# Patient Record
Sex: MALE | Race: BLACK OR AFRICAN AMERICAN
[De-identification: names, ages, dates, MRNs, and addresses within clinical notes are randomized per-mention and may not be internally consistent; named-entity substitution may affect disease eponyms.]

---

## 2017-05-29 ENCOUNTER — HOSPITAL ENCOUNTER (EMERGENCY)
Dept: HOSPITAL 8 - ED | Age: 42
Discharge: HOME | End: 2017-05-29
Payer: MEDICAID

## 2017-05-29 VITALS — WEIGHT: 198.2 LBS | BODY MASS INDEX: 30.04 KG/M2 | HEIGHT: 68 IN

## 2017-05-29 VITALS — SYSTOLIC BLOOD PRESSURE: 112 MMHG | DIASTOLIC BLOOD PRESSURE: 64 MMHG

## 2017-05-29 DIAGNOSIS — K29.20: ICD-10-CM

## 2017-05-29 DIAGNOSIS — E11.65: ICD-10-CM

## 2017-05-29 DIAGNOSIS — R10.13: Primary | ICD-10-CM

## 2017-05-29 DIAGNOSIS — K85.90: ICD-10-CM

## 2017-05-29 LAB
AST SERPL-CCNC: 47 U/L (ref 15–37)
BUN SERPL-MCNC: 9 MG/DL (ref 7–18)
DAU SCREEN: (no result)
PH BLDV: 7.35 PH (ref 7.32–7.42)

## 2017-05-29 PROCEDURE — 81003 URINALYSIS AUTO W/O SCOPE: CPT

## 2017-05-29 PROCEDURE — 80307 DRUG TEST PRSMV CHEM ANLYZR: CPT

## 2017-05-29 PROCEDURE — 96374 THER/PROPH/DIAG INJ IV PUSH: CPT

## 2017-05-29 PROCEDURE — 85025 COMPLETE CBC W/AUTO DIFF WBC: CPT

## 2017-05-29 PROCEDURE — 82803 BLOOD GASES ANY COMBINATION: CPT

## 2017-05-29 PROCEDURE — 93005 ELECTROCARDIOGRAM TRACING: CPT

## 2017-05-29 PROCEDURE — 76700 US EXAM ABDOM COMPLETE: CPT

## 2017-05-29 PROCEDURE — 36415 COLL VENOUS BLD VENIPUNCTURE: CPT

## 2017-05-29 PROCEDURE — 80053 COMPREHEN METABOLIC PANEL: CPT

## 2017-05-29 PROCEDURE — 74020: CPT

## 2017-05-29 PROCEDURE — 83690 ASSAY OF LIPASE: CPT

## 2017-05-29 PROCEDURE — 96361 HYDRATE IV INFUSION ADD-ON: CPT

## 2017-05-29 PROCEDURE — 99285 EMERGENCY DEPT VISIT HI MDM: CPT

## 2017-05-29 PROCEDURE — 82010 KETONE BODYS QUAN: CPT

## 2017-09-12 ENCOUNTER — HOSPITAL ENCOUNTER (EMERGENCY)
Facility: MEDICAL CENTER | Age: 42
End: 2017-09-12
Payer: MEDICAID

## 2017-09-12 VITALS
HEIGHT: 69 IN | HEART RATE: 94 BPM | DIASTOLIC BLOOD PRESSURE: 86 MMHG | BODY MASS INDEX: 28.02 KG/M2 | SYSTOLIC BLOOD PRESSURE: 128 MMHG | OXYGEN SATURATION: 100 % | TEMPERATURE: 98.2 F | RESPIRATION RATE: 16 BRPM | WEIGHT: 189.15 LBS

## 2017-09-12 PROCEDURE — 302449 STATCHG TRIAGE ONLY (STATISTIC)

## 2017-09-12 ASSESSMENT — PAIN SCALES - GENERAL: PAINLEVEL_OUTOF10: 10

## 2017-09-12 NOTE — ED NOTES
"Chief Complaint   Patient presents with   • Open Wound     C/O multiple open wounds to left foot for 2-3 weeks. + chills/body aches at home per pt. +DM. Pt recently got out of shelter does not have any of his diabetes medication or lancets to check suger levels.      Pt has appt with [facility] tomorrow.   /86   Pulse 94   Temp 36.8 °C (98.2 °F)   Resp 16   Ht 1.753 m (5' 9\")   Wt 85.8 kg (189 lb 2.5 oz)   SpO2 100%   BMI 27.93 kg/m²     "

## 2018-07-28 ENCOUNTER — APPOINTMENT (OUTPATIENT)
Dept: RADIOLOGY | Facility: MEDICAL CENTER | Age: 43
End: 2018-07-28
Attending: EMERGENCY MEDICINE
Payer: MEDICAID

## 2018-07-28 ENCOUNTER — HOSPITAL ENCOUNTER (OUTPATIENT)
Facility: MEDICAL CENTER | Age: 43
End: 2018-07-31
Attending: EMERGENCY MEDICINE | Admitting: HOSPITALIST
Payer: MEDICAID

## 2018-07-28 DIAGNOSIS — K85.20 ALCOHOL-INDUCED ACUTE PANCREATITIS, UNSPECIFIED COMPLICATION STATUS: ICD-10-CM

## 2018-07-28 PROBLEM — E11.9 DIABETES (HCC): Status: ACTIVE | Noted: 2018-07-28

## 2018-07-28 PROBLEM — F10.939 ALCOHOL WITHDRAWAL (HCC): Status: ACTIVE | Noted: 2018-07-28

## 2018-07-28 PROBLEM — E87.1 HYPONATREMIA: Status: ACTIVE | Noted: 2018-07-28

## 2018-07-28 LAB
ALBUMIN SERPL BCP-MCNC: 4.3 G/DL (ref 3.2–4.9)
ALBUMIN/GLOB SERPL: 1.5 G/DL
ALP SERPL-CCNC: 80 U/L (ref 30–99)
ALT SERPL-CCNC: 31 U/L (ref 2–50)
ANION GAP SERPL CALC-SCNC: 13 MMOL/L (ref 0–11.9)
APAP SERPL-MCNC: <10 UG/ML (ref 10–30)
AST SERPL-CCNC: 37 U/L (ref 12–45)
BASOPHILS # BLD AUTO: 0.4 % (ref 0–1.8)
BASOPHILS # BLD: 0.05 K/UL (ref 0–0.12)
BILIRUB SERPL-MCNC: 0.6 MG/DL (ref 0.1–1.5)
BNP SERPL-MCNC: 2 PG/ML (ref 0–100)
BUN SERPL-MCNC: 15 MG/DL (ref 8–22)
CALCIUM SERPL-MCNC: 8.9 MG/DL (ref 8.5–10.5)
CHLORIDE SERPL-SCNC: 99 MMOL/L (ref 96–112)
CO2 SERPL-SCNC: 17 MMOL/L (ref 20–33)
CREAT SERPL-MCNC: 0.65 MG/DL (ref 0.5–1.4)
EOSINOPHIL # BLD AUTO: 0.6 K/UL (ref 0–0.51)
EOSINOPHIL NFR BLD: 4.5 % (ref 0–6.9)
ERYTHROCYTE [DISTWIDTH] IN BLOOD BY AUTOMATED COUNT: 39.8 FL (ref 35.9–50)
ETHANOL BLD-MCNC: 0.09 G/DL
GLOBULIN SER CALC-MCNC: 2.9 G/DL (ref 1.9–3.5)
GLUCOSE SERPL-MCNC: 337 MG/DL (ref 65–99)
HCT VFR BLD AUTO: 45.2 % (ref 42–52)
HGB BLD-MCNC: 16 G/DL (ref 14–18)
IMM GRANULOCYTES # BLD AUTO: 0.06 K/UL (ref 0–0.11)
IMM GRANULOCYTES NFR BLD AUTO: 0.5 % (ref 0–0.9)
LACTATE BLD-SCNC: 1.2 MMOL/L (ref 0.5–2)
LIPASE SERPL-CCNC: 86 U/L (ref 11–82)
LYMPHOCYTES # BLD AUTO: 4.63 K/UL (ref 1–4.8)
LYMPHOCYTES NFR BLD: 35 % (ref 22–41)
MCH RBC QN AUTO: 32.1 PG (ref 27–33)
MCHC RBC AUTO-ENTMCNC: 35.4 G/DL (ref 33.7–35.3)
MCV RBC AUTO: 90.8 FL (ref 81.4–97.8)
MONOCYTES # BLD AUTO: 0.82 K/UL (ref 0–0.85)
MONOCYTES NFR BLD AUTO: 6.2 % (ref 0–13.4)
NEUTROPHILS # BLD AUTO: 7.08 K/UL (ref 1.82–7.42)
NEUTROPHILS NFR BLD: 53.4 % (ref 44–72)
NRBC # BLD AUTO: 0 K/UL
NRBC BLD-RTO: 0 /100 WBC
PLATELET # BLD AUTO: 255 K/UL (ref 164–446)
PMV BLD AUTO: 10.3 FL (ref 9–12.9)
POTASSIUM SERPL-SCNC: 4.8 MMOL/L (ref 3.6–5.5)
PROT SERPL-MCNC: 7.2 G/DL (ref 6–8.2)
RBC # BLD AUTO: 4.98 M/UL (ref 4.7–6.1)
SALICYLATES SERPL-MCNC: 0 MG/DL (ref 15–25)
SODIUM SERPL-SCNC: 129 MMOL/L (ref 135–145)
T4 FREE SERPL-MCNC: 0.85 NG/DL (ref 0.53–1.43)
TROPONIN I SERPL-MCNC: <0.01 NG/ML (ref 0–0.04)
TSH SERPL DL<=0.005 MIU/L-ACNC: 1.25 UIU/ML (ref 0.38–5.33)
WBC # BLD AUTO: 13.2 K/UL (ref 4.8–10.8)

## 2018-07-28 PROCEDURE — 80307 DRUG TEST PRSMV CHEM ANLYZR: CPT

## 2018-07-28 PROCEDURE — 80053 COMPREHEN METABOLIC PANEL: CPT

## 2018-07-28 PROCEDURE — 700101 HCHG RX REV CODE 250: Performed by: HOSPITALIST

## 2018-07-28 PROCEDURE — 83036 HEMOGLOBIN GLYCOSYLATED A1C: CPT

## 2018-07-28 PROCEDURE — 83880 ASSAY OF NATRIURETIC PEPTIDE: CPT

## 2018-07-28 PROCEDURE — 85025 COMPLETE CBC W/AUTO DIFF WBC: CPT

## 2018-07-28 PROCEDURE — A9270 NON-COVERED ITEM OR SERVICE: HCPCS | Performed by: HOSPITALIST

## 2018-07-28 PROCEDURE — G0378 HOSPITAL OBSERVATION PER HR: HCPCS

## 2018-07-28 PROCEDURE — 700105 HCHG RX REV CODE 258: Performed by: HOSPITALIST

## 2018-07-28 PROCEDURE — 99219 PR INITIAL OBSERVATION CARE,LEVL II: CPT | Performed by: HOSPITALIST

## 2018-07-28 PROCEDURE — 84439 ASSAY OF FREE THYROXINE: CPT

## 2018-07-28 PROCEDURE — 84443 ASSAY THYROID STIM HORMONE: CPT

## 2018-07-28 PROCEDURE — 700102 HCHG RX REV CODE 250 W/ 637 OVERRIDE(OP): Performed by: HOSPITALIST

## 2018-07-28 PROCEDURE — 99285 EMERGENCY DEPT VISIT HI MDM: CPT

## 2018-07-28 PROCEDURE — 700111 HCHG RX REV CODE 636 W/ 250 OVERRIDE (IP): Performed by: HOSPITALIST

## 2018-07-28 PROCEDURE — 71045 X-RAY EXAM CHEST 1 VIEW: CPT

## 2018-07-28 PROCEDURE — 83690 ASSAY OF LIPASE: CPT

## 2018-07-28 PROCEDURE — 83605 ASSAY OF LACTIC ACID: CPT

## 2018-07-28 PROCEDURE — 84484 ASSAY OF TROPONIN QUANT: CPT

## 2018-07-28 PROCEDURE — 36415 COLL VENOUS BLD VENIPUNCTURE: CPT

## 2018-07-28 PROCEDURE — 70450 CT HEAD/BRAIN W/O DYE: CPT

## 2018-07-28 RX ORDER — LORAZEPAM 1 MG/1
1 TABLET ORAL EVERY 4 HOURS PRN
Status: DISCONTINUED | OUTPATIENT
Start: 2018-07-28 | End: 2018-07-31 | Stop reason: HOSPADM

## 2018-07-28 RX ORDER — AMOXICILLIN 250 MG
2 CAPSULE ORAL 2 TIMES DAILY
Status: DISCONTINUED | OUTPATIENT
Start: 2018-07-28 | End: 2018-07-31 | Stop reason: HOSPADM

## 2018-07-28 RX ORDER — BISACODYL 10 MG
10 SUPPOSITORY, RECTAL RECTAL
Status: DISCONTINUED | OUTPATIENT
Start: 2018-07-28 | End: 2018-07-31 | Stop reason: HOSPADM

## 2018-07-28 RX ORDER — LORAZEPAM 1 MG/1
4 TABLET ORAL
Status: DISCONTINUED | OUTPATIENT
Start: 2018-07-28 | End: 2018-07-31 | Stop reason: HOSPADM

## 2018-07-28 RX ORDER — LORAZEPAM 2 MG/ML
1.5 INJECTION INTRAMUSCULAR
Status: DISCONTINUED | OUTPATIENT
Start: 2018-07-28 | End: 2018-07-31 | Stop reason: HOSPADM

## 2018-07-28 RX ORDER — POLYETHYLENE GLYCOL 3350 17 G/17G
1 POWDER, FOR SOLUTION ORAL
Status: DISCONTINUED | OUTPATIENT
Start: 2018-07-28 | End: 2018-07-31 | Stop reason: HOSPADM

## 2018-07-28 RX ORDER — NICOTINE 21 MG/24HR
21 PATCH, TRANSDERMAL 24 HOURS TRANSDERMAL
Status: DISCONTINUED | OUTPATIENT
Start: 2018-07-29 | End: 2018-07-31 | Stop reason: HOSPADM

## 2018-07-28 RX ORDER — DEXTROSE MONOHYDRATE 25 G/50ML
25 INJECTION, SOLUTION INTRAVENOUS
Status: DISCONTINUED | OUTPATIENT
Start: 2018-07-28 | End: 2018-07-31 | Stop reason: HOSPADM

## 2018-07-28 RX ORDER — LORAZEPAM 2 MG/ML
0.5 INJECTION INTRAMUSCULAR EVERY 4 HOURS PRN
Status: DISCONTINUED | OUTPATIENT
Start: 2018-07-28 | End: 2018-07-31 | Stop reason: HOSPADM

## 2018-07-28 RX ORDER — LORAZEPAM 0.5 MG/1
0.5 TABLET ORAL EVERY 4 HOURS PRN
Status: DISCONTINUED | OUTPATIENT
Start: 2018-07-28 | End: 2018-07-31 | Stop reason: HOSPADM

## 2018-07-28 RX ORDER — GABAPENTIN 100 MG/1
100 CAPSULE ORAL 3 TIMES DAILY
Status: DISCONTINUED | OUTPATIENT
Start: 2018-07-29 | End: 2018-07-30

## 2018-07-28 RX ORDER — LORAZEPAM 2 MG/ML
1 INJECTION INTRAMUSCULAR
Status: DISCONTINUED | OUTPATIENT
Start: 2018-07-28 | End: 2018-07-31 | Stop reason: HOSPADM

## 2018-07-28 RX ORDER — SODIUM CHLORIDE 9 MG/ML
INJECTION, SOLUTION INTRAVENOUS CONTINUOUS
Status: DISCONTINUED | OUTPATIENT
Start: 2018-07-28 | End: 2018-07-31

## 2018-07-28 RX ORDER — LORAZEPAM 2 MG/ML
2 INJECTION INTRAMUSCULAR
Status: DISCONTINUED | OUTPATIENT
Start: 2018-07-28 | End: 2018-07-31 | Stop reason: HOSPADM

## 2018-07-28 RX ORDER — LORAZEPAM 1 MG/1
3 TABLET ORAL
Status: DISCONTINUED | OUTPATIENT
Start: 2018-07-28 | End: 2018-07-31 | Stop reason: HOSPADM

## 2018-07-28 RX ORDER — LORAZEPAM 1 MG/1
2 TABLET ORAL
Status: DISCONTINUED | OUTPATIENT
Start: 2018-07-28 | End: 2018-07-31 | Stop reason: HOSPADM

## 2018-07-28 RX ADMIN — LORAZEPAM 0.5 MG: 0.5 TABLET ORAL at 22:20

## 2018-07-28 RX ADMIN — FOLIC ACID: 5 INJECTION, SOLUTION INTRAMUSCULAR; INTRAVENOUS; SUBCUTANEOUS at 22:20

## 2018-07-28 RX ADMIN — SODIUM CHLORIDE: 9 INJECTION, SOLUTION INTRAVENOUS at 22:20

## 2018-07-28 ASSESSMENT — LIFESTYLE VARIABLES
EVER FELT BAD OR GUILTY ABOUT YOUR DRINKING: YES
ANXIETY: MILDLY ANXIOUS
DO YOU DRINK ALCOHOL: YES
EVER HAD A DRINK FIRST THING IN THE MORNING TO STEADY YOUR NERVES TO GET RID OF A HANGOVER: YES
DOES PATIENT WANT TO STOP DRINKING: YES
TOTAL SCORE: 4
HAVE YOU EVER FELT YOU SHOULD CUT DOWN ON YOUR DRINKING: YES
TOTAL SCORE: 4
TOTAL SCORE: 4
EVER_SMOKED: YES
EVER HAD A DRINK FIRST THING IN THE MORNING TO STEADY YOUR NERVES TO GET RID OF A HANGOVER: YES
HAVE YOU EVER FELT YOU SHOULD CUT DOWN ON YOUR DRINKING: YES
EVER FELT BAD OR GUILTY ABOUT YOUR DRINKING: YES
TREMOR: TREMOR NOT VISIBLE BUT CAN BE FELT, FINGERTIP TO FINGERTIP
CONSUMPTION TOTAL: POSITIVE
TOTAL SCORE: 4
AUDITORY DISTURBANCES: NOT PRESENT
TOTAL SCORE: VERY MILD ITCHING, PINS AND NEEDLES SENSATION, BURNING OR NUMBNESS
DOES PATIENT WANT TO TALK TO SOMEONE ABOUT QUITTING: YES
HEADACHE, FULLNESS IN HEAD: VERY MILD
TOTAL SCORE: 4
HAVE PEOPLE ANNOYED YOU BY CRITICIZING YOUR DRINKING: YES
HOW MANY TIMES IN THE PAST YEAR HAVE YOU HAD 5 OR MORE DRINKS IN A DAY: 240
ALCOHOL_USE: YES
DOES PATIENT WANT TO STOP DRINKING: YES
AVERAGE NUMBER OF DAYS PER WEEK YOU HAVE A DRINK CONTAINING ALCOHOL: 7
VISUAL DISTURBANCES: NOT PRESENT
NAUSEA AND VOMITING: NO NAUSEA AND NO VOMITING
TOTAL SCORE: 4
TOTAL SCORE: 5
ON A TYPICAL DAY WHEN YOU DRINK ALCOHOL HOW MANY DRINKS DO YOU HAVE: 4.5
DOES PATIENT WANT TO TALK TO SOMEONE ABOUT QUITTING: NO
AGITATION: NORMAL ACTIVITY
HAVE PEOPLE ANNOYED YOU BY CRITICIZING YOUR DRINKING: YES
ORIENTATION AND CLOUDING OF SENSORIUM: ORIENTED AND CAN DO SERIAL ADDITIONS
CONSUMPTION TOTAL: INCOMPLETE
PAROXYSMAL SWEATS: BARELY PERCEPTIBLE SWEATING. PALMS MOIST

## 2018-07-28 ASSESSMENT — COGNITIVE AND FUNCTIONAL STATUS - GENERAL
WALKING IN HOSPITAL ROOM: A LITTLE
SUGGESTED CMS G CODE MODIFIER MOBILITY: CJ
SUGGESTED CMS G CODE MODIFIER DAILY ACTIVITY: CH
CLIMB 3 TO 5 STEPS WITH RAILING: A LITTLE
STANDING UP FROM CHAIR USING ARMS: A LITTLE
DAILY ACTIVITIY SCORE: 24
MOBILITY SCORE: 21

## 2018-07-28 ASSESSMENT — ENCOUNTER SYMPTOMS
DIARRHEA: 0
PALPITATIONS: 0
CONSTIPATION: 0
ABDOMINAL PAIN: 1
BLOOD IN STOOL: 0
COUGH: 0
ORTHOPNEA: 0
DIZZINESS: 0
HEMOPTYSIS: 0
VOMITING: 1
SPUTUM PRODUCTION: 0
CHILLS: 0
NAUSEA: 1

## 2018-07-28 ASSESSMENT — PATIENT HEALTH QUESTIONNAIRE - PHQ9
2. FEELING DOWN, DEPRESSED, IRRITABLE, OR HOPELESS: NEARLY EVERY DAY
6. FEELING BAD ABOUT YOURSELF - OR THAT YOU ARE A FAILURE OR HAVE LET YOURSELF OR YOUR FAMILY DOWN: SEVERAL DAYS
SUM OF ALL RESPONSES TO PHQ QUESTIONS 1-9: 13
3. TROUBLE FALLING OR STAYING ASLEEP OR SLEEPING TOO MUCH: MORE THAN HALF THE DAYS
1. LITTLE INTEREST OR PLEASURE IN DOING THINGS: MORE THAN HALF THE DAYS
7. TROUBLE CONCENTRATING ON THINGS, SUCH AS READING THE NEWSPAPER OR WATCHING TELEVISION: SEVERAL DAYS
8. MOVING OR SPEAKING SO SLOWLY THAT OTHER PEOPLE COULD HAVE NOTICED. OR THE OPPOSITE, BEING SO FIGETY OR RESTLESS THAT YOU HAVE BEEN MOVING AROUND A LOT MORE THAN USUAL: SEVERAL DAYS
SUM OF ALL RESPONSES TO PHQ9 QUESTIONS 1 AND 2: 5
4. FEELING TIRED OR HAVING LITTLE ENERGY: MORE THAN HALF THE DAYS
5. POOR APPETITE OR OVEREATING: SEVERAL DAYS
9. THOUGHTS THAT YOU WOULD BE BETTER OFF DEAD, OR OF HURTING YOURSELF: NOT AT ALL

## 2018-07-28 ASSESSMENT — PAIN SCALES - GENERAL: PAINLEVEL_OUTOF10: 8
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
## 2018-07-28 NOTE — ED TRIAGE NOTES
"Pt reports feels \"tired\" for the past week. States that he has been off his medications for about 1 year. Pt drinks daily. Says he only drinks a few a day but has too have  A drink in the mornings to steady his nerves. Reports drinking last night. Pt reports history of DM and neuropathy. Denies SI, however states that sometimes his feet hurt so bad that he does think about it at times but has never tried to hurt himself.  "

## 2018-07-28 NOTE — ED NOTES
Med rec complete per pt at bedside   Pt is supposed to be on diabetic medications but states he has not taken any medications X 7 months   NKDA  No oral ABX within last 30 days

## 2018-07-29 ENCOUNTER — APPOINTMENT (OUTPATIENT)
Dept: RADIOLOGY | Facility: MEDICAL CENTER | Age: 43
End: 2018-07-29
Attending: HOSPITALIST
Payer: MEDICAID

## 2018-07-29 LAB
ALBUMIN SERPL BCP-MCNC: 3.9 G/DL (ref 3.2–4.9)
ALBUMIN/GLOB SERPL: 1.6 G/DL
ALP SERPL-CCNC: 77 U/L (ref 30–99)
ALT SERPL-CCNC: 25 U/L (ref 2–50)
ANION GAP SERPL CALC-SCNC: 8 MMOL/L (ref 0–11.9)
AST SERPL-CCNC: 15 U/L (ref 12–45)
BASOPHILS # BLD AUTO: 0.4 % (ref 0–1.8)
BASOPHILS # BLD: 0.05 K/UL (ref 0–0.12)
BILIRUB SERPL-MCNC: 0.7 MG/DL (ref 0.1–1.5)
BUN SERPL-MCNC: 12 MG/DL (ref 8–22)
CALCIUM SERPL-MCNC: 8.8 MG/DL (ref 8.5–10.5)
CHLORIDE SERPL-SCNC: 101 MMOL/L (ref 96–112)
CO2 SERPL-SCNC: 24 MMOL/L (ref 20–33)
CREAT SERPL-MCNC: 0.67 MG/DL (ref 0.5–1.4)
EOSINOPHIL # BLD AUTO: 0.59 K/UL (ref 0–0.51)
EOSINOPHIL NFR BLD: 5.1 % (ref 0–6.9)
ERYTHROCYTE [DISTWIDTH] IN BLOOD BY AUTOMATED COUNT: 39.5 FL (ref 35.9–50)
GLOBULIN SER CALC-MCNC: 2.4 G/DL (ref 1.9–3.5)
GLUCOSE BLD-MCNC: 235 MG/DL (ref 65–99)
GLUCOSE BLD-MCNC: 250 MG/DL (ref 65–99)
GLUCOSE BLD-MCNC: 277 MG/DL (ref 65–99)
GLUCOSE BLD-MCNC: 282 MG/DL (ref 65–99)
GLUCOSE SERPL-MCNC: 376 MG/DL (ref 65–99)
HCT VFR BLD AUTO: 44.9 % (ref 42–52)
HGB BLD-MCNC: 15.7 G/DL (ref 14–18)
IMM GRANULOCYTES # BLD AUTO: 0.06 K/UL (ref 0–0.11)
IMM GRANULOCYTES NFR BLD AUTO: 0.5 % (ref 0–0.9)
LIPASE SERPL-CCNC: 44 U/L (ref 11–82)
LYMPHOCYTES # BLD AUTO: 3.53 K/UL (ref 1–4.8)
LYMPHOCYTES NFR BLD: 30.6 % (ref 22–41)
MCH RBC QN AUTO: 31.8 PG (ref 27–33)
MCHC RBC AUTO-ENTMCNC: 35 G/DL (ref 33.7–35.3)
MCV RBC AUTO: 90.9 FL (ref 81.4–97.8)
MONOCYTES # BLD AUTO: 0.97 K/UL (ref 0–0.85)
MONOCYTES NFR BLD AUTO: 8.4 % (ref 0–13.4)
NEUTROPHILS # BLD AUTO: 6.35 K/UL (ref 1.82–7.42)
NEUTROPHILS NFR BLD: 55 % (ref 44–72)
NRBC # BLD AUTO: 0 K/UL
NRBC BLD-RTO: 0 /100 WBC
PLATELET # BLD AUTO: 266 K/UL (ref 164–446)
PMV BLD AUTO: 9.9 FL (ref 9–12.9)
POTASSIUM SERPL-SCNC: 4 MMOL/L (ref 3.6–5.5)
PROT SERPL-MCNC: 6.3 G/DL (ref 6–8.2)
RBC # BLD AUTO: 4.94 M/UL (ref 4.7–6.1)
SODIUM SERPL-SCNC: 133 MMOL/L (ref 135–145)
WBC # BLD AUTO: 11.6 K/UL (ref 4.8–10.8)

## 2018-07-29 PROCEDURE — 700102 HCHG RX REV CODE 250 W/ 637 OVERRIDE(OP): Performed by: HOSPITALIST

## 2018-07-29 PROCEDURE — 36415 COLL VENOUS BLD VENIPUNCTURE: CPT

## 2018-07-29 PROCEDURE — 76705 ECHO EXAM OF ABDOMEN: CPT

## 2018-07-29 PROCEDURE — A9270 NON-COVERED ITEM OR SERVICE: HCPCS | Performed by: HOSPITALIST

## 2018-07-29 PROCEDURE — 85025 COMPLETE CBC W/AUTO DIFF WBC: CPT

## 2018-07-29 PROCEDURE — G0378 HOSPITAL OBSERVATION PER HR: HCPCS

## 2018-07-29 PROCEDURE — 700111 HCHG RX REV CODE 636 W/ 250 OVERRIDE (IP): Performed by: HOSPITALIST

## 2018-07-29 PROCEDURE — 700101 HCHG RX REV CODE 250: Performed by: HOSPITALIST

## 2018-07-29 PROCEDURE — 700105 HCHG RX REV CODE 258: Performed by: HOSPITALIST

## 2018-07-29 PROCEDURE — 83690 ASSAY OF LIPASE: CPT

## 2018-07-29 PROCEDURE — 80053 COMPREHEN METABOLIC PANEL: CPT

## 2018-07-29 PROCEDURE — 99225 PR SUBSEQUENT OBSERVATION CARE,LEVEL II: CPT | Performed by: HOSPITALIST

## 2018-07-29 PROCEDURE — 82962 GLUCOSE BLOOD TEST: CPT

## 2018-07-29 RX ORDER — OXYCODONE HYDROCHLORIDE 5 MG/1
5 TABLET ORAL EVERY 4 HOURS PRN
Status: DISCONTINUED | OUTPATIENT
Start: 2018-07-29 | End: 2018-07-30

## 2018-07-29 RX ORDER — INSULIN GLARGINE 100 [IU]/ML
10 INJECTION, SOLUTION SUBCUTANEOUS EVERY EVENING
Status: DISCONTINUED | OUTPATIENT
Start: 2018-07-29 | End: 2018-07-31

## 2018-07-29 RX ORDER — OXYCODONE HYDROCHLORIDE 10 MG/1
10 TABLET ORAL EVERY 4 HOURS PRN
Status: DISCONTINUED | OUTPATIENT
Start: 2018-07-29 | End: 2018-07-30

## 2018-07-29 RX ADMIN — GABAPENTIN 100 MG: 100 CAPSULE ORAL at 06:33

## 2018-07-29 RX ADMIN — LORAZEPAM 0.5 MG: 0.5 TABLET ORAL at 02:20

## 2018-07-29 RX ADMIN — LORAZEPAM 0.5 MG: 0.5 TABLET ORAL at 11:39

## 2018-07-29 RX ADMIN — NICOTINE 21 MG: 21 PATCH, EXTENDED RELEASE TRANSDERMAL at 06:33

## 2018-07-29 RX ADMIN — INSULIN HUMAN 3 UNITS: 100 INJECTION, SOLUTION PARENTERAL at 11:39

## 2018-07-29 RX ADMIN — INSULIN GLARGINE 10 UNITS: 100 INJECTION, SOLUTION SUBCUTANEOUS at 17:06

## 2018-07-29 RX ADMIN — FOLIC ACID: 5 INJECTION, SOLUTION INTRAMUSCULAR; INTRAVENOUS; SUBCUTANEOUS at 22:52

## 2018-07-29 RX ADMIN — GABAPENTIN 100 MG: 100 CAPSULE ORAL at 17:07

## 2018-07-29 RX ADMIN — INSULIN HUMAN 3 UNITS: 100 INJECTION, SOLUTION PARENTERAL at 20:14

## 2018-07-29 RX ADMIN — OXYCODONE HYDROCHLORIDE 5 MG: 5 TABLET ORAL at 17:19

## 2018-07-29 RX ADMIN — LORAZEPAM 0.5 MG: 0.5 TABLET ORAL at 06:34

## 2018-07-29 RX ADMIN — ENOXAPARIN SODIUM 40 MG: 100 INJECTION SUBCUTANEOUS at 06:33

## 2018-07-29 RX ADMIN — INSULIN HUMAN 5 UNITS: 100 INJECTION, SOLUTION PARENTERAL at 17:06

## 2018-07-29 RX ADMIN — INSULIN HUMAN 5 UNITS: 100 INJECTION, SOLUTION PARENTERAL at 07:55

## 2018-07-29 RX ADMIN — LORAZEPAM 0.5 MG: 0.5 TABLET ORAL at 20:14

## 2018-07-29 RX ADMIN — STANDARDIZED SENNA CONCENTRATE AND DOCUSATE SODIUM 2 TABLET: 8.6; 5 TABLET, FILM COATED ORAL at 06:34

## 2018-07-29 RX ADMIN — GABAPENTIN 100 MG: 100 CAPSULE ORAL at 11:39

## 2018-07-29 ASSESSMENT — ENCOUNTER SYMPTOMS
LOSS OF CONSCIOUSNESS: 0
COUGH: 0
EYE DISCHARGE: 0
BACK PAIN: 0
ABDOMINAL PAIN: 0
NAUSEA: 0
HEMOPTYSIS: 0
FOCAL WEAKNESS: 0
POLYDIPSIA: 1
NECK PAIN: 0
WHEEZING: 0
SPEECH CHANGE: 0
BRUISES/BLEEDS EASILY: 0
CLAUDICATION: 0
DEPRESSION: 0
VOMITING: 0
PALPITATIONS: 0
WEAKNESS: 0
MYALGIAS: 0
HEADACHES: 0
DIAPHORESIS: 0
SENSORY CHANGE: 0
CONSTIPATION: 0
DIARRHEA: 0
SORE THROAT: 0
DIZZINESS: 0
SPUTUM PRODUCTION: 0
CHILLS: 0
SHORTNESS OF BREATH: 0
EYE PAIN: 0
FEVER: 0

## 2018-07-29 ASSESSMENT — LIFESTYLE VARIABLES
TOTAL SCORE: 5
HEADACHE, FULLNESS IN HEAD: NOT PRESENT
TREMOR: *
TOTAL SCORE: VERY MILD ITCHING, PINS AND NEEDLES SENSATION, BURNING OR NUMBNESS
ANXIETY: MILDLY ANXIOUS
ANXIETY: MILDLY ANXIOUS
TOTAL SCORE: VERY MILD ITCHING, PINS AND NEEDLES SENSATION, BURNING OR NUMBNESS
TOTAL SCORE: VERY MILD ITCHING, PINS AND NEEDLES SENSATION, BURNING OR NUMBNESS
NAUSEA AND VOMITING: NO NAUSEA AND NO VOMITING
AUDITORY DISTURBANCES: NOT PRESENT
SUBSTANCE_ABUSE: 1
HEADACHE, FULLNESS IN HEAD: VERY MILD
TOTAL SCORE: VERY MILD ITCHING, PINS AND NEEDLES SENSATION, BURNING OR NUMBNESS
VISUAL DISTURBANCES: NOT PRESENT
NAUSEA AND VOMITING: NO NAUSEA AND NO VOMITING
TOTAL SCORE: 6
HEADACHE, FULLNESS IN HEAD: NOT PRESENT
AUDITORY DISTURBANCES: NOT PRESENT
TREMOR: *
VISUAL DISTURBANCES: NOT PRESENT
ORIENTATION AND CLOUDING OF SENSORIUM: ORIENTED AND CAN DO SERIAL ADDITIONS
ANXIETY: MILDLY ANXIOUS
PAROXYSMAL SWEATS: BARELY PERCEPTIBLE SWEATING. PALMS MOIST
TREMOR: TREMOR NOT VISIBLE BUT CAN BE FELT, FINGERTIP TO FINGERTIP
AUDITORY DISTURBANCES: NOT PRESENT
TOTAL SCORE: 5
TREMOR: *
PAROXYSMAL SWEATS: BARELY PERCEPTIBLE SWEATING. PALMS MOIST
ANXIETY: MILDLY ANXIOUS
PAROXYSMAL SWEATS: BARELY PERCEPTIBLE SWEATING. PALMS MOIST
ORIENTATION AND CLOUDING OF SENSORIUM: ORIENTED AND CAN DO SERIAL ADDITIONS
HEADACHE, FULLNESS IN HEAD: VERY MILD
AGITATION: NORMAL ACTIVITY
AUDITORY DISTURBANCES: NOT PRESENT
PAROXYSMAL SWEATS: BARELY PERCEPTIBLE SWEATING. PALMS MOIST
ORIENTATION AND CLOUDING OF SENSORIUM: ORIENTED AND CAN DO SERIAL ADDITIONS
TOTAL SCORE: 5
NAUSEA AND VOMITING: NO NAUSEA AND NO VOMITING
VISUAL DISTURBANCES: NOT PRESENT
AGITATION: NORMAL ACTIVITY
VISUAL DISTURBANCES: NOT PRESENT
ORIENTATION AND CLOUDING OF SENSORIUM: ORIENTED AND CAN DO SERIAL ADDITIONS
AGITATION: NORMAL ACTIVITY
AGITATION: NORMAL ACTIVITY
NAUSEA AND VOMITING: NO NAUSEA AND NO VOMITING

## 2018-07-29 ASSESSMENT — PAIN SCALES - GENERAL
PAINLEVEL_OUTOF10: 8
PAINLEVEL_OUTOF10: 5

## 2018-07-29 NOTE — ASSESSMENT & PLAN NOTE
Patient has a history of diabetes, he does not currently take medications for it  Complications include peripheral neuropathy  Start insulin sliding  Start gabapentin for neuropathy  7/29:  Started lantus 10 units qhs since blood sugars 200/300.  a1c pending.  Diabetic education, will need insulin at [place].  7/30:  Added metformin 500mg po bid.

## 2018-07-29 NOTE — ASSESSMENT & PLAN NOTE
Lipase is only slightly elevated however the patient endorses symptoms consistent with pancreatitis  IV fluid hydration  Lipase 86 to 44.  Hungry:  Advanced clear liquid diet to full liquid.  Resolved, 2/2 chronic alcohol abuse.

## 2018-07-29 NOTE — PROGRESS NOTES
Assumed care of pt, received report from day shift RN, pt assessed.  Pt complaining of 8/10 BL foot pain and abdominal pain, no pain medication available at this time, will reassess pain when medication available.  Pt is A&O x4.  Pt moderate fall risk, wearing treaded socks, bed locked and in lowest position, bed alarm not indicated.  Pt instructed to call for assistance prior to getting OOB, pt verbalized understanding.  Call light, phone, and personal belongings within reach.

## 2018-07-29 NOTE — CARE PLAN
Problem: Communication  Goal: The ability to communicate needs accurately and effectively will improve  Outcome: PROGRESSING SLOWER THAN EXPECTED  Pt is sleeping this am, slightly difficult to arouse. CIWA protocol in place. siderails padded, suction set up at bedside. Rally bag infusing with NS. Will monitor.     Problem: Infection  Goal: Will remain free from infection  Outcome: PROGRESSING AS EXPECTED  Labs and vitals stable.

## 2018-07-29 NOTE — H&P
"Hospital Medicine History & Physical Note    Date of Service  7/28/2018    Primary Care Physician  Pcp Pt States None    Consultants  None    Code Status  Full Code    Chief Complaint  \"My pancreas is bothering me\"    History of Presenting Illness  43 y.o. male who presented 7/28/2018 with abdominal pain, he says that his pancreas is bothering him.    Mr Escudero has a history of alcoholism and diabetes, he is currently not on any treatment for his diabetes.  Patient drinks several beers per day, maybe 6-12.  He says that he has had prior episodes of pancreatitis and alcohol withdrawal.  He presented to the emergency room today complaining of upper quadrant abdominal pain without radiation, the pain is burning, it is associated with nausea and vomiting which has been nonbloody.  This is similar to prior episodes of pancreatitis.  Patient's last drink was last night.  It is noted by labs that the patient's blood sugars grossly uncontrolled, he endorses polyuria and polydipsia    Review of Systems  Review of Systems   Constitutional: Negative for chills and malaise/fatigue.   Respiratory: Negative for cough, hemoptysis and sputum production.    Cardiovascular: Negative for chest pain, palpitations and orthopnea.   Gastrointestinal: Positive for abdominal pain, nausea and vomiting. Negative for blood in stool, constipation and diarrhea.   Genitourinary: Positive for frequency.   Skin: Negative for itching and rash.   Neurological: Negative for dizziness.   All other systems reviewed and are negative.      Past Medical History   has a past medical history of Alcohol abuse; Depression (2010); Diabetes; and Pneumonia (approx 2011).    Family History  Father with cancer, details unknown    Social History   reports that he has been smoking Cigarettes.  He has a 10.00 pack-year smoking history. He has never used smokeless tobacco. He reports that he drinks alcohol. He reports that he does not use " drugs.    Allergies  Allergies   Allergen Reactions   • Apple Hives   • Lactose Diarrhea       Medications  None       Physical Exam  Blood Pressure: 127/83   Temperature: 36.6 °C (97.8 °F)   Pulse: 81   Respiration: 17   Pulse Oximetry: 93 %     Physical Exam   Constitutional: He is oriented to person, place, and time. He appears well-developed and well-nourished.   HENT:   Head: Normocephalic and atraumatic.   Eyes: Conjunctivae and EOM are normal. Right eye exhibits no discharge. Left eye exhibits no discharge.   Cardiovascular: Normal rate, regular rhythm and intact distal pulses.    No murmur heard.  2+ Radial Pulses  Brisk Capillary Refill   Pulmonary/Chest: Effort normal and breath sounds normal. No respiratory distress. He has no wheezes.   Abdominal: Soft. Bowel sounds are normal. He exhibits no distension. There is tenderness. There is no rebound and no guarding.   Musculoskeletal: Normal range of motion. He exhibits no edema.   Neurological: He is alert and oriented to person, place, and time. No cranial nerve deficit.   Sensation to light touch is decreased distally   Skin: Skin is warm and dry. He is not diaphoretic. No erythema.   Skin is warm and well perfused   Psychiatric: He has a normal mood and affect.       Laboratory:  Recent Labs      07/28/18   1120   WBC  13.2*   RBC  4.98   HEMOGLOBIN  16.0   HEMATOCRIT  45.2   MCV  90.8   MCH  32.1   MCHC  35.4*   RDW  39.8   PLATELETCT  255   MPV  10.3     Recent Labs      07/28/18   1120   SODIUM  129*   POTASSIUM  4.8   CHLORIDE  99   CO2  17*   GLUCOSE  337*   BUN  15   CREATININE  0.65   CALCIUM  8.9     Recent Labs      07/28/18   1120   ALTSGPT  31   ASTSGOT  37   ALKPHOSPHAT  80   TBILIRUBIN  0.6   LIPASE  86*   GLUCOSE  337*         Recent Labs      07/28/18   1120   BNPBTYPENAT  2         Lab Results   Component Value Date    TROPONINI <0.01 07/28/2018       Urinalysis:    Lab Results   Component Value Date    SPECGRAVITY 1.013 12/14/2012     GLUCOSEUR >1000 (A) 12/14/2012    KETONES 20 (A) 12/14/2012    NITRITE Negative 12/14/2012    WBCURINE 0-2 (A) 12/14/2012    RBCURINE None observed 12/14/2012    BACTERIA None observed 12/14/2012    EPITHELCELL Few 12/14/2012        Imaging:  CT-HEAD W/O   Final Result      1.  Head CT without contrast within normal limits. No evidence of acute cerebral infarction, hemorrhage or mass lesion.   2.  There is right-sided chronic sinus disease.      DX-CHEST-PORTABLE (1 VIEW)   Final Result      No acute cardiopulmonary process is seen.            Assessment/Plan:  I anticipate this patient is appropriate for observation status at this time.    Diabetes (HCC)- (present on admission)   Assessment & Plan    Patient has a history of diabetes, he does not currently take medications for it  Complications include peripheral neuropathy  Start insulin sliding  Start gabapentin for neuropathy        Alcohol withdrawal (HCC)- (present on admission)   Assessment & Plan    Has history of alcohol withdrawal, last drink was last night, start CIWA protocol        Alcohol-induced acute pancreatitis without infection or necrosis- (present on admission)   Assessment & Plan    Lipase is only slightly elevated however the patient endorses symptoms consistent with pancreatitis  IV fluid hydration        Hyponatremia- (present on admission)   Assessment & Plan    Suspect hypovolemic hyponatremia, IV fluid hydration, repeat labs tomorrow            VTE prophylaxis: Lovenox

## 2018-07-29 NOTE — PROGRESS NOTES
2 RN skin check complete. All bony prominences checked. Pt has very dry skin on heels and toes but no obvious breakdown. No pressure sores or reddened areas.

## 2018-07-29 NOTE — ASSESSMENT & PLAN NOTE
Has history of alcohol withdrawal, last drink was last night, start CIWA protocol  7/29:  Discussed AA, inpatient rehab, need for cessation.  States lives with GF who does not drink alcohol.  7/30:  CIWA remains 5,4,4.  No hallucinations, no tremor noted on exam, VSS.

## 2018-07-29 NOTE — PROGRESS NOTES
[facility] Hospitalist Progress Note    Date of Service: 7/29/2018    Chief Complaint  43 y.o. male admitted 7/28/2018 with abdominal pain, he says that his pancreas is bothering him.     Mr Escudero has a history of alcoholism and diabetes, he is currently not on any treatment for his diabetes.  Patient drinks several beers per day, maybe 6-12.  He says that he has had prior episodes of pancreatitis and alcohol withdrawal.  He presented to the emergency room today complaining of upper quadrant abdominal pain without radiation, the pain is burning, it is associated with nausea and vomiting which has been nonbloody.  This is similar to prior episodes of pancreatitis.  Patient's last drink was last night.  It is noted by labs that the patient's blood sugars grossly uncontrolled, he endorses polyuria and polydipsia.  Elevated Hga1c since 2012 on review.       Interval Problem Update  7/29:  Awakens to name, c/o pain in epigastrium, but states hungry and wants to eat, advanced diet from clears to full liquid.  CIWA 6,5,5. Added lantus 10 units qhs.    Consultants/Specialty  none    Disposition  Home.  AA or inpatient alcohol rehab discussed.  Lives with GF who does not drink per   patient.  Will need insulin at [place]. Diabetic education ordered.        Review of Systems   Constitutional: Positive for malaise/fatigue. Negative for chills, diaphoresis and fever.   HENT: Negative for congestion and sore throat.    Eyes: Negative for pain and discharge.   Respiratory: Negative for cough, hemoptysis, sputum production, shortness of breath and wheezing.    Cardiovascular: Negative for chest pain, palpitations, claudication and leg swelling.   Gastrointestinal: Negative for abdominal pain, constipation, diarrhea, melena, nausea and vomiting.   Genitourinary: Negative for dysuria, frequency and urgency.   Musculoskeletal: Negative for back pain, joint pain, myalgias and neck pain.   Skin: Negative for itching and rash.   Neurological:  Negative for dizziness, sensory change, speech change, focal weakness, loss of consciousness, weakness and headaches.   Endo/Heme/Allergies: Positive for polydipsia. Does not bruise/bleed easily.   Psychiatric/Behavioral: Positive for substance abuse. Negative for depression and suicidal ideas.      Physical Exam  Laboratory/Imaging   Hemodynamics  Temp (24hrs), Av.6 °C (97.8 °F), Min:35.9 °C (96.7 °F), Max:36.9 °C (98.5 °F)   Temperature: 35.9 °C (96.7 °F)  Pulse  Av.7  Min: 66  Max: 97    Blood Pressure: 127/86      Respiratory      Respiration: 18, Pulse Oximetry: 96 %             Fluids    Intake/Output Summary (Last 24 hours) at 18 2136  Last data filed at 18 1638   Gross per 24 hour   Intake                0 ml   Output             1400 ml   Net            -1400 ml       Nutrition  Orders Placed This Encounter   Procedures   • Diet Order Full Liquid, Diabetic     Standing Status:   Standing     Number of Occurrences:   1     Order Specific Question:   Diet:     Answer:   Full Liquid [11]     Order Specific Question:   Diet:     Answer:   Diabetic [3]     Physical Exam   Constitutional: He is oriented to person, place, and time. He appears well-developed and well-nourished. No distress.   HENT:   Head: Normocephalic and atraumatic.   Mouth/Throat: Oropharynx is clear and moist. No oropharyngeal exudate.   Eyes: Pupils are equal, round, and reactive to light. Conjunctivae and EOM are normal. Right eye exhibits no discharge. Left eye exhibits no discharge. No scleral icterus.   Neck: Normal range of motion. Neck supple. No JVD present. No tracheal deviation present. No thyromegaly present.   Cardiovascular: Normal rate, regular rhythm and normal heart sounds.  Exam reveals no gallop and no friction rub.    No murmur heard.  Pulmonary/Chest: Effort normal and breath sounds normal. No respiratory distress. He has no wheezes. He has no rales. He exhibits no tenderness.   Abdominal: Soft. Bowel  sounds are normal. He exhibits no distension and no mass. There is no tenderness. There is no rebound and no guarding.   Musculoskeletal: Normal range of motion. He exhibits no edema or tenderness.   Lymphadenopathy:     He has no cervical adenopathy.   Neurological: He is alert and oriented to person, place, and time. No cranial nerve deficit. He exhibits normal muscle tone.   Skin: Skin is warm and dry. No rash noted. He is not diaphoretic. No erythema.   Psychiatric: He has a normal mood and affect. His behavior is normal. Judgment and thought content normal.   Nursing note and vitals reviewed.      Recent Labs      07/28/18   1120  07/29/18   0026   WBC  13.2*  11.6*   RBC  4.98  4.94   HEMOGLOBIN  16.0  15.7   HEMATOCRIT  45.2  44.9   MCV  90.8  90.9   MCH  32.1  31.8   MCHC  35.4*  35.0   RDW  39.8  39.5   PLATELETCT  255  266   MPV  10.3  9.9     Recent Labs      07/28/18   1120  07/29/18   0026   SODIUM  129*  133*   POTASSIUM  4.8  4.0   CHLORIDE  99  101   CO2  17*  24   GLUCOSE  337*  376*   BUN  15  12   CREATININE  0.65  0.67   CALCIUM  8.9  8.8         Recent Labs      07/28/18   1120   BNPBTYPENAT  2              Assessment/Plan     Diabetes (HCC)- (present on admission)   Assessment & Plan    Patient has a history of diabetes, he does not currently take medications for it  Complications include peripheral neuropathy  Start insulin sliding  Start gabapentin for neuropathy  7/29:  Started lantus 10 units qhs since blood sugars 200/300.  a1c pending.  Diabetic education, will need insulin at [place].        Alcohol withdrawal (HCC)- (present on admission)   Assessment & Plan    Has history of alcohol withdrawal, last drink was last night, start CIWA protocol  7/29:  Discussed AA, inpatient rehab, need for cessation.  States lives with GF who does not drink alcohol.        Alcohol-induced acute pancreatitis without infection or necrosis- (present on admission)   Assessment & Plan    Lipase is only slightly  elevated however the patient endorses symptoms consistent with pancreatitis  IV fluid hydration  Lipase 86.  Hungry:  Advanced clear liquid diet to full liquid.          Hyponatremia- (present on admission)   Assessment & Plan    Suspect hypovolemic hyponatremia, IV fluid hydration, repeat labs tomorrow          Quality-Core Measures

## 2018-07-30 PROBLEM — E11.40 DIABETIC NEUROPATHY ASSOCIATED WITH TYPE 2 DIABETES MELLITUS (HCC): Status: ACTIVE | Noted: 2018-07-30

## 2018-07-30 LAB
BASOPHILS # BLD AUTO: 0.3 % (ref 0–1.8)
BASOPHILS # BLD: 0.03 K/UL (ref 0–0.12)
EOSINOPHIL # BLD AUTO: 0.47 K/UL (ref 0–0.51)
EOSINOPHIL NFR BLD: 5 % (ref 0–6.9)
ERYTHROCYTE [DISTWIDTH] IN BLOOD BY AUTOMATED COUNT: 39.1 FL (ref 35.9–50)
GLUCOSE BLD-MCNC: 181 MG/DL (ref 65–99)
GLUCOSE BLD-MCNC: 223 MG/DL (ref 65–99)
GLUCOSE BLD-MCNC: 225 MG/DL (ref 65–99)
GLUCOSE BLD-MCNC: 268 MG/DL (ref 65–99)
HCT VFR BLD AUTO: 40.4 % (ref 42–52)
HGB BLD-MCNC: 14.3 G/DL (ref 14–18)
IMM GRANULOCYTES # BLD AUTO: 0.04 K/UL (ref 0–0.11)
IMM GRANULOCYTES NFR BLD AUTO: 0.4 % (ref 0–0.9)
LYMPHOCYTES # BLD AUTO: 2.94 K/UL (ref 1–4.8)
LYMPHOCYTES NFR BLD: 31.4 % (ref 22–41)
MCH RBC QN AUTO: 32.1 PG (ref 27–33)
MCHC RBC AUTO-ENTMCNC: 35.4 G/DL (ref 33.7–35.3)
MCV RBC AUTO: 90.6 FL (ref 81.4–97.8)
MONOCYTES # BLD AUTO: 0.79 K/UL (ref 0–0.85)
MONOCYTES NFR BLD AUTO: 8.4 % (ref 0–13.4)
NEUTROPHILS # BLD AUTO: 5.1 K/UL (ref 1.82–7.42)
NEUTROPHILS NFR BLD: 54.5 % (ref 44–72)
NRBC # BLD AUTO: 0 K/UL
NRBC BLD-RTO: 0 /100 WBC
PLATELET # BLD AUTO: 246 K/UL (ref 164–446)
PMV BLD AUTO: 10.1 FL (ref 9–12.9)
RBC # BLD AUTO: 4.46 M/UL (ref 4.7–6.1)
WBC # BLD AUTO: 9.4 K/UL (ref 4.8–10.8)

## 2018-07-30 PROCEDURE — 99225 PR SUBSEQUENT OBSERVATION CARE,LEVEL II: CPT | Performed by: HOSPITALIST

## 2018-07-30 PROCEDURE — 700102 HCHG RX REV CODE 250 W/ 637 OVERRIDE(OP): Performed by: HOSPITALIST

## 2018-07-30 PROCEDURE — A9270 NON-COVERED ITEM OR SERVICE: HCPCS | Performed by: HOSPITALIST

## 2018-07-30 PROCEDURE — G0378 HOSPITAL OBSERVATION PER HR: HCPCS

## 2018-07-30 PROCEDURE — 83036 HEMOGLOBIN GLYCOSYLATED A1C: CPT

## 2018-07-30 PROCEDURE — 36415 COLL VENOUS BLD VENIPUNCTURE: CPT

## 2018-07-30 PROCEDURE — 85025 COMPLETE CBC W/AUTO DIFF WBC: CPT

## 2018-07-30 PROCEDURE — 82962 GLUCOSE BLOOD TEST: CPT

## 2018-07-30 PROCEDURE — 700111 HCHG RX REV CODE 636 W/ 250 OVERRIDE (IP): Performed by: HOSPITALIST

## 2018-07-30 RX ORDER — GABAPENTIN 300 MG/1
600 CAPSULE ORAL 2 TIMES DAILY
Status: DISCONTINUED | OUTPATIENT
Start: 2018-07-30 | End: 2018-07-30

## 2018-07-30 RX ORDER — GABAPENTIN 300 MG/1
600 CAPSULE ORAL 3 TIMES DAILY
Status: DISCONTINUED | OUTPATIENT
Start: 2018-07-30 | End: 2018-07-31 | Stop reason: HOSPADM

## 2018-07-30 RX ADMIN — INSULIN GLARGINE 10 UNITS: 100 INJECTION, SOLUTION SUBCUTANEOUS at 17:29

## 2018-07-30 RX ADMIN — ENOXAPARIN SODIUM 40 MG: 100 INJECTION SUBCUTANEOUS at 05:34

## 2018-07-30 RX ADMIN — INSULIN HUMAN 5 UNITS: 100 INJECTION, SOLUTION PARENTERAL at 20:27

## 2018-07-30 RX ADMIN — METFORMIN HYDROCHLORIDE 500 MG: 500 TABLET, FILM COATED ORAL at 17:30

## 2018-07-30 RX ADMIN — INSULIN HUMAN 2 UNITS: 100 INJECTION, SOLUTION PARENTERAL at 17:29

## 2018-07-30 RX ADMIN — NICOTINE 21 MG: 21 PATCH, EXTENDED RELEASE TRANSDERMAL at 05:34

## 2018-07-30 RX ADMIN — GABAPENTIN 100 MG: 100 CAPSULE ORAL at 05:34

## 2018-07-30 RX ADMIN — INSULIN HUMAN 3 UNITS: 100 INJECTION, SOLUTION PARENTERAL at 08:16

## 2018-07-30 RX ADMIN — INSULIN HUMAN 3 UNITS: 100 INJECTION, SOLUTION PARENTERAL at 12:14

## 2018-07-30 RX ADMIN — GABAPENTIN 100 MG: 100 CAPSULE ORAL at 10:54

## 2018-07-30 RX ADMIN — GABAPENTIN 600 MG: 300 CAPSULE ORAL at 17:30

## 2018-07-30 ASSESSMENT — ENCOUNTER SYMPTOMS
CHILLS: 0
HEMOPTYSIS: 0
POLYDIPSIA: 1
DEPRESSION: 0
SPUTUM PRODUCTION: 0
NECK PAIN: 0
WHEEZING: 0
WEAKNESS: 0
PALPITATIONS: 0
LOSS OF CONSCIOUSNESS: 0
DIZZINESS: 0
BACK PAIN: 0
SORE THROAT: 0
NAUSEA: 0
DIAPHORESIS: 0
SPEECH CHANGE: 0
ABDOMINAL PAIN: 0
FOCAL WEAKNESS: 0
EYE PAIN: 0
EYE DISCHARGE: 0
BRUISES/BLEEDS EASILY: 0
SENSORY CHANGE: 0
FEVER: 0
MYALGIAS: 0
COUGH: 0
SHORTNESS OF BREATH: 0
HEADACHES: 0
VOMITING: 0
CONSTIPATION: 0
CLAUDICATION: 0
DIARRHEA: 0

## 2018-07-30 ASSESSMENT — LIFESTYLE VARIABLES
TOTAL SCORE: 0
AGITATION: NORMAL ACTIVITY
VISUAL DISTURBANCES: NOT PRESENT
AUDITORY DISTURBANCES: NOT PRESENT
TOTAL SCORE: 4
TREMOR: TREMOR NOT VISIBLE BUT CAN BE FELT, FINGERTIP TO FINGERTIP
VISUAL DISTURBANCES: NOT PRESENT
AUDITORY DISTURBANCES: NOT PRESENT
AUDITORY DISTURBANCES: NOT PRESENT
SUBSTANCE_ABUSE: 1
PAROXYSMAL SWEATS: BARELY PERCEPTIBLE SWEATING. PALMS MOIST
TOTAL SCORE: VERY MILD ITCHING, PINS AND NEEDLES SENSATION, BURNING OR NUMBNESS
PAROXYSMAL SWEATS: BARELY PERCEPTIBLE SWEATING. PALMS MOIST
VISUAL DISTURBANCES: NOT PRESENT
TOTAL SCORE: VERY MILD ITCHING, PINS AND NEEDLES SENSATION, BURNING OR NUMBNESS
ANXIETY: NO ANXIETY (AT EASE)
AGITATION: NORMAL ACTIVITY
PAROXYSMAL SWEATS: NO SWEAT VISIBLE
ORIENTATION AND CLOUDING OF SENSORIUM: ORIENTED AND CAN DO SERIAL ADDITIONS
ANXIETY: NO ANXIETY (AT EASE)
NAUSEA AND VOMITING: NO NAUSEA AND NO VOMITING
HEADACHE, FULLNESS IN HEAD: VERY MILD
TOTAL SCORE: 4
TREMOR: TREMOR NOT VISIBLE BUT CAN BE FELT, FINGERTIP TO FINGERTIP
HEADACHE, FULLNESS IN HEAD: VERY MILD
ANXIETY: NO ANXIETY (AT EASE)
AGITATION: NORMAL ACTIVITY
NAUSEA AND VOMITING: NO NAUSEA AND NO VOMITING
NAUSEA AND VOMITING: NO NAUSEA AND NO VOMITING
HEADACHE, FULLNESS IN HEAD: NOT PRESENT
TREMOR: NO TREMOR

## 2018-07-30 ASSESSMENT — PAIN SCALES - GENERAL
PAINLEVEL_OUTOF10: 3
PAINLEVEL_OUTOF10: 5

## 2018-07-30 NOTE — CARE PLAN
Problem: Venous Thromboembolism (VTW)/Deep Vein Thrombosis (DVT) Prevention:  Goal: Patient will participate in Venous Thrombosis (VTE)/Deep Vein Thrombosis (DVT)Prevention Measures  Outcome: PROGRESSING AS EXPECTED  Pt on Lovenox for pharmacologic prophylaxis.    Problem: Bowel/Gastric:  Goal: Will not experience complications related to bowel motility  Outcome: PROGRESSING AS EXPECTED  LB 07/29/2018.  Pt refusing stool softeners, has no complaints of loose stools or constipation.

## 2018-07-30 NOTE — DIETARY
Nutrition Services:    Wt Loss on Nutrition Admit Screen. Pt is currently on a diabetic diet and per chart pt PO %. Pt is eating well. Pt was sleeping and would not arouse to name. Per chart review pt's wt on 9/12/17: 85.8 kg. Wt loss percent is 7.5% in 10 months, which is not significant. Ht: 175.3 cm, Wt 7/28: 79.4 kg via bed scale, BMI 25.84.  Consult RD as needed. RD will re-screen weekly.      RD available prn

## 2018-07-30 NOTE — PROGRESS NOTES
Assumed care of pt, received report from day shift RN, pt assessed.  Pt complaining of 8/10 BL foot pain, no pain medication available at this time, will reassess when available.  Pt is A&O x4.  Pt on CIWA protocol with current CIWA score of 5.  Pt low fall risk, wearing treaded socks, bed locked and in lowest position, bed alarm not indicated.  Pt instructed to call for assistance prior to getting OOB, pt verbalized understanding.  Call light, phone, and personal belongings within reach.

## 2018-07-30 NOTE — PROGRESS NOTES
[facility]ist Progress Note    Date of Service: 7/30/2018    Chief Complaint  43 y.o. male admitted 7/28/2018 with abdominal pain, he says that his pancreas is bothering him.     Mr Escudero has a history of alcoholism and diabetes, he is currently not on any treatment for his diabetes.  Patient drinks several beers per day, maybe 6-12.  He says that he has had prior episodes of pancreatitis and alcohol withdrawal.  He presented to the emergency room today complaining of upper quadrant abdominal pain without radiation, the pain is burning, it is associated with nausea and vomiting which has been nonbloody.  This is similar to prior episodes of pancreatitis.  Patient's last drink was last night.  It is noted by labs that the patient's blood sugars grossly uncontrolled, he endorses polyuria and polydipsia.  Elevated Hga1c since 2012 on review.       Interval Problem Update  7/29:  Awakens to name, c/o pain in epigastrium, but states hungry and wants to eat, advanced diet from clears to full liquid.  CIWA 6,5,5. Added lantus 10 units qhs.  7/30:  Patient feeling better, tolerating GI soft diet today.  But, states still shakey, feels needs 1 more day.  Plans to stop alcohol altogeter.  Advanced diet to diabetic regular.  Increased neurontin from 300mg tid to 600mg po tid, stopped oxycodone.  A1c still pending from 7/28 order.  Needs lantus at discharge, diabetic educator pending.  Started metformin 500mg bid with increase of lantus 10 to 15 units qhs.    Consultants/Specialty  none    Disposition  Home.  AA or inpatient alcohol rehab discussed.  Lives with GF who does not drink per   patient.  Will need insulin at [place]. Diabetic education ordered.        Review of Systems   Constitutional: Positive for malaise/fatigue. Negative for chills, diaphoresis and fever.   HENT: Negative for congestion and sore throat.    Eyes: Negative for pain and discharge.   Respiratory: Negative for cough, hemoptysis, sputum production,  shortness of breath and wheezing.    Cardiovascular: Negative for chest pain, palpitations, claudication and leg swelling.   Gastrointestinal: Negative for abdominal pain, constipation, diarrhea, melena, nausea and vomiting.   Genitourinary: Negative for dysuria, frequency and urgency.   Musculoskeletal: Negative for back pain, joint pain, myalgias and neck pain.   Skin: Negative for itching and rash.   Neurological: Negative for dizziness, sensory change, speech change, focal weakness, loss of consciousness, weakness and headaches.   Endo/Heme/Allergies: Positive for polydipsia. Does not bruise/bleed easily.   Psychiatric/Behavioral: Positive for substance abuse. Negative for depression and suicidal ideas.      Physical Exam  Laboratory/Imaging   Hemodynamics  Temp (24hrs), Av.3 °C (97.3 °F), Min:35.9 °C (96.7 °F), Max:36.7 °C (98 °F)   Temperature: 36.4 °C (97.5 °F)  Pulse  Av.9  Min: 66  Max: 97    Blood Pressure: 116/76      Respiratory      Respiration: 18, Pulse Oximetry: 97 %             Fluids    Intake/Output Summary (Last 24 hours) at 18 1550  Last data filed at 18 1300   Gross per 24 hour   Intake              600 ml   Output             3550 ml   Net            -2950 ml       Nutrition  Orders Placed This Encounter   Procedures   • Diet Order Diabetic     Standing Status:   Standing     Number of Occurrences:   1     Order Specific Question:   Diet:     Answer:   Diabetic [3]     Physical Exam   Constitutional: He is oriented to person, place, and time. He appears well-developed and well-nourished. No distress.   HENT:   Head: Normocephalic and atraumatic.   Mouth/Throat: Oropharynx is clear and moist. No oropharyngeal exudate.   Eyes: Pupils are equal, round, and reactive to light. Conjunctivae and EOM are normal. Right eye exhibits no discharge. Left eye exhibits no discharge. No scleral icterus.   Neck: Normal range of motion. Neck supple. No JVD present. No tracheal deviation  present. No thyromegaly present.   Cardiovascular: Normal rate, regular rhythm and normal heart sounds.  Exam reveals no gallop and no friction rub.    No murmur heard.  Pulmonary/Chest: Effort normal and breath sounds normal. No respiratory distress. He has no wheezes. He has no rales. He exhibits no tenderness.   Abdominal: Soft. Bowel sounds are normal. He exhibits no distension and no mass. There is no tenderness. There is no rebound and no guarding.   Musculoskeletal: Normal range of motion. He exhibits no edema or tenderness.   Lymphadenopathy:     He has no cervical adenopathy.   Neurological: He is alert and oriented to person, place, and time. No cranial nerve deficit. He exhibits normal muscle tone.   Skin: Skin is warm and dry. No rash noted. He is not diaphoretic. No erythema.   Psychiatric: He has a normal mood and affect. His behavior is normal. Judgment and thought content normal.   Nursing note and vitals reviewed.      Recent Labs      07/28/18   1120  07/29/18   0026  07/30/18   0952   WBC  13.2*  11.6*  9.4   RBC  4.98  4.94  4.46*   HEMOGLOBIN  16.0  15.7  14.3   HEMATOCRIT  45.2  44.9  40.4*   MCV  90.8  90.9  90.6   MCH  32.1  31.8  32.1   MCHC  35.4*  35.0  35.4*   RDW  39.8  39.5  39.1   PLATELETCT  255  266  246   MPV  10.3  9.9  10.1     Recent Labs      07/28/18   1120  07/29/18   0026   SODIUM  129*  133*   POTASSIUM  4.8  4.0   CHLORIDE  99  101   CO2  17*  24   GLUCOSE  337*  376*   BUN  15  12   CREATININE  0.65  0.67   CALCIUM  8.9  8.8         Recent Labs      07/28/18   1120   BNPBTYPENAT  2              Assessment/Plan     Diabetes (HCC)- (present on admission)   Assessment & Plan    Patient has a history of diabetes, he does not currently take medications for it  Complications include peripheral neuropathy  Start insulin sliding  Start gabapentin for neuropathy  7/29:  Started lantus 10 units qhs since blood sugars 200/300.  a1c pending.  Diabetic education, will need insulin at  dc.  7/30:  Added metformin 500mg po bid.        Alcohol withdrawal (HCC)- (present on admission)   Assessment & Plan    Has history of alcohol withdrawal, last drink was last night, start CIWA protocol  7/29:  Discussed AA, inpatient rehab, need for cessation.  States lives with GF who does not drink alcohol.  7/30:  CIWA remains 5,4,4.  No hallucinations, no tremor noted on exam, VSS.        Diabetic neuropathy associated with type 2 diabetes mellitus (HCC)- (present on admission)   Assessment & Plan    Increased neurontin 300 tid to 600mg tid.        Alcohol-induced acute pancreatitis without infection or necrosis- (present on admission)   Assessment & Plan    Lipase is only slightly elevated however the patient endorses symptoms consistent with pancreatitis  IV fluid hydration  Lipase 86 to 44.  Hungry:  Advanced clear liquid diet to full liquid.  Resolved, 2/2 chronic alcohol abuse.        Hyponatremia- (present on admission)   Assessment & Plan    Pseudohyponatremia.  Resolved with IVFs and glucose lowering.          Quality-Core Measures

## 2018-07-30 NOTE — CARE PLAN
Problem: Communication  Goal: The ability to communicate needs accurately and effectively will improve  Outcome: PROGRESSING AS EXPECTED  Pt verbalizes understanding to POC. Pt wants to speak with SW today regarding resources for quitting alcohol use today.     Problem: Infection  Goal: Will remain free from infection  Outcome: PROGRESSING AS EXPECTED  Labs and vitals stable.

## 2018-07-31 VITALS
HEIGHT: 69 IN | SYSTOLIC BLOOD PRESSURE: 120 MMHG | TEMPERATURE: 97.4 F | OXYGEN SATURATION: 96 % | BODY MASS INDEX: 25.92 KG/M2 | WEIGHT: 175 LBS | RESPIRATION RATE: 18 BRPM | HEART RATE: 76 BPM | DIASTOLIC BLOOD PRESSURE: 78 MMHG

## 2018-07-31 PROBLEM — E87.1 HYPONATREMIA: Status: RESOLVED | Noted: 2018-07-28 | Resolved: 2018-07-31

## 2018-07-31 PROBLEM — E11.40 DIABETIC NEUROPATHY ASSOCIATED WITH TYPE 2 DIABETES MELLITUS (HCC): Status: RESOLVED | Noted: 2018-07-30 | Resolved: 2018-07-31

## 2018-07-31 LAB
ANION GAP SERPL CALC-SCNC: 8 MMOL/L (ref 0–11.9)
BASOPHILS # BLD AUTO: 0.4 % (ref 0–1.8)
BASOPHILS # BLD: 0.04 K/UL (ref 0–0.12)
BUN SERPL-MCNC: 10 MG/DL (ref 8–22)
CALCIUM SERPL-MCNC: 8.8 MG/DL (ref 8.5–10.5)
CHLORIDE SERPL-SCNC: 104 MMOL/L (ref 96–112)
CO2 SERPL-SCNC: 24 MMOL/L (ref 20–33)
CREAT SERPL-MCNC: 0.78 MG/DL (ref 0.5–1.4)
EOSINOPHIL # BLD AUTO: 0.5 K/UL (ref 0–0.51)
EOSINOPHIL NFR BLD: 4.5 % (ref 0–6.9)
ERYTHROCYTE [DISTWIDTH] IN BLOOD BY AUTOMATED COUNT: 39.2 FL (ref 35.9–50)
EST. AVERAGE GLUCOSE BLD GHB EST-MCNC: 329 MG/DL
GLUCOSE BLD-MCNC: 245 MG/DL (ref 65–99)
GLUCOSE SERPL-MCNC: 256 MG/DL (ref 65–99)
HBA1C MFR BLD: 13.1 % (ref 0–5.6)
HCT VFR BLD AUTO: 40.2 % (ref 42–52)
HGB BLD-MCNC: 14.2 G/DL (ref 14–18)
IMM GRANULOCYTES # BLD AUTO: 0.05 K/UL (ref 0–0.11)
IMM GRANULOCYTES NFR BLD AUTO: 0.5 % (ref 0–0.9)
LYMPHOCYTES # BLD AUTO: 3.64 K/UL (ref 1–4.8)
LYMPHOCYTES NFR BLD: 33 % (ref 22–41)
MCH RBC QN AUTO: 31.8 PG (ref 27–33)
MCHC RBC AUTO-ENTMCNC: 35.3 G/DL (ref 33.7–35.3)
MCV RBC AUTO: 90.1 FL (ref 81.4–97.8)
MONOCYTES # BLD AUTO: 0.92 K/UL (ref 0–0.85)
MONOCYTES NFR BLD AUTO: 8.3 % (ref 0–13.4)
NEUTROPHILS # BLD AUTO: 5.89 K/UL (ref 1.82–7.42)
NEUTROPHILS NFR BLD: 53.3 % (ref 44–72)
NRBC # BLD AUTO: 0 K/UL
NRBC BLD-RTO: 0 /100 WBC
PLATELET # BLD AUTO: 261 K/UL (ref 164–446)
PMV BLD AUTO: 10 FL (ref 9–12.9)
POTASSIUM SERPL-SCNC: 3.9 MMOL/L (ref 3.6–5.5)
RBC # BLD AUTO: 4.46 M/UL (ref 4.7–6.1)
SODIUM SERPL-SCNC: 136 MMOL/L (ref 135–145)
WBC # BLD AUTO: 11 K/UL (ref 4.8–10.8)

## 2018-07-31 PROCEDURE — 700102 HCHG RX REV CODE 250 W/ 637 OVERRIDE(OP): Performed by: HOSPITALIST

## 2018-07-31 PROCEDURE — 99217 PR OBSERVATION CARE DISCHARGE: CPT | Performed by: INTERNAL MEDICINE

## 2018-07-31 PROCEDURE — G0378 HOSPITAL OBSERVATION PER HR: HCPCS

## 2018-07-31 PROCEDURE — 700111 HCHG RX REV CODE 636 W/ 250 OVERRIDE (IP): Performed by: HOSPITALIST

## 2018-07-31 PROCEDURE — A9270 NON-COVERED ITEM OR SERVICE: HCPCS | Performed by: INTERNAL MEDICINE

## 2018-07-31 PROCEDURE — 80048 BASIC METABOLIC PNL TOTAL CA: CPT

## 2018-07-31 PROCEDURE — 82962 GLUCOSE BLOOD TEST: CPT

## 2018-07-31 PROCEDURE — A9270 NON-COVERED ITEM OR SERVICE: HCPCS | Performed by: HOSPITALIST

## 2018-07-31 PROCEDURE — 85025 COMPLETE CBC W/AUTO DIFF WBC: CPT

## 2018-07-31 PROCEDURE — 700102 HCHG RX REV CODE 250 W/ 637 OVERRIDE(OP): Performed by: INTERNAL MEDICINE

## 2018-07-31 PROCEDURE — 700105 HCHG RX REV CODE 258: Performed by: HOSPITALIST

## 2018-07-31 PROCEDURE — 36415 COLL VENOUS BLD VENIPUNCTURE: CPT

## 2018-07-31 RX ORDER — NICOTINE 21 MG/24HR
1 PATCH, TRANSDERMAL 24 HOURS TRANSDERMAL EVERY 24 HOURS
Qty: 7 PATCH | Refills: 1 | Status: SHIPPED | OUTPATIENT
Start: 2018-07-31 | End: 2018-08-07

## 2018-07-31 RX ORDER — BLOOD-GLUCOSE METER
1 EACH MISCELLANEOUS DAILY
Qty: 1 KIT | Refills: 0 | Status: SHIPPED | OUTPATIENT
Start: 2018-07-31 | End: 2020-02-03

## 2018-07-31 RX ORDER — LANCETS 30 GAUGE
EACH MISCELLANEOUS
Qty: 100 EACH | Refills: 1 | Status: SHIPPED | OUTPATIENT
Start: 2018-07-31 | End: 2020-02-03

## 2018-07-31 RX ORDER — OXYCODONE HYDROCHLORIDE 5 MG/1
5-10 TABLET ORAL EVERY 4 HOURS PRN
Status: DISCONTINUED | OUTPATIENT
Start: 2018-07-31 | End: 2018-07-31 | Stop reason: HOSPADM

## 2018-07-31 RX ORDER — INSULIN GLARGINE 100 [IU]/ML
15 INJECTION, SOLUTION SUBCUTANEOUS EVERY EVENING
Status: DISCONTINUED | OUTPATIENT
Start: 2018-07-31 | End: 2018-07-31 | Stop reason: HOSPADM

## 2018-07-31 RX ORDER — GABAPENTIN 300 MG/1
600 CAPSULE ORAL 3 TIMES DAILY
Qty: 90 CAP | Refills: 1 | Status: SHIPPED | OUTPATIENT
Start: 2018-07-31 | End: 2020-02-03

## 2018-07-31 RX ORDER — INSULIN GLARGINE 100 [IU]/ML
15 INJECTION, SOLUTION SUBCUTANEOUS EVERY EVENING
Qty: 10 ML | Refills: 1 | Status: SHIPPED | OUTPATIENT
Start: 2018-07-31 | End: 2018-07-31

## 2018-07-31 RX ORDER — BLOOD-GLUCOSE METER
1 EACH MISCELLANEOUS DAILY
Qty: 1 KIT | Refills: 0 | Status: SHIPPED | OUTPATIENT
Start: 2018-07-31 | End: 2018-07-31

## 2018-07-31 RX ORDER — PEN NEEDLE, DIABETIC 30 GX3/16"
1 NEEDLE, DISPOSABLE MISCELLANEOUS DAILY
Qty: 100 EACH | Refills: 1 | Status: SHIPPED | OUTPATIENT
Start: 2018-07-31 | End: 2020-02-03

## 2018-07-31 RX ORDER — INSULIN GLARGINE 100 [IU]/ML
15 INJECTION, SOLUTION SUBCUTANEOUS
Qty: 5 PEN | Refills: 1 | Status: ON HOLD | OUTPATIENT
Start: 2018-07-31 | End: 2020-02-12 | Stop reason: SDUPTHER

## 2018-07-31 RX ADMIN — OXYCODONE HYDROCHLORIDE 5 MG: 5 TABLET ORAL at 08:38

## 2018-07-31 RX ADMIN — GABAPENTIN 600 MG: 300 CAPSULE ORAL at 06:00

## 2018-07-31 RX ADMIN — NICOTINE 21 MG: 21 PATCH, EXTENDED RELEASE TRANSDERMAL at 06:00

## 2018-07-31 RX ADMIN — ENOXAPARIN SODIUM 40 MG: 100 INJECTION SUBCUTANEOUS at 06:00

## 2018-07-31 RX ADMIN — SODIUM CHLORIDE: 9 INJECTION, SOLUTION INTRAVENOUS at 00:05

## 2018-07-31 RX ADMIN — INSULIN HUMAN 3 UNITS: 100 INJECTION, SOLUTION PARENTERAL at 08:29

## 2018-07-31 RX ADMIN — METFORMIN HYDROCHLORIDE 500 MG: 500 TABLET, FILM COATED ORAL at 07:57

## 2018-07-31 ASSESSMENT — LIFESTYLE VARIABLES
VISUAL DISTURBANCES: NOT PRESENT
AUDITORY DISTURBANCES: NOT PRESENT
ORIENTATION AND CLOUDING OF SENSORIUM: ORIENTED AND CAN DO SERIAL ADDITIONS
TREMOR: NO TREMOR
HEADACHE, FULLNESS IN HEAD: NOT PRESENT
NAUSEA AND VOMITING: NO NAUSEA AND NO VOMITING
AGITATION: NORMAL ACTIVITY
ANXIETY: NO ANXIETY (AT EASE)
TOTAL SCORE: 0
PAROXYSMAL SWEATS: NO SWEAT VISIBLE

## 2018-07-31 ASSESSMENT — PAIN SCALES - GENERAL: PAINLEVEL_OUTOF10: 7

## 2018-07-31 NOTE — PROGRESS NOTES
Report received. Assumed care, assessment complete. Pt is A & O x 4.  Pt reports mild abdominal pain, declines intervention. Fall precautions and appropriate signs in place.  RN extension number provided. Pt educated regarding fall precautions. Bed alarm refused with education. Pt denies any additional needs at this time. Call light within reach.

## 2018-07-31 NOTE — CARE PLAN
Problem: Infection  Goal: Will remain free from infection  Outcome: PROGRESSING AS EXPECTED  Patient educated on frequent monitoring of feet to prevent infection for patients with diabetes.    Problem: Knowledge Deficit  Goal: Knowledge of disease process/condition, treatment plan, diagnostic tests, and medications will improve  Outcome: PROGRESSING AS EXPECTED  Patient educated on new diagnosis of diabetes and tips for safe management.

## 2018-07-31 NOTE — PROGRESS NOTES
D/C instructions provided to patient. IVs discontinued. Patient states all questions have been answered. Copy of discharge provided to patient. Signed copy in chart. Patient escorted off of unit by CNA and girlfriend without incident.

## 2018-07-31 NOTE — DISCHARGE SUMMARY
Discharge Summary    CHIEF COMPLAINT ON ADMISSION  Chief Complaint   Patient presents with   • Foot Pain     history of neuropathy   • Tired     for 3 weeks       Reason for Admission  EMS     Admission Date  7/28/2018    CODE STATUS  Full Code    HPI & HOSPITAL COURSE  This is a 43 y.o. male admitted 7/28/2018 with abdominal pain.     Mr Escudero has a history of alcoholism and diabetes, he is currently not on any treatment for his diabetes.  Patient drinks several beers per day, maybe 6-12.  He says that he has had prior episodes of pancreatitis and alcohol withdrawal.  He presented to the emergency room today complaining of upper quadrant abdominal pain without radiation, the pain is burning, it is associated with nausea and vomiting which has been nonbloody.  This is similar to prior episodes of pancreatitis.  Patient's last drink was last night.  It is noted by labs that the patient's blood sugars grossly uncontrolled, he endorses polyuria and polydipsia.  Elevated Hga1c since 2012 on review.  Lipase normalized 54 to 44.  He did well on CIWA protocol and IVFs, his diet was gradually increased to diabetic from clear liquids.  Hga1c was 12.6%.  Started on metformin 500mg po bid and lantus 15 units qhs for elevated blood sugars in 200s with improved control.  He received diabetes education.  He plans to abstain from alcohol.  Liver u/s with hepatocellular disease.  LFTs wnl.  He lives with girlfriend who is a nondrinker.     Added neurontin 600mg po tid for fairly severe diabetic neuropathy of LEs.       Therefore, he is discharged in good and stable condition to home with close outpatient follow-up.    The patient met 2-midnight criteria for an inpatient stay at the time of discharge.    Discharge Date  7/31/18    FOLLOW UP ITEMS POST DISCHARGE  Follow up with PCP for diabetes management and alcohol cessation.    DISCHARGE DIAGNOSES  Active Problems:    Alcohol withdrawal (HCC) POA: Yes    Diabetes (HCC) POA:  Yes    Hyponatremia POA: Yes    Alcohol-induced acute pancreatitis without infection or necrosis POA: Yes    Diabetic neuropathy associated with type 2 diabetes mellitus (HCC) POA: Yes  Resolved Problems:    * No resolved hospital problems. *      FOLLOW UP  PCP appointment for Aug 6th    MEDICATIONS ON DISCHARGE     Medication List      START taking these medications      Instructions   BASAGLAR KWIKPEN 100 UNIT/ML Sopn   Inject 15 Units as instructed every bedtime.  Dose:  15 Units     [employer] BLOOD GLUCOSE METER w/Device Kit   1 Unknown by Does not apply route every day.  Dose:  1 Unknown     gabapentin 300 MG Caps  Commonly known as:  NEURONTIN   Take 2 Caps by mouth 3 times a day. For neuropathy  Dose:  600 mg     glucose blood strip   Test strips order: Test strips for True Metrix test strip. Use daily and in times of high or low blood sugars.     Lancets Misc   Lancets order: Lancets for True Metrix glucose meter. Use daily and as needed in times of high or low blood sugars.     metFORMIN 500 MG Tabs  Commonly known as:  GLUCOPHAGE   Take 1 Tab by mouth 2 times a day, with meals.  Dose:  500 mg     nicotine 21 MG/24HR Pt24  Commonly known as:  NICODERM   Apply 1 Patch to skin as directed every 24 hours for 7 days.  Dose:  1 Patch     Pen Needles 32G X 4 MM Misc   1 Unknown by Does not apply route every day.  Dose:  1 Unknown            Allergies  Allergies   Allergen Reactions   • Apple Hives   • Lactose Diarrhea       DIET  Orders Placed This Encounter   Procedures   • Diet Order Diabetic     Standing Status:   Standing     Number of Occurrences:   1     Order Specific Question:   Diet:     Answer:   Diabetic [3]       ACTIVITY  As tolerated.  Weight bearing as tolerated    CONSULTATIONS  none    PROCEDURES  Us liver:     Enlarged, heterogeneous and echogenic appearance of the liver can be seen in hepatic steatosis or hepatocellular disease.    No evidence of gallstones or biliary ductal dilatation.     Reading  Provider Reading Date   Derick Billingsley M.D. Jul 29, 2018         LABORATORY  Lab Results   Component Value Date    SODIUM 133 (L) 07/29/2018    POTASSIUM 4.0 07/29/2018    CHLORIDE 101 07/29/2018    CO2 24 07/29/2018    GLUCOSE 376 (H) 07/29/2018    BUN 12 07/29/2018    CREATININE 0.67 07/29/2018    CREATININE 1.2 01/13/2008        Lab Results   Component Value Date    WBC 9.4 07/30/2018    HEMOGLOBIN 14.3 07/30/2018    HEMATOCRIT 40.4 (L) 07/30/2018    PLATELETCT 246 07/30/2018        Total time of the discharge process exceeds 55 minutes.

## 2018-07-31 NOTE — PROGRESS NOTES
Report received. Assumed care at 0700, assessment complete. Pt is A&O x 4. 7/10 pain at this time in abdomen and patient medicated. Patient provided diabetes education. Patient instructed on the plan of discharge today. Bed in lowest position. Call light within reach. Patient provided RN extension.Report received.

## 2018-07-31 NOTE — CARE PLAN
Problem: Discharge Barriers/Planning  Goal: Patient's continuum of care needs will be met    Intervention: Collaborate with Transitional Care Team and Interdisciplinary Team to meet discharge needs  Pt expected to DC home, pt given ETOH treatment resources       Problem: Pain Management  Goal: Pain level will decrease to patient's comfort goal    Intervention: Follow pain managment plan developed in collaboration with patient and Interdisciplinary Team  Neuropathy pain managed with scheduled gabapentin

## 2018-07-31 NOTE — DISCHARGE INSTRUCTIONS
Discharge Instructions    Discharged to home by car with friend. Discharged via walking, hospital escort: Refused.  Special equipment needed: Not Applicable    Be sure to schedule a follow-up appointment with your primary care doctor or any specialists as instructed.     Discharge Plan:   Diet Plan: Discussed  Activity Level: Discussed  Confirmed Follow up Appointment: Appointment Scheduled  Confirmed Symptoms Management: Discussed  Medication Reconciliation Updated: Yes  Influenza Vaccine Indication: Patient Refuses    I understand that a diet low in cholesterol, fat, and sodium is recommended for good health. Unless I have been given specific instructions below for another diet, I accept this instruction as my diet prescription.   Other diet: Diabetic    Special Instructions: None    · Is patient discharged on Warfarin / Coumadin?   No       Diabetes and Foot Care  Diabetes may cause you to have problems because of poor blood supply (circulation) to your feet and legs. This may cause the skin on your feet to become thinner, break easier, and heal more slowly. Your skin may become dry, and the skin may peel and crack. You may also have nerve damage in your legs and feet causing decreased feeling in them. You may not notice minor injuries to your feet that could lead to infections or more serious problems. Taking care of your feet is one of the most important things you can do for yourself.  Follow these instructions at home:  · Wear shoes at all times, even in the house. Do not go barefoot. Bare feet are easily injured.  · Check your feet daily for blisters, cuts, and redness. If you cannot see the bottom of your feet, use a mirror or ask someone for help.  · Wash your feet with warm water (do not use hot water) and mild soap. Then pat your feet and the areas between your toes until they are completely dry. Do not soak your feet as this can dry your skin.  · Apply a moisturizing lotion or petroleum jelly (that does  not contain alcohol and is unscented) to the skin on your feet and to dry, brittle toenails. Do not apply lotion between your toes.  · Trim your toenails straight across. Do not dig under them or around the cuticle. File the edges of your nails with an emery board or nail file.  · Do not cut corns or calluses or try to remove them with medicine.  · Wear clean socks or stockings every day. Make sure they are not too tight. Do not wear knee-high stockings since they may decrease blood flow to your legs.  · Wear shoes that fit properly and have enough cushioning. To break in new shoes, wear them for just a few hours a day. This prevents you from injuring your feet. Always look in your shoes before you put them on to be sure there are no objects inside.  · Do not cross your legs. This may decrease the blood flow to your feet.  · If you find a minor scrape, cut, or break in the skin on your feet, keep it and the skin around it clean and dry. These areas may be cleansed with mild soap and water. Do not cleanse the area with peroxide, alcohol, or iodine.  · When you remove an adhesive bandage, be sure not to damage the skin around it.  · If you have a wound, look at it several times a day to make sure it is healing.  · Do not use heating pads or hot water bottles. They may burn your skin. If you have lost feeling in your feet or legs, you may not know it is happening until it is too late.  · Make sure your health care provider performs a complete foot exam at least annually or more often if you have foot problems. Report any cuts, sores, or bruises to your health care provider immediately.  Contact a health care provider if:  · You have an injury that is not healing.  · You have cuts or breaks in the skin.  · You have an ingrown nail.  · You notice redness on your legs or feet.  · You feel burning or tingling in your legs or feet.  · You have pain or cramps in your legs and feet.  · Your legs or feet are numb.  · Your feet  always feel cold.  Get help right away if:  · There is increasing redness, swelling, or pain in or around a wound.  · There is a red line that goes up your leg.  · Pus is coming from a wound.  · You develop a fever or as directed by your health care provider.  · You notice a bad smell coming from an ulcer or wound.  This information is not intended to replace advice given to you by your health care provider. Make sure you discuss any questions you have with your health care provider.  Document Released: 12/15/2001 Document Revised: 05/25/2017 Document Reviewed: 05/27/2014  [employer] Interactive Patient Education © 2017 [employer]    Diabetes Mellitus and Standards of Medical Care  Managing diabetes (diabetes mellitus) can be complicated. Your diabetes treatment may be managed by a team of health care providers, including:  · A diet and nutrition specialist (registered dietitian).  · A nurse.  · A certified diabetes educator (CDE).  · A diabetes specialist (endocrinologist).  · An eye doctor.  · A primary care provider.  · A dentist.  Your health care providers follow a schedule in order to help you get the best quality of care. The following schedule is a general guideline for your diabetes management plan. Your health care providers may also give you more specific instructions.  HbA1c (  hemoglobin A1c) test  This test provides information about blood sugar (glucose) control over the previous 2-3 months. It is used to check whether your diabetes management plan needs to be adjusted.  · If you are meeting your treatment goals, this test is done at least 2 times a year.  · If you are not meeting treatment goals or if your treatment goals have changed, this test is done 4 times a year.  Blood pressure test  · This test is done at every routine medical visit. For most people, the goal is less than 140/90. In some cases, your goal blood pressure may be 130/80 or less. Ask your health care provider what your goal blood  pressure should be.  Dental and eye exams  · Visit your dentist two times a year.  · If you have type 1 diabetes, get an eye exam 3-5 years after you are diagnosed, and then once a year after your first exam.  ¨ If you were diagnosed with type 1 diabetes as a child, get an eye exam when you are age 10 or older and have had diabetes for 3-5 years. After the first exam, you should get an eye exam once a year.  · If you have type 2 diabetes, have an eye exam as soon as you are diagnosed, and then once a year after your first exam.  Foot care exam  · Visual foot exams are done at every routine medical visit. The exams check for cuts, bruises, redness, blisters, sores, or other problems with the feet.  · A complete foot exam is done by your health care provider once a year. This exam includes an inspection of the structure and skin of your feet, and a check of the pulses and sensation in your feet.  ¨ Type 1 diabetes: Get your first exam 3-5 years after diagnosis.  ¨ Type 2 diabetes: Get your first exam as soon as you are diagnosed.  · Check your feet every day for cuts, bruises, redness, blisters, or sores. If you have any of these or other problems that are not healing, contact your health care provider.  Kidney function test (  urine microalbumin)  · This test is done once a year.  ¨ Type 1 diabetes: Get your first test 5 years after diagnosis.  ¨ Type 2 diabetes: Get your first test as soon as you are diagnosed.  · If you have chronic kidney disease (CKD), get a serum creatinine and estimated glomerular filtration rate (eGFR) test once a year.  Lipid profile (cholesterol, HDL, LDL, triglycerides)  · This test should be done when you are diagnosed with diabetes, and every 5 years after the first test. If you are on medicines to lower your cholesterol, you may need to get this test done every year.  ¨ The goal for LDL is less than 100 mg/dL (5.5 mmol/L). If you are at high risk, the goal is less than 70 mg/dL (3.9  mmol/L).  ¨ The goal for HDL is 40 mg/dL (2.2 mmol/L) for men and 50 mg/dL(2.8 mmol/L) for women. An HDL cholesterol of 60 mg/dL (3.3 mmol/L) or higher gives some protection against heart disease.  ¨ The goal for triglycerides is less than 150 mg/dL (8.3 mmol/L).  Immunizations  · The yearly flu (influenza) vaccine is recommended for everyone 6 months or older who has diabetes.  · The pneumonia (pneumococcal) vaccine is recommended for everyone 2 years or older who has diabetes. If you are 65 or older, you may get the pneumonia vaccine as a series of two separate shots.  · The hepatitis B vaccine is recommended for adults shortly after they have been diagnosed with diabetes.  · The Tdap (tetanus, diphtheria, and pertussis) vaccine should be given:  ¨ According to normal childhood vaccination schedules, for children.  ¨ Every 10 years, for adults who have diabetes.  · The shingles vaccine is recommended for people who have had chicken pox and are 50 years or older.  Mental and emotional health  · Screening for symptoms of eating disorders, anxiety, and depression is recommended at the time of diagnosis and afterward as needed. If your screening shows that you have symptoms (you have a positive screening result), you may need further evaluation and be referred to a mental health care provider.  Diabetes self-management education  · Education about how to manage your diabetes is recommended at diagnosis and ongoing as needed.  Treatment plan  · Your treatment plan will be reviewed at every medical visit.  Summary  · Managing diabetes (diabetes mellitus) can be complicated. Your diabetes treatment may be managed by a team of health care providers.  · Your health care providers follow a schedule in order to help you get the best quality of care.  · Standards of care including having regular physical exams, blood tests, blood pressure monitoring, immunizations, screening tests, and education about how to manage your  diabetes.  · Your health care providers may also give you more specific instructions based on your individual health.  This information is not intended to replace advice given to you by your health care provider. Make sure you discuss any questions you have with your health care provider.  Document Released: 10/15/2010 Document Revised: 09/15/2017 Document Reviewed: 09/15/2017  [employer] Interactive Patient Education © 2017 [employer]      Depression / Suicide Risk    As you are discharged from this [facility] facility, it is important to learn how to keep safe from harming yourself.    Recognize the warning signs:  · Abrupt changes in personality, positive or negative- including increase in energy   · Giving away possessions  · Change in eating patterns- significant weight changes-  positive or negative  · Change in sleeping patterns- unable to sleep or sleeping all the time   · Unwillingness or inability to communicate  · Depression  · Unusual sadness, discouragement and loneliness  · Talk of wanting to die  · Neglect of personal appearance   · Rebelliousness- reckless behavior  · Withdrawal from people/activities they love  · Confusion- inability to concentrate     If you or a loved one observes any of these behaviors or has concerns about self-harm, here's what you can do:  · Talk about it- your feelings and reasons for harming yourself  · Remove any means that you might use to hurt yourself (examples: pills, rope, extension cords, firearm)  · Get professional help from the community (Mental Health, Substance Abuse, psychological counseling)  · Do not be alone:Call your Safe Contact- someone whom you trust who will be there for you.  · Call your local CRISIS HOTLINE 196-5470 or 597-724-8379  · Call your local Children's Mobile Crisis Response Team [place] (680) 582-2394 or www.117go  · Call the toll free National Suicide Prevention Hotlines   · National Suicide Prevention Lifeline  071-432-FMHW (7218)  · National [place] Network 800-SUICIDE (537-7463)

## 2018-07-31 NOTE — PROGRESS NOTES
Diabetic education provided to patient by diabetic educator and Dietician provided nutrition consult.

## 2018-07-31 NOTE — DIETARY
Nutrition Services: diabetes diet    Visited pt and visitor at bedside to provide diabetes diet recommendations. Pt was engaged throughout education, asked appropriate questions, and verbalized understanding. Handout provided as reference to take home.     RD available prn

## 2018-07-31 NOTE — CONSULTS
"Diabetes education: Met with pt and SO regarding diabetes management. Pt has hx of diabetes for about 6 years. Pt was on Metformin and Glipizide \"off on\" taking himself off and then restarted the medications. Pt has not been on medications for \"awhile\".  Pt has a hx of alcohol abuse ( \"6-8 \" beers a day per chart), and stated he has stopped x2 ( once with AA and once \"cold turkey\"), and now her for pancreatitis.  Reviewed what diabetes was, effect of alcohol and pancreatitis on blood sugars, need for insulin at this time, need for a PCP ( pt has a  [employer] medicaid advocate IHD/rep who he met and will get him his PCP), hyperglycemia, hypoglycemia, and need to eat 3 meals and hs snack with protein and carb ( RD waiting to meet with him).  Pt given and taught to use True metrix meter ( reviewed finger sticks and goal) . Pt taught to use Basaglar pen and pen needles. Pt practice with saline pen.  Spoke with hospitalist to adjust prescriptions to reflect True metrix test strips, basaglar insulin pen and clare pen needles.  Plan: Education completed and handouts given. Please call [phone] if questions.  "

## 2018-08-01 LAB
EST. AVERAGE GLUCOSE BLD GHB EST-MCNC: 315 MG/DL
HBA1C MFR BLD: 12.6 % (ref 0–5.6)

## 2018-09-09 ENCOUNTER — HOSPITAL ENCOUNTER (EMERGENCY)
Facility: MEDICAL CENTER | Age: 43
End: 2018-09-09
Attending: EMERGENCY MEDICINE
Payer: MEDICAID

## 2018-09-09 VITALS
DIASTOLIC BLOOD PRESSURE: 86 MMHG | SYSTOLIC BLOOD PRESSURE: 117 MMHG | WEIGHT: 187.39 LBS | TEMPERATURE: 97.5 F | OXYGEN SATURATION: 98 % | BODY MASS INDEX: 27.76 KG/M2 | RESPIRATION RATE: 16 BRPM | HEIGHT: 69 IN | HEART RATE: 92 BPM

## 2018-09-09 DIAGNOSIS — K08.89 DENTALGIA: ICD-10-CM

## 2018-09-09 DIAGNOSIS — J02.9 PHARYNGITIS, UNSPECIFIED ETIOLOGY: ICD-10-CM

## 2018-09-09 PROCEDURE — 99283 EMERGENCY DEPT VISIT LOW MDM: CPT

## 2018-09-09 RX ORDER — AMOXICILLIN 500 MG/1
500 CAPSULE ORAL 3 TIMES DAILY
Qty: 30 CAP | Refills: 0 | Status: SHIPPED | OUTPATIENT
Start: 2018-09-09 | End: 2019-03-04

## 2018-09-09 ASSESSMENT — ENCOUNTER SYMPTOMS
HEADACHES: 1
CHILLS: 0
SHORTNESS OF BREATH: 0
SORE THROAT: 1
FEVER: 0

## 2018-09-09 ASSESSMENT — PAIN SCALES - GENERAL: PAINLEVEL_OUTOF10: 9

## 2018-09-09 NOTE — ED PROVIDER NOTES
ED Provider Note    Scribed for Saturnino Olson M.D. by Agustín Vega. 9/9/2018, 2:24 PM.    Primary care provider: Pcp Pt States None  Means of arrival: Walk in  History obtained from: Patient  History limited by: None    CHIEF COMPLAINT  Chief Complaint   Patient presents with   • Dental Pain     right upper   • Ear Pain     bilat   • Head Ache       HPI  Rogelio Escudero is a 43 y.o. male who presents to the Emergency Department complaining of dental pain, bilateral ear pian, and headache. Patient states he has had right upper dental pain at the 2nd molar for one month with worsened severity over the last several days. He states his symptoms began with pain from that tooth which then radiated up to his head and now bilateral ears. He also reports having sore throat since yesterday. Patient does not report any recent fevers, chills, or shortness of breath.      REVIEW OF SYSTEMS  Review of Systems   Constitutional: Negative for chills and fever.   HENT: Positive for ear pain (bilaterally) and sore throat.         Positive right upper dental pain   Respiratory: Negative for shortness of breath.    Neurological: Positive for headaches.       PAST MEDICAL HISTORY   has a past medical history of Alcohol abuse; Depression (2010); Diabetes; and Pneumonia (approx 2011).    SURGICAL HISTORY   has a past surgical history that includes other.    SOCIAL HISTORY  Social History   Substance Use Topics   • Smoking status: Current Every Day Smoker     Packs/day: 0.50     Years: 20.00     Types: Cigarettes   • Smokeless tobacco: Never Used      Comment: 1-2 cig daily   • Alcohol use Yes      Comment: daily      History   Drug Use No       FAMILY HISTORY  No pertinent family history reported.     CURRENT MEDICATIONS  Home Medications     Reviewed by Natalie Leary R.N. (Registered Nurse) on 09/09/18 at 1334  Med List Status: Complete   Medication Last Dose Status   Blood Glucose Monitoring Suppl ([employer] BLOOD GLUCOSE  "METER) w/Device Kit  Active   gabapentin (NEURONTIN) 300 MG Cap 9/8/2018 Active   glucose blood strip  Active   Insulin Glargine (BASAGLAR KWIKPEN) 100 UNIT/ML Solution Pen-injector 9/8/2018 Active   Insulin Pen Needle (PEN NEEDLES) 32G X 4 MM Misc  Active   Lancets Misc  Active   metFORMIN (GLUCOPHAGE) 500 MG Tab 9/8/2018 Active                ALLERGIES  Allergies   Allergen Reactions   • Apple Hives   • Lactose Diarrhea       PHYSICAL EXAM  VITAL SIGNS: /76   Pulse 88   Temp 36.3 °C (97.4 °F)   Resp 14   Ht 1.753 m (5' 9\")   Wt 85 kg (187 lb 6.3 oz)   SpO2 96%   BMI 27.67 kg/m²   Constitutional: Well developed, Well nourished, No acute distress, Non-toxic appearance.   HENT: Normocephalic, Atraumatic, Bilateral external ears normal. Bilateral TMs are normal. Posterior oropharynx is erythematous.  Tenderness to upper right second molar, no abscess formation.  Eyes:  conjunctiva are normal.   Neck: Supple.   Lymphatic: Anterior cervical lymphadenopathy.  Cardiovascular: Regular rate and rhythm without murmurs gallops or rubs.   Thorax & Lungs: No respiratory distress. Breathing comfortably. Lungs are clear to auscultation bilaterally, there are no wheezes no rales. Chest wall is nontender.      COURSE & MEDICAL DECISION MAKING  Pertinent Labs & Imaging studies reviewed. (See chart for details)    2:24 PM - Patient seen and examined at bedside. The patient will be discharged with amoxil and should return if symptoms worsen or if new symptoms arise. The patient understands and agrees to plan.      Decision Making:  Patient presents for dental pain, ear pain, and headache. Physical exam shows tenderness to right upper 2nd molar, no abscess formation. Patient reports sore throat. Physical exam showed anterior cervical lymphadenopathy and erythema to posterior oropharynx. Patient possibly has dental infection. Will prescribe amoxicillin which patient is to take for 10 days. He may or may not have strep " throat, however, this will be treated with the amoxicillin regardless. The patient will be discharged. Instructions were given for follow-up with a dentist. Discussed indications for seeking immediate medical attention. Patient was given the opportunity for questions. The patient understands and agrees.       The patient will return for new or worsening symptoms and is stable at the time of discharge.    The patient is referred to a primary physician for blood pressure management, diabetic screening, and for all other preventative health concerns.      DISPOSITION:  Patient will be discharged home in stable condition.    FOLLOW UP:  A Dentist    Schedule an appointment as soon as possible for a visit        OUTPATIENT MEDICATIONS:  Discharge Medication List as of 9/9/2018  2:36 PM      START taking these medications    Details   amoxicillin (AMOXIL) 500 MG Cap Take 1 Cap by mouth 3 times a day., Disp-30 Cap, R-0, Print Rx Paper               FINAL IMPRESSION  1. Dentalgia    2. Pharyngitis, unspecified etiology          Agustín WEINSTEIN (Mackibe), am scribing for, and in the presence of, Saturnino Olson M.D..    Electronically signed by: Agustín Vega (Mack[place]), 9/9/2018    ISaturnino M.D. personally performed the services described in this documentation, as scribed by Agustín Vega in my presence, and it is both accurate and complete. E    The note accurately reflects work and decisions made by me.  Saturnino Olson  9/9/2018  5:07 PM

## 2018-09-09 NOTE — ED TRIAGE NOTES
Pt amb to triage.  Chief Complaint   Patient presents with   • Dental Pain     right upper   • Ear Pain     bilat   • Head Ache     x1mo.

## 2018-10-02 NOTE — DISCHARGE INSTRUCTIONS
Pharyngitis  Pharyngitis is redness, pain, and swelling (inflammation) of your pharynx.  What are the causes?  Pharyngitis is usually caused by infection. Most of the time, these infections are from viruses (viral) and are part of a cold. However, sometimes pharyngitis is caused by bacteria (bacterial). Pharyngitis can also be caused by allergies. Viral pharyngitis may be spread from person to person by coughing, sneezing, and personal items or utensils (cups, forks, spoons, toothbrushes). Bacterial pharyngitis may be spread from person to person by more intimate contact, such as kissing.  What are the signs or symptoms?  Symptoms of pharyngitis include:  · Sore throat.  · Tiredness (fatigue).  · Low-grade fever.  · Headache.  · Joint pain and muscle aches.  · Skin rashes.  · Swollen lymph nodes.  · Plaque-like film on throat or tonsils (often seen with bacterial pharyngitis).  How is this diagnosed?  Your health care provider will ask you questions about your illness and your symptoms. Your medical history, along with a physical exam, is often all that is needed to diagnose pharyngitis. Sometimes, a rapid strep test is done. Other lab tests may also be done, depending on the suspected cause.  How is this treated?  Viral pharyngitis will usually get better in 3-4 days without the use of medicine. Bacterial pharyngitis is treated with medicines that kill germs (antibiotics).  Follow these instructions at home:  · Drink enough water and fluids to keep your urine clear or pale yellow.  · Only take over-the-counter or prescription medicines as directed by your health care provider:  ¨ If you are prescribed antibiotics, make sure you finish them even if you start to feel better.  ¨ Do not take aspirin.  · Get lots of rest.  · Gargle with 8 oz of salt water (½ tsp of salt per 1 qt of water) as often as every 1-2 hours to soothe your throat.  · Throat lozenges (if you are not at risk for choking) or sprays may be used to  soothe your throat.  Contact a health care provider if:  · You have large, tender lumps in your neck.  · You have a rash.  · You cough up green, yellow-brown, or bloody spit.  Get help right away if:  · Your neck becomes stiff.  · You drool or are unable to swallow liquids.  · You vomit or are unable to keep medicines or liquids down.  · You have severe pain that does not go away with the use of recommended medicines.  · You have trouble breathing (not caused by a stuffy nose).  This information is not intended to replace advice given to you by your health care provider. Make sure you discuss any questions you have with your health care provider.  Document Released: 12/18/2006 Document Revised: 05/25/2017 Document Reviewed: 08/25/2014  [employer] Interactive Patient Education © 2017 [employer]    Dental Pain  Introduction  Dental pain may be caused by many things, including:  · Tooth decay (cavities or caries). Cavities cause the nerve of your tooth to be open to air and hot or cold temperatures. This can cause pain or discomfort.  · Abscess or infection. A dental abscess is an area that is full of infected pus from a bacterial infection in the inner part of the tooth (pulp). It usually happens at the end of the tooth’s root.  · Injury.  · An unknown reason (idiopathic).  Your pain may be mild or severe. It may only happen when:  · You are chewing.  · You are exposed to hot or cold temperature.  · You are eating or drinking sugary foods or beverages, such as:  ¨ Soda.  ¨ Candy.  Your pain may also be there all of the time.  Follow these instructions at home:  Watch your dental pain for any changes. Do these things to lessen your discomfort:  · Take medicines only as told by your dentist.  · If your dentist tells you to take an antibiotic medicine, finish all of it even if you start to feel better.  · Keep all follow-up visits as told by your dentist. This is important.  · Do not apply heat to the outside of your  face.  · Rinse your mouth or gargle with salt water if told by your dentist. This helps with pain and swelling.  ¨ You can make salt water by adding ¼ tsp of salt to 1 cup of warm water.  · Apply ice to the painful area of your face:  ¨ Put ice in a plastic bag.  ¨ Place a towel between your skin and the bag.  ¨ Leave the ice on for 20 minutes, 2-3 times per day.  · Avoid foods or drinks that cause you pain, such as:  ¨ Very hot or very cold foods or drinks.  ¨ Sweet or sugary foods or drinks.  Contact a doctor if:  · Your pain is not helped with medicines.  · Your symptoms are worse.  · You have new symptoms.  Get help right away if:  · You cannot open your mouth.  · You are having trouble breathing or swallowing.  · You have a fever.  · Your face, neck, or jaw is puffy (swollen).  This information is not intended to replace advice given to you by your health care provider. Make sure you discuss any questions you have with your health care provider.  Document Released: 06/05/2009 Document Revised: 05/25/2017 Document Reviewed: 12/14/2015  © 2017 [employer]     ear

## 2018-12-03 PROCEDURE — 99283 EMERGENCY DEPT VISIT LOW MDM: CPT

## 2018-12-03 ASSESSMENT — PAIN SCALES - GENERAL: PAINLEVEL_OUTOF10: 8

## 2018-12-04 ENCOUNTER — HOSPITAL ENCOUNTER (EMERGENCY)
Facility: MEDICAL CENTER | Age: 43
End: 2018-12-04
Attending: EMERGENCY MEDICINE
Payer: MEDICAID

## 2018-12-04 VITALS
RESPIRATION RATE: 16 BRPM | HEIGHT: 69 IN | BODY MASS INDEX: 29.36 KG/M2 | SYSTOLIC BLOOD PRESSURE: 106 MMHG | HEART RATE: 89 BPM | DIASTOLIC BLOOD PRESSURE: 70 MMHG | OXYGEN SATURATION: 96 % | WEIGHT: 198.19 LBS | TEMPERATURE: 96.8 F

## 2018-12-04 DIAGNOSIS — J01.00 SUBACUTE MAXILLARY SINUSITIS: ICD-10-CM

## 2018-12-04 DIAGNOSIS — R09.81 NASAL CONGESTION: ICD-10-CM

## 2018-12-04 PROCEDURE — 99283 EMERGENCY DEPT VISIT LOW MDM: CPT

## 2018-12-04 RX ORDER — AMOXICILLIN 500 MG/1
500 CAPSULE ORAL 3 TIMES DAILY
Qty: 30 CAP | Refills: 0 | Status: SHIPPED | OUTPATIENT
Start: 2018-12-04 | End: 2018-12-14

## 2018-12-04 ASSESSMENT — PAIN DESCRIPTION - DESCRIPTORS: DESCRIPTORS: PRESSURE

## 2018-12-04 NOTE — ED PROVIDER NOTES
"ED Provider Note    Scribed for Chucho Ham M.D. by Satya Mcneill. 12/4/2018, 1:06 AM.    Primary care provider: Pcp Pt States None  Means of arrival: Walk in  History obtained from: Patient  History limited by: None    CHIEF COMPLAINT  Chief Complaint   Patient presents with   • Nasal Congestion   • Ear Pain       HPI  Rogelio Escudero is a 43 y.o. male with history of diabetes who presents to the Emergency Department for evaluation of nasal congestion with associated sinus pressure onset 3 days ago that has gradually worsened in severity. He additionally endorses associated headache and ear pain. The patient is negative for fever. He reports he takes Metformin and Insulin to manage his diabetes, but recently ran out of his prescription for Metformin. The patient states his blood sugars have been running high lately with a blood glucose around 400. No alleviating or exacerbating factors are identified at this time.     REVIEW OF SYSTEMS  See HPI for further details.    PAST MEDICAL HISTORY   has a past medical history of Alcohol abuse; Depression (2010); Diabetes; and Pneumonia (approx 2011).    SURGICAL HISTORY   has a past surgical history that includes other.    SOCIAL HISTORY  Social History   Substance Use Topics   • Smoking status: Current Every Day Smoker     Packs/day: 0.50     Years: 20.00     Types: Cigarettes   • Smokeless tobacco: Never Used      Comment: 1-2 cig daily   • Alcohol use Yes      Comment: daily      History   Drug Use No       FAMILY HISTORY  History reviewed. No pertinent family history.    CURRENT MEDICATIONS  Reviewed.  See Encounter Summary.     ALLERGIES  Allergies   Allergen Reactions   • Apple Hives   • Lactose Diarrhea       PHYSICAL EXAM  VITAL SIGNS: /78   Pulse 92   Temp 36.2 °C (97.2 °F) (Temporal)   Resp 16   Ht 1.753 m (5' 9\")   Wt 89.9 kg (198 lb 3.1 oz)   SpO2 97%   BMI 29.27 kg/m²   Constitutional: Alert in no apparent distress.  HENT: Normocephalic, " Atraumatic, Bilateral external ears normal. Nasal congestion with maxillary sinus tenderness.   Eyes: Pupils are equal and reactive. Conjunctiva normal, non-icteric.   Heart: Regular rate and rythm, no murmurs.    Lungs: Clear to auscultation bilaterally.  Skin: Warm, Dry, No erythema, No rash.   Neurologic: Alert, Grossly non-focal.   Psychiatric: Affect normal, Judgment normal, Mood normal, Appears appropriate and not intoxicated.     COURSE & MEDICAL DECISION MAKING  Nursing notes, VS, PMSFHx reviewed in chart.    1:06 AM - Patient seen and examined at bedside. He has stable vital signs and physical exam is remarkable for maxillary sinus tenderness and nasal congestion. The patient was informed given his symptoms, he likely has sinusitis. He will be discharged home with prescription for Amoxicillin. The patient is understanding and agreeable to discharge.     Decision Making:  This is a 43 y.o. year old male who presents with above complaint.  Likely viral sinusitis.  Will have the patient continue with over-the-counter medications for further symptom medic control.  I have provided him with a prescription for amoxicillin for possible bacterial sinusitis should his symptoms not resolve within the next week.  I have referred him to [facility] clinic for further outpatient evaluation if needed    The patient will return for new or worsening symptoms and is stable at the time of discharge.    DISPOSITION:  Patient will be discharged home in stable condition.    [facility]  [facility] [address] [address]  [phone]          OUTPATIENT MEDICATIONS:  Discharge Medication List as of 12/4/2018  1:25 AM      START taking these medications    Details   !! amoxicillin (AMOXIL) 500 MG Cap Take 1 Cap by mouth 3 times a day for 10 days., Disp-30 Cap, R-0, Print Rx Paper       !! - Potential duplicate medications found. Please discuss with provider.            FINAL IMPRESSION  1. Nasal  congestion    2. Subacute maxillary sinusitis          Satya WEINSTEIN (Mackibla), am scribing for, and in the presence of, Chucho Ham M.D..    Electronically signed by: Satya Mcneill (Claudio), 12/4/2018    Chucho WEINSTEIN M.D. personally performed the services described in this documentation, as scribed by Satya Mcneill in my presence, and it is both accurate and complete.    E.    The note accurately reflects work and decisions made by me.  Chucho Ham  12/4/2018  2:06 AM

## 2018-12-04 NOTE — ED TRIAGE NOTES
Rogelio Escudero  43 y.o.  male  Chief Complaint   Patient presents with   • Nasal Congestion   • Ear Pain     Present to triage c/o sinus congestion / bilateral ear pain and headache x 2 days. Described as off and on pressure and throbbing pain.

## 2019-03-04 ENCOUNTER — HOSPITAL ENCOUNTER (EMERGENCY)
Facility: MEDICAL CENTER | Age: 44
End: 2019-03-04
Attending: EMERGENCY MEDICINE
Payer: MEDICAID

## 2019-03-04 VITALS
SYSTOLIC BLOOD PRESSURE: 121 MMHG | TEMPERATURE: 97.5 F | OXYGEN SATURATION: 98 % | BODY MASS INDEX: 27.92 KG/M2 | DIASTOLIC BLOOD PRESSURE: 90 MMHG | HEIGHT: 69 IN | RESPIRATION RATE: 16 BRPM | WEIGHT: 188.49 LBS | HEART RATE: 98 BPM

## 2019-03-04 DIAGNOSIS — K02.9 DENTAL CARIES: ICD-10-CM

## 2019-03-04 DIAGNOSIS — K04.7 DENTAL INFECTION: ICD-10-CM

## 2019-03-04 PROCEDURE — 700102 HCHG RX REV CODE 250 W/ 637 OVERRIDE(OP): Performed by: EMERGENCY MEDICINE

## 2019-03-04 PROCEDURE — 99284 EMERGENCY DEPT VISIT MOD MDM: CPT

## 2019-03-04 PROCEDURE — A9270 NON-COVERED ITEM OR SERVICE: HCPCS | Performed by: EMERGENCY MEDICINE

## 2019-03-04 RX ORDER — GLIPIZIDE 10 MG/1
10 TABLET ORAL 2 TIMES DAILY
Status: ON HOLD | COMMUNITY
End: 2020-02-12 | Stop reason: SDUPTHER

## 2019-03-04 RX ORDER — AMOXICILLIN 500 MG/1
500 CAPSULE ORAL ONCE
Status: COMPLETED | OUTPATIENT
Start: 2019-03-04 | End: 2019-03-04

## 2019-03-04 RX ORDER — HYDROCODONE BITARTRATE AND ACETAMINOPHEN 5; 325 MG/1; MG/1
1-2 TABLET ORAL EVERY 6 HOURS PRN
Qty: 16 TAB | Refills: 0 | Status: SHIPPED | OUTPATIENT
Start: 2019-03-04 | End: 2019-03-09

## 2019-03-04 RX ORDER — AMOXICILLIN 500 MG/1
500 TABLET, FILM COATED ORAL 3 TIMES DAILY
Qty: 30 TAB | Refills: 0 | Status: SHIPPED | OUTPATIENT
Start: 2019-03-04 | End: 2019-03-14

## 2019-03-04 RX ORDER — HYDROCODONE BITARTRATE AND ACETAMINOPHEN 5; 325 MG/1; MG/1
1 TABLET ORAL ONCE
Status: COMPLETED | OUTPATIENT
Start: 2019-03-04 | End: 2019-03-04

## 2019-03-04 RX ADMIN — AMOXICILLIN 500 MG: 500 CAPSULE ORAL at 14:12

## 2019-03-04 RX ADMIN — HYDROCODONE BITARTRATE AND ACETAMINOPHEN 1 TABLET: 5; 325 TABLET ORAL at 14:12

## 2019-03-04 ASSESSMENT — LIFESTYLE VARIABLES: DO YOU DRINK ALCOHOL: NO

## 2019-03-04 NOTE — ED TRIAGE NOTES
Ambulates to triage  Chief Complaint   Patient presents with   • Dental Pain   • Oral Swelling     Pt has a broken tooth to his upper jaw, and has had swelling for the last 4 days, afebrile in triage has not been on any abx for this.
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
## 2019-03-04 NOTE — ED NOTES
Rogelio Escudero discharged via ambulation with friend driving home.  Discharge instructions given and reviewed, patient educated to follow up with dental referrals, verbalized understanding.  Prescriptions given x 2.  All personal belongings in possession.  No questions at this time.

## 2019-05-02 ENCOUNTER — NON-PROVIDER VISIT (OUTPATIENT)
Dept: OCCUPATIONAL MEDICINE | Facility: CLINIC | Age: 44
End: 2019-05-02

## 2019-05-02 DIAGNOSIS — Z02.1 PRE-EMPLOYMENT DRUG SCREENING: ICD-10-CM

## 2019-05-02 PROCEDURE — 99000 SPECIMEN HANDLING OFFICE-LAB: CPT | Performed by: PREVENTIVE MEDICINE

## 2019-05-06 ENCOUNTER — OFFICE VISIT (OUTPATIENT)
Dept: OCCUPATIONAL MEDICINE | Facility: CLINIC | Age: 44
End: 2019-05-06

## 2019-05-06 VITALS
RESPIRATION RATE: 14 BRPM | OXYGEN SATURATION: 99 % | BODY MASS INDEX: 30.36 KG/M2 | SYSTOLIC BLOOD PRESSURE: 130 MMHG | TEMPERATURE: 97.9 F | HEART RATE: 92 BPM | DIASTOLIC BLOOD PRESSURE: 90 MMHG | WEIGHT: 205 LBS | HEIGHT: 69 IN

## 2019-05-06 DIAGNOSIS — Z02.1 PHYSICAL EXAM, PRE-EMPLOYMENT: ICD-10-CM

## 2019-05-06 PROCEDURE — 8915 PR COMPREHENSIVE PHYSICAL: Performed by: PREVENTIVE MEDICINE

## 2019-05-06 RX ORDER — GABAPENTIN 400 MG/1
400 CAPSULE ORAL 3 TIMES DAILY
Refills: 3 | Status: ON HOLD | COMMUNITY
Start: 2019-04-10 | End: 2020-02-12 | Stop reason: SDUPTHER

## 2019-05-06 RX ORDER — SIMVASTATIN 10 MG
10 TABLET ORAL EVERY EVENING
Refills: 8 | Status: ON HOLD | COMMUNITY
Start: 2019-04-10 | End: 2020-02-12 | Stop reason: SDUPTHER

## 2019-05-06 RX ORDER — AMOXICILLIN 500 MG/1
CAPSULE ORAL
Refills: 0 | COMMUNITY
Start: 2019-03-04 | End: 2019-05-29

## 2019-05-29 ENCOUNTER — HOSPITAL ENCOUNTER (EMERGENCY)
Facility: MEDICAL CENTER | Age: 44
End: 2019-05-29
Attending: EMERGENCY MEDICINE
Payer: MEDICAID

## 2019-05-29 VITALS
RESPIRATION RATE: 17 BRPM | BODY MASS INDEX: 30.07 KG/M2 | TEMPERATURE: 96.9 F | HEIGHT: 69 IN | SYSTOLIC BLOOD PRESSURE: 141 MMHG | HEART RATE: 92 BPM | DIASTOLIC BLOOD PRESSURE: 92 MMHG | OXYGEN SATURATION: 98 % | WEIGHT: 203.04 LBS

## 2019-05-29 DIAGNOSIS — K04.7 DENTAL ABSCESS: ICD-10-CM

## 2019-05-29 DIAGNOSIS — K02.9 DENTAL CARIES: ICD-10-CM

## 2019-05-29 PROCEDURE — 99283 EMERGENCY DEPT VISIT LOW MDM: CPT

## 2019-05-29 RX ORDER — HYDROCODONE BITARTRATE AND ACETAMINOPHEN 5; 325 MG/1; MG/1
1-2 TABLET ORAL EVERY 6 HOURS PRN
Qty: 20 TAB | Refills: 0 | Status: SHIPPED | OUTPATIENT
Start: 2019-05-29 | End: 2019-06-01

## 2019-05-29 RX ORDER — AMOXICILLIN 500 MG/1
500 CAPSULE ORAL 3 TIMES DAILY
Qty: 30 CAP | Refills: 0 | Status: SHIPPED | OUTPATIENT
Start: 2019-05-29 | End: 2019-06-08

## 2019-05-30 NOTE — ED TRIAGE NOTES
"Chief Complaint   Patient presents with   • Tooth Abscess     dental pain/RT cheek swelling since yesterday.      Pt to triage for above. Rt cheek notably inflamed. Pt appears uncomfortable. Unsure of fevers.     Pt returned to lobby. Educated on triage process and to inform staff of any changes.     /95   Pulse (!) 105   Temp 36.1 °C (96.9 °F) (Temporal)   Resp 16   Ht 1.753 m (5' 9\")   Wt 92.1 kg (203 lb 0.7 oz)   SpO2 96%   BMI 29.98 kg/m²     "

## 2019-05-30 NOTE — ED NOTES
Pt verbalizes understanding of discharge and follow-up instructions. Given Rx X2.  VSS.  All questions answered.  Ambulates to discharge with steady gait.
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
## 2019-12-23 ENCOUNTER — HOSPITAL ENCOUNTER (EMERGENCY)
Dept: HOSPITAL 8 - ED | Age: 44
Discharge: HOME | End: 2019-12-23
Payer: MEDICAID

## 2019-12-23 VITALS — BODY MASS INDEX: 27.78 KG/M2 | HEIGHT: 71 IN | WEIGHT: 198.42 LBS

## 2019-12-23 VITALS — SYSTOLIC BLOOD PRESSURE: 107 MMHG | DIASTOLIC BLOOD PRESSURE: 57 MMHG

## 2019-12-23 DIAGNOSIS — E11.65: ICD-10-CM

## 2019-12-23 DIAGNOSIS — F10.120: ICD-10-CM

## 2019-12-23 DIAGNOSIS — F17.200: ICD-10-CM

## 2019-12-23 DIAGNOSIS — Z72.9: ICD-10-CM

## 2019-12-23 DIAGNOSIS — K29.20: Primary | ICD-10-CM

## 2019-12-23 LAB
ALBUMIN SERPL-MCNC: 3.2 G/DL (ref 3.4–5)
ALP SERPL-CCNC: 110 U/L (ref 45–117)
ALT SERPL-CCNC: 33 U/L (ref 12–78)
ANION GAP SERPL CALC-SCNC: 10 MMOL/L (ref 5–15)
BASOPHILS # BLD AUTO: 0.05 X10^3/UL (ref 0–0.1)
BASOPHILS NFR BLD AUTO: 1 % (ref 0–1)
BILIRUB SERPL-MCNC: 0.3 MG/DL (ref 0.2–1)
CALCIUM SERPL-MCNC: 8 MG/DL (ref 8.5–10.1)
CHLORIDE SERPL-SCNC: 107 MMOL/L (ref 98–107)
CREAT SERPL-MCNC: 0.73 MG/DL (ref 0.7–1.3)
EOSINOPHIL # BLD AUTO: 0.32 X10^3/UL (ref 0–0.4)
EOSINOPHIL NFR BLD AUTO: 3 % (ref 1–7)
ERYTHROCYTE [DISTWIDTH] IN BLOOD BY AUTOMATED COUNT: 14.3 % (ref 9.4–14.8)
LYMPHOCYTES # BLD AUTO: 3.7 X10^3/UL (ref 1–3.4)
LYMPHOCYTES NFR BLD AUTO: 33 % (ref 22–44)
MCH RBC QN AUTO: 32.3 PG (ref 27.5–34.5)
MCHC RBC AUTO-ENTMCNC: 34 G/DL (ref 33.2–36.2)
MCV RBC AUTO: 94.8 FL (ref 81–97)
MD: NO
MONOCYTES # BLD AUTO: 0.87 X10^3/UL (ref 0.2–0.8)
MONOCYTES NFR BLD AUTO: 8 % (ref 2–9)
NEUTROPHILS # BLD AUTO: 6.39 X10^3/UL (ref 1.8–6.8)
NEUTROPHILS NFR BLD AUTO: 56 % (ref 42–75)
PLATELET # BLD AUTO: 296 X10^3/UL (ref 130–400)
PMV BLD AUTO: 8.2 FL (ref 7.4–10.4)
PROT SERPL-MCNC: 6.8 G/DL (ref 6.4–8.2)
RBC # BLD AUTO: 4.78 X10^6/UL (ref 4.38–5.82)

## 2019-12-23 PROCEDURE — 99284 EMERGENCY DEPT VISIT MOD MDM: CPT

## 2019-12-23 PROCEDURE — 36415 COLL VENOUS BLD VENIPUNCTURE: CPT

## 2019-12-23 PROCEDURE — 93005 ELECTROCARDIOGRAM TRACING: CPT

## 2019-12-23 PROCEDURE — 85025 COMPLETE CBC W/AUTO DIFF WBC: CPT

## 2019-12-23 PROCEDURE — 80307 DRUG TEST PRSMV CHEM ANLYZR: CPT

## 2019-12-23 PROCEDURE — 83690 ASSAY OF LIPASE: CPT

## 2019-12-23 PROCEDURE — 80053 COMPREHEN METABOLIC PANEL: CPT

## 2020-02-03 ENCOUNTER — HOSPITAL ENCOUNTER (INPATIENT)
Facility: MEDICAL CENTER | Age: 45
LOS: 9 days | DRG: 623 | End: 2020-02-12
Attending: EMERGENCY MEDICINE | Admitting: HOSPITALIST
Payer: MEDICAID

## 2020-02-03 ENCOUNTER — APPOINTMENT (OUTPATIENT)
Dept: RADIOLOGY | Facility: MEDICAL CENTER | Age: 45
DRG: 623 | End: 2020-02-03
Attending: EMERGENCY MEDICINE
Payer: MEDICAID

## 2020-02-03 DIAGNOSIS — M86.271 SUBACUTE OSTEOMYELITIS OF RIGHT FOOT (HCC): ICD-10-CM

## 2020-02-03 DIAGNOSIS — L03.115 CELLULITIS OF RIGHT LOWER EXTREMITY: ICD-10-CM

## 2020-02-03 DIAGNOSIS — E11.621 DIABETIC FOOT ULCER ASSOCIATED WITH TYPE 2 DIABETES MELLITUS, UNSPECIFIED LATERALITY, UNSPECIFIED PART OF FOOT, UNSPECIFIED ULCER STAGE (HCC): ICD-10-CM

## 2020-02-03 DIAGNOSIS — L97.509 DIABETIC FOOT ULCER ASSOCIATED WITH TYPE 2 DIABETES MELLITUS, UNSPECIFIED LATERALITY, UNSPECIFIED PART OF FOOT, UNSPECIFIED ULCER STAGE (HCC): ICD-10-CM

## 2020-02-03 DIAGNOSIS — M86.171 OTHER ACUTE OSTEOMYELITIS OF RIGHT FOOT (HCC): ICD-10-CM

## 2020-02-03 PROBLEM — Z72.0 TOBACCO ABUSE: Status: ACTIVE | Noted: 2020-02-03

## 2020-02-03 PROBLEM — M86.9 OSTEOMYELITIS (HCC): Status: ACTIVE | Noted: 2020-02-03

## 2020-02-03 PROBLEM — F10.10 ALCOHOL ABUSE: Status: ACTIVE | Noted: 2020-02-03

## 2020-02-03 LAB
ALBUMIN SERPL BCP-MCNC: 4.2 G/DL (ref 3.2–4.9)
ALBUMIN/GLOB SERPL: 1.4 G/DL
ALP SERPL-CCNC: 88 U/L (ref 30–99)
ALT SERPL-CCNC: 27 U/L (ref 2–50)
ANION GAP SERPL CALC-SCNC: 7 MMOL/L (ref 0–11.9)
AST SERPL-CCNC: 21 U/L (ref 12–45)
BASOPHILS # BLD AUTO: 0.5 % (ref 0–1.8)
BASOPHILS # BLD: 0.04 K/UL (ref 0–0.12)
BILIRUB SERPL-MCNC: 0.6 MG/DL (ref 0.1–1.5)
BUN SERPL-MCNC: 14 MG/DL (ref 8–22)
CALCIUM SERPL-MCNC: 9.3 MG/DL (ref 8.5–10.5)
CHLORIDE SERPL-SCNC: 101 MMOL/L (ref 96–112)
CO2 SERPL-SCNC: 24 MMOL/L (ref 20–33)
CREAT SERPL-MCNC: 0.81 MG/DL (ref 0.5–1.4)
CRP SERPL HS-MCNC: 2.75 MG/DL (ref 0–0.75)
EOSINOPHIL # BLD AUTO: 0.27 K/UL (ref 0–0.51)
EOSINOPHIL NFR BLD: 3.1 % (ref 0–6.9)
ERYTHROCYTE [DISTWIDTH] IN BLOOD BY AUTOMATED COUNT: 43.6 FL (ref 35.9–50)
ERYTHROCYTE [SEDIMENTATION RATE] IN BLOOD BY WESTERGREN METHOD: 15 MM/HOUR (ref 0–15)
GLOBULIN SER CALC-MCNC: 3.1 G/DL (ref 1.9–3.5)
GLUCOSE SERPL-MCNC: 365 MG/DL (ref 65–99)
HCT VFR BLD AUTO: 46.5 % (ref 42–52)
HGB BLD-MCNC: 16 G/DL (ref 14–18)
IMM GRANULOCYTES # BLD AUTO: 0.05 K/UL (ref 0–0.11)
IMM GRANULOCYTES NFR BLD AUTO: 0.6 % (ref 0–0.9)
LYMPHOCYTES # BLD AUTO: 1.15 K/UL (ref 1–4.8)
LYMPHOCYTES NFR BLD: 13.1 % (ref 22–41)
MCH RBC QN AUTO: 32 PG (ref 27–33)
MCHC RBC AUTO-ENTMCNC: 34.4 G/DL (ref 33.7–35.3)
MCV RBC AUTO: 93 FL (ref 81.4–97.8)
MONOCYTES # BLD AUTO: 0.99 K/UL (ref 0–0.85)
MONOCYTES NFR BLD AUTO: 11.3 % (ref 0–13.4)
NEUTROPHILS # BLD AUTO: 6.28 K/UL (ref 1.82–7.42)
NEUTROPHILS NFR BLD: 71.4 % (ref 44–72)
NRBC # BLD AUTO: 0 K/UL
NRBC BLD-RTO: 0 /100 WBC
PLATELET # BLD AUTO: 256 K/UL (ref 164–446)
PMV BLD AUTO: 9.9 FL (ref 9–12.9)
POTASSIUM SERPL-SCNC: 4 MMOL/L (ref 3.6–5.5)
PREALB SERPL-MCNC: 22 MG/DL (ref 18–38)
PROT SERPL-MCNC: 7.3 G/DL (ref 6–8.2)
RBC # BLD AUTO: 5 M/UL (ref 4.7–6.1)
SODIUM SERPL-SCNC: 132 MMOL/L (ref 135–145)
WBC # BLD AUTO: 8.8 K/UL (ref 4.8–10.8)

## 2020-02-03 PROCEDURE — 36415 COLL VENOUS BLD VENIPUNCTURE: CPT

## 2020-02-03 PROCEDURE — 96365 THER/PROPH/DIAG IV INF INIT: CPT

## 2020-02-03 PROCEDURE — 700111 HCHG RX REV CODE 636 W/ 250 OVERRIDE (IP): Performed by: EMERGENCY MEDICINE

## 2020-02-03 PROCEDURE — 96367 TX/PROPH/DG ADDL SEQ IV INF: CPT

## 2020-02-03 PROCEDURE — 700105 HCHG RX REV CODE 258: Performed by: HOSPITALIST

## 2020-02-03 PROCEDURE — 99223 1ST HOSP IP/OBS HIGH 75: CPT | Mod: 25 | Performed by: HOSPITALIST

## 2020-02-03 PROCEDURE — 700105 HCHG RX REV CODE 258: Performed by: EMERGENCY MEDICINE

## 2020-02-03 PROCEDURE — 87040 BLOOD CULTURE FOR BACTERIA: CPT

## 2020-02-03 PROCEDURE — 96375 TX/PRO/DX INJ NEW DRUG ADDON: CPT

## 2020-02-03 PROCEDURE — 73630 X-RAY EXAM OF FOOT: CPT | Mod: RT

## 2020-02-03 PROCEDURE — 83036 HEMOGLOBIN GLYCOSYLATED A1C: CPT

## 2020-02-03 PROCEDURE — 86140 C-REACTIVE PROTEIN: CPT

## 2020-02-03 PROCEDURE — 80053 COMPREHEN METABOLIC PANEL: CPT

## 2020-02-03 PROCEDURE — 85652 RBC SED RATE AUTOMATED: CPT

## 2020-02-03 PROCEDURE — 700111 HCHG RX REV CODE 636 W/ 250 OVERRIDE (IP): Performed by: HOSPITALIST

## 2020-02-03 PROCEDURE — 84134 ASSAY OF PREALBUMIN: CPT

## 2020-02-03 PROCEDURE — 99407 BEHAV CHNG SMOKING > 10 MIN: CPT | Performed by: HOSPITALIST

## 2020-02-03 PROCEDURE — 85025 COMPLETE CBC W/AUTO DIFF WBC: CPT

## 2020-02-03 PROCEDURE — 99285 EMERGENCY DEPT VISIT HI MDM: CPT

## 2020-02-03 PROCEDURE — 770006 HCHG ROOM/CARE - MED/SURG/GYN SEMI*

## 2020-02-03 RX ORDER — SIMVASTATIN 20 MG
10 TABLET ORAL
Status: DISCONTINUED | OUTPATIENT
Start: 2020-02-03 | End: 2020-02-08

## 2020-02-03 RX ORDER — POLYETHYLENE GLYCOL 3350 17 G/17G
1 POWDER, FOR SOLUTION ORAL
Status: DISCONTINUED | OUTPATIENT
Start: 2020-02-03 | End: 2020-02-12 | Stop reason: HOSPADM

## 2020-02-03 RX ORDER — OXYCODONE HYDROCHLORIDE 5 MG/1
5 TABLET ORAL
Status: DISCONTINUED | OUTPATIENT
Start: 2020-02-03 | End: 2020-02-12 | Stop reason: HOSPADM

## 2020-02-03 RX ORDER — HYDROMORPHONE HYDROCHLORIDE 1 MG/ML
0.25 INJECTION, SOLUTION INTRAMUSCULAR; INTRAVENOUS; SUBCUTANEOUS
Status: DISCONTINUED | OUTPATIENT
Start: 2020-02-03 | End: 2020-02-12 | Stop reason: HOSPADM

## 2020-02-03 RX ORDER — NICOTINE 21 MG/24HR
21 PATCH, TRANSDERMAL 24 HOURS TRANSDERMAL
Status: DISCONTINUED | OUTPATIENT
Start: 2020-02-04 | End: 2020-02-12 | Stop reason: HOSPADM

## 2020-02-03 RX ORDER — AMOXICILLIN 250 MG
2 CAPSULE ORAL 2 TIMES DAILY
Status: DISCONTINUED | OUTPATIENT
Start: 2020-02-03 | End: 2020-02-12 | Stop reason: HOSPADM

## 2020-02-03 RX ORDER — GABAPENTIN 400 MG/1
400 CAPSULE ORAL 3 TIMES DAILY
Status: DISCONTINUED | OUTPATIENT
Start: 2020-02-04 | End: 2020-02-12 | Stop reason: HOSPADM

## 2020-02-03 RX ORDER — MORPHINE SULFATE 4 MG/ML
4 INJECTION, SOLUTION INTRAMUSCULAR; INTRAVENOUS ONCE
Status: COMPLETED | OUTPATIENT
Start: 2020-02-03 | End: 2020-02-03

## 2020-02-03 RX ORDER — BISACODYL 10 MG
10 SUPPOSITORY, RECTAL RECTAL
Status: DISCONTINUED | OUTPATIENT
Start: 2020-02-03 | End: 2020-02-12 | Stop reason: HOSPADM

## 2020-02-03 RX ORDER — ONDANSETRON 2 MG/ML
4 INJECTION INTRAMUSCULAR; INTRAVENOUS ONCE
Status: COMPLETED | OUTPATIENT
Start: 2020-02-03 | End: 2020-02-03

## 2020-02-03 RX ORDER — OXYCODONE HYDROCHLORIDE 5 MG/1
2.5 TABLET ORAL
Status: DISCONTINUED | OUTPATIENT
Start: 2020-02-03 | End: 2020-02-12 | Stop reason: HOSPADM

## 2020-02-03 RX ORDER — SODIUM CHLORIDE 9 MG/ML
INJECTION, SOLUTION INTRAVENOUS CONTINUOUS
Status: DISCONTINUED | OUTPATIENT
Start: 2020-02-03 | End: 2020-02-06

## 2020-02-03 RX ADMIN — VANCOMYCIN HYDROCHLORIDE 2200 MG: 500 INJECTION, POWDER, LYOPHILIZED, FOR SOLUTION INTRAVENOUS at 22:10

## 2020-02-03 RX ADMIN — AMPICILLIN SODIUM AND SULBACTAM SODIUM 3 G: 2; 1 INJECTION, POWDER, FOR SOLUTION INTRAMUSCULAR; INTRAVENOUS at 20:35

## 2020-02-03 RX ADMIN — ONDANSETRON 4 MG: 2 INJECTION INTRAMUSCULAR; INTRAVENOUS at 20:35

## 2020-02-03 RX ADMIN — MORPHINE SULFATE 4 MG: 4 INJECTION INTRAVENOUS at 20:35

## 2020-02-03 ASSESSMENT — ENCOUNTER SYMPTOMS
EYE DISCHARGE: 0
DEPRESSION: 0
FLANK PAIN: 0
BRUISES/BLEEDS EASILY: 0
WEAKNESS: 0
HALLUCINATIONS: 0
MYALGIAS: 1
DOUBLE VISION: 0
SENSORY CHANGE: 0
SHORTNESS OF BREATH: 0
ABDOMINAL PAIN: 0
NAUSEA: 0
SPEECH CHANGE: 0
FOCAL WEAKNESS: 0
PALPITATIONS: 0
HEARTBURN: 0
FALLS: 1
FEVER: 0
BLURRED VISION: 0
VOMITING: 0
HEMOPTYSIS: 0
DIZZINESS: 0
COUGH: 0
CHILLS: 1

## 2020-02-03 ASSESSMENT — LIFESTYLE VARIABLES: SUBSTANCE_ABUSE: 0

## 2020-02-04 ENCOUNTER — PATIENT OUTREACH (OUTPATIENT)
Dept: HEALTH INFORMATION MANAGEMENT | Facility: OTHER | Age: 45
End: 2020-02-04

## 2020-02-04 ENCOUNTER — APPOINTMENT (OUTPATIENT)
Dept: RADIOLOGY | Facility: MEDICAL CENTER | Age: 45
DRG: 623 | End: 2020-02-04
Attending: HOSPITALIST
Payer: MEDICAID

## 2020-02-04 PROBLEM — J11.1 INFLUENZA: Status: ACTIVE | Noted: 2020-02-04

## 2020-02-04 LAB
ALBUMIN SERPL BCP-MCNC: 4 G/DL (ref 3.2–4.9)
ALBUMIN/GLOB SERPL: 1.5 G/DL
ALP SERPL-CCNC: 78 U/L (ref 30–99)
ALT SERPL-CCNC: 23 U/L (ref 2–50)
ANION GAP SERPL CALC-SCNC: 8 MMOL/L (ref 0–11.9)
AST SERPL-CCNC: 15 U/L (ref 12–45)
BASOPHILS # BLD AUTO: 0.4 % (ref 0–1.8)
BASOPHILS # BLD: 0.03 K/UL (ref 0–0.12)
BILIRUB SERPL-MCNC: 0.5 MG/DL (ref 0.1–1.5)
BUN SERPL-MCNC: 12 MG/DL (ref 8–22)
CALCIUM SERPL-MCNC: 9.3 MG/DL (ref 8.5–10.5)
CHLORIDE SERPL-SCNC: 104 MMOL/L (ref 96–112)
CO2 SERPL-SCNC: 24 MMOL/L (ref 20–33)
CREAT SERPL-MCNC: 0.78 MG/DL (ref 0.5–1.4)
EOSINOPHIL # BLD AUTO: 0.21 K/UL (ref 0–0.51)
EOSINOPHIL NFR BLD: 2.5 % (ref 0–6.9)
ERYTHROCYTE [DISTWIDTH] IN BLOOD BY AUTOMATED COUNT: 42.9 FL (ref 35.9–50)
EST. AVERAGE GLUCOSE BLD GHB EST-MCNC: 324 MG/DL
EST. AVERAGE GLUCOSE BLD GHB EST-MCNC: 326 MG/DL
FLUAV RNA SPEC QL NAA+PROBE: POSITIVE
FLUBV RNA SPEC QL NAA+PROBE: NEGATIVE
GLOBULIN SER CALC-MCNC: 2.7 G/DL (ref 1.9–3.5)
GLUCOSE SERPL-MCNC: 240 MG/DL (ref 65–99)
GRAM STN SPEC: NORMAL
HBA1C MFR BLD: 12.9 % (ref 0–5.6)
HBA1C MFR BLD: 13 % (ref 0–5.6)
HCT VFR BLD AUTO: 43.8 % (ref 42–52)
HGB BLD-MCNC: 14.9 G/DL (ref 14–18)
IMM GRANULOCYTES # BLD AUTO: 0.06 K/UL (ref 0–0.11)
IMM GRANULOCYTES NFR BLD AUTO: 0.7 % (ref 0–0.9)
LYMPHOCYTES # BLD AUTO: 0.96 K/UL (ref 1–4.8)
LYMPHOCYTES NFR BLD: 11.4 % (ref 22–41)
MCH RBC QN AUTO: 31.8 PG (ref 27–33)
MCHC RBC AUTO-ENTMCNC: 34 G/DL (ref 33.7–35.3)
MCV RBC AUTO: 93.6 FL (ref 81.4–97.8)
MONOCYTES # BLD AUTO: 1.04 K/UL (ref 0–0.85)
MONOCYTES NFR BLD AUTO: 12.3 % (ref 0–13.4)
NEUTROPHILS # BLD AUTO: 6.14 K/UL (ref 1.82–7.42)
NEUTROPHILS NFR BLD: 72.7 % (ref 44–72)
NRBC # BLD AUTO: 0 K/UL
NRBC BLD-RTO: 0 /100 WBC
PLATELET # BLD AUTO: 242 K/UL (ref 164–446)
PMV BLD AUTO: 9.8 FL (ref 9–12.9)
POTASSIUM SERPL-SCNC: 3.8 MMOL/L (ref 3.6–5.5)
PROT SERPL-MCNC: 6.7 G/DL (ref 6–8.2)
RBC # BLD AUTO: 4.68 M/UL (ref 4.7–6.1)
SIGNIFICANT IND 70042: NORMAL
SITE SITE: NORMAL
SODIUM SERPL-SCNC: 136 MMOL/L (ref 135–145)
SOURCE SOURCE: NORMAL
WBC # BLD AUTO: 8.4 K/UL (ref 4.8–10.8)

## 2020-02-04 PROCEDURE — A9270 NON-COVERED ITEM OR SERVICE: HCPCS | Performed by: HOSPITALIST

## 2020-02-04 PROCEDURE — 90471 IMMUNIZATION ADMIN: CPT

## 2020-02-04 PROCEDURE — 80053 COMPREHEN METABOLIC PANEL: CPT

## 2020-02-04 PROCEDURE — 99233 SBSQ HOSP IP/OBS HIGH 50: CPT | Performed by: HOSPITALIST

## 2020-02-04 PROCEDURE — 87205 SMEAR GRAM STAIN: CPT

## 2020-02-04 PROCEDURE — 90686 IIV4 VACC NO PRSV 0.5 ML IM: CPT | Performed by: HOSPITALIST

## 2020-02-04 PROCEDURE — 36415 COLL VENOUS BLD VENIPUNCTURE: CPT

## 2020-02-04 PROCEDURE — 82962 GLUCOSE BLOOD TEST: CPT

## 2020-02-04 PROCEDURE — 99222 1ST HOSP IP/OBS MODERATE 55: CPT | Performed by: NURSE PRACTITIONER

## 2020-02-04 PROCEDURE — 700111 HCHG RX REV CODE 636 W/ 250 OVERRIDE (IP): Performed by: HOSPITALIST

## 2020-02-04 PROCEDURE — 3E02340 INTRODUCTION OF INFLUENZA VACCINE INTO MUSCLE, PERCUTANEOUS APPROACH: ICD-10-PCS | Performed by: HOSPITALIST

## 2020-02-04 PROCEDURE — 85025 COMPLETE CBC W/AUTO DIFF WBC: CPT

## 2020-02-04 PROCEDURE — 87070 CULTURE OTHR SPECIMN AEROBIC: CPT

## 2020-02-04 PROCEDURE — 87502 INFLUENZA DNA AMP PROBE: CPT

## 2020-02-04 PROCEDURE — 700102 HCHG RX REV CODE 250 W/ 637 OVERRIDE(OP): Performed by: HOSPITALIST

## 2020-02-04 PROCEDURE — 700105 HCHG RX REV CODE 258: Performed by: HOSPITALIST

## 2020-02-04 PROCEDURE — 770021 HCHG ROOM/CARE - ISO PRIVATE

## 2020-02-04 PROCEDURE — 71045 X-RAY EXAM CHEST 1 VIEW: CPT

## 2020-02-04 PROCEDURE — 83036 HEMOGLOBIN GLYCOSYLATED A1C: CPT

## 2020-02-04 RX ORDER — ONDANSETRON 4 MG/1
4 TABLET, ORALLY DISINTEGRATING ORAL EVERY 4 HOURS PRN
Status: DISCONTINUED | OUTPATIENT
Start: 2020-02-04 | End: 2020-02-12 | Stop reason: HOSPADM

## 2020-02-04 RX ORDER — BENZONATATE 100 MG/1
100 CAPSULE ORAL 3 TIMES DAILY PRN
Status: DISCONTINUED | OUTPATIENT
Start: 2020-02-04 | End: 2020-02-12 | Stop reason: HOSPADM

## 2020-02-04 RX ORDER — GUAIFENESIN/DEXTROMETHORPHAN 100-10MG/5
5 SYRUP ORAL EVERY 6 HOURS PRN
Status: DISCONTINUED | OUTPATIENT
Start: 2020-02-04 | End: 2020-02-12 | Stop reason: HOSPADM

## 2020-02-04 RX ORDER — ALBUTEROL SULFATE 90 UG/1
2 AEROSOL, METERED RESPIRATORY (INHALATION) EVERY 4 HOURS
Status: DISCONTINUED | OUTPATIENT
Start: 2020-02-04 | End: 2020-02-08

## 2020-02-04 RX ORDER — ACETAMINOPHEN 325 MG/1
650 TABLET ORAL EVERY 6 HOURS PRN
Status: DISCONTINUED | OUTPATIENT
Start: 2020-02-04 | End: 2020-02-12 | Stop reason: HOSPADM

## 2020-02-04 RX ORDER — OSELTAMIVIR PHOSPHATE 75 MG/1
75 CAPSULE ORAL 2 TIMES DAILY
Status: COMPLETED | OUTPATIENT
Start: 2020-02-04 | End: 2020-02-09

## 2020-02-04 RX ORDER — ONDANSETRON 2 MG/ML
4 INJECTION INTRAMUSCULAR; INTRAVENOUS EVERY 4 HOURS PRN
Status: DISCONTINUED | OUTPATIENT
Start: 2020-02-04 | End: 2020-02-12 | Stop reason: HOSPADM

## 2020-02-04 RX ORDER — GUAIFENESIN 600 MG/1
600 TABLET, EXTENDED RELEASE ORAL EVERY 12 HOURS
Status: DISCONTINUED | OUTPATIENT
Start: 2020-02-04 | End: 2020-02-08

## 2020-02-04 RX ORDER — INSULIN GLARGINE 100 [IU]/ML
0.2 INJECTION, SOLUTION SUBCUTANEOUS EVERY EVENING
Status: DISCONTINUED | OUTPATIENT
Start: 2020-02-04 | End: 2020-02-05

## 2020-02-04 RX ADMIN — INFLUENZA A VIRUS A/BRISBANE/02/2018 IVR-190 (H1N1) ANTIGEN (FORMALDEHYDE INACTIVATED), INFLUENZA A VIRUS A/KANSAS/14/2017 X-327 (H3N2) ANTIGEN (FORMALDEHYDE INACTIVATED), INFLUENZA B VIRUS B/PHUKET/3073/2013 ANTIGEN (FORMALDEHYDE INACTIVATED), AND INFLUENZA B VIRUS B/MARYLAND/15/2016 BX-69A ANTIGEN (FORMALDEHYDE INACTIVATED) 0.5 ML: 15; 15; 15; 15 INJECTION, SUSPENSION INTRAMUSCULAR at 01:15

## 2020-02-04 RX ADMIN — ALBUTEROL SULFATE 2 PUFF: 90 AEROSOL, METERED RESPIRATORY (INHALATION) at 17:29

## 2020-02-04 RX ADMIN — INSULIN HUMAN 2 UNITS: 100 INJECTION, SOLUTION PARENTERAL at 06:04

## 2020-02-04 RX ADMIN — SODIUM CHLORIDE: 9 INJECTION, SOLUTION INTRAVENOUS at 01:18

## 2020-02-04 RX ADMIN — AMPICILLIN SODIUM AND SULBACTAM SODIUM 3 G: 2; 1 INJECTION, POWDER, FOR SOLUTION INTRAMUSCULAR; INTRAVENOUS at 05:58

## 2020-02-04 RX ADMIN — ACETAMINOPHEN 650 MG: 325 TABLET, FILM COATED ORAL at 20:23

## 2020-02-04 RX ADMIN — AMPICILLIN SODIUM AND SULBACTAM SODIUM 3 G: 2; 1 INJECTION, POWDER, FOR SOLUTION INTRAMUSCULAR; INTRAVENOUS at 20:24

## 2020-02-04 RX ADMIN — INSULIN HUMAN 3 UNITS: 100 INJECTION, SOLUTION PARENTERAL at 01:03

## 2020-02-04 RX ADMIN — VANCOMYCIN HYDROCHLORIDE 1500 MG: 500 INJECTION, POWDER, LYOPHILIZED, FOR SOLUTION INTRAVENOUS at 09:11

## 2020-02-04 RX ADMIN — SIMVASTATIN 10 MG: 20 TABLET, FILM COATED ORAL at 22:10

## 2020-02-04 RX ADMIN — GUAIFENESIN 600 MG: 600 TABLET, EXTENDED RELEASE ORAL at 17:31

## 2020-02-04 RX ADMIN — OSELTAMIVIR PHOSPHATE 75 MG: 75 CAPSULE ORAL at 17:31

## 2020-02-04 RX ADMIN — INSULIN LISPRO 4 UNITS: 100 INJECTION, SOLUTION INTRAVENOUS; SUBCUTANEOUS at 17:34

## 2020-02-04 RX ADMIN — VANCOMYCIN HYDROCHLORIDE 1500 MG: 500 INJECTION, POWDER, LYOPHILIZED, FOR SOLUTION INTRAVENOUS at 22:11

## 2020-02-04 RX ADMIN — GABAPENTIN 400 MG: 400 CAPSULE ORAL at 05:58

## 2020-02-04 RX ADMIN — SODIUM CHLORIDE: 9 INJECTION, SOLUTION INTRAVENOUS at 12:24

## 2020-02-04 RX ADMIN — ACETAMINOPHEN 650 MG: 325 TABLET, FILM COATED ORAL at 09:11

## 2020-02-04 RX ADMIN — ALBUTEROL SULFATE 2 PUFF: 90 AEROSOL, METERED RESPIRATORY (INHALATION) at 22:09

## 2020-02-04 RX ADMIN — OXYCODONE HYDROCHLORIDE 5 MG: 5 TABLET ORAL at 20:23

## 2020-02-04 RX ADMIN — AMPICILLIN SODIUM AND SULBACTAM SODIUM 3 G: 2; 1 INJECTION, POWDER, FOR SOLUTION INTRAMUSCULAR; INTRAVENOUS at 01:15

## 2020-02-04 RX ADMIN — INSULIN LISPRO 7 UNITS: 100 INJECTION, SOLUTION INTRAVENOUS; SUBCUTANEOUS at 13:44

## 2020-02-04 RX ADMIN — GABAPENTIN 400 MG: 400 CAPSULE ORAL at 12:23

## 2020-02-04 RX ADMIN — GABAPENTIN 400 MG: 400 CAPSULE ORAL at 17:31

## 2020-02-04 RX ADMIN — SIMVASTATIN 10 MG: 20 TABLET, FILM COATED ORAL at 00:06

## 2020-02-04 RX ADMIN — AMPICILLIN SODIUM AND SULBACTAM SODIUM 3 G: 2; 1 INJECTION, POWDER, FOR SOLUTION INTRAMUSCULAR; INTRAVENOUS at 12:23

## 2020-02-04 RX ADMIN — OXYCODONE HYDROCHLORIDE 5 MG: 5 TABLET ORAL at 00:06

## 2020-02-04 RX ADMIN — INSULIN GLARGINE 17 UNITS: 100 INJECTION, SOLUTION SUBCUTANEOUS at 18:02

## 2020-02-04 SDOH — ECONOMIC STABILITY: FOOD INSECURITY: WITHIN THE PAST 12 MONTHS, YOU WORRIED THAT YOUR FOOD WOULD RUN OUT BEFORE YOU GOT MONEY TO BUY MORE.: SOMETIMES TRUE

## 2020-02-04 SDOH — ECONOMIC STABILITY: FOOD INSECURITY: WITHIN THE PAST 12 MONTHS, THE FOOD YOU BOUGHT JUST DIDN'T LAST AND YOU DIDN'T HAVE MONEY TO GET MORE.: PATIENT DECLINED

## 2020-02-04 SDOH — ECONOMIC STABILITY: TRANSPORTATION INSECURITY
IN THE PAST 12 MONTHS, HAS THE LACK OF TRANSPORTATION KEPT YOU FROM MEDICAL APPOINTMENTS OR FROM GETTING MEDICATIONS?: PATIENT DECLINED

## 2020-02-04 SDOH — ECONOMIC STABILITY: FOOD INSECURITY: WITHIN THE PAST 12 MONTHS, THE FOOD YOU BOUGHT JUST DIDN'T LAST AND YOU DIDN'T HAVE MONEY TO GET MORE.: SOMETIMES TRUE

## 2020-02-04 SDOH — ECONOMIC STABILITY: TRANSPORTATION INSECURITY
IN THE PAST 12 MONTHS, HAS LACK OF TRANSPORTATION KEPT YOU FROM MEETINGS, WORK, OR FROM GETTING THINGS NEEDED FOR DAILY LIVING?: YES

## 2020-02-04 SDOH — ECONOMIC STABILITY: INCOME INSECURITY: HOW HARD IS IT FOR YOU TO PAY FOR THE VERY BASICS LIKE FOOD, HOUSING, MEDICAL CARE, AND HEATING?: VERY HARD

## 2020-02-04 SDOH — ECONOMIC STABILITY: TRANSPORTATION INSECURITY
IN THE PAST 12 MONTHS, HAS THE LACK OF TRANSPORTATION KEPT YOU FROM MEDICAL APPOINTMENTS OR FROM GETTING MEDICATIONS?: YES

## 2020-02-04 SDOH — ECONOMIC STABILITY: TRANSPORTATION INSECURITY
IN THE PAST 12 MONTHS, HAS LACK OF TRANSPORTATION KEPT YOU FROM MEETINGS, WORK, OR FROM GETTING THINGS NEEDED FOR DAILY LIVING?: PATIENT DECLINED

## 2020-02-04 SDOH — ECONOMIC STABILITY: FOOD INSECURITY: WITHIN THE PAST 12 MONTHS, YOU WORRIED THAT YOUR FOOD WOULD RUN OUT BEFORE YOU GOT MONEY TO BUY MORE.: PATIENT DECLINED

## 2020-02-04 ASSESSMENT — LIFESTYLE VARIABLES
ALCOHOL_USE: NO
DOES PATIENT WANT TO STOP DRINKING: NO
SUBSTANCE_ABUSE: 0
HOW MANY TIMES IN THE PAST YEAR HAVE YOU HAD 5 OR MORE DRINKS IN A DAY: 0
EVER HAD A DRINK FIRST THING IN THE MORNING TO STEADY YOUR NERVES TO GET RID OF A HANGOVER: NO
CONSUMPTION TOTAL: NEGATIVE
TOTAL SCORE: 0
HAVE YOU EVER FELT YOU SHOULD CUT DOWN ON YOUR DRINKING: NO
TOTAL SCORE: 0
EVER_SMOKED: YES
HAVE PEOPLE ANNOYED YOU BY CRITICIZING YOUR DRINKING: NO
TOTAL SCORE: 0
EVER FELT BAD OR GUILTY ABOUT YOUR DRINKING: NO
ON A TYPICAL DAY WHEN YOU DRINK ALCOHOL HOW MANY DRINKS DO YOU HAVE: 0
AVERAGE NUMBER OF DAYS PER WEEK YOU HAVE A DRINK CONTAINING ALCOHOL: 0

## 2020-02-04 ASSESSMENT — PATIENT HEALTH QUESTIONNAIRE - PHQ9
9. THOUGHTS THAT YOU WOULD BE BETTER OFF DEAD, OR OF HURTING YOURSELF: NOT AT ALL
1. LITTLE INTEREST OR PLEASURE IN DOING THINGS: SEVERAL DAYS
SUM OF ALL RESPONSES TO PHQ9 QUESTIONS 1 AND 2: 2
1. LITTLE INTEREST OR PLEASURE IN DOING THINGS: NOT AT ALL
8. MOVING OR SPEAKING SO SLOWLY THAT OTHER PEOPLE COULD HAVE NOTICED. OR THE OPPOSITE, BEING SO FIGETY OR RESTLESS THAT YOU HAVE BEEN MOVING AROUND A LOT MORE THAN USUAL: NOT AT ALL
7. TROUBLE CONCENTRATING ON THINGS, SUCH AS READING THE NEWSPAPER OR WATCHING TELEVISION: NOT AT ALL
2. FEELING DOWN, DEPRESSED, IRRITABLE, OR HOPELESS: SEVERAL DAYS
5. POOR APPETITE OR OVEREATING: NOT AT ALL
4. FEELING TIRED OR HAVING LITTLE ENERGY: SEVERAL DAYS
2. FEELING DOWN, DEPRESSED, IRRITABLE, OR HOPELESS: NOT AT ALL
3. TROUBLE FALLING OR STAYING ASLEEP OR SLEEPING TOO MUCH: NOT AT ALL
SUM OF ALL RESPONSES TO PHQ9 QUESTIONS 1 AND 2: 0
SUM OF ALL RESPONSES TO PHQ QUESTIONS 1-9: 3
6. FEELING BAD ABOUT YOURSELF - OR THAT YOU ARE A FAILURE OR HAVE LET YOURSELF OR YOUR FAMILY DOWN: NOT AL ALL

## 2020-02-04 ASSESSMENT — COGNITIVE AND FUNCTIONAL STATUS - GENERAL
SUGGESTED CMS G CODE MODIFIER MOBILITY: CK
CLIMB 3 TO 5 STEPS WITH RAILING: TOTAL
MOBILITY SCORE: 17
WALKING IN HOSPITAL ROOM: TOTAL
DAILY ACTIVITIY SCORE: 23
DRESSING REGULAR LOWER BODY CLOTHING: A LITTLE
SUGGESTED CMS G CODE MODIFIER DAILY ACTIVITY: CI
STANDING UP FROM CHAIR USING ARMS: A LITTLE

## 2020-02-04 ASSESSMENT — ENCOUNTER SYMPTOMS
FALLS: 1
ABDOMINAL PAIN: 0
SPUTUM PRODUCTION: 1
DEPRESSION: 0
WEAKNESS: 0
PALPITATIONS: 0
HEMOPTYSIS: 0
COUGH: 1
SPEECH CHANGE: 0
FOCAL WEAKNESS: 0
EYE DISCHARGE: 0
SHORTNESS OF BREATH: 1
HEARTBURN: 0
BLURRED VISION: 0
WHEEZING: 1
FLANK PAIN: 0
DIZZINESS: 0
CHILLS: 1
NAUSEA: 0
FEVER: 0
MYALGIAS: 1
SENSORY CHANGE: 0
VOMITING: 0
HALLUCINATIONS: 0
BRUISES/BLEEDS EASILY: 0
DOUBLE VISION: 0

## 2020-02-04 NOTE — PROGRESS NOTES
Spiritual Care Note    Patient's Name: Rogelio Escudero   MRN: [id number]    YOB: 1975   Age and Gender: 45 y.o. male   Service Area: [address]   Room (and Bed): [place] [address]   Ethnicity or Nationality: Black or    Primary Language: English   Protestant/Spiritual preference: Moslem   Place of Residence: [facility]   Family/Friends/Others Present: No   Clinical Team Present: No   Medical Diagnosis(-es)/Procedure(s): Open Wound   Code Status: Full Code    Date of Admission: 2/3/2020   Length of Stay: 1 days        Spiritual Care Provider Information    Name of Spiritual Care Provider:  Flori Norwood  Title of Spiritual Care Provider:    Phone Number:    [phone]  E-mail:     [email or web address]  Total Time:    15 minutes    Spiritual Screen Results:    Gen Nursing    Is your spiritual health or inner well-being important to you as you cope with your medical condition?: Yes  Would you like to receive a visit from our Spiritual Care team or your own Islam or spiritual leader?: Yes  Was spiritual care education provided to the patient?: Yes  Cultural/Spiritual Needs During Care: Clinical  Sources of Hope/[place]: Art/Music, Counseling/Support groups, Prayer, Family, Excercise/Physical activity     Palliative Care    Was spiritual care education provided to the patient?: Yes      Encounter/Request Information    Visited With:      Patient  Nature of the Visit:     Initial, On shift  Continue Visiting:     Yes  General Visit:      Yes  Referral From/ Origin of Request:   Epic nursing    Religous Needs/Values    Protestant Needs:     Visit  Protestant Needs:     Prayer    Spiritual Assessment     Observations/Symptoms:    Pain    Interacton/Conversation:    PT was sleeping when  entered. He confirmed the need for a halal diet ([employer]oration[employer] approved)    Assessment:      Need   Need: Cultural Issues, [place] Issues, halal diet     Intended Effects:     Convey a Calming Presence,  Establish Rapport and Connectedness, [place] Affirmation, Preserve Dignity and Respect, Promote a Sense of Peace    Interventions:      Non-anxious presence, active listening, prayer    Outcomes:      Connectedness with the Holy/with God, Spiritual Comfort, Value/Dignity/Respect    Plan:       Visit Upon Request    Notes:      Thank you for allowing Spiritual Care to support this patient and family. Contact [phone] for additional assistance, changes in PT status, or with any questions/concerns.

## 2020-02-04 NOTE — PROGRESS NOTES
"Pharmacy Kinetics   45 y.o. male on vancomycin day # 1 2020    Currently on Vancomycin 1500 mg iv q12hr  Provider specified end date: N/A    Indication for Treatment: Skin and soft tissue infection    Pertinent history per medical record: Admitted on 2/3/2020 for Diabetic food ulcer.    Other antibiotics: Unasyn 3g q6hr    Allergies: Apple and Lactose     List concerns for renal function: No concern    Pertinent cultures to date:   Blood culture x2 pending ()    MRSA nares swab if pneumonia is a concern (ordered/positive/negative/n-a): N/A    Recent Labs     20  0305   WBC 8.8 8.4   NEUTSPOLYS 71.40 72.70*     Recent Labs     20  0305   BUN 14 12   CREATININE 0.81 0.78   ALBUMIN 4.2 4.0     No results for input(s): VANCOTROUGH, VANCOPEAK, VANCORANDOM in the last 72 hours.    Intake/Output Summary (Last 24 hours) at 2020 0422  Last data filed at 2/3/2020 2105  Gross per 24 hour   Intake 100 ml   Output --   Net 100 ml      /73   Pulse 100   Temp 36.4 °C (97.6 °F) (Temporal)   Resp 18   Ht 1.753 m (5' 9\")   Wt 87.4 kg (192 lb 10.9 oz)   SpO2 90%  Temp (24hrs), Av.7 °C (98 °F), Min:36.4 °C (97.6 °F), Max:37 °C (98.6 °F)      A/P   1. Vancomycin dose change: New start   2. Next vancomycin level: Not ordered, possibly prior to 3rd dose.   3. Goal trough: 12-16  4. Comments: Pt has diabetic ulcer with concerns of osteomyelitis, waiting for LPS and wound consultation while on Unasyn and vanco. Follow up with blood culture and de-escalate if appropriate.     Peggy Raymundo, PharmD    "

## 2020-02-04 NOTE — ED PROVIDER NOTES
ED Provider Note    Scribed for Ruby Vaz M.D. by Ariadna Tran. 2/3/2020  7:58 PM    Primary care provider: Pcp Pt States None  Means of arrival: walk in   History obtained from: patient   History limited by: none    CHIEF COMPLAINT  Chief Complaint   Patient presents with   • Open Wound     R foot x2 weeks   • Leg Pain     RLE edema, pedal pulse 2+       HPI  Rogelio Escudero is a 45 y.o. male with a history of diabetes who presents to the Emergency Department for evaluation of right foot pain onset 2 weeks ago. Patient reports associated right leg pain that extends from his foot to mid thigh secondary to his foot pain and fevers. He states he has multiple diabetic ulcers to his right foot. The patient states that his ulcer was small at first however grew and his pain worsened within 4-5 days which brings him to the ED today. He notes that pressure and movement of his foot greatly exacerbates his pain. Patient is currently compliant with his diabetes medications of Gabapentin and Metformin. The patient states that he is an everyday smoker. No alleviating factors were reported at bedside.    REVIEW OF SYSTEMS  HEENT:  No ear pain, congestion, or sore throat   EYES: no discharge, redness, or vision changes  CARDIAC: no chest pain, no palpitations    PULMONARY: no dyspnea, cough, or congestion   GI: no vomiting, diarrhea, or abdominal pain   : no dysuria, back pain, or hematuria   Neuro: no weakness, numbness, aphasia, or headache  Musculoskeletal: Positive right foot swelling and pain  Endocrine: no sweating, or weight loss positive fevers subjective at home  SKIN: no rash, right foot erythema, no contusions     See history of present illness. All other systems are negative. C.    PAST MEDICAL HISTORY   has a past medical history of Alcohol abuse, Depression (2010), Diabetes, and Pneumonia (approx 2011).    SURGICAL HISTORY   has a past surgical history that includes other.    SOCIAL HISTORY  Social  Sedation Medication Administration Completed   History     Tobacco Use   • Smoking status: Current Every Day Smoker     Packs/day: 0.25     Years: 20.00     Pack years: 5.00     Types: Cigarettes   • Smokeless tobacco: Never Used   • Tobacco comment: 6   Substance Use Topics   • Alcohol use: No     Comment: quit 8 months ago   • Drug use: No      Social History     Substance and Sexual Activity   Drug Use No       FAMILY HISTORY  None.     CURRENT MEDICATIONS  No current facility-administered medications for this encounter.     Current Outpatient Medications:   •  gabapentin (NEURONTIN) 400 MG Cap, Take 400 mg by mouth., Disp: , Rfl: 3  •  metformin (GLUCOPHAGE) 1000 MG tablet, Take 1,000 mg by mouth., Disp: , Rfl: 6  •  metFORMIN (GLUCOPHAGE) 850 MG Tab, Take 850 mg by mouth., Disp: , Rfl: 5  •  simvastatin (ZOCOR) 10 MG Tab, Take 10 mg by mouth every day. N THE EVENING, Disp: , Rfl: 8  •  glipiZIDE (GLUCOTROL) 10 MG Tab, Take 10 mg by mouth 2 times a day., Disp: , Rfl:   •  gabapentin (NEURONTIN) 300 MG Cap, Take 2 Caps by mouth 3 times a day. For neuropathy (Patient taking differently: Take 400 mg by mouth 3 times a day. For neuropathy), Disp: 90 Cap, Rfl: 1  •  metFORMIN (GLUCOPHAGE) 500 MG Tab, Take 1 Tab by mouth 2 times a day, with meals. (Patient not taking: Reported on 5/6/2019), Disp: 60 Tab, Rfl: 1  •  Blood Glucose Monitoring Suppl ([employer] BLOOD GLUCOSE METER) w/Device Kit, 1 Unknown by Does not apply route every day., Disp: 1 Kit, Rfl: 0  •  glucose blood strip, Test strips order: Test strips for True Metrix test strip. Use daily and in times of high or low blood sugars., Disp: 100 Strip, Rfl: 1  •  Lancets Misc, Lancets order: Lancets for True Metrix glucose meter. Use daily and as needed in times of high or low blood sugars., Disp: 100 Each, Rfl: 1  •  Insulin Pen Needle (PEN NEEDLES) 32G X 4 MM Misc, 1 Unknown by Does not apply route every day., Disp: 100 Each, Rfl: 1  •  Insulin Glargine (BASAGLAR KWIKPEN) 100 UNIT/ML Solution Pen-injector, Inject  "15 Units as instructed every bedtime., Disp: 5 PEN, Rfl: 1     ALLERGIES  Allergies   Allergen Reactions   • Apple Hives   • Lactose Diarrhea       PHYSICAL EXAM  VITAL SIGNS: /72   Pulse (!) 102   Temp 36.5 °C (97.7 °F) (Temporal)   Resp 18   Ht 1.753 m (5' 9\")   Wt 87.4 kg (192 lb 10.9 oz)   SpO2 96%   BMI 28.45 kg/m²     Constitutional: Well developed, Well nourished, No acute distress, Non-toxic appearance.   HEENT: Normocephalic, Atraumatic,  external ears normal, pharynx pink,  Mucous  Membranes moist, No rhinorrhea or mucosal edema  Eyes: PERRL, EOMI, Conjunctiva normal, No discharge.   Neck: Normal range of motion, No tenderness, Supple, No stridor.   Lymphatic: No lymphadenopathy    Cardiovascular: Regular Rate and Rhythm, No murmurs,  rubs, or gallops.   Thorax & Lungs: Lungs clear to auscultation bilaterally, No respiratory distress, No wheezes, rhales or rhonchi, No chest wall tenderness.   Abdomen: Bowel sounds normal, Soft, non tender, non distended,  No pulsatile masses., no rebound guarding or peritoneal signs.   Skin: Warm, Dry, right foot erythema, No rash,   Back:  No CVA tenderness,  No spinal tenderness, bony crepitance, step offs, or instability.   Neurologic: Alert & oriented x 3, Normal motor function, Normal sensory function, No focal deficits noted. Normal reflexes. Normal Cranial Nerves.  Extremities: Equal, intact distal pulses, No cyanosis, clubbing or edema,  No tenderness.   Musculoskeletal:  Right foot is warm to the touch. Right foot is swollen and tender with diabetic foot ulcers on the medial aspect of the great toe and between great and second toes. Decreased range of motion.  Positive calluses and crusting on both great toes.  There is no red streaking up the foot.    DIAGNOSTIC STUDIES / PROCEDURES    LABS  Results for orders placed or performed during the hospital encounter of 02/03/20   CBC WITH DIFFERENTIAL   Result Value Ref Range    WBC 8.8 4.8 - 10.8 K/uL    " RBC 5.00 4.70 - 6.10 M/uL    Hemoglobin 16.0 14.0 - 18.0 g/dL    Hematocrit 46.5 42.0 - 52.0 %    MCV 93.0 81.4 - 97.8 fL    MCH 32.0 27.0 - 33.0 pg    MCHC 34.4 33.7 - 35.3 g/dL    RDW 43.6 35.9 - 50.0 fL    Platelet Count 256 164 - 446 K/uL    MPV 9.9 9.0 - 12.9 fL    Neutrophils-Polys 71.40 44.00 - 72.00 %    Lymphocytes 13.10 (L) 22.00 - 41.00 %    Monocytes 11.30 0.00 - 13.40 %    Eosinophils 3.10 0.00 - 6.90 %    Basophils 0.50 0.00 - 1.80 %    Immature Granulocytes 0.60 0.00 - 0.90 %    Nucleated RBC 0.00 /100 WBC    Neutrophils (Absolute) 6.28 1.82 - 7.42 K/uL    Lymphs (Absolute) 1.15 1.00 - 4.80 K/uL    Monos (Absolute) 0.99 (H) 0.00 - 0.85 K/uL    Eos (Absolute) 0.27 0.00 - 0.51 K/uL    Baso (Absolute) 0.04 0.00 - 0.12 K/uL    Immature Granulocytes (abs) 0.05 0.00 - 0.11 K/uL    NRBC (Absolute) 0.00 K/uL   Comp Metabolic Panel   Result Value Ref Range    Sodium 132 (L) 135 - 145 mmol/L    Potassium 4.0 3.6 - 5.5 mmol/L    Chloride 101 96 - 112 mmol/L    Co2 24 20 - 33 mmol/L    Anion Gap 7.0 0.0 - 11.9    Glucose 365 (H) 65 - 99 mg/dL    Bun 14 8 - 22 mg/dL    Creatinine 0.81 0.50 - 1.40 mg/dL    Calcium 9.3 8.5 - 10.5 mg/dL    AST(SGOT) 21 12 - 45 U/L    ALT(SGPT) 27 2 - 50 U/L    Alkaline Phosphatase 88 30 - 99 U/L    Total Bilirubin 0.6 0.1 - 1.5 mg/dL    Albumin 4.2 3.2 - 4.9 g/dL    Total Protein 7.3 6.0 - 8.2 g/dL    Globulin 3.1 1.9 - 3.5 g/dL    A-G Ratio 1.4 g/dL   Sed Rate   Result Value Ref Range    Sed Rate Westergren 15 0 - 15 mm/hour   CRP QUANTITIVE (NON-CARDIAC)   Result Value Ref Range    Stat C-Reactive Protein 2.75 (H) 0.00 - 0.75 mg/dL   ESTIMATED GFR   Result Value Ref Range    GFR If African American >60 >60 mL/min/1.73 m 2    GFR If Non African American >60 >60 mL/min/1.73 m 2   Prealbumin   Result Value Ref Range    Pre-Albumin 22.0 18.0 - 38.0 mg/dL      All labs reviewed by me.    RADIOLOGY  DX-FOOT-COMPLETE 3+ RIGHT   Final Result      Erosion involving the medial first  metatarsal neck, with overlying soft tissue wound. This is suggestive of osteomyelitis.      MR-FOOT-W/O RIGHT    (Results Pending)     The radiologist's interpretation of all radiological studies have been reviewed by me.    COURSE & MEDICAL DECISION MAKING  Nursing notes, VS, PMSFHx reviewed in chart.    7:58 PM Patient seen and examined at bedside. Patient will be treated with Morphine 4 injection mg, Zofran injection 4 mg, Unasyn 3 g in  mL IVPB. Ordered CBC with diff, CMP, Sed Rate, CRP Quantitive, Blood Culture to evaluate his symptoms. I informed the patient the need for labs and radiology to rule out any emergent processes. Currently awaiting labs and radiology results before deciding if intervention is necessary. Patient verbalizes understanding and agreement to this plan of care. The differential diagnoses include but are not limited to: cellulitis osteomyelitis    9:25 PM I discussed the patient's case and the above findings with Dr. Parra (Hospitalist) who agrees to admit the patient       DISPOSITION:  Patient will be hospitalized by Dr. Parra in guarded condition.    FINAL IMPRESSION  1. Subacute osteomyelitis of right foot (HCC)    2. Cellulitis of right lower extremity          Ariadna WEINSTEIN (Claudio), am scribing for, and in the presence of, Ruby Vaz M.D..    Electronically signed by: Ariadna Tran (Claudio), 2/3/2020    Ruby WEINSTEIN M.D. personally performed the services described in this documentation, as scribed by Ariadna Tran in my presence, and it is both accurate and complete. C     The note accurately reflects work and decisions made by me.  Ruby Vaz M.D.  2/3/2020  11:44 PM

## 2020-02-04 NOTE — PROGRESS NOTES
"Pharmacy Kinetics 45 y.o. male on vancomycin day # 1 2020    Currently on Vancomycin 1500 mg iv q12hr (10:00, 22:00)  Provider specified end date: TBD    Indication for Treatment: SSTI, DM foot ulcer    Pertinent history per medical record: Admitted on 2/3/2020 for R foot ulcer/pain. Wound developed about 2 weeks prior with subjective fever and chills. PMH: DM, tobacco and ETOH abuse, depressio    Other antibiotics: Unasyn 3 g IV q6h    Allergies: Apple and Lactose     List concerns for renal function: DM    Pertinent cultures to date:   2/3/20 PBC x2 - NGTD  20 right foot wound - pending    MRSA nares swab if pneumonia is a concern (ordered/positive/negative/n-a): n/a    Recent Labs     20  0305   WBC 8.8 8.4   NEUTSPOLYS 71.40 72.70*     Recent Labs     20  0305   BUN 14 12   CREATININE 0.81 0.78   ALBUMIN 4.2 4.0     No results for input(s): VANCOTROUGH, VANCOPEAK, VANCORANDOM in the last 72 hours.    Intake/Output Summary (Last 24 hours) at 2020 1254  Last data filed at 2020 0915  Gross per 24 hour   Intake 100 ml   Output 1110 ml   Net -1010 ml      /76   Pulse (!) 101   Temp 37.4 °C (99.4 °F) (Temporal)   Resp 18   Ht 1.753 m (5' 9\")   Wt 87.4 kg (192 lb 10.9 oz)   SpO2 98%  Temp (24hrs), Av.4 °C (99.3 °F), Min:36.4 °C (97.6 °F), Max:38.6 °C (101.5 °F)      A/P   1. Vancomycin dose change: continue same  2. Next vancomycin level: prior 4th total dose tomorrow at 9:30AM  3. Goal trough: 12-16  4. Comments: MRI ordered to r/o osteo. Continue current antibiotics. Pharmacy will monitor renal function and vanco trough and dose adjust per protocol.    Sylvia Wyatt, Pharm.D, BCPS, BCCP      "

## 2020-02-04 NOTE — DISCHARGE PLANNING
Anticipated Discharge Disposition: TBD    Action: Bedside RN requested LSW contact pt's family member, Brandee ([phone]). LSW called Brandee who asked about accessing  benefits. LSW informed Romanha that pt will need to go through [facility] services to apply for assistance or benefits. LSW provided [facility] contact information if she has any questions or concerns regarding discharge plan.    Barriers to Discharge: None    Plan: LSW continue to assess for discharge needs.

## 2020-02-04 NOTE — H&P
Hospital Medicine History & Physical Note    Date of Service  2/3/2020    Primary Care Physician  Pcp Pt States None    Consultants  LPS  wound    Code Status  full    Chief Complaint  Foot ulcer/pain     History of Presenting Illness  45 y.o. male who presented 2/3/2020 with past medical history of tobacco abuse, alcohol abuse depression and diabetes who presents with right foot pain.  This patient has developed right lower extremity wounds for about 2 weeks in the right lower extremity pain.  Pain is throbbing in nature worsening over the last 2 weeks.  Worse with any ambulation.  He is also noticed some ulcerations.  Pain radiates up into his mid thigh.  Is also had subjective fevers and chills.  He states he is compliant with his home diabetes medication.  Otherwise he has no known alleviating or exacerbating factors to his symptoms.  He will be admitted to the hospital diabetic foot ulcer with concerns of osteomyelitis.    Review of Systems  Review of Systems   Constitutional: Positive for chills. Negative for fever.   HENT: Negative for congestion, hearing loss and tinnitus.    Eyes: Negative for blurred vision, double vision and discharge.   Respiratory: Negative for cough, hemoptysis and shortness of breath.    Cardiovascular: Negative for chest pain, palpitations and leg swelling.   Gastrointestinal: Negative for abdominal pain, heartburn, nausea and vomiting.   Genitourinary: Negative for dysuria and flank pain.   Musculoskeletal: Positive for falls and myalgias. Negative for joint pain.   Skin: Positive for rash.   Neurological: Negative for dizziness, sensory change, speech change, focal weakness and weakness.   Endo/Heme/Allergies: Negative for environmental allergies. Does not bruise/bleed easily.   Psychiatric/Behavioral: Negative for depression, hallucinations and substance abuse.       Past Medical History   has a past medical history of Alcohol abuse, Depression (2010), Diabetes, and Pneumonia  (approx ).    Surgical History  Reviewed and not peritnent     Family History  Reviewed and not pertinent     Social History   reports that he has been smoking cigarettes. He has a 5.00 pack-year smoking history. He has never used smokeless tobacco. He reports that he does not drink alcohol or use drugs.    Allergies  Allergies   Allergen Reactions   • Apple Hives   • Lactose Diarrhea       Medications  Prior to Admission Medications   Prescriptions Last Dose Informant Patient Reported? Taking?   Blood Glucose Monitoring Suppl ([employer] BLOOD GLUCOSE METER) w/Device Kit   No No   Si Unknown by Does not apply route every day.   Insulin Glargine (BASAGLAR KWIKPEN) 100 UNIT/ML Solution Pen-injector   No No   Sig: Inject 15 Units as instructed every bedtime.   Insulin Pen Needle (PEN NEEDLES) 32G X 4 MM Misc   No No   Si Unknown by Does not apply route every day.   Lancets Misc   No No   Sig: Lancets order: Lancets for True Metrix glucose meter. Use daily and as needed in times of high or low blood sugars.   gabapentin (NEURONTIN) 300 MG Cap   No No   Sig: Take 2 Caps by mouth 3 times a day. For neuropathy   Patient taking differently: Take 400 mg by mouth 3 times a day. For neuropathy   gabapentin (NEURONTIN) 400 MG Cap   Yes No   Sig: Take 400 mg by mouth.   glipiZIDE (GLUCOTROL) 10 MG Tab   Yes No   Sig: Take 10 mg by mouth 2 times a day.   glucose blood strip   No No   Sig: Test strips order: Test strips for True Metrix test strip. Use daily and in times of high or low blood sugars.   metFORMIN (GLUCOPHAGE) 500 MG Tab   No No   Sig: Take 1 Tab by mouth 2 times a day, with meals.   Patient not taking: Reported on 2019   metFORMIN (GLUCOPHAGE) 850 MG Tab   Yes No   Sig: Take 850 mg by mouth.   metformin (GLUCOPHAGE) 1000 MG tablet   Yes No   Sig: Take 1,000 mg by mouth.   simvastatin (ZOCOR) 10 MG Tab   Yes No   Sig: Take 10 mg by mouth every day. N THE EVENING      Facility-Administered Medications: None        Physical Exam  Temp:  [36.5 °C (97.7 °F)] 36.5 °C (97.7 °F)  Pulse:  [102] 102  Resp:  [18] 18  BP: (117)/(72) 117/72  SpO2:  [96 %] 96 %    Physical Exam  Vitals signs reviewed.   Constitutional:       General: He is not in acute distress.     Appearance: He is ill-appearing.   HENT:      Head: Normocephalic and atraumatic.      Nose: No congestion.      Mouth/Throat:      Mouth: Mucous membranes are dry.   Eyes:      Extraocular Movements: Extraocular movements intact.      Pupils: Pupils are equal, round, and reactive to light.   Neck:      Musculoskeletal: Neck supple.   Cardiovascular:      Rate and Rhythm: Normal rate and regular rhythm.      Pulses: Normal pulses.      Heart sounds: Normal heart sounds.   Pulmonary:      Effort: Pulmonary effort is normal. No respiratory distress.      Breath sounds: Normal breath sounds. No wheezing.   Abdominal:      General: Bowel sounds are normal. There is no distension.      Palpations: Abdomen is soft.      Tenderness: There is no tenderness.   Musculoskeletal:         General: Swelling present.      Comments: Right lateral big toe edema, pain to palpation small ulceration, right medial foot edema erythema and palpation. Ulceration involving middle of 2-3 toes. Lateral pinky edema and palpation    Skin:     General: Skin is warm and dry.      Capillary Refill: Capillary refill takes 2 to 3 seconds.   Neurological:      General: No focal deficit present.      Mental Status: He is alert and oriented to person, place, and time. Mental status is at baseline.   Psychiatric:         Mood and Affect: Mood normal.         Behavior: Behavior normal.         Thought Content: Thought content normal.         Judgment: Judgment normal.         Laboratory:  Recent Labs     02/03/20 2023   WBC 8.8   RBC 5.00   HEMOGLOBIN 16.0   HEMATOCRIT 46.5   MCV 93.0   MCH 32.0   MCHC 34.4   RDW 43.6   PLATELETCT 256   MPV 9.9     Recent Labs     02/03/20 2023   SODIUM 132*   POTASSIUM  4.0   CHLORIDE 101   CO2 24   GLUCOSE 365*   BUN 14   CREATININE 0.81   CALCIUM 9.3     Recent Labs     02/03/20 2023   ALTSGPT 27   ASTSGOT 21   ALKPHOSPHAT 88   TBILIRUBIN 0.6   GLUCOSE 365*         No results for input(s): NTPROBNP in the last 72 hours.      No results for input(s): TROPONINT in the last 72 hours.    Urinalysis:    No results found     Imaging:  DX-FOOT-COMPLETE 3+ RIGHT   Final Result      Erosion involving the medial first metatarsal neck, with overlying soft tissue wound. This is suggestive of osteomyelitis.      MR-FOOT-W/O RIGHT    (Results Pending)         Assessment/Plan:  I anticipate this patient will require at least two midnights for appropriate medical management, necessitating inpatient admission.    * Diabetic foot ulcer (HCC)  Assessment & Plan  Associated evidence of osteo on Xray   Cont with IV unasyn and vanco for now   Pain control   Esr/crp  SSI ordered  MRI right foot  LPS and wound consultation     Diabetes (HCC)- (present on admission)  Assessment & Plan  Uncontrolled  SSI ordered  accu checks   Attempt to keep BS <180 for optimal wound heeling    Alcohol abuse  Assessment & Plan  States has not had drink since yesterday  Monitor for withdrawals   Encouraged cessation    Osteomyelitis (HCC)  Assessment & Plan  Xray evidence of osteo, MRI for connfirmation   NPO for now   Pain control  IV abx     Tobacco abuse  Assessment & Plan  Greater than 10 minutes spent with patient smoking cessation counseling, discussed cardiovascular risk factors of smoking. Nicotine patch provided to patient      VTE prophylaxis: scd

## 2020-02-04 NOTE — PROGRESS NOTES
Assumed care of patient at 2330, arrived from ED, received report. Patient is A & O x 4, c/o 10/10 RLE and left foot pain, c/o n/t in RLE and under left foot and left toes. Bed controls on, bed locked and in lowest position. Fall precautions and signs on door in place. Updated plan of care on board. Call light within reach. Hourly rounding in place.

## 2020-02-04 NOTE — PROGRESS NOTES
Community Health Worker Intake completed at bedside 2/4/20 by CHANDA Sin.   • Social determinates of health intake completed.   • Identified transportation, housing, food, and financial security barriers.  • Contact information provided to Rogelio Escudero.  • Referral to [facility] requested.  • Patient provided permission to text his cousin Lyssa: [phone]  • Patient accepted meds-to-beds.    Pt. Is couch surfing, has a support system, and is unsure about managing his health. Pt. was distraut concerning news about amputating his toe.     Plan:  CHANDA Sin will route chart to [facility], connect with pt. For a PCP, and TC pt. For follow-up questions.

## 2020-02-04 NOTE — ED NOTES
Met with pt at bedside. Dicussed current medications and last doses taken. Pt has not taken any medications including his insulin for   > 1 month. No antibiotic use in last 14 days.   pt stated that he would like to fill prescriptions now at [facility] on [address]

## 2020-02-04 NOTE — ED TRIAGE NOTES
Chief Complaint   Patient presents with   • Open Wound     R foot x2 weeks   • Leg Pain     RLE edema, pedal pulse 2+     Patient to triage via ambulation, patient A&O x4.  Mask applied, verbalized understanding.    Explained wait time and triage process to pt. Pt placed back in lobby, told to notify ED tech or triage RN of any changes, verbalized understanding.

## 2020-02-04 NOTE — ASSESSMENT & PLAN NOTE
SSI ordered  accu checks   Attempt to keep BS <180 for optimal wound heeling  Continue lantus 25 U at bedtime   Continue to monitor   Will consider to restart oral meds tomorrow, after angiogram   Last A1c 13

## 2020-02-04 NOTE — CONSULTS
Diabetic foot admission. Consults placed for Limb Preservation Services, Wound Care, Diabetes Education, and Nutrition. LSW to follow for discharge recommendations.

## 2020-02-04 NOTE — PROGRESS NOTES
· 2 RN skin check complete with HANS Peres  · Devices in place n/a.  · Skin assessed under devices n/a  · Confirmed pressure ulcers found on n/a  · New potential pressure ulcers noted n/a  · Diabetic foot ulcer, present on right foot medial. Diabetic foot ulcer also present between first and second toe, right foot.

## 2020-02-04 NOTE — ASSESSMENT & PLAN NOTE
Greater than 10 minutes spent with patient smoking cessation counseling, discussed cardiovascular risk factors of smoking. Nicotine patch provided to patient
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
## 2020-02-04 NOTE — ASSESSMENT & PLAN NOTE
Xray evidence of osteo,   ESR CRP elevated  Bone biopsy was positive for osteomyelitis-it   MRI of the foot found no evidence of osteomyelitis  Pain control  abx per ID

## 2020-02-04 NOTE — DISCHARGE PLANNING
Patient is eligible for Medicaid Meds to Beds at discharge if they have coverage with [place] Medicaid, Medicaid FFS, Medicaid HMO ([facility]), or [place]. This service is provided through the [facility] if orders are received by the pharmacy prior to 4pm Monday through Friday excluding holidays. Preferred pharmacy has been changed to [facility]. Please call [phone] prior to discharge.

## 2020-02-04 NOTE — CARE PLAN
Problem: Communication  Goal: The ability to communicate needs accurately and effectively will improve  Outcome: PROGRESSING AS EXPECTED  Intervention: [place] patient and significant other/support system to call light to alert staff of needs  Note:   Calls appropriately, communicates needs effectively      Problem: Safety  Goal: Will remain free from falls  Outcome: PROGRESSING AS EXPECTED  Intervention: Assess risk factors for falls  Note:   Mobility assessed, appropriate sign placed, hourly rounding in place, call light within reach

## 2020-02-04 NOTE — ASSESSMENT & PLAN NOTE
Associated evidence of osteo on Xray   Cont with IV unasyn Pain control   Esr/crp- elevated  He did undergo a I&D of right foot abscess and bone biopsy 2/6  MRI of the foot found no evidence of osteomyelitis   Bone biopsy was positive for osteomyelitis  Blood cultures, wound cultures, surgical cultures found enterococcus and strep viridans  LPS and wound consultation   ID following   Angiogram positive for severe stenosis. Vascular surgeon following

## 2020-02-04 NOTE — PROGRESS NOTES
0745: 102.5 temporal. 101.5 oral temp. Ice packs placed. No tylenol ordered.   0820: recheck 100.9 temporal. Dr Ike knapp.

## 2020-02-05 ENCOUNTER — APPOINTMENT (OUTPATIENT)
Dept: RADIOLOGY | Facility: MEDICAL CENTER | Age: 45
DRG: 623 | End: 2020-02-05
Attending: NURSE PRACTITIONER
Payer: MEDICAID

## 2020-02-05 LAB
ALBUMIN SERPL BCP-MCNC: 3.3 G/DL (ref 3.2–4.9)
ALBUMIN/GLOB SERPL: 1.4 G/DL
ALP SERPL-CCNC: 60 U/L (ref 30–99)
ALT SERPL-CCNC: 27 U/L (ref 2–50)
ANION GAP SERPL CALC-SCNC: 6 MMOL/L (ref 0–11.9)
AST SERPL-CCNC: 23 U/L (ref 12–45)
BASOPHILS # BLD AUTO: 0.4 % (ref 0–1.8)
BASOPHILS # BLD: 0.03 K/UL (ref 0–0.12)
BILIRUB SERPL-MCNC: 0.3 MG/DL (ref 0.1–1.5)
BUN SERPL-MCNC: 11 MG/DL (ref 8–22)
CALCIUM SERPL-MCNC: 8.3 MG/DL (ref 8.5–10.5)
CHLORIDE SERPL-SCNC: 106 MMOL/L (ref 96–112)
CO2 SERPL-SCNC: 24 MMOL/L (ref 20–33)
CREAT SERPL-MCNC: 0.75 MG/DL (ref 0.5–1.4)
EOSINOPHIL # BLD AUTO: 0.03 K/UL (ref 0–0.51)
EOSINOPHIL NFR BLD: 0.4 % (ref 0–6.9)
ERYTHROCYTE [DISTWIDTH] IN BLOOD BY AUTOMATED COUNT: 43.8 FL (ref 35.9–50)
GLOBULIN SER CALC-MCNC: 2.4 G/DL (ref 1.9–3.5)
GLUCOSE BLD-MCNC: 102 MG/DL (ref 65–99)
GLUCOSE BLD-MCNC: 153 MG/DL (ref 65–99)
GLUCOSE BLD-MCNC: 153 MG/DL (ref 65–99)
GLUCOSE BLD-MCNC: 225 MG/DL (ref 65–99)
GLUCOSE BLD-MCNC: 226 MG/DL (ref 65–99)
GLUCOSE BLD-MCNC: 235 MG/DL (ref 65–99)
GLUCOSE BLD-MCNC: 236 MG/DL (ref 65–99)
GLUCOSE BLD-MCNC: 281 MG/DL (ref 65–99)
GLUCOSE BLD-MCNC: 282 MG/DL (ref 65–99)
GLUCOSE SERPL-MCNC: 111 MG/DL (ref 65–99)
HCT VFR BLD AUTO: 39.7 % (ref 42–52)
HGB BLD-MCNC: 13.7 G/DL (ref 14–18)
IMM GRANULOCYTES # BLD AUTO: 0.02 K/UL (ref 0–0.11)
IMM GRANULOCYTES NFR BLD AUTO: 0.3 % (ref 0–0.9)
LYMPHOCYTES # BLD AUTO: 2.02 K/UL (ref 1–4.8)
LYMPHOCYTES NFR BLD: 29.8 % (ref 22–41)
MAGNESIUM SERPL-MCNC: 2 MG/DL (ref 1.5–2.5)
MCH RBC QN AUTO: 32.4 PG (ref 27–33)
MCHC RBC AUTO-ENTMCNC: 34.5 G/DL (ref 33.7–35.3)
MCV RBC AUTO: 93.9 FL (ref 81.4–97.8)
MONOCYTES # BLD AUTO: 1.14 K/UL (ref 0–0.85)
MONOCYTES NFR BLD AUTO: 16.8 % (ref 0–13.4)
NEUTROPHILS # BLD AUTO: 3.54 K/UL (ref 1.82–7.42)
NEUTROPHILS NFR BLD: 52.3 % (ref 44–72)
NRBC # BLD AUTO: 0 K/UL
NRBC BLD-RTO: 0 /100 WBC
PHOSPHATE SERPL-MCNC: 3.6 MG/DL (ref 2.5–4.5)
PLATELET # BLD AUTO: 217 K/UL (ref 164–446)
PMV BLD AUTO: 10.1 FL (ref 9–12.9)
POTASSIUM SERPL-SCNC: 3.3 MMOL/L (ref 3.6–5.5)
PROT SERPL-MCNC: 5.7 G/DL (ref 6–8.2)
RBC # BLD AUTO: 4.23 M/UL (ref 4.7–6.1)
SODIUM SERPL-SCNC: 136 MMOL/L (ref 135–145)
VANCOMYCIN TROUGH SERPL-MCNC: 9.8 UG/ML (ref 10–20)
WBC # BLD AUTO: 6.8 K/UL (ref 4.8–10.8)

## 2020-02-05 PROCEDURE — 84100 ASSAY OF PHOSPHORUS: CPT

## 2020-02-05 PROCEDURE — 700102 HCHG RX REV CODE 250 W/ 637 OVERRIDE(OP): Performed by: HOSPITALIST

## 2020-02-05 PROCEDURE — 770021 HCHG ROOM/CARE - ISO PRIVATE

## 2020-02-05 PROCEDURE — 82962 GLUCOSE BLOOD TEST: CPT

## 2020-02-05 PROCEDURE — 700105 HCHG RX REV CODE 258: Performed by: HOSPITALIST

## 2020-02-05 PROCEDURE — 85025 COMPLETE CBC W/AUTO DIFF WBC: CPT

## 2020-02-05 PROCEDURE — 80202 ASSAY OF VANCOMYCIN: CPT

## 2020-02-05 PROCEDURE — 700111 HCHG RX REV CODE 636 W/ 250 OVERRIDE (IP): Performed by: HOSPITALIST

## 2020-02-05 PROCEDURE — 99233 SBSQ HOSP IP/OBS HIGH 50: CPT | Performed by: HOSPITALIST

## 2020-02-05 PROCEDURE — 700117 HCHG RX CONTRAST REV CODE 255: Performed by: NURSE PRACTITIONER

## 2020-02-05 PROCEDURE — A9270 NON-COVERED ITEM OR SERVICE: HCPCS | Performed by: HOSPITALIST

## 2020-02-05 PROCEDURE — 36415 COLL VENOUS BLD VENIPUNCTURE: CPT

## 2020-02-05 PROCEDURE — 73720 MRI LWR EXTREMITY W/O&W/DYE: CPT | Mod: RT

## 2020-02-05 PROCEDURE — 80053 COMPREHEN METABOLIC PANEL: CPT

## 2020-02-05 PROCEDURE — A9576 INJ PROHANCE MULTIPACK: HCPCS | Performed by: NURSE PRACTITIONER

## 2020-02-05 PROCEDURE — 83735 ASSAY OF MAGNESIUM: CPT

## 2020-02-05 RX ORDER — INSULIN GLARGINE 100 [IU]/ML
20 INJECTION, SOLUTION SUBCUTANEOUS EVERY EVENING
Status: DISCONTINUED | OUTPATIENT
Start: 2020-02-05 | End: 2020-02-05

## 2020-02-05 RX ORDER — INSULIN GLARGINE 100 [IU]/ML
17 INJECTION, SOLUTION SUBCUTANEOUS EVERY EVENING
Status: DISCONTINUED | OUTPATIENT
Start: 2020-02-05 | End: 2020-02-07

## 2020-02-05 RX ADMIN — GABAPENTIN 400 MG: 400 CAPSULE ORAL at 06:43

## 2020-02-05 RX ADMIN — SIMVASTATIN 10 MG: 20 TABLET, FILM COATED ORAL at 20:51

## 2020-02-05 RX ADMIN — GUAIFENESIN 600 MG: 600 TABLET, EXTENDED RELEASE ORAL at 06:43

## 2020-02-05 RX ADMIN — OXYCODONE HYDROCHLORIDE 5 MG: 5 TABLET ORAL at 00:24

## 2020-02-05 RX ADMIN — SODIUM CHLORIDE: 9 INJECTION, SOLUTION INTRAVENOUS at 15:08

## 2020-02-05 RX ADMIN — GUAIFENESIN AND DEXTROMETHORPHAN 5 ML: 100; 10 SYRUP ORAL at 06:44

## 2020-02-05 RX ADMIN — OSELTAMIVIR PHOSPHATE 75 MG: 75 CAPSULE ORAL at 06:43

## 2020-02-05 RX ADMIN — INSULIN LISPRO 4 UNITS: 100 INJECTION, SOLUTION INTRAVENOUS; SUBCUTANEOUS at 00:17

## 2020-02-05 RX ADMIN — BENZONATATE 100 MG: 100 CAPSULE ORAL at 02:23

## 2020-02-05 RX ADMIN — INSULIN GLARGINE 17 UNITS: 100 INJECTION, SOLUTION SUBCUTANEOUS at 17:32

## 2020-02-05 RX ADMIN — GABAPENTIN 400 MG: 400 CAPSULE ORAL at 17:32

## 2020-02-05 RX ADMIN — GADOTERIDOL 15 ML: 279.3 INJECTION, SOLUTION INTRAVENOUS at 12:05

## 2020-02-05 RX ADMIN — SENNOSIDES AND DOCUSATE SODIUM 2 TABLET: 8.6; 5 TABLET ORAL at 17:32

## 2020-02-05 RX ADMIN — GUAIFENESIN AND DEXTROMETHORPHAN 5 ML: 100; 10 SYRUP ORAL at 20:51

## 2020-02-05 RX ADMIN — OXYCODONE HYDROCHLORIDE 5 MG: 5 TABLET ORAL at 06:43

## 2020-02-05 RX ADMIN — ALBUTEROL SULFATE 2 PUFF: 90 AEROSOL, METERED RESPIRATORY (INHALATION) at 02:13

## 2020-02-05 RX ADMIN — OXYCODONE HYDROCHLORIDE 5 MG: 5 TABLET ORAL at 12:45

## 2020-02-05 RX ADMIN — SODIUM CHLORIDE: 9 INJECTION, SOLUTION INTRAVENOUS at 02:17

## 2020-02-05 RX ADMIN — VANCOMYCIN HYDROCHLORIDE 1800 MG: 500 INJECTION, POWDER, LYOPHILIZED, FOR SOLUTION INTRAVENOUS at 17:38

## 2020-02-05 RX ADMIN — INSULIN LISPRO 3 UNITS: 100 INJECTION, SOLUTION INTRAVENOUS; SUBCUTANEOUS at 06:37

## 2020-02-05 RX ADMIN — AMPICILLIN SODIUM AND SULBACTAM SODIUM 3 G: 2; 1 INJECTION, POWDER, FOR SOLUTION INTRAMUSCULAR; INTRAVENOUS at 02:14

## 2020-02-05 RX ADMIN — AMPICILLIN SODIUM AND SULBACTAM SODIUM 3 G: 2; 1 INJECTION, POWDER, FOR SOLUTION INTRAMUSCULAR; INTRAVENOUS at 20:52

## 2020-02-05 RX ADMIN — BENZONATATE 100 MG: 100 CAPSULE ORAL at 15:06

## 2020-02-05 RX ADMIN — VANCOMYCIN HYDROCHLORIDE 1500 MG: 500 INJECTION, POWDER, LYOPHILIZED, FOR SOLUTION INTRAVENOUS at 10:17

## 2020-02-05 RX ADMIN — ALBUTEROL SULFATE 2 PUFF: 90 AEROSOL, METERED RESPIRATORY (INHALATION) at 06:43

## 2020-02-05 RX ADMIN — AMPICILLIN SODIUM AND SULBACTAM SODIUM 3 G: 2; 1 INJECTION, POWDER, FOR SOLUTION INTRAMUSCULAR; INTRAVENOUS at 15:06

## 2020-02-05 RX ADMIN — GUAIFENESIN 600 MG: 600 TABLET, EXTENDED RELEASE ORAL at 17:32

## 2020-02-05 RX ADMIN — AMPICILLIN SODIUM AND SULBACTAM SODIUM 3 G: 2; 1 INJECTION, POWDER, FOR SOLUTION INTRAMUSCULAR; INTRAVENOUS at 08:32

## 2020-02-05 RX ADMIN — OSELTAMIVIR PHOSPHATE 75 MG: 75 CAPSULE ORAL at 17:32

## 2020-02-05 RX ADMIN — GABAPENTIN 400 MG: 400 CAPSULE ORAL at 12:45

## 2020-02-05 ASSESSMENT — ENCOUNTER SYMPTOMS
HALLUCINATIONS: 0
DEPRESSION: 0
CHILLS: 1
WEAKNESS: 0
PALPITATIONS: 0
FOCAL WEAKNESS: 0
BLURRED VISION: 0
EYE DISCHARGE: 0
DIZZINESS: 0
FALLS: 1
SHORTNESS OF BREATH: 1
VOMITING: 0
SPEECH CHANGE: 0
DOUBLE VISION: 0
SPUTUM PRODUCTION: 1
SENSORY CHANGE: 0
ABDOMINAL PAIN: 0
MYALGIAS: 1
FLANK PAIN: 0
HEARTBURN: 0
NAUSEA: 0
HEMOPTYSIS: 0
COUGH: 1
FEVER: 0
BRUISES/BLEEDS EASILY: 0
WHEEZING: 1

## 2020-02-05 ASSESSMENT — LIFESTYLE VARIABLES: SUBSTANCE_ABUSE: 0

## 2020-02-05 NOTE — PROGRESS NOTES
Assumed care of pt at 0715. Report received and bedside rounding completed with night RN. Pt is calm no SOB, or in any acute distress noted.    Pt is considered a high fall risk. edu provided on risk level. Fall precautions in place,  bed alarm. - Treaded non slip socks. Bed locked. Communication board updated with POC. Call light and pt belongings within reach - pt makes needs known - hourly rounding in place. See flowsheets for further assessment.     Patient stated feeling exhausted. Sleeping most of the morning. MRI to be completed. Possible surgery. This RN spoke with MD- per MD to keep pt NPO incase of surgery. Has been NPO since midnight.

## 2020-02-05 NOTE — PROGRESS NOTES
Assumed care of patient at 1900. Received bedside report from day RN. Patient resting comfortably in bed sleeping. Bed is in low and locked position. Non-slip socks are in place. Call light is within reach. Droplet isolation precautions are in place.

## 2020-02-05 NOTE — WOUND TEAM
Pt is being followed by LPS. Amputation has been recommended. Waiting for pt's Flu to improve. Wound team not following, please consult if condition changes.

## 2020-02-05 NOTE — PROGRESS NOTES
[facility] Medicine Daily Progress Note    Date of Service  2/4/2020    Chief Complaint  45 y.o. male admitted 2/3/2020 with with past medical history of tobacco abuse, alcohol abuse depression and diabetes who presents with right foot pain.  It is associated with swelling, tenderness, erythema    Hospital Course    Upon arrival to the emergency room patient was found to be tachycardic and hypotensive.  X-ray of the right foot found first metatarsal neck erosion indicating osteomyelitis      Interval Problem Update  2/4: Patient was seen and examined by me today.  He has significant tenderness and swelling of the right leg.  LPS was consulted suggested he undergo a I&D and bone biopsy.  They have canceled the MRI.  Patient was found to have tachypnea, shortness of breath and cough.  Chest x-ray interpreted by me found no acute pulmonary process.  Patient was positive for influenza and Tamiflu was started.    Consultants/Specialty  LPS    Code Status  Full    Disposition  TBD    Review of Systems  Review of Systems   Constitutional: Positive for chills. Negative for fever.   HENT: Negative for congestion, hearing loss and tinnitus.    Eyes: Negative for blurred vision, double vision and discharge.   Respiratory: Positive for cough, sputum production, shortness of breath and wheezing. Negative for hemoptysis.    Cardiovascular: Negative for chest pain, palpitations and leg swelling.   Gastrointestinal: Negative for abdominal pain, heartburn, nausea and vomiting.   Genitourinary: Negative for dysuria and flank pain.   Musculoskeletal: Positive for falls and myalgias. Negative for joint pain.   Skin: Positive for rash.   Neurological: Negative for dizziness, sensory change, speech change, focal weakness and weakness.   Endo/Heme/Allergies: Negative for environmental allergies. Does not bruise/bleed easily.   Psychiatric/Behavioral: Negative for depression, hallucinations and substance abuse.        Physical Exam  Temp:   [36.4 °C (97.6 °F)-38.6 °C (101.5 °F)] 38.2 °C (100.7 °F)  Pulse:  [] 103  Resp:  [18-20] 18  BP: (109-130)/(57-86) 109/57  SpO2:  [90 %-98 %] 98 %    Physical Exam  Vitals signs reviewed.   Constitutional:       General: He is not in acute distress.     Appearance: He is ill-appearing.   HENT:      Head: Normocephalic and atraumatic.      Nose: No congestion.      Mouth/Throat:      Mouth: Mucous membranes are dry.   Eyes:      Extraocular Movements: Extraocular movements intact.      Pupils: Pupils are equal, round, and reactive to light.   Neck:      Musculoskeletal: Neck supple.   Cardiovascular:      Rate and Rhythm: Normal rate and regular rhythm.      Pulses: Normal pulses.      Heart sounds: Normal heart sounds.   Pulmonary:      Effort: Pulmonary effort is normal. No respiratory distress.      Breath sounds: Normal breath sounds. No wheezing.   Abdominal:      General: Bowel sounds are normal. There is no distension.      Palpations: Abdomen is soft.      Tenderness: There is no tenderness.   Musculoskeletal:         General: Swelling present.      Comments: Right lateral big toe edema, pain to palpation small ulceration, right medial foot edema erythema and palpation. Ulceration involving middle of 2-3 toes. Lateral pinky edema and palpation    Skin:     General: Skin is warm and dry.      Capillary Refill: Capillary refill takes 2 to 3 seconds.   Neurological:      General: No focal deficit present.      Mental Status: He is alert and oriented to person, place, and time. Mental status is at baseline.   Psychiatric:         Mood and Affect: Mood normal.         Behavior: Behavior normal.         Thought Content: Thought content normal.         Judgment: Judgment normal.         Fluids    Intake/Output Summary (Last 24 hours) at 2/4/2020 1646  Last data filed at 2/4/2020 0915  Gross per 24 hour   Intake 100 ml   Output 1110 ml   Net -1010 ml       Laboratory  Recent Labs     02/03/20 2023  02/04/20  0305   WBC 8.8 8.4   RBC 5.00 4.68*   HEMOGLOBIN 16.0 14.9   HEMATOCRIT 46.5 43.8   MCV 93.0 93.6   MCH 32.0 31.8   MCHC 34.4 34.0   RDW 43.6 42.9   PLATELETCT 256 242   MPV 9.9 9.8     Recent Labs     02/03/20 2023 02/04/20  0305   SODIUM 132* 136   POTASSIUM 4.0 3.8   CHLORIDE 101 104   CO2 24 24   GLUCOSE 365* 240*   BUN 14 12   CREATININE 0.81 0.78   CALCIUM 9.3 9.3                   Imaging  DX-CHEST-PORTABLE (1 VIEW)   Final Result      No evidence of acute cardiopulmonary process.      DX-FOOT-COMPLETE 3+ RIGHT   Final Result      Erosion involving the medial first metatarsal neck, with overlying soft tissue wound. This is suggestive of osteomyelitis.           Assessment/Plan  * Diabetic foot ulcer (HCC)  Assessment & Plan  Associated evidence of osteo on Xray   Cont with IV unasyn and vanco for now-monitor trough levels and adjust levels accordingly  Pain control   Esr/crp- elevated  MRI was canceled by LPS and suggested that he will undergo a I&D of right foot abscess and bone biopsy  LPS and wound consultation     Diabetes (HCC)- (present on admission)  Assessment & Plan  Uncontrolled  SSI ordered  accu checks   Attempt to keep BS <180 for optimal wound heeling  Started Lantus 17 units    Influenza  Assessment & Plan  Droplet precautions  Start Tamiflu  IV hydration  Antinausea medication is been ordered  Start mucinex and tesslon pearls     Alcohol abuse  Assessment & Plan  States has not had drink since yesterday  Monitor for withdrawals   Encouraged cessation    Osteomyelitis ([facility])  Assessment & Plan  Xray evidence of osteo,   ESR CRP elevated  ID will be consulted  Patient will undergo bone biopsy  Pain control  IV abx     Tobacco abuse  Assessment & Plan  Greater than 10 minutes spent with patient smoking cessation counseling, discussed cardiovascular risk factors of smoking. Nicotine patch provided to patient       VTE prophylaxis: SCD

## 2020-02-05 NOTE — CONSULTS
"Diabetes education: pt has a hx of diabetes on Basaglar , Metformin and Glipizide (per med rec) at home. Pt known from previous visit.  Pt was admitted with blood sugar of 365 and Hg a1c of 13 %.  Pt is currently on Lantus 17 units pm to start tonight, and Regular insulin sliding scale coverage (scale increased at 1344) every six hours. Blood sugars have been: 282 ( 3 units), 225 ( 2 units), 281 ( 7 units) and 236 ( 4 units).  Plan: CDE will follow up with pt tomorrow. Please call [phone] if needs change.      Previous education:    Laverne Cason R.N.   Diabetes Health Educator      Consults   Signed   Date of Service:  7/31/2018 10:02 AM                        Diabetes education: Met with pt and SO regarding diabetes management. Pt has hx of diabetes for about 6 years. Pt was on Metformin and Glipizide \"off on\" taking himself off and then restarted the medications. Pt has not been on medications for \"awhile\".  Pt has a hx of alcohol abuse ( \"6-8 \" beers a day per chart), and stated he has stopped x2 ( once with AA and once \"cold turkey\"), and now her for pancreatitis.  Reviewed what diabetes was, effect of alcohol and pancreatitis on blood sugars, need for insulin at this time, need for a PCP ( pt has a  [place] medicaid advocate IHD/rep who he met and will get him his PCP), hyperglycemia, hypoglycemia, and need to eat 3 meals and hs snack with protein and carb ( RD waiting to meet with him).  Pt given and taught to use True metrix meter ( reviewed finger sticks and goal) . Pt taught to use Basaglar pen and pen needles. Pt practice with saline pen.  Spoke with hospitalist to adjust prescriptions to reflect True metrix test strips, basaglar insulin pen and clare pen needles.  Plan: Education completed and handouts given. Please call [phone] if questions.                          "
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
## 2020-02-05 NOTE — DIETARY
Nutrition services: Day 2 of admit.  Consult received for diabetes meal planning education. Pt is in isolation for the flu.  I&D for foot abscess (diabetic foot ulcer) tomorrow. Will follow up with education prior to discharge.

## 2020-02-05 NOTE — PROGRESS NOTES
Hospital Medicine Daily Progress Note    Date of Service  2/5/2020    Chief Complaint  45 y.o. male admitted 2/3/2020 with with past medical history of tobacco abuse, alcohol abuse depression and diabetes who presents with right foot pain.  It is associated with swelling, tenderness, erythema    Hospital Course    Upon arrival to the emergency room patient was found to be tachycardic and hypotensive.  X-ray of the right foot found first metatarsal neck erosion indicating osteomyelitis      Interval Problem Update  2/4: Patient was seen and examined by me today.  He has significant tenderness and swelling of the right leg.  LPS was consulted suggested he undergo a I&D and bone biopsy.  They have canceled the MRI.  Patient was found to have tachypnea, shortness of breath and cough.  Chest x-ray interpreted by me found no acute pulmonary process.  Patient was positive for influenza and Tamiflu was started.  2/5: Patient states that his respiratory symptoms are have improved significantly since yesterday.  Found to be hypotensive and patient likely dehydrated from influenza and hyperglycemia.  Increase fluids to 150 an hour.  Blood glucose is better controlled after increasing Lantus to 17 units.  Patient will undergo an I&D tomorrow.  Consultants/Specialty  LPS    Code Status  Full    Disposition  TBD    Review of Systems  Review of Systems   Constitutional: Positive for chills. Negative for fever.   HENT: Negative for congestion, hearing loss and tinnitus.    Eyes: Negative for blurred vision, double vision and discharge.   Respiratory: Positive for cough, sputum production, shortness of breath and wheezing. Negative for hemoptysis.    Cardiovascular: Negative for chest pain, palpitations and leg swelling.   Gastrointestinal: Negative for abdominal pain, heartburn, nausea and vomiting.   Genitourinary: Negative for dysuria and flank pain.   Musculoskeletal: Positive for falls and myalgias. Negative for joint pain.    Skin: Positive for rash.   Neurological: Negative for dizziness, sensory change, speech change, focal weakness and weakness.   Endo/Heme/Allergies: Negative for environmental allergies. Does not bruise/bleed easily.   Psychiatric/Behavioral: Negative for depression, hallucinations and substance abuse.        Physical Exam  Temp:  [37.2 °C (99 °F)-39.4 °C (103 °F)] 37.3 °C (99.1 °F)  Pulse:  [] 90  Resp:  [18-20] 18  BP: ()/(57-80) 97/65  SpO2:  [90 %-99 %] 92 %    Physical Exam  Vitals signs reviewed.   Constitutional:       General: He is not in acute distress.     Appearance: He is ill-appearing.   HENT:      Head: Normocephalic and atraumatic.      Nose: No congestion.      Mouth/Throat:      Mouth: Mucous membranes are dry.   Eyes:      Extraocular Movements: Extraocular movements intact.      Pupils: Pupils are equal, round, and reactive to light.   Neck:      Musculoskeletal: Neck supple.   Cardiovascular:      Rate and Rhythm: Normal rate and regular rhythm.      Pulses: Normal pulses.      Heart sounds: Normal heart sounds.   Pulmonary:      Effort: Pulmonary effort is normal. No respiratory distress.      Breath sounds: Normal breath sounds. No wheezing.   Abdominal:      General: Bowel sounds are normal. There is no distension.      Palpations: Abdomen is soft.      Tenderness: There is no tenderness.   Musculoskeletal:         General: Swelling present.      Comments: Right lateral big toe edema, pain to palpation small ulceration, right medial foot edema erythema and palpation. Ulceration involving middle of 2-3 toes. Lateral pinky edema and palpation    Skin:     General: Skin is warm and dry.      Capillary Refill: Capillary refill takes 2 to 3 seconds.   Neurological:      General: No focal deficit present.      Mental Status: He is alert and oriented to person, place, and time. Mental status is at baseline.   Psychiatric:         Mood and Affect: Mood normal.         Behavior: Behavior  normal.         Thought Content: Thought content normal.         Judgment: Judgment normal.         Fluids    Intake/Output Summary (Last 24 hours) at 2/5/2020 1502  Last data filed at 2/5/2020 0230  Gross per 24 hour   Intake 1540 ml   Output 1000 ml   Net 540 ml       Laboratory  Recent Labs     02/03/20 2023 02/04/20  0305 02/05/20  1422   WBC 8.8 8.4 6.8   RBC 5.00 4.68* 4.23*   HEMOGLOBIN 16.0 14.9 13.7*   HEMATOCRIT 46.5 43.8 39.7*   MCV 93.0 93.6 93.9   MCH 32.0 31.8 32.4   MCHC 34.4 34.0 34.5   RDW 43.6 42.9 43.8   PLATELETCT 256 242 217   MPV 9.9 9.8 10.1     Recent Labs     02/03/20 2023 02/04/20  0305   SODIUM 132* 136   POTASSIUM 4.0 3.8   CHLORIDE 101 104   CO2 24 24   GLUCOSE 365* 240*   BUN 14 12   CREATININE 0.81 0.78   CALCIUM 9.3 9.3                   Imaging  MR-FOOT-WITH & W/O RIGHT   Final Result         No MR evidence of osteomyelitis.      Erosion in the dorsomedial aspect of the distal first metatarsal is nonspecific and could relate to chronic inflammation, probably gout.      Degenerative change of the first MTP joint and hallux sesamoid      DX-CHEST-PORTABLE (1 VIEW)   Final Result      No evidence of acute cardiopulmonary process.      DX-FOOT-COMPLETE 3+ RIGHT   Final Result      Erosion involving the medial first metatarsal neck, with overlying soft tissue wound. This is suggestive of osteomyelitis.           Assessment/Plan  * Diabetic foot ulcer (HCC)  Assessment & Plan  Associated evidence of osteo on Xray   Cont with IV unasyn and vanco for now-monitor trough levels and adjust levels accordingly  Pain control   Esr/crp- elevated  He will undergo a I&D of right foot abscess and bone biopsy tomorrow  MRI of the foot found no evidence of osteomyelitis   LPS and wound consultation     Diabetes (HCC)- (present on admission)  Assessment & Plan  controlled  SSI ordered  accu checks   Attempt to keep BS <180 for optimal wound heeling  Started Lantus 17 units    Influenza  Assessment &  Plan  Droplet precautions  Start Tamiflu  IV hydration  Antinausea medication is been ordered  Start mucinex and tesslon pearls     Alcohol abuse  Assessment & Plan  States has not had drink since yesterday  Monitor for withdrawals   Encouraged cessation    Osteomyelitis (HCC)  Assessment & Plan  Xray evidence of osteo,   ESR CRP elevated  ID will be consulted  Patient will undergo bone biopsy  MRI of the foot found no evidence of osteomyelitis  Pain control  IV abx     Tobacco abuse  Assessment & Plan  Greater than 10 minutes spent with patient smoking cessation counseling, discussed cardiovascular risk factors of smoking. Nicotine patch provided to patient       VTE prophylaxis: SCD

## 2020-02-05 NOTE — CARE PLAN
Problem: Communication  Goal: The ability to communicate needs accurately and effectively will improve  Outcome: PROGRESSING SLOWER THAN EXPECTED     Problem: Safety  Goal: Will remain free from injury  Outcome: PROGRESSING SLOWER THAN EXPECTED  Goal: Will remain free from falls  Outcome: PROGRESSING SLOWER THAN EXPECTED     Problem: Infection  Goal: Will remain free from infection  Outcome: PROGRESSING SLOWER THAN EXPECTED     Problem: Venous Thromboembolism (VTW)/Deep Vein Thrombosis (DVT) Prevention:  Goal: Patient will participate in Venous Thrombosis (VTE)/Deep Vein Thrombosis (DVT)Prevention Measures  Outcome: PROGRESSING SLOWER THAN EXPECTED     Problem: Bowel/Gastric:  Goal: Normal bowel function is maintained or improved  Outcome: PROGRESSING SLOWER THAN EXPECTED  Goal: Will not experience complications related to bowel motility  Outcome: PROGRESSING SLOWER THAN EXPECTED     Problem: Knowledge Deficit  Goal: Knowledge of disease process/condition, treatment plan, diagnostic tests, and medications will improve  Outcome: PROGRESSING SLOWER THAN EXPECTED  Goal: Knowledge of the prescribed therapeutic regimen will improve  Outcome: PROGRESSING SLOWER THAN EXPECTED     Problem: Discharge Barriers/Planning  Goal: Patient's continuum of care needs will be met  Outcome: PROGRESSING SLOWER THAN EXPECTED     Problem: Pain Management  Goal: Pain level will decrease to patient's comfort goal  Outcome: PROGRESSING SLOWER THAN EXPECTED     Problem: Fluid Volume:  Goal: Will maintain balanced intake and output  Outcome: PROGRESSING SLOWER THAN EXPECTED     Problem: Mobility  Goal: Risk for activity intolerance will decrease  Outcome: PROGRESSING SLOWER THAN EXPECTED     Problem: Medication  Goal: Compliance with prescribed medication will improve  Outcome: PROGRESSING SLOWER THAN EXPECTED     Problem: Respiratory:  Goal: Respiratory status will improve  Outcome: PROGRESSING SLOWER THAN EXPECTED     Problem: Skin  Integrity  Goal: Risk for impaired skin integrity will decrease  Outcome: PROGRESSING SLOWER THAN EXPECTED

## 2020-02-05 NOTE — ASSESSMENT & PLAN NOTE
Droplet precautions  Start Tamiflu-remove droplet precautions once Tamiflu was completed  IV hydration  Antinausea medication is been ordered

## 2020-02-05 NOTE — PROGRESS NOTES
LIMB PRESERVATION SERVICE     46 y/o male with DM, tobacco abuse, influenza A. Admitted with R 1st MTH ulcer and R lateral great toe ulcer.       Xray positive for OM  R 1st MTH tender, abscess      PLAN  MRI   Recommend R 1st ray amputation  Postpone amputation until flu controlled

## 2020-02-06 ENCOUNTER — ANESTHESIA (OUTPATIENT)
Dept: SURGERY | Facility: MEDICAL CENTER | Age: 45
DRG: 623 | End: 2020-02-06
Payer: MEDICAID

## 2020-02-06 ENCOUNTER — PATIENT OUTREACH (OUTPATIENT)
Dept: HEALTH INFORMATION MANAGEMENT | Facility: OTHER | Age: 45
End: 2020-02-06

## 2020-02-06 ENCOUNTER — APPOINTMENT (OUTPATIENT)
Dept: RADIOLOGY | Facility: MEDICAL CENTER | Age: 45
DRG: 623 | End: 2020-02-06
Attending: ORTHOPAEDIC SURGERY
Payer: MEDICAID

## 2020-02-06 ENCOUNTER — ANESTHESIA EVENT (OUTPATIENT)
Dept: SURGERY | Facility: MEDICAL CENTER | Age: 45
DRG: 623 | End: 2020-02-06
Payer: MEDICAID

## 2020-02-06 LAB
ALBUMIN SERPL BCP-MCNC: 3.5 G/DL (ref 3.2–4.9)
ALBUMIN/GLOB SERPL: 1.5 G/DL
ALP SERPL-CCNC: 71 U/L (ref 30–99)
ALT SERPL-CCNC: 47 U/L (ref 2–50)
ANION GAP SERPL CALC-SCNC: 7 MMOL/L (ref 0–11.9)
AST SERPL-CCNC: 52 U/L (ref 12–45)
BACTERIA WND AEROBE CULT: NORMAL
BASOPHILS # BLD AUTO: 0.6 % (ref 0–1.8)
BASOPHILS # BLD: 0.04 K/UL (ref 0–0.12)
BILIRUB SERPL-MCNC: 0.3 MG/DL (ref 0.1–1.5)
BUN SERPL-MCNC: 10 MG/DL (ref 8–22)
CALCIUM SERPL-MCNC: 8.1 MG/DL (ref 8.5–10.5)
CHLORIDE SERPL-SCNC: 107 MMOL/L (ref 96–112)
CO2 SERPL-SCNC: 25 MMOL/L (ref 20–33)
CREAT SERPL-MCNC: 0.67 MG/DL (ref 0.5–1.4)
EOSINOPHIL # BLD AUTO: 0.16 K/UL (ref 0–0.51)
EOSINOPHIL NFR BLD: 2.3 % (ref 0–6.9)
ERYTHROCYTE [DISTWIDTH] IN BLOOD BY AUTOMATED COUNT: 43.5 FL (ref 35.9–50)
GLOBULIN SER CALC-MCNC: 2.3 G/DL (ref 1.9–3.5)
GLUCOSE BLD-MCNC: 108 MG/DL (ref 65–99)
GLUCOSE BLD-MCNC: 150 MG/DL (ref 65–99)
GLUCOSE BLD-MCNC: 192 MG/DL (ref 65–99)
GLUCOSE BLD-MCNC: 85 MG/DL (ref 65–99)
GLUCOSE BLD-MCNC: 87 MG/DL (ref 65–99)
GLUCOSE SERPL-MCNC: 143 MG/DL (ref 65–99)
GRAM STN SPEC: NORMAL
HCT VFR BLD AUTO: 40.5 % (ref 42–52)
HGB BLD-MCNC: 13.8 G/DL (ref 14–18)
IMM GRANULOCYTES # BLD AUTO: 0.02 K/UL (ref 0–0.11)
IMM GRANULOCYTES NFR BLD AUTO: 0.3 % (ref 0–0.9)
LYMPHOCYTES # BLD AUTO: 2.44 K/UL (ref 1–4.8)
LYMPHOCYTES NFR BLD: 34.6 % (ref 22–41)
MCH RBC QN AUTO: 31.9 PG (ref 27–33)
MCHC RBC AUTO-ENTMCNC: 34.1 G/DL (ref 33.7–35.3)
MCV RBC AUTO: 93.5 FL (ref 81.4–97.8)
MONOCYTES # BLD AUTO: 0.89 K/UL (ref 0–0.85)
MONOCYTES NFR BLD AUTO: 12.6 % (ref 0–13.4)
NEUTROPHILS # BLD AUTO: 3.51 K/UL (ref 1.82–7.42)
NEUTROPHILS NFR BLD: 49.6 % (ref 44–72)
NRBC # BLD AUTO: 0 K/UL
NRBC BLD-RTO: 0 /100 WBC
PLATELET # BLD AUTO: 227 K/UL (ref 164–446)
PMV BLD AUTO: 9.7 FL (ref 9–12.9)
POTASSIUM SERPL-SCNC: 3.5 MMOL/L (ref 3.6–5.5)
PROT SERPL-MCNC: 5.8 G/DL (ref 6–8.2)
RBC # BLD AUTO: 4.33 M/UL (ref 4.7–6.1)
SIGNIFICANT IND 70042: NORMAL
SITE SITE: NORMAL
SODIUM SERPL-SCNC: 139 MMOL/L (ref 135–145)
SOURCE SOURCE: NORMAL
WBC # BLD AUTO: 7.1 K/UL (ref 4.8–10.8)

## 2020-02-06 PROCEDURE — 87186 SC STD MICRODIL/AGAR DIL: CPT

## 2020-02-06 PROCEDURE — 160002 HCHG RECOVERY MINUTES (STAT): Performed by: ORTHOPAEDIC SURGERY

## 2020-02-06 PROCEDURE — 87076 CULTURE ANAEROBE IDENT EACH: CPT

## 2020-02-06 PROCEDURE — 99255 IP/OBS CONSLTJ NEW/EST HI 80: CPT | Mod: GC | Performed by: INTERNAL MEDICINE

## 2020-02-06 PROCEDURE — A9270 NON-COVERED ITEM OR SERVICE: HCPCS | Performed by: HOSPITALIST

## 2020-02-06 PROCEDURE — 700101 HCHG RX REV CODE 250: Performed by: ANESTHESIOLOGY

## 2020-02-06 PROCEDURE — 87075 CULTR BACTERIA EXCEPT BLOOD: CPT

## 2020-02-06 PROCEDURE — A9270 NON-COVERED ITEM OR SERVICE: HCPCS | Performed by: ANESTHESIOLOGY

## 2020-02-06 PROCEDURE — 700102 HCHG RX REV CODE 250 W/ 637 OVERRIDE(OP): Performed by: HOSPITALIST

## 2020-02-06 PROCEDURE — 700105 HCHG RX REV CODE 258: Performed by: HOSPITALIST

## 2020-02-06 PROCEDURE — 160036 HCHG PACU - EA ADDL 30 MINS PHASE I: Performed by: ORTHOPAEDIC SURGERY

## 2020-02-06 PROCEDURE — 700111 HCHG RX REV CODE 636 W/ 250 OVERRIDE (IP): Performed by: HOSPITALIST

## 2020-02-06 PROCEDURE — 700111 HCHG RX REV CODE 636 W/ 250 OVERRIDE (IP): Performed by: ANESTHESIOLOGY

## 2020-02-06 PROCEDURE — 0QBN0ZX EXCISION OF RIGHT METATARSAL, OPEN APPROACH, DIAGNOSTIC: ICD-10-PCS | Performed by: ORTHOPAEDIC SURGERY

## 2020-02-06 PROCEDURE — 87077 CULTURE AEROBIC IDENTIFY: CPT

## 2020-02-06 PROCEDURE — 160048 HCHG OR STATISTICAL LEVEL 1-5: Performed by: ORTHOPAEDIC SURGERY

## 2020-02-06 PROCEDURE — 87205 SMEAR GRAM STAIN: CPT

## 2020-02-06 PROCEDURE — 99233 SBSQ HOSP IP/OBS HIGH 50: CPT | Performed by: HOSPITALIST

## 2020-02-06 PROCEDURE — 80053 COMPREHEN METABOLIC PANEL: CPT

## 2020-02-06 PROCEDURE — 160028 HCHG SURGERY MINUTES - 1ST 30 MINS LEVEL 3: Performed by: ORTHOPAEDIC SURGERY

## 2020-02-06 PROCEDURE — 87015 SPECIMEN INFECT AGNT CONCNTJ: CPT

## 2020-02-06 PROCEDURE — 87070 CULTURE OTHR SPECIMN AEROBIC: CPT

## 2020-02-06 PROCEDURE — 700102 HCHG RX REV CODE 250 W/ 637 OVERRIDE(OP): Performed by: ANESTHESIOLOGY

## 2020-02-06 PROCEDURE — 700105 HCHG RX REV CODE 258: Performed by: ANESTHESIOLOGY

## 2020-02-06 PROCEDURE — 501445 HCHG STAPLER, SKIN DISP: Performed by: ORTHOPAEDIC SURGERY

## 2020-02-06 PROCEDURE — 501838 HCHG SUTURE GENERAL: Performed by: ORTHOPAEDIC SURGERY

## 2020-02-06 PROCEDURE — 85025 COMPLETE CBC W/AUTO DIFF WBC: CPT

## 2020-02-06 PROCEDURE — 160035 HCHG PACU - 1ST 60 MINS PHASE I: Performed by: ORTHOPAEDIC SURGERY

## 2020-02-06 PROCEDURE — 0JBQ0ZZ EXCISION OF RIGHT FOOT SUBCUTANEOUS TISSUE AND FASCIA, OPEN APPROACH: ICD-10-PCS | Performed by: ORTHOPAEDIC SURGERY

## 2020-02-06 PROCEDURE — 770021 HCHG ROOM/CARE - ISO PRIVATE

## 2020-02-06 PROCEDURE — 160009 HCHG ANES TIME/MIN: Performed by: ORTHOPAEDIC SURGERY

## 2020-02-06 PROCEDURE — 160039 HCHG SURGERY MINUTES - EA ADDL 1 MIN LEVEL 3: Performed by: ORTHOPAEDIC SURGERY

## 2020-02-06 PROCEDURE — 88311 DECALCIFY TISSUE: CPT

## 2020-02-06 PROCEDURE — 88304 TISSUE EXAM BY PATHOLOGIST: CPT

## 2020-02-06 PROCEDURE — 36415 COLL VENOUS BLD VENIPUNCTURE: CPT

## 2020-02-06 PROCEDURE — 82962 GLUCOSE BLOOD TEST: CPT

## 2020-02-06 RX ORDER — DIPHENHYDRAMINE HYDROCHLORIDE 50 MG/ML
12.5 INJECTION INTRAMUSCULAR; INTRAVENOUS
Status: DISCONTINUED | OUTPATIENT
Start: 2020-02-06 | End: 2020-02-06 | Stop reason: HOSPADM

## 2020-02-06 RX ORDER — DEXAMETHASONE SODIUM PHOSPHATE 4 MG/ML
INJECTION, SOLUTION INTRA-ARTICULAR; INTRALESIONAL; INTRAMUSCULAR; INTRAVENOUS; SOFT TISSUE PRN
Status: DISCONTINUED | OUTPATIENT
Start: 2020-02-06 | End: 2020-02-06 | Stop reason: SURG

## 2020-02-06 RX ORDER — MEPERIDINE HYDROCHLORIDE 25 MG/ML
12.5 INJECTION INTRAMUSCULAR; INTRAVENOUS; SUBCUTANEOUS
Status: DISCONTINUED | OUTPATIENT
Start: 2020-02-06 | End: 2020-02-06 | Stop reason: HOSPADM

## 2020-02-06 RX ORDER — SODIUM CHLORIDE, SODIUM LACTATE, POTASSIUM CHLORIDE, CALCIUM CHLORIDE 600; 310; 30; 20 MG/100ML; MG/100ML; MG/100ML; MG/100ML
INJECTION, SOLUTION INTRAVENOUS
Status: DISCONTINUED | OUTPATIENT
Start: 2020-02-06 | End: 2020-02-06 | Stop reason: SURG

## 2020-02-06 RX ORDER — ONDANSETRON 2 MG/ML
INJECTION INTRAMUSCULAR; INTRAVENOUS PRN
Status: DISCONTINUED | OUTPATIENT
Start: 2020-02-06 | End: 2020-02-06 | Stop reason: SURG

## 2020-02-06 RX ORDER — OXYCODONE HCL 5 MG/5 ML
5 SOLUTION, ORAL ORAL
Status: COMPLETED | OUTPATIENT
Start: 2020-02-06 | End: 2020-02-06

## 2020-02-06 RX ORDER — POTASSIUM CHLORIDE 20 MEQ/1
40 TABLET, EXTENDED RELEASE ORAL ONCE
Status: COMPLETED | OUTPATIENT
Start: 2020-02-06 | End: 2020-02-06

## 2020-02-06 RX ORDER — HYDROMORPHONE HYDROCHLORIDE 1 MG/ML
0.2 INJECTION, SOLUTION INTRAMUSCULAR; INTRAVENOUS; SUBCUTANEOUS
Status: DISCONTINUED | OUTPATIENT
Start: 2020-02-06 | End: 2020-02-06 | Stop reason: HOSPADM

## 2020-02-06 RX ORDER — HYDROMORPHONE HYDROCHLORIDE 1 MG/ML
0.4 INJECTION, SOLUTION INTRAMUSCULAR; INTRAVENOUS; SUBCUTANEOUS
Status: DISCONTINUED | OUTPATIENT
Start: 2020-02-06 | End: 2020-02-06 | Stop reason: HOSPADM

## 2020-02-06 RX ORDER — HALOPERIDOL 5 MG/ML
1 INJECTION INTRAMUSCULAR
Status: DISCONTINUED | OUTPATIENT
Start: 2020-02-06 | End: 2020-02-06 | Stop reason: HOSPADM

## 2020-02-06 RX ORDER — PHENYLEPHRINE HCL IN 0.9% NACL 0.5 MG/5ML
SYRINGE (ML) INTRAVENOUS PRN
Status: DISCONTINUED | OUTPATIENT
Start: 2020-02-06 | End: 2020-02-06 | Stop reason: SURG

## 2020-02-06 RX ORDER — SODIUM CHLORIDE, SODIUM LACTATE, POTASSIUM CHLORIDE, CALCIUM CHLORIDE 600; 310; 30; 20 MG/100ML; MG/100ML; MG/100ML; MG/100ML
INJECTION, SOLUTION INTRAVENOUS CONTINUOUS
Status: DISCONTINUED | OUTPATIENT
Start: 2020-02-06 | End: 2020-02-06 | Stop reason: HOSPADM

## 2020-02-06 RX ORDER — LABETALOL HYDROCHLORIDE 5 MG/ML
5 INJECTION, SOLUTION INTRAVENOUS
Status: DISCONTINUED | OUTPATIENT
Start: 2020-02-06 | End: 2020-02-06 | Stop reason: HOSPADM

## 2020-02-06 RX ORDER — ONDANSETRON 2 MG/ML
4 INJECTION INTRAMUSCULAR; INTRAVENOUS
Status: COMPLETED | OUTPATIENT
Start: 2020-02-06 | End: 2020-02-06

## 2020-02-06 RX ORDER — MIDAZOLAM HYDROCHLORIDE 1 MG/ML
INJECTION INTRAMUSCULAR; INTRAVENOUS PRN
Status: DISCONTINUED | OUTPATIENT
Start: 2020-02-06 | End: 2020-02-06 | Stop reason: SURG

## 2020-02-06 RX ORDER — LIDOCAINE HYDROCHLORIDE 20 MG/ML
INJECTION, SOLUTION EPIDURAL; INFILTRATION; INTRACAUDAL; PERINEURAL PRN
Status: DISCONTINUED | OUTPATIENT
Start: 2020-02-06 | End: 2020-02-06 | Stop reason: SURG

## 2020-02-06 RX ORDER — HYDROMORPHONE HYDROCHLORIDE 1 MG/ML
0.1 INJECTION, SOLUTION INTRAMUSCULAR; INTRAVENOUS; SUBCUTANEOUS
Status: DISCONTINUED | OUTPATIENT
Start: 2020-02-06 | End: 2020-02-06 | Stop reason: HOSPADM

## 2020-02-06 RX ORDER — HYDRALAZINE HYDROCHLORIDE 20 MG/ML
5 INJECTION INTRAMUSCULAR; INTRAVENOUS
Status: DISCONTINUED | OUTPATIENT
Start: 2020-02-06 | End: 2020-02-06 | Stop reason: HOSPADM

## 2020-02-06 RX ORDER — OXYCODONE HCL 5 MG/5 ML
10 SOLUTION, ORAL ORAL
Status: COMPLETED | OUTPATIENT
Start: 2020-02-06 | End: 2020-02-06

## 2020-02-06 RX ADMIN — GUAIFENESIN AND DEXTROMETHORPHAN 5 ML: 100; 10 SYRUP ORAL at 05:49

## 2020-02-06 RX ADMIN — GUAIFENESIN 600 MG: 600 TABLET, EXTENDED RELEASE ORAL at 20:58

## 2020-02-06 RX ADMIN — AMPICILLIN SODIUM AND SULBACTAM SODIUM 3 G: 2; 1 INJECTION, POWDER, FOR SOLUTION INTRAMUSCULAR; INTRAVENOUS at 02:21

## 2020-02-06 RX ADMIN — SIMVASTATIN 10 MG: 20 TABLET, FILM COATED ORAL at 20:58

## 2020-02-06 RX ADMIN — OXYCODONE HYDROCHLORIDE 5 MG: 5 TABLET ORAL at 11:41

## 2020-02-06 RX ADMIN — FENTANYL CITRATE 50 MCG: 50 INJECTION, SOLUTION INTRAMUSCULAR; INTRAVENOUS at 18:08

## 2020-02-06 RX ADMIN — AMPICILLIN SODIUM AND SULBACTAM SODIUM 3 G: 2; 1 INJECTION, POWDER, FOR SOLUTION INTRAMUSCULAR; INTRAVENOUS at 09:04

## 2020-02-06 RX ADMIN — DEXAMETHASONE SODIUM PHOSPHATE 8 MG: 4 INJECTION, SOLUTION INTRA-ARTICULAR; INTRALESIONAL; INTRAMUSCULAR; INTRAVENOUS; SOFT TISSUE at 17:51

## 2020-02-06 RX ADMIN — BENZONATATE 100 MG: 100 CAPSULE ORAL at 05:49

## 2020-02-06 RX ADMIN — FENTANYL CITRATE 50 MCG: 0.05 INJECTION, SOLUTION INTRAMUSCULAR; INTRAVENOUS at 18:44

## 2020-02-06 RX ADMIN — MIDAZOLAM HYDROCHLORIDE 2 MG: 1 INJECTION, SOLUTION INTRAMUSCULAR; INTRAVENOUS at 17:39

## 2020-02-06 RX ADMIN — GABAPENTIN 400 MG: 400 CAPSULE ORAL at 20:58

## 2020-02-06 RX ADMIN — AMPICILLIN SODIUM AND SULBACTAM SODIUM 3 G: 2; 1 INJECTION, POWDER, FOR SOLUTION INTRAMUSCULAR; INTRAVENOUS at 20:44

## 2020-02-06 RX ADMIN — VANCOMYCIN HYDROCHLORIDE 1800 MG: 500 INJECTION, POWDER, LYOPHILIZED, FOR SOLUTION INTRAVENOUS at 05:49

## 2020-02-06 RX ADMIN — FENTANYL CITRATE 50 MCG: 50 INJECTION, SOLUTION INTRAMUSCULAR; INTRAVENOUS at 17:37

## 2020-02-06 RX ADMIN — VANCOMYCIN HYDROCHLORIDE 1800 MG: 500 INJECTION, POWDER, LYOPHILIZED, FOR SOLUTION INTRAVENOUS at 17:32

## 2020-02-06 RX ADMIN — OXYCODONE HYDROCHLORIDE 5 MG: 5 TABLET ORAL at 05:48

## 2020-02-06 RX ADMIN — OSELTAMIVIR PHOSPHATE 75 MG: 75 CAPSULE ORAL at 20:58

## 2020-02-06 RX ADMIN — GABAPENTIN 400 MG: 400 CAPSULE ORAL at 05:48

## 2020-02-06 RX ADMIN — POTASSIUM CHLORIDE 40 MEQ: 1500 TABLET, EXTENDED RELEASE ORAL at 11:48

## 2020-02-06 RX ADMIN — GUAIFENESIN AND DEXTROMETHORPHAN 5 ML: 100; 10 SYRUP ORAL at 11:41

## 2020-02-06 RX ADMIN — FENTANYL CITRATE 50 MCG: 0.05 INJECTION, SOLUTION INTRAMUSCULAR; INTRAVENOUS at 18:50

## 2020-02-06 RX ADMIN — AMPICILLIN SODIUM AND SULBACTAM SODIUM 3 G: 2; 1 INJECTION, POWDER, FOR SOLUTION INTRAMUSCULAR; INTRAVENOUS at 14:25

## 2020-02-06 RX ADMIN — ONDANSETRON 4 MG: 2 INJECTION INTRAMUSCULAR; INTRAVENOUS at 18:45

## 2020-02-06 RX ADMIN — SODIUM CHLORIDE: 9 INJECTION, SOLUTION INTRAVENOUS at 02:22

## 2020-02-06 RX ADMIN — ONDANSETRON 4 MG: 2 INJECTION INTRAMUSCULAR; INTRAVENOUS at 18:08

## 2020-02-06 RX ADMIN — PROPOFOL 150 MG: 10 INJECTION, EMULSION INTRAVENOUS at 17:44

## 2020-02-06 RX ADMIN — OSELTAMIVIR PHOSPHATE 75 MG: 75 CAPSULE ORAL at 05:48

## 2020-02-06 RX ADMIN — Medication 200 MCG: at 17:58

## 2020-02-06 RX ADMIN — Medication 200 MCG: at 18:08

## 2020-02-06 RX ADMIN — GABAPENTIN 400 MG: 400 CAPSULE ORAL at 11:41

## 2020-02-06 RX ADMIN — Medication 200 MCG: at 17:53

## 2020-02-06 RX ADMIN — GUAIFENESIN 600 MG: 600 TABLET, EXTENDED RELEASE ORAL at 05:48

## 2020-02-06 RX ADMIN — SODIUM CHLORIDE, POTASSIUM CHLORIDE, SODIUM LACTATE AND CALCIUM CHLORIDE: 600; 310; 30; 20 INJECTION, SOLUTION INTRAVENOUS at 17:32

## 2020-02-06 RX ADMIN — LIDOCAINE HYDROCHLORIDE 80 MG: 20 INJECTION, SOLUTION EPIDURAL; INFILTRATION; INTRACAUDAL at 17:44

## 2020-02-06 RX ADMIN — OXYCODONE HYDROCHLORIDE 10 MG: 5 SOLUTION ORAL at 18:45

## 2020-02-06 ASSESSMENT — ENCOUNTER SYMPTOMS
SPEECH CHANGE: 0
VOMITING: 0
PALPITATIONS: 0
SPUTUM PRODUCTION: 1
EYE DISCHARGE: 0
DIZZINESS: 0
BRUISES/BLEEDS EASILY: 0
WHEEZING: 1
BLURRED VISION: 0
HEMOPTYSIS: 0
DEPRESSION: 0
DOUBLE VISION: 0
CHILLS: 1
WEAKNESS: 0
MYALGIAS: 1
FEVER: 0
FALLS: 1
HEARTBURN: 0
FLANK PAIN: 0
ABDOMINAL PAIN: 0
NAUSEA: 0
SENSORY CHANGE: 0
FOCAL WEAKNESS: 0
HALLUCINATIONS: 0
SHORTNESS OF BREATH: 1
COUGH: 1

## 2020-02-06 ASSESSMENT — PAIN SCALES - GENERAL: PAIN_LEVEL: 1

## 2020-02-06 ASSESSMENT — LIFESTYLE VARIABLES: SUBSTANCE_ABUSE: 0

## 2020-02-06 NOTE — CARE PLAN
Problem: Safety  Goal: Will remain free from injury  Outcome: PROGRESSING AS EXPECTED  Note:   Pt reminded to call for assistance before ambulating. Bed is in the lowest/locked position, call light and belongings are within reach. Hourly rounding in place.     Problem: Infection  Goal: Will remain free from infection  Outcome: PROGRESSING AS EXPECTED  Note:   Pt educated on importance of handwashing before eating and after using the restroom. Pt demonstrated post void handwashing without prompting from RN.
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
## 2020-02-06 NOTE — PROGRESS NOTES
[id number]: Aox4. VSS on RA. C/o right foot pain 9 out of 10, given PRN 5mg oxycodone with good effect. Continent b/b, bedside urinal and up to the toilet w/ SBA. No BM this shift. Skin per flowsheet. Safety maintained, bed alarm refused. TM.     1615: Pt off the floor to OR

## 2020-02-06 NOTE — PROGRESS NOTES
Hospital Medicine Daily Progress Note    Date of Service  2/6/2020    Chief Complaint  45 y.o. male admitted 2/3/2020 with with past medical history of tobacco abuse, alcohol abuse depression and diabetes who presents with right foot pain.  It is associated with swelling, tenderness, erythema    Hospital Course    Upon arrival to the emergency room patient was found to be tachycardic and hypotensive.  X-ray of the right foot found first metatarsal neck erosion indicating osteomyelitis      Interval Problem Update  2/4: Patient was seen and examined by me today.  He has significant tenderness and swelling of the right leg.  LPS was consulted suggested he undergo a I&D and bone biopsy.  They have canceled the MRI.  Patient was found to have tachypnea, shortness of breath and cough.  Chest x-ray interpreted by me found no acute pulmonary process.  Patient was positive for influenza and Tamiflu was started.  2/5: Patient states that his respiratory symptoms are have improved significantly since yesterday.  Found to be hypotensive and patient likely dehydrated from influenza and hyperglycemia.  Increase fluids to 150 an hour.  Blood glucose is better controlled after increasing Lantus to 17 units.  Patient will undergo an I&D tomorrow.  2/6: Patient seen and examined by me today.  He had good capillary refills and I will discontinue his IV fluids.  Blood glucose is controlled today but he is n.p.o. so we will continue to monitor this.  Continue Vanco and Unasyn since patient is going for an I&D today.  Will wait for cultures to de-escalate.  Consultants/Specialty  LPS    Code Status  Full    Disposition  TBD    Review of Systems  Review of Systems   Constitutional: Positive for chills. Negative for fever.   HENT: Negative for congestion, hearing loss and tinnitus.    Eyes: Negative for blurred vision, double vision and discharge.   Respiratory: Positive for cough, sputum production, shortness of breath and wheezing.  Negative for hemoptysis.    Cardiovascular: Negative for chest pain, palpitations and leg swelling.   Gastrointestinal: Negative for abdominal pain, heartburn, nausea and vomiting.   Genitourinary: Negative for dysuria and flank pain.   Musculoskeletal: Positive for falls and myalgias. Negative for joint pain.   Skin: Positive for rash.   Neurological: Negative for dizziness, sensory change, speech change, focal weakness and weakness.   Endo/Heme/Allergies: Negative for environmental allergies. Does not bruise/bleed easily.   Psychiatric/Behavioral: Negative for depression, hallucinations and substance abuse.        Physical Exam  Temp:  [36.7 °C (98 °F)-37.2 °C (98.9 °F)] 36.8 °C (98.2 °F)  Pulse:  [70-90] 73  Resp:  [16-19] 16  BP: (112-121)/(69-83) 121/83  SpO2:  [92 %-94 %] 93 %    Physical Exam  Vitals signs reviewed.   Constitutional:       General: He is not in acute distress.     Appearance: He is ill-appearing.   HENT:      Head: Normocephalic and atraumatic.      Nose: No congestion.      Mouth/Throat:      Mouth: Mucous membranes are dry.   Eyes:      Extraocular Movements: Extraocular movements intact.      Pupils: Pupils are equal, round, and reactive to light.   Neck:      Musculoskeletal: Neck supple.   Cardiovascular:      Rate and Rhythm: Normal rate and regular rhythm.      Pulses: Normal pulses.      Heart sounds: Normal heart sounds.   Pulmonary:      Effort: Pulmonary effort is normal. No respiratory distress.      Breath sounds: Normal breath sounds. No wheezing.   Abdominal:      General: Bowel sounds are normal. There is no distension.      Palpations: Abdomen is soft.      Tenderness: There is no tenderness.   Musculoskeletal:         General: Swelling present.      Comments: Right lateral big toe edema, pain to palpation small ulceration, right medial foot edema erythema and palpation. Ulceration involving middle of 2-3 toes. Lateral pinky edema and palpation    Skin:     General: Skin is  warm and dry.      Capillary Refill: Capillary refill takes 2 to 3 seconds.   Neurological:      General: No focal deficit present.      Mental Status: He is alert and oriented to person, place, and time. Mental status is at baseline.   Psychiatric:         Mood and Affect: Mood normal.         Behavior: Behavior normal.         Thought Content: Thought content normal.         Judgment: Judgment normal.         Fluids    Intake/Output Summary (Last 24 hours) at 2/6/2020 1456  Last data filed at 2/6/2020 1425  Gross per 24 hour   Intake 380 ml   Output 2375 ml   Net -1995 ml       Laboratory  Recent Labs     02/04/20  0305 02/05/20  1422 02/06/20  0519   WBC 8.4 6.8 7.1   RBC 4.68* 4.23* 4.33*   HEMOGLOBIN 14.9 13.7* 13.8*   HEMATOCRIT 43.8 39.7* 40.5*   MCV 93.6 93.9 93.5   MCH 31.8 32.4 31.9   MCHC 34.0 34.5 34.1   RDW 42.9 43.8 43.5   PLATELETCT 242 217 227   MPV 9.8 10.1 9.7     Recent Labs     02/04/20  0305 02/05/20  1422 02/06/20  0519   SODIUM 136 136 139   POTASSIUM 3.8 3.3* 3.5*   CHLORIDE 104 106 107   CO2 24 24 25   GLUCOSE 240* 111* 143*   BUN 12 11 10   CREATININE 0.78 0.75 0.67   CALCIUM 9.3 8.3* 8.1*                   Imaging  MR-FOOT-WITH & W/O RIGHT   Final Result         No MR evidence of osteomyelitis.      Erosion in the dorsomedial aspect of the distal first metatarsal is nonspecific and could relate to chronic inflammation, probably gout.      Degenerative change of the first MTP joint and hallux sesamoid      DX-CHEST-PORTABLE (1 VIEW)   Final Result      No evidence of acute cardiopulmonary process.      DX-FOOT-COMPLETE 3+ RIGHT   Final Result      Erosion involving the medial first metatarsal neck, with overlying soft tissue wound. This is suggestive of osteomyelitis.           Assessment/Plan  * Diabetic foot ulcer (HCC)  Assessment & Plan  Associated evidence of osteo on Xray   Cont with IV unasyn and vanco for now-monitor trough levels and adjust levels accordingly  Pain control    Esr/crp- elevated  He will undergo a I&D of right foot abscess and bone biopsy tomorrow  MRI of the foot found no evidence of osteomyelitis   LPS and wound consultation     Diabetes (HCC)- (present on admission)  Assessment & Plan  controlled  SSI ordered  accu checks   Attempt to keep BS <180 for optimal wound heeling  Started Lantus 17 units    Influenza  Assessment & Plan  Droplet precautions  Start Tamiflu  IV hydration  Antinausea medication is been ordered  Start mucinex and tesslon pearls     Alcohol abuse  Assessment & Plan  States has not had drink since yesterday  Monitor for withdrawals   Encouraged cessation    Osteomyelitis (HCC)  Assessment & Plan  Xray evidence of osteo,   ESR CRP elevated  ID will be consulted  Patient will undergo bone biopsy  MRI of the foot found no evidence of osteomyelitis  Pain control  IV abx     Tobacco abuse  Assessment & Plan  Greater than 10 minutes spent with patient smoking cessation counseling, discussed cardiovascular risk factors of smoking. Nicotine patch provided to patient       VTE prophylaxis: SCD

## 2020-02-06 NOTE — PROGRESS NOTES
Assumed care at 1900, report received from day shift RN.  Pt is laying in bed with eyes closed, no obvious s/sx of distress. Bed is locked and in the lowest position. Call light and belongings within reach. Whiteboard updated with RN and CNA phone numbers.

## 2020-02-06 NOTE — CARE PLAN
Problem: Safety  Goal: Will remain free from falls  Outcome: PROGRESSING AS EXPECTED  Intervention: Assess risk factors for falls  Flowsheets (Taken 2/6/2020 3571)  Pt Calls for Assistance: Yes  History of fall: 2  Mobility Status Assessment: 1-1 Healthcare Provider Required for Assistance with Ambulation & Transfer     Problem: Knowledge Deficit  Goal: Knowledge of disease process/condition, treatment plan, diagnostic tests, and medications will improve  Outcome: PROGRESSING AS EXPECTED  Intervention: Assess knowledge level of disease process/condition, treatment plan, diagnostic tests, and medications  Note:   Keep pt updated on upcoming procedures

## 2020-02-06 NOTE — PROGRESS NOTES
"Pharmacy Kinetics 45 y.o. male on vancomycin day # 2   2020    Currently on Vancomycin 1,800 mg iv q12hr (0600, 1800) - Dose increase & re-timing     Provider specified end date: TBD, generic stop date currently applied, 20.       Indication for Treatment: SSTI, DM foot ulcer     Pertinent history per medical record: Admitted on 2/3/2020 for R foot ulcer/pain. Wound developed about 2 weeks prior with subjective fever and chills. PMH: DM, tobacco and ETOH abuse, depressio     Other antibiotics: Unasyn 3 g IV q6h     Allergies: Apple and Lactose      List concerns for renal function: DM     Pertinent cultures to date:   2/3/20 PBC x2 - NGTD  20 right foot wound - Light growth mixed skin maryellen.      MRSA nares swab if pneumonia is a concern (ordered/positive/negative/n-a): n/a       Recent Labs     20  0305 20  1422   WBC 8.8 8.4 6.8   NEUTSPOLYS 71.40 72.70* 52.30     Recent Labs     20  0305 20  1422   BUN 14 12 11   CREATININE 0.81 0.78 0.75   ALBUMIN 4.2 4.0 3.3     Recent Labs     20  0900   VANCOTROUGH 9.8*       Intake/Output Summary (Last 24 hours) at 2020 1634  Last data filed at 2020 0230  Gross per 24 hour   Intake 1540 ml   Output 1000 ml   Net 540 ml      BP (!) 97/65   Pulse 90   Temp 37.3 °C (99.1 °F) (Temporal)   Resp 18   Ht 1.753 m (5' 9\")   Wt 87.4 kg (192 lb 10.9 oz)   SpO2 92%  Temp (24hrs), Av.8 °C (100.1 °F), Min:37.2 °C (99 °F), Max:39.4 °C (103 °F)      A/P   1. Vancomycin dose change: Yes.   2. Next vancomycin level: Prior to the 4th dose of new dosing regimen (not yet ordered)  3. Goal trough: 12 - 16 mcg/mL   Comments: Vancomycin trough level resulted this morning at 9.8 mcg/mL, close to true trough level is below goal range as outlined above. Increased dose from 17 mg/kg to 21 mg/kg maintained q12h dosing regimen. Patient has likely just approached steady state or prior to and will be to accumulate. " Anticipate a normal to moderately high volume of distribution. Renal function is stable. No leukocytosis. Tmax 103 F last night at 1944 - no fever since. Borderline hypotensive. NS running at 150 cc/hr. Also Flu A positive on Oseltamivir. Ortho consulting -  Plan for OR tomorrow for debridement and deep cultures of R foot.     Teresa Mercado, SalinasD, BCPS

## 2020-02-07 ENCOUNTER — APPOINTMENT (OUTPATIENT)
Dept: RADIOLOGY | Facility: MEDICAL CENTER | Age: 45
DRG: 623 | End: 2020-02-07
Attending: HOSPITALIST
Payer: MEDICAID

## 2020-02-07 PROBLEM — I73.9 PERIPHERAL ARTERY DISEASE (HCC): Status: ACTIVE | Noted: 2020-02-07

## 2020-02-07 LAB
ALBUMIN SERPL BCP-MCNC: 3.6 G/DL (ref 3.2–4.9)
ALBUMIN/GLOB SERPL: 1.3 G/DL
ALP SERPL-CCNC: 78 U/L (ref 30–99)
ALT SERPL-CCNC: 47 U/L (ref 2–50)
ANION GAP SERPL CALC-SCNC: 8 MMOL/L (ref 0–11.9)
AST SERPL-CCNC: 32 U/L (ref 12–45)
BASOPHILS # BLD AUTO: 0.1 % (ref 0–1.8)
BASOPHILS # BLD: 0.01 K/UL (ref 0–0.12)
BILIRUB SERPL-MCNC: 0.3 MG/DL (ref 0.1–1.5)
BUN SERPL-MCNC: 10 MG/DL (ref 8–22)
CALCIUM SERPL-MCNC: 8.5 MG/DL (ref 8.5–10.5)
CHLORIDE SERPL-SCNC: 102 MMOL/L (ref 96–112)
CO2 SERPL-SCNC: 24 MMOL/L (ref 20–33)
CREAT SERPL-MCNC: 0.66 MG/DL (ref 0.5–1.4)
EOSINOPHIL # BLD AUTO: 0 K/UL (ref 0–0.51)
EOSINOPHIL NFR BLD: 0 % (ref 0–6.9)
ERYTHROCYTE [DISTWIDTH] IN BLOOD BY AUTOMATED COUNT: 42.9 FL (ref 35.9–50)
GLOBULIN SER CALC-MCNC: 2.7 G/DL (ref 1.9–3.5)
GLUCOSE BLD-MCNC: 212 MG/DL (ref 65–99)
GLUCOSE BLD-MCNC: 222 MG/DL (ref 65–99)
GLUCOSE BLD-MCNC: 293 MG/DL (ref 65–99)
GLUCOSE BLD-MCNC: 316 MG/DL (ref 65–99)
GLUCOSE SERPL-MCNC: 306 MG/DL (ref 65–99)
GRAM STN SPEC: NORMAL
HCT VFR BLD AUTO: 44.2 % (ref 42–52)
HGB BLD-MCNC: 15.1 G/DL (ref 14–18)
IMM GRANULOCYTES # BLD AUTO: 0.03 K/UL (ref 0–0.11)
IMM GRANULOCYTES NFR BLD AUTO: 0.3 % (ref 0–0.9)
LYMPHOCYTES # BLD AUTO: 0.9 K/UL (ref 1–4.8)
LYMPHOCYTES NFR BLD: 10 % (ref 22–41)
MCH RBC QN AUTO: 31.6 PG (ref 27–33)
MCHC RBC AUTO-ENTMCNC: 34.2 G/DL (ref 33.7–35.3)
MCV RBC AUTO: 92.5 FL (ref 81.4–97.8)
MONOCYTES # BLD AUTO: 0.39 K/UL (ref 0–0.85)
MONOCYTES NFR BLD AUTO: 4.3 % (ref 0–13.4)
NEUTROPHILS # BLD AUTO: 7.64 K/UL (ref 1.82–7.42)
NEUTROPHILS NFR BLD: 85.3 % (ref 44–72)
NRBC # BLD AUTO: 0 K/UL
NRBC BLD-RTO: 0 /100 WBC
PATHOLOGY CONSULT NOTE: NORMAL
PLATELET # BLD AUTO: 250 K/UL (ref 164–446)
PMV BLD AUTO: 10.3 FL (ref 9–12.9)
POTASSIUM SERPL-SCNC: 4 MMOL/L (ref 3.6–5.5)
PROT SERPL-MCNC: 6.3 G/DL (ref 6–8.2)
RBC # BLD AUTO: 4.78 M/UL (ref 4.7–6.1)
SIGNIFICANT IND 70042: NORMAL
SITE SITE: NORMAL
SODIUM SERPL-SCNC: 134 MMOL/L (ref 135–145)
SOURCE SOURCE: NORMAL
WBC # BLD AUTO: 9 K/UL (ref 4.8–10.8)

## 2020-02-07 PROCEDURE — 700105 HCHG RX REV CODE 258: Performed by: HOSPITALIST

## 2020-02-07 PROCEDURE — 80053 COMPREHEN METABOLIC PANEL: CPT

## 2020-02-07 PROCEDURE — A9270 NON-COVERED ITEM OR SERVICE: HCPCS | Performed by: HOSPITALIST

## 2020-02-07 PROCEDURE — 700102 HCHG RX REV CODE 250 W/ 637 OVERRIDE(OP): Performed by: HOSPITALIST

## 2020-02-07 PROCEDURE — 36415 COLL VENOUS BLD VENIPUNCTURE: CPT

## 2020-02-07 PROCEDURE — 93922 UPR/L XTREMITY ART 2 LEVELS: CPT | Mod: 26 | Performed by: INTERNAL MEDICINE

## 2020-02-07 PROCEDURE — 93926 LOWER EXTREMITY STUDY: CPT | Mod: 26 | Performed by: INTERNAL MEDICINE

## 2020-02-07 PROCEDURE — 700111 HCHG RX REV CODE 636 W/ 250 OVERRIDE (IP): Performed by: INTERNAL MEDICINE

## 2020-02-07 PROCEDURE — 99232 SBSQ HOSP IP/OBS MODERATE 35: CPT | Mod: GC | Performed by: INTERNAL MEDICINE

## 2020-02-07 PROCEDURE — 700111 HCHG RX REV CODE 636 W/ 250 OVERRIDE (IP): Performed by: HOSPITALIST

## 2020-02-07 PROCEDURE — 99233 SBSQ HOSP IP/OBS HIGH 50: CPT | Performed by: HOSPITALIST

## 2020-02-07 PROCEDURE — 93926 LOWER EXTREMITY STUDY: CPT | Mod: RT

## 2020-02-07 PROCEDURE — 770021 HCHG ROOM/CARE - ISO PRIVATE

## 2020-02-07 PROCEDURE — 82962 GLUCOSE BLOOD TEST: CPT

## 2020-02-07 PROCEDURE — 85025 COMPLETE CBC W/AUTO DIFF WBC: CPT

## 2020-02-07 PROCEDURE — 700105 HCHG RX REV CODE 258: Performed by: INTERNAL MEDICINE

## 2020-02-07 PROCEDURE — 93922 UPR/L XTREMITY ART 2 LEVELS: CPT

## 2020-02-07 RX ORDER — INSULIN GLARGINE 100 [IU]/ML
17 INJECTION, SOLUTION SUBCUTANEOUS EVERY EVENING
Status: DISCONTINUED | OUTPATIENT
Start: 2020-02-07 | End: 2020-02-08

## 2020-02-07 RX ORDER — OXYCODONE HCL 10 MG/1
10 TABLET, FILM COATED, EXTENDED RELEASE ORAL EVERY 12 HOURS
Status: DISCONTINUED | OUTPATIENT
Start: 2020-02-07 | End: 2020-02-12 | Stop reason: HOSPADM

## 2020-02-07 RX ORDER — ASCORBIC ACID 500 MG
1000 TABLET ORAL DAILY
Status: DISCONTINUED | OUTPATIENT
Start: 2020-02-07 | End: 2020-02-12 | Stop reason: HOSPADM

## 2020-02-07 RX ADMIN — GUAIFENESIN 600 MG: 600 TABLET, EXTENDED RELEASE ORAL at 05:38

## 2020-02-07 RX ADMIN — VANCOMYCIN HYDROCHLORIDE 1800 MG: 500 INJECTION, POWDER, LYOPHILIZED, FOR SOLUTION INTRAVENOUS at 17:46

## 2020-02-07 RX ADMIN — INSULIN GLARGINE 17 UNITS: 100 INJECTION, SOLUTION SUBCUTANEOUS at 17:28

## 2020-02-07 RX ADMIN — AMPICILLIN SODIUM AND SULBACTAM SODIUM 3 G: 2; 1 INJECTION, POWDER, FOR SOLUTION INTRAMUSCULAR; INTRAVENOUS at 02:28

## 2020-02-07 RX ADMIN — AMPICILLIN SODIUM AND SULBACTAM SODIUM 3 G: 2; 1 INJECTION, POWDER, FOR SOLUTION INTRAMUSCULAR; INTRAVENOUS at 21:08

## 2020-02-07 RX ADMIN — ALBUTEROL SULFATE 2 PUFF: 90 AEROSOL, METERED RESPIRATORY (INHALATION) at 10:00

## 2020-02-07 RX ADMIN — OXYCODONE HYDROCHLORIDE 5 MG: 5 TABLET ORAL at 08:07

## 2020-02-07 RX ADMIN — SIMVASTATIN 10 MG: 20 TABLET, FILM COATED ORAL at 20:30

## 2020-02-07 RX ADMIN — OSELTAMIVIR PHOSPHATE 75 MG: 75 CAPSULE ORAL at 17:22

## 2020-02-07 RX ADMIN — ALBUTEROL SULFATE 2 PUFF: 90 AEROSOL, METERED RESPIRATORY (INHALATION) at 20:31

## 2020-02-07 RX ADMIN — ACETAMINOPHEN 650 MG: 325 TABLET, FILM COATED ORAL at 10:08

## 2020-02-07 RX ADMIN — GABAPENTIN 400 MG: 400 CAPSULE ORAL at 17:20

## 2020-02-07 RX ADMIN — GABAPENTIN 400 MG: 400 CAPSULE ORAL at 11:18

## 2020-02-07 RX ADMIN — OXYCODONE HYDROCHLORIDE AND ACETAMINOPHEN 1000 MG: 500 TABLET ORAL at 11:16

## 2020-02-07 RX ADMIN — OXYCODONE HYDROCHLORIDE 5 MG: 5 TABLET ORAL at 00:16

## 2020-02-07 RX ADMIN — OSELTAMIVIR PHOSPHATE 75 MG: 75 CAPSULE ORAL at 05:38

## 2020-02-07 RX ADMIN — AMPICILLIN SODIUM AND SULBACTAM SODIUM 3 G: 2; 1 INJECTION, POWDER, FOR SOLUTION INTRAMUSCULAR; INTRAVENOUS at 15:04

## 2020-02-07 RX ADMIN — AMPICILLIN SODIUM AND SULBACTAM SODIUM 3 G: 2; 1 INJECTION, POWDER, FOR SOLUTION INTRAMUSCULAR; INTRAVENOUS at 09:52

## 2020-02-07 RX ADMIN — GABAPENTIN 400 MG: 400 CAPSULE ORAL at 05:38

## 2020-02-07 RX ADMIN — VANCOMYCIN HYDROCHLORIDE 1800 MG: 500 INJECTION, POWDER, LYOPHILIZED, FOR SOLUTION INTRAVENOUS at 05:40

## 2020-02-07 RX ADMIN — OXYCODONE HYDROCHLORIDE 10 MG: 10 TABLET, FILM COATED, EXTENDED RELEASE ORAL at 11:16

## 2020-02-07 RX ADMIN — ALBUTEROL SULFATE 2 PUFF: 90 AEROSOL, METERED RESPIRATORY (INHALATION) at 17:20

## 2020-02-07 RX ADMIN — GUAIFENESIN 600 MG: 600 TABLET, EXTENDED RELEASE ORAL at 17:20

## 2020-02-07 RX ADMIN — ALBUTEROL SULFATE 2 PUFF: 90 AEROSOL, METERED RESPIRATORY (INHALATION) at 15:06

## 2020-02-07 RX ADMIN — SENNOSIDES AND DOCUSATE SODIUM 2 TABLET: 8.6; 5 TABLET ORAL at 17:20

## 2020-02-07 ASSESSMENT — ENCOUNTER SYMPTOMS
WEAKNESS: 0
DEPRESSION: 0
BLURRED VISION: 0
BRUISES/BLEEDS EASILY: 0
MYALGIAS: 1
VOMITING: 0
SENSORY CHANGE: 0
CHILLS: 1
FEVER: 0
FLANK PAIN: 0
PALPITATIONS: 0
SPUTUM PRODUCTION: 1
HEARTBURN: 0
DOUBLE VISION: 0
FOCAL WEAKNESS: 0
HALLUCINATIONS: 0
EYE DISCHARGE: 0
FALLS: 1
SHORTNESS OF BREATH: 1
COUGH: 1
SPEECH CHANGE: 0
WHEEZING: 1
HEMOPTYSIS: 0
DIZZINESS: 0
ABDOMINAL PAIN: 0
NAUSEA: 0

## 2020-02-07 ASSESSMENT — LIFESTYLE VARIABLES: SUBSTANCE_ABUSE: 0

## 2020-02-07 NOTE — ANESTHESIA POSTPROCEDURE EVALUATION
Patient: Rogelio Escudero    Procedure Summary     Date:  02/06/20 Room / Location:  [facility] 12 / SURGERY [facility]    Anesthesia Start:  1742 Anesthesia Stop:  1830    Procedure:  IRRIGATION AND DEBRIDEMENT, WOUND - FOOT, WITH BONE BIOPSY (Right Foot) Diagnosis:  (right foot wound )    Surgeon:  Pal Ibarra M.D. Responsible Provider:  Lainey Watts M.D.    Anesthesia Type:  general ASA Status:  3 - Emergent          Final Anesthesia Type: general  Last vitals  BP   Blood Pressure: 105/60    Temp   36.5 °C (97.7 °F)    Pulse   Pulse: 66   Resp   13    SpO2   98 %      Anesthesia Post Evaluation    Patient location during evaluation: PACU  Patient participation: complete - patient participated  Level of consciousness: awake and alert  Pain score: 1    Airway patency: patent  Anesthetic complications: no  Cardiovascular status: hemodynamically stable  Respiratory status: acceptable  Hydration status: euvolemic    PONV: none           Nurse Pain Score: 1 (NPRS)

## 2020-02-07 NOTE — OP REPORT
DATE OF SERVICE:  02/06/2020    PREOPERATIVE DIAGNOSES:  Right foot wound and possible osteomyelitis.    POSTOPERATIVE DIAGNOSES:  Right foot wound and possible osteomyelitis.    PROCEDURES PERFORMED:  1.  Debridement of right foot wound and drainage of subcutaneous abscess.  2.  Open biopsy of first metatarsal bony erosion.    SURGEON:  Pal Ibarra MD    ASSISTANT:  None.    ANESTHESIA:  General.    ESTIMATED BLOOD LOSS:  5 mL.    COMPLICATIONS:  None.    OPERATIVE INDICATIONS:  The patient is a 45-year-old gentleman who was   initially admitted with cellulitis, swelling to the foot and appearance of a   blister over the first MTP joint.  Further workup with imaging showed an   avulsion to the distal first metatarsal.  The MRI did not suggest   osteomyelitis, but based on its proximity, this was a concern.  We discussed   treatment options, recommendations.  Recommended debridement of the   subcutaneous abscess of the foot as well as a bone biopsy to determine whether   this was osteomyelitis versus other erosive causes.  We discussed procedure   at length and talked about the risks of surgery to include wound healing   complications, infection, pain, possible need for additional procedures as   well as risk of stiffness.  He understood these and agreed, he wished to   proceed.    PROCEDURE IN DETAIL:  Patient was seen in the preoperative holding area on   date of surgery.  The right foot was marked with surgeon's initials.  He was   then taken to the operating room and placed supine on operative table.    Anesthesia was induced.  The right lower extremity was prepped and draped in   normal sterile fashion.  An operative pause was conducted.  Correct patient,   site, side, and procedure were identified as well as surgeon's initial on   operative extremity.  Patient was continued on his preoperative vancomycin   antibiotics.  At this point, we initially proceeded with an incision over the   ulceration.  This  showed a thick callus tissue, which was debrided back to   healthy appearing skin.  This portion of the skin was then incised as well and   there was subcutaneous purulent material.  This was taken with a rongeur and   sent for cultures.  The nonviable-appearing tissue was excised sharply with a   knife and rongeur.  This included skin and subcutaneous tissues.  The wound   itself was about 2x2 cm.  There was no obvious probing through this down to   the level of the bone.  Separate incision was made closer to the bony lesion on the   first metatarsal.  This was located with image intensifier.  This incision was   bluntly dissected down to the first ray.  The ulcerations could be felt with   the use of a [place] elevator.  Using a rongeur, we took a sample of this   erosive bone and some of the periosteum and soft tissue attachment as   well.  This was sent for pathology for permanent specimen.  Based off the   hardness of the bone, I did not feel like osteomyelitic bone.  There is also   no purulent material seen within the bone.  After the biopsy was taken, we   thoroughly irrigated this wound and closed it with 3-0 nylon sutures in a   vertical mattress fashion.  The wound had been irrigated as well and was   dressed with Xeroform gauze and Ace wrap.  At this point, the patient was   allowed to awake in the operating room, taken to PACU in stable condition.      POSTOPERATIVE PLAN:  Weightbearing as tolerated to the right lower extremity.    We will await further culture and pathology results.  Currently, I would   proceed more with treatment of a soft tissue ulceration over concern of   osteomyelitis.  Patient will need to follow up in 2 weeks' time with either us   or wound care for removal of his sutures.       ____________________________________     Pal Ibarra MD    DCS / NTS    DD:  02/07/2020 06:14:39  DT:  02/07/2020 09:27:13    D#:  [id number]  Job#:  [id number]

## 2020-02-07 NOTE — DIETARY
Nutrition services: Day 4 of admit. Attempted to provided diabetes meal planning education. Pt stated he was medicated and requested return visit for education. Will follow up.
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
## 2020-02-07 NOTE — PROGRESS NOTES
Spiritual Care Note    Patient's Name: Rogelio Escudero   MRN: [id number]    YOB: 1975   Age and Gender: 45 y.o. male   Service Area: [place] [address]   Room (and Bed): [place] [address]   Ethnicity or Nationality:    Primary Language: English   Zoroastrian/Spiritual preference: Moslem   Place of Residence: [facility]   Family/Friends/Others Present: No   Clinical Team Present: No   Medical Diagnosis(-es)/Procedure(s): Open Wound   Code Status: Full Code    Date of Admission: 2/3/2020   Length of Stay: 4 days        Spiritual Care Provider Information    Name of Spiritual Care Provider:   Flori Norwood  Title of Spiritual Care Provider:     Phone Number:     [phone]  E-mail:      [email or web address]  Total Time:       15 minutes    Spiritual Screen Results    Gen Nursing    Is your spiritual health or inner well-being important to you as you cope with your medical condition?: Yes  Would you like to receive a visit from our Spiritual Care team or your own Episcopalian or spiritual leader?: Yes  Was spiritual care education provided to the patient?: Yes  Cultural/Spiritual Needs During Care: Clinical  Sources of Hope/Laura: Art/Music, Counseling/Support groups, Prayer, Family, Excercise/Physical activity     Palliative Care    Was spiritual care education provided to the patient?: Yes      Encounter/Request Information    Visited With:      Patient  Nature of the Visit:     Follow-up, On shift  Continue Visiting:     Yes  General Visit:      Yes  Referral From/ Origin of Request:   Epic nursing    Religous Needs/Values    Zoroastrian Needs:     Visit  Zoroastrian Needs:     Prayer   Zoroastrian Needs:    SPECIAL DIET (halal)    Spiritual Assessment       Observations/Symptoms:    Accepting, Thankfulness    Interacton/Conversation:    PT was friendly and welcoming to . We discussed spiritual nature of life and choices made.    Assessment:      Need    Need: Cultural Issues, Seeking  Spiritual Assistance and Support    Intended Effects:     Build Relationship of Care and Support, Convey a Calming Presence, Demonstrate Caring and Concern, Stephanie Affirmation, Helping Someone Feel Comforted, Lessen Anxiety, Preserve Dignity and Respect, Promote a Sense of Peace    Interventions:      non-anxious presence, active listening, prayer    Outcomes:      Hope/Peace/Laura/Trust/Gratitude/Beauty, Spiritual Comfort, Value/Dignity/Respect    Plan:       Visit Upon Request    Notes:    Thank you for allowing Spiritual Care to support this patient and family. Contact [phone] for additional assistance, changes in PT status, or with any questions/concerns.

## 2020-02-07 NOTE — PROGRESS NOTES
Assumed care at 1950, report received from PACU RN.  Pt arrived via hospital bed with a member of transport staff. Pt is A&O x4, receiving 10L o2 via oxy mask, denies any pain at this time. Bed is locked and in the lowest position. Call light and belongings within reach. Plan of care discussed and whiteboard updated, Pt has no questions at this time.

## 2020-02-07 NOTE — ANESTHESIA PROCEDURE NOTES
Airway  Date/Time: 2/6/2020 5:47 PM  Performed by: Lainey Watts M.D.  Authorized by: Lainey Watts M.D.     Location:  OR  Urgency:  Elective  Difficult Airway: No    Indications for Airway Management:  Anesthesia  Spontaneous Ventilation: absent    Sedation Level:  Deep  Preoxygenated: Yes    Mask Difficulty Assessment:  0 - not attempted  Final Airway Type:  Supraglottic airway  Final Supraglottic Airway:  Standard LMA  SGA Size:  5  Number of Attempts at Approach:  1

## 2020-02-07 NOTE — CARE PLAN
Problem: Pain Management  Goal: Pain level will decrease to patient's comfort goal  Outcome: PROGRESSING AS EXPECTED  Patient calls appropriately for pain medications.      Problem: Respiratory:  Goal: Respiratory status will improve  Outcome: PROGRESSING AS EXPECTED   Patient taught to use IS to maximize respiratory function

## 2020-02-07 NOTE — OR NURSING
PT from Or w/ ANES/RN, PT A/O x4, reported pain 10/10, however, PT not writhing, crying out or yelling in pain, also stated had mild nausea.  PT medicated w/ Fentanyl 10 mcg, Oxycodone 10 mg PO and Zofran 4mg, PT now observed snoring and resting w/ eyes closed, VSS. FBG 85, no coverage needed.  PT tolerated sips of water.  RLE wrapped in ace wrap, xerform and 4x4 gauze, CDI, cap refill approx 2-3 seconds, unable to assess pedal pulse due to dsg, post tibial pulse +1.  Will call report, PT to be transported back to floor.

## 2020-02-07 NOTE — ANESTHESIA QCDR
[facility] Clinical Data Registry (for Quality Improvement)     Postoperative nausea/vomiting risk protocol (Adult = 18 yrs and Pediatric 3-17 yrs)- (430 and 463)  General inhalation anesthetic (NOT TIVA) with PONV risk factors: No  Provision of anti-emetic therapy with at least 2 different classes of agents: N/A  Patient DID NOT receive anti-emetic therapy and reason is documented in Medical Record: N/A    Multimodal Pain Management- (477)  Non-emergent surgery AND patient age >= 18: No  Use of Multimodal Pain Management, two or more drugs and/or interventions, NOT including systemic opioids:   Exception: Documented allergy to multiple classes of analgesics:     Smoking Abstinence (404)  Patient is current smoker (cigarette, pipe, e-cig, marijuanna): No  Elective Surgery:   Abstinence instructions provided prior to day of surgery:   Patient abstained from smoking on day of surgery:     Pre-Op Beta-Blocker in Isolated CABG (44)  Isolated CABG AND patient age >= 18: No  Beta-blocker admin within 24 hours of surgical incision:   Exception:of medical reason(s) for not administering beta blocker within 24 hours prior to surgical incision (e.g., not  indicated,other medical reason):     PACU assessment of acute postoperative pain prior to Anesthesia Care End- Applies to Patients Age = 18- (ABG7)  Initial PACU pain score is which of the following: < 7/10  Patient unable to report pain score: N/A    Post-anesthetic transfer of care checklist/protocol to PACU/ICU- (426 and 427)  Upon conclusion of case, patient transferred to which of the following locations: PACU/Non-ICU  Use of transfer checklist/protocol: Yes  Exclusion: Service Performed in Patient Hospital Room (and thus did not require transfer): N/A  Unplanned admission to ICU related to anesthesia service up through end of PACU care- (MD51)  Unplanned admission to ICU (not initially anticipated at anesthesia start time): No
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
## 2020-02-07 NOTE — ANESTHESIA PREPROCEDURE EVALUATION
Relevant Problems   Other   (+) Alcohol abuse   (+) Diabetes (HCC)   (+) Diabetic foot ulcer (HCC)   (+) Influenza   (+) Osteomyelitis (HCC)   (+) Tobacco abuse       Physical Exam    Airway   Mallampati: II  TM distance: >3 FB  Neck ROM: full       Cardiovascular - normal exam  Rhythm: regular  Rate: normal  (-) murmur     Dental - normal exam         Pulmonary - normal exam  Breath sounds clear to auscultation     Abdominal    Neurological - normal exam         Other findings: Top back right molar not attached to the bone per patient but not loose.            Anesthesia Plan    ASA 3- EMERGENT   ASA physical status 3 criteria: diabetes - poorly controlledASA physical status emergent criteria: acute deteriorating condition due to infection    Plan - general       Airway plan will be LMA        Induction: intravenous    Postoperative Plan: Postoperative administration of opioids is intended.    Pertinent diagnostic labs and testing reviewed    Informed Consent:    Anesthetic plan and risks discussed with patient.    Use of blood products discussed with: patient whom consented to blood products.

## 2020-02-07 NOTE — PROGRESS NOTES
"Pharmacy Kinetics 45 y.o. male on vancomycin day # 4 2020    Currently on Vancomycin 1800 mg iv q12hr (0600, 1800)  Provider specified end date: TBD     Indication for Treatment: SSTI, DM foot ulcer     Pertinent history per medical record: Admitted on 2/3/2020 for right foot ulcer/pain. Wound developed about 2 weeks prior with subjective fever and chills. PMH: DM, tobacco and ETOH abuse, depression. Xray of foot possible osteo; MRI of foot no evidence.     Other antibiotics: Unasyn 3 g IV q6h     Allergies: Apple and Lactose      List concerns for renal function: alb 3.5, BMI 29.46     Pertinent cultures to date:   2/3/20 PBC x2 NGTD   R foot wound - light growth mixed skin maryellen     MRSA nares swab if pneumonia is a concern (ordered/positive/negative/n-a): n/a    Recent Labs     20  1422 20  0519 20  0150   WBC 6.8 7.1 9.0   NEUTSPOLYS 52.30 49.60 85.30*     Recent Labs     20  1422 20  0519 20  0150   BUN 11 10 10   CREATININE 0.75 0.67 0.66   ALBUMIN 3.3 3.5 3.6     Recent Labs     20  0900   VANCOTROUGH 9.8*       Intake/Output Summary (Last 24 hours) at 2020 1549  Last data filed at 2020 0900  Gross per 24 hour   Intake 1060 ml   Output 1404 ml   Net -344 ml      /77   Pulse 74   Temp 36.6 °C (97.9 °F) (Temporal)   Resp 18   Ht 1.753 m (5' 9\")   Wt 90.5 kg (199 lb 8.3 oz)   SpO2 95%  Temp (24hrs), Av.4 °C (97.5 °F), Min:36.1 °C (97 °F), Max:36.7 °C (98 °F)      A/P   1. Vancomycin dose change: continues same  2. Next vancomycin level: level prior dose 6 AM dose tomorrow (ordered at 5:30AM)  3. Goal trough: 12-16  4. Comments: s/p I&D, per ortho more soft tissue ulceration over concern of osteo.. Awaiting tissue biopsy. ID consulted and continue current antibiotics. Renal function stable. Vanco trough ordered prior dose tomorrow to ensure within therapeutic goal.    Sylvia Wyatt, Pharm.D, BCPS, BCCP      "

## 2020-02-07 NOTE — PROGRESS NOTES
Received report from MARCIA HERBERT Assumed care of patient at 0700. Pt is alert with all needs met at this time. Plan of care discussed and whiteboard updated. Bed is in the lowest and locked position, call light and personal belongings in reach.

## 2020-02-07 NOTE — ANESTHESIA TIME REPORT
Anesthesia Start and Stop Event Times     Date Time Event    2/6/2020 1727 Ready for Procedure     1742 Anesthesia Start     1830 Anesthesia Stop        Responsible Staff  02/06/20    Name Role Begin End    Lainey Watts M.D. Anesth 1742 1830        Preop Diagnosis (Free Text):  Pre-op Diagnosis     right foot wound        Preop Diagnosis (Codes):    Post op Diagnosis  Right foot infection      Premium Reason  B. 1st Call    Comments:

## 2020-02-07 NOTE — PROGRESS NOTES
"Pharmacy Kinetics 45 y.o. male on vancomycin day # 3 2020    Currently on Vancomycin 1800 mg iv q12hr (0600, 1800)  Provider specified end date: TBD    Indication for Treatment: SSTI, DM foot ulcer    Pertinent history per medical record: Admitted on 2/3/2020 for right foot ulcer/pain. Wound developed about 2 weeks prior with subjective fever and chills. PMH: DM, tobacco and ETOH abuse, depression. Xray of foot possible osteo; MRI of foot no evidence.    Other antibiotics: Unasyn 3 g IV q6h    Allergies: Apple and Lactose     List concerns for renal function: alb 3.5, BMI 29.46    Pertinent cultures to date:   2/3/20 PBC x2 NGTD   R foot wound - light growth mixed skin maryellen    MRSA nares swab if pneumonia is a concern (ordered/positive/negative/n-a): n/a    Recent Labs     20  0305 20  1422 20  0519   WBC 8.8 8.4 6.8 7.1   NEUTSPOLYS 71.40 72.70* 52.30 49.60     Recent Labs     20  0305 20  1422 20  0519   BUN 14 12 11 10   CREATININE 0.81 0.78 0.75 0.67   ALBUMIN 4.2 4.0 3.3 3.5     Recent Labs     20  0900   VANCOTROUGH 9.8*       Intake/Output Summary (Last 24 hours) at 2020 1625  Last data filed at 2020 1425  Gross per 24 hour   Intake 380 ml   Output 2375 ml   Net -1995 ml      /83   Pulse 73   Temp 36.8 °C (98.2 °F) (Temporal)   Resp 16   Ht 1.753 m (5' 9\")   Wt 90.5 kg (199 lb 8.3 oz)   SpO2 93%  Temp (24hrs), Av.8 °C (98.3 °F), Min:36.7 °C (98 °F), Max:37.1 °C (98.8 °F)      A/P   1. Vancomycin dose change: continue same  2. Next vancomycin level: 1-2 days  3. Goal trough: 12-16  4. Comments: Vancomycin trough subtherapeutic yesterday, dose increased. OR for I&D of right foot abscess today. LPS and ID consulted and recc to continue current antibiotic regimen.    Sylvia Wyatt, Pharm.D, BCPS, BCCP      "
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
## 2020-02-07 NOTE — CARE PLAN
Problem: Safety  Goal: Will remain free from falls  Outcome: PROGRESSING AS EXPECTED  Note:   Pt reminded to call for assistance before ambulating. Bed is in the lowest/locked position, call light and belongings are within reach.      Problem: Pain Management  Goal: Pain level will decrease to patient's comfort goal  Outcome: PROGRESSING AS EXPECTED  Note:   Pt has no pain complaints at this time. Will continue to monitor pt pain level

## 2020-02-08 LAB
ALBUMIN SERPL BCP-MCNC: 3.5 G/DL (ref 3.2–4.9)
ALBUMIN/GLOB SERPL: 1.4 G/DL
ALP SERPL-CCNC: 73 U/L (ref 30–99)
ALT SERPL-CCNC: 53 U/L (ref 2–50)
ANION GAP SERPL CALC-SCNC: 7 MMOL/L (ref 0–11.9)
AST SERPL-CCNC: 38 U/L (ref 12–45)
BACTERIA BLD CULT: NORMAL
BACTERIA BLD CULT: NORMAL
BASOPHILS # BLD AUTO: 0.2 % (ref 0–1.8)
BASOPHILS # BLD: 0.02 K/UL (ref 0–0.12)
BILIRUB SERPL-MCNC: 0.3 MG/DL (ref 0.1–1.5)
BUN SERPL-MCNC: 12 MG/DL (ref 8–22)
CALCIUM SERPL-MCNC: 8.2 MG/DL (ref 8.5–10.5)
CHLORIDE SERPL-SCNC: 106 MMOL/L (ref 96–112)
CHOLEST SERPL-MCNC: 143 MG/DL (ref 100–199)
CO2 SERPL-SCNC: 25 MMOL/L (ref 20–33)
CREAT SERPL-MCNC: 0.82 MG/DL (ref 0.5–1.4)
EOSINOPHIL # BLD AUTO: 0.32 K/UL (ref 0–0.51)
EOSINOPHIL NFR BLD: 3.4 % (ref 0–6.9)
ERYTHROCYTE [DISTWIDTH] IN BLOOD BY AUTOMATED COUNT: 43.7 FL (ref 35.9–50)
GLOBULIN SER CALC-MCNC: 2.5 G/DL (ref 1.9–3.5)
GLUCOSE BLD-MCNC: 221 MG/DL (ref 65–99)
GLUCOSE BLD-MCNC: 229 MG/DL (ref 65–99)
GLUCOSE BLD-MCNC: 265 MG/DL (ref 65–99)
GLUCOSE BLD-MCNC: 278 MG/DL (ref 65–99)
GLUCOSE SERPL-MCNC: 312 MG/DL (ref 65–99)
HCT VFR BLD AUTO: 40 % (ref 42–52)
HDLC SERPL-MCNC: 24 MG/DL
HGB BLD-MCNC: 13.7 G/DL (ref 14–18)
IMM GRANULOCYTES # BLD AUTO: 0.03 K/UL (ref 0–0.11)
IMM GRANULOCYTES NFR BLD AUTO: 0.3 % (ref 0–0.9)
LDLC SERPL CALC-MCNC: 79 MG/DL
LYMPHOCYTES # BLD AUTO: 3.43 K/UL (ref 1–4.8)
LYMPHOCYTES NFR BLD: 36.3 % (ref 22–41)
MCH RBC QN AUTO: 32 PG (ref 27–33)
MCHC RBC AUTO-ENTMCNC: 34.3 G/DL (ref 33.7–35.3)
MCV RBC AUTO: 93.5 FL (ref 81.4–97.8)
MONOCYTES # BLD AUTO: 0.88 K/UL (ref 0–0.85)
MONOCYTES NFR BLD AUTO: 9.3 % (ref 0–13.4)
NEUTROPHILS # BLD AUTO: 4.77 K/UL (ref 1.82–7.42)
NEUTROPHILS NFR BLD: 50.5 % (ref 44–72)
NRBC # BLD AUTO: 0 K/UL
NRBC BLD-RTO: 0 /100 WBC
PLATELET # BLD AUTO: 254 K/UL (ref 164–446)
PMV BLD AUTO: 9.9 FL (ref 9–12.9)
POTASSIUM SERPL-SCNC: 3.7 MMOL/L (ref 3.6–5.5)
PROT SERPL-MCNC: 6 G/DL (ref 6–8.2)
RBC # BLD AUTO: 4.28 M/UL (ref 4.7–6.1)
SIGNIFICANT IND 70042: NORMAL
SIGNIFICANT IND 70042: NORMAL
SITE SITE: NORMAL
SITE SITE: NORMAL
SODIUM SERPL-SCNC: 138 MMOL/L (ref 135–145)
SOURCE SOURCE: NORMAL
SOURCE SOURCE: NORMAL
TRIGL SERPL-MCNC: 200 MG/DL (ref 0–149)
VANCOMYCIN TROUGH SERPL-MCNC: 12.4 UG/ML (ref 10–20)
WBC # BLD AUTO: 9.5 K/UL (ref 4.8–10.8)

## 2020-02-08 PROCEDURE — 82962 GLUCOSE BLOOD TEST: CPT | Mod: 91

## 2020-02-08 PROCEDURE — 36415 COLL VENOUS BLD VENIPUNCTURE: CPT

## 2020-02-08 PROCEDURE — A9270 NON-COVERED ITEM OR SERVICE: HCPCS | Performed by: HOSPITALIST

## 2020-02-08 PROCEDURE — 700111 HCHG RX REV CODE 636 W/ 250 OVERRIDE (IP): Performed by: INTERNAL MEDICINE

## 2020-02-08 PROCEDURE — 700111 HCHG RX REV CODE 636 W/ 250 OVERRIDE (IP): Performed by: HOSPITALIST

## 2020-02-08 PROCEDURE — 700102 HCHG RX REV CODE 250 W/ 637 OVERRIDE(OP): Performed by: SURGERY

## 2020-02-08 PROCEDURE — 85025 COMPLETE CBC W/AUTO DIFF WBC: CPT

## 2020-02-08 PROCEDURE — 770021 HCHG ROOM/CARE - ISO PRIVATE

## 2020-02-08 PROCEDURE — 80061 LIPID PANEL: CPT

## 2020-02-08 PROCEDURE — 80053 COMPREHEN METABOLIC PANEL: CPT

## 2020-02-08 PROCEDURE — 700105 HCHG RX REV CODE 258: Performed by: INTERNAL MEDICINE

## 2020-02-08 PROCEDURE — 80202 ASSAY OF VANCOMYCIN: CPT

## 2020-02-08 PROCEDURE — 700102 HCHG RX REV CODE 250 W/ 637 OVERRIDE(OP): Performed by: HOSPITALIST

## 2020-02-08 PROCEDURE — A9270 NON-COVERED ITEM OR SERVICE: HCPCS | Performed by: SURGERY

## 2020-02-08 PROCEDURE — 700105 HCHG RX REV CODE 258: Performed by: HOSPITALIST

## 2020-02-08 PROCEDURE — 99233 SBSQ HOSP IP/OBS HIGH 50: CPT | Performed by: HOSPITALIST

## 2020-02-08 RX ORDER — INSULIN GLARGINE 100 [IU]/ML
20 INJECTION, SOLUTION SUBCUTANEOUS EVERY EVENING
Status: DISCONTINUED | OUTPATIENT
Start: 2020-02-08 | End: 2020-02-09

## 2020-02-08 RX ORDER — SIMVASTATIN 40 MG
40 TABLET ORAL
Status: DISCONTINUED | OUTPATIENT
Start: 2020-02-08 | End: 2020-02-12 | Stop reason: HOSPADM

## 2020-02-08 RX ORDER — CLOPIDOGREL BISULFATE 75 MG/1
75 TABLET ORAL DAILY
Status: DISCONTINUED | OUTPATIENT
Start: 2020-02-08 | End: 2020-02-12 | Stop reason: HOSPADM

## 2020-02-08 RX ADMIN — GUAIFENESIN 600 MG: 600 TABLET, EXTENDED RELEASE ORAL at 06:53

## 2020-02-08 RX ADMIN — OSELTAMIVIR PHOSPHATE 75 MG: 75 CAPSULE ORAL at 06:53

## 2020-02-08 RX ADMIN — AMPICILLIN SODIUM AND SULBACTAM SODIUM 3 G: 2; 1 INJECTION, POWDER, FOR SOLUTION INTRAMUSCULAR; INTRAVENOUS at 20:38

## 2020-02-08 RX ADMIN — AMPICILLIN SODIUM AND SULBACTAM SODIUM 3 G: 2; 1 INJECTION, POWDER, FOR SOLUTION INTRAMUSCULAR; INTRAVENOUS at 11:00

## 2020-02-08 RX ADMIN — OXYCODONE HYDROCHLORIDE 10 MG: 10 TABLET, FILM COATED, EXTENDED RELEASE ORAL at 18:50

## 2020-02-08 RX ADMIN — OXYCODONE HYDROCHLORIDE 10 MG: 10 TABLET, FILM COATED, EXTENDED RELEASE ORAL at 00:14

## 2020-02-08 RX ADMIN — ALBUTEROL SULFATE 2 PUFF: 90 AEROSOL, METERED RESPIRATORY (INHALATION) at 10:54

## 2020-02-08 RX ADMIN — ALBUTEROL SULFATE 2 PUFF: 90 AEROSOL, METERED RESPIRATORY (INHALATION) at 03:12

## 2020-02-08 RX ADMIN — OXYCODONE HYDROCHLORIDE AND ACETAMINOPHEN 1000 MG: 500 TABLET ORAL at 06:54

## 2020-02-08 RX ADMIN — OSELTAMIVIR PHOSPHATE 75 MG: 75 CAPSULE ORAL at 18:50

## 2020-02-08 RX ADMIN — OXYCODONE HYDROCHLORIDE 10 MG: 10 TABLET, FILM COATED, EXTENDED RELEASE ORAL at 06:54

## 2020-02-08 RX ADMIN — GABAPENTIN 400 MG: 400 CAPSULE ORAL at 06:54

## 2020-02-08 RX ADMIN — CLOPIDOGREL BISULFATE 75 MG: 75 TABLET ORAL at 18:50

## 2020-02-08 RX ADMIN — GABAPENTIN 400 MG: 400 CAPSULE ORAL at 18:50

## 2020-02-08 RX ADMIN — AMPICILLIN SODIUM AND SULBACTAM SODIUM 3 G: 2; 1 INJECTION, POWDER, FOR SOLUTION INTRAMUSCULAR; INTRAVENOUS at 03:12

## 2020-02-08 RX ADMIN — INSULIN GLARGINE 20 UNITS: 100 INJECTION, SOLUTION SUBCUTANEOUS at 18:54

## 2020-02-08 RX ADMIN — ALBUTEROL SULFATE 2 PUFF: 90 AEROSOL, METERED RESPIRATORY (INHALATION) at 06:53

## 2020-02-08 RX ADMIN — SENNOSIDES AND DOCUSATE SODIUM 2 TABLET: 8.6; 5 TABLET ORAL at 06:54

## 2020-02-08 RX ADMIN — SIMVASTATIN 40 MG: 40 TABLET, FILM COATED ORAL at 20:38

## 2020-02-08 RX ADMIN — GABAPENTIN 400 MG: 400 CAPSULE ORAL at 13:53

## 2020-02-08 RX ADMIN — OXYCODONE HYDROCHLORIDE 5 MG: 5 TABLET ORAL at 11:06

## 2020-02-08 RX ADMIN — VANCOMYCIN HYDROCHLORIDE 1800 MG: 500 INJECTION, POWDER, LYOPHILIZED, FOR SOLUTION INTRAVENOUS at 07:37

## 2020-02-08 RX ADMIN — SENNOSIDES AND DOCUSATE SODIUM 2 TABLET: 8.6; 5 TABLET ORAL at 18:49

## 2020-02-08 RX ADMIN — AMPICILLIN SODIUM AND SULBACTAM SODIUM 3 G: 2; 1 INJECTION, POWDER, FOR SOLUTION INTRAMUSCULAR; INTRAVENOUS at 15:57

## 2020-02-08 ASSESSMENT — ENCOUNTER SYMPTOMS
FALLS: 1
HEARTBURN: 0
FEVER: 0
BLURRED VISION: 0
COUGH: 1
MYALGIAS: 1
BRUISES/BLEEDS EASILY: 0
WEAKNESS: 0
HEMOPTYSIS: 0
DEPRESSION: 0
DOUBLE VISION: 0
SHORTNESS OF BREATH: 1
ABDOMINAL PAIN: 0
HALLUCINATIONS: 0
PALPITATIONS: 0
DIZZINESS: 0
SPUTUM PRODUCTION: 1
SENSORY CHANGE: 0
FOCAL WEAKNESS: 0
SPEECH CHANGE: 0
NAUSEA: 0
CHILLS: 1
EYE DISCHARGE: 0
FLANK PAIN: 0
VOMITING: 0
WHEEZING: 1

## 2020-02-08 ASSESSMENT — LIFESTYLE VARIABLES: SUBSTANCE_ABUSE: 0

## 2020-02-08 NOTE — PROGRESS NOTES
Pt denies SOB, states 9 out of 10 pain that was well managed with scheduled OxyContin, ambulates with 1x assist and walker, on RA. Lung diminished, heart regular, bowel sound normative abdomen soft non-tender, R foot dressing C/D/I.

## 2020-02-08 NOTE — PROGRESS NOTES
"Pharmacy Kinetics 45 y.o. male on vancomycin day # 5   2020     Currently on Vancomycin 1800 mg iv q12hr (0600, 1800)  Provider specified end date: TBD     Indication for Treatment: SSTI, DM foot ulcer     Pertinent history per medical record: Admitted on 2/3/2020 for right foot ulcer/pain. Wound developed about 2 weeks prior with subjective fever and chills. PMH: DM, tobacco and ETOH abuse, depression. Xray of foot possible osteo; MRI of foot no evidence.     Other antibiotics: Unasyn 3 g IV q6h     Allergies: Apple and Lactose      List concerns for renal function:BMI 29.46     Pertinent cultures to date:   20: PBCx2: NGTD  20: Rt ft,wnd:NGTD  20:Rt ft, TISS:Group D Enterococcus species, Viridans Streptococcus     MRSA nares swab if pneumonia is a concern (ordered/positive/negative/n-a): n/a    Recent Labs     20  1422 20  0519 20  0150 20  0540   WBC 6.8 7.1 9.0 9.5   NEUTSPOLYS 52.30 49.60 85.30* 50.50     Recent Labs     20  1422 20  0519 20  0150 20  0540   BUN 11 10 10 12   CREATININE 0.75 0.67 0.66 0.82   ALBUMIN 3.3 3.5 3.6 3.5     Recent Labs     20  0540   VANCOTROUGH 12.4       Intake/Output Summary (Last 24 hours) at 2020 1043  Last data filed at 2020 1004  Gross per 24 hour   Intake 540 ml   Output 2300 ml   Net -1760 ml      /86   Pulse 78   Temp 36.4 °C (97.6 °F) (Temporal)   Resp 18   Ht 1.753 m (5' 9\")   Wt 90.5 kg (199 lb 8.3 oz)   SpO2 96%  Temp (24hrs), Av.5 °C (97.7 °F), Min:36.3 °C (97.3 °F), Max:36.8 °C (98.2 °F)      A/P   1. Vancomycin dose change: No change   2. Next vancomycin level: 2-3 days   3. Goal trough: 12-16 mcg/mL   4. Comments: No leukocytosis, afebrile. Cx above. Renal indices increased over interval, trending. Vancomycin level at goal . Continue current dose. ID following, continue Vancomycin/Unasyn antibiotic regimen for now.    Jarrett Clarke PharmD BCPS   "

## 2020-02-08 NOTE — PROGRESS NOTES
LIMB PRESERVATION SERVICE   POST SURGICAL PROGRESS NOTE      HPI:  Rogelio Escudero is a 45 y.o.  with a past medical history that includes type 2 diabetes, tobacco abuse, admitted 2/3/2020 for Diabetic foot ulcer (HCC)  Osteomyelitis (HCC).   LPS has been consulted for right first MTH ulcer and right lateral great toe ulcer.       Patient reports end of January 2020 he started developing increased pain to his foot.  He noticed a blister forming that drained pus.  He washed his foot with soap and water.  Left it open to air.  Despite this he continued to have increased erythema, edema, pain to his foot and proceeded to ED for further care.  Patient mentions approximately a week prior to the blister formation he had stopped taking his medications and checking his blood sugars as his supplies were at his significant other's home.  On 2/4/2020, patient reports that he started to develop cough, fever, generalized body ache.  He denies any nausea or vomiting.    SURGERY DATE: 02/06/2020 by Dr. Ibarra  PROCEDURE:   1.  Debridement of right foot wound and drainage of subcutaneous abscess.  2.  Open biopsy of first metatarsal bony erosion.    2/4/2020: patient reports that he started to develop cough, fever, generalized body ache.  He denies any nausea or vomiting.  2/8/2020: Patient denies fevers, chills, nausea, vomiting.  Pain well controlled.       PERTINENT LPS RESULTS:   OR Microbiology   Significant Indicator POSPositive (POS) P   Source TISS P   Site Right Foot P   Culture Result -Abnormal  P   Gram Stain Result Rare WBCs.   Rare Gram positive cocci.    Culture Result Abnormal    Group D Enterococcus species   Rare growth   Combination therapy with ampicillin, penicillin, or   vancomycin (for susceptible strains) plus an aminoglycoside   is usually indicated for serious enterococcal infections,   such as endocarditis unless high-level resistance to both   gentamicin and streptomycin is documented; such  "combinations   are predicted to result in synergistic killing of the   Enterococcus.   P      Culture Result Abnormal    Viridans Streptococcus   Light growth             SURGICAL SITE EXAM:      /86   Pulse 78   Temp 36.4 °C (97.6 °F) (Temporal)   Resp 18   Ht 1.753 m (5' 9\")   Wt 90.5 kg (199 lb 8.3 oz)   SpO2 96%   BMI 29.46 kg/m²        Incision 02/06/20 Foot (Active)   Site Assessment LAXMI 2/8/2020  9:00 AM   Kaitlin-wound Assessment LAXMI 2/6/2020  6:45 PM   Margins LAXMI 2/6/2020  6:45 PM   Closure LAXMI 2/6/2020  6:45 PM   Drainage Amount None 2/6/2020  6:45 PM   Periwound Protectant Not Applicable 2/6/2020  6:45 PM   Dressing Options Petrolatum Gauze (yellow);Dry Gauze 2/6/2020  6:45 PM   Dressing Changed New 2/6/2020  6:45 PM   Dressing Status Clean;Dry;Intact 2/6/2020  6:45 PM        Wound 02/03/20 Diabetic Ulcer Foot (Active)        Site Assessment Red 2/8/2020  3:00 PM   Kaitlin-wound Assessment Clean;Dry;Intact 2/8/2020  3:00 PM   Margins Attached edges 2/8/2020  3:00 PM   Wound Length (cm) 3 cm 2/8/2020  3:00 PM   Wound Width (cm) 2.5 cm 2/8/2020  3:00 PM   Wound Depth (cm) 0.1 cm 2/8/2020  3:00 PM   Wound Surface Area (cm^2) 7.5 cm^2 2/8/2020  3:00 PM   Tunneling 0 cm 2/8/2020  3:00 PM   Undermining 0 cm 2/8/2020  3:00 PM   Closure Secondary intention 2/8/2020  3:00 PM   Drainage Amount Scant 2/8/2020  3:00 PM   Drainage Description Serosanguineous 2/8/2020  3:00 PM   Non-staged Wound Description Partial thickness 2/8/2020  3:00 PM   Treatments Cleansed 2/8/2020  3:00 PM   Cleansing Approved Wound Cleanser 2/8/2020  3:00 PM   Periwound Protectant Not Applicable 2/8/2020  3:00 PM   Dressing Options Petrolatum Gauze (yellow);Dry Gauze;Roll Gauze 2/8/2020  3:00 PM   Dressing Cleansing/Solutions Not Applicable 2/8/2020  3:00 PM   Dressing Changed Changed 2/8/2020  3:00 PM   Dressing Status Clean;Dry;Intact 2/8/2020  3:00 PM   Dressing Change Frequency Daily 2/8/2020  3:00 PM   NEXT Dressing Change  " 02/09/20 2/8/2020  3:00 PM   NEXT Weekly Photo (Inpatient Only) 02/15/20 2/8/2020  3:00 PM   WOUND NURSE ONLY - Odor None 2/8/2020  3:00 PM   WOUND NURSE ONLY - Pulses Right;2+;DP;PT 2/8/2020  3:00 PM   WOUND NURSE ONLY - Exposed Structures None 2/8/2020  3:00 PM   WOUND NURSE ONLY - Tissue Type and Percentage 100% well approximated sutures, 100% red 2/8/2020  3:00 PM   WOUND NURSE ONLY - Time Spent with Patient (mins) 45 2/8/2020  3:00 PM       DIABETES MANAGEMENT:    Blood glucose:   Results from last 7 days   Lab Units 02/08/20  0659 02/08/20  0004 02/07/20  1718 02/07/20  1116 02/07/20  0546 02/07/20  0012 02/06/20  1833 02/06/20  1638   ACCU CHECK GLUCOSE 788 mg/dL 278* 265* 212* 293* 222* 316* 85 87     A1c:   Lab Results   Component Value Date/Time    HBA1C 13.0 (H) 02/04/2020 03:05 AM            INFECTION MANAGEMENT:    Results from last 7 days   Lab Units 02/08/20  0540 02/07/20  0150 02/06/20  0519 02/05/20  1422 02/04/20  0305 02/03/20 2023   WBC 1501 K/uL 9.5 9.0 7.1 6.8 8.4 8.8   PLATELET COUNT 1518 K/uL 254 250 227 217 242 256     Wound culture results:   Results     Procedure Component Value Units Date/Time    CULTURE TISSUE W/ GRM STAIN [[id number]]  (Abnormal) Collected:  02/06/20 1801    Order Status:  Completed Specimen:  Tissue Updated:  02/08/20 0953     Significant Indicator POS     Source TISS     Site Right Foot     Culture Result -     Gram Stain Result Rare WBCs.  Rare Gram positive cocci.       Culture Result Group D Enterococcus species  Rare growth  Combination therapy with ampicillin, penicillin, or  vancomycin (for susceptible strains) plus an aminoglycoside  is usually indicated for serious enterococcal infections,  such as endocarditis unless high-level resistance to both  gentamicin and streptomycin is documented; such combinations  are predicted to result in synergistic killing of the  Enterococcus.        Viridans Streptococcus  Light growth      Narrative:       Surgery Specimen  "   Anaerobic Culture [[id number]] Collected:  02/06/20 1801    Order Status:  Completed Specimen:  Tissue Updated:  02/08/20 0953     Significant Indicator NEG     Source TISS     Site Right Foot     Culture Result Culture in progress.    Narrative:       Surgery Specimen    GRAM STAIN [[id number]] Collected:  02/06/20 1801    Order Status:  Completed Specimen:  Tissue Updated:  02/07/20 0405     Significant Indicator .     Source TISS     Site Right Foot     Gram Stain Result Rare WBCs.  Rare Gram positive cocci.      Narrative:       Surgery Specimen    Culture Wound W/ Gram Stain [[id number]] Collected:  02/04/20 0645    Order Status:  Completed Specimen:  Wound from Right Foot Updated:  02/06/20 0946     Significant Indicator NEG     Source WND     Site RIGHT FOOT     Culture Result Light growth mixed skin maryellen.     Gram Stain Result No organisms seen.    Influenza A/B By PCR (Adult - Flu Only) [[id number]]  (Abnormal) Collected:  02/04/20 1442    Order Status:  Completed Specimen:  Respirate from Nasopharyngeal Updated:  02/04/20 1506     Influenza virus A RNA POSITIVE     Influenza virus B, PCR Negative    Narrative:       Collected By:[id number] AYE SPARKS    GRAM STAIN [[id number]] Collected:  02/04/20 0645    Order Status:  Completed Specimen:  Wound Updated:  02/04/20 1101     Significant Indicator .     Source WND     Site RIGHT FOOT     Gram Stain Result No organisms seen.    BLOOD CULTURE x2 [[id number]] Collected:  02/03/20 2040    Order Status:  Completed Specimen:  Blood from Peripheral Updated:  02/04/20 0636     Significant Indicator NEG     Source BLD     Site PERIPHERAL     Culture Result No Growth  Note: Blood cultures are incubated for 5 days and  are monitored continuously.Positive blood cultures  are called to the RN and reported as soon as  they are identified.      Narrative:       Per Hospital Policy: Only change Specimen Src: to \"Line\" if  specified by physician order.  Right Hand    BLOOD " "CULTURE x2 [[id number]] Collected:  02/03/20 2023    Order Status:  Completed Specimen:  Blood from Peripheral Updated:  02/04/20 0636     Significant Indicator NEG     Source BLD     Site PERIPHERAL     Culture Result No Growth  Note: Blood cultures are incubated for 5 days and  are monitored continuously.Positive blood cultures  are called to the RN and reported as soon as  they are identified.      Narrative:       Per Hospital Policy: Only change Specimen Src: to \"Line\" if  specified by physician order.  Per Hospital Policy: Only change Specimen Src: to \"Line\" if    Culture Wound W/ Gram Stain [[id number]]     Order Status:  Canceled Specimen:  Wound from Right Foot                ASSESSMENT:   POD#2 s/p I&D of right foot abscess   Well approximated sutures with wound at R 1st MTH medial 100% moist red      PLAN:continue with wound care and sutures out in 2 weeks with follow-up with Dr. Ibarra    Wound care: Xeroform, gauze and rolled gauze daily change with bedside RN.    Vascular status: palpable pulses R 2+ DP/PT     Antibiotics: Per ID recommendation    Weight Bearing Status: Weight bearing as tolerated    Offloading: Offloading boot  when ambulating    PT/OT : not involved, patient able to ambulate    Diabetes Education: involved, last seen 02/07  - Implications of loss of protective sensation (LOPS) discussed with patient- including increased risk for amputation.  Advised to check feet at least daily, moisturize feet, and to always wear protective foot wear.   -avoid trimming own nails. See podiatrist or certified foot and nail RN  -keep blood sugars <150 for improved wound healing    Plan to return to [facility].R.: no further surgeries planned at this time      DISCHARGE PLAN:    Disposition: Home with OP wound care    Follow-up: [facility], sutures to be removed approximately 2 weeks post-op with follow-up with Dr. Fred Leslie, R.N.    If any questions or concerns, please call [phone]    "
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
## 2020-02-08 NOTE — PROGRESS NOTES
Pain is well controlled to the right foot.  Dressings remain clean dry and intact.  The superficial abscess had gram-positive cocci and the results from the bony biopsy are not back yet.    Plan:  Wound care for the ulceration.  This does not appear to be extending deep into the subcutaneous tissues or bone.  Await further bone pathology.  Surgical appearance did not fit with osteomyelitis though  Antibiotics  Weight-bear as tolerated  Follow-up in 2 weeks
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
## 2020-02-08 NOTE — CARE PLAN
Problem: Safety  Goal: Will remain free from falls  Outcome: PROGRESSING AS EXPECTED     Problem: Pain Management  Goal: Pain level will decrease to patient's comfort goal  Outcome: PROGRESSING SLOWER THAN EXPECTED  Flowsheets (Taken 2/8/2020 1100)  Comfort Goal: Comfort at Rest  Pain Rating Scale (NPRS): 9

## 2020-02-09 LAB
ALBUMIN SERPL BCP-MCNC: 3.8 G/DL (ref 3.2–4.9)
ALBUMIN/GLOB SERPL: 1.5 G/DL
ALP SERPL-CCNC: 87 U/L (ref 30–99)
ALT SERPL-CCNC: 47 U/L (ref 2–50)
ANION GAP SERPL CALC-SCNC: 7 MMOL/L (ref 0–11.9)
AST SERPL-CCNC: 20 U/L (ref 12–45)
BACTERIA SPEC ANAEROBE CULT: NORMAL
BASOPHILS # BLD AUTO: 0.9 % (ref 0–1.8)
BASOPHILS # BLD: 0.09 K/UL (ref 0–0.12)
BILIRUB SERPL-MCNC: 0.3 MG/DL (ref 0.1–1.5)
BUN SERPL-MCNC: 12 MG/DL (ref 8–22)
CALCIUM SERPL-MCNC: 9 MG/DL (ref 8.5–10.5)
CHLORIDE SERPL-SCNC: 104 MMOL/L (ref 96–112)
CO2 SERPL-SCNC: 26 MMOL/L (ref 20–33)
CREAT SERPL-MCNC: 0.78 MG/DL (ref 0.5–1.4)
EOSINOPHIL # BLD AUTO: 0.26 K/UL (ref 0–0.51)
EOSINOPHIL NFR BLD: 2.6 % (ref 0–6.9)
ERYTHROCYTE [DISTWIDTH] IN BLOOD BY AUTOMATED COUNT: 42.2 FL (ref 35.9–50)
GLOBULIN SER CALC-MCNC: 2.6 G/DL (ref 1.9–3.5)
GLUCOSE BLD-MCNC: 148 MG/DL (ref 65–99)
GLUCOSE BLD-MCNC: 160 MG/DL (ref 65–99)
GLUCOSE BLD-MCNC: 172 MG/DL (ref 65–99)
GLUCOSE BLD-MCNC: 235 MG/DL (ref 65–99)
GLUCOSE BLD-MCNC: 321 MG/DL (ref 65–99)
GLUCOSE SERPL-MCNC: 242 MG/DL (ref 65–99)
HCT VFR BLD AUTO: 41.5 % (ref 42–52)
HGB BLD-MCNC: 14.5 G/DL (ref 14–18)
LYMPHOCYTES # BLD AUTO: 3.69 K/UL (ref 1–4.8)
LYMPHOCYTES NFR BLD: 36.5 % (ref 22–41)
MANUAL DIFF BLD: NORMAL
MCH RBC QN AUTO: 32.2 PG (ref 27–33)
MCHC RBC AUTO-ENTMCNC: 34.9 G/DL (ref 33.7–35.3)
MCV RBC AUTO: 92 FL (ref 81.4–97.8)
MONOCYTES # BLD AUTO: 0.79 K/UL (ref 0–0.85)
MONOCYTES NFR BLD AUTO: 7.8 % (ref 0–13.4)
MORPHOLOGY BLD-IMP: NORMAL
NEUTROPHILS # BLD AUTO: 5.27 K/UL (ref 1.82–7.42)
NEUTROPHILS NFR BLD: 48.7 % (ref 44–72)
NEUTS BAND NFR BLD MANUAL: 3.5 % (ref 0–10)
NRBC # BLD AUTO: 0 K/UL
NRBC BLD-RTO: 0 /100 WBC
PLATELET # BLD AUTO: 294 K/UL (ref 164–446)
PLATELET BLD QL SMEAR: NORMAL
PMV BLD AUTO: 10.1 FL (ref 9–12.9)
POTASSIUM SERPL-SCNC: 4 MMOL/L (ref 3.6–5.5)
PROT SERPL-MCNC: 6.4 G/DL (ref 6–8.2)
RBC # BLD AUTO: 4.51 M/UL (ref 4.7–6.1)
RBC BLD AUTO: PRESENT
SIGNIFICANT IND 70042: NORMAL
SITE SITE: NORMAL
SMUDGE CELLS BLD QL SMEAR: NORMAL
SODIUM SERPL-SCNC: 137 MMOL/L (ref 135–145)
SOURCE SOURCE: NORMAL
WBC # BLD AUTO: 10.1 K/UL (ref 4.8–10.8)

## 2020-02-09 PROCEDURE — 80053 COMPREHEN METABOLIC PANEL: CPT

## 2020-02-09 PROCEDURE — 82962 GLUCOSE BLOOD TEST: CPT | Mod: 91

## 2020-02-09 PROCEDURE — 99232 SBSQ HOSP IP/OBS MODERATE 35: CPT | Performed by: HOSPITALIST

## 2020-02-09 PROCEDURE — A9270 NON-COVERED ITEM OR SERVICE: HCPCS | Performed by: SURGERY

## 2020-02-09 PROCEDURE — 700102 HCHG RX REV CODE 250 W/ 637 OVERRIDE(OP): Performed by: HOSPITALIST

## 2020-02-09 PROCEDURE — 700105 HCHG RX REV CODE 258: Performed by: INTERNAL MEDICINE

## 2020-02-09 PROCEDURE — 85007 BL SMEAR W/DIFF WBC COUNT: CPT

## 2020-02-09 PROCEDURE — 97161 PT EVAL LOW COMPLEX 20 MIN: CPT

## 2020-02-09 PROCEDURE — 36415 COLL VENOUS BLD VENIPUNCTURE: CPT

## 2020-02-09 PROCEDURE — A9270 NON-COVERED ITEM OR SERVICE: HCPCS | Performed by: HOSPITALIST

## 2020-02-09 PROCEDURE — 700102 HCHG RX REV CODE 250 W/ 637 OVERRIDE(OP): Performed by: SURGERY

## 2020-02-09 PROCEDURE — 700111 HCHG RX REV CODE 636 W/ 250 OVERRIDE (IP): Performed by: INTERNAL MEDICINE

## 2020-02-09 PROCEDURE — 85027 COMPLETE CBC AUTOMATED: CPT

## 2020-02-09 PROCEDURE — 770021 HCHG ROOM/CARE - ISO PRIVATE

## 2020-02-09 PROCEDURE — 99232 SBSQ HOSP IP/OBS MODERATE 35: CPT | Performed by: INTERNAL MEDICINE

## 2020-02-09 RX ORDER — INSULIN GLARGINE 100 [IU]/ML
20 INJECTION, SOLUTION SUBCUTANEOUS EVERY EVENING
Status: DISCONTINUED | OUTPATIENT
Start: 2020-02-09 | End: 2020-02-09

## 2020-02-09 RX ORDER — INSULIN GLARGINE 100 [IU]/ML
25 INJECTION, SOLUTION SUBCUTANEOUS EVERY EVENING
Status: DISCONTINUED | OUTPATIENT
Start: 2020-02-09 | End: 2020-02-12 | Stop reason: HOSPADM

## 2020-02-09 RX ADMIN — CLOPIDOGREL BISULFATE 75 MG: 75 TABLET ORAL at 06:06

## 2020-02-09 RX ADMIN — AMPICILLIN SODIUM AND SULBACTAM SODIUM 3 G: 2; 1 INJECTION, POWDER, FOR SOLUTION INTRAMUSCULAR; INTRAVENOUS at 08:31

## 2020-02-09 RX ADMIN — AMPICILLIN SODIUM AND SULBACTAM SODIUM 3 G: 2; 1 INJECTION, POWDER, FOR SOLUTION INTRAMUSCULAR; INTRAVENOUS at 20:58

## 2020-02-09 RX ADMIN — AMPICILLIN SODIUM AND SULBACTAM SODIUM 3 G: 2; 1 INJECTION, POWDER, FOR SOLUTION INTRAMUSCULAR; INTRAVENOUS at 03:10

## 2020-02-09 RX ADMIN — OXYCODONE HYDROCHLORIDE 10 MG: 10 TABLET, FILM COATED, EXTENDED RELEASE ORAL at 06:07

## 2020-02-09 RX ADMIN — AMPICILLIN SODIUM AND SULBACTAM SODIUM 3 G: 2; 1 INJECTION, POWDER, FOR SOLUTION INTRAMUSCULAR; INTRAVENOUS at 14:58

## 2020-02-09 RX ADMIN — INSULIN GLARGINE 25 UNITS: 100 INJECTION, SOLUTION SUBCUTANEOUS at 18:11

## 2020-02-09 RX ADMIN — GABAPENTIN 400 MG: 400 CAPSULE ORAL at 06:06

## 2020-02-09 RX ADMIN — SENNOSIDES AND DOCUSATE SODIUM 2 TABLET: 8.6; 5 TABLET ORAL at 06:05

## 2020-02-09 RX ADMIN — OSELTAMIVIR PHOSPHATE 75 MG: 75 CAPSULE ORAL at 06:06

## 2020-02-09 RX ADMIN — SIMVASTATIN 40 MG: 40 TABLET, FILM COATED ORAL at 20:59

## 2020-02-09 RX ADMIN — SENNOSIDES AND DOCUSATE SODIUM 2 TABLET: 8.6; 5 TABLET ORAL at 18:07

## 2020-02-09 RX ADMIN — OXYCODONE HYDROCHLORIDE 10 MG: 10 TABLET, FILM COATED, EXTENDED RELEASE ORAL at 18:07

## 2020-02-09 RX ADMIN — OXYCODONE HYDROCHLORIDE AND ACETAMINOPHEN 1000 MG: 500 TABLET ORAL at 06:06

## 2020-02-09 RX ADMIN — GABAPENTIN 400 MG: 400 CAPSULE ORAL at 12:26

## 2020-02-09 RX ADMIN — OXYCODONE HYDROCHLORIDE 5 MG: 5 TABLET ORAL at 12:26

## 2020-02-09 RX ADMIN — GABAPENTIN 400 MG: 400 CAPSULE ORAL at 18:07

## 2020-02-09 ASSESSMENT — ENCOUNTER SYMPTOMS
HALLUCINATIONS: 0
MYALGIAS: 1
DIZZINESS: 0
PALPITATIONS: 0
FEVER: 0
WEAKNESS: 0
FALLS: 1
SPUTUM PRODUCTION: 1
COUGH: 1
WHEEZING: 1
SPEECH CHANGE: 0
VOMITING: 0
NAUSEA: 0
SHORTNESS OF BREATH: 1
CHILLS: 1
EYE DISCHARGE: 0
BLURRED VISION: 0
FOCAL WEAKNESS: 0
HEMOPTYSIS: 0
BRUISES/BLEEDS EASILY: 0
FLANK PAIN: 0
ABDOMINAL PAIN: 0
DOUBLE VISION: 0
DEPRESSION: 0
CHILLS: 0
HEARTBURN: 0
SENSORY CHANGE: 0

## 2020-02-09 ASSESSMENT — COGNITIVE AND FUNCTIONAL STATUS - GENERAL
MOVING FROM LYING ON BACK TO SITTING ON SIDE OF FLAT BED: A LITTLE
SUGGESTED CMS G CODE MODIFIER MOBILITY: CJ
MOVING TO AND FROM BED TO CHAIR: A LITTLE
CLIMB 3 TO 5 STEPS WITH RAILING: A LITTLE
MOBILITY SCORE: 20
WALKING IN HOSPITAL ROOM: A LITTLE

## 2020-02-09 ASSESSMENT — GAIT ASSESSMENTS
DISTANCE (FEET): 500
ASSISTIVE DEVICE: CRUTCHES
DEVIATION: ANTALGIC;BRADYKINETIC
GAIT LEVEL OF ASSIST: SUPERVISED

## 2020-02-09 ASSESSMENT — LIFESTYLE VARIABLES: SUBSTANCE_ABUSE: 0

## 2020-02-09 NOTE — CARE PLAN
Problem: Safety  Goal: Will remain free from injury  Outcome: PROGRESSING AS EXPECTED  Intervention: Provide assistance with mobility  Note:   No falls or injury during shift, safety precaution in place at all times.   Goal: Will remain free from falls  Outcome: PROGRESSING AS EXPECTED     Problem: Infection  Goal: Will remain free from infection  Outcome: PROGRESSING AS EXPECTED  Intervention: Assess signs and symptoms of infection  Note:   No signs of infection during shift, infection prevention protocol in place at all times.     Problem: Pain Management  Goal: Pain level will decrease to patient's comfort goal  Outcome: PROGRESSING AS EXPECTED  Intervention: Follow pain managment plan developed in collaboration with patient and Interdisciplinary Team  Note:   Pt states 6 out of 10 pain that was well managed with scheduled oxycotin.
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
## 2020-02-09 NOTE — PROGRESS NOTES
Pt denies SOB, states 6 out of 10 pain that was well managed with scheduled OxyContin, ambulates with SB assist and walker and boot, on RA. Lung diminished, heart regular, bowel sound normative abdomen soft non-tender, R foot dressing C/D/I.
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
## 2020-02-09 NOTE — THERAPY
"Physical Therapy Evaluation completed.   Bed Mobility:  Supine to Sit: Supervised  Transfers: Sit to Stand: Supervised  Gait: Level Of Assist: Supervised with Crutches       Plan of Care: Patient with no further skilled PT needs in the acute care setting and demonstrates safety with mobility in this environment at this time.   Discharge Recommendations: Equipment: Crutches. Anticipate that the patient will have no further physical therapy needs after discharge from the hospital.    See \"Rehab Therapy-Acute\" Patient Summary Report for complete documentation.     Pt is a 44yo male admitted with diabetic foot ulcer s/p debridement and drainage of subcutaneous abscess on 2/6. RLE is WBAT, pt has walking boot although appears too large for pt. Notified RN. Pt was able to ambulate without LOB with SPV. Initially pt reaching for objects with HHA; significant improvement in ambulation efforts when given axillary crutches. Demo'd ambulation with single and double crutches. Recommend crutches be delivered at DC. Pt was able to ascend/descend two flights of stairs without difficulty or LOB. Pt reports no concerns with return home. Patient will not be actively followed for physical therapy services at this time, however may be seen if requested by physician for 1 more visit within 30 days to address any discharge or equipment needs.   "
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
## 2020-02-09 NOTE — PROGRESS NOTES
Assumed care of patient at 0700. Report received from HANS Bryant. Patient A&Ox4, sleeping but c/o pain in right foot while awake, other vitals stable on room oxygen. Patient is standby assist and able to turn self. No complaints. Call light within reach; will continue to monitor.

## 2020-02-10 LAB
ALBUMIN SERPL BCP-MCNC: 3.5 G/DL (ref 3.2–4.9)
ALBUMIN/GLOB SERPL: 1.3 G/DL
ALP SERPL-CCNC: 74 U/L (ref 30–99)
ALT SERPL-CCNC: 43 U/L (ref 2–50)
ANION GAP SERPL CALC-SCNC: 10 MMOL/L (ref 0–11.9)
AST SERPL-CCNC: 26 U/L (ref 12–45)
BASOPHILS # BLD AUTO: 0.7 % (ref 0–1.8)
BASOPHILS # BLD: 0.09 K/UL (ref 0–0.12)
BILIRUB SERPL-MCNC: 0.3 MG/DL (ref 0.1–1.5)
BUN SERPL-MCNC: 15 MG/DL (ref 8–22)
CALCIUM SERPL-MCNC: 8.5 MG/DL (ref 8.5–10.5)
CHLORIDE SERPL-SCNC: 104 MMOL/L (ref 96–112)
CO2 SERPL-SCNC: 19 MMOL/L (ref 20–33)
CREAT SERPL-MCNC: 0.83 MG/DL (ref 0.5–1.4)
EOSINOPHIL # BLD AUTO: 0.51 K/UL (ref 0–0.51)
EOSINOPHIL NFR BLD: 4.2 % (ref 0–6.9)
ERYTHROCYTE [DISTWIDTH] IN BLOOD BY AUTOMATED COUNT: 44.4 FL (ref 35.9–50)
GLOBULIN SER CALC-MCNC: 2.8 G/DL (ref 1.9–3.5)
GLUCOSE BLD-MCNC: 139 MG/DL (ref 65–99)
GLUCOSE BLD-MCNC: 163 MG/DL (ref 65–99)
GLUCOSE BLD-MCNC: 184 MG/DL (ref 65–99)
GLUCOSE BLD-MCNC: 197 MG/DL (ref 65–99)
GLUCOSE SERPL-MCNC: 201 MG/DL (ref 65–99)
HCT VFR BLD AUTO: 46.1 % (ref 42–52)
HGB BLD-MCNC: 15.4 G/DL (ref 14–18)
IMM GRANULOCYTES # BLD AUTO: 0.08 K/UL (ref 0–0.11)
IMM GRANULOCYTES NFR BLD AUTO: 0.7 % (ref 0–0.9)
LYMPHOCYTES # BLD AUTO: 3.85 K/UL (ref 1–4.8)
LYMPHOCYTES NFR BLD: 31.8 % (ref 22–41)
MCH RBC QN AUTO: 32 PG (ref 27–33)
MCHC RBC AUTO-ENTMCNC: 33.4 G/DL (ref 33.7–35.3)
MCV RBC AUTO: 95.6 FL (ref 81.4–97.8)
MONOCYTES # BLD AUTO: 1.05 K/UL (ref 0–0.85)
MONOCYTES NFR BLD AUTO: 8.7 % (ref 0–13.4)
NEUTROPHILS # BLD AUTO: 6.54 K/UL (ref 1.82–7.42)
NEUTROPHILS NFR BLD: 53.9 % (ref 44–72)
NRBC # BLD AUTO: 0 K/UL
NRBC BLD-RTO: 0 /100 WBC
PLATELET # BLD AUTO: 280 K/UL (ref 164–446)
PMV BLD AUTO: 9.7 FL (ref 9–12.9)
POTASSIUM SERPL-SCNC: 4.1 MMOL/L (ref 3.6–5.5)
PROT SERPL-MCNC: 6.3 G/DL (ref 6–8.2)
RBC # BLD AUTO: 4.82 M/UL (ref 4.7–6.1)
SODIUM SERPL-SCNC: 133 MMOL/L (ref 135–145)
WBC # BLD AUTO: 12.1 K/UL (ref 4.8–10.8)

## 2020-02-10 PROCEDURE — 97165 OT EVAL LOW COMPLEX 30 MIN: CPT

## 2020-02-10 PROCEDURE — 700105 HCHG RX REV CODE 258: Performed by: INTERNAL MEDICINE

## 2020-02-10 PROCEDURE — 700111 HCHG RX REV CODE 636 W/ 250 OVERRIDE (IP): Performed by: INTERNAL MEDICINE

## 2020-02-10 PROCEDURE — A9270 NON-COVERED ITEM OR SERVICE: HCPCS | Performed by: SURGERY

## 2020-02-10 PROCEDURE — 82962 GLUCOSE BLOOD TEST: CPT

## 2020-02-10 PROCEDURE — 85025 COMPLETE CBC W/AUTO DIFF WBC: CPT

## 2020-02-10 PROCEDURE — 700102 HCHG RX REV CODE 250 W/ 637 OVERRIDE(OP): Performed by: HOSPITALIST

## 2020-02-10 PROCEDURE — 770006 HCHG ROOM/CARE - MED/SURG/GYN SEMI*

## 2020-02-10 PROCEDURE — A9270 NON-COVERED ITEM OR SERVICE: HCPCS | Performed by: HOSPITALIST

## 2020-02-10 PROCEDURE — 36415 COLL VENOUS BLD VENIPUNCTURE: CPT

## 2020-02-10 PROCEDURE — 99232 SBSQ HOSP IP/OBS MODERATE 35: CPT | Performed by: HOSPITALIST

## 2020-02-10 PROCEDURE — 99233 SBSQ HOSP IP/OBS HIGH 50: CPT | Mod: GC | Performed by: INTERNAL MEDICINE

## 2020-02-10 PROCEDURE — 700102 HCHG RX REV CODE 250 W/ 637 OVERRIDE(OP): Performed by: SURGERY

## 2020-02-10 PROCEDURE — 80053 COMPREHEN METABOLIC PANEL: CPT

## 2020-02-10 PROCEDURE — 700105 HCHG RX REV CODE 258: Performed by: SURGERY

## 2020-02-10 RX ORDER — SODIUM CHLORIDE 9 MG/ML
INJECTION, SOLUTION INTRAVENOUS CONTINUOUS
Status: DISCONTINUED | OUTPATIENT
Start: 2020-02-10 | End: 2020-02-12

## 2020-02-10 RX ADMIN — AMPICILLIN SODIUM AND SULBACTAM SODIUM 3 G: 2; 1 INJECTION, POWDER, FOR SOLUTION INTRAMUSCULAR; INTRAVENOUS at 13:36

## 2020-02-10 RX ADMIN — SIMVASTATIN 40 MG: 40 TABLET, FILM COATED ORAL at 20:54

## 2020-02-10 RX ADMIN — OXYCODONE HYDROCHLORIDE 5 MG: 5 TABLET ORAL at 15:59

## 2020-02-10 RX ADMIN — INSULIN GLARGINE 25 UNITS: 100 INJECTION, SOLUTION SUBCUTANEOUS at 17:27

## 2020-02-10 RX ADMIN — GABAPENTIN 400 MG: 400 CAPSULE ORAL at 17:28

## 2020-02-10 RX ADMIN — AMPICILLIN SODIUM AND SULBACTAM SODIUM 3 G: 2; 1 INJECTION, POWDER, FOR SOLUTION INTRAMUSCULAR; INTRAVENOUS at 07:26

## 2020-02-10 RX ADMIN — OXYCODONE HYDROCHLORIDE AND ACETAMINOPHEN 1000 MG: 500 TABLET ORAL at 07:25

## 2020-02-10 RX ADMIN — OXYCODONE HYDROCHLORIDE 10 MG: 10 TABLET, FILM COATED, EXTENDED RELEASE ORAL at 17:27

## 2020-02-10 RX ADMIN — OXYCODONE HYDROCHLORIDE 5 MG: 5 TABLET ORAL at 21:24

## 2020-02-10 RX ADMIN — SENNOSIDES AND DOCUSATE SODIUM 2 TABLET: 8.6; 5 TABLET ORAL at 18:22

## 2020-02-10 RX ADMIN — GABAPENTIN 400 MG: 400 CAPSULE ORAL at 11:16

## 2020-02-10 RX ADMIN — OXYCODONE HYDROCHLORIDE 10 MG: 10 TABLET, FILM COATED, EXTENDED RELEASE ORAL at 07:25

## 2020-02-10 RX ADMIN — GABAPENTIN 400 MG: 400 CAPSULE ORAL at 07:25

## 2020-02-10 RX ADMIN — SODIUM CHLORIDE: 9 INJECTION, SOLUTION INTRAVENOUS at 11:04

## 2020-02-10 RX ADMIN — SENNOSIDES AND DOCUSATE SODIUM 2 TABLET: 8.6; 5 TABLET ORAL at 07:25

## 2020-02-10 RX ADMIN — AMPICILLIN SODIUM AND SULBACTAM SODIUM 3 G: 2; 1 INJECTION, POWDER, FOR SOLUTION INTRAMUSCULAR; INTRAVENOUS at 20:51

## 2020-02-10 RX ADMIN — OXYCODONE HYDROCHLORIDE 5 MG: 5 TABLET ORAL at 11:16

## 2020-02-10 RX ADMIN — AMPICILLIN SODIUM AND SULBACTAM SODIUM 3 G: 2; 1 INJECTION, POWDER, FOR SOLUTION INTRAMUSCULAR; INTRAVENOUS at 03:09

## 2020-02-10 RX ADMIN — CLOPIDOGREL BISULFATE 75 MG: 75 TABLET ORAL at 07:25

## 2020-02-10 RX ADMIN — SODIUM CHLORIDE: 9 INJECTION, SOLUTION INTRAVENOUS at 21:34

## 2020-02-10 ASSESSMENT — ENCOUNTER SYMPTOMS
HALLUCINATIONS: 0
DEPRESSION: 0
BLURRED VISION: 0
SPEECH CHANGE: 0
CHILLS: 0
FLANK PAIN: 0
VOMITING: 0
FALLS: 1
FEVER: 0
HEARTBURN: 0
SHORTNESS OF BREATH: 1
MYALGIAS: 1
HEMOPTYSIS: 0
SENSORY CHANGE: 0
DOUBLE VISION: 0
DIZZINESS: 0
COUGH: 1
EYE DISCHARGE: 0
FOCAL WEAKNESS: 0
WEAKNESS: 0
BRUISES/BLEEDS EASILY: 0
WHEEZING: 1
ABDOMINAL PAIN: 0
NAUSEA: 0
SPUTUM PRODUCTION: 1
PALPITATIONS: 0
CHILLS: 1

## 2020-02-10 ASSESSMENT — COGNITIVE AND FUNCTIONAL STATUS - GENERAL
DAILY ACTIVITIY SCORE: 24
SUGGESTED CMS G CODE MODIFIER DAILY ACTIVITY: CH

## 2020-02-10 ASSESSMENT — ACTIVITIES OF DAILY LIVING (ADL): TOILETING: INDEPENDENT

## 2020-02-10 ASSESSMENT — PAIN SCALES - WONG BAKER: WONGBAKER_NUMERICALRESPONSE: HURTS A WHOLE LOT

## 2020-02-10 ASSESSMENT — LIFESTYLE VARIABLES: SUBSTANCE_ABUSE: 0

## 2020-02-10 NOTE — PROGRESS NOTES
Diabetic boot resized to RLE and left at bedside. Crutches fitted to pt's height and left at bedside. [place] at [phone] for any further assistance.

## 2020-02-10 NOTE — CARE PLAN
Problem: Safety  Goal: Will remain free from injury  Outcome: PROGRESSING AS EXPECTED  Intervention: Provide assistance with mobility  Note:   No falls or injury during shift, safety precaution in place at all times.   Goal: Will remain free from falls  Outcome: PROGRESSING AS EXPECTED     Problem: Infection  Goal: Will remain free from infection  Outcome: PROGRESSING AS EXPECTED  Intervention: Assess signs and symptoms of infection  Note:   No signs of infection during shift, infection prevention protocol in place at all times.     Problem: Pain Management  Goal: Pain level will decrease to patient's comfort goal  Outcome: PROGRESSING AS EXPECTED  Intervention: Follow pain managment plan developed in collaboration with patient and Interdisciplinary Team  Note:   Pt states 5 out of 10 pain that was well managed with scheduled oxycotin.

## 2020-02-10 NOTE — PROGRESS NOTES
[id number]: Aox4. VSS on RA. C/o right foot pain 8/9 out of 10, given PRN 5mg oxycodone with good effect. Continent b/b, up to the toilet. No BM this shift. Skin per flowsheet. Safety maintained, bed alarm set. WCTM.
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
## 2020-02-10 NOTE — PROGRESS NOTES
For angiogram and treatment of right leg bypass graft stenosis tomorrow.  Risks and benefits again were discussed with patient.  Risks include, but not limited to, bleeding, infection, contrast reaction, contrast-induced kidney failure, atheroembolization, arterial injury, arterial thrombosis, and perioperative anesthetic complications.  Patient indicated understanding and would like to proceed.    Discussed with RN as well.    Orders placed.    IR scheduling contacted.
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
## 2020-02-10 NOTE — PROGRESS NOTES
Pt denies SOB, states 5 out of 10 any pain that was well managed with scheduled OxyContin, ambulates with independently with crutches and boot, on RA. Lung diminished, heart regular, bowel sound normative abdomen soft non-tender, R foot dressing changed at 0100, new dressing C/D/I.

## 2020-02-10 NOTE — CARE PLAN
Problem: Safety  Goal: Will remain free from falls  Outcome: PROGRESSING AS EXPECTED  Intervention: Implement fall precautions  Flowsheets (Taken 2/10/2020 1313)  Bed Alarm: Patient Educated Regarding Fall Risk and Need for Bed Alarm, Understands and Continues to Refuse  Environmental Precautions: Treaded Slipper Socks on Patient; Personal Belongings, Wastebasket, Call Bell etc. in Easy Reach; Report Given to Other Health Care Providers Regarding Fall Risk; Bed in Low Position; Communication Sign for Patients & Families; Mobility Assessed & Appropriate Sign Placed  IV Pole on Same Side of Bed as Bathroom: Yes  Bedrails: Bedrails Closest to Bathroom Down  Note:   Interventions in place per protocol     Problem: Knowledge Deficit  Goal: Knowledge of disease process/condition, treatment plan, diagnostic tests, and medications will improve  Outcome: PROGRESSING AS EXPECTED  Intervention: Assess knowledge level of disease process/condition, treatment plan, diagnostic tests, and medications  Note:   Active listening; answered questions; addressed concerns

## 2020-02-11 ENCOUNTER — APPOINTMENT (OUTPATIENT)
Dept: RADIOLOGY | Facility: MEDICAL CENTER | Age: 45
DRG: 623 | End: 2020-02-11
Attending: SURGERY
Payer: MEDICAID

## 2020-02-11 PROBLEM — K85.20 ALCOHOL-INDUCED ACUTE PANCREATITIS WITHOUT INFECTION OR NECROSIS: Status: RESOLVED | Noted: 2018-07-28 | Resolved: 2020-02-11

## 2020-02-11 PROBLEM — J11.1 INFLUENZA: Status: RESOLVED | Noted: 2020-02-04 | Resolved: 2020-02-11

## 2020-02-11 PROBLEM — F10.939 ALCOHOL WITHDRAWAL (HCC): Status: RESOLVED | Noted: 2018-07-28 | Resolved: 2020-02-11

## 2020-02-11 LAB
ALBUMIN SERPL BCP-MCNC: 3.7 G/DL (ref 3.2–4.9)
ALBUMIN/GLOB SERPL: 1.4 G/DL
ALP SERPL-CCNC: 65 U/L (ref 30–99)
ALT SERPL-CCNC: 30 U/L (ref 2–50)
ANION GAP SERPL CALC-SCNC: 5 MMOL/L (ref 0–11.9)
AST SERPL-CCNC: 12 U/L (ref 12–45)
BACTERIA TISS AEROBE CULT: ABNORMAL
BASOPHILS # BLD AUTO: 0.4 % (ref 0–1.8)
BASOPHILS # BLD: 0.05 K/UL (ref 0–0.12)
BILIRUB SERPL-MCNC: 0.4 MG/DL (ref 0.1–1.5)
BUN SERPL-MCNC: 15 MG/DL (ref 8–22)
CALCIUM SERPL-MCNC: 8.6 MG/DL (ref 8.5–10.5)
CHLORIDE SERPL-SCNC: 106 MMOL/L (ref 96–112)
CO2 SERPL-SCNC: 27 MMOL/L (ref 20–33)
CREAT SERPL-MCNC: 0.8 MG/DL (ref 0.5–1.4)
EOSINOPHIL # BLD AUTO: 0.54 K/UL (ref 0–0.51)
EOSINOPHIL NFR BLD: 4.6 % (ref 0–6.9)
ERYTHROCYTE [DISTWIDTH] IN BLOOD BY AUTOMATED COUNT: 43.4 FL (ref 35.9–50)
GLOBULIN SER CALC-MCNC: 2.7 G/DL (ref 1.9–3.5)
GLUCOSE BLD-MCNC: 119 MG/DL (ref 65–99)
GLUCOSE BLD-MCNC: 155 MG/DL (ref 65–99)
GLUCOSE BLD-MCNC: 174 MG/DL (ref 65–99)
GLUCOSE BLD-MCNC: 95 MG/DL (ref 65–99)
GLUCOSE SERPL-MCNC: 129 MG/DL (ref 65–99)
GRAM STN SPEC: ABNORMAL
HCT VFR BLD AUTO: 42.9 % (ref 42–52)
HGB BLD-MCNC: 15 G/DL (ref 14–18)
IMM GRANULOCYTES # BLD AUTO: 0.16 K/UL (ref 0–0.11)
IMM GRANULOCYTES NFR BLD AUTO: 1.4 % (ref 0–0.9)
LYMPHOCYTES # BLD AUTO: 3.5 K/UL (ref 1–4.8)
LYMPHOCYTES NFR BLD: 29.9 % (ref 22–41)
MCH RBC QN AUTO: 32.3 PG (ref 27–33)
MCHC RBC AUTO-ENTMCNC: 35 G/DL (ref 33.7–35.3)
MCV RBC AUTO: 92.3 FL (ref 81.4–97.8)
MONOCYTES # BLD AUTO: 1.04 K/UL (ref 0–0.85)
MONOCYTES NFR BLD AUTO: 8.9 % (ref 0–13.4)
NEUTROPHILS # BLD AUTO: 6.4 K/UL (ref 1.82–7.42)
NEUTROPHILS NFR BLD: 54.8 % (ref 44–72)
NRBC # BLD AUTO: 0 K/UL
NRBC BLD-RTO: 0 /100 WBC
PLATELET # BLD AUTO: 336 K/UL (ref 164–446)
PMV BLD AUTO: 9.6 FL (ref 9–12.9)
POTASSIUM SERPL-SCNC: 4.1 MMOL/L (ref 3.6–5.5)
PROT SERPL-MCNC: 6.4 G/DL (ref 6–8.2)
RBC # BLD AUTO: 4.65 M/UL (ref 4.7–6.1)
SIGNIFICANT IND 70042: ABNORMAL
SITE SITE: ABNORMAL
SODIUM SERPL-SCNC: 138 MMOL/L (ref 135–145)
SOURCE SOURCE: ABNORMAL
WBC # BLD AUTO: 11.7 K/UL (ref 4.8–10.8)

## 2020-02-11 PROCEDURE — 82962 GLUCOSE BLOOD TEST: CPT | Mod: 91

## 2020-02-11 PROCEDURE — 36415 COLL VENOUS BLD VENIPUNCTURE: CPT

## 2020-02-11 PROCEDURE — B41G1ZZ FLUOROSCOPY OF LEFT LOWER EXTREMITY ARTERIES USING LOW OSMOLAR CONTRAST: ICD-10-PCS | Performed by: SURGERY

## 2020-02-11 PROCEDURE — 80053 COMPREHEN METABOLIC PANEL: CPT

## 2020-02-11 PROCEDURE — 99232 SBSQ HOSP IP/OBS MODERATE 35: CPT | Performed by: INTERNAL MEDICINE

## 2020-02-11 PROCEDURE — 700111 HCHG RX REV CODE 636 W/ 250 OVERRIDE (IP)

## 2020-02-11 PROCEDURE — 700105 HCHG RX REV CODE 258: Performed by: INTERNAL MEDICINE

## 2020-02-11 PROCEDURE — 99233 SBSQ HOSP IP/OBS HIGH 50: CPT | Mod: GC | Performed by: INTERNAL MEDICINE

## 2020-02-11 PROCEDURE — A9270 NON-COVERED ITEM OR SERVICE: HCPCS

## 2020-02-11 PROCEDURE — A9270 NON-COVERED ITEM OR SERVICE: HCPCS | Performed by: HOSPITALIST

## 2020-02-11 PROCEDURE — 36247 INS CATH ABD/L-EXT ART 3RD: CPT | Mod: RT

## 2020-02-11 PROCEDURE — 700102 HCHG RX REV CODE 250 W/ 637 OVERRIDE(OP): Performed by: SURGERY

## 2020-02-11 PROCEDURE — 700117 HCHG RX CONTRAST REV CODE 255: Performed by: SURGERY

## 2020-02-11 PROCEDURE — 85025 COMPLETE CBC W/AUTO DIFF WBC: CPT

## 2020-02-11 PROCEDURE — B41F1ZZ FLUOROSCOPY OF RIGHT LOWER EXTREMITY ARTERIES USING LOW OSMOLAR CONTRAST: ICD-10-PCS | Performed by: SURGERY

## 2020-02-11 PROCEDURE — 047K3ZZ DILATION OF RIGHT FEMORAL ARTERY, PERCUTANEOUS APPROACH: ICD-10-PCS | Performed by: SURGERY

## 2020-02-11 PROCEDURE — 700111 HCHG RX REV CODE 636 W/ 250 OVERRIDE (IP): Performed by: SURGERY

## 2020-02-11 PROCEDURE — 700102 HCHG RX REV CODE 250 W/ 637 OVERRIDE(OP): Performed by: HOSPITALIST

## 2020-02-11 PROCEDURE — A9270 NON-COVERED ITEM OR SERVICE: HCPCS | Performed by: SURGERY

## 2020-02-11 PROCEDURE — 700111 HCHG RX REV CODE 636 W/ 250 OVERRIDE (IP): Performed by: INTERNAL MEDICINE

## 2020-02-11 PROCEDURE — 770006 HCHG ROOM/CARE - MED/SURG/GYN SEMI*

## 2020-02-11 PROCEDURE — 700102 HCHG RX REV CODE 250 W/ 637 OVERRIDE(OP)

## 2020-02-11 RX ORDER — CLOPIDOGREL 300 MG/1
TABLET, FILM COATED ORAL
Status: COMPLETED
Start: 2020-02-11 | End: 2020-02-11

## 2020-02-11 RX ORDER — SODIUM CHLORIDE 9 MG/ML
500 INJECTION, SOLUTION INTRAVENOUS
Status: ACTIVE | OUTPATIENT
Start: 2020-02-11 | End: 2020-02-11

## 2020-02-11 RX ORDER — HEPARIN SODIUM 1000 [USP'U]/ML
5000 INJECTION, SOLUTION INTRAVENOUS; SUBCUTANEOUS PRN
Status: DISCONTINUED | OUTPATIENT
Start: 2020-02-11 | End: 2020-02-12 | Stop reason: HOSPADM

## 2020-02-11 RX ORDER — MIDAZOLAM HYDROCHLORIDE 1 MG/ML
.5-2 INJECTION INTRAMUSCULAR; INTRAVENOUS PRN
Status: ACTIVE | OUTPATIENT
Start: 2020-02-11 | End: 2020-02-11

## 2020-02-11 RX ORDER — ONDANSETRON 2 MG/ML
4 INJECTION INTRAMUSCULAR; INTRAVENOUS PRN
Status: ACTIVE | OUTPATIENT
Start: 2020-02-11 | End: 2020-02-11

## 2020-02-11 RX ORDER — HEPARIN SODIUM,PORCINE 1000/ML
VIAL (ML) INJECTION
Status: COMPLETED
Start: 2020-02-11 | End: 2020-02-11

## 2020-02-11 RX ORDER — MIDAZOLAM HYDROCHLORIDE 1 MG/ML
INJECTION INTRAMUSCULAR; INTRAVENOUS
Status: COMPLETED
Start: 2020-02-11 | End: 2020-02-11

## 2020-02-11 RX ORDER — CLOPIDOGREL BISULFATE 75 MG/1
150 TABLET ORAL ONCE
Status: DISCONTINUED | OUTPATIENT
Start: 2020-02-11 | End: 2020-02-11

## 2020-02-11 RX ORDER — IODIXANOL 270 MG/ML
24 INJECTION, SOLUTION INTRAVASCULAR ONCE
Status: COMPLETED | OUTPATIENT
Start: 2020-02-11 | End: 2020-02-11

## 2020-02-11 RX ADMIN — CLOPIDOGREL BISULFATE 150 MG: 300 TABLET, FILM COATED ORAL at 12:52

## 2020-02-11 RX ADMIN — FENTANYL CITRATE 50 MCG: 0.05 INJECTION, SOLUTION INTRAMUSCULAR; INTRAVENOUS at 12:16

## 2020-02-11 RX ADMIN — SENNOSIDES AND DOCUSATE SODIUM 2 TABLET: 8.6; 5 TABLET ORAL at 17:34

## 2020-02-11 RX ADMIN — OXYCODONE HYDROCHLORIDE AND ACETAMINOPHEN 1000 MG: 500 TABLET ORAL at 06:10

## 2020-02-11 RX ADMIN — OXYCODONE HYDROCHLORIDE 5 MG: 5 TABLET ORAL at 08:09

## 2020-02-11 RX ADMIN — HEPARIN SODIUM 5000 UNITS: 1000 INJECTION, SOLUTION INTRAVENOUS; SUBCUTANEOUS at 12:31

## 2020-02-11 RX ADMIN — SENNOSIDES AND DOCUSATE SODIUM 2 TABLET: 8.6; 5 TABLET ORAL at 06:10

## 2020-02-11 RX ADMIN — AMPICILLIN SODIUM AND SULBACTAM SODIUM 3 G: 2; 1 INJECTION, POWDER, FOR SOLUTION INTRAMUSCULAR; INTRAVENOUS at 20:01

## 2020-02-11 RX ADMIN — GABAPENTIN 400 MG: 400 CAPSULE ORAL at 13:48

## 2020-02-11 RX ADMIN — ACETAMINOPHEN 650 MG: 325 TABLET, FILM COATED ORAL at 08:09

## 2020-02-11 RX ADMIN — OXYCODONE HYDROCHLORIDE 10 MG: 10 TABLET, FILM COATED, EXTENDED RELEASE ORAL at 06:15

## 2020-02-11 RX ADMIN — OXYCODONE HYDROCHLORIDE 5 MG: 5 TABLET ORAL at 20:39

## 2020-02-11 RX ADMIN — GABAPENTIN 400 MG: 400 CAPSULE ORAL at 06:10

## 2020-02-11 RX ADMIN — AMPICILLIN SODIUM AND SULBACTAM SODIUM 3 G: 2; 1 INJECTION, POWDER, FOR SOLUTION INTRAMUSCULAR; INTRAVENOUS at 08:02

## 2020-02-11 RX ADMIN — OXYCODONE HYDROCHLORIDE 5 MG: 5 TABLET ORAL at 14:41

## 2020-02-11 RX ADMIN — CLOPIDOGREL BISULFATE 150 MG: 75 TABLET ORAL at 12:52

## 2020-02-11 RX ADMIN — FENTANYL CITRATE 25 MCG: 50 INJECTION, SOLUTION INTRAMUSCULAR; INTRAVENOUS at 12:32

## 2020-02-11 RX ADMIN — AMPICILLIN SODIUM AND SULBACTAM SODIUM 3 G: 2; 1 INJECTION, POWDER, FOR SOLUTION INTRAMUSCULAR; INTRAVENOUS at 04:03

## 2020-02-11 RX ADMIN — IODIXANOL 24 ML: 270 INJECTION, SOLUTION INTRAVASCULAR at 13:15

## 2020-02-11 RX ADMIN — MIDAZOLAM HYDROCHLORIDE 1 MG: 1 INJECTION, SOLUTION INTRAMUSCULAR; INTRAVENOUS at 12:16

## 2020-02-11 RX ADMIN — INSULIN GLARGINE 25 UNITS: 100 INJECTION, SOLUTION SUBCUTANEOUS at 17:29

## 2020-02-11 RX ADMIN — CLOPIDOGREL BISULFATE 75 MG: 75 TABLET ORAL at 06:10

## 2020-02-11 RX ADMIN — OXYCODONE HYDROCHLORIDE 10 MG: 10 TABLET, FILM COATED, EXTENDED RELEASE ORAL at 17:34

## 2020-02-11 RX ADMIN — GABAPENTIN 400 MG: 400 CAPSULE ORAL at 17:34

## 2020-02-11 RX ADMIN — FENTANYL CITRATE 50 MCG: 50 INJECTION, SOLUTION INTRAMUSCULAR; INTRAVENOUS at 12:16

## 2020-02-11 RX ADMIN — SIMVASTATIN 40 MG: 40 TABLET, FILM COATED ORAL at 20:39

## 2020-02-11 RX ADMIN — AMPICILLIN SODIUM AND SULBACTAM SODIUM 3 G: 2; 1 INJECTION, POWDER, FOR SOLUTION INTRAMUSCULAR; INTRAVENOUS at 13:48

## 2020-02-11 RX ADMIN — MIDAZOLAM HYDROCHLORIDE 1 MG: 1 INJECTION, SOLUTION INTRAMUSCULAR; INTRAVENOUS at 12:32

## 2020-02-11 ASSESSMENT — ENCOUNTER SYMPTOMS
MYALGIAS: 1
SPEECH CHANGE: 0
FLANK PAIN: 0
EYE DISCHARGE: 0
SHORTNESS OF BREATH: 0
HEMOPTYSIS: 0
CHILLS: 0
WHEEZING: 0
HALLUCINATIONS: 0
DOUBLE VISION: 0
FEVER: 0
VOMITING: 0
BLURRED VISION: 0
ABDOMINAL PAIN: 0
DEPRESSION: 0
BRUISES/BLEEDS EASILY: 0
SENSORY CHANGE: 0
NAUSEA: 0
FOCAL WEAKNESS: 0
HEARTBURN: 0
COUGH: 0
DIZZINESS: 0
PALPITATIONS: 0
FALLS: 1
WEAKNESS: 0
SPUTUM PRODUCTION: 0

## 2020-02-11 ASSESSMENT — LIFESTYLE VARIABLES: SUBSTANCE_ABUSE: 0

## 2020-02-11 NOTE — PROGRESS NOTES
Bedside report received from Samantha MAXWELL . Assumed care of pt at 0645 . Pt is awake at this time with no signs of distress. Plan of care discussed with pt, as well as the plan to go down for an angiogram today. Pt is A&O x 4. Pt is on RA. Bed alarm is not in use, bed in lowest position, bed rails up x 2, belongings and call light within reach. Hourly rounding in place.

## 2020-02-11 NOTE — PROGRESS NOTES
IR Nursing Note    Patient underwent a Right Lower Extremity Angiogram with Possible Intervention by Dr. Calderon. Procedure verified by patient and procedure team. Pedal pulses prior to case Right auscultated with doppler and Left 2+. Patient was placed in a supine position in IR 1 with all bony prominences padded and draped in sterile fashion. Vitals were taken every 5 minutes and remained stable during procedure (see doc flow sheet for results). CO2 waveform capnography was monitored and remained 26-35 throughout procedure. A [employer] SE Vascular Closure Device was deployed in the Left Groin (Femoral) gauze and a tegaderm dressing was placed over surgical site. Report called to Darlin MAXWELL. Pt transported via rney with RN to S521-01 in a stable condition.     Star[employer] SE Vascular Closure System 6F  REF# [id number] LOT# [id number]  EXP. 07/01/2021    Angioplasty to the Right SFA    Post Procedural Pedal Pulses Right 1+ Left 2+

## 2020-02-11 NOTE — PROGRESS NOTES
Assumed care at 1900.  Patient alert and oriented.  Resting in bed.  No new complaints.  IV fluids and abx as ordered.  TM
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
## 2020-02-11 NOTE — OR SURGEON
Immediate Post OP Note    PreOp Diagnosis: Right leg bypass graft stenosis.    PostOp Diagnosis: Same.    Procedure(s):  Bilateral leg angiogram, R SFA PTA.    Surgeon:  Angy Calderon MD    Type of Anesthesia:  IV sedation for 30 minutes.    IR Staff:    Estimated Blood Loss: 5ml.    Contrast: 24ml  Visipaque.    Findings: Severe proximal bypass graft stenosis, treated with angioplasty.    Complications: None.    Dictated, #[id number].        2/11/2020 12:51 PM Angy Calderon M.D.

## 2020-02-11 NOTE — PROGRESS NOTES
Pt back from angiogram. Vital signs stable and pt has no complaints of pain at this time. Will monitor vitals and pain.

## 2020-02-11 NOTE — THERAPY
"Occupational Therapy Evaluation completed.   Functional Status:  Supervision supine to sit.  Pt donned RLE boot seated EOB supervised.  Pt stood and walked to bathroom with crutch and completed toileting standing at toilet mod independent.  Pt walked around unit with crutch supervised.  Plan of Care: Patient with no further skilled OT needs in the acute care setting at this time  Discharge Recommendations:  Equipment: No Equipment Needed (pt now has crutches). .Anticipate that the patient will have no further occupational therapy needs after discharge from the hospital.     Pt is 44 y/o M seen for OT evaluation.  Pt admitted with R foot infection and osteomyelitis.  Pt with hx of DM and ETOH abuse.  Pt underwent I&D an is awaiting angiogram.  Pt is completing simple self-care and functional mobility tasks at a supervised/mod independent level at this time.  Pt reports no concerns with self-care at this time.  Pt with no further acute OT needs.    See \"Rehab Therapy-Acute\" Patient Summary Report for complete documentation.    "
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
## 2020-02-12 ENCOUNTER — PATIENT OUTREACH (OUTPATIENT)
Dept: HEALTH INFORMATION MANAGEMENT | Facility: OTHER | Age: 45
End: 2020-02-12

## 2020-02-12 VITALS
WEIGHT: 202.82 LBS | OXYGEN SATURATION: 96 % | HEART RATE: 75 BPM | HEIGHT: 69 IN | BODY MASS INDEX: 30.04 KG/M2 | TEMPERATURE: 98.4 F | RESPIRATION RATE: 16 BRPM | DIASTOLIC BLOOD PRESSURE: 80 MMHG | SYSTOLIC BLOOD PRESSURE: 119 MMHG

## 2020-02-12 LAB
ALBUMIN SERPL BCP-MCNC: 3.5 G/DL (ref 3.2–4.9)
ALBUMIN/GLOB SERPL: 1.3 G/DL
ALP SERPL-CCNC: 79 U/L (ref 30–99)
ALT SERPL-CCNC: 26 U/L (ref 2–50)
ANION GAP SERPL CALC-SCNC: 9 MMOL/L (ref 0–11.9)
AST SERPL-CCNC: 18 U/L (ref 12–45)
BASOPHILS # BLD AUTO: 0.4 % (ref 0–1.8)
BASOPHILS # BLD: 0.05 K/UL (ref 0–0.12)
BILIRUB SERPL-MCNC: 0.2 MG/DL (ref 0.1–1.5)
BUN SERPL-MCNC: 16 MG/DL (ref 8–22)
CALCIUM SERPL-MCNC: 8.4 MG/DL (ref 8.5–10.5)
CHLORIDE SERPL-SCNC: 106 MMOL/L (ref 96–112)
CO2 SERPL-SCNC: 21 MMOL/L (ref 20–33)
CREAT SERPL-MCNC: 0.9 MG/DL (ref 0.5–1.4)
EOSINOPHIL # BLD AUTO: 0.56 K/UL (ref 0–0.51)
EOSINOPHIL NFR BLD: 4.4 % (ref 0–6.9)
ERYTHROCYTE [DISTWIDTH] IN BLOOD BY AUTOMATED COUNT: 45 FL (ref 35.9–50)
GLOBULIN SER CALC-MCNC: 2.7 G/DL (ref 1.9–3.5)
GLUCOSE BLD-MCNC: 126 MG/DL (ref 65–99)
GLUCOSE BLD-MCNC: 162 MG/DL (ref 65–99)
GLUCOSE SERPL-MCNC: 227 MG/DL (ref 65–99)
HCT VFR BLD AUTO: 43.7 % (ref 42–52)
HGB BLD-MCNC: 14.9 G/DL (ref 14–18)
IMM GRANULOCYTES # BLD AUTO: 0.14 K/UL (ref 0–0.11)
IMM GRANULOCYTES NFR BLD AUTO: 1.1 % (ref 0–0.9)
LYMPHOCYTES # BLD AUTO: 3.11 K/UL (ref 1–4.8)
LYMPHOCYTES NFR BLD: 24.6 % (ref 22–41)
MCH RBC QN AUTO: 32.7 PG (ref 27–33)
MCHC RBC AUTO-ENTMCNC: 34.1 G/DL (ref 33.7–35.3)
MCV RBC AUTO: 96 FL (ref 81.4–97.8)
MONOCYTES # BLD AUTO: 1.08 K/UL (ref 0–0.85)
MONOCYTES NFR BLD AUTO: 8.5 % (ref 0–13.4)
NEUTROPHILS # BLD AUTO: 7.71 K/UL (ref 1.82–7.42)
NEUTROPHILS NFR BLD: 61 % (ref 44–72)
NRBC # BLD AUTO: 0 K/UL
NRBC BLD-RTO: 0 /100 WBC
PLATELET # BLD AUTO: 349 K/UL (ref 164–446)
PMV BLD AUTO: 9.3 FL (ref 9–12.9)
POTASSIUM SERPL-SCNC: 4 MMOL/L (ref 3.6–5.5)
PROT SERPL-MCNC: 6.2 G/DL (ref 6–8.2)
RBC # BLD AUTO: 4.55 M/UL (ref 4.7–6.1)
SODIUM SERPL-SCNC: 136 MMOL/L (ref 135–145)
WBC # BLD AUTO: 12.7 K/UL (ref 4.8–10.8)

## 2020-02-12 PROCEDURE — 700105 HCHG RX REV CODE 258: Performed by: INTERNAL MEDICINE

## 2020-02-12 PROCEDURE — 700102 HCHG RX REV CODE 250 W/ 637 OVERRIDE(OP): Performed by: HOSPITALIST

## 2020-02-12 PROCEDURE — 85025 COMPLETE CBC W/AUTO DIFF WBC: CPT

## 2020-02-12 PROCEDURE — 36415 COLL VENOUS BLD VENIPUNCTURE: CPT

## 2020-02-12 PROCEDURE — A9270 NON-COVERED ITEM OR SERVICE: HCPCS | Performed by: INTERNAL MEDICINE

## 2020-02-12 PROCEDURE — 82962 GLUCOSE BLOOD TEST: CPT | Mod: 91

## 2020-02-12 PROCEDURE — 700102 HCHG RX REV CODE 250 W/ 637 OVERRIDE(OP): Performed by: INTERNAL MEDICINE

## 2020-02-12 PROCEDURE — 99232 SBSQ HOSP IP/OBS MODERATE 35: CPT | Performed by: INTERNAL MEDICINE

## 2020-02-12 PROCEDURE — 80053 COMPREHEN METABOLIC PANEL: CPT

## 2020-02-12 PROCEDURE — A9270 NON-COVERED ITEM OR SERVICE: HCPCS | Performed by: SURGERY

## 2020-02-12 PROCEDURE — 700111 HCHG RX REV CODE 636 W/ 250 OVERRIDE (IP): Performed by: INTERNAL MEDICINE

## 2020-02-12 PROCEDURE — A9270 NON-COVERED ITEM OR SERVICE: HCPCS | Performed by: HOSPITALIST

## 2020-02-12 PROCEDURE — 700102 HCHG RX REV CODE 250 W/ 637 OVERRIDE(OP): Performed by: SURGERY

## 2020-02-12 PROCEDURE — 99239 HOSP IP/OBS DSCHRG MGMT >30: CPT | Performed by: INTERNAL MEDICINE

## 2020-02-12 RX ORDER — CLOPIDOGREL BISULFATE 75 MG/1
75 TABLET ORAL DAILY
Qty: 30 TAB | Refills: 0 | Status: SHIPPED | OUTPATIENT
Start: 2020-02-13 | End: 2021-05-21

## 2020-02-12 RX ORDER — LANCETS 30 GAUGE
EACH MISCELLANEOUS
Qty: 100 EACH | Refills: 0 | Status: SHIPPED | OUTPATIENT
Start: 2020-02-12 | End: 2022-11-16

## 2020-02-12 RX ORDER — GABAPENTIN 400 MG/1
400 CAPSULE ORAL 3 TIMES DAILY
Qty: 90 CAP | Refills: 0 | Status: SHIPPED | OUTPATIENT
Start: 2020-02-12 | End: 2021-11-15

## 2020-02-12 RX ORDER — CLOPIDOGREL BISULFATE 75 MG/1
75 TABLET ORAL DAILY
Qty: 30 TAB | Refills: 1 | Status: SHIPPED | OUTPATIENT
Start: 2020-02-13 | End: 2020-02-12

## 2020-02-12 RX ORDER — SIMVASTATIN 10 MG
10 TABLET ORAL EVERY EVENING
Qty: 30 TAB | Refills: 0 | Status: SHIPPED | OUTPATIENT
Start: 2020-02-12 | End: 2021-05-21

## 2020-02-12 RX ORDER — INSULIN GLARGINE 100 [IU]/ML
25 INJECTION, SOLUTION SUBCUTANEOUS
Qty: 5 PEN | Refills: 1 | Status: SHIPPED | OUTPATIENT
Start: 2020-02-12 | End: 2021-05-21

## 2020-02-12 RX ORDER — OXYCODONE HYDROCHLORIDE 5 MG/1
5 TABLET ORAL EVERY 8 HOURS PRN
Qty: 15 TAB | Refills: 0 | Status: SHIPPED | OUTPATIENT
Start: 2020-02-12 | End: 2020-02-17

## 2020-02-12 RX ORDER — LINEZOLID 600 MG/1
600 TABLET, FILM COATED ORAL 2 TIMES DAILY
Qty: 28 TAB | Refills: 0 | Status: SHIPPED | OUTPATIENT
Start: 2020-02-12 | End: 2020-02-26

## 2020-02-12 RX ORDER — PEN NEEDLE, DIABETIC 30 GX5/16"
1 NEEDLE, DISPOSABLE MISCELLANEOUS DAILY
Qty: 100 EACH | Refills: 0 | Status: SHIPPED | OUTPATIENT
Start: 2020-02-12 | End: 2020-02-12

## 2020-02-12 RX ORDER — GLIPIZIDE 10 MG/1
10 TABLET ORAL 2 TIMES DAILY
Qty: 60 TAB | Refills: 0 | Status: SHIPPED | OUTPATIENT
Start: 2020-02-12 | End: 2021-05-21

## 2020-02-12 RX ORDER — GLUCOSAMINE HCL/CHONDROITIN SU 500-400 MG
CAPSULE ORAL
Qty: 100 EACH | Refills: 0 | Status: SHIPPED | OUTPATIENT
Start: 2020-02-12 | End: 2022-11-16

## 2020-02-12 RX ORDER — LINEZOLID 600 MG/1
600 TABLET, FILM COATED ORAL EVERY 12 HOURS
Status: DISCONTINUED | OUTPATIENT
Start: 2020-02-12 | End: 2020-02-12 | Stop reason: HOSPADM

## 2020-02-12 RX ADMIN — OXYCODONE HYDROCHLORIDE AND ACETAMINOPHEN 1000 MG: 500 TABLET ORAL at 05:42

## 2020-02-12 RX ADMIN — GABAPENTIN 400 MG: 400 CAPSULE ORAL at 11:31

## 2020-02-12 RX ADMIN — OXYCODONE HYDROCHLORIDE 5 MG: 5 TABLET ORAL at 03:22

## 2020-02-12 RX ADMIN — AMPICILLIN SODIUM AND SULBACTAM SODIUM 3 G: 2; 1 INJECTION, POWDER, FOR SOLUTION INTRAMUSCULAR; INTRAVENOUS at 07:58

## 2020-02-12 RX ADMIN — OXYCODONE HYDROCHLORIDE 5 MG: 5 TABLET ORAL at 08:04

## 2020-02-12 RX ADMIN — OXYCODONE HYDROCHLORIDE 10 MG: 10 TABLET, FILM COATED, EXTENDED RELEASE ORAL at 05:42

## 2020-02-12 RX ADMIN — CLOPIDOGREL BISULFATE 75 MG: 75 TABLET ORAL at 05:42

## 2020-02-12 RX ADMIN — GABAPENTIN 400 MG: 400 CAPSULE ORAL at 05:42

## 2020-02-12 RX ADMIN — LINEZOLID 600 MG: 600 TABLET, FILM COATED ORAL at 10:17

## 2020-02-12 RX ADMIN — SENNOSIDES AND DOCUSATE SODIUM 2 TABLET: 8.6; 5 TABLET ORAL at 05:42

## 2020-02-12 RX ADMIN — AMPICILLIN SODIUM AND SULBACTAM SODIUM 3 G: 2; 1 INJECTION, POWDER, FOR SOLUTION INTRAMUSCULAR; INTRAVENOUS at 02:07

## 2020-02-12 ASSESSMENT — ENCOUNTER SYMPTOMS
CHILLS: 0
FEVER: 0
SHORTNESS OF BREATH: 0
COUGH: 0

## 2020-02-12 NOTE — CARE PLAN
Problem: Infection  Goal: Will remain free from infection  Outcome: PROGRESSING AS EXPECTED  Pt is compliant with antibiotics.     Problem: Pain Management  Goal: Pain level will decrease to patient's comfort goal  Outcome: PROGRESSING AS EXPECTED

## 2020-02-12 NOTE — PROGRESS NOTES
Bedside report received from Dedra MAXWELL . Assumed care of pt at 0645 . Pt is awake at this time with no signs of distress. Plan of care discussed with pt. Pt is A&O x 4. Pt is on RA. Bed alarm is not in use, bed in lowest position, bed rails up x 2, belongings and call light within reach. Hourly rounding in place.

## 2020-02-12 NOTE — DISCHARGE PLANNING
Rec’d call from Chey with pharmacy services. Pt to be dc’d on Zyvox po 600mg BID x 14 days and this requires a prior auth. Call placed to [employer] ([phone]) and obtained prior auth. Approval number is [id number].       Update at 14:57: Rec’d call from Chey with pharmacy services at [facility] and she states the pharmacist at the [facility] pharmacy is unable to get the authorization for the Zyvox to go through. Re-called [employer]. Spoke to Yahir. She states I should not have been given the approval number because it is a 2 step auth process and it still needs to be approved by pharmacy. They are expediting it to pharmacy for review. They state the auth should come through within an hour (that was at 14:45). Called and notified Janet in the [facility] pharmacy of the above info. Also left a msg for Chey, outpt pharmacist.

## 2020-02-12 NOTE — PROGRESS NOTES
Patients girlfriend brought chicken wings, pizza, and sandwich for patient. Education regarding diet was provided to patient by this RN, but patient does not like the meals that the hospital provides and believes that good food helps him feel better. Patient has kenya sausages, kishor cheese doritos, and sweet tea at bedside.

## 2020-02-12 NOTE — DISCHARGE SUMMARY
Discharge Summary    CHIEF COMPLAINT ON ADMISSION  Chief Complaint   Patient presents with   • Open Wound     R foot x2 weeks   • Leg Pain     RLE edema, pedal pulse 2+       Reason for Admission  Foot Pain     Admission Date  2/3/2020    CODE STATUS  Full Code    HPI & HOSPITAL COURSE  This is a 45 y.o. male past medical history tobacco abuse, alcohol abuse, depression, diabetes presenting with right foot pain.  He did have a right lower extremity wound for about 2 weeks prior to arrival.  He did report subjective fevers and chills.  Please refer to H&P of Dr. Parra for further details. Upon arrival to the emergency room patient was found to be tachycardic and hypotensive.  X-ray of the right foot found first metatarsal neck erosion indicating osteomyelitis. LPS service was consulted. Pt was started on vancomycin (2/3-2/8), IV Unasyn (2/3-2/11). ID was also consulted.  He was taken for debridement of the right foot drainage, biopsy taken 2/6/2020 by Dr. Ibarra. Pathology confirmed chronic osteomyelitis.  Cultures from surgery positive for Enterococcus faecalis, vertical Streptococcus, Staphylococcus simulans and Bifidobacterium species.  Vascular study showed severe right leg bypass graft stenosis.  He did go under angiogram and angioplasty 02/11/2020 by Dr. Calderon. He was started on plavix that he needs to continue for one month then transition to aspirin 81 mg daily. He was transition to linezolid 600 mg BID 02/12 for additional two weeks. He will follow up with ID.   While in hospital diabetes was managed with lantus and ISSS. Lantus increased to 25 U at bedtime. Oral medications were restarted.   He will need to be on ACE inhibitor but I will refer to primary care.     Therefore, he is discharged in good and stable condition to home with close outpatient follow-up.    The patient met 2-midnight criteria for an inpatient stay at the time of discharge.    Discharge Date  02/12/2020     FOLLOW UP ITEMS POST  DISCHARGE  None     DISCHARGE DIAGNOSES  Principal Problem:    Diabetic foot ulcer (HCC) POA: Unknown  Active Problems:    Diabetes (HCC) POA: Yes    Tobacco abuse POA: Unknown    Osteomyelitis (HCC) POA: Unknown    Alcohol abuse POA: Unknown    Peripheral artery disease (HCC) POA: Unknown  Resolved Problems:    Influenza POA: Unknown      FOLLOW UP  No future appointments.  Vicki Luu M.D.  [address] [id number]  [place] [address]  [phone]    In 3 weeks        MEDICATIONS ON DISCHARGE     Medication List      START taking these medications      Instructions   clopidogrel 75 MG Tabs  Start taking on:  February 13, 2020  Commonly known as:  PLAVIX   Take 1 Tab by mouth every day.  Dose:  75 mg     linezolid 600 MG Tabs  Commonly known as:  ZYVOX   Take 1 Tab by mouth 2 times a day for 14 days.  Dose:  600 mg     oxyCODONE immediate-release 5 MG Tabs  Commonly known as:  ROXICODONE   Take 1 Tab by mouth every 8 hours as needed for up to 5 days.  Dose:  5 mg        CHANGE how you take these medications      Instructions   insulin glargine 100 UNIT/ML Sopn injection  What changed:  how much to take  Generic drug:  insulin glargine   Inject 25 Units as instructed every bedtime.  Dose:  25 Units        CONTINUE taking these medications      Instructions   gabapentin 400 MG Caps  Commonly known as:  NEURONTIN   Take 400 mg by mouth 3 times a day.  Dose:  400 mg     glipiZIDE 10 MG Tabs  Commonly known as:  GLUCOTROL   Take 10 mg by mouth 2 times a day.  Dose:  10 mg     metformin 1000 MG tablet  Commonly known as:  GLUCOPHAGE   Take 1,000 mg by mouth 2 times a day.  Dose:  1,000 mg     simvastatin 10 MG Tabs  Commonly known as:  ZOCOR   Take 10 mg by mouth every evening.  Dose:  10 mg            Allergies  Allergies   Allergen Reactions   • Apple Hives   • Lactose Diarrhea       DIET  Orders Placed This Encounter   Procedures   • Diet Order Diabetic     Standing Status:   Standing     Number of Occurrences:    1     Order Specific Question:   Diet:     Answer:   Diabetic [3]       ACTIVITY  As tolerated.  Weight bearing as tolerated    CONSULTATIONS  Vascular surgeon   ID  Orthopedic    LPS team     PROCEDURES  Debridement   Angiogram/angioplasty     LABORATORY  Lab Results   Component Value Date    SODIUM 136 02/12/2020    POTASSIUM 4.0 02/12/2020    CHLORIDE 106 02/12/2020    CO2 21 02/12/2020    GLUCOSE 227 (H) 02/12/2020    BUN 16 02/12/2020    CREATININE 0.90 02/12/2020    CREATININE 1.2 01/13/2008        Lab Results   Component Value Date    WBC 12.7 (H) 02/12/2020    HEMOGLOBIN 14.9 02/12/2020    HEMATOCRIT 43.7 02/12/2020    PLATELETCT 349 02/12/2020        Total time of the discharge process exceeds 35 minutes.

## 2020-02-12 NOTE — PROGRESS NOTES
Diabetes education: Pt to be discharged today. Some medications including insulin ordered but not all medications nor strips and lancets for his meter. CDE spoke with Jarrett RN will ask pt if something changed and he does not need those medications or strips ( not was was told to this CDE yesterday). If pt needs the other oral medications and strips/lancets for his meter MD will need to order them before pt is discharged. Please see CDE note from 2/11.    Spoke with Chey pharmacist, meds to beds. She will follow up with Sofia to get prescriptions needed.
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
## 2020-02-12 NOTE — PROGRESS NOTES
Assumed care of patient at 1900. Received bedside report from day RN. Patient resting comfortably in bed. No signs of distress. Bed is in low and locked position. Call light is within reach and patient has been calling appropriately.

## 2020-02-12 NOTE — PROGRESS NOTES
Spoke to this patient. He stated he had by mouth medications at home. He will require lancets and strips

## 2020-02-13 NOTE — DISCHARGE PLANNING
Medicaid Meds-to-Beds: Discharge prescription orders listed below delivered to patient's bedside. RN notified. Patient counseled.    Zyvox obtained through approved services.        Rogelio Escudero   Home Medication Instructions KEARA:[id number]    Printed on:02/12/20 5651   Medication Information                      Alcohol Swabs  Wipe site with prep pad prior to injection.             Blood Glucose Test Strips  Use one True Metrix strip to test blood sugar three times daily             clopidogrel (PLAVIX) 75 MG Tab  Take 1 Tab by mouth every day.             gabapentin (NEURONTIN) 400 MG Cap  Take 1 Cap by mouth 3 times a day.             glipiZIDE (GLUCOTROL) 10 MG Tab  Take 1 Tab by mouth 2 times a day.             insulin glargine (INSULIN GLARGINE) 100 UNIT/ML Solution Pen-injector injection  Inject 25 Units as instructed every bedtime.             Insulin Pen Needle 32 G x 4 mm  Use one pen needle in pen device to inject insulin daily as directed             Lancets  Use one True metrix lancet to test blood sugar three times daily.             linezolid (ZYVOX) 600 MG Tab  Take 1 Tab by mouth 2 times a day for 14 days.             metformin (GLUCOPHAGE) 1000 MG tablet  Take 1 Tab by mouth 2 times a day.             simvastatin (ZOCOR) 10 MG Tab  Take 1 Tab by mouth every evening.                 Chey Kumar, PharmD

## 2020-02-13 NOTE — DISCHARGE INSTRUCTIONS
Discharge Instructions    Discharged to home by car with friend. Discharged via wheelchair, hospital escort: Yes.  Special equipment needed: Crutches    Be sure to schedule a follow-up appointment with your primary care doctor or any specialists as instructed.     Discharge Plan:   Smoking Cessation Offered: Patient Refused  Influenza Vaccine Indication: Indicated: 9 to 64 years of age  Influenza Vaccine Given - only chart on this line when given: Influenza Vaccine Given (See MAR)    I understand that a diet low in cholesterol, fat, and sodium is recommended for good health. Unless I have been given specific instructions below for another diet, I accept this instruction as my diet prescription.   Other diet: Low carb.     Special Instructions: None    · Is patient discharged on Warfarin / Coumadin?   No     Depression / Suicide Risk    As you are discharged from this [facility] facility, it is important to learn how to keep safe from harming yourself.    Recognize the warning signs:  · Abrupt changes in personality, positive or negative- including increase in energy   · Giving away possessions  · Change in eating patterns- significant weight changes-  positive or negative  · Change in sleeping patterns- unable to sleep or sleeping all the time   · Unwillingness or inability to communicate  · Depression  · Unusual sadness, discouragement and loneliness  · Talk of wanting to die  · Neglect of personal appearance   · Rebelliousness- reckless behavior  · Withdrawal from people/activities they love  · Confusion- inability to concentrate     If you or a loved one observes any of these behaviors or has concerns about self-harm, here's what you can do:  · Talk about it- your feelings and reasons for harming yourself  · Remove any means that you might use to hurt yourself (examples: pills, rope, extension cords, firearm)  · Get professional help from the community (Mental Health, Substance Abuse, psychological  counseling)  · Do not be alone:Call your Safe Contact- someone whom you trust who will be there for you.  · Call your local CRISIS HOTLINE 904-9543 or 653-281-8941  · Call your local Children's Mobile Crisis Response Team [place] (471) 684-9449 or www.Tweetworks  · Call the toll free National Suicide Prevention Hotlines   · National Suicide Prevention Lifeline 030-983-MWCR (3810)  · National Hope Line Network 800-SUICIDE (858-8552)      Discharge Instructions per Qing Calzada M.D.  Follow up with primary care within 2 weeks   Follow up with infectious disease in 2 weeks   Continue plavix for one month     DIET: healthy, low carb     ACTIVITY: as tolerated     DIAGNOSIS: osteomyelitis     Return to ER if worsen pain. Fever, confusion, focal weakness, persistent diarrhea, severe abdominal pain     Discharge Instructions    Discharged to home by car with relative. Discharged via wheelchair, hospital escort: Yes.  Special equipment needed: Not Applicable    Be sure to schedule a follow-up appointment with your primary care doctor or any specialists as instructed.     Discharge Plan:   Smoking Cessation Offered: Patient Refused  Influenza Vaccine Indication: Indicated: 9 to 64 years of age  Influenza Vaccine Given - only chart on this line when given: Influenza Vaccine Given (See MAR)    I understand that a diet low in cholesterol, fat, and sodium is recommended for good health. Unless I have been given specific instructions below for another diet, I accept this instruction as my diet prescription.   Other diet: Diabetic Diet    Special Instructions: None    · Is patient discharged on Warfarin / Coumadin?   No      Angiogram, Care After  Refer to this sheet in the next few weeks. These instructions provide you with information about caring for yourself after your procedure. Your health care provider may also give you more specific instructions. Your treatment has been planned according to current medical  practices, but problems sometimes occur. Call your health care provider if you have any problems or questions after your procedure.  WHAT TO EXPECT AFTER THE PROCEDURE  After your procedure, it is typical to have the following:  · Bruising at the catheter insertion site that usually fades within 1-2 weeks.  · Blood collecting in the tissue (hematoma) that may be painful to the touch. It should usually decrease in size and tenderness within 1-2 weeks.  HOME CARE INSTRUCTIONS  · Take medicines only as directed by your health care provider.  · You may shower 24-48 hours after the procedure or as directed by your health care provider. Remove the bandage (dressing) and gently wash the site with plain soap and water. Pat the area dry with a clean towel. Do not rub the site, because this may cause bleeding.  · Do not take baths, swim, or use a hot tub until your health care provider approves.  · Check your insertion site every day for redness, swelling, or drainage.  · Do not apply powder or lotion to the site.  · Do not lift over 10 lb (4.5 kg) for 5 days after your procedure or as directed by your health care provider.  · Ask your health care provider when it is okay to:  ¨ Return to work or school.  ¨ Resume usual physical activities or sports.  ¨ Resume sexual activity.  · Do not drive home if you are discharged the same day as the procedure. Have someone else drive you.  · You may drive 24 hours after the procedure unless otherwise instructed by your health care provider.  · Do not operate machinery or power tools for 24 hours after the procedure or as directed by your health care provider.  · If your procedure was done as an outpatient procedure, which means that you went home the same day as your procedure, a responsible adult should be with you for the first 24 hours after you arrive home.  · Keep all follow-up visits as directed by your health care provider. This is important.  SEEK MEDICAL CARE IF:  · You have a  fever.  · You have chills.  · You have increased bleeding from the catheter insertion site. Hold pressure on the site.  SEEK IMMEDIATE MEDICAL CARE IF:  · You have unusual pain at the catheter insertion site.  · You have redness, warmth, or swelling at the catheter insertion site.  · You have drainage (other than a small amount of blood on the dressing) from the catheter insertion site.  · The catheter insertion site is bleeding, and the bleeding does not stop after 30 minutes of holding steady pressure on the site.  · The area near or just beyond the catheter insertion site becomes pale, cool, tingly, or numb.     This information is not intended to replace advice given to you by your health care provider. Make sure you discuss any questions you have with your health care provider.     Document Released: 07/06/2006 Document Revised: 01/08/2016 Document Reviewed: 07/06/2015  [employer] Interactive Patient Education ©2016 [employer]    Depression / Suicide Risk    As you are discharged from this [facility] facility, it is important to learn how to keep safe from harming yourself.    Recognize the warning signs:  · Abrupt changes in personality, positive or negative- including increase in energy   · Giving away possessions  · Change in eating patterns- significant weight changes-  positive or negative  · Change in sleeping patterns- unable to sleep or sleeping all the time   · Unwillingness or inability to communicate  · Depression  · Unusual sadness, discouragement and loneliness  · Talk of wanting to die  · Neglect of personal appearance   · Rebelliousness- reckless behavior  · Withdrawal from people/activities they love  · Confusion- inability to concentrate     If you or a loved one observes any of these behaviors or has concerns about self-harm, here's what you can do:  · Talk about it- your feelings and reasons for harming yourself  · Remove any means that you might use to hurt yourself (examples: pills,  rope, extension cords, firearm)  · Get professional help from the community (Mental Health, Substance Abuse, psychological counseling)  · Do not be alone:Call your Safe Contact- someone whom you trust who will be there for you.  · Call your local CRISIS HOTLINE 905-7844 or 020-633-4274  · Call your local Children's Mobile Crisis Response Team [place] (021) 943-8339 or www.[employer] Magpie  · Call the toll free National Suicide Prevention Hotlines   · National Suicide Prevention Lifeline 538-435-PBQI (9152)  · National Hope Line Network 800-SUICIDE (418-6299)

## 2020-02-13 NOTE — PROGRESS NOTES
Discharged to home by car with friend. Discharged via wheelchair, hospital escort: Yes.  Special equipment needed: Crutches    Be sure to schedule a follow-up appointment with your primary care doctor or any specialists as instructed.     Discharge Plan:   Smoking Cessation Offered: Patient Refused  Influenza Vaccine Indication: Indicated: 9 to 64 years of age  Influenza Vaccine Given - only chart on this line when given: Influenza Vaccine Given (See MAR)    I understand that a diet low in cholesterol, fat, and sodium is recommended for good health. Unless I have been given specific instructions below for another diet, I accept this instruction as my diet prescription.   Other diet: Low carb.     Special Instructions: None    · Is patient discharged on Warfarin / Coumadin?   No   Medications provide via meds to bed. He received education on his medications by the pharmacist. Diabetic teaching done. He received one week of wound care supplies.

## 2020-02-13 NOTE — PROGRESS NOTES
Ambulatory in the [place] today.  He is in good spirits and says he has no pain to the foot.    Cam boot in place able to ambulate with the use of 1 crutch.    Plan:  Wound care  ABX per ID  Follow-up and 1 to 2 weeks for wound check and suture removal  Weightbearing as tolerated with Cam boot

## 2020-02-14 ENCOUNTER — PATIENT OUTREACH (OUTPATIENT)
Dept: HEALTH INFORMATION MANAGEMENT | Facility: OTHER | Age: 45
End: 2020-02-14

## 2020-02-26 ENCOUNTER — HOSPITAL ENCOUNTER (OUTPATIENT)
Facility: MEDICAL CENTER | Age: 45
End: 2020-02-26
Attending: INTERNAL MEDICINE
Payer: MEDICAID

## 2020-02-26 ENCOUNTER — OFFICE VISIT (OUTPATIENT)
Dept: INFECTIOUS DISEASES | Facility: MEDICAL CENTER | Age: 45
End: 2020-02-26
Payer: MEDICAID

## 2020-02-26 VITALS
WEIGHT: 188 LBS | HEART RATE: 107 BPM | TEMPERATURE: 97.9 F | OXYGEN SATURATION: 96 % | DIASTOLIC BLOOD PRESSURE: 80 MMHG | SYSTOLIC BLOOD PRESSURE: 128 MMHG | HEIGHT: 69 IN | BODY MASS INDEX: 27.85 KG/M2

## 2020-02-26 DIAGNOSIS — B35.1 ONYCHOMYCOSIS OF TOENAIL: ICD-10-CM

## 2020-02-26 DIAGNOSIS — L97.509 DIABETIC FOOT ULCER ASSOCIATED WITH TYPE 2 DIABETES MELLITUS, UNSPECIFIED LATERALITY, UNSPECIFIED PART OF FOOT, UNSPECIFIED ULCER STAGE (HCC): ICD-10-CM

## 2020-02-26 DIAGNOSIS — Z72.0 TOBACCO ABUSE: ICD-10-CM

## 2020-02-26 DIAGNOSIS — R19.7 DIARRHEA, UNSPECIFIED TYPE: ICD-10-CM

## 2020-02-26 DIAGNOSIS — E11.621 DIABETIC FOOT ULCER ASSOCIATED WITH TYPE 2 DIABETES MELLITUS, UNSPECIFIED LATERALITY, UNSPECIFIED PART OF FOOT, UNSPECIFIED ULCER STAGE (HCC): ICD-10-CM

## 2020-02-26 DIAGNOSIS — F10.10 ALCOHOL ABUSE: ICD-10-CM

## 2020-02-26 PROCEDURE — 87493 C DIFF AMPLIFIED PROBE: CPT

## 2020-02-26 PROCEDURE — 99213 OFFICE O/P EST LOW 20 MIN: CPT | Performed by: INTERNAL MEDICINE

## 2020-02-26 RX ORDER — AMOXICILLIN AND CLAVULANATE POTASSIUM 875; 125 MG/1; MG/1
1 TABLET, FILM COATED ORAL 2 TIMES DAILY
Qty: 60 TAB | Refills: 0 | Status: SHIPPED | OUTPATIENT
Start: 2020-02-26 | End: 2020-03-27

## 2020-02-26 NOTE — PROGRESS NOTES
Infectious Disease Clinic    Subjective:     Chief Complaint   Patient presents with   • Hospital Follow-up     Diabetic Foot Ulcer/ OM     45-year-old diabetic male with past medical history of alcohol abuse, MDD admitted on 2/3/2020 with a 2-week history of right foot pain.  He was diagnosed with Tamiflu during his admission and completed Tamiflu course.  He underwent bone BX states+OM but description showed bland findings and no inflammatory cells described- Dr. Luu discussed this with pathologist- per  Dr. Moreau, this is chronic osteomyelitis or fracture but not acute osteomyelitis.  Foot Xray with erosion of bone underlying ulcer c/w OM/reactive changes. MRI neg for OM. S/p surgical I&D on 2/7-bone hard, not soft. Tissue Cxs +polymicrobial.  He was initially given Unasyn and then discharged on Zyvox x2-weeks.     Hospital records reviewed    Today, 2/26/2020: Patient reports that he has been taking the Zyvox as prescribed.  He reports some ongoing stomach upset and diffuse watery diarrhea multiple times a day.  Denies feeling generally ill, fevers/chills, general malaise, headache, abdominal pain, chest pain or shortness of breath.  The right foot continues to have some edema and tenderness near the great toe.  The wound has healed and no ongoing drainage.     ROS  As documented above in my HPI.    Past Medical History:   Diagnosis Date   • Alcohol abuse    • Depression 2010    ever since 2010   • Diabetes    • Pneumonia approx 2011       Social History     Tobacco Use   • Smoking status: Current Every Day Smoker     Packs/day: 0.25     Years: 20.00     Pack years: 5.00     Types: Cigarettes   • Smokeless tobacco: Never Used   • Tobacco comment: 6   Substance Use Topics   • Alcohol use: No     Comment: quit 8 months ago   • Drug use: No       Allergies: Apple and Lactose    Pt's medication and problem list reviewed.     Objective:     PE:  /80 (BP Location: Right arm, Patient Position: Sitting, BP  "Cuff Size: Adult)   Pulse (!) 107   Temp 36.6 °C (97.9 °F) (Temporal)   Ht 1.753 m (5' 9\")   Wt 85.3 kg (188 lb)   SpO2 96%   BMI 27.76 kg/m²     Vital signs reviewed    Constitutional: Appears well-developed and well-nourished. No acute distress.  Speech fluent.    Eyes: Conjunctivae normal and EOM are normal. Pupils are equal, round, and reactive to light.   ENMT: Lips without lesions, good dentition.  Oropharynx is clear and moist.  Neck: Trachea midline. Normal range of motion. Neck supple. No masses.  No JVD.  Cardiovascular: Normal rate, regular rhythm, normal heart sounds and intact distal pulses. No murmur, gallop, or friction rub. No edema.  Respiratory: No respiratory distress, unlabored respiratory effort.  Lungs clear to auscultation bilaterally. No wheezes or rales.   Abdomen: Soft, non tender, non-distended. BS + x 4. No masses or hepatosplenomegaly.   Musculoskeletal: Right foot with edema, tenderness to palpation surrounding the great toe, deformity, no warmth, no drainage.  Skin: Warm and dry. Good turgor. No visible rashes or lesions.  Neurological: No cranial nerve deficit. Coordination normal.  Sensation intact.  Psychiatric: Alert and oriented to person, place, and time. Normal mood, calm affect.  Normal behavior and judgment.     Labs:  WBC   Date/Time Value Ref Range Status   02/12/2020 02:53 AM 12.7 (H) 4.8 - 10.8 K/uL Final     RBC   Date/Time Value Ref Range Status   02/12/2020 02:53 AM 4.55 (L) 4.70 - 6.10 M/uL Final     Hemoglobin   Date/Time Value Ref Range Status   02/12/2020 02:53 AM 14.9 14.0 - 18.0 g/dL Final     Hematocrit   Date/Time Value Ref Range Status   02/12/2020 02:53 AM 43.7 42.0 - 52.0 % Final     MCV   Date/Time Value Ref Range Status   02/12/2020 02:53 AM 96.0 81.4 - 97.8 fL Final     MCH   Date/Time Value Ref Range Status   02/12/2020 02:53 AM 32.7 27.0 - 33.0 pg Final     MCHC   Date/Time Value Ref Range Status   02/12/2020 02:53 AM 34.1 33.7 - 35.3 g/dL Final "     MPV   Date/Time Value Ref Range Status   02/12/2020 02:53 AM 9.3 9.0 - 12.9 fL Final        Sodium   Date/Time Value Ref Range Status   02/12/2020 02:53  135 - 145 mmol/L Final     Potassium   Date/Time Value Ref Range Status   02/12/2020 02:53 AM 4.0 3.6 - 5.5 mmol/L Final     Chloride   Date/Time Value Ref Range Status   02/12/2020 02:53  96 - 112 mmol/L Final     Co2   Date/Time Value Ref Range Status   02/12/2020 02:53 AM 21 20 - 33 mmol/L Final     Glucose   Date/Time Value Ref Range Status   02/12/2020 02:53  (H) 65 - 99 mg/dL Final     Bun   Date/Time Value Ref Range Status   02/12/2020 02:53 AM 16 8 - 22 mg/dL Final     Creatinine   Date/Time Value Ref Range Status   02/12/2020 02:53 AM 0.90 0.50 - 1.40 mg/dL Final   01/13/2008 09:40 PM 1.2 0.5 - 1.4 mg/dL Final       Alkaline Phosphatase   Date/Time Value Ref Range Status   02/12/2020 02:53 AM 79 30 - 99 U/L Final     AST(SGOT)   Date/Time Value Ref Range Status   02/12/2020 02:53 AM 18 12 - 45 U/L Final     ALT(SGPT)   Date/Time Value Ref Range Status   02/12/2020 02:53 AM 26 2 - 50 U/L Final     Total Bilirubin   Date/Time Value Ref Range Status   02/12/2020 02:53 AM 0.2 0.1 - 1.5 mg/dL Final        No results found for: CPKTOTAL     Assessment and Plan:   The following treatment plan was discussed with patient at length:    1. Diabetic foot ulcer associated with type 2 diabetes mellitus, unspecified laterality, unspecified part of foot, unspecified ulcer stage (HCC)     2. Diarrhea, unspecified type  C Diff by PCR rflx Toxin   3. Alcohol abuse     4. Tobacco abuse     5. Onychomycosis of toenail       --- We will stop Zyvox as not recommended for longer than 2 weeks and he is having stomach upset.  As the foot still has edema and tenderness and there is questionable osteomyelitis as based on HPI notes above will start Augmentin 875/125.  This will treat the enterococcus and strep viridans noted on cultures.  We will not specifically  target the staph simulans as this is likely a contaminant.  However, if the foot worsens may need to reconsider.    --- If the foot does not improve or worsens on this regimen will need to consider IV antibiotics for possible osteomyelitis  --- C. difficile testing due to ongoing watery diarrhea  ---Discussed dosing and side effects of medications being prescribed.  Pt instructed to call clinic with any adverse effects or to discontinue the medication and go to the ER should difficulty breathing, SOB, CP, facial swelling and/or gross rash/itching occurs.   --- Referral to podiatry - placed  --- Referral to LPS if does not improve     Follow up: 2 weeks, RTC sooner if needed. FU with PCP for ongoing chronic medical conditions.     Jennifer Hugo M.D.       Please note that this dictation was created using voice recognition software. I have  worked with technical experts from [facility]  Virtual Call Center to optimize the interface.  I have made every reasonable attempt to correct obvious errors, but there may be errors of grammar and possibly content that I did not discover before finalizing the note.

## 2020-02-27 ENCOUNTER — TELEPHONE (OUTPATIENT)
Dept: INFECTIOUS DISEASES | Facility: MEDICAL CENTER | Age: 45
End: 2020-02-27

## 2020-02-27 ENCOUNTER — PATIENT OUTREACH (OUTPATIENT)
Dept: HEALTH INFORMATION MANAGEMENT | Facility: OTHER | Age: 45
End: 2020-02-27

## 2020-02-27 LAB
C DIFF DNA SPEC QL NAA+PROBE: NEGATIVE
C DIFF TOX GENS STL QL NAA+PROBE: NEGATIVE

## 2020-02-27 NOTE — PROGRESS NOTES
Pt. Inbound TC to [facility] 2/27/20.  CHANDA Sin scheduled a home visit with pt. For Tuesday 2/3/30 at 10:30am. Pt. Is living in a RV with girlfriend and will call [facility] for RV location.  Pt. Provided girlfriend's, Marleen, contact information: [phone].  Pt. States that he is finding it difficult to move due to his leg, is having trouble paying for heat, and would like assistance finding housing.    Plan:  CHW will complete follow-up questions, provide pt. With Food-is-medicine prescription, and invite ARUN Levi to home visit for housing and financial insecurity concerns.

## 2020-02-27 NOTE — TELEPHONE ENCOUNTER
Pt called back and all results for the C-Diff test were given. Pt verbally understood all information given. JUSTICE

## 2020-02-27 NOTE — TELEPHONE ENCOUNTER
Spoke with Dr. Hugo. Pt's C-diff testing was negative. Called and LM for pt to return my call to discuss the results. JUSTICE

## 2020-03-11 ENCOUNTER — DOCUMENTATION (OUTPATIENT)
Dept: INFECTIOUS DISEASES | Facility: MEDICAL CENTER | Age: 45
End: 2020-03-11

## 2020-03-11 ENCOUNTER — TELEPHONE (OUTPATIENT)
Dept: INFECTIOUS DISEASES | Facility: MEDICAL CENTER | Age: 45
End: 2020-03-11

## 2020-03-11 NOTE — TELEPHONE ENCOUNTER
Called and LM for pt to return my call to reschedule a missed appointment with Infectious Disease.  -AMP

## 2021-01-03 ENCOUNTER — HOSPITAL ENCOUNTER (EMERGENCY)
Dept: HOSPITAL 8 - ED | Age: 46
LOS: 1 days | Discharge: HOME | End: 2021-01-04
Payer: MEDICAID

## 2021-01-03 VITALS — BODY MASS INDEX: 30.33 KG/M2 | WEIGHT: 204.81 LBS | HEIGHT: 69 IN

## 2021-01-03 DIAGNOSIS — E11.9: ICD-10-CM

## 2021-01-03 DIAGNOSIS — R10.84: Primary | ICD-10-CM

## 2021-01-03 DIAGNOSIS — R11.2: ICD-10-CM

## 2021-01-03 DIAGNOSIS — F17.210: ICD-10-CM

## 2021-01-03 DIAGNOSIS — R19.7: ICD-10-CM

## 2021-01-03 LAB
ALBUMIN SERPL-MCNC: 4.2 G/DL (ref 3.4–5)
ALP SERPL-CCNC: 87 U/L (ref 45–117)
ALT SERPL-CCNC: 41 U/L (ref 12–78)
ANION GAP SERPL CALC-SCNC: 6 MMOL/L (ref 5–15)
BASOPHILS # BLD AUTO: 0.1 X10^3/UL (ref 0–0.1)
BASOPHILS NFR BLD AUTO: 1 % (ref 0–1)
BILIRUB SERPL-MCNC: 0.3 MG/DL (ref 0.2–1)
CALCIUM SERPL-MCNC: 9.3 MG/DL (ref 8.5–10.1)
CHLORIDE SERPL-SCNC: 111 MMOL/L (ref 98–107)
CREAT SERPL-MCNC: 0.98 MG/DL (ref 0.7–1.3)
EOSINOPHIL # BLD AUTO: 0.5 X10^3/UL (ref 0–0.4)
EOSINOPHIL NFR BLD AUTO: 4 % (ref 1–7)
ERYTHROCYTE [DISTWIDTH] IN BLOOD BY AUTOMATED COUNT: 14.2 % (ref 9.4–14.8)
LYMPHOCYTES # BLD AUTO: 4.3 X10^3/UL (ref 1–3.4)
LYMPHOCYTES NFR BLD AUTO: 36 % (ref 22–44)
MCH RBC QN AUTO: 30.7 PG (ref 27.5–34.5)
MCHC RBC AUTO-ENTMCNC: 34.5 G/DL (ref 33.2–36.2)
MD: NO
MONOCYTES # BLD AUTO: 0.9 X10^3/UL (ref 0.2–0.8)
MONOCYTES NFR BLD AUTO: 8 % (ref 2–9)
NEUTROPHILS # BLD AUTO: 6.3 X10^3/UL (ref 1.8–6.8)
NEUTROPHILS NFR BLD AUTO: 52 % (ref 42–75)
PLATELET # BLD AUTO: 307 X10^3/UL (ref 130–400)
PMV BLD AUTO: 8.3 FL (ref 7.4–10.4)
PROT SERPL-MCNC: 8.1 G/DL (ref 6.4–8.2)
RBC # BLD AUTO: 5.31 X10^6/UL (ref 4.38–5.82)

## 2021-01-03 PROCEDURE — 83690 ASSAY OF LIPASE: CPT

## 2021-01-03 PROCEDURE — 36415 COLL VENOUS BLD VENIPUNCTURE: CPT

## 2021-01-03 PROCEDURE — 96375 TX/PRO/DX INJ NEW DRUG ADDON: CPT

## 2021-01-03 PROCEDURE — 80053 COMPREHEN METABOLIC PANEL: CPT

## 2021-01-03 PROCEDURE — 85025 COMPLETE CBC W/AUTO DIFF WBC: CPT

## 2021-01-03 PROCEDURE — 99285 EMERGENCY DEPT VISIT HI MDM: CPT

## 2021-01-03 PROCEDURE — 99406 BEHAV CHNG SMOKING 3-10 MIN: CPT

## 2021-01-03 PROCEDURE — 96374 THER/PROPH/DIAG INJ IV PUSH: CPT

## 2021-01-03 PROCEDURE — 74177 CT ABD & PELVIS W/CONTRAST: CPT

## 2021-01-03 PROCEDURE — 96376 TX/PRO/DX INJ SAME DRUG ADON: CPT

## 2021-01-03 RX ADMIN — MORPHINE SULFATE PRN MG: 4 INJECTION INTRAVENOUS at 22:14

## 2021-01-04 VITALS — DIASTOLIC BLOOD PRESSURE: 71 MMHG | SYSTOLIC BLOOD PRESSURE: 108 MMHG

## 2021-01-04 RX ADMIN — MORPHINE SULFATE PRN MG: 4 INJECTION INTRAVENOUS at 00:06

## 2021-01-04 NOTE — NUR
PT LAYING IN BED WITH FRIEND AT PT SIDE, PT REQUESTED A ADDITIONAL DOSE OF PAIN 
MED. PT ON MONITOR WITH VSS. PT MEDICATED PER MAR. PROVIDER NOTIFIED

## 2021-01-08 ENCOUNTER — HOSPITAL ENCOUNTER (OUTPATIENT)
Dept: HOSPITAL 8 - CFH | Age: 46
Discharge: HOME | End: 2021-01-08
Attending: NURSE PRACTITIONER
Payer: MEDICAID

## 2021-01-08 DIAGNOSIS — K21.9: Primary | ICD-10-CM

## 2021-01-08 DIAGNOSIS — R19.7: ICD-10-CM

## 2021-01-08 DIAGNOSIS — R10.9: ICD-10-CM

## 2021-01-08 DIAGNOSIS — R11.0: ICD-10-CM

## 2021-01-08 PROCEDURE — 74018 RADEX ABDOMEN 1 VIEW: CPT

## 2021-01-19 ENCOUNTER — HOSPITAL ENCOUNTER (OUTPATIENT)
Dept: HOSPITAL 8 - RAD | Age: 46
Discharge: HOME | End: 2021-01-19
Attending: NURSE PRACTITIONER
Payer: MEDICAID

## 2021-01-19 DIAGNOSIS — R10.9: ICD-10-CM

## 2021-01-19 DIAGNOSIS — R19.7: Primary | ICD-10-CM

## 2021-01-19 DIAGNOSIS — R11.0: ICD-10-CM

## 2021-01-19 DIAGNOSIS — K21.9: ICD-10-CM

## 2021-01-19 PROCEDURE — 74018 RADEX ABDOMEN 1 VIEW: CPT

## 2021-01-19 PROCEDURE — 76700 US EXAM ABDOM COMPLETE: CPT

## 2021-02-23 ENCOUNTER — HOSPITAL ENCOUNTER (EMERGENCY)
Dept: HOSPITAL 8 - ED | Age: 46
Discharge: HOME | End: 2021-02-23
Payer: MEDICAID

## 2021-02-23 VITALS — BODY MASS INDEX: 30.24 KG/M2 | WEIGHT: 204.15 LBS | HEIGHT: 69 IN

## 2021-02-23 VITALS — SYSTOLIC BLOOD PRESSURE: 92 MMHG | DIASTOLIC BLOOD PRESSURE: 64 MMHG
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
## 2021-02-23 DIAGNOSIS — K29.01: ICD-10-CM
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
## 2021-02-23 DIAGNOSIS — F17.210: ICD-10-CM

## 2021-02-23 DIAGNOSIS — S39.012A: Primary | ICD-10-CM

## 2021-02-23 DIAGNOSIS — E11.9: ICD-10-CM

## 2021-02-23 DIAGNOSIS — K92.0: ICD-10-CM
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
## 2021-02-23 LAB
ALBUMIN SERPL-MCNC: 3.8 G/DL (ref 3.4–5)
ALP SERPL-CCNC: 70 U/L (ref 45–117)
ALT SERPL-CCNC: 36 U/L (ref 12–78)
ANION GAP SERPL CALC-SCNC: 7 MMOL/L (ref 5–15)
BASOPHILS # BLD AUTO: 0.1 X10^3/UL (ref 0–0.1)
BASOPHILS NFR BLD AUTO: 1 % (ref 0–1)
BILIRUB SERPL-MCNC: 0.3 MG/DL (ref 0.2–1)
CALCIUM SERPL-MCNC: 8.9 MG/DL (ref 8.5–10.1)
CHLORIDE SERPL-SCNC: 105 MMOL/L (ref 98–107)
CREAT SERPL-MCNC: 1 MG/DL (ref 0.7–1.3)
EOSINOPHIL # BLD AUTO: 0.3 X10^3/UL (ref 0–0.4)
EOSINOPHIL NFR BLD AUTO: 3 % (ref 1–7)
ERYTHROCYTE [DISTWIDTH] IN BLOOD BY AUTOMATED COUNT: 14.1 % (ref 9.4–14.8)
LYMPHOCYTES # BLD AUTO: 3.2 X10^3/UL (ref 1–3.4)
LYMPHOCYTES NFR BLD AUTO: 30 % (ref 22–44)
MCH RBC QN AUTO: 30.9 PG (ref 27.5–34.5)
MCHC RBC AUTO-ENTMCNC: 34.8 G/DL (ref 33.2–36.2)
MD: NO
MONOCYTES # BLD AUTO: 1 X10^3/UL (ref 0.2–0.8)
MONOCYTES NFR BLD AUTO: 9 % (ref 2–9)
NEUTROPHILS # BLD AUTO: 5.9 X10^3/UL (ref 1.8–6.8)
NEUTROPHILS NFR BLD AUTO: 57 % (ref 42–75)
PLATELET # BLD AUTO: 311 X10^3/UL (ref 130–400)
PMV BLD AUTO: 8.1 FL (ref 7.4–10.4)
PROT SERPL-MCNC: 7.4 G/DL (ref 6.4–8.2)
RBC # BLD AUTO: 4.57 X10^6/UL (ref 4.38–5.82)

## 2021-02-23 PROCEDURE — 36415 COLL VENOUS BLD VENIPUNCTURE: CPT

## 2021-02-23 PROCEDURE — 99406 BEHAV CHNG SMOKING 3-10 MIN: CPT

## 2021-02-23 PROCEDURE — 80053 COMPREHEN METABOLIC PANEL: CPT

## 2021-02-23 PROCEDURE — 99284 EMERGENCY DEPT VISIT MOD MDM: CPT

## 2021-02-23 PROCEDURE — 83690 ASSAY OF LIPASE: CPT

## 2021-02-23 PROCEDURE — 85025 COMPLETE CBC W/AUTO DIFF WBC: CPT

## 2021-03-03 ENCOUNTER — APPOINTMENT (OUTPATIENT)
Dept: RADIOLOGY | Facility: MEDICAL CENTER | Age: 46
End: 2021-03-03
Attending: SURGERY
Payer: MEDICAID

## 2021-03-04 ENCOUNTER — APPOINTMENT (OUTPATIENT)
Dept: RADIOLOGY | Facility: MEDICAL CENTER | Age: 46
End: 2021-03-04
Attending: SURGERY
Payer: MEDICAID

## 2021-03-10 ENCOUNTER — HOSPITAL ENCOUNTER (OUTPATIENT)
Dept: RADIOLOGY | Facility: MEDICAL CENTER | Age: 46
End: 2021-03-10
Attending: SURGERY
Payer: COMMERCIAL

## 2021-03-10 ENCOUNTER — HOSPITAL ENCOUNTER (OUTPATIENT)
Dept: HOSPITAL 8 - RAD | Age: 46
Discharge: HOME | End: 2021-03-10
Attending: NURSE PRACTITIONER
Payer: MEDICAID

## 2021-03-10 DIAGNOSIS — Z02.9: Primary | ICD-10-CM

## 2021-03-10 DIAGNOSIS — I70.211 ATHEROSCLEROSIS OF NATIVE ARTERY OF RIGHT LOWER EXTREMITY WITH INTERMITTENT CLAUDICATION (HCC): ICD-10-CM

## 2021-03-10 PROCEDURE — 93922 UPR/L XTREMITY ART 2 LEVELS: CPT | Mod: 26 | Performed by: INTERNAL MEDICINE

## 2021-03-10 PROCEDURE — 93926 LOWER EXTREMITY STUDY: CPT | Mod: RT

## 2021-03-10 PROCEDURE — 93922 UPR/L XTREMITY ART 2 LEVELS: CPT

## 2021-03-10 PROCEDURE — 93926 LOWER EXTREMITY STUDY: CPT | Mod: 26 | Performed by: INTERNAL MEDICINE

## 2021-03-29 ENCOUNTER — HOSPITAL ENCOUNTER (OUTPATIENT)
Dept: HOSPITAL 8 - RAD | Age: 46
Discharge: HOME | End: 2021-03-29
Attending: NURSE PRACTITIONER
Payer: MEDICAID

## 2021-03-29 DIAGNOSIS — R19.7: ICD-10-CM

## 2021-03-29 DIAGNOSIS — K22.2: Primary | ICD-10-CM

## 2021-03-29 DIAGNOSIS — K21.9: ICD-10-CM

## 2021-03-29 DIAGNOSIS — K57.10: ICD-10-CM

## 2021-03-29 DIAGNOSIS — R10.9: ICD-10-CM

## 2021-03-29 DIAGNOSIS — K29.80: ICD-10-CM

## 2021-03-29 DIAGNOSIS — R13.10: ICD-10-CM

## 2021-03-29 PROCEDURE — A9541 TC99M SULFUR COLLOID: HCPCS

## 2021-03-29 PROCEDURE — 82962 GLUCOSE BLOOD TEST: CPT

## 2021-03-29 PROCEDURE — 78264 GASTRIC EMPTYING IMG STUDY: CPT

## 2021-04-24 ENCOUNTER — HOSPITAL ENCOUNTER (EMERGENCY)
Dept: HOSPITAL 8 - ED | Age: 46
Discharge: HOME | End: 2021-04-24
Payer: MEDICAID

## 2021-04-24 VITALS — DIASTOLIC BLOOD PRESSURE: 79 MMHG | SYSTOLIC BLOOD PRESSURE: 122 MMHG

## 2021-04-24 VITALS — BODY MASS INDEX: 31.67 KG/M2 | HEIGHT: 69 IN | WEIGHT: 213.85 LBS

## 2021-04-24 DIAGNOSIS — E11.40: ICD-10-CM

## 2021-04-24 DIAGNOSIS — R60.0: Primary | ICD-10-CM

## 2021-04-24 DIAGNOSIS — E11.65: ICD-10-CM

## 2021-04-24 LAB
ALBUMIN SERPL-MCNC: 4 G/DL (ref 3.4–5)
ANION GAP SERPL CALC-SCNC: 6 MMOL/L (ref 5–15)
APTT BLD: 28 SECONDS (ref 25–31)
BASOPHILS # BLD AUTO: 0.1 X10^3/UL (ref 0–0.1)
BASOPHILS NFR BLD AUTO: 1 % (ref 0–1)
CALCIUM SERPL-MCNC: 9.2 MG/DL (ref 8.5–10.1)
CHLORIDE SERPL-SCNC: 107 MMOL/L (ref 98–107)
CREAT SERPL-MCNC: 1.02 MG/DL (ref 0.7–1.3)
EOSINOPHIL # BLD AUTO: 0.4 X10^3/UL (ref 0–0.4)
EOSINOPHIL NFR BLD AUTO: 3 % (ref 1–7)
ERYTHROCYTE [DISTWIDTH] IN BLOOD BY AUTOMATED COUNT: 14.6 % (ref 9.4–14.8)
INR PPP: 0.91 (ref 0.93–1.1)
LYMPHOCYTES # BLD AUTO: 2.3 X10^3/UL (ref 1–3.4)
LYMPHOCYTES NFR BLD AUTO: 21 % (ref 22–44)
MCH RBC QN AUTO: 31.2 PG (ref 27.5–34.5)
MCHC RBC AUTO-ENTMCNC: 34.5 G/DL (ref 33.2–36.2)
MD: NO
MONOCYTES # BLD AUTO: 0.8 X10^3/UL (ref 0.2–0.8)
MONOCYTES NFR BLD AUTO: 8 % (ref 2–9)
NEUTROPHILS # BLD AUTO: 7.4 X10^3/UL (ref 1.8–6.8)
NEUTROPHILS NFR BLD AUTO: 67 % (ref 42–75)
PLATELET # BLD AUTO: 359 X10^3/UL (ref 130–400)
PMV BLD AUTO: 7.9 FL (ref 7.4–10.4)
PROTHROMBIN TIME: 9.8 SECONDS (ref 9.6–11.5)
RBC # BLD AUTO: 4.44 X10^6/UL (ref 4.38–5.82)

## 2021-04-24 PROCEDURE — 82040 ASSAY OF SERUM ALBUMIN: CPT

## 2021-04-24 PROCEDURE — 93970 EXTREMITY STUDY: CPT

## 2021-04-24 PROCEDURE — 36415 COLL VENOUS BLD VENIPUNCTURE: CPT

## 2021-04-24 PROCEDURE — 85025 COMPLETE CBC W/AUTO DIFF WBC: CPT

## 2021-04-24 PROCEDURE — 99284 EMERGENCY DEPT VISIT MOD MDM: CPT

## 2021-04-24 PROCEDURE — 85610 PROTHROMBIN TIME: CPT

## 2021-04-24 PROCEDURE — 85730 THROMBOPLASTIN TIME PARTIAL: CPT

## 2021-04-24 PROCEDURE — 80048 BASIC METABOLIC PNL TOTAL CA: CPT

## 2021-04-24 NOTE — NUR
Pt complains of bilateral diabetic neuropathy and that his pain medication, 
gabapentin is ineffective.  He has been a diabetic for 10 years.   He states 
compliance with insulin and metformin.

## 2021-04-24 NOTE — NUR
pt and wife undertsands the importance of diabetic diet, control, and lasix and 
postassium RX.  as well as close follow up with vascular surgeon.

## 2021-05-21 ENCOUNTER — APPOINTMENT (OUTPATIENT)
Dept: ADMISSIONS | Facility: MEDICAL CENTER | Age: 46
End: 2021-05-21
Attending: SURGERY
Payer: COMMERCIAL

## 2021-05-21 RX ORDER — LIDOCAINE 40 MG/G
CREAM TOPICAL PRN
COMMUNITY
Start: 2021-04-26 | End: 2022-11-16

## 2021-05-21 RX ORDER — INSULIN LISPRO 100 [IU]/ML
2-6 INJECTION, SOLUTION INTRAVENOUS; SUBCUTANEOUS
COMMUNITY
End: 2023-07-05

## 2021-05-21 RX ORDER — PANTOPRAZOLE SODIUM 40 MG/1
40 FOR SUSPENSION ORAL EVERY MORNING
Status: ON HOLD | COMMUNITY
End: 2022-11-18 | Stop reason: SDUPTHER

## 2021-05-21 RX ORDER — INSULIN GLARGINE 100 [IU]/ML
25 INJECTION, SOLUTION SUBCUTANEOUS EVERY EVENING
Status: ON HOLD | COMMUNITY
End: 2022-11-16

## 2021-05-21 RX ORDER — AMITRIPTYLINE HYDROCHLORIDE 10 MG/1
10 TABLET, FILM COATED ORAL NIGHTLY
COMMUNITY
End: 2021-11-15

## 2021-05-21 RX ORDER — ATORVASTATIN CALCIUM 40 MG/1
40 TABLET, FILM COATED ORAL
Status: ON HOLD | COMMUNITY
End: 2022-11-18 | Stop reason: SDUPTHER

## 2021-05-21 RX ORDER — CYCLOBENZAPRINE HCL 5 MG
5 TABLET ORAL 3 TIMES DAILY PRN
COMMUNITY
End: 2022-11-16

## 2021-05-21 RX ORDER — ARIPIPRAZOLE 2 MG/1
2 TABLET ORAL EVERY MORNING
COMMUNITY
End: 2021-11-15

## 2021-05-21 RX ORDER — TERBINAFINE HYDROCHLORIDE 250 MG/1
250 TABLET ORAL EVERY MORNING
COMMUNITY
End: 2022-11-16

## 2021-05-21 RX ORDER — ONDANSETRON 4 MG/1
4 TABLET, FILM COATED ORAL PRN
COMMUNITY
Start: 2021-02-24 | End: 2021-11-15

## 2021-05-24 ENCOUNTER — APPOINTMENT (OUTPATIENT)
Dept: RADIOLOGY | Facility: MEDICAL CENTER | Age: 46
End: 2021-05-24
Attending: SURGERY
Payer: COMMERCIAL

## 2021-05-24 ENCOUNTER — HOSPITAL ENCOUNTER (OUTPATIENT)
Facility: MEDICAL CENTER | Age: 46
End: 2021-05-24
Attending: SURGERY | Admitting: SURGERY
Payer: COMMERCIAL

## 2021-05-24 VITALS
HEIGHT: 69 IN | TEMPERATURE: 97.4 F | RESPIRATION RATE: 20 BRPM | BODY MASS INDEX: 30.17 KG/M2 | OXYGEN SATURATION: 96 % | HEART RATE: 88 BPM | WEIGHT: 203.71 LBS | SYSTOLIC BLOOD PRESSURE: 132 MMHG | DIASTOLIC BLOOD PRESSURE: 84 MMHG

## 2021-05-24 DIAGNOSIS — I70.211 ATHEROSCLEROSIS OF NATIVE ARTERY OF RIGHT LOWER EXTREMITY WITH INTERMITTENT CLAUDICATION (HCC): ICD-10-CM

## 2021-05-24 LAB
ANION GAP SERPL CALC-SCNC: 10 MMOL/L (ref 7–16)
BASOPHILS # BLD AUTO: 0.4 % (ref 0–1.8)
BASOPHILS # BLD: 0.04 K/UL (ref 0–0.12)
BUN SERPL-MCNC: 24 MG/DL (ref 8–22)
CALCIUM SERPL-MCNC: 9 MG/DL (ref 8.5–10.5)
CHLORIDE SERPL-SCNC: 103 MMOL/L (ref 96–112)
CO2 SERPL-SCNC: 20 MMOL/L (ref 20–33)
CREAT SERPL-MCNC: 0.86 MG/DL (ref 0.5–1.4)
EOSINOPHIL # BLD AUTO: 0.31 K/UL (ref 0–0.51)
EOSINOPHIL NFR BLD: 3.3 % (ref 0–6.9)
ERYTHROCYTE [DISTWIDTH] IN BLOOD BY AUTOMATED COUNT: 45.8 FL (ref 35.9–50)
GLUCOSE SERPL-MCNC: 326 MG/DL (ref 65–99)
HCT VFR BLD AUTO: 47.1 % (ref 42–52)
HGB BLD-MCNC: 16 G/DL (ref 14–18)
IMM GRANULOCYTES # BLD AUTO: 0.04 K/UL (ref 0–0.11)
IMM GRANULOCYTES NFR BLD AUTO: 0.4 % (ref 0–0.9)
LYMPHOCYTES # BLD AUTO: 3.46 K/UL (ref 1–4.8)
LYMPHOCYTES NFR BLD: 36.8 % (ref 22–41)
MCH RBC QN AUTO: 30.8 PG (ref 27–33)
MCHC RBC AUTO-ENTMCNC: 34 G/DL (ref 33.7–35.3)
MCV RBC AUTO: 90.6 FL (ref 81.4–97.8)
MONOCYTES # BLD AUTO: 0.75 K/UL (ref 0–0.85)
MONOCYTES NFR BLD AUTO: 8 % (ref 0–13.4)
NEUTROPHILS # BLD AUTO: 4.79 K/UL (ref 1.82–7.42)
NEUTROPHILS NFR BLD: 51.1 % (ref 44–72)
NRBC # BLD AUTO: 0 K/UL
NRBC BLD-RTO: 0 /100 WBC
PLATELET # BLD AUTO: 258 K/UL (ref 164–446)
PMV BLD AUTO: 10.4 FL (ref 9–12.9)
POTASSIUM SERPL-SCNC: 4.3 MMOL/L (ref 3.6–5.5)
RBC # BLD AUTO: 5.2 M/UL (ref 4.7–6.1)
SODIUM SERPL-SCNC: 133 MMOL/L (ref 135–145)
WBC # BLD AUTO: 9.4 K/UL (ref 4.8–10.8)

## 2021-05-24 PROCEDURE — 99153 MOD SED SAME PHYS/QHP EA: CPT

## 2021-05-24 PROCEDURE — 700111 HCHG RX REV CODE 636 W/ 250 OVERRIDE (IP)

## 2021-05-24 PROCEDURE — 160002 HCHG RECOVERY MINUTES (STAT)

## 2021-05-24 PROCEDURE — A9270 NON-COVERED ITEM OR SERVICE: HCPCS

## 2021-05-24 PROCEDURE — 85025 COMPLETE CBC W/AUTO DIFF WBC: CPT

## 2021-05-24 PROCEDURE — 700117 HCHG RX CONTRAST REV CODE 255: Performed by: SURGERY

## 2021-05-24 PROCEDURE — 80048 BASIC METABOLIC PNL TOTAL CA: CPT

## 2021-05-24 PROCEDURE — 700102 HCHG RX REV CODE 250 W/ 637 OVERRIDE(OP)

## 2021-05-24 RX ORDER — SODIUM CHLORIDE 9 MG/ML
500 INJECTION, SOLUTION INTRAVENOUS
Status: ACTIVE | OUTPATIENT
Start: 2021-05-24 | End: 2021-05-24

## 2021-05-24 RX ORDER — OXYCODONE HYDROCHLORIDE 10 MG/1
10 TABLET ORAL EVERY 6 HOURS PRN
Status: DISCONTINUED | OUTPATIENT
Start: 2021-05-24 | End: 2021-05-24 | Stop reason: HOSPADM

## 2021-05-24 RX ORDER — MIDAZOLAM HYDROCHLORIDE 1 MG/ML
INJECTION INTRAMUSCULAR; INTRAVENOUS
Status: COMPLETED
Start: 2021-05-24 | End: 2021-05-24

## 2021-05-24 RX ORDER — HEPARIN SODIUM 1000 [USP'U]/ML
INJECTION, SOLUTION INTRAVENOUS; SUBCUTANEOUS
Status: COMPLETED
Start: 2021-05-24 | End: 2021-05-24

## 2021-05-24 RX ORDER — HEPARIN SODIUM 5000 [USP'U]/ML
INJECTION, SOLUTION INTRAVENOUS; SUBCUTANEOUS
Status: COMPLETED
Start: 2021-05-24 | End: 2021-05-24

## 2021-05-24 RX ORDER — CLOPIDOGREL BISULFATE 75 MG/1
75 TABLET ORAL DAILY
Qty: 30 TABLET | Refills: 0 | Status: SHIPPED | OUTPATIENT
Start: 2021-05-24 | End: 2021-06-23

## 2021-05-24 RX ORDER — PROTAMINE SULFATE 10 MG/ML
INJECTION, SOLUTION INTRAVENOUS
Status: COMPLETED
Start: 2021-05-24 | End: 2021-05-24

## 2021-05-24 RX ORDER — OXYCODONE HYDROCHLORIDE 5 MG/1
TABLET ORAL
Status: COMPLETED
Start: 2021-05-24 | End: 2021-05-24

## 2021-05-24 RX ORDER — MIDAZOLAM HYDROCHLORIDE 1 MG/ML
.5-2 INJECTION INTRAMUSCULAR; INTRAVENOUS PRN
Status: ACTIVE | OUTPATIENT
Start: 2021-05-24 | End: 2021-05-24

## 2021-05-24 RX ADMIN — MIDAZOLAM HYDROCHLORIDE 0.5 MG: 1 INJECTION INTRAMUSCULAR; INTRAVENOUS at 08:32

## 2021-05-24 RX ADMIN — MIDAZOLAM HYDROCHLORIDE 0.5 MG: 1 INJECTION, SOLUTION INTRAMUSCULAR; INTRAVENOUS at 08:32

## 2021-05-24 RX ADMIN — OXYCODONE 10 MG: 5 TABLET ORAL at 10:50

## 2021-05-24 RX ADMIN — IOHEXOL 75 ML: 300 INJECTION, SOLUTION INTRAVENOUS at 09:45

## 2021-05-24 RX ADMIN — PROTAMINE SULFATE 50 MG: 10 INJECTION, SOLUTION INTRAVENOUS at 09:16

## 2021-05-24 RX ADMIN — FENTANYL CITRATE 25 MCG: 50 INJECTION, SOLUTION INTRAMUSCULAR; INTRAVENOUS at 08:32

## 2021-05-24 RX ADMIN — HEPARIN SODIUM 7000 UNITS: 5000 INJECTION, SOLUTION INTRAVENOUS; SUBCUTANEOUS at 09:00

## 2021-05-24 ASSESSMENT — FIBROSIS 4 INDEX: FIB4 SCORE: 0.49

## 2021-05-24 NOTE — OR NURSING
0936: Patient from radiology to [facility] via [facility] s/p lower extremity angiogram. Patient is awake and on bedrest/flat X 2 hours. VSS. LLE CMS intact. L groin site soft and dressing clean, dry and intact. Vascular access care management per MD order (see assessment). No c/o pain or nausea at this time. Will monitor closely.   0946: Patient tolerating PO intake well. Wife updated.  1030: Patient voided.  1045: Patient c/o pain and Dr Crabtree notified. Orders received. Wife at bedside.   1130: HOB up 30 degrees. L groin site intact.   1145: DC instructions given. Questions answered. Family at bedside. L groin site soft,CDI. No c/o pain or nausea. Patient wide awake. VSS. Discussed diet, activity, medications, follow up care and worsening symptoms. No questions at this time.Patient met criteria for discharge.
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
## 2021-05-24 NOTE — DISCHARGE INSTRUCTIONS
ACTIVITY: Rest and take it easy for the first 24 hours.  A responsible adult is recommended to remain with you during that time.  It is normal to feel sleepy.  We encourage you to not do anything that requires balance, judgment or coordination.    MILD FLU-LIKE SYMPTOMS ARE NORMAL. YOU MAY EXPERIENCE GENERALIZED MUSCLE ACHES, THROAT IRRITATION, HEADACHE AND/OR SOME NAUSEA.    FOR 24 HOURS DO NOT:  Drive, operate machinery or run household appliances.  Drink beer or alcoholic beverages.   Make important decisions or sign legal documents.    SPECIAL INSTRUCTIONS:     DIET: To avoid nausea, slowly advance diet as tolerated, avoiding spicy or greasy foods for the first day.  Add more substantial food to your diet according to your physician's instructions.  Babies can be fed formula or breast milk as soon as they are hungry.  INCREASE FLUIDS AND FIBER TO AVOID CONSTIPATION.    SURGICAL DRESSING/BATHING:     Keep the dressing clean and dry for 24 hours.  May remove dressing tomorrow  and can shower.  Do not submerge site under water for 1 week.  No heavy lifting  for 2 days.      FOLLOW-UP APPOINTMENT:  A follow-up appointment should be arranged with your doctor; call to schedule.    You should CALL YOUR PHYSICIAN if you develop:  Fever greater than 101 degrees F.  Pain not relieved by medication, or persistent nausea or vomiting.  Excessive bleeding (blood soaking through dressing) or unexpected drainage from the wound.  Extreme redness or swelling around the incision site, drainage of pus or foul smelling drainage.  Inability to urinate or empty your bladder within 8 hours.  Problems with breathing or chest pain.    You should call 911 if you develop problems with breathing or chest pain.  If you are unable to contact your doctor or surgical center, you should go to the nearest emergency room or urgent care center.      Physician's telephone #: [phone]    If any questions arise, call your doctor.  If your doctor is  not available, please feel free to call the Surgical Center at [phone]. The Contact Center is open Monday through Friday 7AM to 5PM and may speak to a nurse at [phone], or toll free at [phone].     A registered nurse may call you a few days after your surgery to see how you are doing after your procedure.    MEDICATIONS: Resume taking daily medication.  Take prescribed pain medication with food.  If no medication is prescribed, you may take non-aspirin pain medication if needed.  PAIN MEDICATION CAN BE VERY CONSTIPATING.  Take a stool softener or laxative such as senokot, pericolace, or milk of magnesia if needed.      If your physician has prescribed pain medication that includes Acetaminophen (Tylenol), do not take additional Acetaminophen (Tylenol) while taking the prescribed medication.    Depression / Suicide Risk    As you are discharged from this [facility] facility, it is important to learn how to keep safe from harming yourself.    Recognize the warning signs:  · Abrupt changes in personality, positive or negative- including increase in energy   · Giving away possessions  · Change in eating patterns- significant weight changes-  positive or negative  · Change in sleeping patterns- unable to sleep or sleeping all the time   · Unwillingness or inability to communicate  · Depression  · Unusual sadness, discouragement and loneliness  · Talk of wanting to die  · Neglect of personal appearance   · Rebelliousness- reckless behavior  · Withdrawal from people/activities they love  · Confusion- inability to concentrate     If you or a loved one observes any of these behaviors or has concerns about self-harm, here's what you can do:  · Talk about it- your feelings and reasons for harming yourself  · Remove any means that you might use to hurt yourself (examples: pills, rope, extension cords, firearm)  · Get professional help from the community (Mental Health, Substance Abuse, psychological  "counseling)  · Do not be alone:Call your Safe Contact- someone whom you trust who will be there for you.  · Call your local CRISIS HOTLINE 748-4455 or 426-579-9376  · Call your local Children's Mobile Crisis Response Team [place] (232) 054-4142 or www."[employer]  · Call the toll free National Suicide Prevention Hotlines   · National Suicide Prevention Lifeline 183-540-SPMJ (1881)  · National Hope Line Network 800-SUICIDE (646-3562)    Post Angiogram Groin Care Instructions     INSTRUCTIONS  2. Examine (look and feel) the site of your incision site TODAY so you can recognize changes that should be called to your doctor (see below).  3. Avoid straining either by lifting or pulling objects for 4-5 days. Avoid lifting over 5 pounds.   4. For at least 72 hours, if you should sneeze or cough, please hold pressure over your groin area.  5. If you should begin to have oozing from the catheterization site, please hold firm pressure and call your doctor's office immediately.  6. If profuse bleeding occurs from the catheterization site, hold firm pressure and call \"251\" immediately for assistance.  7. Remove bandage after 24 hours.     ACTIVITY  2. Limit activity as instructed by your doctor.  3. No driving or very limited driving with frequent stops for one week.   4. If you must take a long car ride, stop every hour and walk around the car.   5. Warm showers or baths are permitted after the bandage is removed. Avoid hot showers, baths, hot tubs, and swimming for one week.    PLEASE CALL YOUR DOCTOR IF:  1. Temperature elevation occurs.  2. Catheterization site becomes reddened or begins to drain.   3. Bruising appears to be new or not resolving. The bruise may move down your leg. This is normal.  4. The small round lump in the groin increases in size.  5. Any leg numbness, aching, or discomfort (immediately).  6. Increasing discomfort in the leg at the insertion site.  7. Chest pains, even if relieved by " Nitroglycerin.    MISCELLANEOUS INSTRUCTIONS  1. Bruising may occur as a result of heart catheterization. Some of the discoloration may travel down the leg, going from blue to green in color.  2. A small round lump under the catheterization site will remain for up to six weeks.  3. If any questions arise call your physician's office. The Contact Center is open Monday through Friday 7AM to 5PM and may speak to a nurse at [phone], or toll free at [phone].   4. You should call 911 if you develop problems with breathing or chest pain.  I acknowledge receipt and understanding of these Home Care instructions.

## 2021-05-24 NOTE — PROGRESS NOTES
Site Marked and Procedure Confirmed with MD, patient and RN pre procedure. Consent obtained by MD Crabtree with all questions answered, placed in Patient's chart. Patient assisted to the table in the supine position with all bony prominences padded and draped in sterile fashion.    Right Lower Extremity angiogram with possible intervention  by MD Crabtree assisted by Hannah Alejandre access site. Left groin access angioplasty was performed.     Patient tolerated procedure, hemodynamically stable; pt drowsy post procedure; report given to HANS Juárez; patient transported to [facility] via IR RN monitored then transferred care to report RN. Alert and talking during handoff.       Terumo   #6F Angio-seal  REF: [id number]  LOT: [id number]  EXP:02-

## 2021-06-16 ENCOUNTER — HOSPITAL ENCOUNTER (EMERGENCY)
Facility: MEDICAL CENTER | Age: 46
End: 2021-06-16
Attending: EMERGENCY MEDICINE
Payer: COMMERCIAL

## 2021-06-16 ENCOUNTER — APPOINTMENT (OUTPATIENT)
Dept: RADIOLOGY | Facility: MEDICAL CENTER | Age: 46
End: 2021-06-16
Attending: EMERGENCY MEDICINE
Payer: COMMERCIAL

## 2021-06-16 VITALS
DIASTOLIC BLOOD PRESSURE: 83 MMHG | SYSTOLIC BLOOD PRESSURE: 130 MMHG | HEIGHT: 69 IN | TEMPERATURE: 96.8 F | HEART RATE: 98 BPM | BODY MASS INDEX: 30.07 KG/M2 | OXYGEN SATURATION: 96 % | WEIGHT: 203 LBS | RESPIRATION RATE: 15 BRPM

## 2021-06-16 DIAGNOSIS — R53.81 MALAISE AND FATIGUE: ICD-10-CM

## 2021-06-16 DIAGNOSIS — R73.9 HYPERGLYCEMIA: ICD-10-CM

## 2021-06-16 DIAGNOSIS — R53.83 MALAISE AND FATIGUE: ICD-10-CM

## 2021-06-16 DIAGNOSIS — R10.84 GENERALIZED ABDOMINAL PAIN: ICD-10-CM

## 2021-06-16 LAB
ALBUMIN SERPL BCP-MCNC: 4.4 G/DL (ref 3.2–4.9)
ALBUMIN/GLOB SERPL: 1.4 G/DL
ALP SERPL-CCNC: 113 U/L (ref 30–99)
ALT SERPL-CCNC: 32 U/L (ref 2–50)
ANION GAP SERPL CALC-SCNC: 14 MMOL/L (ref 7–16)
APPEARANCE UR: CLEAR
AST SERPL-CCNC: 23 U/L (ref 12–45)
BASOPHILS # BLD AUTO: 0.5 % (ref 0–1.8)
BASOPHILS # BLD: 0.06 K/UL (ref 0–0.12)
BILIRUB SERPL-MCNC: 0.4 MG/DL (ref 0.1–1.5)
BILIRUB UR QL STRIP.AUTO: NEGATIVE
BLOOD CULTURE HOLD CXBCH: NORMAL
BUN SERPL-MCNC: 11 MG/DL (ref 8–22)
CALCIUM SERPL-MCNC: 9.3 MG/DL (ref 8.5–10.5)
CHLORIDE SERPL-SCNC: 97 MMOL/L (ref 96–112)
CO2 SERPL-SCNC: 19 MMOL/L (ref 20–33)
COLOR UR: YELLOW
CREAT SERPL-MCNC: 0.72 MG/DL (ref 0.5–1.4)
EOSINOPHIL # BLD AUTO: 0.22 K/UL (ref 0–0.51)
EOSINOPHIL NFR BLD: 1.8 % (ref 0–6.9)
ERYTHROCYTE [DISTWIDTH] IN BLOOD BY AUTOMATED COUNT: 44.5 FL (ref 35.9–50)
GLOBULIN SER CALC-MCNC: 3.2 G/DL (ref 1.9–3.5)
GLUCOSE BLD-MCNC: 294 MG/DL (ref 65–99)
GLUCOSE SERPL-MCNC: 305 MG/DL (ref 65–99)
GLUCOSE UR STRIP.AUTO-MCNC: >=1000 MG/DL
HCT VFR BLD AUTO: 43.3 % (ref 42–52)
HGB BLD-MCNC: 14.6 G/DL (ref 14–18)
IMM GRANULOCYTES # BLD AUTO: 0.06 K/UL (ref 0–0.11)
IMM GRANULOCYTES NFR BLD AUTO: 0.5 % (ref 0–0.9)
KETONES UR STRIP.AUTO-MCNC: NEGATIVE MG/DL
LEUKOCYTE ESTERASE UR QL STRIP.AUTO: NEGATIVE
LIPASE SERPL-CCNC: 49 U/L (ref 11–82)
LYMPHOCYTES # BLD AUTO: 3.16 K/UL (ref 1–4.8)
LYMPHOCYTES NFR BLD: 26.1 % (ref 22–41)
MCH RBC QN AUTO: 30 PG (ref 27–33)
MCHC RBC AUTO-ENTMCNC: 33.7 G/DL (ref 33.7–35.3)
MCV RBC AUTO: 88.9 FL (ref 81.4–97.8)
MICRO URNS: ABNORMAL
MONOCYTES # BLD AUTO: 0.95 K/UL (ref 0–0.85)
MONOCYTES NFR BLD AUTO: 7.8 % (ref 0–13.4)
NEUTROPHILS # BLD AUTO: 7.66 K/UL (ref 1.82–7.42)
NEUTROPHILS NFR BLD: 63.3 % (ref 44–72)
NITRITE UR QL STRIP.AUTO: NEGATIVE
NRBC # BLD AUTO: 0 K/UL
NRBC BLD-RTO: 0 /100 WBC
PH UR STRIP.AUTO: 5 [PH] (ref 5–8)
PLATELET # BLD AUTO: 384 K/UL (ref 164–446)
PMV BLD AUTO: 10 FL (ref 9–12.9)
POTASSIUM SERPL-SCNC: 4 MMOL/L (ref 3.6–5.5)
PROT SERPL-MCNC: 7.6 G/DL (ref 6–8.2)
PROT UR QL STRIP: NEGATIVE MG/DL
RBC # BLD AUTO: 4.87 M/UL (ref 4.7–6.1)
RBC UR QL AUTO: NEGATIVE
SODIUM SERPL-SCNC: 130 MMOL/L (ref 135–145)
SP GR UR STRIP.AUTO: 1.01
UROBILINOGEN UR STRIP.AUTO-MCNC: 0.2 MG/DL
WBC # BLD AUTO: 12.1 K/UL (ref 4.8–10.8)

## 2021-06-16 PROCEDURE — 81003 URINALYSIS AUTO W/O SCOPE: CPT

## 2021-06-16 PROCEDURE — 96375 TX/PRO/DX INJ NEW DRUG ADDON: CPT

## 2021-06-16 PROCEDURE — 85025 COMPLETE CBC W/AUTO DIFF WBC: CPT

## 2021-06-16 PROCEDURE — 80053 COMPREHEN METABOLIC PANEL: CPT

## 2021-06-16 PROCEDURE — 36415 COLL VENOUS BLD VENIPUNCTURE: CPT

## 2021-06-16 PROCEDURE — 74177 CT ABD & PELVIS W/CONTRAST: CPT

## 2021-06-16 PROCEDURE — 83690 ASSAY OF LIPASE: CPT

## 2021-06-16 PROCEDURE — 700111 HCHG RX REV CODE 636 W/ 250 OVERRIDE (IP): Performed by: EMERGENCY MEDICINE

## 2021-06-16 PROCEDURE — 99285 EMERGENCY DEPT VISIT HI MDM: CPT

## 2021-06-16 PROCEDURE — 96374 THER/PROPH/DIAG INJ IV PUSH: CPT

## 2021-06-16 PROCEDURE — 82962 GLUCOSE BLOOD TEST: CPT

## 2021-06-16 PROCEDURE — 700117 HCHG RX CONTRAST REV CODE 255: Performed by: EMERGENCY MEDICINE

## 2021-06-16 RX ORDER — ONDANSETRON 2 MG/ML
4 INJECTION INTRAMUSCULAR; INTRAVENOUS ONCE
Status: COMPLETED | OUTPATIENT
Start: 2021-06-16 | End: 2021-06-16

## 2021-06-16 RX ADMIN — ONDANSETRON 4 MG: 2 INJECTION INTRAMUSCULAR; INTRAVENOUS at 09:00

## 2021-06-16 RX ADMIN — IOHEXOL 100 ML: 350 INJECTION, SOLUTION INTRAVENOUS at 10:54

## 2021-06-16 RX ADMIN — FENTANYL CITRATE 50 MCG: 50 INJECTION, SOLUTION INTRAMUSCULAR; INTRAVENOUS at 09:00

## 2021-06-16 ASSESSMENT — PAIN DESCRIPTION - PAIN TYPE: TYPE: ACUTE PAIN

## 2021-06-16 ASSESSMENT — FIBROSIS 4 INDEX: FIB4 SCORE: 0.66

## 2021-06-16 NOTE — ED TRIAGE NOTES
"Chief Complaint   Patient presents with   • Abdominal Pain   • Leg Pain     Left     Pt walked into triage with a steady gait c/o lower abd pain and L leg pain.  Pt recently discharged from [facility] for Sepsis of unknown etiology.  Pt states he  Has \"felt bad\" since discharge     Pt & staff masked and in appropriate PPE during encounter.  Pt denies fever/travel or being in contact with anyone testing positive for Covid.  Explained pt the triage process, made pt aware to tell the RN/staff of any changes/concerns, pt verbalized understanding of process and instructions given.  Pt to ER lobby.    "

## 2021-06-16 NOTE — ED NOTES
Pt wheeled to room by this RN. Pt appears tired and somnolent but aaox4 acting appropriately.  Changed into a gown. Placed on cardiac monitor/pulse ox/bp. Call light within reach instructed to call staff for any assistance.

## 2021-06-16 NOTE — ED PROVIDER NOTES
ED Provider Note    CHIEF COMPLAINT  Chief Complaint   Patient presents with   • Abdominal Pain   • Leg Pain     Left       HPI  Rogelio Escudero is a 46 y.o. male who presents to the emergency department through triage for abdominal pain.  Patient states pain began last night, he describes generalized abdominal pain, but points to the umbilical region and left lower quadrant for greatest discomfort.  Nausea without vomiting.  Denies diarrhea or bloody stools.  Normal bowel movement a couple of days ago.  Denies fever or chills.  Decreased appetite but tolerating food and fluids.    Patient complains of some left groin pain, denies any left leg pain as per the triage note.  He does complain of bilateral foot pain but states this is chronic.  No new paresthesias, pain or weakness.    History of polyps and diverticulitis by history.  No recent colonoscopy.    Patient states he was hospitalized for 3 days in [facility] 1.5 weeks ago and given antibiotic course which she completed, but states the source of infection was never determined.    HPI is limited as patient is a poor historian.    REVIEW OF SYSTEMS  See [facility] for further details. All other systems are negative.     PAST MEDICAL HISTORY   has a past medical history of Alcohol abuse, Depression (2010), Diabetes, Diabetic neuropathy ([facility]), Heart burn, Hiatus hernia syndrome, High cholesterol, Pain, Pneumonia (approx 2011), PTSD (post-traumatic stress disorder), and PVD (peripheral vascular disease) ([facility]).    SOCIAL HISTORY  Social History     Tobacco Use   • Smoking status: Current Every Day Smoker     Packs/day: 0.25     Years: 20.00     Pack years: 5.00     Types: Cigarettes   • Smokeless tobacco: Never Used   • Tobacco comment: 6   Vaping Use   • Vaping Use: Never used   Substance and Sexual Activity   • Alcohol use: No     Comment: quit 8 months ago   • Drug use: No   • Sexual activity: Not on file       SURGICAL HISTORY   has a past surgical history that  "includes irrigation & debridement general (Right, 2/6/2020) and other.    CURRENT MEDICATIONS  Home Medications    **Home medications have not yet been reviewed for this encounter**         ALLERGIES  Allergies   Allergen Reactions   • Apple Hives   • Lactose Diarrhea       PHYSICAL EXAM  VITAL SIGNS: /83   Pulse 98   Temp 36 °C (96.8 °F) (Temporal)   Resp 15   Ht 1.753 m (5' 9\")   Wt 92.1 kg (203 lb)   SpO2 96%   BMI 29.98 kg/m²   Pulse ox interpretation: I interpret this pulse ox as normal.  Constitutional: Alert in no apparent distress.  HENT: Normocephalic, atraumatic. Bilateral external ears normal, Nose normal. Moist mucous membranes.    Eyes: Pupils are equal and reactive, Conjunctiva normal.   Neck: Normal range of motion, Supple  Lymphatic: No lymphadenopathy noted.   Cardiovascular: Regular rate and rhythm, no murmurs. Distal pulses intact.  No peripheral edema.  Thorax & Lungs: Normal breath sounds.  No wheezing/rales/ronchi. No increased work of breathing, clipped speech or retractions.   Abdomen: Soft, non-distended.  No palpable or pulsatile mass.  Mild generalized discomfort, greatest in the left mid lower quadrant without any rebound, guarding or peritonitis.  Skin: Warm, Dry, No erythema, No rash.   Musculoskeletal: Good range of motion in all major joints.   Neurologic: Alert and oriented x3.  Was for extremity spontaneously.  Psychiatric: Flat affect.  Cooperative.      DIAGNOSTIC STUDIES / PROCEDURES  LABS  Results for orders placed or performed during the hospital encounter of 06/16/21   CBC WITH DIFFERENTIAL   Result Value Ref Range    WBC 12.1 (H) 4.8 - 10.8 K/uL    RBC 4.87 4.70 - 6.10 M/uL    Hemoglobin 14.6 14.0 - 18.0 g/dL    Hematocrit 43.3 42.0 - 52.0 %    MCV 88.9 81.4 - 97.8 fL    MCH 30.0 27.0 - 33.0 pg    MCHC 33.7 33.7 - 35.3 g/dL    RDW 44.5 35.9 - 50.0 fL    Platelet Count 384 164 - 446 K/uL    MPV 10.0 9.0 - 12.9 fL    Neutrophils-Polys 63.30 44.00 - 72.00 %    " Lymphocytes 26.10 22.00 - 41.00 %    Monocytes 7.80 0.00 - 13.40 %    Eosinophils 1.80 0.00 - 6.90 %    Basophils 0.50 0.00 - 1.80 %    Immature Granulocytes 0.50 0.00 - 0.90 %    Nucleated RBC 0.00 /100 WBC    Neutrophils (Absolute) 7.66 (H) 1.82 - 7.42 K/uL    Lymphs (Absolute) 3.16 1.00 - 4.80 K/uL    Monos (Absolute) 0.95 (H) 0.00 - 0.85 K/uL    Eos (Absolute) 0.22 0.00 - 0.51 K/uL    Baso (Absolute) 0.06 0.00 - 0.12 K/uL    Immature Granulocytes (abs) 0.06 0.00 - 0.11 K/uL    NRBC (Absolute) 0.00 K/uL   COMP METABOLIC PANEL   Result Value Ref Range    Sodium 130 (L) 135 - 145 mmol/L    Potassium 4.0 3.6 - 5.5 mmol/L    Chloride 97 96 - 112 mmol/L    Co2 19 (L) 20 - 33 mmol/L    Anion Gap 14.0 7.0 - 16.0    Glucose 305 (H) 65 - 99 mg/dL    Bun 11 8 - 22 mg/dL    Creatinine 0.72 0.50 - 1.40 mg/dL    Calcium 9.3 8.5 - 10.5 mg/dL    AST(SGOT) 23 12 - 45 U/L    ALT(SGPT) 32 2 - 50 U/L    Alkaline Phosphatase 113 (H) 30 - 99 U/L    Total Bilirubin 0.4 0.1 - 1.5 mg/dL    Albumin 4.4 3.2 - 4.9 g/dL    Total Protein 7.6 6.0 - 8.2 g/dL    Globulin 3.2 1.9 - 3.5 g/dL    A-G Ratio 1.4 g/dL   LIPASE   Result Value Ref Range    Lipase 49 11 - 82 U/L   URINALYSIS    Specimen: Urine   Result Value Ref Range    Color Yellow     Character Clear     Specific Gravity 1.013 <1.035    Ph 5.0 5.0 - 8.0    Glucose >=1000 (A) Negative mg/dL    Ketones Negative Negative mg/dL    Protein Negative Negative mg/dL    Bilirubin Negative Negative    Urobilinogen, Urine 0.2 Negative    Nitrite Negative Negative    Leukocyte Esterase Negative Negative    Occult Blood Negative Negative    Micro Urine Req see below    ESTIMATED GFR   Result Value Ref Range    GFR If African American >60 >60 mL/min/1.73 m 2    GFR If Non African American >60 >60 mL/min/1.73 m 2   POCT glucose device results   Result Value Ref Range    Glucose - Accu-Ck 294 (H) 65 - 99 mg/dL       RADIOLOGY  CT-ABDOMEN-PELVIS WITH   Final Result         1. No acute abnormality  identified in the abdomen or pelvis.      2. Mild bilateral perinephric stranding could relate to renal insufficiency. Correlate with creatinine.          COURSE & MEDICAL DECISION MAKING  Nursing notes and vital signs were reviewed. (See chart for details)  The patients records were reviewed, history was obtained from the patient;     ED evaluation for abdominal pain, malaise and fatigue is really unrevealing.  Patient is hyperglycemic, this may be contributing to his symptoms.  History of diabetes, but states compliant with oral medications as well as insulin.  There is no clinical evidence for DKA.  Mild nonspecific leukocytosis, WBC 12.1 without left shift or bandemia.  No electrolyte derangement, sodium corrects to blood glucose.  Normal LFTs and renal function.  No UTI.  CT within normal limits, bilateral perinephric stranding with otherwise normal renal function and urinalysis is nonspecific at this time.  Patient tolerates oral fluids without difficulty or vomiting.  He is hemodynamically stable otherwise.    Patient is stable for discharge at this time, anticipatory guidance provided,continue home medications, close follow-up is encouraged, and strict ED return instructions have been detailed. Patient is agreeable to the disposition and plan.    Patient's blood pressure was elevated in the emergency department, and has been referred to primary care for close monitoring.      FINAL IMPRESSION  (R10.84) Generalized abdominal pain  (R53.81,  R53.83) Malaise and fatigue  (R73.9) Hyperglycemia      Electronically signed by: Stacey Carbajal D.O., 6/16/2021 8:48 AM      This dictation was created using voice recognition software. The accuracy of the dictation is limited to the abilities of the software. I expect there may be some errors of grammar and possibly content. The nursing notes were reviewed and certain aspects of this information were incorporated into this note.

## 2021-06-16 NOTE — DISCHARGE INSTRUCTIONS
Follow-up with primary care 1 to 2 days for reevaluation, medication management and close blood glucose monitoring.    Strongly encourage compliance with home medications.      Encourage oral fluid hydration.  Advance to bland diabetic diet as tolerated.    Return to the emergency department for persistent worsening abdominal pain, vomiting, fever, elevated blood sugars or other new concerns.

## 2021-07-28 ENCOUNTER — NON-PROVIDER VISIT (OUTPATIENT)
Dept: NEUROLOGY | Facility: MEDICAL CENTER | Age: 46
End: 2021-07-28
Attending: ORTHOPAEDIC SURGERY
Payer: MEDICAID

## 2021-07-28 DIAGNOSIS — G56.00 CARPAL TUNNEL SYNDROME, UNSPECIFIED LATERALITY: ICD-10-CM

## 2021-07-28 PROCEDURE — 95886 MUSC TEST DONE W/N TEST COMP: CPT | Mod: 26 | Performed by: PSYCHIATRY & NEUROLOGY

## 2021-07-28 PROCEDURE — 95886 MUSC TEST DONE W/N TEST COMP: CPT | Performed by: PSYCHIATRY & NEUROLOGY

## 2021-07-28 PROCEDURE — 95913 NRV CNDJ TEST 13/> STUDIES: CPT | Performed by: PSYCHIATRY & NEUROLOGY

## 2021-07-28 PROCEDURE — 95913 NRV CNDJ TEST 13/> STUDIES: CPT | Mod: 26 | Performed by: PSYCHIATRY & NEUROLOGY

## 2021-07-28 NOTE — PROCEDURES
"NERVE CONDUCTION STUDIES AND ELECTROMYOGRAPHY REPORT  [facility]  07/28/21          IMPRESSION:  This is abnormal electrodiagnostic study due to:  1. Evidence suggestive of an underlying sensory polyneuropathy in the bilateral upper extremities.  2. Mild left median neuropathy at the wrist (carpal tunnel syndrome).  3. Slowing of the left ulnar nerve across the elbow without denervation of the left first dorsal interosseous and flexor carpi ulnaris.    There is no evidence of bilateral cervical radiculopathy.  Recommend clinical correlation.    Stacey Sherman MD  Neurology - Neurophysiology          REASON FOR REFERRAL:  Mr. Rogelio Escudero 46 y.o. referred by Dr. Osorio Barrios for an evaluation of bilateral hand pain of uncertain etiology.  Patient has a history of diabetes associated with underlying polyneuropathy affecting the bilateral lower extremities up to the calves.  Most recently he began feeling some tingling similar to his legs affecting all 10 fingertips.  He has symptoms of pain, numbness primarily affecting the middle 3 fingers, tingling in the hands with prolonged elbow flexion, neck pain.    Height: 5'9\"  Weight: 214 lbs    Symptom focused neurological exam shows decreased sensation to light touch in all fingertips.  Strength is preserved in the arms.      ELECTRODIAGNOSTIC EXAMINATION:  Nerve conduction studies (NCS) and electromyography (EMG) are utilized to evaluate direct or indirect damage to the peripheral nervous system. NCS are performed to measure the nerve(s) response(s) to electrostimulation across a given nerve segment. EMG evaluates the passive and active electrical activity of the muscle(s) in question.  Muscles are innervated by specific peripheral nerves and roots. Often times, several nerves the muscle to be examined in order to determine the presence or absence of the disease process. Furthermore, nerves and muscles may need to be tested in a " [id number] comparison, as well as in additional extremities, as this may be crucial in characterizing the extent of the disease process, which may be diffuse or isolated and of varying degree of severity. The extent of the neurodiagnostic exam is justified as it may help arrive to a proper diagnosis, which ultimately may contribute to better management of the patient. Therefore, the nerves to muscles examined during the study were medically necessary.    Unless otherwise noted, temperature of the extremity(s) study was monitored before and during the examination and remained between 32 and 36 degrees C for the upper extremities, and between 30 and 36 degrees C for the lower extremities. The patient tolerated testing well, without any complications.       NERVE CONDUCTION STUDY SUMMARY:  Selected nerves of the bilateral upper extremity are studied.    Abnormal bilateral median, bilateral ulnar, and right radial sensory responses due to low amplitude.  Normal bilateral median motor responses at the abductor pollicis brevis.  Normal right ulnar motor response at the abductor digiti minimi.  Abnormal left ulnar motor response at the abductor digiti minimi due to slowing across the elbow.  Abnormal left median/ulnar palmar comparison study due to prolonged median latency.   Normal median latency on right median/ulnar palmar comparison study.  Ulnar response is unobtainable.  Right median ulnar F waves are normal.    NEEDLE EMG SUMMARY:  Concentric needle study of selected bilateral upper extremity muscles is performed.     Insertion activity is normal in all muscles sampled.   With activation, there are normal morphology (amplitude/duration) motor unit action potentials firing with normal recruitment in muscles tested.       PATIENT DATA TABLES  Nerve Conduction Studies     Stim Site NR Onset (ms) Norm Onset (ms) O-P Amp (µV) Norm O-P Amp Site1 Site2 Delta-P (ms) Dist (cm) Omi (m/s) Norm Omi (m/s)   Left Median Anti  Sensory (2nd Digit)   Wrist    2.9 <3.4 *2.5 >20 Wrist 2nd Digit 3.6 14.0 *39 >50   Right Median Anti Sensory (2nd Digit)   Wrist    2.8 <3.4 *5.6 >20 Wrist 2nd Digit 3.5 14.0 *40 >50   Right Radial Anti Sensory (Base 1st Digit)   Wrist    1.7 <2.7 *7.2 >18 Wrist Base 1st Digit 2.6 10.0 *38 >50       1.6  5.3          Left Ulnar Anti Sensory (5th Digit)   Wrist    2.8 <3.1 *9.7 >12 Wrist 5th Digit 3.9 14.0 *36 >50   Right Ulnar Anti Sensory (5th Digit)   Wrist    2.5 <3.1 *5.1 >12 Wrist 5th Digit 3.4 14.0 *41 >50        Stim Site NR Onset (ms) Norm Onset (ms) O-P Amp (mV) Norm O-P Amp Site1 Site2 Delta-0 (ms) Dist (cm) Omi (m/s) Norm Omi (m/s)   Left Median Motor (Abd Poll Brev)   Wrist    3.7 <3.9 7.2 >6 Elbow Wrist 5.0 25.0 50 >50   Elbow    8.7  6.7          Right Median Motor (Abd Poll Brev)   Wrist    3.6 <3.9 6.9 >6 Elbow Wrist 5.2 26.5 51 >50   Elbow    8.8  6.7          Left Ulnar Motor (Abd Dig Min)   Wrist    3.0 <3.1 7.3 >7 B Elbow Wrist 3.4 22.0 65 >50   B Elbow    6.4  6.7  A Elbow B Elbow 2.1 10.0 48    A Elbow    8.5  6.4          Right Ulnar Motor (Abd Dig Min)   Wrist    2.7 <3.1 9.4 >7 B Elbow Wrist 4.6 24.0 52 >50   B Elbow    7.3  8.8  A Elbow B Elbow 1.6 10.0 62    A Elbow    8.9  8.6               Stim Site NR Peak (ms) Norm Peak (ms) P-T Amp (µV) Site1 Site2 Delta-P (ms) Norm Delta (ms)   Left Median/Ulnar Palm Comparison (Wrist - 8cm)   Median Palm    *2.5 <2.3 15.5 Median Palm Ulnar Palm *0.5 <0.3   Ulnar Palm    2.0 <2.3 23.8       Ulnar Palm    2.0  7.8       Right Median/Ulnar Palm Comparison (Wrist - 8cm)   Median Palm    1.9 <2.3 32.9 Median Palm Ulnar Palm  <0.3   Ulnar Palm *NR  <2.3          F Wave Studies     NR F-Lat (ms) Lat Norm (ms)   Right Median (Abd Poll Brev)      29.51 <31   Right Ulnar (Abd Dig Min)      30.45 <32                                      Electromyography     Side Muscle Nerve Root Ins Act Fibs Psw Amp Dur Poly Recrt Int Pat Comment   Left 1stDorInt Ulnar C8-T1  Nml Nml Nml Nml Nml 0 Nml Nml    Left PronatorTeres Median C6-7 Nml Nml Nml Nml Nml 0 Nml Nml    Left Biceps Musculocut C5-6 Nml Nml Nml Nml Nml 0 Nml Nml    Left Triceps Radial C6-7-8 Nml Nml Nml Nml Nml 0 Nml Nml    Left Deltoid Axillary C5-6 Nml Nml Nml Nml Nml 0 Nml Nml    Left Abd Poll Brev Median C8-T1 Nml Nml Nml Nml Nml 0 Nml Nml    Left FlexCarpiUln Ulnar C8,T1 Nml Nml Nml Nml Nml 0 Nml Nml    Right 1stDorInt Ulnar C8-T1 Nml Nml Nml Nml Nml 0 Nml Nml    Right PronatorTeres Median C6-7 Nml Nml Nml Nml Nml 0 Nml Nml    Right Biceps Musculocut C5-6 Nml Nml Nml Nml Nml 0 Nml Nml    Right Triceps Radial C6-7-8 Nml Nml Nml Nml Nml 0 Nml Nml    Right Deltoid Axillary C5-6 Nml Nml Nml Nml Nml 0 Nml Nml

## 2021-08-11 PROBLEM — G56.02 CARPAL TUNNEL SYNDROME ON LEFT: Status: ACTIVE | Noted: 2021-08-11

## 2021-08-11 PROBLEM — M25.531 RIGHT WRIST PAIN: Status: ACTIVE | Noted: 2021-08-11

## 2021-08-11 PROBLEM — G56.22 CUBITAL TUNNEL SYNDROME ON LEFT: Status: ACTIVE | Noted: 2021-08-11

## 2021-11-10 PROBLEM — S46.001A ROTATOR CUFF INJURY, RIGHT, INITIAL ENCOUNTER: Status: ACTIVE | Noted: 2021-11-10
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
## 2021-11-15 ENCOUNTER — PRE-ADMISSION TESTING (OUTPATIENT)
Dept: ADMISSIONS | Facility: MEDICAL CENTER | Age: 46
End: 2021-11-15
Attending: ORTHOPAEDIC SURGERY
Payer: MEDICAID

## 2021-11-15 RX ORDER — ESCITALOPRAM OXALATE 10 MG/1
10 TABLET ORAL DAILY
Status: ON HOLD | COMMUNITY
End: 2022-11-18 | Stop reason: SDUPTHER

## 2021-11-15 RX ORDER — LISINOPRIL 10 MG/1
10 TABLET ORAL DAILY
Status: ON HOLD | COMMUNITY
End: 2022-11-18 | Stop reason: SDUPTHER

## 2021-11-15 NOTE — PREPROCEDURE INSTRUCTIONS
Hx and meds reviewed, pre-op instructions given, handouts reviewed, questions answered.  Patient instructed to continue regularly prescribed medicatio s through day before surgery.  Instructed to discontinue all vitamins and supplements 2 weeks prior to DOS.  Per anesthesia protocol patient instructed to take these mediations with a sip of water on the day of surgery:  Lexapro oxycodone, protonix..  Anesthesia fasting guidelines reviewed with patient.  Covid testing scheduled DOS.  Patient has been vaccinated for Covid.    Hi Dr. Gates,  This pt. is scheduled for 11/18.  He had an A1C = 9.6 and BS= 229 on 9/5/21.  His provider recently added Jardiance.  Please advise on any further steps.  Thanks, Bev RN          Anesthesia Summary Report           Patient Name: Rogelio Escudero MRN: [id number] Admission Date: Patient not admitted   Allergies: Apple, Lactose

## 2021-11-16 NOTE — PREPROCEDURE INSTRUCTIONS
Please have the patient obtain a new BMP so that we could see what a random fasting blood glucose looks like currently and then be better able to plan ahead. I am not sure whether repeating an A1C is worthwhile at this present time until we see the random fast fasting number. Thank you.   Sola Gates M.D.  [facility]    Patient is [place] of [place] until DOS.  Will need BMP, CBC and Covid on DOS

## 2021-11-18 ENCOUNTER — ANESTHESIA (OUTPATIENT)
Dept: SURGERY | Facility: MEDICAL CENTER | Age: 46
End: 2021-11-18
Payer: MEDICAID

## 2021-11-18 ENCOUNTER — HOSPITAL ENCOUNTER (OUTPATIENT)
Facility: MEDICAL CENTER | Age: 46
Setting detail: OUTPATIENT SURGERY
End: 2021-11-18
Attending: ORTHOPAEDIC SURGERY | Admitting: ORTHOPAEDIC SURGERY
Payer: MEDICAID

## 2021-11-18 ENCOUNTER — APPOINTMENT (OUTPATIENT)
Dept: RADIOLOGY | Facility: MEDICAL CENTER | Age: 46
End: 2021-11-18
Attending: ANESTHESIOLOGY
Payer: MEDICAID

## 2021-11-18 ENCOUNTER — ANESTHESIA EVENT (OUTPATIENT)
Dept: SURGERY | Facility: MEDICAL CENTER | Age: 46
End: 2021-11-18
Payer: MEDICAID

## 2021-11-18 VITALS
BODY MASS INDEX: 30.04 KG/M2 | DIASTOLIC BLOOD PRESSURE: 86 MMHG | WEIGHT: 202.82 LBS | SYSTOLIC BLOOD PRESSURE: 124 MMHG | OXYGEN SATURATION: 90 % | TEMPERATURE: 97.5 F | HEIGHT: 69 IN | RESPIRATION RATE: 16 BRPM | HEART RATE: 81 BPM

## 2021-11-18 DIAGNOSIS — S46.001A ROTATOR CUFF INJURY, RIGHT, INITIAL ENCOUNTER: ICD-10-CM

## 2021-11-18 LAB
ANION GAP SERPL CALC-SCNC: 11 MMOL/L (ref 7–16)
BUN SERPL-MCNC: 16 MG/DL (ref 8–22)
CALCIUM SERPL-MCNC: 9.7 MG/DL (ref 8.4–10.2)
CHLORIDE SERPL-SCNC: 100 MMOL/L (ref 96–112)
CO2 SERPL-SCNC: 25 MMOL/L (ref 20–33)
CREAT SERPL-MCNC: 0.84 MG/DL (ref 0.5–1.4)
EXTERNAL QUALITY CONTROL: NORMAL
GLUCOSE BLD-MCNC: 106 MG/DL (ref 65–99)
GLUCOSE SERPL-MCNC: 124 MG/DL (ref 65–99)
POTASSIUM SERPL-SCNC: 4.3 MMOL/L (ref 3.6–5.5)
SARS-COV+SARS-COV-2 AG RESP QL IA.RAPID: NEGATIVE
SODIUM SERPL-SCNC: 136 MMOL/L (ref 135–145)

## 2021-11-18 PROCEDURE — 160029 HCHG SURGERY MINUTES - 1ST 30 MINS LEVEL 4: Performed by: ORTHOPAEDIC SURGERY

## 2021-11-18 PROCEDURE — 700101 HCHG RX REV CODE 250: Performed by: ORTHOPAEDIC SURGERY

## 2021-11-18 PROCEDURE — 500151 HCHG CANNULA, THRDED 8.4: Performed by: ORTHOPAEDIC SURGERY

## 2021-11-18 PROCEDURE — 29826 SHO ARTHRS SRG DECOMPRESSION: CPT | Mod: RT | Performed by: ORTHOPAEDIC SURGERY

## 2021-11-18 PROCEDURE — 71045 X-RAY EXAM CHEST 1 VIEW: CPT

## 2021-11-18 PROCEDURE — C1713 ANCHOR/SCREW BN/BN,TIS/BN: HCPCS | Performed by: ORTHOPAEDIC SURGERY

## 2021-11-18 PROCEDURE — 160009 HCHG ANES TIME/MIN: Performed by: ORTHOPAEDIC SURGERY

## 2021-11-18 PROCEDURE — 160048 HCHG OR STATISTICAL LEVEL 1-5: Performed by: ORTHOPAEDIC SURGERY

## 2021-11-18 PROCEDURE — 23430 REPAIR BICEPS TENDON: CPT | Mod: RT | Performed by: ORTHOPAEDIC SURGERY

## 2021-11-18 PROCEDURE — 23430 REPAIR BICEPS TENDON: CPT | Mod: ASROC,RT | Performed by: PHYSICIAN ASSISTANT

## 2021-11-18 PROCEDURE — 87426 SARSCOV CORONAVIRUS AG IA: CPT | Performed by: ORTHOPAEDIC SURGERY

## 2021-11-18 PROCEDURE — 29823 SHO ARTHRS SRG XTNSV DBRDMT: CPT | Mod: RT | Performed by: ORTHOPAEDIC SURGERY

## 2021-11-18 PROCEDURE — 160002 HCHG RECOVERY MINUTES (STAT): Performed by: ORTHOPAEDIC SURGERY

## 2021-11-18 PROCEDURE — 29827 SHO ARTHRS SRG RT8TR CUF RPR: CPT | Mod: RT | Performed by: ORTHOPAEDIC SURGERY

## 2021-11-18 PROCEDURE — 160047 HCHG PACU  - EA ADDL 30 MINS PHASE II: Performed by: ORTHOPAEDIC SURGERY

## 2021-11-18 PROCEDURE — 700111 HCHG RX REV CODE 636 W/ 250 OVERRIDE (IP): Performed by: ANESTHESIOLOGY

## 2021-11-18 PROCEDURE — 700111 HCHG RX REV CODE 636 W/ 250 OVERRIDE (IP): Performed by: ORTHOPAEDIC SURGERY

## 2021-11-18 PROCEDURE — 29826 SHO ARTHRS SRG DECOMPRESSION: CPT | Mod: ASROC,RT | Performed by: PHYSICIAN ASSISTANT

## 2021-11-18 PROCEDURE — 29827 SHO ARTHRS SRG RT8TR CUF RPR: CPT | Mod: ASROC,RT | Performed by: PHYSICIAN ASSISTANT

## 2021-11-18 PROCEDURE — 700101 HCHG RX REV CODE 250: Performed by: ANESTHESIOLOGY

## 2021-11-18 PROCEDURE — 502581 HCHG PACK, SHOULDER ARTHROSCOPY: Performed by: ORTHOPAEDIC SURGERY

## 2021-11-18 PROCEDURE — 64415 NJX AA&/STRD BRCH PLXS IMG: CPT | Performed by: ORTHOPAEDIC SURGERY

## 2021-11-18 PROCEDURE — 501838 HCHG SUTURE GENERAL: Performed by: ORTHOPAEDIC SURGERY

## 2021-11-18 PROCEDURE — 160046 HCHG PACU - 1ST 60 MINS PHASE II: Performed by: ORTHOPAEDIC SURGERY

## 2021-11-18 PROCEDURE — 29823 SHO ARTHRS SRG XTNSV DBRDMT: CPT | Mod: ASROC,RT | Performed by: PHYSICIAN ASSISTANT

## 2021-11-18 PROCEDURE — 700105 HCHG RX REV CODE 258: Performed by: ORTHOPAEDIC SURGERY

## 2021-11-18 PROCEDURE — 160035 HCHG PACU - 1ST 60 MINS PHASE I: Performed by: ORTHOPAEDIC SURGERY

## 2021-11-18 PROCEDURE — 82962 GLUCOSE BLOOD TEST: CPT

## 2021-11-18 PROCEDURE — 160025 RECOVERY II MINUTES (STATS): Performed by: ORTHOPAEDIC SURGERY

## 2021-11-18 PROCEDURE — 80048 BASIC METABOLIC PNL TOTAL CA: CPT

## 2021-11-18 PROCEDURE — 160041 HCHG SURGERY MINUTES - EA ADDL 1 MIN LEVEL 4: Performed by: ORTHOPAEDIC SURGERY

## 2021-11-18 DEVICE — ANCHOR SUTURE ICONIX 3 WITH 3 STRANDS #2 FORCE FIBER 2.3MM (5EA/BX): Type: IMPLANTABLE DEVICE | Status: FUNCTIONAL

## 2021-11-18 DEVICE — ANCHOR SUTURE ICONIX 2 WITH #2 FORCE FIBER 2.3MM (5EA/BX): Type: IMPLANTABLE DEVICE | Status: FUNCTIONAL

## 2021-11-18 RX ORDER — CEFAZOLIN SODIUM 1 G/3ML
2 INJECTION, POWDER, FOR SOLUTION INTRAMUSCULAR; INTRAVENOUS ONCE
Status: DISCONTINUED | OUTPATIENT
Start: 2021-11-18 | End: 2021-11-18 | Stop reason: HOSPADM

## 2021-11-18 RX ORDER — MEPERIDINE HYDROCHLORIDE 25 MG/ML
12.5 INJECTION INTRAMUSCULAR; INTRAVENOUS; SUBCUTANEOUS
Status: DISCONTINUED | OUTPATIENT
Start: 2021-11-18 | End: 2021-11-18 | Stop reason: HOSPADM

## 2021-11-18 RX ORDER — ONDANSETRON 2 MG/ML
INJECTION INTRAMUSCULAR; INTRAVENOUS PRN
Status: DISCONTINUED | OUTPATIENT
Start: 2021-11-18 | End: 2021-11-18 | Stop reason: SURG

## 2021-11-18 RX ORDER — DEXAMETHASONE SODIUM PHOSPHATE 4 MG/ML
INJECTION, SOLUTION INTRA-ARTICULAR; INTRALESIONAL; INTRAMUSCULAR; INTRAVENOUS; SOFT TISSUE PRN
Status: DISCONTINUED | OUTPATIENT
Start: 2021-11-18 | End: 2021-11-18 | Stop reason: SURG

## 2021-11-18 RX ORDER — DIPHENHYDRAMINE HYDROCHLORIDE 50 MG/ML
12.5 INJECTION INTRAMUSCULAR; INTRAVENOUS
Status: DISCONTINUED | OUTPATIENT
Start: 2021-11-18 | End: 2021-11-18 | Stop reason: HOSPADM

## 2021-11-18 RX ORDER — HALOPERIDOL 5 MG/ML
1 INJECTION INTRAMUSCULAR
Status: DISCONTINUED | OUTPATIENT
Start: 2021-11-18 | End: 2021-11-18 | Stop reason: HOSPADM

## 2021-11-18 RX ORDER — OXYCODONE HCL 5 MG/5 ML
5 SOLUTION, ORAL ORAL
Status: DISCONTINUED | OUTPATIENT
Start: 2021-11-18 | End: 2021-11-18 | Stop reason: HOSPADM

## 2021-11-18 RX ORDER — HYDROMORPHONE HYDROCHLORIDE 1 MG/ML
0.2 INJECTION, SOLUTION INTRAMUSCULAR; INTRAVENOUS; SUBCUTANEOUS
Status: DISCONTINUED | OUTPATIENT
Start: 2021-11-18 | End: 2021-11-18 | Stop reason: HOSPADM

## 2021-11-18 RX ORDER — SODIUM CHLORIDE, SODIUM LACTATE, POTASSIUM CHLORIDE, CALCIUM CHLORIDE 600; 310; 30; 20 MG/100ML; MG/100ML; MG/100ML; MG/100ML
INJECTION, SOLUTION INTRAVENOUS CONTINUOUS
Status: ACTIVE | OUTPATIENT
Start: 2021-11-18 | End: 2021-11-18

## 2021-11-18 RX ORDER — OXYCODONE HCL 5 MG/5 ML
10 SOLUTION, ORAL ORAL
Status: DISCONTINUED | OUTPATIENT
Start: 2021-11-18 | End: 2021-11-18 | Stop reason: HOSPADM

## 2021-11-18 RX ORDER — EPINEPHRINE 1 MG/ML(1)
AMPUL (ML) INJECTION
Status: DISCONTINUED | OUTPATIENT
Start: 2021-11-18 | End: 2021-11-18 | Stop reason: HOSPADM

## 2021-11-18 RX ORDER — CEFAZOLIN SODIUM 1 G/3ML
INJECTION, POWDER, FOR SOLUTION INTRAMUSCULAR; INTRAVENOUS PRN
Status: DISCONTINUED | OUTPATIENT
Start: 2021-11-18 | End: 2021-11-18 | Stop reason: SURG

## 2021-11-18 RX ORDER — MIDAZOLAM HYDROCHLORIDE 1 MG/ML
INJECTION INTRAMUSCULAR; INTRAVENOUS PRN
Status: DISCONTINUED | OUTPATIENT
Start: 2021-11-18 | End: 2021-11-18 | Stop reason: SURG

## 2021-11-18 RX ORDER — HYDROMORPHONE HYDROCHLORIDE 1 MG/ML
0.1 INJECTION, SOLUTION INTRAMUSCULAR; INTRAVENOUS; SUBCUTANEOUS
Status: DISCONTINUED | OUTPATIENT
Start: 2021-11-18 | End: 2021-11-18 | Stop reason: HOSPADM

## 2021-11-18 RX ORDER — HYDROMORPHONE HYDROCHLORIDE 1 MG/ML
0.4 INJECTION, SOLUTION INTRAMUSCULAR; INTRAVENOUS; SUBCUTANEOUS
Status: DISCONTINUED | OUTPATIENT
Start: 2021-11-18 | End: 2021-11-18 | Stop reason: HOSPADM

## 2021-11-18 RX ORDER — LIDOCAINE HYDROCHLORIDE AND EPINEPHRINE 10; 10 MG/ML; UG/ML
INJECTION, SOLUTION INFILTRATION; PERINEURAL
Status: DISCONTINUED | OUTPATIENT
Start: 2021-11-18 | End: 2021-11-18 | Stop reason: HOSPADM

## 2021-11-18 RX ORDER — ONDANSETRON 2 MG/ML
4 INJECTION INTRAMUSCULAR; INTRAVENOUS
Status: DISCONTINUED | OUTPATIENT
Start: 2021-11-18 | End: 2021-11-18 | Stop reason: HOSPADM

## 2021-11-18 RX ORDER — KETOROLAC TROMETHAMINE 30 MG/ML
INJECTION, SOLUTION INTRAMUSCULAR; INTRAVENOUS PRN
Status: DISCONTINUED | OUTPATIENT
Start: 2021-11-18 | End: 2021-11-18 | Stop reason: SURG

## 2021-11-18 RX ORDER — BUPIVACAINE HYDROCHLORIDE 2.5 MG/ML
INJECTION, SOLUTION EPIDURAL; INFILTRATION; INTRACAUDAL
Status: COMPLETED | OUTPATIENT
Start: 2021-11-18 | End: 2021-11-18

## 2021-11-18 RX ADMIN — DEXAMETHASONE SODIUM PHOSPHATE 8 MG: 4 INJECTION, SOLUTION INTRAMUSCULAR; INTRAVENOUS at 15:12

## 2021-11-18 RX ADMIN — KETOROLAC TROMETHAMINE 30 MG: 30 INJECTION, SOLUTION INTRAMUSCULAR at 15:12

## 2021-11-18 RX ADMIN — MIDAZOLAM HYDROCHLORIDE 2 MG: 1 INJECTION, SOLUTION INTRAMUSCULAR; INTRAVENOUS at 14:55

## 2021-11-18 RX ADMIN — CLINDAMYCIN PHOSPHATE 900 MG: 150 INJECTION, SOLUTION INTRAMUSCULAR; INTRAVENOUS at 15:06

## 2021-11-18 RX ADMIN — ONDANSETRON 4 MG: 2 INJECTION INTRAMUSCULAR; INTRAVENOUS at 15:12

## 2021-11-18 RX ADMIN — FENTANYL CITRATE 100 MCG: 50 INJECTION, SOLUTION INTRAMUSCULAR; INTRAVENOUS at 14:55

## 2021-11-18 RX ADMIN — BUPIVACAINE HYDROCHLORIDE 25 ML: 2.5 INJECTION, SOLUTION EPIDURAL; INFILTRATION; INTRACAUDAL; PERINEURAL at 14:53

## 2021-11-18 RX ADMIN — CEFAZOLIN 2 G: 330 INJECTION, POWDER, FOR SOLUTION INTRAMUSCULAR; INTRAVENOUS at 15:06

## 2021-11-18 RX ADMIN — SODIUM CHLORIDE, POTASSIUM CHLORIDE, SODIUM LACTATE AND CALCIUM CHLORIDE: 600; 310; 30; 20 INJECTION, SOLUTION INTRAVENOUS at 15:13

## 2021-11-18 ASSESSMENT — PAIN DESCRIPTION - PAIN TYPE
TYPE: SURGICAL PAIN

## 2021-11-18 ASSESSMENT — FIBROSIS 4 INDEX: FIB4 SCORE: 0.51

## 2021-11-18 ASSESSMENT — PAIN SCALES - GENERAL: PAIN_LEVEL: 2

## 2021-11-18 NOTE — ANESTHESIA PROCEDURE NOTES
Airway    Date/Time: 11/18/2021 3:06 PM  Performed by: Juno Watts M.D.  Authorized by: Juno Watts M.D.     Location:  OR  Urgency:  Elective  Indications for Airway Management:  Anesthesia      Spontaneous Ventilation: absent    Sedation Level:  Deep  Preoxygenated: Yes    Patient Position:  Sniffing  Final Airway Type:  Endotracheal airway  Final Endotracheal Airway:  ETT  Cuffed: Yes    Technique Used for Successful ETT Placement:  Direct laryngoscopy    Insertion Site:  Oral  Blade Type:  Dimple  Laryngoscope Blade/Videolaryngoscope Blade Size:  3  ETT Size (mm):  7.5  Measured from:  Teeth  ETT to Teeth (cm):  21  Placement Verified by: auscultation and capnometry    Cormack-Lehane Classification:  Grade I - full view of glottis  Number of Attempts at Approach:  1

## 2021-11-18 NOTE — ANESTHESIA PREPROCEDURE EVALUATION
Case: [id number] Date/Time: 11/18/21 1415    Procedures:       ARTHROSCOPY, SHOULDER, WITH ROTATOR CUFF REPAIR (Right )      DECOMPRESSION, SHOULDER, ARTHROSCOPIC      ARTHROSCOPY, SHOULDER, WITH BICEPS TENODESIS      ARTHROSCOPIC LABRAL DEBRIDEMENT    Diagnosis: Rotator cuff injury, right, initial encounter [S46.001A]    Pre-op diagnosis: Rotator cuff injury, right, initial encounter [S46.001A]    Location: [place] [address] / SURGERY [facility]    Surgeons: Arnulfo Valentin M.D.          Relevant Problems   CARDIAC   (positive) Peripheral artery disease (HCC)      Other   (positive) Osteomyelitis (HCC)       Physical Exam    Airway   Mallampati: II  TM distance: >3 FB  Neck ROM: full       Cardiovascular - normal exam  Rhythm: regular  Rate: normal  (-) murmur     Dental - normal exam           Pulmonary - normal exam  Breath sounds clear to auscultation     Abdominal    Neurological - normal exam                 Anesthesia Plan    ASA 2       Plan - general               Induction: intravenous    Postoperative Plan: Postoperative administration of opioids is intended.    Pertinent diagnostic labs and testing reviewed    Informed Consent:    Anesthetic plan and risks discussed with patient.    Use of blood products discussed with: patient whom consented to blood products.

## 2021-11-18 NOTE — ANESTHESIA PROCEDURE NOTES
Peripheral Block    Date/Time: 11/18/2021 2:53 PM  Performed by: Juno Watts M.D.  Authorized by: Juno Watts M.D.     Start Time:  11/18/2021 2:53 PM  Reason for Block: at surgeon's request and post-op pain management ONLY    patient identified, IV checked, site marked, risks and benefits discussed, surgical consent, monitors and equipment checked, pre-op evaluation and timeout performed    Patient Position:  Supine  Prep: ChloraPrep    Monitoring:  Heart rate, continuous pulse ox and cardiac monitor  Block Region:  Upper Extremity  Upper Extremity - Block Type:  BRACHIAL PLEXUS block, Interscalene approach    Laterality:  Right  Procedures: ultrasound guided  Image captured, interpreted and electronically stored.  Local Infiltration:  Lidocaine  Strength:  1 %  Dose:  3 ml  Block Type:  Single-shot  Needle Length:  50mm  Needle Gauge:  22 G  Needle Localization:  Ultrasound guidance  Injection Assessment:  Negative aspiration for heme, no paresthesia on injection, incremental injection and local visualized surrounding nerve on ultrasound

## 2021-11-18 NOTE — OR NURSING
1220 Procedure, patient allergies and NPO status verified. Home med rec completed and belongings secured. Patient verbalizes understanding of pain scale, expected course of stay and plan of care. IV access established. SCD and Ralph hose placed on bilateral calves. Triple aim completed by Adan PORTILLO.    1330 Preop complete.

## 2021-11-18 NOTE — LETTER
November 10, 2021    Patient Name: Rogelio Escudero  Surgeon Name: Arnulfo Valentin M.D.  Surgery Facility: [facility] ([address] [facility])  Surgery Date: 11/18/2021    The time of your surgery is not final and may change up to and until the day of your surgery. You will be contacted 24-48 hours prior to your surgery date with your check-in and surgery time.    If you will not be at one of the below numbers please call/text the surgery scheduler at [phone]  Preferred Phone: [phone]    BEFORE YOUR SURGERY  Pre Registration and/or Lab Work must be done within and no earlier than 28 days prior to your surgery date. Please call [facility] at [phone] for an appointment as soon as possible.     Pre op Appointment:   Date: 11/17/21   Time: 0915   Provider: Surgeon(s):  Arnulfo Valentin M.D.   Location: [address] [place] [address]  Instructions: Bring a list of all medications you are taking including the dosing and frequency.      Please refrain from smoking any substance after midnight prior to surgery. Smoking may interfere with the anesthetic and frequently produces nausea during the recovery period.    Continue taking all lifesaving medications. Including the morning of your surgery with small sip of water.    Please read the MEDICATION INSTRUCTIONS below completely.    DAY OF YOUR SURGERY  Nothing to eat or drink after midnight     Please arrive at the hospital/surgery center at the check-in time provided.     An adult will need to bring you and take you home after your surgery.     AFTER YOUR SURGERY  Post op Appointment:   Date: 12/01/21   Time: 3:00   With: Surgeon(s):  Arnulfo Valentin M.D.   Location: [address] [place] [address]    - Therapy- Your first appointment should be 6  week(s) after your surgery. For your convenience we have 4 Physical Therapy locations: [facility], [facility], and [facility]  [place]. Call our office ASAP to schedule an appointment at [phone] or take the enclosed Therapy Prescription to a facility of your choice.    TIME OFF WORK  FMLA or Disability forms can be faxed directly to: [phone] or you may drop them off at [address] [place] [address]. Our office charges a $35.00 fee per form. Forms will be completed within 10 business days of drop off and payment received. For the status of your forms you may contact our disability office directly [phone].    MEDICATION INSTRUCTIONS  The following medications should be stopped a minimum of 10 days prior to surgery:  All over the counter, Supplements & Herbal medications    Anorectics: Phentermine (Adipex-P, Lomaira and Suprenza), Phentermine-topiramate (Qsymia), Bupropion-naltrexone (Contrave)    Opiod Partial Agonists/Opioid Antagonists: Buprenorphine (Subocone, Belbuca, Butrans, Probuphine Implant, Sublocade), Naltrexone (ReVia, Vivitrol), Naloxone    Amphetamines: Dextroamphetamine/Amphetamine (Adderall, Mydayis), Methylphenidate Hydrochloride (Concerta, Metadate, Methylin, Ritalin)    The following medications should be stopped 5 days prior to surgery:  Blood Thinners: Any Aspirin, Aspirin products, anti-inflammatories such as ibuprofen and any blood thinners such as Coumadin and Plavix. Please consult your prescribing physician if you are on life saving blood thinners, in regards to when to stop medications prior to surgery.     The following medications should be stopped a minimum of 3 days prior to surgery:  PDE-5 inhibitors: Sildenafil (Viagra), Tadalafil (Cialis), Vardenafil (Levitra), Avanafil (Stendra)    MAO Inhibitors: Rasagiline (Azilect), Selegiline (Eldepryl, Emsam, Selapar), Isocarboxazid (Marplan), Phenelzine (Nardil)

## 2021-11-19 NOTE — ANESTHESIA POSTPROCEDURE EVALUATION
Patient: Rogelio Escudero    Procedure Summary     Date: 11/18/21 Room / Location:  OR 06 / SURGERY [facility]    Anesthesia Start: 1500 Anesthesia Stop: 1650    Procedures:       ARTHROSCOPY, SHOULDER, WITH ROTATOR CUFF REPAIR (Right Shoulder)      DECOMPRESSION, SHOULDER, ARTHROSCOPIC (Right Shoulder)      ARTHROSCOPY, SHOULDER, WITH BICEPS TENODESIS (Right Shoulder)      ARTHROSCOPIC LABRAL DEBRIDEMENT (Right Shoulder) Diagnosis:       Rotator cuff injury, right, initial encounter      (Rotator cuff injury, right, initial encounter [S46.001A])    Surgeons: Arnulfo Valentin M.D. Responsible Provider: Juno Watts M.D.    Anesthesia Type: general ASA Status: 2          Final Anesthesia Type: general  Last vitals  BP   Blood Pressure: 125/81    Temp   36.3 °C (97.3 °F)    Pulse   75   Resp   20    SpO2   98 %      Anesthesia Post Evaluation    Patient location during evaluation: PACU  Patient participation: complete - patient participated  Level of consciousness: awake and alert  Pain score: 2    Airway patency: patent  Anesthetic complications: no  Cardiovascular status: hemodynamically stable  Respiratory status: acceptable  Hydration status: euvolemic    PONV: none          No complications documented.     Nurse Pain Score: 3 (NPRS)

## 2021-11-19 NOTE — OR NURSING
1741 PT RECEIVED IN STAGE 2, REPORT RECEIVED.  PT DRESSED IN PACU, TRANSFERRED TO RECLINER.  SPO2 DROPS TO 86% RA. PT CONTINUES IS. PULLS >2500ML WITHOUT DIFFICULTY.      1815 SPO2 DROPS TO 86% RA. DB&C INSTRUCTIONS GIVEN TO PT WITH RETURN DEMONSTRATION. PT ABLE TO COUGH UP THICK SECRETION 2 TIMES. SPO2 INCREASES TO 94%.      1845 MONITOR SPO2 >94% FOR 30 MINUTES AT REST.   1914 PT MEETS CRITERIA FOR D/C TO HOME

## 2021-11-19 NOTE — ANESTHESIA TIME REPORT
Anesthesia Start and Stop Event Times     Date Time Event    11/18/2021 1335 Ready for Procedure     1500 Anesthesia Start     1650 Anesthesia Stop        Responsible Staff  11/18/21    Name Role Begin End    Juon Watts M.D. Anesth 1500 1650        Preop Diagnosis (Free Text):  Pre-op Diagnosis     Rotator cuff injury, right, initial encounter [S46.001A]        Preop Diagnosis (Codes):  Diagnosis Information     Diagnosis Code(s): Rotator cuff injury, right, initial encounter [S46.001A]        Premium Reason  A. 3PM - 7AM    Comments: JOVANY HILL

## 2021-11-19 NOTE — OP REPORT
DATE OF SERVICE: 11/18/2021     SURGEON:  Arnulfo Valentin MD     ASSISTANT:  DILIA Suarez    ANESTHESIOLOGIST:  Juno Watts M.D.      ANESTHESIA:  General anesthesia with singe shot interscalene block.     PREOPERATIVE DIAGNOSES:  1.  Right shoulder full-thickness rotator cuff tear  2.  Right shoulder subacromial impingement/bursitis  3.  Right shoulder labrum/SLAP tear  4.  Right shoulder biceps tenosynovitis     POSTOPERATIVE DIAGNOSES:  1.  Right shoulder full-thickness rotator cuff tear  2.  Right shoulder subacromial impingement/bursitis  3.  Right shoulder labrum/SLAP tear  4.  Right shoulder biceps tenosynovitis  5.  Right shoulder synovitis     PROCEDURES PERFORMED:  1.  Right shoulder arthroscopy  2.  Right shoulder rotator cuff repair  3.  Right shoulder subacromial decompression  4.  Right shoulder labral debridement  5.  Right shoulder biceps tenotomy and open subpectoral tenodesis  6.  Right shoulder synovectomy    IMPLANTS:  1.  Bobby Iconix 2 double-loaded anchor x 1  2.  Bobby Iconix 3 triple-loaded anchor x 2     INFORMED CONSENT:  The patient was informed of the risks, benefits, and alternatives to the planned operation.  The risks include but are not limited to bleeding, infection, neurovascular damage, recurrent rotator cuff tear, osteoarthritis/cartilage damage, pain, stiffness, DVT, PE, MI, stroke, and death.  Advanced directives were reviewed.  After answering all questions, the patient elected to proceed with the planned operation and an informed consent form was signed.     PROCEDURE:  The patient was identified in the preoperative holding area.  The correct procedural side and site were identified and marked.  The patient was then brought to the operating room and transferred to the operating room table.  An interscalene nerve block was performed by the anesthesia team followed by a general anesthesia.      The patient was then carefully placed in the beach-chair  position with all bony prominences being well padded.  The shoulder was cleaned with several alcohol-soaked gauzes and the subacromial space injected with 30 mL of 1% lidocaine with epinephrine.  The upper extremity was then prepped and draped in a normal standard sterile fashion.     A procedural pause was performed with the operating room team.  The procedure, patient's identity, operative side, surgical site, and procedure to be performed were all verified.  The patient was given IV antibiotics prior to incision.    I then began with a diagnostic arthroscopy via standard posterior and anterior portals.  There was some diffuse synovitis throughout the glenohumeral joint.  Examination of the labrum demonstrated extensive tearing of the posterior, superior, and anterior labrum.  No obvious loose bodies or soft tissue debris withinthe inferior axillary recess.  Examination of the glenoid demonstrated well-preserved cartilage and examination of the humeral head demonstrated well-preserved cartilage.  Examination of the long head of the biceps demonstrated extensive erythema along its superior border as it exited the joint.  Examination of the subscapularis demonstrated no obvious tears.  Examination of the articular side of the rotator cuff demonstrated a full-thickness tear of the supraspinatus tendon.  Examination of the subacromial space demonstrated some extensive inflammation and synovitis of the subacromial bursa.  There was a prominent anterolateral acromial spur.  Examination of the bursa of the rotator cuff demonstrated a full-thickness tear of the supraspinatus tendon.  The tear measured approximately 1.2 cm in the anterior-posterior direction.       I first turned my attention to the synovitis throughout the glenohumeral joint.  A synovectomy was performed using the oscillating suction shaver device and electrocautery wand.  I then debrided the posterior, superior, and anterior labrum.    A biceps tenotomy  was performed at its insertion onto the superior labrum and the tendon was allowed to retract distally out of the joint.  The remaining stump of tissue was debrided.  All instruments were then removed from the joint.    A 2.5 cm longitudinal incision was made over the anteromedial aspect of the upper arm.  Dissection was carried down through the subcutaneous tissue and the underlying fascia was incised.  The long head of the biceps tendon was identified and pulled out of the wound.  The proximal part of the tendon was excised, leaving approximately 1.5 cm of tendon intact.  A single Bobby Iconix 2 anchor was placed in the bone of the proximal humerus at the distal part of the bicipital groove, immediately posterior to the inferior border of the pectoralis major tendon.  The remaining biceps was secured resulting in excellent fixation and recreation of the normal length-tension relationship.  The wound was copiously irrigated and meticulous hemostasis obtained.  All gloves were changed.     I then placed the trocar and cannula back into the posterior portal and into the subacromial space followed by a 30-degree arthroscope.  Once in the subacromial space, an accessory portal off the lateral aspect of the acromion was immediately created under direct arthroscopic visualization.  A full bursectomy was then performed.  The undersurface of the acromion was identified and an anterolateral acromioplasty was performed, utilizing a cutting block technique.    I then turned my attention back to the rotator cuff tear.  The tear measured approximately 1.2-1.3 cm in the anterior-posterior direction and had a crescent pattern.  As a result, I elected to perform a formal rotator cuff repair.  I first debrided the tuberosity of all remaining soft tissue and then utilized the arthroscopic bur to create a healthy bed of bleeding bone.  I then created a more anterior lateral portal and placed an 8.25 mm cannula.  I then  percutaneously placed 2 Bobby Iconix 3 triple-loaded anchors on to the medial part of the tuberosity, immediately adjacent to the articular cartilage.  The sutures were then passed through the rotator cuff, immediately lateral to the myotendinous junction.  We then punched additional holes in the lateral tuberosity with an arthroscopic microfracture awl, utilizing a crimson duvet technique.  The sutures were then sequentially tied, resulting in excellent fixation of the rotator cuff back to its footprint on the tuberosity without excessive tension.        All incisions were copiously irrigated.  The portal incisions were closed with simple 3-0 Prolene suture followed by Steri-Strips.  The tenodesis wound was then closed in a layered fashion with Monocryl followed by Steri-Strips.  Xeroform was placed over all incisions followed by a sterile compressive dressing.  The shoulder was then placed in a well padded shoulder abduction sling.     Needle and sponge counts were correct at the end of the procedure as reported to the surgeon by the circulating nurse.  The patient tolerated the procedure well with no obvious intraoperative complications.  The patient was then transferred off the operating room table on to a regular hospital bed.  The patient was extubated by the anesthesia team.      ESTIMATED BLOOD LOSS:   10 mL     COMPLICATIONS:  None     TOURNIQUET TIME:  None     SPECIMENS:  None     WOUND TYPE:  Type 1 clean     POSTOPERATIVE PLAN:  The patient will be transferred back to the postoperative unit.  I expect the patient will be discharged from the hospital later today once mobilizing safely and tolerating oral medications.  The patient will be nonweightbearing with the upper extremity, and we will start physical therapy in approximately 6 weeks.

## 2021-11-19 NOTE — DISCHARGE INSTRUCTIONS
ACTIVITY: Rest and take it easy for the first 24 hours.  A responsible adult is recommended to remain with you during that time.  It is normal to feel sleepy.  We encourage you to not do anything that requires balance, judgment or coordination.    MILD FLU-LIKE SYMPTOMS ARE NORMAL. YOU MAY EXPERIENCE GENERALIZED MUSCLE ACHES, THROAT IRRITATION, HEADACHE AND/OR SOME NAUSEA.    FOR 24 HOURS DO NOT:  Drive, operate machinery or run household appliances.  Drink beer or alcoholic beverages.   Make important decisions or sign legal documents.    DIET: To avoid nausea, slowly advance diet as tolerated, avoiding spicy or greasy foods for the first day.  Add more substantial food to your diet according to your physician's instructions.  Babies can be fed formula or breast milk as soon as they are hungry.  INCREASE FLUIDS AND FIBER TO AVOID CONSTIPATION.      FOLLOW-UP APPOINTMENT:  A follow-up appointment should be arranged with your doctor; call to schedule.    You should CALL YOUR PHYSICIAN if you develop:  Fever greater than 101 degrees F.  Pain not relieved by medication, or persistent nausea or vomiting.  Excessive bleeding (blood soaking through dressing) or unexpected drainage from the wound.  Extreme redness or swelling around the incision site, drainage of pus or foul smelling drainage.  Inability to urinate or empty your bladder within 8 hours.  Problems with breathing or chest pain.    You should call 911 if you develop problems with breathing or chest pain.  If you are unable to contact your doctor or surgical center, you should go to the nearest emergency room or urgent care center.     If any questions arise, call your doctor.  If your doctor is not available, please feel free to call the Surgical Center at [phone]. The Contact Center is open Monday through Friday 7AM to 5PM and may speak to a nurse at [phone], or toll free at [phone].     A registered nurse may call you a few days after  your surgery to see how you are doing after your procedure.    MEDICATIONS: Resume taking daily medication.  Take prescribed pain medication with food.  If no medication is prescribed, you may take non-aspirin pain medication if needed.  PAIN MEDICATION CAN BE VERY CONSTIPATING.  Take a stool softener or laxative such as senokot, pericolace, or milk of magnesia if needed.    Prescription given for n/a.  Last pain medication given at n/a.    If your physician has prescribed pain medication that includes Acetaminophen (Tylenol), do not take additional Acetaminophen (Tylenol) while taking the prescribed medication.    Depression / Suicide Risk    As you are discharged from this formerly [facility] facility, it is important to learn how to keep safe from harming yourself.    Recognize the warning signs:  · Abrupt changes in personality, positive or negative- including increase in energy   · Giving away possessions  · Change in eating patterns- significant weight changes-  positive or negative  · Change in sleeping patterns- unable to sleep or sleeping all the time   · Unwillingness or inability to communicate  · Depression  · Unusual sadness, discouragement and loneliness  · Talk of wanting to die  · Neglect of personal appearance   · Rebelliousness- reckless behavior  · Withdrawal from people/activities they love  · Confusion- inability to concentrate     If you or a loved one observes any of these behaviors or has concerns about self-harm, here's what you can do:  · Talk about it- your feelings and reasons for harming yourself  · Remove any means that you might use to hurt yourself (examples: pills, rope, extension cords, firearm)  · Get professional help from the community (Mental Health, Substance Abuse, psychological counseling)  · Do not be alone:Call your Safe Contact- someone whom you trust who will be there for you.  · Call your local CRISIS HOTLINE 595-4130 or 259-478-1643  · Call your local Children's Mobile Crisis  "Response Team [facility] (620) 955-3903 or www.[employer]  · Call the toll free National Suicide Prevention Hotlines   · National Suicide Prevention Lifeline 507-721-MLGN (5526)  [place] [facility] 800-SUICIDE (758-2316)    Peripheral Nerve Block Discharge Instructions from Same Day Surgery and Inpatient :    What to Expect - Upper Extremity  · You may experience numbness and weakness in shoulder, arm and hand  on the same side as your surgery  · This is normal. For some people, this may be an unpleasant sensation. Be very careful with your numb limb  · Ask for help when you need it  Shoulder Surgery Side Effects  · In addition to numbness and weakness you may experience other symptoms  · Other nerves that are close to those nerves injected can also be affected by local anesthesia  · You may experience a hoarseness in your voice  · Your breathing may feel different  · You may also notice drooping of your eyelid, pupil constriction, and decreased sweating, on the side of your surgery  · All of these side effects are normal and will resolve when the local anesthetic wears off   Prevent Injury  · Protect the limb like a baby  · Beware of exposing your limb to extreme heat or cold or trauma  · The limb may be injured without you noticing because it is numb  · Keep the limb elevated whenever possible  · Do not sleep on the limb  · Change the position of the limb regularly  · Avoid putting pressure on your surgical limb  Pain Control  · The initial block on the day of surgery will make your extremity feel \"numb\"  · Any consecutive injection including prior to discharge from the hospital will make your extremity feel \"numb\"  · You may feel an aching or burning when the local anesthesia starts to wear off  · Take pain pills as prescribed by your surgeon  · Call your surgeon or anesthesiologist if you do not have adequate pain control  ·   "

## 2021-11-19 NOTE — DISCHARGE INSTR - OTHER INFO
Dr. Valentin Discharge Instructions  (Shoulder and Elbow)    Contact Info: If you have any questions or concerns, please contact St[facility] at [phone] during normal business hours (Monday-Friday: 8:00am-5:00pm) or the main office number at [phone] after normal business hours.     Diet: Resume your regular diet slowly and as tolerated.      Icing/Elevating: Apply ice to the operative shoulder or elbow near full time for the first 5 days following surgery.  Be sure to have a surgical dressing or towel between the ice and skin.  After 5 days, you should apply ice for only 10 minutes, 2-3 times per day.      Medications: Resume your home medications as normal, unless otherwise instructed.    Narcotic pain medication (Percocet, Norco, Oxycodone, etc.): Take the narcotic pain medication as needed for pain, as instructed on the medication bottle.  DO NOT drive, drink alcohol, or sign important documents while taking narcotic pain medications.    Anti-nausea medication (Zofran/Ondansetron, Phenergan, etc.): Take the anti-nausea medication if you are feeling ill or nauseated after surgery.      Regional Nerve Blocks: You may have a regional nerve block that was performed by the Anesthesia team prior to surgery to help with postoperative pain control.  The nerve block usually lasts 12-18 hours, but may last up to 24 hours.  You will know the nerve block is wearing off when you begin to have increasing discomfort or soreness around the shoulder.  Be sure to start taking the prescribed narcotic pain medication within a few hours after surgery and/or as soon as you feel soreness around the operative shoulder.      Showering/Dressing: You may remove the surgical dressing 3 DAYS AFTER SURGERY.   You will see Steri-Strips (white stickers) covering the sutures and incisions - these will stay on until your first postoperative appointment.  Once the surgical dressing has been removed, you may shower as normal.   Let water run freely over the incisions and then pat the shoulder dry with a clean towel.  You may then leave the shoulder open to the air, or you may cover the shoulder with a simple fresh dry dressing or ACE wrap.  DO NOT scrub the incisions.  DO NOT apply soap, ointments, lotions, or Bacitracin on the incisions for at least 6 weeks after surgery.  DO NOT submerge the operative shoulder in a bath, pool, river, hot tub, lake, etc. for at least 6 weeks after surgery.      Driving: You may resume driving when you feel comfortable and safe doing so and when you are off all narcotic pain medications.        SPECIAL INSTRUCTIONS    Rotator Cuff Repair  Sling: A shoulder abduction sling will be worn at all times for a total of 6 weeks following surgery.  The sling should be worn at all times except for showering and specific exercises.    Activity: You will remain non-weightbearing with the operative extremity for 3 months following surgery.  Dr. Valentin will provide instructions about specific exercises that should be performed at home.  Recovery: Return to unrestricted activities is expected at 6-12 months.  Follow-Up: You will be seen back in the clinic for your first postoperative appointment approximately 7-10 days after surgery.  Your sutures will be removed at this time and the Steri-Strips replaced.    Physical Therapy: Physical therapy will be started approximately 6 weeks after surgery, unless instructed otherwise.  This appointment should already be scheduled (appointment time and date will be in your surgery letter/packet if scheduled at [facility], or the physical therapy prescription will be provided to you if going to an outside physical therapy practice).      PROBLEMS  Reasons to contact Dr. Valentin's office immediately include:  Fevers over 101.3°F (38.5°C) consistently, chills, sweats   Increased drainage from or swelling around the incisions  Excessive bleeding (dressing is saturated)  Excessive  redness around incisions  Discomfort and/or swelling in the lower leg and calf  Chest pain and/or shortness of breath  Pain not controlled by pain medication  Swelling that is accompanied by coolness or decreased sensation in the shoulder, arm forearm, wrist, or hand  Persistent nausea or vomiting
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
## 2022-11-16 ENCOUNTER — APPOINTMENT (OUTPATIENT)
Dept: RADIOLOGY | Facility: MEDICAL CENTER | Age: 47
End: 2022-11-16
Attending: EMERGENCY MEDICINE
Payer: COMMERCIAL

## 2022-11-16 ENCOUNTER — APPOINTMENT (OUTPATIENT)
Dept: CARDIOLOGY | Facility: MEDICAL CENTER | Age: 47
End: 2022-11-16
Attending: HOSPITALIST
Payer: COMMERCIAL

## 2022-11-16 ENCOUNTER — HOSPITAL ENCOUNTER (OUTPATIENT)
Facility: MEDICAL CENTER | Age: 47
End: 2022-11-18
Attending: EMERGENCY MEDICINE | Admitting: HOSPITALIST
Payer: COMMERCIAL

## 2022-11-16 DIAGNOSIS — F32.89 OTHER DEPRESSION: ICD-10-CM

## 2022-11-16 DIAGNOSIS — I25.10 CORONARY ARTERY DISEASE INVOLVING NATIVE HEART, UNSPECIFIED VESSEL OR LESION TYPE, UNSPECIFIED WHETHER ANGINA PRESENT: ICD-10-CM

## 2022-11-16 DIAGNOSIS — E11.42 TYPE 2 DIABETES MELLITUS WITH DIABETIC POLYNEUROPATHY, WITHOUT LONG-TERM CURRENT USE OF INSULIN (HCC): ICD-10-CM

## 2022-11-16 DIAGNOSIS — R73.9 HYPERGLYCEMIA: ICD-10-CM

## 2022-11-16 DIAGNOSIS — S46.001A ROTATOR CUFF INJURY, RIGHT, INITIAL ENCOUNTER: ICD-10-CM

## 2022-11-16 DIAGNOSIS — J18.9 PNEUMONIA OF BOTH LOWER LOBES DUE TO INFECTIOUS ORGANISM: ICD-10-CM

## 2022-11-16 DIAGNOSIS — R06.02 SHORTNESS OF BREATH: ICD-10-CM

## 2022-11-16 DIAGNOSIS — R07.89 OTHER CHEST PAIN: ICD-10-CM

## 2022-11-16 DIAGNOSIS — R07.81 PLEURITIC CHEST PAIN: Primary | ICD-10-CM

## 2022-11-16 DIAGNOSIS — I30.9 ACUTE PERICARDITIS, UNSPECIFIED TYPE: ICD-10-CM

## 2022-11-16 PROBLEM — R07.9 CHEST PAIN: Status: ACTIVE | Noted: 2022-11-16

## 2022-11-16 PROBLEM — R82.5 POSITIVE URINE DRUG SCREEN: Status: ACTIVE | Noted: 2022-11-16

## 2022-11-16 LAB
ALBUMIN SERPL BCP-MCNC: 4.1 G/DL (ref 3.2–4.9)
ALBUMIN/GLOB SERPL: 1 G/DL
ALP SERPL-CCNC: 139 U/L (ref 30–99)
ALT SERPL-CCNC: 10 U/L (ref 2–50)
AMPHET UR QL SCN: POSITIVE
ANION GAP SERPL CALC-SCNC: 17 MMOL/L (ref 7–16)
APTT PPP: 25.6 SEC (ref 24.7–36)
AST SERPL-CCNC: 10 U/L (ref 12–45)
BARBITURATES UR QL SCN: NEGATIVE
BASOPHILS # BLD AUTO: 0.6 % (ref 0–1.8)
BASOPHILS # BLD: 0.08 K/UL (ref 0–0.12)
BENZODIAZ UR QL SCN: NEGATIVE
BILIRUB SERPL-MCNC: 0.3 MG/DL (ref 0.1–1.5)
BUN SERPL-MCNC: 16 MG/DL (ref 8–22)
BZE UR QL SCN: POSITIVE
CALCIUM SERPL-MCNC: 9.6 MG/DL (ref 8.5–10.5)
CANNABINOIDS UR QL SCN: NEGATIVE
CHLORIDE SERPL-SCNC: 94 MMOL/L (ref 96–112)
CO2 SERPL-SCNC: 19 MMOL/L (ref 20–33)
CREAT SERPL-MCNC: 0.73 MG/DL (ref 0.5–1.4)
CRP SERPL HS-MCNC: 15.2 MG/DL (ref 0–0.75)
EKG IMPRESSION: NORMAL
EOSINOPHIL # BLD AUTO: 0.36 K/UL (ref 0–0.51)
EOSINOPHIL NFR BLD: 2.6 % (ref 0–6.9)
ERYTHROCYTE [DISTWIDTH] IN BLOOD BY AUTOMATED COUNT: 49.2 FL (ref 35.9–50)
ERYTHROCYTE [SEDIMENTATION RATE] IN BLOOD BY WESTERGREN METHOD: 21 MM/HOUR (ref 0–20)
ETHANOL BLD-MCNC: 13.4 MG/DL
GFR SERPLBLD CREATININE-BSD FMLA CKD-EPI: 111 ML/MIN/1.73 M 2
GLOBULIN SER CALC-MCNC: 4.3 G/DL (ref 1.9–3.5)
GLUCOSE SERPL-MCNC: 332 MG/DL (ref 65–99)
HCT VFR BLD AUTO: 42.2 % (ref 42–52)
HGB BLD-MCNC: 14.2 G/DL (ref 14–18)
IMM GRANULOCYTES # BLD AUTO: 0.07 K/UL (ref 0–0.11)
IMM GRANULOCYTES NFR BLD AUTO: 0.5 % (ref 0–0.9)
INR PPP: 1.06 (ref 0.87–1.13)
LV EJECT FRACT MOD 2C 99903: 65.11
LV EJECT FRACT MOD 4C 99902: 52.4
LV EJECT FRACT MOD BP 99901: 54.31
LYMPHOCYTES # BLD AUTO: 1.86 K/UL (ref 1–4.8)
LYMPHOCYTES NFR BLD: 13.5 % (ref 22–41)
MCH RBC QN AUTO: 29.8 PG (ref 27–33)
MCHC RBC AUTO-ENTMCNC: 33.6 G/DL (ref 33.7–35.3)
MCV RBC AUTO: 88.5 FL (ref 81.4–97.8)
METHADONE UR QL SCN: NEGATIVE
MONOCYTES # BLD AUTO: 1.53 K/UL (ref 0–0.85)
MONOCYTES NFR BLD AUTO: 11.1 % (ref 0–13.4)
NEUTROPHILS # BLD AUTO: 9.86 K/UL (ref 1.82–7.42)
NEUTROPHILS NFR BLD: 71.7 % (ref 44–72)
NRBC # BLD AUTO: 0 K/UL
NRBC BLD-RTO: 0 /100 WBC
OPIATES UR QL SCN: POSITIVE
OXYCODONE UR QL SCN: NEGATIVE
PCP UR QL SCN: NEGATIVE
PLATELET # BLD AUTO: 352 K/UL (ref 164–446)
PMV BLD AUTO: 8.8 FL (ref 9–12.9)
POTASSIUM SERPL-SCNC: 4.3 MMOL/L (ref 3.6–5.5)
PROPOXYPH UR QL SCN: NEGATIVE
PROT SERPL-MCNC: 8.4 G/DL (ref 6–8.2)
PROTHROMBIN TIME: 13.7 SEC (ref 12–14.6)
RBC # BLD AUTO: 4.77 M/UL (ref 4.7–6.1)
SODIUM SERPL-SCNC: 130 MMOL/L (ref 135–145)
TROPONIN T SERPL-MCNC: 15 NG/L (ref 6–19)
TROPONIN T SERPL-MCNC: 19 NG/L (ref 6–19)
TROPONIN T SERPL-MCNC: 20 NG/L (ref 6–19)
WBC # BLD AUTO: 13.8 K/UL (ref 4.8–10.8)

## 2022-11-16 PROCEDURE — 93005 ELECTROCARDIOGRAM TRACING: CPT | Performed by: EMERGENCY MEDICINE

## 2022-11-16 PROCEDURE — 99285 EMERGENCY DEPT VISIT HI MDM: CPT

## 2022-11-16 PROCEDURE — 96375 TX/PRO/DX INJ NEW DRUG ADDON: CPT

## 2022-11-16 PROCEDURE — 700105 HCHG RX REV CODE 258: Performed by: EMERGENCY MEDICINE

## 2022-11-16 PROCEDURE — 85610 PROTHROMBIN TIME: CPT

## 2022-11-16 PROCEDURE — 85730 THROMBOPLASTIN TIME PARTIAL: CPT

## 2022-11-16 PROCEDURE — 82962 GLUCOSE BLOOD TEST: CPT | Mod: 91

## 2022-11-16 PROCEDURE — 85652 RBC SED RATE AUTOMATED: CPT

## 2022-11-16 PROCEDURE — 80053 COMPREHEN METABOLIC PANEL: CPT

## 2022-11-16 PROCEDURE — 93306 TTE W/DOPPLER COMPLETE: CPT

## 2022-11-16 PROCEDURE — 84484 ASSAY OF TROPONIN QUANT: CPT

## 2022-11-16 PROCEDURE — 700111 HCHG RX REV CODE 636 W/ 250 OVERRIDE (IP): Performed by: EMERGENCY MEDICINE

## 2022-11-16 PROCEDURE — A9270 NON-COVERED ITEM OR SERVICE: HCPCS | Performed by: EMERGENCY MEDICINE

## 2022-11-16 PROCEDURE — 87040 BLOOD CULTURE FOR BACTERIA: CPT

## 2022-11-16 PROCEDURE — G0378 HOSPITAL OBSERVATION PER HR: HCPCS

## 2022-11-16 PROCEDURE — 700102 HCHG RX REV CODE 250 W/ 637 OVERRIDE(OP): Performed by: EMERGENCY MEDICINE

## 2022-11-16 PROCEDURE — 71045 X-RAY EXAM CHEST 1 VIEW: CPT

## 2022-11-16 PROCEDURE — 99223 1ST HOSP IP/OBS HIGH 75: CPT | Performed by: HOSPITALIST

## 2022-11-16 PROCEDURE — 700102 HCHG RX REV CODE 250 W/ 637 OVERRIDE(OP): Performed by: HOSPITALIST

## 2022-11-16 PROCEDURE — 85025 COMPLETE CBC W/AUTO DIFF WBC: CPT

## 2022-11-16 PROCEDURE — 700111 HCHG RX REV CODE 636 W/ 250 OVERRIDE (IP): Performed by: HOSPITALIST

## 2022-11-16 PROCEDURE — 93005 ELECTROCARDIOGRAM TRACING: CPT

## 2022-11-16 PROCEDURE — 86140 C-REACTIVE PROTEIN: CPT

## 2022-11-16 PROCEDURE — 96365 THER/PROPH/DIAG IV INF INIT: CPT

## 2022-11-16 PROCEDURE — 71275 CT ANGIOGRAPHY CHEST: CPT

## 2022-11-16 PROCEDURE — A9270 NON-COVERED ITEM OR SERVICE: HCPCS | Performed by: HOSPITALIST

## 2022-11-16 PROCEDURE — 96372 THER/PROPH/DIAG INJ SC/IM: CPT

## 2022-11-16 PROCEDURE — 93005 ELECTROCARDIOGRAM TRACING: CPT | Performed by: HOSPITALIST

## 2022-11-16 PROCEDURE — 93306 TTE W/DOPPLER COMPLETE: CPT | Mod: 26 | Performed by: STUDENT IN AN ORGANIZED HEALTH CARE EDUCATION/TRAINING PROGRAM

## 2022-11-16 PROCEDURE — 96376 TX/PRO/DX INJ SAME DRUG ADON: CPT

## 2022-11-16 PROCEDURE — 700117 HCHG RX CONTRAST REV CODE 255: Performed by: EMERGENCY MEDICINE

## 2022-11-16 PROCEDURE — 82077 ASSAY SPEC XCP UR&BREATH IA: CPT

## 2022-11-16 PROCEDURE — 36415 COLL VENOUS BLD VENIPUNCTURE: CPT

## 2022-11-16 PROCEDURE — 80307 DRUG TEST PRSMV CHEM ANLYZR: CPT

## 2022-11-16 RX ORDER — BUSPIRONE HYDROCHLORIDE 10 MG/1
10 TABLET ORAL 2 TIMES DAILY
COMMUNITY
End: 2023-07-05

## 2022-11-16 RX ORDER — ATORVASTATIN CALCIUM 40 MG/1
40 TABLET, FILM COATED ORAL DAILY
Status: DISCONTINUED | OUTPATIENT
Start: 2022-11-16 | End: 2022-11-16

## 2022-11-16 RX ORDER — ATORVASTATIN CALCIUM 40 MG/1
40 TABLET, FILM COATED ORAL
Status: DISCONTINUED | OUTPATIENT
Start: 2022-11-16 | End: 2022-11-18 | Stop reason: HOSPADM

## 2022-11-16 RX ORDER — POLYETHYLENE GLYCOL 3350 17 G/17G
1 POWDER, FOR SOLUTION ORAL
Status: DISCONTINUED | OUTPATIENT
Start: 2022-11-16 | End: 2022-11-18 | Stop reason: HOSPADM

## 2022-11-16 RX ORDER — ACETAMINOPHEN 325 MG/1
650 TABLET ORAL EVERY 6 HOURS PRN
Status: DISCONTINUED | OUTPATIENT
Start: 2022-11-16 | End: 2022-11-18 | Stop reason: HOSPADM

## 2022-11-16 RX ORDER — AMOXICILLIN 250 MG
2 CAPSULE ORAL 2 TIMES DAILY
Status: DISCONTINUED | OUTPATIENT
Start: 2022-11-16 | End: 2022-11-18 | Stop reason: HOSPADM

## 2022-11-16 RX ORDER — AMITRIPTYLINE HYDROCHLORIDE 10 MG/1
10 TABLET, FILM COATED ORAL
Status: DISCONTINUED | OUTPATIENT
Start: 2022-11-16 | End: 2022-11-16

## 2022-11-16 RX ORDER — MORPHINE SULFATE 4 MG/ML
4 INJECTION INTRAVENOUS ONCE
Status: COMPLETED | OUTPATIENT
Start: 2022-11-16 | End: 2022-11-16

## 2022-11-16 RX ORDER — COLCHICINE 0.6 MG/1
0.6 TABLET ORAL DAILY
Status: DISCONTINUED | OUTPATIENT
Start: 2022-11-17 | End: 2022-11-18 | Stop reason: HOSPADM

## 2022-11-16 RX ORDER — ONDANSETRON 2 MG/ML
4 INJECTION INTRAMUSCULAR; INTRAVENOUS ONCE
Status: COMPLETED | OUTPATIENT
Start: 2022-11-16 | End: 2022-11-16

## 2022-11-16 RX ORDER — DULOXETIN HYDROCHLORIDE 60 MG/1
60 CAPSULE, DELAYED RELEASE ORAL DAILY
Status: DISCONTINUED | OUTPATIENT
Start: 2022-11-16 | End: 2022-11-16

## 2022-11-16 RX ORDER — COLCHICINE 0.6 MG/1
1.2 TABLET ORAL ONCE
Status: COMPLETED | OUTPATIENT
Start: 2022-11-16 | End: 2022-11-16

## 2022-11-16 RX ORDER — LISINOPRIL 10 MG/1
10 TABLET ORAL DAILY
Status: DISCONTINUED | OUTPATIENT
Start: 2022-11-16 | End: 2022-11-18 | Stop reason: HOSPADM

## 2022-11-16 RX ORDER — COLCHICINE 0.6 MG/1
0.6 TABLET ORAL ONCE
Status: DISPENSED | OUTPATIENT
Start: 2022-11-16 | End: 2022-11-17

## 2022-11-16 RX ORDER — CYCLOBENZAPRINE HCL 10 MG
5 TABLET ORAL 3 TIMES DAILY PRN
Status: DISCONTINUED | OUTPATIENT
Start: 2022-11-16 | End: 2022-11-17

## 2022-11-16 RX ORDER — ALUMINA, MAGNESIA, AND SIMETHICONE 2400; 2400; 240 MG/30ML; MG/30ML; MG/30ML
30 SUSPENSION ORAL EVERY 4 HOURS PRN
Status: DISCONTINUED | OUTPATIENT
Start: 2022-11-16 | End: 2022-11-18 | Stop reason: HOSPADM

## 2022-11-16 RX ORDER — CLOPIDOGREL BISULFATE 75 MG/1
75 TABLET ORAL DAILY
Status: DISCONTINUED | OUTPATIENT
Start: 2022-11-17 | End: 2022-11-18 | Stop reason: HOSPADM

## 2022-11-16 RX ORDER — DOXYCYCLINE 100 MG/1
100 TABLET ORAL ONCE
Status: COMPLETED | OUTPATIENT
Start: 2022-11-16 | End: 2022-11-16

## 2022-11-16 RX ORDER — KETOROLAC TROMETHAMINE 30 MG/ML
15 INJECTION, SOLUTION INTRAMUSCULAR; INTRAVENOUS EVERY 6 HOURS PRN
Status: DISCONTINUED | OUTPATIENT
Start: 2022-11-16 | End: 2022-11-18 | Stop reason: HOSPADM

## 2022-11-16 RX ORDER — LABETALOL HYDROCHLORIDE 5 MG/ML
10 INJECTION, SOLUTION INTRAVENOUS EVERY 4 HOURS PRN
Status: DISCONTINUED | OUTPATIENT
Start: 2022-11-16 | End: 2022-11-18 | Stop reason: HOSPADM

## 2022-11-16 RX ORDER — CLOPIDOGREL BISULFATE 75 MG/1
75 TABLET ORAL DAILY
Status: DISCONTINUED | OUTPATIENT
Start: 2022-11-16 | End: 2022-11-16

## 2022-11-16 RX ORDER — BISACODYL 10 MG
10 SUPPOSITORY, RECTAL RECTAL
Status: DISCONTINUED | OUTPATIENT
Start: 2022-11-16 | End: 2022-11-18 | Stop reason: HOSPADM

## 2022-11-16 RX ORDER — ARIPIPRAZOLE 10 MG/1
5 TABLET ORAL DAILY
Status: DISCONTINUED | OUTPATIENT
Start: 2022-11-16 | End: 2022-11-16

## 2022-11-16 RX ORDER — SODIUM CHLORIDE 9 MG/ML
1000 INJECTION, SOLUTION INTRAVENOUS ONCE
Status: COMPLETED | OUTPATIENT
Start: 2022-11-16 | End: 2022-11-16

## 2022-11-16 RX ORDER — COLCHICINE 0.6 MG/1
0.6 TABLET ORAL ONCE
Status: COMPLETED | OUTPATIENT
Start: 2022-11-16 | End: 2022-11-16

## 2022-11-16 RX ADMIN — INSULIN HUMAN 5 UNITS: 100 INJECTION, SOLUTION PARENTERAL at 21:21

## 2022-11-16 RX ADMIN — SENNOSIDES AND DOCUSATE SODIUM 2 TABLET: 50; 8.6 TABLET ORAL at 17:36

## 2022-11-16 RX ADMIN — KETOROLAC TROMETHAMINE 15 MG: 30 INJECTION, SOLUTION INTRAMUSCULAR at 21:16

## 2022-11-16 RX ADMIN — IOHEXOL 50 ML: 350 INJECTION, SOLUTION INTRAVENOUS at 08:45

## 2022-11-16 RX ADMIN — ASPIRIN 81 MG: 81 TABLET, COATED ORAL at 12:01

## 2022-11-16 RX ADMIN — LISINOPRIL 10 MG: 10 TABLET ORAL at 12:01

## 2022-11-16 RX ADMIN — COLCHICINE 0.6 MG: 0.6 TABLET, FILM COATED ORAL at 09:20

## 2022-11-16 RX ADMIN — MORPHINE SULFATE 4 MG: 4 INJECTION INTRAVENOUS at 03:46

## 2022-11-16 RX ADMIN — AMPICILLIN AND SULBACTAM 3 G: 1; 2 INJECTION, POWDER, FOR SOLUTION INTRAMUSCULAR; INTRAVENOUS at 09:24

## 2022-11-16 RX ADMIN — RIVAROXABAN 10 MG: 10 TABLET, FILM COATED ORAL at 17:37

## 2022-11-16 RX ADMIN — ONDANSETRON 4 MG: 2 INJECTION INTRAMUSCULAR; INTRAVENOUS at 03:46

## 2022-11-16 RX ADMIN — COLCHICINE 1.2 MG: 0.6 TABLET, FILM COATED ORAL at 17:33

## 2022-11-16 RX ADMIN — INSULIN HUMAN 5 UNITS: 100 INJECTION, SOLUTION PARENTERAL at 17:38

## 2022-11-16 RX ADMIN — CLOPIDOGREL BISULFATE 75 MG: 75 TABLET ORAL at 09:03

## 2022-11-16 RX ADMIN — INSULIN HUMAN 3 UNITS: 100 INJECTION, SOLUTION PARENTERAL at 12:09

## 2022-11-16 RX ADMIN — INSULIN GLARGINE-YFGN 25 UNITS: 100 INJECTION, SOLUTION SUBCUTANEOUS at 17:41

## 2022-11-16 RX ADMIN — DOXYCYCLINE 100 MG: 100 TABLET, FILM COATED ORAL at 09:20

## 2022-11-16 RX ADMIN — SODIUM CHLORIDE 1000 ML: 9 INJECTION, SOLUTION INTRAVENOUS at 04:00

## 2022-11-16 RX ADMIN — MORPHINE SULFATE 4 MG: 4 INJECTION, SOLUTION INTRAMUSCULAR; INTRAVENOUS at 07:34

## 2022-11-16 RX ADMIN — ATORVASTATIN CALCIUM 40 MG: 40 TABLET, FILM COATED ORAL at 21:20

## 2022-11-16 RX ADMIN — CYCLOBENZAPRINE 5 MG: 10 TABLET, FILM COATED ORAL at 11:54

## 2022-11-16 RX ADMIN — KETOROLAC TROMETHAMINE 15 MG: 30 INJECTION, SOLUTION INTRAMUSCULAR at 11:51

## 2022-11-16 ASSESSMENT — LIFESTYLE VARIABLES
CONSUMPTION TOTAL: POSITIVE
HOW MANY TIMES IN THE PAST YEAR HAVE YOU HAD 5 OR MORE DRINKS IN A DAY: 6
TOTAL SCORE: 3
DOES PATIENT WANT TO STOP DRINKING: YES
ALCOHOL_USE: YES
SUBSTANCE_ABUSE: 1
TOTAL SCORE: 4
TOTAL SCORE: 3
AVERAGE NUMBER OF DAYS PER WEEK YOU HAVE A DRINK CONTAINING ALCOHOL: 3
ALCOHOL_USE: YES
EVER HAD A DRINK FIRST THING IN THE MORNING TO STEADY YOUR NERVES TO GET RID OF A HANGOVER: NO
HAVE YOU EVER FELT YOU SHOULD CUT DOWN ON YOUR DRINKING: YES
HAVE YOU EVER FELT YOU SHOULD CUT DOWN ON YOUR DRINKING: YES
ON A TYPICAL DAY WHEN YOU DRINK ALCOHOL HOW MANY DRINKS DO YOU HAVE: 5
AVERAGE NUMBER OF DAYS PER WEEK YOU HAVE A DRINK CONTAINING ALCOHOL: 4
DOES PATIENT WANT TO TALK TO SOMEONE ABOUT QUITTING: YES
HAVE PEOPLE ANNOYED YOU BY CRITICIZING YOUR DRINKING: YES
EVER FELT BAD OR GUILTY ABOUT YOUR DRINKING: YES
EVER HAD A DRINK FIRST THING IN THE MORNING TO STEADY YOUR NERVES TO GET RID OF A HANGOVER: NO
HAVE PEOPLE ANNOYED YOU BY CRITICIZING YOUR DRINKING: YES
ON A TYPICAL DAY WHEN YOU DRINK ALCOHOL HOW MANY DRINKS DO YOU HAVE: 5
DOES PATIENT WANT TO STOP DRINKING: YES
ALCOHOL_USE: YES
TOTAL SCORE: 4
TOTAL SCORE: 4
TOTAL SCORE: 2
EVER FELT BAD OR GUILTY ABOUT YOUR DRINKING: YES
HAVE PEOPLE ANNOYED YOU BY CRITICIZING YOUR DRINKING: YES
AVERAGE NUMBER OF DAYS PER WEEK YOU HAVE A DRINK CONTAINING ALCOHOL: 4
CONSUMPTION TOTAL: INCOMPLETE
ON A TYPICAL DAY WHEN YOU DRINK ALCOHOL HOW MANY DRINKS DO YOU HAVE: 6
TOTAL SCORE: 3
EVER FELT BAD OR GUILTY ABOUT YOUR DRINKING: NO
HAVE YOU EVER FELT YOU SHOULD CUT DOWN ON YOUR DRINKING: YES
DOES PATIENT WANT TO TALK TO SOMEONE ABOUT QUITTING: YES
DOES PATIENT WANT TO STOP DRINKING: YES
TOTAL SCORE: 4
EVER HAD A DRINK FIRST THING IN THE MORNING TO STEADY YOUR NERVES TO GET RID OF A HANGOVER: YES
DOES PATIENT WANT TO TALK TO SOMEONE ABOUT QUITTING: YES
CONSUMPTION TOTAL: INCOMPLETE
CONSUMPTION TOTAL: INCOMPLETE
EVER HAD A DRINK FIRST THING IN THE MORNING TO STEADY YOUR NERVES TO GET RID OF A HANGOVER: YES
ALCOHOL_USE: YES
TOTAL SCORE: 2
HAVE YOU EVER FELT YOU SHOULD CUT DOWN ON YOUR DRINKING: YES
TOTAL SCORE: 2
TOTAL SCORE: 4
TOTAL SCORE: 4
DOES PATIENT WANT TO TALK TO SOMEONE ABOUT QUITTING: YES
DOES PATIENT WANT TO STOP DRINKING: YES
EVER FELT BAD OR GUILTY ABOUT YOUR DRINKING: YES
ON A TYPICAL DAY WHEN YOU DRINK ALCOHOL HOW MANY DRINKS DO YOU HAVE: 2
AVERAGE NUMBER OF DAYS PER WEEK YOU HAVE A DRINK CONTAINING ALCOHOL: 4
HAVE PEOPLE ANNOYED YOU BY CRITICIZING YOUR DRINKING: YES

## 2022-11-16 ASSESSMENT — ENCOUNTER SYMPTOMS
FEVER: 0
SORE THROAT: 0
NERVOUS/ANXIOUS: 0
DIZZINESS: 0
STRIDOR: 0
SHORTNESS OF BREATH: 1
SENSORY CHANGE: 0
ABDOMINAL PAIN: 1
HEADACHES: 0
DIAPHORESIS: 1
DEPRESSION: 0
MYALGIAS: 0
CHILLS: 0
VOMITING: 0
COUGH: 1
SPEECH CHANGE: 0
EYE DISCHARGE: 0
COUGH: 0
NAUSEA: 0
BACK PAIN: 0
DIARRHEA: 0
PALPITATIONS: 0
ABDOMINAL PAIN: 0

## 2022-11-16 ASSESSMENT — PATIENT HEALTH QUESTIONNAIRE - PHQ9
2. FEELING DOWN, DEPRESSED, IRRITABLE, OR HOPELESS: NOT AT ALL
1. LITTLE INTEREST OR PLEASURE IN DOING THINGS: NOT AT ALL
SUM OF ALL RESPONSES TO PHQ9 QUESTIONS 1 AND 2: 0

## 2022-11-16 ASSESSMENT — PAIN DESCRIPTION - PAIN TYPE
TYPE: ACUTE PAIN

## 2022-11-16 ASSESSMENT — FIBROSIS 4 INDEX: FIB4 SCORE: 0.44

## 2022-11-16 NOTE — ED TRIAGE NOTES
"Chief Complaint   Patient presents with    Chest Pain     Began yesterday L side chest stabbing pain, hx cardiac issues, stent placement 9/17, has not taken meds last x2 days, pain worse with resp        WC to triage for above complaint. +ETOH and marijuana tonight. Hx PVD, DM, amputation L foot.    Chest pain protocols ordered, ekg completed in triage with x2 staff. Pt brought to [facility] office for blood draw. Pt educated of triage process and informed to contact staff if situation changes.    /81   Pulse (!) 118   Temp 36.5 °C (97.7 °F) (Temporal)   Resp 18   Ht 1.753 m (5' 9\")   Wt 80 kg (176 lb 5.9 oz)   SpO2 97%   BMI 26.05 kg/m²      "

## 2022-11-16 NOTE — ASSESSMENT & PLAN NOTE
Recent coronary stent in [place] in 9/2022.  States he has been good at taking plavix but not always his aspirin doses.    Troponins are negative    There is still possibility possible pericarditis and will continue colchicine and IV Toradol    There is evidence of infiltrate on the right lung and I have ordered a procalcitonin level.  If procalcitonin level is elevated , antibiotics will be initiated    Avoid narcotics

## 2022-11-16 NOTE — ED NOTES
Pt resting, airway patent, rr even and unlabored, equal chest rise and fall. NAD noted. Call light within reach.

## 2022-11-16 NOTE — ED NOTES
Pt back from ct   I will START or STAY ON the medications listed below when I get home from the hospital:  None

## 2022-11-16 NOTE — ASSESSMENT & PLAN NOTE
Had stent placed 9/2022 at [facility] per [facility] records.  Aspirin, plavix, atorvastatin, lisinopril.
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
## 2022-11-16 NOTE — ASSESSMENT & PLAN NOTE
DMII requiring insulin  Hold outpatient Jardiance and metformin  Diabetic diet  Hypoglycemic protocol  Monitor accuchecks and cover with SSI

## 2022-11-16 NOTE — ED PROVIDER NOTES
ED Provider Note    ED Provider Note Addendum    Scribed for Pal Kirkland D.O. by Luana Giron M.D. on 11/16/2022 at 2:08 PM.     This is an addendum to the note on Rogelio Escudero.  For further details and full chart information, see patient's initial note.       6:08 AM- I discussed the patient's case with Dr. Thibodeaux ([facility]) who will transfer care of the patient to me at this time.  Patient presented to the emergency department with left-sided chest pain.  History of cardiac stent 9/17.  Has been off his medicines for the last 2 days which includes his Plavix.  Patient's had sharp left-sided pain that seems more pleuritic in nature.  Initial troponin was normal.  Patient was awaiting CT scan to rule out PE as the etiology of his left-sided chest pain.  Patient does not complain of fevers but has had a cough and some mild shortness of breath.  He also complains of fatigue.  He has not had any episodes of hypoxia here.    6:15 - Patient evaluated by myself.  Patient still complaining of chest pain especially with deep inspiration.  Patient will be medicated with pain medication.  Due to his recent cath pericarditis is also in the differential.    Discussed the case with Dr. SAMANIEGO who is on-call for cardiology.  Recommends giving colchicine and restarting the Plavix.    CT scan has returned and does show a moderate pericardial effusion.  Also evidence of pneumonia.  Patient still is having pleuritic chest pain and remains tachycardic.  No hypoxia.  I discussed with Dr. Rosales again who recommends an echo.  I think at this point patient will be hospitalized for further treatment of his pneumonia.  Also get echo during admission to further evaluate his pericardial effusion as I suspect he has pericarditis causing his pleuritic chest pain.    Discussed the case with Dr. Kirkland the hospitalist.    Patient hospitalized in guarded condition.    FINAL IMPRESSION  1. Pleuritic chest pain Acute   2. Shortness of  breath Acute   3. Hyperglycemia    4. Acute pericarditis, unspecified type    5. Pneumonia of both lower lobes due to infectious organism        The note accurately reflects work and decisions made by me.  Luana Giron M.D.  11/16/2022  2:12 PM

## 2022-11-16 NOTE — PROGRESS NOTES
4 Eyes Skin Assessment Completed by [place] RN and HANS Titus.    Head WDL  Ears WDL  Nose WDL  Mouth WDL  Neck WDL  Breast/Chest WDL  Shoulder Blades WDL  Spine WDL  (R) Arm/Elbow/Hand WDL  (L) Arm/Elbow/Hand WDL  Abdomen WDL  Groin WDL  Scrotum/Coccyx/Buttocks WDL  (R) Leg WDL  (L) Leg WDL  (R) Heel/Foot/Toe WDL  (L) Heel/Foot/Toe WDL          Devices In Places Blood Pressure Cuff and Pulse Ox      Interventions In Place Pillows    Possible Skin Injury No    Pictures Uploaded Into Epic N/A  Wound Consult Placed N/A  RN Wound Prevention Protocol Ordered No

## 2022-11-16 NOTE — CARE PLAN
Problem: Knowledge Deficit - Standard  Goal: Patient and family/care givers will demonstrate understanding of plan of care, disease process/condition, diagnostic tests and medications  Outcome: Progressing     Problem: Pain - Standard  Goal: Alleviation of pain or a reduction in pain to the patient’s comfort goal  Outcome: Progressing     Problem: Fall Risk  Goal: Patient will remain free from falls  Outcome: Progressing   The patient is Stable - Low risk of patient condition declining or worsening    Shift Goals  Clinical Goals: Pain control VSS  Patient Goals: Pain Control    Progress made toward(s) clinical / shift goals:  Patient updated on plan of care. The patient has PRN medication for pain     Patient is not progressing towards the following goals:

## 2022-11-16 NOTE — ED NOTES
Pharmacy Medication Reconciliation      ~Medication reconciliation updated and complete per patient at bedside & patient home pharmacy  ~No oral ABX within the last 30 days  ~Patient home pharmacy :  [facility]    ~Patient home pharmacy reports patient has not filled any medications in 6 months

## 2022-11-16 NOTE — ASSESSMENT & PLAN NOTE
"Positive for cocaine and methamphetamines  \"    Patient advised to stay away from illicit drugs  "

## 2022-11-16 NOTE — H&P
"Hospital Medicine History & Physical Note    Date of Service  11/16/2022    Primary Care Physician  ROBBI Pepper      Code Status  Full Code    Chief Complaint  Chief Complaint   Patient presents with    Chest Pain     Began yesterday L side chest stabbing pain, hx cardiac issues, stent placement 9/17, has not taken meds last x2 days, pain worse with resp       History of Presenting Illness  Rogelio Escudero is a 47 y.o. male with a history of hypertension, diabetes type 2 requiring insulin, posttraumatic stress disorder, dyslipidemia, coronary artery disease with a recent stent placed 9/2022, prior osteomyelitis of the left great toe status requiring amputation, active smoker and diabetic neuropathy.  He presented 11/16/2022 with acute stabbing left-sided upper chest pain quite severe nature.  Patient has not been taking his aspirin or medications last 2 days.  Pain is worse with     I discussed the plan of care with patient.    Review of Systems  Review of Systems   Constitutional:  Positive for diaphoresis. Negative for chills and fever.   Eyes:  Negative for discharge.   Respiratory:  Positive for shortness of breath. Negative for cough and stridor.    Cardiovascular:  Positive for chest pain. Negative for palpitations and leg swelling.   Gastrointestinal:  Negative for abdominal pain, diarrhea, nausea and vomiting.   Genitourinary:  Negative for dysuria and hematuria.   Musculoskeletal:  Negative for back pain and joint pain.   Skin:  Negative for rash.   Neurological:  Negative for dizziness, sensory change, speech change and headaches.   Psychiatric/Behavioral:  Positive for substance abuse (\"marijuana, tobacco\"). Negative for depression. The patient is not nervous/anxious.      Past Medical History   has a past medical history of Alcohol abuse, Dental disorder, Depression (2010), Diabetes, Diabetic neuropathy (HCC), Heart burn, Hemorrhagic disorder (HCC), Hiatus hernia syndrome, High " cholesterol, Hypertension, Pain, Pneumonia (approx ), PTSD (post-traumatic stress disorder), and PVD (peripheral vascular disease) ([facility]).    Surgical History   has a past surgical history that includes irrigation & debridement general (Right, 2020); other; pr shldr arthroscop,surg,w/rotat cuff repr (Right, 2021); pr shldr arthroscop,part acromioplas (Right, 2021); pr arthroscopy shoulder surgical biceps tenodes* (Right, 2021); and debridement, labrum, hip, arthroscopic (Right, 2021).     Family History  Mother alive and well.  Father  in his late 40s secondary to colon cancer spread to liver.  On his father side his grandfather had heart disease  Family history reviewed with patient.     Social History   reports that he has been smoking cigarettes. He has a 5.00 pack-year smoking history. He has never used smokeless tobacco. He reports current alcohol use. He reports current drug use. Drug: Inhaled.   has children.  Drug of choice is marijuana he denied use of cocaine and methamphetamines despite a positive drug screen.    Allergies  Allergies   Allergen Reactions    Apple Hives    Lactose Diarrhea       Medications  Prior to Admission Medications   Prescriptions Last Dose Informant Patient Reported? Taking?   ARIPiprazole (ABILIFY) 5 MG tablet   Yes No   Alcohol Swabs  Patient No No   Sig: Wipe site with prep pad prior to injection.   Blood Glucose Test Strips  Patient No No   Sig: Use one True Metrix strip to test blood sugar three times daily   DULoxetine (CYMBALTA) 30 MG Cap DR Particles   Yes No   DULoxetine (CYMBALTA) 60 MG Cap DR Particles delayed-release capsule   Yes No   Sig: Take 60 mg by mouth every day.   Empagliflozin (JARDIANCE) 10 MG Tab   Yes No   Sig: Take  by mouth.   HYDROcodone/acetaminophen (NORCO)  MG Tab   Yes No   Sig: Take 1 Tablet by mouth 2 times a day.   Insulin Pen Needle 32 G x 4 mm  Patient No No   Sig: Use one pen needle in pen device  to inject insulin daily as directed   JARDIANCE 25 MG Tab   Yes No   LANTUS SOLOSTAR 100 UNIT/ML Solution Pen-injector injection   Yes No   LISINOPRIL PO   Yes No   Sig: Take  by mouth.   Lancets  Patient No No   Sig: Use one True metrix lancet to test blood sugar three times daily.   TRUE METRIX BLOOD GLUCOSE TEST strip   Yes No   VITAMIN D PO  Patient Yes No   Sig: Take 1 tablet by mouth every morning. Wife unsure of dose   amitriptyline (ELAVIL) 10 MG Tab   Yes No   Sig: Take 10 mg by mouth at bedtime.   aspirin 81 MG EC tablet   Yes No   Sig: Take 81 mg by mouth every day.   atorvastatin (LIPITOR) 40 MG Tab  Patient Yes No   Sig: Take 40 mg by mouth at bedtime.   atorvastatin (LIPITOR) 40 MG Tab   Yes No   Sig: Take 40 mg by mouth every day.   clopidogrel (PLAVIX) 75 MG Tab   Yes No   cyclobenzaprine (FLEXERIL) 5 mg tablet  Patient Yes No   Sig: Take 5 mg by mouth 3 times a day as needed.   cyclobenzaprine (FLEXERIL) 5 mg tablet   Yes No   Sig: Take 5 mg by mouth.   ergocalciferol (DRISDOL) 38280 UNIT capsule   Yes No   Sig: Take 50,000 Units by mouth.   escitalopram (LEXAPRO) 10 MG Tab   Yes No   Sig: Take 10 mg by mouth every day.   escitalopram (LEXAPRO) 20 MG tablet   Yes No   insulin glargine (LANTUS) 100 UNIT/ML Solution  Patient Yes No   Sig: Inject 25 Units under the skin every evening.   insulin glargine (LANTUS) 100 UNIT/ML Solution   Yes No   Sig: Inject 25 Units under the skin.   insulin lispro (HUMALOG KWIKPEN) 100 UNIT/ML Solution Pen-injector injection PEN  Patient Yes No   Sig: Inject 5 Units under the skin 3 times a day with meals.   insulin lispro (HUMALOG,ADMELOG) 100 UNIT/ML Solution Pen-injector injection PEN   Yes No   Sig: Inject 7 Units under the skin.   lidocaine (LMX) 4 % Cream  Patient Yes No   Sig: as needed.   lisinopril (PRINIVIL) 10 MG Tab   Yes No   Sig: Take 10 mg by mouth every day.   meloxicam (MOBIC) 15 MG tablet   No No   Sig: TAKE 1 TABLET BY MOUTH EVERY DAY   metFORMIN  (GLUCOPHAGE) 500 MG Tab  Patient Yes No   Sig: Take 1,000 mg by mouth 2 times a day with meals.   metformin (GLUCOPHAGE) 1000 MG tablet   Yes No   Sig: Take 1,000 mg by mouth.   metformin (GLUCOPHAGE) 1000 MG tablet   Yes No   naproxen (NAPROSYN) 500 MG Tab   Yes No   omeprazole (PRILOSEC) 20 MG Tablet Delayed Response delayed-release tablet   Yes No   oxyCODONE-acetaminophen (PERCOCET) 5-325 MG Tab   Yes No   Sig: Take 1 Tablet by mouth every 6 hours as needed.   pantoprazole (PROTONIX) 40 MG Pack  Patient Yes No   Sig: Take 40 mg by mouth every morning.   pantoprazole (PROTONIX) 40 MG Tablet Delayed Response   Yes No   Sig: Take 40 mg by mouth every day.   pantoprazole (PROTONIX) 40 MG Tablet Delayed Response   Yes No   simvastatin (ZOCOR) 40 MG Tab   Yes No   terbinafine (LAMISIL) 250 MG Tab  Patient Yes No   Sig: Take 250 mg by mouth every morning.   traZODone (DESYREL) 50 MG Tab   Yes No      Facility-Administered Medications: None       Physical Exam  Temp:  [36.5 °C (97.7 °F)] 36.5 °C (97.7 °F)  Pulse:  [104-118] 105  Resp:  [18-21] 19  BP: (120-150)/(79-91) 138/79  SpO2:  [94 %-98 %] 97 %  Blood Pressure: 138/79   Temperature: 36.5 °C (97.7 °F)   Pulse: (!) 105   Respiration: 19   Pulse Oximetry: 97 %       Physical Exam  Vitals reviewed.   Constitutional:       Appearance: Normal appearance. He is not diaphoretic.   HENT:      Head: Normocephalic and atraumatic.      Nose: Nose normal.      Mouth/Throat:      Mouth: Mucous membranes are moist.      Pharynx: No oropharyngeal exudate.   Eyes:      General: No scleral icterus.        Right eye: No discharge.         Left eye: No discharge.      Extraocular Movements: Extraocular movements intact.      Conjunctiva/sclera: Conjunctivae normal.   Cardiovascular:      Rate and Rhythm: Normal rate and regular rhythm.      Pulses:           Radial pulses are 2+ on the right side and 2+ on the left side.        Dorsalis pedis pulses are 2+ on the right side and 2+ on  the left side.      Heart sounds: Normal heart sounds. No murmur heard.    No friction rub.   Pulmonary:      Effort: Pulmonary effort is normal. No respiratory distress.      Breath sounds: Decreased air movement present. No wheezing or rales.   Abdominal:      General: Bowel sounds are normal. There is no distension.      Palpations: Abdomen is soft.   Musculoskeletal:         General: No swelling or tenderness.      Cervical back: Neck supple. No muscular tenderness.      Right lower leg: No edema.      Left lower leg: Edema present.      Comments: Great toe amputation stump appears to be clean and well-healed.   Lymphadenopathy:      Cervical: No cervical adenopathy.   Skin:     Coloration: Skin is not jaundiced or pale.   Neurological:      General: No focal deficit present.      Mental Status: He is alert and oriented to person, place, and time. Mental status is at baseline.      Cranial Nerves: No cranial nerve deficit.   Psychiatric:         Mood and Affect: Mood normal.         Behavior: Behavior normal.       Laboratory:  Recent Labs     11/16/22 0212   WBC 13.8*   RBC 4.77   HEMOGLOBIN 14.2   HEMATOCRIT 42.2   MCV 88.5   MCH 29.8   MCHC 33.6*   RDW 49.2   PLATELETCT 352   MPV 8.8*     Recent Labs     11/16/22 0212   SODIUM 130*   POTASSIUM 4.3   CHLORIDE 94*   CO2 19*   GLUCOSE 332*   BUN 16   CREATININE 0.73   CALCIUM 9.6     Recent Labs     11/16/22 0212   ALTSGPT 10   ASTSGOT 10*   ALKPHOSPHAT 139*   TBILIRUBIN 0.3   GLUCOSE 332*     Recent Labs     11/16/22 0212   APTT 25.6   INR 1.06     No results for input(s): NTPROBNP in the last 72 hours.      Recent Labs     11/16/22 0212 11/16/22  0535   TROPONINT 19 15       Imaging:  CT-CTA CHEST PULMONARY ARTERY W/ RECONS   Final Result      1.  No evidence of pulmonary embolism.   2.  Small/moderate pericardial effusion.   3.  Bilateral consolidations concerning for pneumonia. Aspiration is a potential etiology.   4.  Possible indeterminate right  "adrenal gland nodule measuring 1.7 cm. Consider further evaluation with nonemergent CT of the abdomen.   5.  Coronary artery atherosclerotic disease.         DX-CHEST-PORTABLE (1 VIEW)   Final Result      No acute cardiac or pulmonary abnormalities are identified.      EC-ECHOCARDIOGRAM COMPLETE W/O CONT    (Results Pending)       EKG:  I have personally reviewed the images and compared with prior images.    Assessment/Plan:  Justification for Admission Status  I anticipate this patient is appropriate for observation status at this time because pain control of chest pain thought to be secondary to pericarditis.  Checking of the labs and echocardiogram.    Patient will need a Telemetry bed on MEDICAL service .  The need is secondary to monitoring of chest pain in face of potential pericarditis.    * Chest pain- (present on admission)  Assessment & Plan  Recent coronary stent in [facility] in 9/2022.  States he has been good at taking plavix but not always his aspirin doses.  Follow serial troponins.  Concern of vasospasm given positive cocaine and methamphetamine on drug screen vs pericarditis.  As needed toradol  Schedule colchicine  Monitor on telemetry  Medical management for CAD  Checking echocardiogram, ESR, CRP    CAD (coronary artery disease)  Assessment & Plan  Had stent placed 9/2022 at [facility] per [facility] records.  Aspirin, plavix, atorvastatin, lisinopril.    Positive urine drug screen  Assessment & Plan  Positive for cocaine and methamphetamines  \"I only smoke weed, I don't do that other stuff\"  Discussed cessation of any illicit drug use.  Alerted him of danger of cocaine and meth with heart disease and potential for vasospasm and sudden heart attack.    Tobacco abuse- (present on admission)  Assessment & Plan  Discussed cessation and the harms of smoking of any particulate matter   Nicotine patch if needed.    Diabetes (HCC)- (present on admission)  Assessment & Plan  DMII requiring insulin  Hold " outpatient Jardiance and metformin  Diabetic diet  Hypoglycemic protocol  Monitor accuchecks and cover with SSI  One month ago glycohemoglobin:8.2      VTE prophylaxis: Xarelto 10 mg daily as prophylaxis

## 2022-11-16 NOTE — ED NOTES
Pt sleeping in bed, equal chest rise and fall, airway patent, rr even and unlabored, NAD noted. Rails up times two, call light within reach.

## 2022-11-17 LAB
ANION GAP SERPL CALC-SCNC: 12 MMOL/L (ref 7–16)
BASOPHILS # BLD AUTO: 0.5 % (ref 0–1.8)
BASOPHILS # BLD: 0.06 K/UL (ref 0–0.12)
BUN SERPL-MCNC: 23 MG/DL (ref 8–22)
CALCIUM SERPL-MCNC: 9.2 MG/DL (ref 8.5–10.5)
CHLORIDE SERPL-SCNC: 99 MMOL/L (ref 96–112)
CO2 SERPL-SCNC: 20 MMOL/L (ref 20–33)
CREAT SERPL-MCNC: 0.67 MG/DL (ref 0.5–1.4)
EKG IMPRESSION: NORMAL
EKG IMPRESSION: NORMAL
EOSINOPHIL # BLD AUTO: 0.39 K/UL (ref 0–0.51)
EOSINOPHIL NFR BLD: 3 % (ref 0–6.9)
ERYTHROCYTE [DISTWIDTH] IN BLOOD BY AUTOMATED COUNT: 49.6 FL (ref 35.9–50)
GFR SERPLBLD CREATININE-BSD FMLA CKD-EPI: 114 ML/MIN/1.73 M 2
GLUCOSE BLD STRIP.AUTO-MCNC: 239 MG/DL (ref 65–99)
GLUCOSE BLD STRIP.AUTO-MCNC: 284 MG/DL (ref 65–99)
GLUCOSE BLD STRIP.AUTO-MCNC: 290 MG/DL (ref 65–99)
GLUCOSE BLD STRIP.AUTO-MCNC: 291 MG/DL (ref 65–99)
GLUCOSE BLD STRIP.AUTO-MCNC: 306 MG/DL (ref 65–99)
GLUCOSE BLD STRIP.AUTO-MCNC: 311 MG/DL (ref 65–99)
GLUCOSE SERPL-MCNC: 274 MG/DL (ref 65–99)
HCT VFR BLD AUTO: 40.3 % (ref 42–52)
HGB BLD-MCNC: 13.4 G/DL (ref 14–18)
IMM GRANULOCYTES # BLD AUTO: 0.05 K/UL (ref 0–0.11)
IMM GRANULOCYTES NFR BLD AUTO: 0.4 % (ref 0–0.9)
LYMPHOCYTES # BLD AUTO: 2.18 K/UL (ref 1–4.8)
LYMPHOCYTES NFR BLD: 16.5 % (ref 22–41)
MCH RBC QN AUTO: 30 PG (ref 27–33)
MCHC RBC AUTO-ENTMCNC: 33.3 G/DL (ref 33.7–35.3)
MCV RBC AUTO: 90.4 FL (ref 81.4–97.8)
MONOCYTES # BLD AUTO: 1.19 K/UL (ref 0–0.85)
MONOCYTES NFR BLD AUTO: 9 % (ref 0–13.4)
NEUTROPHILS # BLD AUTO: 9.33 K/UL (ref 1.82–7.42)
NEUTROPHILS NFR BLD: 70.6 % (ref 44–72)
NRBC # BLD AUTO: 0 K/UL
NRBC BLD-RTO: 0 /100 WBC
PLATELET # BLD AUTO: 328 K/UL (ref 164–446)
PMV BLD AUTO: 9.1 FL (ref 9–12.9)
POTASSIUM SERPL-SCNC: 4.5 MMOL/L (ref 3.6–5.5)
PROCALCITONIN SERPL-MCNC: 0.05 NG/ML
RBC # BLD AUTO: 4.46 M/UL (ref 4.7–6.1)
SODIUM SERPL-SCNC: 131 MMOL/L (ref 135–145)
TROPONIN T SERPL-MCNC: 15 NG/L (ref 6–19)
WBC # BLD AUTO: 13.2 K/UL (ref 4.8–10.8)

## 2022-11-17 PROCEDURE — 93005 ELECTROCARDIOGRAM TRACING: CPT

## 2022-11-17 PROCEDURE — G0378 HOSPITAL OBSERVATION PER HR: HCPCS

## 2022-11-17 PROCEDURE — 96372 THER/PROPH/DIAG INJ SC/IM: CPT

## 2022-11-17 PROCEDURE — 84484 ASSAY OF TROPONIN QUANT: CPT

## 2022-11-17 PROCEDURE — A9270 NON-COVERED ITEM OR SERVICE: HCPCS | Performed by: HOSPITALIST

## 2022-11-17 PROCEDURE — 700111 HCHG RX REV CODE 636 W/ 250 OVERRIDE (IP): Performed by: HOSPITALIST

## 2022-11-17 PROCEDURE — 99232 SBSQ HOSP IP/OBS MODERATE 35: CPT | Performed by: HOSPITALIST

## 2022-11-17 PROCEDURE — 700102 HCHG RX REV CODE 250 W/ 637 OVERRIDE(OP): Performed by: HOSPITALIST

## 2022-11-17 PROCEDURE — 96376 TX/PRO/DX INJ SAME DRUG ADON: CPT

## 2022-11-17 PROCEDURE — 700111 HCHG RX REV CODE 636 W/ 250 OVERRIDE (IP)

## 2022-11-17 PROCEDURE — 93010 ELECTROCARDIOGRAM REPORT: CPT | Mod: 76 | Performed by: INTERNAL MEDICINE

## 2022-11-17 PROCEDURE — 85025 COMPLETE CBC W/AUTO DIFF WBC: CPT

## 2022-11-17 PROCEDURE — 84145 PROCALCITONIN (PCT): CPT

## 2022-11-17 PROCEDURE — 82962 GLUCOSE BLOOD TEST: CPT | Mod: 91

## 2022-11-17 PROCEDURE — 80048 BASIC METABOLIC PNL TOTAL CA: CPT

## 2022-11-17 PROCEDURE — 93010 ELECTROCARDIOGRAM REPORT: CPT | Performed by: INTERNAL MEDICINE

## 2022-11-17 RX ORDER — MORPHINE SULFATE 4 MG/ML
2 INJECTION INTRAVENOUS EVERY 4 HOURS PRN
Status: DISCONTINUED | OUTPATIENT
Start: 2022-11-17 | End: 2022-11-17

## 2022-11-17 RX ORDER — FAMOTIDINE 20 MG/1
20 TABLET, FILM COATED ORAL 2 TIMES DAILY
Status: DISCONTINUED | OUTPATIENT
Start: 2022-11-17 | End: 2022-11-18 | Stop reason: HOSPADM

## 2022-11-17 RX ADMIN — ATORVASTATIN CALCIUM 40 MG: 40 TABLET, FILM COATED ORAL at 20:17

## 2022-11-17 RX ADMIN — SENNOSIDES AND DOCUSATE SODIUM 2 TABLET: 50; 8.6 TABLET ORAL at 06:09

## 2022-11-17 RX ADMIN — INSULIN HUMAN 6 UNITS: 100 INJECTION, SOLUTION PARENTERAL at 06:13

## 2022-11-17 RX ADMIN — RIVAROXABAN 10 MG: 10 TABLET, FILM COATED ORAL at 18:23

## 2022-11-17 RX ADMIN — KETOROLAC TROMETHAMINE 15 MG: 30 INJECTION, SOLUTION INTRAMUSCULAR at 20:17

## 2022-11-17 RX ADMIN — MORPHINE SULFATE 2 MG: 4 INJECTION, SOLUTION INTRAMUSCULAR; INTRAVENOUS at 01:05

## 2022-11-17 RX ADMIN — CLOPIDOGREL BISULFATE 75 MG: 75 TABLET ORAL at 06:09

## 2022-11-17 RX ADMIN — INSULIN HUMAN 6 UNITS: 100 INJECTION, SOLUTION PARENTERAL at 11:55

## 2022-11-17 RX ADMIN — INSULIN HUMAN 5 UNITS: 100 INJECTION, SOLUTION PARENTERAL at 18:01

## 2022-11-17 RX ADMIN — INSULIN GLARGINE-YFGN 25 UNITS: 100 INJECTION, SOLUTION SUBCUTANEOUS at 18:02

## 2022-11-17 RX ADMIN — ASPIRIN 81 MG: 81 TABLET, COATED ORAL at 06:08

## 2022-11-17 RX ADMIN — COLCHICINE 0.6 MG: 0.6 TABLET, FILM COATED ORAL at 06:09

## 2022-11-17 RX ADMIN — INSULIN HUMAN 6 UNITS: 100 INJECTION, SOLUTION PARENTERAL at 20:17

## 2022-11-17 RX ADMIN — SENNOSIDES AND DOCUSATE SODIUM 2 TABLET: 50; 8.6 TABLET ORAL at 18:23

## 2022-11-17 RX ADMIN — FAMOTIDINE 20 MG: 20 TABLET, FILM COATED ORAL at 08:27

## 2022-11-17 RX ADMIN — FAMOTIDINE 20 MG: 20 TABLET, FILM COATED ORAL at 18:23

## 2022-11-17 ASSESSMENT — ENCOUNTER SYMPTOMS
BLURRED VISION: 0
DOUBLE VISION: 0
SHORTNESS OF BREATH: 0
PHOTOPHOBIA: 0
TINGLING: 0
VOMITING: 0
SPUTUM PRODUCTION: 0
MYALGIAS: 1
HEARTBURN: 0
HEADACHES: 0
ORTHOPNEA: 0
HALLUCINATIONS: 0
COUGH: 0
SENSORY CHANGE: 0
DIZZINESS: 0
TREMORS: 0
WHEEZING: 0
EYE PAIN: 0
NAUSEA: 0
DEPRESSION: 0
PALPITATIONS: 0
HEMOPTYSIS: 0
CLAUDICATION: 0
ABDOMINAL PAIN: 0
SPEECH CHANGE: 0

## 2022-11-17 ASSESSMENT — PAIN DESCRIPTION - PAIN TYPE
TYPE: ACUTE PAIN
TYPE: ACUTE PAIN

## 2022-11-17 ASSESSMENT — LIFESTYLE VARIABLES: SUBSTANCE_ABUSE: 0

## 2022-11-17 NOTE — CARE PLAN
"Assumed care of patient at shift change.  Patient AAO x 4, pleasant, on 2L NC with O2 sats in low- to mid-90s...will wean as tolerated.  Patient c/o 4/10 pain in chest and \"all over body.\" Attending (Dr. Armijo) aware.  Tele monitor showing NSR/sinus tachycardia.  No needs verbalized at this time.  Call bell and belongings within reach.  Will continue to monitor.          The patient is Stable - Low risk of patient condition declining or worsening    Shift Goals  Clinical Goals: Pain control, monitor trops  Patient Goals: Pain control  Family Goals: N/A    Progress made toward(s) clinical / shift goals:    Problem: Knowledge Deficit - Standard  Goal: Patient and family/care givers will demonstrate understanding of plan of care, disease process/condition, diagnostic tests and medications  Outcome: Progressing     Problem: Pain - Standard  Goal: Alleviation of pain or a reduction in pain to the patient’s comfort goal  Outcome: Progressing     Problem: Fall Risk  Goal: Patient will remain free from falls  Outcome: Progressing       Patient is not progressing towards the following goals:      "

## 2022-11-17 NOTE — PROGRESS NOTES
Hospital Medicine Daily Progress Note    Date of Service  11/17/2022    Chief Complaint  Rogelio Escudero is a 47 y.o. male admitted 11/16/2022 with chest pain    Hospital Course  No notes on file    Interval Problem Update  Patient states that he does not feel well    He has pain all over , dull ache , 4 out of 10    Patient has been using the available IV narcotics frequently    Treating pericarditis with colchicine and NSAIDs    I reviewed the CT of the chest that showed infiltrate in the right lung    I have ordered a procalcitonin level    I have discussed this patient's plan of care and discharge plan at IDT rounds today with Case Management, Nursing, Nursing leadership, and other members of the IDT team.    Consultants/Specialty  none    Code Status  Full Code    Disposition  Patient is not medically cleared for discharge.   Anticipate discharge to to home with close outpatient follow-up.  I have placed the appropriate orders for post-discharge needs.    Review of Systems  Review of Systems   Constitutional:  Positive for malaise/fatigue.   HENT: Negative.     Eyes:  Negative for blurred vision, double vision, photophobia and pain.   Respiratory:  Negative for cough, hemoptysis, sputum production, shortness of breath and wheezing.    Cardiovascular:  Positive for chest pain. Negative for palpitations, orthopnea and claudication.   Gastrointestinal:  Negative for abdominal pain, heartburn, nausea and vomiting.   Genitourinary:  Negative for dysuria, frequency and urgency.   Musculoskeletal:  Positive for myalgias.   Skin: Negative.    Neurological:  Negative for dizziness, tingling, tremors, sensory change, speech change and headaches.   Psychiatric/Behavioral:  Negative for depression, hallucinations, substance abuse and suicidal ideas.    All other systems reviewed and are negative.     Physical Exam  Temp:  [36.1 °C (97 °F)-36.9 °C (98.5 °F)] 36.4 °C (97.5 °F)  Pulse:  [] 100  Resp:  [16-18]  18  BP: (105-121)/(67-77) 106/67  SpO2:  [92 %-100 %] 97 %    Physical Exam  Constitutional:       General: He is not in acute distress.     Appearance: Normal appearance. He is ill-appearing. He is not toxic-appearing.   HENT:      Right Ear: There is no impacted cerumen.      Nose: Nose normal.      Mouth/Throat:      Pharynx: No oropharyngeal exudate.   Eyes:      General: No scleral icterus.        Left eye: No discharge.   Cardiovascular:      Rate and Rhythm: Normal rate and regular rhythm.      Heart sounds: No murmur heard.    No gallop.   Pulmonary:      Effort: Pulmonary effort is normal. No respiratory distress.      Breath sounds: Normal breath sounds. No stridor. No wheezing, rhonchi or rales.   Chest:      Chest wall: No tenderness.   Abdominal:      General: Abdomen is flat. There is no distension.      Palpations: There is no mass.      Tenderness: There is no abdominal tenderness. There is no guarding or rebound.      Hernia: No hernia is present.   Musculoskeletal:         General: No swelling, tenderness, deformity or signs of injury. Normal range of motion.      Cervical back: Normal range of motion.      Right lower leg: No edema.      Left lower leg: No edema.   Skin:     General: Skin is warm.      Capillary Refill: Capillary refill takes 2 to 3 seconds.      Coloration: Skin is not jaundiced.      Findings: No bruising.   Neurological:      General: No focal deficit present.      Mental Status: He is alert and oriented to person, place, and time.      Cranial Nerves: No cranial nerve deficit.      Motor: No weakness.      Gait: Gait normal.   Psychiatric:         Mood and Affect: Mood normal.         Behavior: Behavior normal.       Fluids  No intake or output data in the 24 hours ending 11/17/22 1116    Laboratory  Recent Labs     11/16/22  0212 11/17/22  0100   WBC 13.8* 13.2*   RBC 4.77 4.46*   HEMOGLOBIN 14.2 13.4*   HEMATOCRIT 42.2 40.3*   MCV 88.5 90.4   MCH 29.8 30.0   MCHC 33.6* 33.3*  "  RDW 49.2 49.6   PLATELETCT 352 328   MPV 8.8* 9.1     Recent Labs     11/16/22  0212 11/17/22  0100   SODIUM 130* 131*   POTASSIUM 4.3 4.5   CHLORIDE 94* 99   CO2 19* 20   GLUCOSE 332* 274*   BUN 16 23*   CREATININE 0.73 0.67   CALCIUM 9.6 9.2     Recent Labs     11/16/22  0212   APTT 25.6   INR 1.06               Imaging  EC-ECHOCARDIOGRAM COMPLETE W/O CONT   Final Result      CT-CTA CHEST PULMONARY ARTERY W/ RECONS   Final Result      1.  No evidence of pulmonary embolism.   2.  Small/moderate pericardial effusion.   3.  Bilateral consolidations concerning for pneumonia. Aspiration is a potential etiology.   4.  Possible indeterminate right adrenal gland nodule measuring 1.7 cm. Consider further evaluation with nonemergent CT of the abdomen.   5.  Coronary artery atherosclerotic disease.         DX-CHEST-PORTABLE (1 VIEW)   Final Result      No acute cardiac or pulmonary abnormalities are identified.           Assessment/Plan  * Chest pain- (present on admission)  Assessment & Plan  Recent coronary stent in [facility] in 9/2022.  States he has been good at taking plavix but not always his aspirin doses.    Troponins are negative    There is still possibility possible pericarditis and will continue colchicine and IV Toradol    There is evidence of infiltrate on the right lung and I have ordered a procalcitonin level.  If procalcitonin level is elevated , antibiotics will be initiated    Avoid narcotics    CAD (coronary artery disease)- (present on admission)  Assessment & Plan  Had stent placed 9/2022 at [facility] per [facility] records.  Aspirin, plavix, atorvastatin, lisinopril.    Positive urine drug screen  Assessment & Plan  Positive for cocaine and methamphetamines  \"    Patient advised to stay away from illicit drugs    Tobacco abuse- (present on admission)  Assessment & Plan  Smoking cessation advised  Nicotine patch if needed.    Diabetes (HCC)- (present on admission)  Assessment & Plan  DMII requiring " insulin  Hold outpatient Jardiance and metformin  Diabetic diet  Hypoglycemic protocol  Monitor accuchecks and cover with SSI         VTE prophylaxis: SCDs/TEDs    I have performed a physical exam and reviewed and updated ROS and Plan today (11/17/2022). In review of yesterday's note (11/16/2022), there are no changes except as documented above.

## 2022-11-17 NOTE — CARE PLAN
The patient is Stable - Low risk of patient condition declining or worsening    Shift Goals  Clinical Goals: pain contro, trend trops  Patient Goals: pain control    Progress made toward(s) clinical / shift goals:      Problem: Knowledge Deficit - Standard  Goal: Patient and family/care givers will demonstrate understanding of plan of care, disease process/condition, diagnostic tests and medications  Outcome: Progressing     Problem: Pain - Standard  Goal: Alleviation of pain or a reduction in pain to the patient’s comfort goal  Outcome: Progressing     Problem: Fall Risk  Goal: Patient will remain free from falls  Outcome: Progressing     Patient is not progressing towards the following goals:

## 2022-11-17 NOTE — PROGRESS NOTES
Assessment completed. Patient is aox4, vital signs stable. Patient sinus rhythm on tele monitor, HR: 82. Patient updated on plan of care, all needs met at this time. Call light and personal belongings within reach. Bed locked and in lowest position.

## 2022-11-18 VITALS
HEIGHT: 69 IN | OXYGEN SATURATION: 95 % | SYSTOLIC BLOOD PRESSURE: 110 MMHG | WEIGHT: 192.24 LBS | HEART RATE: 91 BPM | BODY MASS INDEX: 28.47 KG/M2 | RESPIRATION RATE: 18 BRPM | DIASTOLIC BLOOD PRESSURE: 67 MMHG | TEMPERATURE: 97.8 F

## 2022-11-18 PROBLEM — R07.9 CHEST PAIN: Status: RESOLVED | Noted: 2022-11-16 | Resolved: 2022-11-18

## 2022-11-18 LAB
GLUCOSE BLD STRIP.AUTO-MCNC: 167 MG/DL (ref 65–99)
GLUCOSE BLD STRIP.AUTO-MCNC: 301 MG/DL (ref 65–99)

## 2022-11-18 PROCEDURE — 82962 GLUCOSE BLOOD TEST: CPT

## 2022-11-18 PROCEDURE — G0378 HOSPITAL OBSERVATION PER HR: HCPCS

## 2022-11-18 PROCEDURE — 700102 HCHG RX REV CODE 250 W/ 637 OVERRIDE(OP): Performed by: HOSPITALIST

## 2022-11-18 PROCEDURE — 96372 THER/PROPH/DIAG INJ SC/IM: CPT

## 2022-11-18 PROCEDURE — A9270 NON-COVERED ITEM OR SERVICE: HCPCS | Performed by: HOSPITALIST

## 2022-11-18 PROCEDURE — 700111 HCHG RX REV CODE 636 W/ 250 OVERRIDE (IP): Performed by: HOSPITALIST

## 2022-11-18 PROCEDURE — 96376 TX/PRO/DX INJ SAME DRUG ADON: CPT

## 2022-11-18 PROCEDURE — 99239 HOSP IP/OBS DSCHRG MGMT >30: CPT | Performed by: HOSPITALIST

## 2022-11-18 RX ORDER — ASPIRIN 81 MG/1
81 TABLET ORAL DAILY
Qty: 90 TABLET | Refills: 3 | Status: SHIPPED | OUTPATIENT
Start: 2022-11-19 | End: 2023-07-05

## 2022-11-18 RX ORDER — LISINOPRIL 10 MG/1
10 TABLET ORAL DAILY
Qty: 90 TABLET | Refills: 3 | Status: ON HOLD | OUTPATIENT
Start: 2022-11-18 | End: 2023-07-17 | Stop reason: SDUPTHER

## 2022-11-18 RX ORDER — CLOPIDOGREL BISULFATE 75 MG/1
75 TABLET ORAL DAILY
Qty: 90 TABLET | Refills: 3 | Status: ON HOLD | OUTPATIENT
Start: 2022-11-18 | End: 2023-07-17 | Stop reason: SDUPTHER

## 2022-11-18 RX ORDER — PANTOPRAZOLE SODIUM 40 MG/1
40 FOR SUSPENSION ORAL EVERY MORNING
Qty: 90 EACH | Refills: 3 | Status: SHIPPED | OUTPATIENT
Start: 2022-11-18 | End: 2022-11-18 | Stop reason: SDUPTHER

## 2022-11-18 RX ORDER — ATORVASTATIN CALCIUM 40 MG/1
40 TABLET, FILM COATED ORAL
Qty: 30 TABLET | Refills: 3 | Status: ON HOLD | OUTPATIENT
Start: 2022-11-18 | End: 2023-07-17 | Stop reason: SDUPTHER

## 2022-11-18 RX ORDER — COLCHICINE 0.6 MG/1
0.6 TABLET ORAL DAILY
Qty: 30 TABLET | Refills: 0 | Status: SHIPPED | OUTPATIENT
Start: 2022-11-19 | End: 2022-11-18 | Stop reason: SDUPTHER

## 2022-11-18 RX ORDER — INSULIN GLARGINE 100 [IU]/ML
25 INJECTION, SOLUTION SUBCUTANEOUS EVERY EVENING
Qty: 9 ML | Refills: 3 | Status: SHIPPED | OUTPATIENT
Start: 2022-11-18 | End: 2022-12-18

## 2022-11-18 RX ORDER — ESCITALOPRAM OXALATE 10 MG/1
10 TABLET ORAL DAILY
Qty: 30 TABLET | Refills: 3 | Status: SHIPPED | OUTPATIENT
Start: 2022-11-18 | End: 2022-11-18 | Stop reason: SDUPTHER

## 2022-11-18 RX ORDER — INSULIN GLARGINE 100 [IU]/ML
25 INJECTION, SOLUTION SUBCUTANEOUS EVERY EVENING
Qty: 9 ML | Refills: 3 | Status: SHIPPED | OUTPATIENT
Start: 2022-11-18 | End: 2022-11-18 | Stop reason: SDUPTHER

## 2022-11-18 RX ORDER — PANTOPRAZOLE SODIUM 40 MG/1
40 FOR SUSPENSION ORAL EVERY MORNING
Qty: 90 EACH | Refills: 3 | Status: ON HOLD | OUTPATIENT
Start: 2022-11-18 | End: 2023-07-17

## 2022-11-18 RX ORDER — ESCITALOPRAM OXALATE 10 MG/1
10 TABLET ORAL DAILY
Qty: 30 TABLET | Refills: 3 | Status: ON HOLD | OUTPATIENT
Start: 2022-11-18 | End: 2023-07-17 | Stop reason: SDUPTHER

## 2022-11-18 RX ORDER — LISINOPRIL 10 MG/1
10 TABLET ORAL DAILY
Qty: 90 TABLET | Refills: 3 | Status: SHIPPED | OUTPATIENT
Start: 2022-11-18 | End: 2022-11-18 | Stop reason: SDUPTHER

## 2022-11-18 RX ORDER — COLCHICINE 0.6 MG/1
0.6 TABLET ORAL DAILY
Qty: 30 TABLET | Refills: 0 | Status: ON HOLD | OUTPATIENT
Start: 2022-11-19 | End: 2023-07-17 | Stop reason: SDUPTHER

## 2022-11-18 RX ORDER — CLOPIDOGREL BISULFATE 75 MG/1
75 TABLET ORAL DAILY
Qty: 90 TABLET | Refills: 3 | Status: SHIPPED | OUTPATIENT
Start: 2022-11-18 | End: 2022-11-18 | Stop reason: SDUPTHER

## 2022-11-18 RX ADMIN — COLCHICINE 0.6 MG: 0.6 TABLET, FILM COATED ORAL at 05:27

## 2022-11-18 RX ADMIN — LISINOPRIL 10 MG: 10 TABLET ORAL at 05:25

## 2022-11-18 RX ADMIN — KETOROLAC TROMETHAMINE 15 MG: 30 INJECTION, SOLUTION INTRAMUSCULAR at 05:26

## 2022-11-18 RX ADMIN — INSULIN HUMAN 2 UNITS: 100 INJECTION, SOLUTION PARENTERAL at 05:26

## 2022-11-18 RX ADMIN — FAMOTIDINE 20 MG: 20 TABLET, FILM COATED ORAL at 05:25

## 2022-11-18 RX ADMIN — CLOPIDOGREL BISULFATE 75 MG: 75 TABLET ORAL at 05:25

## 2022-11-18 RX ADMIN — SENNOSIDES AND DOCUSATE SODIUM 2 TABLET: 50; 8.6 TABLET ORAL at 05:25

## 2022-11-18 RX ADMIN — ASPIRIN 81 MG: 81 TABLET, COATED ORAL at 05:25

## 2022-11-18 NOTE — PROGRESS NOTES
Monitor summary: [id number], OH 0.16, QRS 0  .10, QT 0.31, no ectopy per strip from monitor room.

## 2022-11-18 NOTE — CARE PLAN
The patient is Stable - Low risk of patient condition declining or worsening    Shift Goals  Clinical Goals: pain control, bs control  Patient Goals: rest  Family Goals: N/A    Progress made toward(s) clinical / shift goals:    Problem: Knowledge Deficit - Standard  Goal: Patient and family/care givers will demonstrate understanding of plan of care, disease process/condition, diagnostic tests and medications  Outcome: Progressing     Problem: Knowledge Deficit - Standard  Goal: Patient and family/care givers will demonstrate understanding of plan of care, disease process/condition, diagnostic tests and medications  Outcome: Progressing     Problem: Pain - Standard  Goal: Alleviation of pain or a reduction in pain to the patient’s comfort goal  Outcome: Progressing     Problem: Pain - Standard  Goal: Alleviation of pain or a reduction in pain to the patient’s comfort goal  Outcome: Progressing     Problem: Fall Risk  Goal: Patient will remain free from falls  Outcome: Progressing     Problem: Fall Risk  Goal: Patient will remain free from falls  Outcome: Progressing       Patient is not progressing towards the following goals:

## 2022-11-18 NOTE — DISCHARGE SUMMARY
Discharge Summary    CHIEF COMPLAINT ON ADMISSION  Chief Complaint   Patient presents with    Chest Pain     Began yesterday L side chest stabbing pain, hx cardiac issues, stent placement 9/17, has not taken meds last x2 days, pain worse with resp       Reason for Admission  Chest Pain; Trouble Breathing     Admission Date  11/16/2022    CODE STATUS  Prior    HPI & HOSPITAL COURSE  As per dr tremaine Mercado Hugo Escudero is a 47 y.o. male with a history of hypertension, diabetes type 2 requiring insulin, posttraumatic stress disorder, dyslipidemia, coronary artery disease with a recent stent placed 9/2022, prior osteomyelitis of the left great toe status requiring amputation, active smoker and diabetic neuropathy.  He presented 11/16/2022 with acute stabbing left-sided upper chest pain quite severe nature.  Patient has not been taking his aspirin or medications last 2 days    ================================      Patient's pain was deemed to be related to pericarditis and he was treated with colchicine and IV Toradol.  We did check a procalcitonin level because a CAT scan showed a infiltrate in his lung his procalcitonin level was normal so most likely represents atelectasis.  Patient has symptomatic improvement and was stable for discharge on 11/18/2022 with recommendations to take all medication prescribed by a doctor.  Patient also advised to stay away from illicit drugs as he was positive for cocaine and methamphetamines on his urine drug screen    Therefore, he is discharged in good and stable condition to home with close outpatient follow-up.    The patient recovered much more quickly than anticipated on admission.    Discharge Date  11/18/2022    FOLLOW UP ITEMS POST DISCHARGE      DISCHARGE DIAGNOSES  Principal Problem (Resolved):    Chest pain POA: Yes  Active Problems:    Diabetes (HCC) POA: Yes    Tobacco abuse POA: Yes    Positive urine drug screen POA: Yes    CAD (coronary artery disease) POA:  Yes      FOLLOW UP  No future appointments.  [facility]  [address] [facility] [address]  [phone]  Schedule an appointment as soon as possible for a visit in 1 week(s)        MEDICATIONS ON DISCHARGE     Medication List        START taking these medications        Instructions   aspirin 81 MG EC tablet  Start taking on: November 19, 2022  Replaces: aspirin 325 MG Tabs  Next Dose Due: Tomorrow morning   Take 1 Tablet by mouth every day.  Dose: 81 mg     colchicine 0.6 MG Tabs  Start taking on: November 19, 2022  Commonly known as: COLCRYS  Next Dose Due: Tomorrow morning   Take 1 Tablet by mouth every day.  Dose: 0.6 mg            CHANGE how you take these medications        Instructions   Lantus SoloStar 100 UNIT/ML Sopn injection  What changed: how to take this  Generic drug: insulin glargine  Next Dose Due: This evening   Inject 25 Units under the skin every evening for 30 days.  Dose: 25 Units            CONTINUE taking these medications        Instructions   atorvastatin 40 MG Tabs  Commonly known as: LIPITOR   Take 1 Tablet by mouth at bedtime.  Dose: 40 mg     busPIRone 10 MG Tabs tablet  Commonly known as: BUSPAR   Take 1 Tablet by mouth 2 times a day.  Dose: 10 mg     clopidogrel 75 MG Tabs  Commonly known as: PLAVIX   Take 1 Tablet by mouth every day.  Dose: 75 mg     Empagliflozin 25 MG Tabs   Take 25 mg by mouth every day.  Dose: 25 mg     escitalopram 10 MG Tabs  Commonly known as: Lexapro   Take 1 Tablet by mouth every day.  Dose: 10 mg     insulin lispro 100 UNIT/ML Sopn injection PEN  Commonly known as: HumaLOG,AdmeLOG   Inject 2-6 Units under the skin 3 times a day with meals.  Dose: 2-6 Units     lisinopril 10 MG Tabs  Commonly known as: PRINIVIL   Take 1 Tablet by mouth every day.  Dose: 10 mg     metformin 1000 MG tablet  Commonly known as: GLUCOPHAGE   Take 1 Tablet by mouth 2 times a day.  Dose: 1,000 mg     pantoprazole 40 MG Pack  Commonly known as: PROTONIX   Take 40 mg by  mouth every morning.  Dose: 40 mg            STOP taking these medications      aspirin 325 MG Tabs  Commonly known as: ASA  Replaced by: aspirin 81 MG EC tablet     VITAMIN D PO              Allergies  Allergies   Allergen Reactions    Apple Hives    Lactose Diarrhea       DIET  No orders of the defined types were placed in this encounter.      ACTIVITY  As tolerated.  Weight bearing as tolerated    CONSULTATIONS      PROCEDURES      LABORATORY  Lab Results   Component Value Date    SODIUM 131 (L) 11/17/2022    POTASSIUM 4.5 11/17/2022    CHLORIDE 99 11/17/2022    CO2 20 11/17/2022    GLUCOSE 274 (H) 11/17/2022    BUN 23 (H) 11/17/2022    CREATININE 0.67 11/17/2022    CREATININE 1.2 01/13/2008        Lab Results   Component Value Date    WBC 13.2 (H) 11/17/2022    HEMOGLOBIN 13.4 (L) 11/17/2022    HEMATOCRIT 40.3 (L) 11/17/2022    PLATELETCT 328 11/17/2022        Total time of the discharge process exceeds 37 minutes.

## 2022-11-18 NOTE — DISCHARGE INSTRUCTIONS
Nonspecific Chest Pain  Chest pain can be caused by many different conditions. Some causes of chest pain can be life-threatening. These will require treatment right away. Serious causes of chest pain include:  Heart attack.  A tear in the body's main blood vessel.  Redness and swelling (inflammation) around your heart.  Blood clot in your lungs.  Other causes of chest pain may not be so serious. These include:  Heartburn.  Anxiety or stress.  Damage to bones or muscles in your chest.  Lung infections.  Chest pain can feel like:  Pain or discomfort in your chest.  Crushing, pressure, aching, or squeezing pain.  Burning or tingling.  Dull or sharp pain that is worse when you move, cough, or take a deep breath.  Pain or discomfort that is also felt in your back, neck, jaw, shoulder, or arm, or pain that spreads to any of these areas.  It is hard to know whether your pain is caused by something that is serious or something that is not so serious. So it is important to see your doctor right away if you have chest pain.  Follow these instructions at home:  Medicines  Take over-the-counter and prescription medicines only as told by your doctor.  If you were prescribed an antibiotic medicine, take it as told by your doctor. Do not stop taking the antibiotic even if you start to feel better.  Lifestyle    Rest as told by your doctor.  Do not use any products that contain nicotine or tobacco, such as cigarettes, e-cigarettes, and chewing tobacco. If you need help quitting, ask your doctor.  Do not drink alcohol.  Make lifestyle changes as told by your doctor. These may include:  Getting regular exercise. Ask your doctor what activities are safe for you.  Eating a heart-healthy diet. A diet and nutrition specialist (dietitian) can help you to learn healthy eating options.  Staying at a healthy weight.  Treating diabetes or high blood pressure, if needed.  Lowering your stress. Activities such as yoga and relaxation techniques  can help.  General instructions  Pay attention to any changes in your symptoms. Tell your doctor about them or any new symptoms.  Avoid any activities that cause chest pain.  Keep all follow-up visits as told by your doctor. This is important. You may need more testing if your chest pain does not go away.  Contact a doctor if:  Your chest pain does not go away.  You feel depressed.  You have a fever.  Get help right away if:  Your chest pain is worse.  You have a cough that gets worse, or you cough up blood.  You have very bad (severe) pain in your belly (abdomen).  You pass out (faint).  You have either of these for no clear reason:  Sudden chest discomfort.  Sudden discomfort in your arms, back, neck, or jaw.  You have shortness of breath at any time.  You suddenly start to sweat, or your skin gets clammy.  You feel sick to your stomach (nauseous).  You throw up (vomit).  You suddenly feel lightheaded or dizzy.  You feel very weak or tired.  Your heart starts to beat fast, or it feels like it is skipping beats.  These symptoms may be an emergency. Do not wait to see if the symptoms will go away. Get medical help right away. Call your local emergency services (911 in the [place] Do not drive yourself to the hospital.  Summary  Chest pain can be caused by many different conditions. The cause may be serious and need treatment right away. If you have chest pain, see your doctor right away.  Follow your doctor's instructions for taking medicines and making lifestyle changes.  Keep all follow-up visits as told by your doctor. This includes visits for any further testing if your chest pain does not go away.  Be sure to know the signs that show that your condition has become worse. Get help right away if you have these symptoms.  This information is not intended to replace advice given to you by your health care provider. Make sure you discuss any questions you have with your health care provider.  Document Released:  06/05/2009 Document Revised: 06/20/2019 Document Reviewed: 06/20/2019  Elsevier Patient Education © 2020 [employer]

## 2022-11-18 NOTE — CARE PLAN
Assumed care of patient at shift change.  Patient AAO x 4, pleasant, on RA, c/o 4/10 generalize pain.  Tele monitor showing NSR.  No needs verbalized at this time.  Call bell and belongings within reach.  Will continue to monitor.          The patient is Stable - Low risk of patient condition declining or worsening    Shift Goals  Clinical Goals: Pain control, tele monitor, blood sugar control  Patient Goals: Rest, pain control  Family Goals: N/A    Progress made toward(s) clinical / shift goals:    Problem: Knowledge Deficit - Standard  Goal: Patient and family/care givers will demonstrate understanding of plan of care, disease process/condition, diagnostic tests and medications  Outcome: Progressing     Problem: Pain - Standard  Goal: Alleviation of pain or a reduction in pain to the patient’s comfort goal  Outcome: Progressing     Problem: Fall Risk  Goal: Patient will remain free from falls  Outcome: Progressing       Patient is not progressing towards the following goals:

## 2022-11-21 LAB
BACTERIA BLD CULT: NORMAL
BACTERIA BLD CULT: NORMAL
SIGNIFICANT IND 70042: NORMAL
SIGNIFICANT IND 70042: NORMAL
SITE SITE: NORMAL
SITE SITE: NORMAL
SOURCE SOURCE: NORMAL
SOURCE SOURCE: NORMAL

## 2022-12-01 PROBLEM — L97.521 ULCERATED, FOOT, LEFT, LIMITED TO BREAKDOWN OF SKIN (HCC): Status: ACTIVE | Noted: 2022-12-01

## 2022-12-08 PROBLEM — S91.302A WOUND, OPEN, FOOT, LEFT, INITIAL ENCOUNTER: Status: ACTIVE | Noted: 2022-12-08

## 2023-07-05 ENCOUNTER — HOSPITAL ENCOUNTER (INPATIENT)
Facility: MEDICAL CENTER | Age: 48
LOS: 13 days | DRG: 253 | End: 2023-07-18
Attending: EMERGENCY MEDICINE | Admitting: INTERNAL MEDICINE
Payer: MEDICAID

## 2023-07-05 ENCOUNTER — APPOINTMENT (OUTPATIENT)
Dept: RADIOLOGY | Facility: MEDICAL CENTER | Age: 48
DRG: 253 | End: 2023-07-05
Attending: EMERGENCY MEDICINE
Payer: MEDICAID

## 2023-07-05 DIAGNOSIS — S91.302A WOUND, OPEN, FOOT, LEFT, INITIAL ENCOUNTER: ICD-10-CM

## 2023-07-05 DIAGNOSIS — B95.62 MRSA BACTEREMIA: ICD-10-CM

## 2023-07-05 DIAGNOSIS — S46.001A ROTATOR CUFF INJURY, RIGHT, INITIAL ENCOUNTER: ICD-10-CM

## 2023-07-05 DIAGNOSIS — F32.89 OTHER DEPRESSION: ICD-10-CM

## 2023-07-05 DIAGNOSIS — E11.628 DIABETIC FOOT INFECTION (HCC): ICD-10-CM

## 2023-07-05 DIAGNOSIS — R07.89 OTHER CHEST PAIN: ICD-10-CM

## 2023-07-05 DIAGNOSIS — E11.42 TYPE 2 DIABETES MELLITUS WITH DIABETIC POLYNEUROPATHY, WITHOUT LONG-TERM CURRENT USE OF INSULIN (HCC): ICD-10-CM

## 2023-07-05 DIAGNOSIS — L97.521 ULCERATED, FOOT, LEFT, LIMITED TO BREAKDOWN OF SKIN (HCC): ICD-10-CM

## 2023-07-05 DIAGNOSIS — B35.1 ONYCHOMYCOSIS OF MULTIPLE TOENAILS WITH TYPE 2 DIABETES MELLITUS (HCC): ICD-10-CM

## 2023-07-05 DIAGNOSIS — I73.9 PERIPHERAL ARTERY DISEASE (HCC): ICD-10-CM

## 2023-07-05 DIAGNOSIS — E11.69 ONYCHOMYCOSIS OF MULTIPLE TOENAILS WITH TYPE 2 DIABETES MELLITUS (HCC): ICD-10-CM

## 2023-07-05 DIAGNOSIS — R78.81 MRSA BACTEREMIA: ICD-10-CM

## 2023-07-05 DIAGNOSIS — L08.9 DIABETIC FOOT INFECTION (HCC): ICD-10-CM

## 2023-07-05 DIAGNOSIS — I25.10 CORONARY ARTERY DISEASE INVOLVING NATIVE HEART, UNSPECIFIED VESSEL OR LESION TYPE, UNSPECIFIED WHETHER ANGINA PRESENT: ICD-10-CM

## 2023-07-05 PROBLEM — Z91.199 NONCOMPLIANCE: Status: ACTIVE | Noted: 2023-07-05

## 2023-07-05 LAB
ALBUMIN SERPL BCP-MCNC: 3.5 G/DL (ref 3.2–4.9)
ALBUMIN/GLOB SERPL: 1 G/DL
ALP SERPL-CCNC: 159 U/L (ref 30–99)
ALT SERPL-CCNC: 5 U/L (ref 2–50)
ANION GAP SERPL CALC-SCNC: 14 MMOL/L (ref 7–16)
APPEARANCE UR: CLEAR
AST SERPL-CCNC: 7 U/L (ref 12–45)
BASOPHILS # BLD AUTO: 0.5 % (ref 0–1.8)
BASOPHILS # BLD: 0.05 K/UL (ref 0–0.12)
BILIRUB SERPL-MCNC: 0.4 MG/DL (ref 0.1–1.5)
BILIRUB UR QL STRIP.AUTO: NEGATIVE
BUN SERPL-MCNC: 8 MG/DL (ref 8–22)
CALCIUM ALBUM COR SERPL-MCNC: 9.5 MG/DL (ref 8.5–10.5)
CALCIUM SERPL-MCNC: 9.1 MG/DL (ref 8.5–10.5)
CHLORIDE SERPL-SCNC: 99 MMOL/L (ref 96–112)
CO2 SERPL-SCNC: 21 MMOL/L (ref 20–33)
COLOR UR: YELLOW
CREAT SERPL-MCNC: 0.69 MG/DL (ref 0.5–1.4)
CRP SERPL HS-MCNC: 2.28 MG/DL (ref 0–0.75)
EKG IMPRESSION: NORMAL
EOSINOPHIL # BLD AUTO: 0.46 K/UL (ref 0–0.51)
EOSINOPHIL NFR BLD: 4.4 % (ref 0–6.9)
ERYTHROCYTE [DISTWIDTH] IN BLOOD BY AUTOMATED COUNT: 53.3 FL (ref 35.9–50)
ERYTHROCYTE [SEDIMENTATION RATE] IN BLOOD BY WESTERGREN METHOD: 23 MM/HOUR (ref 0–20)
GFR SERPLBLD CREATININE-BSD FMLA CKD-EPI: 112 ML/MIN/1.73 M 2
GLOBULIN SER CALC-MCNC: 3.5 G/DL (ref 1.9–3.5)
GLUCOSE BLD STRIP.AUTO-MCNC: 290 MG/DL (ref 65–99)
GLUCOSE BLD STRIP.AUTO-MCNC: 346 MG/DL (ref 65–99)
GLUCOSE SERPL-MCNC: 480 MG/DL (ref 65–99)
GLUCOSE UR STRIP.AUTO-MCNC: >=1000 MG/DL
HCT VFR BLD AUTO: 38.8 % (ref 42–52)
HGB BLD-MCNC: 13.3 G/DL (ref 14–18)
IMM GRANULOCYTES # BLD AUTO: 0.06 K/UL (ref 0–0.11)
IMM GRANULOCYTES NFR BLD AUTO: 0.6 % (ref 0–0.9)
KETONES UR STRIP.AUTO-MCNC: NEGATIVE MG/DL
LACTATE SERPL-SCNC: 1.5 MMOL/L (ref 0.5–2)
LACTATE SERPL-SCNC: 2.3 MMOL/L (ref 0.5–2)
LEUKOCYTE ESTERASE UR QL STRIP.AUTO: NEGATIVE
LYMPHOCYTES # BLD AUTO: 2.25 K/UL (ref 1–4.8)
LYMPHOCYTES NFR BLD: 21.8 % (ref 22–41)
MCH RBC QN AUTO: 30.5 PG (ref 27–33)
MCHC RBC AUTO-ENTMCNC: 34.3 G/DL (ref 32.3–36.5)
MCV RBC AUTO: 89 FL (ref 81.4–97.8)
MICRO URNS: ABNORMAL
MONOCYTES # BLD AUTO: 1.03 K/UL (ref 0–0.85)
MONOCYTES NFR BLD AUTO: 10 % (ref 0–13.4)
NEUTROPHILS # BLD AUTO: 6.49 K/UL (ref 1.82–7.42)
NEUTROPHILS NFR BLD: 62.7 % (ref 44–72)
NITRITE UR QL STRIP.AUTO: NEGATIVE
NRBC # BLD AUTO: 0 K/UL
NRBC BLD-RTO: 0 /100 WBC (ref 0–0.2)
PH UR STRIP.AUTO: 5 [PH] (ref 5–8)
PLATELET # BLD AUTO: 321 K/UL (ref 164–446)
PMV BLD AUTO: 9.1 FL (ref 9–12.9)
POTASSIUM SERPL-SCNC: 4 MMOL/L (ref 3.6–5.5)
PROT SERPL-MCNC: 7 G/DL (ref 6–8.2)
PROT UR QL STRIP: NEGATIVE MG/DL
RBC # BLD AUTO: 4.36 M/UL (ref 4.7–6.1)
RBC UR QL AUTO: NEGATIVE
SCCMEC + MECA PNL NOSE NAA+PROBE: NEGATIVE
SODIUM SERPL-SCNC: 134 MMOL/L (ref 135–145)
SP GR UR STRIP.AUTO: 1.04
UROBILINOGEN UR STRIP.AUTO-MCNC: 0.2 MG/DL
WBC # BLD AUTO: 10.3 K/UL (ref 4.8–10.8)

## 2023-07-05 PROCEDURE — 73630 X-RAY EXAM OF FOOT: CPT | Mod: RT

## 2023-07-05 PROCEDURE — 99406 BEHAV CHNG SMOKING 3-10 MIN: CPT | Performed by: INTERNAL MEDICINE

## 2023-07-05 PROCEDURE — 96367 TX/PROPH/DG ADDL SEQ IV INF: CPT

## 2023-07-05 PROCEDURE — 700105 HCHG RX REV CODE 258: Mod: JZ | Performed by: EMERGENCY MEDICINE

## 2023-07-05 PROCEDURE — 700102 HCHG RX REV CODE 250 W/ 637 OVERRIDE(OP): Performed by: INTERNAL MEDICINE

## 2023-07-05 PROCEDURE — 700111 HCHG RX REV CODE 636 W/ 250 OVERRIDE (IP): Performed by: INTERNAL MEDICINE

## 2023-07-05 PROCEDURE — 96365 THER/PROPH/DIAG IV INF INIT: CPT

## 2023-07-05 PROCEDURE — 96375 TX/PRO/DX INJ NEW DRUG ADDON: CPT

## 2023-07-05 PROCEDURE — 99223 1ST HOSP IP/OBS HIGH 75: CPT | Mod: 25 | Performed by: INTERNAL MEDICINE

## 2023-07-05 PROCEDURE — 96366 THER/PROPH/DIAG IV INF ADDON: CPT

## 2023-07-05 PROCEDURE — 80053 COMPREHEN METABOLIC PANEL: CPT

## 2023-07-05 PROCEDURE — 81003 URINALYSIS AUTO W/O SCOPE: CPT

## 2023-07-05 PROCEDURE — 700105 HCHG RX REV CODE 258: Performed by: INTERNAL MEDICINE

## 2023-07-05 PROCEDURE — 86140 C-REACTIVE PROTEIN: CPT

## 2023-07-05 PROCEDURE — 99024 POSTOP FOLLOW-UP VISIT: CPT | Mod: 57 | Performed by: STUDENT IN AN ORGANIZED HEALTH CARE EDUCATION/TRAINING PROGRAM

## 2023-07-05 PROCEDURE — 99406 BEHAV CHNG SMOKING 3-10 MIN: CPT

## 2023-07-05 PROCEDURE — 93005 ELECTROCARDIOGRAM TRACING: CPT | Performed by: INTERNAL MEDICINE

## 2023-07-05 PROCEDURE — 99285 EMERGENCY DEPT VISIT HI MDM: CPT

## 2023-07-05 PROCEDURE — 82962 GLUCOSE BLOOD TEST: CPT | Mod: 91

## 2023-07-05 PROCEDURE — 700111 HCHG RX REV CODE 636 W/ 250 OVERRIDE (IP): Performed by: EMERGENCY MEDICINE

## 2023-07-05 PROCEDURE — 770006 HCHG ROOM/CARE - MED/SURG/GYN SEMI*

## 2023-07-05 PROCEDURE — 87641 MR-STAPH DNA AMP PROBE: CPT

## 2023-07-05 PROCEDURE — 71045 X-RAY EXAM CHEST 1 VIEW: CPT

## 2023-07-05 PROCEDURE — 87086 URINE CULTURE/COLONY COUNT: CPT

## 2023-07-05 PROCEDURE — 87040 BLOOD CULTURE FOR BACTERIA: CPT

## 2023-07-05 PROCEDURE — 83605 ASSAY OF LACTIC ACID: CPT | Mod: 91

## 2023-07-05 PROCEDURE — A9270 NON-COVERED ITEM OR SERVICE: HCPCS | Performed by: INTERNAL MEDICINE

## 2023-07-05 PROCEDURE — 85652 RBC SED RATE AUTOMATED: CPT

## 2023-07-05 PROCEDURE — 36415 COLL VENOUS BLD VENIPUNCTURE: CPT

## 2023-07-05 PROCEDURE — 85025 COMPLETE CBC W/AUTO DIFF WBC: CPT

## 2023-07-05 RX ORDER — PROCHLORPERAZINE EDISYLATE 5 MG/ML
5-10 INJECTION INTRAMUSCULAR; INTRAVENOUS EVERY 4 HOURS PRN
Status: DISCONTINUED | OUTPATIENT
Start: 2023-07-05 | End: 2023-07-18 | Stop reason: HOSPADM

## 2023-07-05 RX ORDER — PROMETHAZINE HYDROCHLORIDE 25 MG/1
12.5-25 TABLET ORAL EVERY 4 HOURS PRN
Status: DISCONTINUED | OUTPATIENT
Start: 2023-07-05 | End: 2023-07-18 | Stop reason: HOSPADM

## 2023-07-05 RX ORDER — SODIUM CHLORIDE 9 MG/ML
1000 INJECTION, SOLUTION INTRAVENOUS ONCE
Status: COMPLETED | OUTPATIENT
Start: 2023-07-05 | End: 2023-07-05

## 2023-07-05 RX ORDER — INSULIN GLARGINE 100 [IU]/ML
25 INJECTION, SOLUTION SUBCUTANEOUS DAILY
Status: ON HOLD | COMMUNITY
End: 2023-07-17 | Stop reason: SDUPTHER

## 2023-07-05 RX ORDER — ONDANSETRON 2 MG/ML
4 INJECTION INTRAMUSCULAR; INTRAVENOUS EVERY 4 HOURS PRN
Status: DISCONTINUED | OUTPATIENT
Start: 2023-07-05 | End: 2023-07-18 | Stop reason: HOSPADM

## 2023-07-05 RX ORDER — LISINOPRIL 10 MG/1
10 TABLET ORAL DAILY
Status: DISCONTINUED | OUTPATIENT
Start: 2023-07-05 | End: 2023-07-18 | Stop reason: HOSPADM

## 2023-07-05 RX ORDER — ENOXAPARIN SODIUM 100 MG/ML
40 INJECTION SUBCUTANEOUS DAILY
Status: DISCONTINUED | OUTPATIENT
Start: 2023-07-05 | End: 2023-07-18 | Stop reason: HOSPADM

## 2023-07-05 RX ORDER — POLYETHYLENE GLYCOL 3350 17 G/17G
1 POWDER, FOR SOLUTION ORAL
Status: DISCONTINUED | OUTPATIENT
Start: 2023-07-05 | End: 2023-07-18 | Stop reason: HOSPADM

## 2023-07-05 RX ORDER — INSULIN LISPRO 100 [IU]/ML
2-6 INJECTION, SOLUTION INTRAVENOUS; SUBCUTANEOUS
Status: DISCONTINUED | OUTPATIENT
Start: 2023-07-05 | End: 2023-07-05

## 2023-07-05 RX ORDER — HYDROMORPHONE HYDROCHLORIDE 1 MG/ML
1 INJECTION, SOLUTION INTRAMUSCULAR; INTRAVENOUS; SUBCUTANEOUS ONCE
Status: ACTIVE | OUTPATIENT
Start: 2023-07-05 | End: 2023-07-06

## 2023-07-05 RX ORDER — INSULIN LISPRO 100 [IU]/ML
2-9 INJECTION, SOLUTION INTRAVENOUS; SUBCUTANEOUS
Status: DISCONTINUED | OUTPATIENT
Start: 2023-07-05 | End: 2023-07-08

## 2023-07-05 RX ORDER — SODIUM CHLORIDE 9 MG/ML
INJECTION, SOLUTION INTRAVENOUS CONTINUOUS
Status: DISCONTINUED | OUTPATIENT
Start: 2023-07-05 | End: 2023-07-06

## 2023-07-05 RX ORDER — OXYCODONE HYDROCHLORIDE 5 MG/1
5 TABLET ORAL EVERY 4 HOURS PRN
Status: DISCONTINUED | OUTPATIENT
Start: 2023-07-05 | End: 2023-07-07

## 2023-07-05 RX ORDER — ASPIRIN 81 MG/1
81 TABLET ORAL DAILY
Status: DISCONTINUED | OUTPATIENT
Start: 2023-07-05 | End: 2023-07-05

## 2023-07-05 RX ORDER — AMOXICILLIN 250 MG
2 CAPSULE ORAL 2 TIMES DAILY
Status: DISCONTINUED | OUTPATIENT
Start: 2023-07-05 | End: 2023-07-18 | Stop reason: HOSPADM

## 2023-07-05 RX ORDER — ACETAMINOPHEN 325 MG/1
650 TABLET ORAL EVERY 6 HOURS PRN
Status: DISCONTINUED | OUTPATIENT
Start: 2023-07-05 | End: 2023-07-18 | Stop reason: HOSPADM

## 2023-07-05 RX ORDER — COLCHICINE 0.6 MG/1
0.6 TABLET ORAL DAILY
Status: DISCONTINUED | OUTPATIENT
Start: 2023-07-05 | End: 2023-07-18 | Stop reason: HOSPADM

## 2023-07-05 RX ORDER — CLOPIDOGREL BISULFATE 75 MG/1
75 TABLET ORAL DAILY
Status: DISCONTINUED | OUTPATIENT
Start: 2023-07-05 | End: 2023-07-14

## 2023-07-05 RX ORDER — ONDANSETRON 4 MG/1
4 TABLET, ORALLY DISINTEGRATING ORAL EVERY 4 HOURS PRN
Status: DISCONTINUED | OUTPATIENT
Start: 2023-07-05 | End: 2023-07-18 | Stop reason: HOSPADM

## 2023-07-05 RX ORDER — INSULIN LISPRO 100 [IU]/ML
0.2 INJECTION, SOLUTION INTRAVENOUS; SUBCUTANEOUS
Status: DISCONTINUED | OUTPATIENT
Start: 2023-07-05 | End: 2023-07-08

## 2023-07-05 RX ORDER — ATORVASTATIN CALCIUM 40 MG/1
40 TABLET, FILM COATED ORAL
Status: DISCONTINUED | OUTPATIENT
Start: 2023-07-05 | End: 2023-07-16

## 2023-07-05 RX ORDER — BUSPIRONE HYDROCHLORIDE 10 MG/1
10 TABLET ORAL 2 TIMES DAILY
Status: DISCONTINUED | OUTPATIENT
Start: 2023-07-05 | End: 2023-07-05

## 2023-07-05 RX ORDER — PROMETHAZINE HYDROCHLORIDE 25 MG/1
12.5-25 SUPPOSITORY RECTAL EVERY 4 HOURS PRN
Status: DISCONTINUED | OUTPATIENT
Start: 2023-07-05 | End: 2023-07-18 | Stop reason: HOSPADM

## 2023-07-05 RX ORDER — BISACODYL 10 MG
10 SUPPOSITORY, RECTAL RECTAL
Status: DISCONTINUED | OUTPATIENT
Start: 2023-07-05 | End: 2023-07-18 | Stop reason: HOSPADM

## 2023-07-05 RX ORDER — ASPIRIN 81 MG/1
81 TABLET ORAL DAILY
Status: DISCONTINUED | OUTPATIENT
Start: 2023-07-05 | End: 2023-07-18 | Stop reason: HOSPADM

## 2023-07-05 RX ORDER — OMEPRAZOLE 20 MG/1
20 CAPSULE, DELAYED RELEASE ORAL EVERY MORNING
Status: DISCONTINUED | OUTPATIENT
Start: 2023-07-05 | End: 2023-07-18 | Stop reason: HOSPADM

## 2023-07-05 RX ORDER — ESCITALOPRAM OXALATE 10 MG/1
10 TABLET ORAL DAILY
Status: DISCONTINUED | OUTPATIENT
Start: 2023-07-05 | End: 2023-07-18 | Stop reason: HOSPADM

## 2023-07-05 RX ORDER — KETOROLAC TROMETHAMINE 30 MG/ML
15 INJECTION, SOLUTION INTRAMUSCULAR; INTRAVENOUS ONCE
Status: COMPLETED | OUTPATIENT
Start: 2023-07-05 | End: 2023-07-05

## 2023-07-05 RX ADMIN — INSULIN LISPRO 5 UNITS: 100 INJECTION, SOLUTION INTRAVENOUS; SUBCUTANEOUS at 20:19

## 2023-07-05 RX ADMIN — ESCITALOPRAM OXALATE 10 MG: 10 TABLET ORAL at 10:56

## 2023-07-05 RX ADMIN — SODIUM CHLORIDE 1000 ML: 9 INJECTION, SOLUTION INTRAVENOUS at 07:22

## 2023-07-05 RX ADMIN — CLOPIDOGREL BISULFATE 75 MG: 75 TABLET ORAL at 11:02

## 2023-07-05 RX ADMIN — MEROPENEM 500 MG: 500 INJECTION, POWDER, FOR SOLUTION INTRAVENOUS at 07:26

## 2023-07-05 RX ADMIN — ATORVASTATIN CALCIUM 40 MG: 40 TABLET, FILM COATED ORAL at 21:48

## 2023-07-05 RX ADMIN — COLCHICINE 0.6 MG: 0.6 TABLET ORAL at 10:56

## 2023-07-05 RX ADMIN — SODIUM CHLORIDE: 9 INJECTION, SOLUTION INTRAVENOUS at 11:01

## 2023-07-05 RX ADMIN — INSULIN LISPRO 6 UNITS: 100 INJECTION, SOLUTION INTRAVENOUS; SUBCUTANEOUS at 21:48

## 2023-07-05 RX ADMIN — VANCOMYCIN HYDROCHLORIDE 2250 MG: 5 INJECTION, POWDER, LYOPHILIZED, FOR SOLUTION INTRAVENOUS at 08:01

## 2023-07-05 RX ADMIN — OXYCODONE 5 MG: 5 TABLET ORAL at 15:43

## 2023-07-05 RX ADMIN — ASPIRIN 81 MG: 81 TABLET, COATED ORAL at 15:43

## 2023-07-05 RX ADMIN — INSULIN GLARGINE-YFGN 18 UNITS: 100 INJECTION, SOLUTION SUBCUTANEOUS at 21:48

## 2023-07-05 RX ADMIN — MEROPENEM 500 MG: 500 INJECTION, POWDER, FOR SOLUTION INTRAVENOUS at 13:10

## 2023-07-05 RX ADMIN — KETOROLAC TROMETHAMINE 15 MG: 30 INJECTION, SOLUTION INTRAMUSCULAR; INTRAVENOUS at 07:28

## 2023-07-05 RX ADMIN — LISINOPRIL 10 MG: 10 TABLET ORAL at 10:56

## 2023-07-05 RX ADMIN — OMEPRAZOLE 20 MG: 20 CAPSULE, DELAYED RELEASE ORAL at 10:56

## 2023-07-05 RX ADMIN — MEROPENEM 500 MG: 500 INJECTION, POWDER, FOR SOLUTION INTRAVENOUS at 18:50

## 2023-07-05 RX ADMIN — VANCOMYCIN HYDROCHLORIDE 1500 MG: 5 INJECTION, POWDER, LYOPHILIZED, FOR SOLUTION INTRAVENOUS at 21:58

## 2023-07-05 ASSESSMENT — PATIENT HEALTH QUESTIONNAIRE - PHQ9
SUM OF ALL RESPONSES TO PHQ9 QUESTIONS 1 AND 2: 0
2. FEELING DOWN, DEPRESSED, IRRITABLE, OR HOPELESS: NOT AT ALL
1. LITTLE INTEREST OR PLEASURE IN DOING THINGS: NOT AT ALL

## 2023-07-05 ASSESSMENT — ENCOUNTER SYMPTOMS
CONSTIPATION: 0
CLAUDICATION: 0
DIARRHEA: 0
FEVER: 0
HEMOPTYSIS: 0
BLURRED VISION: 0
VOMITING: 0
ABDOMINAL PAIN: 0
COUGH: 0
PHOTOPHOBIA: 0
CHILLS: 0
NAUSEA: 0
NECK PAIN: 0
WEAKNESS: 0
SPEECH CHANGE: 0
MYALGIAS: 0
ORTHOPNEA: 0
DIZZINESS: 0
PALPITATIONS: 0
WEIGHT LOSS: 0
DOUBLE VISION: 0

## 2023-07-05 ASSESSMENT — FIBROSIS 4 INDEX: FIB4 SCORE: 0.39

## 2023-07-05 NOTE — ASSESSMENT & PLAN NOTE
Uncontrolled and A1c 11.2  Patient admitted he is not taking insulin at home  Continue glargine 25 U nightly  ISS.    Hyperglycemia improving  DM educator has seen him

## 2023-07-05 NOTE — ASSESSMENT & PLAN NOTE
No chest pain and EKG did not show ischemia  Stent placement in September 2022  Resumed on aspirin and Plavix

## 2023-07-05 NOTE — ASSESSMENT & PLAN NOTE
History of osteomyelitis on the right foot and had amputation transmetatarsal  Wound culture at [place] showed mixed Pseudomonas and MRSA  Blood culture showed MRSA.    Patient was supposed to take antibiotics until 7/20 however he left AGAINST MEDICAL ADVICE  Also patient had MRSA bacteremia  X-ray did not show osteomyelitis  Ortho was consulted, MRI is is done, I will follow-up with Ortho recommendations  LATASHA abnormal, discussed with Dr. Calderon and plan for vascular graft 7/14  Wound culture is growing MRSA and Enterococcus  Continue IV vancomycin and monitor renal function.  ID assisting in arranging outpatient antibiotic infusion till 8/15 on daptomycin

## 2023-07-05 NOTE — ED NOTES
~Unable to complete med rec at this time. Patient reports he gets medications at [facility] but, has not taken medications in a month. [facility] reports they have not filled medications since 2022. Med rec will call [facility] when they open to see if patient has filled any medications recently.

## 2023-07-05 NOTE — ED NOTES
"Attempted to medicated pt  Pt refused pain medication stating, \"I do not like the way that makes me feel\"  MD notified   "

## 2023-07-05 NOTE — H&P
Hospital Medicine History & Physical Note    Date of Service  7/5/2023    Primary Care Physician  ROBBI Pepper    Consultants  Ortho    Code Status  Full Code    Chief Complaint  Chief Complaint   Patient presents with    T-5000 Extremity Pain     Pt report having his toes amputated a month ago and is having increased swelling and pain in his right leg       History of Presenting Illness    48-year-old male with history of uncontrolled diabetes, coronary artery disease s/p stent, PTSD, hypertension, alcoholism and peripheral vascular disease who presented 7/5 with worsening pain on his right leg.  Last month patient was diagnosed with osteomyelitis and he underwent amputation by Dr. Escudero at [facility], at that time patient had MRSA/Pseudomonas infection, likely bacteremia??  Echo did not show any signs of endocarditis and patient was discharged to the SNF with daptomycin and meropenem for 6-month until end of July 7/20.  However patient left AGAINST MEDICAL ADVICE from the SNF.  And patient came on 7/5 with worsening pain, and worsening swelling on the right leg, denied any fever or chills, denied any chest pain or shortness of breath.  Patient has not taking antibiotics for more than 2 weeks and patient admitted he is not taking insulin.  On admission vital signs around baseline with no fever.  White blood cell 10.3 and blood sugar more than 400 with lactic acid 2.3.  CRP 2.2.  X-ray for the right foot did not show any abnormalities.  ED physician discussed the case with Ortho, patient will be admitted to the hospital to continue antibiotics.    Discharge summary from [place] was reviewed    I discussed the plan of care with patient, family, bedside RN, and ED physician .    Review of Systems  Review of Systems   Constitutional:  Positive for malaise/fatigue. Negative for chills, fever and weight loss.   HENT:  Negative for ear pain, hearing loss and tinnitus.    Eyes:  Negative for  blurred vision, double vision and photophobia.   Respiratory:  Negative for cough and hemoptysis.    Cardiovascular:  Negative for chest pain, palpitations, orthopnea and claudication.   Gastrointestinal:  Negative for abdominal pain, constipation, diarrhea, nausea and vomiting.   Genitourinary:  Negative for dysuria, frequency and urgency.   Musculoskeletal:  Positive for joint pain. Negative for myalgias and neck pain.   Skin:  Negative for rash.   Neurological:  Negative for dizziness, speech change and weakness.       Past Medical History   has a past medical history of Alcohol abuse, Dental disorder, Depression (2010), Diabetes, Diabetic neuropathy (HCC), Heart burn, Hemorrhagic disorder ([facility]), Hiatus hernia syndrome, High cholesterol, Hypertension, Pain, Pneumonia (approx 2011), PTSD (post-traumatic stress disorder), and PVD (peripheral vascular disease) ([facility]).    Surgical History   has a past surgical history that includes irrigation & debridement general (Right, 2/6/2020); other; pr shldr arthroscop,surg,w/rotat cuff repr (Right, 11/18/2021); pr shldr arthroscop,part acromioplas (Right, 11/18/2021); pr arthroscopy shoulder surgical biceps tenodes* (Right, 11/18/2021); and debridement, labrum, hip, arthroscopic (Right, 11/18/2021).     Family History  Family history reviewed with patient. There is no family history that is pertinent to the chief complaint.     Social History   reports that he has been smoking cigarettes. He has a 5.00 pack-year smoking history. He has never used smokeless tobacco. He reports current alcohol use. He reports current drug use. Drug: Inhaled.    Allergies  Allergies   Allergen Reactions    Apple Hives    Lactose Diarrhea     Diarrhea         Medications  Prior to Admission Medications   Prescriptions Last Dose Informant Patient Reported? Taking?   Empagliflozin 25 MG Tab   No No   Sig: Take 25 mg by mouth every day.   aspirin EC 81 MG EC tablet   No No   Sig: Take 1 Tablet by  mouth every day.   atorvastatin (LIPITOR) 40 MG Tab   No No   Sig: Take 1 Tablet by mouth at bedtime.   busPIRone (BUSPAR) 10 MG Tab tablet  Patient Yes No   Sig: Take 1 Tablet by mouth 2 times a day.   clopidogrel (PLAVIX) 75 MG Tab   No No   Sig: Take 1 Tablet by mouth every day.   colchicine (COLCRYS) 0.6 MG Tab   No No   Sig: Take 1 Tablet by mouth every day.   escitalopram (LEXAPRO) 10 MG Tab   No No   Sig: Take 1 Tablet by mouth every day.   insulin lispro 100 UNIT/ML SC SOPN injection PEN  Patient Yes No   Sig: Inject 2-6 Units under the skin 3 times a day with meals.   lisinopril (PRINIVIL) 10 MG Tab   No No   Sig: Take 1 Tablet by mouth every day.   metformin (GLUCOPHAGE) 1000 MG tablet   No No   Sig: Take 1 Tablet by mouth 2 times a day.   pantoprazole (PROTONIX) 40 MG Pack   No No   Sig: Take 40 mg by mouth every morning.      Facility-Administered Medications: None       Physical Exam  Temp:  [36.5 °C (97.7 °F)] 36.5 °C (97.7 °F)  Pulse:  [] 78  Resp:  [14-20] 14  BP: (104-153)/(68-92) 129/71  SpO2:  [94 %-96 %] 96 %  Blood Pressure: 118/82   Temperature: 36.5 °C (97.7 °F)   Pulse: 100   Respiration: 19   Pulse Oximetry: 96 %       Physical Exam  Constitutional:       General: He is not in acute distress.     Appearance: He is not ill-appearing.   Eyes:      General: No scleral icterus.  Cardiovascular:      Rate and Rhythm: Normal rate.      Heart sounds: No murmur heard.  Pulmonary:      Effort: No respiratory distress.      Breath sounds: No wheezing.   Abdominal:      General: There is no distension.      Tenderness: There is no abdominal tenderness. There is no right CVA tenderness, left CVA tenderness or guarding.   Musculoskeletal:      Right lower leg: Edema present.      Left lower leg: No edema.      Comments: Amputation on the big toe on the left foot  Amputation foot on the right leg   Lymphadenopathy:      Cervical: No cervical adenopathy.   Skin:     Coloration: Skin is not jaundiced.       Findings: Bruising and lesion present. No rash.   Neurological:      General: No focal deficit present.      Mental Status: He is alert and oriented to person, place, and time. Mental status is at baseline.      Cranial Nerves: No cranial nerve deficit.      Motor: No weakness.      Gait: Gait normal.         Laboratory:  Recent Labs     07/05/23  0553   WBC 10.3   RBC 4.36*   HEMOGLOBIN 13.3*   HEMATOCRIT 38.8*   MCV 89.0   MCH 30.5   MCHC 34.3   RDW 53.3*   PLATELETCT 321   MPV 9.1     Recent Labs     07/05/23  0553   SODIUM 134*   POTASSIUM 4.0   CHLORIDE 99   CO2 21   GLUCOSE 480*   BUN 8   CREATININE 0.69   CALCIUM 9.1     Recent Labs     07/05/23  0553   ALTSGPT 5   ASTSGOT 7*   ALKPHOSPHAT 159*   TBILIRUBIN 0.4   GLUCOSE 480*         No results for input(s): NTPROBNP in the last 72 hours.      No results for input(s): TROPONINT in the last 72 hours.    Imaging:  DX-CHEST-PORTABLE (1 VIEW)   Final Result         1.  No acute cardiopulmonary disease.      DX-FOOT-COMPLETE 3+ RIGHT   Final Result         1.  No acute traumatic bony injury.      US-EXTREMITY VENOUS LOWER UNILAT RIGHT    (Results Pending)       X-Ray:  I have personally reviewed the images and compared with prior images.  EKG:  I have personally reviewed the images and compared with prior images.    Assessment/Plan:  Justification for Admission Status  I anticipate this patient will require at least two midnights for appropriate medical management, necessitating inpatient admission because patient needs IV antibiotics, history of bacteremia and osteomyelitis    Patient will need a Med/Surg bed on ORTHOPEDICS service .  The need is secondary to history of osteomyelitis and underwent surgery.    * Osteomyelitis (HCC)- (present on admission)  Assessment & Plan  History of osteomyelitis on the right foot and had amputation transmetatarsal  Wound culture at [place] showed mixed Pseudomonas and MRSA  Blood culture showed MRSA  Patient was  supposed to take antibiotics until 7/20 however he left AGAINST MEDICAL ADVICE  Also patient had MRSA bacteremia  X-ray did not show osteomyelitis  Ortho was consulted, will leave the decision for MRI to LPS and Ortho  At this time we continue vancomycin and meropenem  Blood cultures pending    MRSA bacteremia  Assessment & Plan  Blood culture [facility] on 6/6 was positive for MRSA and repeat culture on 6/9 was negative  Echo did not show any endocarditis  Patient was supposed to continue daptomycin until 7/20 however he left SNF AMA  Repeat blood culture   continue vancomycin and meropenem at this time      CAD (coronary artery disease)- (present on admission)  Assessment & Plan  No chest pain and EKG did not show ischemia  Stent placement in September 2022  Continue aspirin, atorvastatin and Plavix      Peripheral artery disease (HCC)- (present on admission)  Assessment & Plan  Continue aspirin, atorvastatin and Plavix  Encouraged the patient to quit smoking    Alcohol abuse- (present on admission)  Assessment & Plan  No signs of withdrawal  Close monitoring and consider CIWA protocol if needed    Tobacco abuse- (present on admission)  Assessment & Plan  Smoking cessation consultation  Discussed risk of worsening coronary artery disease, peripheral vascular disease and death with the patient, discussed all other treatments including nicotine patch, patient understood.  Time 5 minutes    Diabetic foot ulcer (HCC)- (present on admission)  Assessment & Plan  Underwent amputation on left foot  His blood sugar is not controlled, on admission more than 400  Continue antibiotics  LPS    Diabetes (HCC)- (present on admission)  Assessment & Plan  Uncontrolled and A1c 11.2  Patient admitted he is not taking insulin  Sliding scale and resume Lantus 5 units daily  Diabetes education    Noncompliance  Assessment & Plan  Patient has significant history of noncompliance and not taking insulin or medications  Patient has  complicated medical history including uncontrolled diabetes and peripheral vascular disease with coronary artery disease  Resume his medications  Continue education the patient        VTE prophylaxis: SCDs/TEDs and enoxaparin ppx

## 2023-07-05 NOTE — ASSESSMENT & PLAN NOTE
Patient has significant history of noncompliance and not taking insulin or medications  Patient has complicated medical history including uncontrolled diabetes and peripheral vascular disease with coronary artery disease  Resume his medications  Continue education the patient

## 2023-07-05 NOTE — ED TRIAGE NOTES
"Chief Complaint   Patient presents with    T-5000 Extremity Pain     Pt report having his toes amputated a month ago and is having increased swelling and pain in his right leg     /87   Pulse (!) 101   Temp 36.5 °C (97.7 °F) (Temporal)   Resp 20   Ht 1.753 m (5' 9\")   Wt 88.5 kg (195 lb)   SpO2 96%   BMI 28.80 kg/m²     Pt brought in by EMS  Pt given 50 mcg of fentanly in route along with 4mg of zofran and 400 ml of fluids  Pt has notiable swelling in his right leg   Pt is A&Ox4  Pt reports having muscle spasms in his right leg   Per EMS pt had a blood glucose of 406. Pt reports being non compliant with medications    "

## 2023-07-05 NOTE — ASSESSMENT & PLAN NOTE
Underwent amputation on left foot  Good glucose control needed  Continue IV Vanco and monitor renal function.  ID consulted and following  LPS consulted, will see tomorrow  Ortho consulted, MRI is done.  I spoke with Dr. Escudero and no surgery is planned during this hospitalization  LATASHA abnormal.  Having vascular grafting with Dr. Calderon 7/14  Blood cultures remain NGTD

## 2023-07-05 NOTE — ED NOTES
Hourly rounding complete. Pt resting on stretcher. VSS on RA . Respirations even and non-labored. No s/sx of distress noted. Call bell within reach, side rails up. Pt c/o pain. Admitting MD notified. Awaiting order.

## 2023-07-05 NOTE — ASSESSMENT & PLAN NOTE
Blood culture [place] on 6/6 was positive for MRSA and repeat culture on 6/9 was negative  Blood cultures have been negative, TTE negative for endocarditis

## 2023-07-05 NOTE — CONSULTS
Aware of patient  S/p right TMA now with drainage  Left [facility] AMA last time  Refused antibiotics postoperatively  Uncontrolled diabetic with glucose near 500 on admission  Recommend admit for abx, LPS consult and wound care  Possible OR Friday if needed      Joon Escudero MD  Orthopedic Trauma Surgery
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
## 2023-07-05 NOTE — ASSESSMENT & PLAN NOTE
Continue aspirin, atorvastatin.  Holding Plavix for possible surgery with Ortho  Encouraged the patient to quit smoking

## 2023-07-05 NOTE — PROGRESS NOTES
Pharmacy Vancomycin Kinetics Note for 7/5/2023     48 y.o. male on Vancomycin day # 1     Vancomycin Indication (AUC Dosing): Osteomyelitis  Vancomycin Indication (Two level/Trough based Dosing):      Provider specified end date: 07/19/23    Active Antibiotics (From admission, onward)      Ordered     Ordering Provider       Wed Jul 5, 2023  9:42 AM    07/05/23 0942  vancomycin (Vancocin) 1,500 mg in  mL IVPB  (vancomycin (VANCOCIN) IV (LD + Maintenance))  EVERY 12 HOURS         Teo Jimenez M.D.       Wed Jul 5, 2023  8:34 AM    07/05/23 0834  MD Alert...Vancomycin per Pharmacy  PHARMACY TO DOSE        Question:  Indication(s) for vancomycin?  Answer:  Osteomyelitis    Teo Jimenez M.D.       Wed Jul 5, 2023  8:34 AM    07/05/23 0834  meropenem (Merrem) 500 mg in  mL IV-MBP  EVERY 6 HOURS        Question:  Indication  Answer:  Osteomyelitis/septic joint    Teo Jimenez M.D.       Wed Jul 5, 2023  6:12 AM    07/05/23 0612  vancomycin (Vancocin) 2,250 mg in  mL IVPB  (vancomycin (VANCOCIN) IV (LD + Maintenance))  ONCE         Jun Prater M.D.          Dosing Weight: 88 kg (194 lb 0.1 oz)    Admission History: Admitted on 7/5/2023 for Osteomyelitis (HCC) [M86.9]  Pertinent history: 49yo male with history of DMII, PTSD, hypertension, alcoholism and PVD presents with worsening pain in his right leg.  Last month patient was diagnosed with osteomyelitis and he underwent amputation at [facility], at that time patient had MRSA/ESBL and patient was discharged to the SNF with daptomycin and meropenem for 6 weeks but patient left AMA from the SNF after 2 days.    Allergies: Apple and Lactose     Pertinent cultures to date:   Results       Procedure Component Value Units Date/Time    Urinalysis [[id number]] Collected: 07/05/23 0915    Order Status: Sent Specimen: Urine Updated: 07/05/23 0918    Narrative:      Indication for culture:->Evaluation for sepsis without a  clear source  "of infection    Urine Culture (New) [[id number]] Collected: 23    Order Status: Sent Specimen: Urine Updated: 23    Narrative:      Indication for culture:->Evaluation for sepsis without a  clear source of infection    MRSA By PCR (Amp) [[id number]] Collected: 23    Order Status: Sent Specimen: Respirate from Nares Updated: 23    Blood Culture [[id number]] Collected: 23    Order Status: Sent Specimen: Blood from Peripheral Updated: 23    Narrative:      Per Hospital Policy: Only change Specimen Src: to \"Line\" if  specified by physician order.    Blood Culture [[id number]] Collected: 23    Order Status: Sent Specimen: Blood from Peripheral Updated: 23    Narrative:      Per Hospital Policy: Only change Specimen Src: to \"Line\" if  specified by physician order.          Labs:   Estimated Creatinine Clearance: 140.9 mL/min (by C-G formula based on SCr of 0.69 mg/dL).  Recent Labs     23   WBC 10.3   NEUTSPOLYS 62.70     Recent Labs     23   BUN 8   CREATININE 0.69   ALBUMIN 3.5     Intake/Output Summary (Last 24 hours) at 2023 0946  Last data filed at 2023 0756  Gross per 24 hour   Intake 97.78 ml   Output --   Net 97.78 ml      /73   Pulse 96   Temp 36.5 °C (97.7 °F) (Temporal)   Resp 19   Ht 1.753 m (5' 9\")   Wt 88.5 kg (195 lb)   SpO2 94%  Temp (24hrs), Av.5 °C (97.7 °F), Min:36.5 °C (97.7 °F), Max:36.5 °C (97.7 °F)    List concerns for Vancomycin clearance: Nephrotoxic drugs;Malnutrition/Low albumin    Pharmacokinetics:   AUC kinetics:   Ke (hr ^-1): 0.1173 hr^-1  Half life: 5.91 hr  Clearance: 6.71  Estimated TDD: 3355  Estimated Dose: 1104  Estimated interval: 7.9    A/P:   -  Vancomycin dose: 2250mg loading dose then 1500mg q12h at 6410-7430    -  Next vancomycin level(s): not ordered    -  Predicted vancomycin AUC from initial AUC test calculator: 447 mg·hr/L    -  Comments: MRSA in " blood cultures from June, obtain culture to evaluate RENATE. ID consult pending. Follow ID recommendations for antibiotics.    Saul Reynolds, [place]inas[place], BCPS

## 2023-07-06 ENCOUNTER — APPOINTMENT (OUTPATIENT)
Dept: RADIOLOGY | Facility: MEDICAL CENTER | Age: 48
DRG: 253 | End: 2023-07-06
Attending: INTERNAL MEDICINE
Payer: MEDICAID

## 2023-07-06 LAB
ALBUMIN SERPL BCP-MCNC: 3.4 G/DL (ref 3.2–4.9)
ALBUMIN/GLOB SERPL: 1 G/DL
ALP SERPL-CCNC: 150 U/L (ref 30–99)
ALT SERPL-CCNC: 9 U/L (ref 2–50)
ANION GAP SERPL CALC-SCNC: 10 MMOL/L (ref 7–16)
AST SERPL-CCNC: 8 U/L (ref 12–45)
BASOPHILS # BLD AUTO: 0.5 % (ref 0–1.8)
BASOPHILS # BLD: 0.04 K/UL (ref 0–0.12)
BILIRUB SERPL-MCNC: 0.3 MG/DL (ref 0.1–1.5)
BUN SERPL-MCNC: 8 MG/DL (ref 8–22)
CALCIUM ALBUM COR SERPL-MCNC: 9 MG/DL (ref 8.5–10.5)
CALCIUM SERPL-MCNC: 8.5 MG/DL (ref 8.5–10.5)
CHLORIDE SERPL-SCNC: 103 MMOL/L (ref 96–112)
CO2 SERPL-SCNC: 23 MMOL/L (ref 20–33)
CREAT SERPL-MCNC: 0.67 MG/DL (ref 0.5–1.4)
CRP SERPL HS-MCNC: 2.48 MG/DL (ref 0–0.75)
EOSINOPHIL # BLD AUTO: 0.38 K/UL (ref 0–0.51)
EOSINOPHIL NFR BLD: 4.5 % (ref 0–6.9)
ERYTHROCYTE [DISTWIDTH] IN BLOOD BY AUTOMATED COUNT: 54.9 FL (ref 35.9–50)
EST. AVERAGE GLUCOSE BLD GHB EST-MCNC: 278 MG/DL
GFR SERPLBLD CREATININE-BSD FMLA CKD-EPI: 113 ML/MIN/1.73 M 2
GLOBULIN SER CALC-MCNC: 3.5 G/DL (ref 1.9–3.5)
GLUCOSE BLD STRIP.AUTO-MCNC: 211 MG/DL (ref 65–99)
GLUCOSE BLD STRIP.AUTO-MCNC: 248 MG/DL (ref 65–99)
GLUCOSE BLD STRIP.AUTO-MCNC: 266 MG/DL (ref 65–99)
GLUCOSE BLD STRIP.AUTO-MCNC: 283 MG/DL (ref 65–99)
GLUCOSE SERPL-MCNC: 243 MG/DL (ref 65–99)
HBA1C MFR BLD: 11.3 % (ref 4–5.6)
HCT VFR BLD AUTO: 40.6 % (ref 42–52)
HGB BLD-MCNC: 13.4 G/DL (ref 14–18)
IMM GRANULOCYTES # BLD AUTO: 0.03 K/UL (ref 0–0.11)
IMM GRANULOCYTES NFR BLD AUTO: 0.4 % (ref 0–0.9)
LYMPHOCYTES # BLD AUTO: 2.02 K/UL (ref 1–4.8)
LYMPHOCYTES NFR BLD: 23.9 % (ref 22–41)
MAGNESIUM SERPL-MCNC: 1.8 MG/DL (ref 1.5–2.5)
MCH RBC QN AUTO: 29.8 PG (ref 27–33)
MCHC RBC AUTO-ENTMCNC: 33 G/DL (ref 32.3–36.5)
MCV RBC AUTO: 90.2 FL (ref 81.4–97.8)
MONOCYTES # BLD AUTO: 0.77 K/UL (ref 0–0.85)
MONOCYTES NFR BLD AUTO: 9.1 % (ref 0–13.4)
NEUTROPHILS # BLD AUTO: 5.2 K/UL (ref 1.82–7.42)
NEUTROPHILS NFR BLD: 61.6 % (ref 44–72)
NRBC # BLD AUTO: 0 K/UL
NRBC BLD-RTO: 0 /100 WBC (ref 0–0.2)
PLATELET # BLD AUTO: 319 K/UL (ref 164–446)
PMV BLD AUTO: 9.1 FL (ref 9–12.9)
POTASSIUM SERPL-SCNC: 3.8 MMOL/L (ref 3.6–5.5)
PROT SERPL-MCNC: 6.9 G/DL (ref 6–8.2)
RBC # BLD AUTO: 4.5 M/UL (ref 4.7–6.1)
SODIUM SERPL-SCNC: 136 MMOL/L (ref 135–145)
WBC # BLD AUTO: 8.4 K/UL (ref 4.8–10.8)

## 2023-07-06 PROCEDURE — 93971 EXTREMITY STUDY: CPT | Mod: RT

## 2023-07-06 PROCEDURE — 770006 HCHG ROOM/CARE - MED/SURG/GYN SEMI*

## 2023-07-06 PROCEDURE — 700111 HCHG RX REV CODE 636 W/ 250 OVERRIDE (IP): Performed by: INTERNAL MEDICINE

## 2023-07-06 PROCEDURE — 80053 COMPREHEN METABOLIC PANEL: CPT

## 2023-07-06 PROCEDURE — 82962 GLUCOSE BLOOD TEST: CPT | Mod: 91

## 2023-07-06 PROCEDURE — 36415 COLL VENOUS BLD VENIPUNCTURE: CPT

## 2023-07-06 PROCEDURE — 700102 HCHG RX REV CODE 250 W/ 637 OVERRIDE(OP): Performed by: INTERNAL MEDICINE

## 2023-07-06 PROCEDURE — 97162 PT EVAL MOD COMPLEX 30 MIN: CPT

## 2023-07-06 PROCEDURE — A9270 NON-COVERED ITEM OR SERVICE: HCPCS | Performed by: INTERNAL MEDICINE

## 2023-07-06 PROCEDURE — 700105 HCHG RX REV CODE 258: Performed by: INTERNAL MEDICINE

## 2023-07-06 PROCEDURE — 700111 HCHG RX REV CODE 636 W/ 250 OVERRIDE (IP): Performed by: STUDENT IN AN ORGANIZED HEALTH CARE EDUCATION/TRAINING PROGRAM

## 2023-07-06 PROCEDURE — 86140 C-REACTIVE PROTEIN: CPT

## 2023-07-06 PROCEDURE — 700105 HCHG RX REV CODE 258: Mod: JZ | Performed by: INTERNAL MEDICINE

## 2023-07-06 PROCEDURE — 93971 EXTREMITY STUDY: CPT | Mod: 26,RT | Performed by: INTERNAL MEDICINE

## 2023-07-06 PROCEDURE — 83735 ASSAY OF MAGNESIUM: CPT

## 2023-07-06 PROCEDURE — 700111 HCHG RX REV CODE 636 W/ 250 OVERRIDE (IP): Mod: JZ | Performed by: INTERNAL MEDICINE

## 2023-07-06 PROCEDURE — 700105 HCHG RX REV CODE 258: Mod: JZ | Performed by: STUDENT IN AN ORGANIZED HEALTH CARE EDUCATION/TRAINING PROGRAM

## 2023-07-06 PROCEDURE — 83036 HEMOGLOBIN GLYCOSYLATED A1C: CPT

## 2023-07-06 PROCEDURE — 85025 COMPLETE CBC W/AUTO DIFF WBC: CPT

## 2023-07-06 PROCEDURE — 97597 DBRDMT OPN WND 1ST 20 CM/<: CPT

## 2023-07-06 PROCEDURE — 99232 SBSQ HOSP IP/OBS MODERATE 35: CPT | Performed by: STUDENT IN AN ORGANIZED HEALTH CARE EDUCATION/TRAINING PROGRAM

## 2023-07-06 RX ADMIN — OXYCODONE 5 MG: 5 TABLET ORAL at 11:30

## 2023-07-06 RX ADMIN — ESCITALOPRAM OXALATE 10 MG: 10 TABLET ORAL at 05:21

## 2023-07-06 RX ADMIN — INSULIN GLARGINE-YFGN 18 UNITS: 100 INJECTION, SOLUTION SUBCUTANEOUS at 18:11

## 2023-07-06 RX ADMIN — INSULIN LISPRO 5 UNITS: 100 INJECTION, SOLUTION INTRAVENOUS; SUBCUTANEOUS at 18:15

## 2023-07-06 RX ADMIN — VANCOMYCIN HYDROCHLORIDE 1500 MG: 5 INJECTION, POWDER, LYOPHILIZED, FOR SOLUTION INTRAVENOUS at 09:16

## 2023-07-06 RX ADMIN — CLOPIDOGREL BISULFATE 75 MG: 75 TABLET ORAL at 05:21

## 2023-07-06 RX ADMIN — INSULIN LISPRO 6 UNITS: 100 INJECTION, SOLUTION INTRAVENOUS; SUBCUTANEOUS at 13:57

## 2023-07-06 RX ADMIN — MEROPENEM 500 MG: 500 INJECTION, POWDER, FOR SOLUTION INTRAVENOUS at 18:09

## 2023-07-06 RX ADMIN — LISINOPRIL 10 MG: 10 TABLET ORAL at 05:21

## 2023-07-06 RX ADMIN — INSULIN LISPRO 6 UNITS: 100 INJECTION, SOLUTION INTRAVENOUS; SUBCUTANEOUS at 09:18

## 2023-07-06 RX ADMIN — INSULIN LISPRO 3 UNITS: 100 INJECTION, SOLUTION INTRAVENOUS; SUBCUTANEOUS at 09:20

## 2023-07-06 RX ADMIN — VANCOMYCIN HYDROCHLORIDE 1500 MG: 5 INJECTION, POWDER, LYOPHILIZED, FOR SOLUTION INTRAVENOUS at 20:41

## 2023-07-06 RX ADMIN — MEROPENEM 500 MG: 500 INJECTION, POWDER, FOR SOLUTION INTRAVENOUS at 12:21

## 2023-07-06 RX ADMIN — COLCHICINE 0.6 MG: 0.6 TABLET ORAL at 05:21

## 2023-07-06 RX ADMIN — INSULIN LISPRO 3 UNITS: 100 INJECTION, SOLUTION INTRAVENOUS; SUBCUTANEOUS at 13:56

## 2023-07-06 RX ADMIN — MEROPENEM 500 MG: 500 INJECTION, POWDER, FOR SOLUTION INTRAVENOUS at 00:31

## 2023-07-06 RX ADMIN — INSULIN LISPRO 5 UNITS: 100 INJECTION, SOLUTION INTRAVENOUS; SUBCUTANEOUS at 20:45

## 2023-07-06 RX ADMIN — OXYCODONE 5 MG: 5 TABLET ORAL at 06:04

## 2023-07-06 RX ADMIN — OMEPRAZOLE 20 MG: 20 CAPSULE, DELAYED RELEASE ORAL at 05:21

## 2023-07-06 RX ADMIN — ASPIRIN 81 MG: 81 TABLET, COATED ORAL at 05:21

## 2023-07-06 RX ADMIN — INSULIN LISPRO 6 UNITS: 100 INJECTION, SOLUTION INTRAVENOUS; SUBCUTANEOUS at 18:12

## 2023-07-06 RX ADMIN — SENNOSIDES AND DOCUSATE SODIUM 2 TABLET: 50; 8.6 TABLET ORAL at 05:21

## 2023-07-06 RX ADMIN — MEROPENEM 500 MG: 500 INJECTION, POWDER, FOR SOLUTION INTRAVENOUS at 05:22

## 2023-07-06 RX ADMIN — ATORVASTATIN CALCIUM 40 MG: 40 TABLET, FILM COATED ORAL at 20:40

## 2023-07-06 ASSESSMENT — PAIN DESCRIPTION - PAIN TYPE
TYPE: ACUTE PAIN

## 2023-07-06 ASSESSMENT — ENCOUNTER SYMPTOMS
BLURRED VISION: 0
COUGH: 0
SHORTNESS OF BREATH: 0
NAUSEA: 0
HEADACHES: 0
CHILLS: 0
DIZZINESS: 0
ABDOMINAL PAIN: 0
VOMITING: 0
BACK PAIN: 0
INSOMNIA: 0
EYE PAIN: 0
PALPITATIONS: 0
FEVER: 0

## 2023-07-06 ASSESSMENT — COGNITIVE AND FUNCTIONAL STATUS - GENERAL
DRESSING REGULAR LOWER BODY CLOTHING: A LOT
TURNING FROM BACK TO SIDE WHILE IN FLAT BAD: A LITTLE
MOBILITY SCORE: 11
CLIMB 3 TO 5 STEPS WITH RAILING: TOTAL
MOVING TO AND FROM BED TO CHAIR: A LITTLE
STANDING UP FROM CHAIR USING ARMS: TOTAL
SUGGESTED CMS G CODE MODIFIER MOBILITY: CL
MOVING FROM LYING ON BACK TO SITTING ON SIDE OF FLAT BED: A LOT
WALKING IN HOSPITAL ROOM: TOTAL

## 2023-07-06 ASSESSMENT — LIFESTYLE VARIABLES: SUBSTANCE_ABUSE: 0

## 2023-07-06 NOTE — CARE PLAN
The patient is Stable - Low risk of patient condition declining or worsening    Shift Goals  Clinical Goals: getting IV abx  Patient Goals: sleep    Progress made toward(s) clinical / shift goals:  Pt received Q6 hr IV abx during 12 hour shift. Pt slept through most of the night    Patient is not progressing towards the following goals:

## 2023-07-06 NOTE — PROGRESS NOTES
4 Eyes Skin Assessment Completed by HANS Tellez and HANS Robbins.    Head WDL  Ears WDL  Nose WDL  Mouth WDL  Neck WDL  Breast/Chest WDL  Shoulder Blades WDL  Spine WDL  (R) Arm/Elbow/Hand WDL  (L) Arm/Elbow/Hand WDL  Abdomen WDL  Groin WDL  Scrotum/Coccyx/Buttocks WDL  (R) Leg WDL  (L) Leg WDL  (R) Heel/Foot/Toe Redness, Scar, and Scab, s/p amputation   (L) Heel/Foot/Toe s/p great toe amputation          Devices In Places       Interventions In Place Q2    Possible Skin Injury Yes    Pictures Uploaded Into Epic Yes  Wound Consult Placed LPS already consulted   RN Wound Prevention Protocol Ordered No

## 2023-07-06 NOTE — CARE PLAN
Problem: Knowledge Deficit - Standard  Goal: Patient and family/care givers will demonstrate understanding of plan of care, disease process/condition, diagnostic tests and medications  Outcome: Progressing     Problem: Fall Risk  Goal: Patient will remain free from falls  Outcome: Progressing   The patient is Stable - Low risk of patient condition declining or worsening    Shift Goals  Clinical Goals: IVantibiotics  Patient Goals: pain management  Family Goals: not present    Progress made toward(s) clinical / shift goals:  patient a+o x 4, iv abx a/o, afebrile, vss, ?I&D tomorrow to right foot, 10/10 pain on initial assessment but not due for any pain meds at that time, when reassessed patient was found to be sleeping, no needs at this time, wctm    Patient is not progressing towards the following goals: n/a    Fall precautions/hourly rounding maintained, call light within reach and functioning, all items within reach.  Patient encouraged to call for assistance, poc reviewed with patient, ?'s/concerns answered.   Bed alarm active.

## 2023-07-06 NOTE — PROGRESS NOTES
1820: Pt arrived to unit. Denies drinking alcohol. Denies taking drugs. States there is no possible way he could withdrawal during this hospital stay. A&Ox4. Lethargic. Pt oreinted to unit. Call light in reach. No signs of respiratory distress at this time. LPS already consulted of [facility].
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
## 2023-07-07 LAB
ANION GAP SERPL CALC-SCNC: 11 MMOL/L (ref 7–16)
BACTERIA UR CULT: NORMAL
BUN SERPL-MCNC: 11 MG/DL (ref 8–22)
CALCIUM SERPL-MCNC: 9 MG/DL (ref 8.5–10.5)
CHLORIDE SERPL-SCNC: 104 MMOL/L (ref 96–112)
CO2 SERPL-SCNC: 22 MMOL/L (ref 20–33)
CREAT SERPL-MCNC: 0.65 MG/DL (ref 0.5–1.4)
ERYTHROCYTE [DISTWIDTH] IN BLOOD BY AUTOMATED COUNT: 56.6 FL (ref 35.9–50)
GFR SERPLBLD CREATININE-BSD FMLA CKD-EPI: 114 ML/MIN/1.73 M 2
GLUCOSE BLD STRIP.AUTO-MCNC: 182 MG/DL (ref 65–99)
GLUCOSE BLD STRIP.AUTO-MCNC: 212 MG/DL (ref 65–99)
GLUCOSE BLD STRIP.AUTO-MCNC: 212 MG/DL (ref 65–99)
GLUCOSE BLD STRIP.AUTO-MCNC: 241 MG/DL (ref 65–99)
GLUCOSE BLD STRIP.AUTO-MCNC: 264 MG/DL (ref 65–99)
GLUCOSE SERPL-MCNC: 297 MG/DL (ref 65–99)
GRAM STN SPEC: NORMAL
HCT VFR BLD AUTO: 41.9 % (ref 42–52)
HGB BLD-MCNC: 13.8 G/DL (ref 14–18)
MCH RBC QN AUTO: 30.1 PG (ref 27–33)
MCHC RBC AUTO-ENTMCNC: 32.9 G/DL (ref 32.3–36.5)
MCV RBC AUTO: 91.3 FL (ref 81.4–97.8)
PLATELET # BLD AUTO: 332 K/UL (ref 164–446)
PMV BLD AUTO: 9.1 FL (ref 9–12.9)
POTASSIUM SERPL-SCNC: 4.3 MMOL/L (ref 3.6–5.5)
RBC # BLD AUTO: 4.59 M/UL (ref 4.7–6.1)
SIGNIFICANT IND 70042: NORMAL
SIGNIFICANT IND 70042: NORMAL
SITE SITE: NORMAL
SITE SITE: NORMAL
SODIUM SERPL-SCNC: 137 MMOL/L (ref 135–145)
SOURCE SOURCE: NORMAL
SOURCE SOURCE: NORMAL
WBC # BLD AUTO: 8.8 K/UL (ref 4.8–10.8)

## 2023-07-07 PROCEDURE — 700102 HCHG RX REV CODE 250 W/ 637 OVERRIDE(OP): Performed by: INTERNAL MEDICINE

## 2023-07-07 PROCEDURE — 85027 COMPLETE CBC AUTOMATED: CPT

## 2023-07-07 PROCEDURE — A9270 NON-COVERED ITEM OR SERVICE: HCPCS | Performed by: STUDENT IN AN ORGANIZED HEALTH CARE EDUCATION/TRAINING PROGRAM

## 2023-07-07 PROCEDURE — 99232 SBSQ HOSP IP/OBS MODERATE 35: CPT | Performed by: STUDENT IN AN ORGANIZED HEALTH CARE EDUCATION/TRAINING PROGRAM

## 2023-07-07 PROCEDURE — 87077 CULTURE AEROBIC IDENTIFY: CPT

## 2023-07-07 PROCEDURE — 700111 HCHG RX REV CODE 636 W/ 250 OVERRIDE (IP): Mod: JZ | Performed by: STUDENT IN AN ORGANIZED HEALTH CARE EDUCATION/TRAINING PROGRAM

## 2023-07-07 PROCEDURE — 700102 HCHG RX REV CODE 250 W/ 637 OVERRIDE(OP): Performed by: STUDENT IN AN ORGANIZED HEALTH CARE EDUCATION/TRAINING PROGRAM

## 2023-07-07 PROCEDURE — 87186 SC STD MICRODIL/AGAR DIL: CPT

## 2023-07-07 PROCEDURE — 770006 HCHG ROOM/CARE - MED/SURG/GYN SEMI*

## 2023-07-07 PROCEDURE — 82962 GLUCOSE BLOOD TEST: CPT

## 2023-07-07 PROCEDURE — 87205 SMEAR GRAM STAIN: CPT

## 2023-07-07 PROCEDURE — 80048 BASIC METABOLIC PNL TOTAL CA: CPT

## 2023-07-07 PROCEDURE — A9270 NON-COVERED ITEM OR SERVICE: HCPCS | Performed by: INTERNAL MEDICINE

## 2023-07-07 PROCEDURE — 87070 CULTURE OTHR SPECIMN AEROBIC: CPT

## 2023-07-07 PROCEDURE — 99255 IP/OBS CONSLTJ NEW/EST HI 80: CPT | Performed by: INTERNAL MEDICINE

## 2023-07-07 PROCEDURE — 700111 HCHG RX REV CODE 636 W/ 250 OVERRIDE (IP): Performed by: INTERNAL MEDICINE

## 2023-07-07 PROCEDURE — 700105 HCHG RX REV CODE 258: Performed by: INTERNAL MEDICINE

## 2023-07-07 PROCEDURE — 700105 HCHG RX REV CODE 258: Mod: JZ | Performed by: STUDENT IN AN ORGANIZED HEALTH CARE EDUCATION/TRAINING PROGRAM

## 2023-07-07 PROCEDURE — 87076 CULTURE ANAEROBE IDENT EACH: CPT

## 2023-07-07 PROCEDURE — 36415 COLL VENOUS BLD VENIPUNCTURE: CPT

## 2023-07-07 RX ORDER — MORPHINE SULFATE 4 MG/ML
3 INJECTION INTRAVENOUS EVERY 4 HOURS PRN
Status: DISCONTINUED | OUTPATIENT
Start: 2023-07-07 | End: 2023-07-14

## 2023-07-07 RX ORDER — HYDROCODONE BITARTRATE AND ACETAMINOPHEN 5; 325 MG/1; MG/1
1 TABLET ORAL EVERY 6 HOURS PRN
Status: DISCONTINUED | OUTPATIENT
Start: 2023-07-07 | End: 2023-07-14

## 2023-07-07 RX ADMIN — INSULIN LISPRO 1 UNITS: 100 INJECTION, SOLUTION INTRAVENOUS; SUBCUTANEOUS at 08:03

## 2023-07-07 RX ADMIN — OMEPRAZOLE 20 MG: 20 CAPSULE, DELAYED RELEASE ORAL at 05:07

## 2023-07-07 RX ADMIN — LISINOPRIL 10 MG: 10 TABLET ORAL at 05:07

## 2023-07-07 RX ADMIN — MEROPENEM 500 MG: 500 INJECTION, POWDER, FOR SOLUTION INTRAVENOUS at 13:04

## 2023-07-07 RX ADMIN — MORPHINE SULFATE 3 MG: 4 INJECTION, SOLUTION INTRAMUSCULAR; INTRAVENOUS at 13:12

## 2023-07-07 RX ADMIN — INSULIN LISPRO 3 UNITS: 100 INJECTION, SOLUTION INTRAVENOUS; SUBCUTANEOUS at 13:10

## 2023-07-07 RX ADMIN — OXYCODONE 5 MG: 5 TABLET ORAL at 07:52

## 2023-07-07 RX ADMIN — ESCITALOPRAM OXALATE 10 MG: 10 TABLET ORAL at 05:07

## 2023-07-07 RX ADMIN — SENNOSIDES AND DOCUSATE SODIUM 2 TABLET: 50; 8.6 TABLET ORAL at 05:08

## 2023-07-07 RX ADMIN — INSULIN LISPRO 6 UNITS: 100 INJECTION, SOLUTION INTRAVENOUS; SUBCUTANEOUS at 13:11

## 2023-07-07 RX ADMIN — VANCOMYCIN HYDROCHLORIDE 1500 MG: 5 INJECTION, POWDER, LYOPHILIZED, FOR SOLUTION INTRAVENOUS at 10:42

## 2023-07-07 RX ADMIN — SENNOSIDES AND DOCUSATE SODIUM 2 TABLET: 50; 8.6 TABLET ORAL at 18:18

## 2023-07-07 RX ADMIN — MEROPENEM 500 MG: 500 INJECTION, POWDER, FOR SOLUTION INTRAVENOUS at 18:16

## 2023-07-07 RX ADMIN — OXYCODONE 5 MG: 5 TABLET ORAL at 02:50

## 2023-07-07 RX ADMIN — HYDROCODONE BITARTRATE AND ACETAMINOPHEN 1 TABLET: 5; 325 TABLET ORAL at 10:36

## 2023-07-07 RX ADMIN — MORPHINE SULFATE 3 MG: 4 INJECTION, SOLUTION INTRAMUSCULAR; INTRAVENOUS at 20:19

## 2023-07-07 RX ADMIN — MEROPENEM 500 MG: 500 INJECTION, POWDER, FOR SOLUTION INTRAVENOUS at 05:11

## 2023-07-07 RX ADMIN — INSULIN LISPRO 5 UNITS: 100 INJECTION, SOLUTION INTRAVENOUS; SUBCUTANEOUS at 18:18

## 2023-07-07 RX ADMIN — ATORVASTATIN CALCIUM 40 MG: 40 TABLET, FILM COATED ORAL at 20:19

## 2023-07-07 RX ADMIN — HYDROCODONE BITARTRATE AND ACETAMINOPHEN 1 TABLET: 5; 325 TABLET ORAL at 18:12

## 2023-07-07 RX ADMIN — INSULIN GLARGINE-YFGN 18 UNITS: 100 INJECTION, SOLUTION SUBCUTANEOUS at 18:20

## 2023-07-07 RX ADMIN — COLCHICINE 0.6 MG: 0.6 TABLET ORAL at 05:07

## 2023-07-07 RX ADMIN — VANCOMYCIN HYDROCHLORIDE 1500 MG: 5 INJECTION, POWDER, LYOPHILIZED, FOR SOLUTION INTRAVENOUS at 20:20

## 2023-07-07 RX ADMIN — MEROPENEM 500 MG: 500 INJECTION, POWDER, FOR SOLUTION INTRAVENOUS at 00:07

## 2023-07-07 RX ADMIN — INSULIN LISPRO 6 UNITS: 100 INJECTION, SOLUTION INTRAVENOUS; SUBCUTANEOUS at 18:19

## 2023-07-07 RX ADMIN — INSULIN LISPRO 2 UNITS: 100 INJECTION, SOLUTION INTRAVENOUS; SUBCUTANEOUS at 20:28

## 2023-07-07 ASSESSMENT — PAIN DESCRIPTION - PAIN TYPE
TYPE: ACUTE PAIN

## 2023-07-07 ASSESSMENT — ENCOUNTER SYMPTOMS
BLURRED VISION: 0
EYE PAIN: 0
COUGH: 0
NAUSEA: 0
CHILLS: 0
INSOMNIA: 0
PALPITATIONS: 0
VOMITING: 0
DIZZINESS: 0
HEADACHES: 0
FEVER: 0
ABDOMINAL PAIN: 0
SHORTNESS OF BREATH: 0
BACK PAIN: 0

## 2023-07-07 ASSESSMENT — LIFESTYLE VARIABLES: SUBSTANCE_ABUSE: 0

## 2023-07-07 NOTE — PROGRESS NOTES
Hospital Medicine Daily Progress Note    Date of Service  7/7/2023    Chief Complaint  Rogelio Escudero is a 48 y.o. male admitted 7/5/2023 with foot pain.    Hospital Course  48-year-old male with history of uncontrolled diabetes, coronary artery disease s/p stent, PTSD, hypertension, alcoholism and peripheral vascular disease who presented 7/5 with worsening pain on his right leg.  Last month patient was diagnosed with osteomyelitis and he underwent amputation by Dr. Escudero at [facility], at that time patient had MRSA/Pseudomonas infection, likely bacteremia??  Echo did not show any signs of endocarditis and patient was discharged to the SNF with daptomycin and meropenem for 6-month until end of July 7/20.  However patient left AGAINST MEDICAL ADVICE from the SNF.  And patient came on 7/5 with worsening pain, and worsening swelling on the right leg, denied any fever or chills, denied any chest pain or shortness of breath.  Patient has not taking antibiotics for more than 2 weeks and patient admitted he is not taking insulin.  On admission vital signs around baseline with no fever.  White blood cell 10.3 and blood sugar more than 400 with lactic acid 2.3.  CRP 2.2.  X-ray for the right foot did not show any abnormalities.  ED physician discussed the case with Ortho, patient will be admitted to the hospital to continue antibiotics.    Interval Problem Update  No acute events overnight.  MRI foot pending. Surgery this morning cancelled pending MRI imaging to dictate plan of care.  Continue antibiotics.  Will need to restart timer for treatment length of hx MRSA bacteremia.  Blood cultures NGTD.  Holding home plavix for hx CAD stent 9/2022 pending surgical plans.    I have discussed this patient's plan of care and discharge plan at IDT rounds today with Case Management, Nursing, Nursing leadership, and other members of the IDT team.    Consultants/Specialty  infectious disease and orthopedics    Code  Status  Full Code    Disposition  The patient is not medically cleared for discharge to home or a post-acute facility.      I have placed the appropriate orders for post-discharge needs.    Review of Systems  Review of Systems   Constitutional:  Negative for chills and fever.   Eyes:  Negative for blurred vision and pain.   Respiratory:  Negative for cough and shortness of breath.    Cardiovascular:  Negative for chest pain, palpitations and leg swelling.   Gastrointestinal:  Negative for abdominal pain, nausea and vomiting.   Genitourinary:  Negative for dysuria and urgency.   Musculoskeletal:  Negative for back pain.   Skin:  Negative for itching and rash.   Neurological:  Negative for dizziness and headaches.   Psychiatric/Behavioral:  Negative for substance abuse. The patient does not have insomnia.         Physical Exam  Temp:  [36.1 °C (96.9 °F)-36.6 °C (97.8 °F)] 36.1 °C (97 °F)  Pulse:  [71-82] 75  Resp:  [16-17] 17  BP: (120-142)/(81-88) 142/88  SpO2:  [96 %-98 %] 98 %    Physical Exam  Constitutional:       General: He is not in acute distress.     Appearance: He is not ill-appearing.   HENT:      Head: Normocephalic and atraumatic.      Right Ear: External ear normal.      Left Ear: External ear normal.      Mouth/Throat:      Pharynx: No oropharyngeal exudate or posterior oropharyngeal erythema.   Eyes:      Extraocular Movements: Extraocular movements intact.      Pupils: Pupils are equal, round, and reactive to light.   Cardiovascular:      Rate and Rhythm: Normal rate and regular rhythm.      Pulses: Normal pulses.      Heart sounds: Normal heart sounds.   Pulmonary:      Effort: Pulmonary effort is normal. No respiratory distress.      Breath sounds: Normal breath sounds. No wheezing.   Abdominal:      General: Bowel sounds are normal. There is no distension.      Palpations: Abdomen is soft.      Tenderness: There is no abdominal tenderness.   Musculoskeletal:         General: No swelling or  tenderness.      Cervical back: Normal range of motion and neck supple.      Comments: Right TMA   Skin:     General: Skin is warm and dry.   Neurological:      General: No focal deficit present.      Mental Status: He is oriented to person, place, and time.   Psychiatric:         Mood and Affect: Mood normal.         Behavior: Behavior normal.         Fluids    Intake/Output Summary (Last 24 hours) at 7/7/2023 1549  Last data filed at 7/7/2023 1304  Gross per 24 hour   Intake 1140 ml   Output 2150 ml   Net -1010 ml       Laboratory  Recent Labs     07/05/23  0553 07/06/23  0113 07/07/23  0258   WBC 10.3 8.4 8.8   RBC 4.36* 4.50* 4.59*   HEMOGLOBIN 13.3* 13.4* 13.8*   HEMATOCRIT 38.8* 40.6* 41.9*   MCV 89.0 90.2 91.3   MCH 30.5 29.8 30.1   MCHC 34.3 33.0 32.9   RDW 53.3* 54.9* 56.6*   PLATELETCT 321 319 332   MPV 9.1 9.1 9.1     Recent Labs     07/05/23  0553 07/06/23  0113 07/07/23  0258   SODIUM 134* 136 137   POTASSIUM 4.0 3.8 4.3   CHLORIDE 99 103 104   CO2 21 23 22   GLUCOSE 480* 243* 297*   BUN 8 8 11   CREATININE 0.69 0.67 0.65   CALCIUM 9.1 8.5 9.0                   Imaging  US-EXTREMITY VENOUS LOWER UNILAT RIGHT   Final Result      DX-CHEST-PORTABLE (1 VIEW)   Final Result         1.  No acute cardiopulmonary disease.      DX-FOOT-COMPLETE 3+ RIGHT   Final Result         1.  No acute traumatic bony injury.      US-EXTREMITY ARTERY LOWER BILAT W/LATASHA (COMBO)    (Results Pending)   MR-FOOT-WITH RIGHT    (Results Pending)        Assessment/Plan  * Osteomyelitis (HCC)- (present on admission)  Assessment & Plan  History of osteomyelitis on the right foot and had amputation transmetatarsal  Wound culture at [place] showed mixed Pseudomonas and MRSA  Blood culture showed MRSA  Patient was supposed to take antibiotics until 7/20 however he left AGAINST MEDICAL ADVICE  Also patient had MRSA bacteremia  X-ray did not show osteomyelitis  Ortho was consulted, will leave the decision for MRI to LPS and Ortho  At this  time we continue vancomycin and meropenem  Blood cultures pending    Noncompliance  Assessment & Plan  Patient has significant history of noncompliance and not taking insulin or medications  Patient has complicated medical history including uncontrolled diabetes and peripheral vascular disease with coronary artery disease  Resume his medications  Continue education the patient    MRSA bacteremia  Assessment & Plan  Blood culture [facility] on 6/6 was positive for MRSA and repeat culture on 6/9 was negative  Echo did not show any endocarditis  Patient was supposed to continue daptomycin until 7/20 however he left SNF AMA  Repeat blood culture   continue vancomycin and meropenem at this time      CAD (coronary artery disease)- (present on admission)  Assessment & Plan  No chest pain and EKG did not show ischemia  Stent placement in September 2022  Holding antiplatelets pending surgery  Resume plavix after surgery completed    Peripheral artery disease (HCC)- (present on admission)  Assessment & Plan  Continue aspirin, atorvastatin and Plavix  Encouraged the patient to quit smoking    Alcohol abuse- (present on admission)  Assessment & Plan  No signs of withdrawal  Close monitoring and consider CIWA protocol if needed    Tobacco abuse- (present on admission)  Assessment & Plan  Smoking cessation consultation  Discussed risk of worsening coronary artery disease, peripheral vascular disease and death with the patient, discussed all other treatments including nicotine patch, patient understood.  Time 5 minutes    Diabetic foot ulcer (HCC)- (present on admission)  Assessment & Plan  Underwent amputation on left foot  His blood sugar is not controlled, on admission more than 400  Continue antibiotics  LPS consulted, recommend likely surgery  Surgery this morning cancelled, ortho would like MRI foot  MRI foot pending  Continue antibiotics  Blood cultures remain NGTD    Diabetes (HCC)- (present on admission)  Assessment &  Plan  Uncontrolled and A1c 11.2  Patient admitted he is not taking insulin  Sliding scale and resume Lantus 5 units daily  Diabetes education         VTE prophylaxis: SCDs/TEDs

## 2023-07-07 NOTE — PROGRESS NOTES
Diabetes education: Met with pt this pm. Please see consult note.  Plan:  Pt will need True metrix meter,test strips and lancets ( I believe) as well as both oral medications and Lantus Humalog pens and pen needles at discharge. Please use diabetes supplies, F2 for correct meter and dx code. CDE will not be available after tomorrow ( until 7/17), and will follow up if pt remains in hospital.

## 2023-07-07 NOTE — CARE PLAN
The patient is Watcher - Medium risk of patient condition declining or worsening    Shift Goals  Clinical Goals: patient will report adequate pain control by end fo shift  Patient Goals: feel better  Family Goals: updated on poc    Progress made toward(s) clinical / shift goals:  patient pain well controlled with norco and morphine. Calling appropriately. Iv abx. Wound care.      Patient is not progressing towards the following goals:

## 2023-07-07 NOTE — PROGRESS NOTES
After discussion with patient, wound team and wound evaluation plan for bedside wound care  Will obtain wound cultures for abx regimen  MRI to further evaluate for possible abscess or osteo  Ok for diet at this time    I had a long discussion with the patient regarding compliance and his diabetes management  He understands that his compliance may have up large role in him healing  Will reassess wounds early next week after MRI      Joon Escudero MD  Orthopedic Trauma Surgery

## 2023-07-07 NOTE — CONSULTS
"Diabetes education: Pt has a hx of diabetes on insulin and oral medications. Pt is known from previous visits. Pt was admitted with blood sugar of 480 and Hga1c of 11.2% from 5/20/23.  Pt is currently on  Glargine 18 units pm with Lispro 6 units tid and Lispro per sliding scale coverage ac and hs. Blood sugars are 290 ( 5 units/NPO), 346 ( 6 units ) 211 ( 3 + 6 units), and 248 ( 6 + 3 units).  Met with pt this pm. Pt states he was \"kicked out of the home because he had a disagreement with some one\". He states they have all his insulin and he does not have any supplies.  Per DC note from [facility], Pt had surgery on 6/ 8 /23 and was transferred to [facility] on 6/16/23. Unclear when he left [place] and how long he has been without insulin.  Pt has used insulin pens and finger sticks before and is tentatively scheduled for I&D tomorrow.  Plan:  Pt will need True metrix meter,test strips and lancets ( I believe) as well as both oral medications and Lantus Humalog pens and pen needles at discharge. Please use diabetes supplies, F2 for correct meter and dx code. CDE will not be available after tomorrow ( until 7/17), and will follow up if pt remains in hospital.  "

## 2023-07-07 NOTE — PROGRESS NOTES
[facility] Medicine Daily Progress Note    Date of Service  7/6/2023    Chief Complaint  Rogelio Escudero is a 48 y.o. male admitted 7/5/2023 with foot pain.    Hospital Course  48-year-old male with history of uncontrolled diabetes, coronary artery disease s/p stent, PTSD, hypertension, alcoholism and peripheral vascular disease who presented 7/5 with worsening pain on his right leg.  Last month patient was diagnosed with osteomyelitis and he underwent amputation by Dr. Escudero at [facility], at that time patient had MRSA/Pseudomonas infection, likely bacteremia??  Echo did not show any signs of endocarditis and patient was discharged to the SNF with daptomycin and meropenem for 6-month until end of July 7/20.  However patient left AGAINST MEDICAL ADVICE from the SNF.  And patient came on 7/5 with worsening pain, and worsening swelling on the right leg, denied any fever or chills, denied any chest pain or shortness of breath.  Patient has not taking antibiotics for more than 2 weeks and patient admitted he is not taking insulin.  On admission vital signs around baseline with no fever.  White blood cell 10.3 and blood sugar more than 400 with lactic acid 2.3.  CRP 2.2.  X-ray for the right foot did not show any abnormalities.  ED physician discussed the case with Ortho, patient will be admitted to the hospital to continue antibiotics.    Interval Problem Update  Patient with hx of R TMA at [facility] last month, discharged to SNF on IV antibiotics daptomycin and meropenem for 6 week course, patient left SNF AMA a few weeks ago and has not had antibiotics since.  LPS consulted, recommend likely surgery tomorrow.  NPO at midnight.  Continue vancomycin and meropenem.  ID consulted, appreciate recommendations.  Records from [facility] requested.  Holding home plavix for hx CAD stent 9/2022.    I have discussed this patient's plan of care and discharge plan at IDT rounds today with Case Management, Nursing,  Nursing leadership, and other members of the IDT team.    Consultants/Specialty  infectious disease and orthopedics    Code Status  Full Code    Disposition  The patient is not medically cleared for discharge to home or a post-acute facility.      I have placed the appropriate orders for post-discharge needs.    Review of Systems  Review of Systems   Constitutional:  Negative for chills and fever.   Eyes:  Negative for blurred vision and pain.   Respiratory:  Negative for cough and shortness of breath.    Cardiovascular:  Negative for chest pain, palpitations and leg swelling.   Gastrointestinal:  Negative for abdominal pain, nausea and vomiting.   Genitourinary:  Negative for dysuria and urgency.   Musculoskeletal:  Negative for back pain.   Skin:  Negative for itching and rash.   Neurological:  Negative for dizziness and headaches.   Psychiatric/Behavioral:  Negative for substance abuse. The patient does not have insomnia.         Physical Exam  Temp:  [36.1 °C (96.9 °F)-36.6 °C (97.8 °F)] 36.1 °C (96.9 °F)  Pulse:  [76-82] 82  Resp:  [16-17] 16  BP: (120-151)/(81-94) 124/87  SpO2:  [96 %-98 %] 97 %    Physical Exam  Constitutional:       General: He is not in acute distress.     Appearance: He is not ill-appearing.   HENT:      Head: Normocephalic and atraumatic.      Right Ear: External ear normal.      Left Ear: External ear normal.      Mouth/Throat:      Pharynx: No oropharyngeal exudate or posterior oropharyngeal erythema.   Eyes:      Extraocular Movements: Extraocular movements intact.      Pupils: Pupils are equal, round, and reactive to light.   Cardiovascular:      Rate and Rhythm: Normal rate and regular rhythm.      Pulses: Normal pulses.      Heart sounds: Normal heart sounds.   Pulmonary:      Effort: Pulmonary effort is normal. No respiratory distress.      Breath sounds: Normal breath sounds. No wheezing.   Abdominal:      General: Bowel sounds are normal. There is no distension.      Palpations:  Abdomen is soft.      Tenderness: There is no abdominal tenderness.   Musculoskeletal:         General: No swelling or tenderness.      Cervical back: Normal range of motion and neck supple.      Comments: Right TMA   Skin:     General: Skin is warm and dry.   Neurological:      General: No focal deficit present.      Mental Status: He is oriented to person, place, and time.   Psychiatric:         Mood and Affect: Mood normal.         Behavior: Behavior normal.         Fluids    Intake/Output Summary (Last 24 hours) at 7/6/2023 2040  Last data filed at 7/6/2023 1629  Gross per 24 hour   Intake 640 ml   Output 2400 ml   Net -1760 ml       Laboratory  Recent Labs     07/05/23  0553 07/06/23  0113   WBC 10.3 8.4   RBC 4.36* 4.50*   HEMOGLOBIN 13.3* 13.4*   HEMATOCRIT 38.8* 40.6*   MCV 89.0 90.2   MCH 30.5 29.8   MCHC 34.3 33.0   RDW 53.3* 54.9*   PLATELETCT 321 319   MPV 9.1 9.1     Recent Labs     07/05/23  0553 07/06/23  0113   SODIUM 134* 136   POTASSIUM 4.0 3.8   CHLORIDE 99 103   CO2 21 23   GLUCOSE 480* 243*   BUN 8 8   CREATININE 0.69 0.67   CALCIUM 9.1 8.5                   Imaging  US-EXTREMITY VENOUS LOWER UNILAT RIGHT   Final Result      DX-CHEST-PORTABLE (1 VIEW)   Final Result         1.  No acute cardiopulmonary disease.      DX-FOOT-COMPLETE 3+ RIGHT   Final Result         1.  No acute traumatic bony injury.           Assessment/Plan  * Osteomyelitis (HCC)- (present on admission)  Assessment & Plan  History of osteomyelitis on the right foot and had amputation transmetatarsal  Wound culture at [place] showed mixed Pseudomonas and MRSA  Blood culture showed MRSA  Patient was supposed to take antibiotics until 7/20 however he left AGAINST MEDICAL ADVICE  Also patient had MRSA bacteremia  X-ray did not show osteomyelitis  Ortho was consulted, will leave the decision for MRI to LPS and Ortho  At this time we continue vancomycin and meropenem  Blood cultures pending    Noncompliance  Assessment &  Plan  Patient has significant history of noncompliance and not taking insulin or medications  Patient has complicated medical history including uncontrolled diabetes and peripheral vascular disease with coronary artery disease  Resume his medications  Continue education the patient    MRSA bacteremia  Assessment & Plan  Blood culture [facility] on 6/6 was positive for MRSA and repeat culture on 6/9 was negative  Echo did not show any endocarditis  Patient was supposed to continue daptomycin until 7/20 however he left SNF AMA  Repeat blood culture   continue vancomycin and meropenem at this time      CAD (coronary artery disease)- (present on admission)  Assessment & Plan  No chest pain and EKG did not show ischemia  Stent placement in September 2022  Holding antiplatelets pending surgery  Resume plavix after surgery completed    Peripheral artery disease (HCC)- (present on admission)  Assessment & Plan  Continue aspirin, atorvastatin and Plavix  Encouraged the patient to quit smoking    Alcohol abuse- (present on admission)  Assessment & Plan  No signs of withdrawal  Close monitoring and consider CIWA protocol if needed    Tobacco abuse- (present on admission)  Assessment & Plan  Smoking cessation consultation  Discussed risk of worsening coronary artery disease, peripheral vascular disease and death with the patient, discussed all other treatments including nicotine patch, patient understood.  Time 5 minutes    Diabetic foot ulcer (HCC)- (present on admission)  Assessment & Plan  Underwent amputation on left foot  His blood sugar is not controlled, on admission more than 400  Continue antibiotics  LPS consulted, recommend likely surgery  Ortho possibly plan for OR tomorrow, NPO at midnight  Continue antibiotics, ID consulted    Diabetes ([facility])- (present on admission)  Assessment & Plan  Uncontrolled and A1c 11.2  Patient admitted he is not taking insulin  Sliding scale and resume Lantus 5 units daily  Diabetes  education         VTE prophylaxis: SCDs/TEDs

## 2023-07-07 NOTE — DIETARY
Nutrition Services:  Day 2 of admit.  Rogelio Escudero is a 48 y.o. male with admitting DX of Osteomyelitis (HCC) [M86.9].  Consult received for diabetes education.  Patient has been NPO and in the OR this morning.      Will attempt diet education when appropriate and as time allows. Please consult as needed. RD monitoring.
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
## 2023-07-08 LAB
GLUCOSE BLD STRIP.AUTO-MCNC: 218 MG/DL (ref 65–99)
GLUCOSE BLD STRIP.AUTO-MCNC: 223 MG/DL (ref 65–99)
VANCOMYCIN PEAK SERPL-MCNC: 21.2 UG/ML (ref 20–40)
VANCOMYCIN TROUGH SERPL-MCNC: 10.9 UG/ML (ref 10–20)

## 2023-07-08 PROCEDURE — 700102 HCHG RX REV CODE 250 W/ 637 OVERRIDE(OP): Performed by: STUDENT IN AN ORGANIZED HEALTH CARE EDUCATION/TRAINING PROGRAM

## 2023-07-08 PROCEDURE — 770006 HCHG ROOM/CARE - MED/SURG/GYN SEMI*

## 2023-07-08 PROCEDURE — A9270 NON-COVERED ITEM OR SERVICE: HCPCS | Performed by: STUDENT IN AN ORGANIZED HEALTH CARE EDUCATION/TRAINING PROGRAM

## 2023-07-08 PROCEDURE — 99232 SBSQ HOSP IP/OBS MODERATE 35: CPT | Performed by: STUDENT IN AN ORGANIZED HEALTH CARE EDUCATION/TRAINING PROGRAM

## 2023-07-08 PROCEDURE — 700102 HCHG RX REV CODE 250 W/ 637 OVERRIDE(OP): Performed by: INTERNAL MEDICINE

## 2023-07-08 PROCEDURE — A9270 NON-COVERED ITEM OR SERVICE: HCPCS | Performed by: INTERNAL MEDICINE

## 2023-07-08 PROCEDURE — 700105 HCHG RX REV CODE 258: Performed by: INTERNAL MEDICINE

## 2023-07-08 PROCEDURE — 36415 COLL VENOUS BLD VENIPUNCTURE: CPT

## 2023-07-08 PROCEDURE — 99233 SBSQ HOSP IP/OBS HIGH 50: CPT | Performed by: INTERNAL MEDICINE

## 2023-07-08 PROCEDURE — 700111 HCHG RX REV CODE 636 W/ 250 OVERRIDE (IP): Performed by: STUDENT IN AN ORGANIZED HEALTH CARE EDUCATION/TRAINING PROGRAM

## 2023-07-08 PROCEDURE — 82962 GLUCOSE BLOOD TEST: CPT | Mod: 91

## 2023-07-08 PROCEDURE — 700111 HCHG RX REV CODE 636 W/ 250 OVERRIDE (IP): Performed by: INTERNAL MEDICINE

## 2023-07-08 PROCEDURE — 80202 ASSAY OF VANCOMYCIN: CPT | Mod: 91

## 2023-07-08 PROCEDURE — 700105 HCHG RX REV CODE 258: Mod: JZ | Performed by: STUDENT IN AN ORGANIZED HEALTH CARE EDUCATION/TRAINING PROGRAM

## 2023-07-08 PROCEDURE — 700111 HCHG RX REV CODE 636 W/ 250 OVERRIDE (IP): Mod: JZ | Performed by: INTERNAL MEDICINE

## 2023-07-08 PROCEDURE — 87040 BLOOD CULTURE FOR BACTERIA: CPT | Mod: 91

## 2023-07-08 RX ORDER — INSULIN LISPRO 100 [IU]/ML
2-9 INJECTION, SOLUTION INTRAVENOUS; SUBCUTANEOUS
Status: DISCONTINUED | OUTPATIENT
Start: 2023-07-08 | End: 2023-07-08

## 2023-07-08 RX ORDER — INSULIN LISPRO 100 [IU]/ML
0.2 INJECTION, SOLUTION INTRAVENOUS; SUBCUTANEOUS
Status: DISCONTINUED | OUTPATIENT
Start: 2023-07-08 | End: 2023-07-08

## 2023-07-08 RX ORDER — INSULIN LISPRO 100 [IU]/ML
0.2 INJECTION, SOLUTION INTRAVENOUS; SUBCUTANEOUS
Status: DISCONTINUED | OUTPATIENT
Start: 2023-07-08 | End: 2023-07-10

## 2023-07-08 RX ORDER — INSULIN LISPRO 100 [IU]/ML
2-9 INJECTION, SOLUTION INTRAVENOUS; SUBCUTANEOUS
Status: DISCONTINUED | OUTPATIENT
Start: 2023-07-08 | End: 2023-07-18 | Stop reason: HOSPADM

## 2023-07-08 RX ADMIN — ESCITALOPRAM OXALATE 10 MG: 10 TABLET ORAL at 06:10

## 2023-07-08 RX ADMIN — INSULIN LISPRO 3 UNITS: 100 INJECTION, SOLUTION INTRAVENOUS; SUBCUTANEOUS at 20:05

## 2023-07-08 RX ADMIN — VANCOMYCIN HYDROCHLORIDE 1500 MG: 5 INJECTION, POWDER, LYOPHILIZED, FOR SOLUTION INTRAVENOUS at 10:00

## 2023-07-08 RX ADMIN — SENNOSIDES AND DOCUSATE SODIUM 2 TABLET: 50; 8.6 TABLET ORAL at 06:10

## 2023-07-08 RX ADMIN — INSULIN GLARGINE-YFGN 25 UNITS: 100 INJECTION, SOLUTION SUBCUTANEOUS at 20:05

## 2023-07-08 RX ADMIN — MORPHINE SULFATE 3 MG: 4 INJECTION, SOLUTION INTRAMUSCULAR; INTRAVENOUS at 00:39

## 2023-07-08 RX ADMIN — INSULIN LISPRO 6 UNITS: 100 INJECTION, SOLUTION INTRAVENOUS; SUBCUTANEOUS at 14:52

## 2023-07-08 RX ADMIN — MEROPENEM 500 MG: 500 INJECTION, POWDER, FOR SOLUTION INTRAVENOUS at 00:39

## 2023-07-08 RX ADMIN — ATORVASTATIN CALCIUM 40 MG: 40 TABLET, FILM COATED ORAL at 20:20

## 2023-07-08 RX ADMIN — MEROPENEM 500 MG: 500 INJECTION, POWDER, FOR SOLUTION INTRAVENOUS at 06:11

## 2023-07-08 RX ADMIN — INSULIN LISPRO 6 UNITS: 100 INJECTION, SOLUTION INTRAVENOUS; SUBCUTANEOUS at 10:16

## 2023-07-08 RX ADMIN — INSULIN LISPRO 3 UNITS: 100 INJECTION, SOLUTION INTRAVENOUS; SUBCUTANEOUS at 10:18

## 2023-07-08 RX ADMIN — COLCHICINE 0.6 MG: 0.6 TABLET ORAL at 06:10

## 2023-07-08 RX ADMIN — OMEPRAZOLE 20 MG: 20 CAPSULE, DELAYED RELEASE ORAL at 06:09

## 2023-07-08 RX ADMIN — MORPHINE SULFATE 3 MG: 4 INJECTION, SOLUTION INTRAMUSCULAR; INTRAVENOUS at 19:23

## 2023-07-08 RX ADMIN — LISINOPRIL 10 MG: 10 TABLET ORAL at 06:09

## 2023-07-08 RX ADMIN — INSULIN LISPRO 6 UNITS: 100 INJECTION, SOLUTION INTRAVENOUS; SUBCUTANEOUS at 20:06

## 2023-07-08 RX ADMIN — INSULIN LISPRO 2 UNITS: 100 INJECTION, SOLUTION INTRAVENOUS; SUBCUTANEOUS at 14:53

## 2023-07-08 RX ADMIN — VANCOMYCIN HYDROCHLORIDE 1500 MG: 5 INJECTION, POWDER, LYOPHILIZED, FOR SOLUTION INTRAVENOUS at 21:26

## 2023-07-08 RX ADMIN — ASPIRIN 81 MG: 81 TABLET, COATED ORAL at 06:10

## 2023-07-08 RX ADMIN — MEROPENEM 500 MG: 500 INJECTION, POWDER, FOR SOLUTION INTRAVENOUS at 13:01

## 2023-07-08 RX ADMIN — HYDROCODONE BITARTRATE AND ACETAMINOPHEN 1 TABLET: 5; 325 TABLET ORAL at 15:46

## 2023-07-08 RX ADMIN — INSULIN LISPRO 2 UNITS: 100 INJECTION, SOLUTION INTRAVENOUS; SUBCUTANEOUS at 22:30

## 2023-07-08 RX ADMIN — ENOXAPARIN SODIUM 40 MG: 100 INJECTION SUBCUTANEOUS at 16:38

## 2023-07-08 RX ADMIN — HYDROCODONE BITARTRATE AND ACETAMINOPHEN 1 TABLET: 5; 325 TABLET ORAL at 06:09

## 2023-07-08 ASSESSMENT — ENCOUNTER SYMPTOMS
SHORTNESS OF BREATH: 0
EYE PAIN: 0
BACK PAIN: 0
DIZZINESS: 0
VOMITING: 0
NAUSEA: 0
HEADACHES: 0
PALPITATIONS: 0
CHILLS: 0
INSOMNIA: 0
ABDOMINAL PAIN: 0
FEVER: 0
COUGH: 0
BLURRED VISION: 0
DIARRHEA: 0

## 2023-07-08 ASSESSMENT — PAIN DESCRIPTION - PAIN TYPE
TYPE: ACUTE PAIN;SURGICAL PAIN
TYPE: ACUTE PAIN;SURGICAL PAIN
TYPE: ACUTE PAIN
TYPE: ACUTE PAIN

## 2023-07-08 ASSESSMENT — LIFESTYLE VARIABLES: SUBSTANCE_ABUSE: 0

## 2023-07-08 NOTE — PROGRESS NOTES
Infectious Disease Progress Note    Author: Jasiel Purdy M.D. Date & Time of service: 2023  2:35 PM    Chief Complaint:  Follow-up for right foot osteomyelitis    Interval History:   patient remains afebrile, white count is 8.8, tolerating vancomycin and meropenem for now, cultures as below, creatinine 0.65, normal transaminases, magnesium 1.8, albumin 3.4,    Labs Reviewed and Medications Reviewed.    Review of Systems:  Review of Systems   Constitutional:  Negative for chills and fever.   Gastrointestinal:  Negative for abdominal pain, diarrhea, nausea and vomiting.   Skin:  Negative for itching and rash.       Hemodynamics:  Temp (24hrs), Av.7 °C (98 °F), Min:36.3 °C (97.3 °F), Max:37.1 °C (98.7 °F)  Temperature: 36.3 °C (97.3 °F)  Pulse  Av.8  Min: 71  Max: 101   Blood Pressure: (!) 130/90       Physical Exam:  Physical Exam  Vitals and nursing note reviewed.   Constitutional:       General: He is not in acute distress.     Appearance: He is not ill-appearing.   HENT:      Mouth/Throat:      Pharynx: No oropharyngeal exudate.      Comments: Poor dentition  Eyes:      General:         Right eye: No discharge.   Cardiovascular:      Rate and Rhythm: Normal rate.      Heart sounds: No murmur heard.  Pulmonary:      Effort: Pulmonary effort is normal. No respiratory distress.      Breath sounds: No stridor.   Abdominal:      General: Abdomen is flat. There is no distension.      Tenderness: There is no abdominal tenderness.   Musculoskeletal:      Comments: Right-sided TMA with dressing in place.  Pictures reviewed, wounds with surrounding swelling at amputation site   Neurological:      General: No focal deficit present.      Mental Status: He is alert and oriented to person, place, and time.   Psychiatric:         Mood and Affect: Mood normal.         Behavior: Behavior normal.         Meds:    Current Facility-Administered Medications:     insulin GLARGINE **AND** [DISCONTINUED] insulin lispro  **AND** [DISCONTINUED] insulin lispro **AND** POC blood glucose manual result **AND** NOTIFY MD and PharmD **AND** Administer 20 grams of glucose (approximately 8 ounces of fruit juice) every 15 minutes PRN FSBG less than 70 mg/dL **AND** dextrose bolus    insulin lispro    insulin lispro    HYDROcodone-acetaminophen    morphine injection    MD Alert...Vancomycin per Pharmacy    vancomycin    meropenem    atorvastatin    [Held by provider] clopidogrel    colchicine    escitalopram    lisinopril    omeprazole    senna-docusate **AND** polyethylene glycol/lytes **AND** magnesium hydroxide **AND** bisacodyl    enoxaparin (LOVENOX) injection    acetaminophen    ondansetron    ondansetron    promethazine    promethazine    prochlorperazine    aspirin    Labs:  Recent Labs     07/06/23 0113 07/07/23 0258   WBC 8.4 8.8   RBC 4.50* 4.59*   HEMOGLOBIN 13.4* 13.8*   HEMATOCRIT 40.6* 41.9*   MCV 90.2 91.3   MCH 29.8 30.1   RDW 54.9* 56.6*   PLATELETCT 319 332   MPV 9.1 9.1   NEUTSPOLYS 61.60  --    LYMPHOCYTES 23.90  --    MONOCYTES 9.10  --    EOSINOPHILS 4.50  --    BASOPHILS 0.50  --      Recent Labs     07/06/23 0113 07/07/23 0258   SODIUM 136 137   POTASSIUM 3.8 4.3   CHLORIDE 103 104   CO2 23 22   GLUCOSE 243* 297*   BUN 8 11     Recent Labs     07/06/23 0113 07/07/23  0258   ALBUMIN 3.4  --    TBILIRUBIN 0.3  --    ALKPHOSPHAT 150*  --    TOTPROTEIN 6.9  --    ALTSGPT 9  --    ASTSGOT 8*  --    CREATININE 0.67 0.65       Imaging:  US-EXTREMITY VENOUS LOWER UNILAT RIGHT    Result Date: 7/6/2023   Vascular Laboratory  CONCLUSIONS  Normal right lower extremity deep venous examination.  ABIGAILREGLA  Exam Date:     07/06/2023 11:25  Room #:     Inpatient  Priority:     Routine  Ht (in):             Wt (lb):  Ordering Physician:        JULY CALIX  Referring Physician:       YOGI Villalba  Sonographer:               Tim Dc RVDEANDRE  Study Type:                Complete Unilateral   Technical Quality:         Adequate  Age:    48    Gender:     M  MRN:    [id number]  :    1975      BSA:  Indications:     Localized swelling, mass and lump, right lower limb  CPT Codes:       61355  ICD Codes:       R22.41  History:         right leg edema & pain; recent right transmetatarsal foot                   amputation; no prior exam  Limitations:  PROCEDURES:  Right lower extremity venous duplex imaging.  Serial compression, color, and spectral Doppler flow evaluations were  performed.  The following venous structures were evaluated: common femoral, deep  femoral, proximal great saphenous, femoral, popliteal, peroneal, and  posterior tibial veins.  FINDINGS:  RIGHT LOWER EXTREMITY:  No deep venous thrombosis.  All veins demonstrate complete color filling and compressibility with  normal venous flow dynamics including spontaneous flow and respiratory  phasicity.  Rene Lara MD  (Electronically Signed)  Final Date:      2023                   20:02    DX-FOOT-COMPLETE 3+ RIGHT    Result Date: 2023 6:13 AM HISTORY/REASON FOR EXAM: Atraumatic Pain/Swelling/Deformity TECHNIQUE/EXAM DESCRIPTION:  AP, lateral, and oblique views of the RIGHT foot. COMPARISON:  February 3, 2020 FINDINGS: Postsurgical changes of transmetatarsal amputation of the foot are seen. There is no acute fracture, subluxation, or dislocation identified. Soft tissue swelling of the forefoot is seen.     1.  No acute traumatic bony injury.    DX-CHEST-PORTABLE (1 VIEW)    Result Date: 2023 6:10 AM HISTORY/REASON FOR EXAM: possible sepsis. TECHNIQUE/EXAM DESCRIPTION:  Single AP view of the chest. COMPARISON: 2022 FINDINGS: Overlying cardiac leads are present. The cardiac silhouette appears within normal limits. The mediastinal contour appears within normal limits.  The central pulmonary vasculature appears normal. Bilateral lung volumes are diminished.  Bilateral lungs are clear. No  significant pleural effusions are identified. The bony structures appear age-appropriate.     1.  No acute cardiopulmonary disease.    IR PICC    Result Date: 6/16/2023  CLINICAL DATA: Long term IV abx, FINDINGS:  The nature of the procedure and associated risks and benefits were explained to the patient and consent to perform the procedure was obtained. Maximum sterile barrier technique was utilized. Hand washing with alcohol-based antiseptic preceded the procedure. Cap, mask, sterile gown and sterile gloves were worn. The patient's skin was prepped with 2% chlorhexidine. Catheter insertion site was draped with a large sterile drape. The left arm was assessed with ultrasound to identify a suitable vein. The brachial vein was identified, flowing blood within the vein and vein compressibility indicated that the vein was patent. Subsequently, under real-time sonographic guidance a 22-gauge micropuncture needle was advanced into the lumen of the vein. An ultrasound image demonstrating the needle tip in the vein lumen was recorded in the patient record. Through the 22-gauge needle a 0.018-gauge guidewire was advanced to secure access to the vein. A vessel dilator was advanced over the guidewire in conjunction with a peel-away sheath. Appropriate catheter length was determined with the original guidewire under fluoroscopic observation. Subsequently, the PICC line was cut to appropriate length and advanced over a second guidewire to the distal superior vena cava and secured at the skin. Final catheter position was recorded with a chest x-ray. Catheter length is 47 centimeters. The fluoroscopy time was 0.3 minutes.  One image was obtained.  3.7 mg, 1 25 mcg/sq m.    PLACEMENT OF A PERIPHERALLY INSERTED CENTRAL CATHETER USING FLUOROSCOPIC AND SONOGRAPHIC GUIDANCE AS OUTLINED ABOVE.      Micro:  Results       Procedure Component Value Units Date/Time    BLOOD CULTURE [[id number]] Collected: 07/08/23 0737    Order Status: Sent  "Specimen: Blood from Peripheral Updated: 07/08/23 0811    Narrative:      Per Hospital Policy: Only change Specimen Src: to \"Line\" if  specified by physician order.    Culture Wound With Gram Stain [[id number]]  (Abnormal) Collected: 07/07/23 1428    Order Status: Completed Specimen: Wound from Right Foot Updated: 07/08/23 0717     Significant Indicator POS     Source WND     Site RIGHT FOOT     Culture Result -     Gram Stain Result Moderate WBCs.  No organisms seen.       Culture Result Staphylococcus aureus  Light growth  Methicillin resistant by screening method      Narrative:      Collected By: [facility] COLLEEN PAULSON  [facility]    BLOOD CULTURE [[id number]]     Order Status: Sent Specimen: Blood from Peripheral     GRAM STAIN [[id number]] Collected: 07/07/23 1428    Order Status: Completed Specimen: Wound Updated: 07/07/23 2250     Significant Indicator .     Source WND     Site RIGHT FOOT     Gram Stain Result Moderate WBCs.  No organisms seen.      Narrative:      Collected By: [facility] COLLEEN PAULSON  Collected By: [facility] MACIAS SU    Urine Culture (New) [[id number]] Collected: 07/05/23 0915    Order Status: Completed Specimen: Urine Updated: 07/07/23 0651     Significant Indicator NEG     Source UR     Site -     Culture Result No growth at 48 hours.    Narrative:      Indication for culture:->Evaluation for sepsis without a  clear source of infection  Indication for culture:->Evaluation for sepsis without a    Blood Culture [[id number]] Collected: 07/05/23 0622    Order Status: Completed Specimen: Blood from Peripheral Updated: 07/06/23 0849     Significant Indicator NEG     Source BLD     Site PERIPHERAL     Culture Result No Growth  Note: Blood cultures are incubated for 5 days and  are monitored continuously.Positive blood cultures  are called to the RN and reported as soon as  they are identified.      Narrative:      Per Hospital Policy: Only change Specimen Src: to \"Line\" " "if  specified by physician order.  Left AC    Blood Culture [[id number]] Collected: 07/05/23 0622    Order Status: Completed Specimen: Blood from Peripheral Updated: 07/06/23 0849     Significant Indicator NEG     Source BLD     Site PERIPHERAL     Culture Result No Growth  Note: Blood cultures are incubated for 5 days and  are monitored continuously.Positive blood cultures  are called to the RN and reported as soon as  they are identified.      Narrative:      Per Hospital Policy: Only change Specimen Src: to \"Line\" if  specified by physician order.  Right AC    MRSA By PCR (Amp) [[id number]] Collected: 07/05/23 0732    Order Status: Completed Specimen: Respirate from Nares Updated: 07/05/23 1058     MRSA by PCR Negative    Urinalysis [[id number]]  (Abnormal) Collected: 07/05/23 0915    Order Status: Completed Specimen: Urine Updated: 07/05/23 1046     Color Yellow     Character Clear     Specific Gravity 1.044     Ph 5.0     Glucose >=1000 mg/dL      Ketones Negative mg/dL      Protein Negative mg/dL      Bilirubin Negative     Urobilinogen, Urine 0.2     Nitrite Negative     Leukocyte Esterase Negative     Occult Blood Negative     Micro Urine Req see below     Comment: Microscopic examination not performed when specimen is clear  and chemically negative for protein, blood, leukocyte esterase  and nitrite.         Narrative:      Indication for culture:->Evaluation for sepsis without a  clear source of infection            Assessment/Hospital Course:  This is a 48-year-old male patient with uncontrolled type 2 diabetes, CAD status post stent, PTSD, hypertension, alcoholism, polysubstance abuse, peripheral arterial disease, diagnosis of right foot osteomyelitis in April 2023 at [facility] status post TMA 6/8/2023. Blood cultures x2 from 6/8 were positive for MRSA and repeat blood cultures from 6/9 were negative. He was discharged to SNF with daptomycin and meropenem with plan to continue through 7/20 but patient left " AMA on 6/17.  Patient now presented with several days of chills and drainage of right-sided TMA site with pain, per report pathology was positive for osteomyelitis of the margins    It appears that the reason for meropenem was a superficial skin swab from 4/13, nearly 3 months ago, that grew rare growth of ESBL E. coli along with mixed skin maryellen and cellular debris but without MRSA which are likely commensals/contaminants.  A superficial swab has been obtained here 7/7 and this is growing MRSA thus far.    Pertinent Diagnoses:   Right foot osteomyelitis, status post TMA, positive margins per pathology per report  Recent MRSA bacteremia, incompletely treated, patient left AMA  Uncontrolled type 2 diabetes  History of polysubstance abuse  Peripheral arterial disease    Plan:  -Continue IV vancomycin, discontinue meropenem.  Monitor vancomycin levels and renal function  -Superficial swab obtained here growing MRSA thus far which we will continue to cover.  Any additional growth will be of uncertain clinical significance  -MRI foot is pending.  We will follow along  -TTE ordered  -Follow blood cultures x2 from 7/5, no growth till date  -Monitor for any new areas of sites of seeding of infection    Disposition: Anticipate eventual return to facility for an additional 6 weeks of IV daptomycin or vancomycin  Need for PICC line: Yes, okay to place after any surgical procedures    Discussed with Dr. Dorantes.  ID will follow.  This illness poses a threat to limb function

## 2023-07-08 NOTE — CARE PLAN
The patient is Stable - Low risk of patient condition declining or worsening    Shift Goals  Clinical Goals: pain control, IV ABX  Patient Goals: manage pain  Family Goals: NA    Progress made toward(s) clinical / shift goals:  pain controlled    Patient is not progressing towards the following goals:

## 2023-07-08 NOTE — PROGRESS NOTES
Diabetes education: Pt to have surgery today  ( unclear if to have surgery or surgery was cancelled).  Plan:  Pt will need True metrix meter,test strips and lancets ( I believe) as well as both oral medications and Lantus Humalog pens and pen needles at discharge. Please use diabetes supplies, F2 for correct meter and dx code. CDE will not be available, until 7/17  and will follow up if pt remains in hospital.

## 2023-07-08 NOTE — CARE PLAN
Plan of care discussed with patient. Patient is being monitored and medicated for pain per the MAR. No acute events overnight. Bed is locked and in lowest position, call light and belongings within reach. Bi hourly rounding in place.         The patient is Stable - Low risk of patient condition declining or worsening    Shift Goals  Clinical Goals: pain control, IV ABX  Patient Goals: manage pain  Family Goals: NA    Progress made toward(s) clinical / shift goals:    Problem: Knowledge Deficit - Standard  Goal: Patient and family/care givers will demonstrate understanding of plan of care, disease process/condition, diagnostic tests and medications  Outcome: Progressing     Problem: Fall Risk  Goal: Patient will remain free from falls  Outcome: Progressing     Problem: Pain - Standard  Goal: Alleviation of pain or a reduction in pain to the patient’s comfort goal  Outcome: Progressing       Patient is not progressing towards the following goals:

## 2023-07-08 NOTE — PROGRESS NOTES
Hospital Medicine Daily Progress Note    Date of Service  7/8/2023    Chief Complaint  Rogelio Escudero is a 48 y.o. male admitted 7/5/2023 with foot pain.    Hospital Course  48-year-old male with history of uncontrolled diabetes, coronary artery disease s/p stent, PTSD, hypertension, alcoholism and peripheral vascular disease who presented 7/5 with worsening pain on his right leg.  Last month patient was diagnosed with osteomyelitis and he underwent amputation by Dr. Escudero at [facility], at that time patient had MRSA/Pseudomonas infection, likely bacteremia??  Echo did not show any signs of endocarditis and patient was discharged to the SNF with daptomycin and meropenem for 6-month until end of July 7/20.  However patient left AGAINST MEDICAL ADVICE from the SNF.  And patient came on 7/5 with worsening pain, and worsening swelling on the right leg, denied any fever or chills, denied any chest pain or shortness of breath.  Patient has not taking antibiotics for more than 2 weeks and patient admitted he is not taking insulin.  On admission vital signs around baseline with no fever.  White blood cell 10.3 and blood sugar more than 400 with lactic acid 2.3.  CRP 2.2.  X-ray for the right foot did not show any abnormalities.  ED physician discussed the case with Ortho, patient will be admitted to the hospital to continue antibiotics.    Interval Problem Update  No acute events overnight.  MRI foot pending. Surgery following, plan of care pending MRI results.  Continue antibiotics.  Blood cultures NGTD. Repeat blood cultures today.  Increase lantus to 25 U daily, continue TID 6 U insulin as well as ISS. Adjust as needed.  Holding home plavix for hx CAD stent 9/2022 pending surgical plans.    I have discussed this patient's plan of care and discharge plan at IDT rounds today with Case Management, Nursing, Nursing leadership, and other members of the IDT team.    Consultants/Specialty  infectious disease  and orthopedics    Code Status  Full Code    Disposition  The patient is not medically cleared for discharge to home or a post-acute facility.  Anticipate discharge to: skilled nursing facility    I have placed the appropriate orders for post-discharge needs.    Review of Systems  Review of Systems   Constitutional:  Negative for chills and fever.   Eyes:  Negative for blurred vision and pain.   Respiratory:  Negative for cough and shortness of breath.    Cardiovascular:  Negative for chest pain, palpitations and leg swelling.   Gastrointestinal:  Negative for abdominal pain, nausea and vomiting.   Genitourinary:  Negative for dysuria and urgency.   Musculoskeletal:  Negative for back pain.   Skin:  Negative for itching and rash.   Neurological:  Negative for dizziness and headaches.   Psychiatric/Behavioral:  Negative for substance abuse. The patient does not have insomnia.         Physical Exam  Temp:  [36.3 °C (97.3 °F)-37.1 °C (98.7 °F)] 36.3 °C (97.3 °F)  Pulse:  [71-76] 76  Resp:  [16-17] 17  BP: (125-138)/(85-90) 130/90  SpO2:  [93 %-97 %] 97 %    Physical Exam  Constitutional:       General: He is not in acute distress.     Appearance: He is not ill-appearing.   HENT:      Head: Normocephalic and atraumatic.      Right Ear: External ear normal.      Left Ear: External ear normal.      Mouth/Throat:      Pharynx: No oropharyngeal exudate or posterior oropharyngeal erythema.   Eyes:      Extraocular Movements: Extraocular movements intact.      Pupils: Pupils are equal, round, and reactive to light.   Cardiovascular:      Rate and Rhythm: Normal rate and regular rhythm.      Pulses: Normal pulses.      Heart sounds: Normal heart sounds.   Pulmonary:      Effort: Pulmonary effort is normal. No respiratory distress.      Breath sounds: Normal breath sounds. No wheezing.   Abdominal:      General: Bowel sounds are normal. There is no distension.      Palpations: Abdomen is soft.      Tenderness: There is no  abdominal tenderness.   Musculoskeletal:         General: No swelling or tenderness.      Cervical back: Normal range of motion and neck supple.      Comments: Right TMA   Skin:     General: Skin is warm and dry.   Neurological:      General: No focal deficit present.      Mental Status: He is oriented to person, place, and time.   Psychiatric:         Mood and Affect: Mood normal.         Behavior: Behavior normal.         Fluids    Intake/Output Summary (Last 24 hours) at 7/8/2023 1251  Last data filed at 7/8/2023 0336  Gross per 24 hour   Intake 500 ml   Output 1150 ml   Net -650 ml       Laboratory  Recent Labs     07/06/23  0113 07/07/23  0258   WBC 8.4 8.8   RBC 4.50* 4.59*   HEMOGLOBIN 13.4* 13.8*   HEMATOCRIT 40.6* 41.9*   MCV 90.2 91.3   MCH 29.8 30.1   MCHC 33.0 32.9   RDW 54.9* 56.6*   PLATELETCT 319 332   MPV 9.1 9.1     Recent Labs     07/06/23  0113 07/07/23  0258   SODIUM 136 137   POTASSIUM 3.8 4.3   CHLORIDE 103 104   CO2 23 22   GLUCOSE 243* 297*   BUN 8 11   CREATININE 0.67 0.65   CALCIUM 8.5 9.0                   Imaging  US-EXTREMITY VENOUS LOWER UNILAT RIGHT   Final Result      DX-CHEST-PORTABLE (1 VIEW)   Final Result         1.  No acute cardiopulmonary disease.      DX-FOOT-COMPLETE 3+ RIGHT   Final Result         1.  No acute traumatic bony injury.      US-EXTREMITY ARTERY LOWER BILAT W/LATASHA (COMBO)    (Results Pending)   MR-FOOT-WITH RIGHT    (Results Pending)        Assessment/Plan  * Osteomyelitis (HCC)- (present on admission)  Assessment & Plan  History of osteomyelitis on the right foot and had amputation transmetatarsal  Wound culture at [facility] showed mixed Pseudomonas and MRSA  Blood culture showed MRSA  Patient was supposed to take antibiotics until 7/20 however he left AGAINST MEDICAL ADVICE  Also patient had MRSA bacteremia  X-ray did not show osteomyelitis  Ortho was consulted, will leave the decision for MRI to LPS and Ortho  At this time we continue vancomycin and  meropenem  Blood cultures pending    Noncompliance  Assessment & Plan  Patient has significant history of noncompliance and not taking insulin or medications  Patient has complicated medical history including uncontrolled diabetes and peripheral vascular disease with coronary artery disease  Resume his medications  Continue education the patient    MRSA bacteremia  Assessment & Plan  Blood culture [facility] on 6/6 was positive for MRSA and repeat culture on 6/9 was negative  Echo did not show any endocarditis  Patient was supposed to continue daptomycin until 7/20 however he left SNF AMA  Repeat blood culture   continue vancomycin and meropenem at this time      CAD (coronary artery disease)- (present on admission)  Assessment & Plan  No chest pain and EKG did not show ischemia  Stent placement in September 2022  Holding antiplatelets pending surgery  Resume plavix after surgery completed    Peripheral artery disease (HCC)- (present on admission)  Assessment & Plan  Continue aspirin, atorvastatin and Plavix  Encouraged the patient to quit smoking    Alcohol abuse- (present on admission)  Assessment & Plan  No signs of withdrawal  Close monitoring and consider CIWA protocol if needed    Tobacco abuse- (present on admission)  Assessment & Plan  Smoking cessation consultation  Discussed risk of worsening coronary artery disease, peripheral vascular disease and death with the patient, discussed all other treatments including nicotine patch, patient understood.  Time 5 minutes    Diabetic foot ulcer (HCC)- (present on admission)  Assessment & Plan  Underwent amputation on left foot  His blood sugar is not controlled, on admission more than 400  Continue antibiotics  LPS consulted, recommend likely surgery  ortho would like MRI foot  MRI foot pending  Continue antibiotics  Blood cultures remain NGTD    Diabetes (HCC)- (present on admission)  Assessment & Plan  Uncontrolled and A1c 11.2  Patient admitted he is not taking  insulin  Increase lantus to 25 U daily, continue 6U TID with meals, and ISS  Diabetes education         VTE prophylaxis: SCDs/TEDs

## 2023-07-09 LAB
GLUCOSE BLD STRIP.AUTO-MCNC: 172 MG/DL (ref 65–99)
GLUCOSE BLD STRIP.AUTO-MCNC: 172 MG/DL (ref 65–99)
GLUCOSE BLD STRIP.AUTO-MCNC: 182 MG/DL (ref 65–99)
GLUCOSE BLD STRIP.AUTO-MCNC: 194 MG/DL (ref 65–99)
GLUCOSE BLD STRIP.AUTO-MCNC: 237 MG/DL (ref 65–99)
GLUCOSE BLD STRIP.AUTO-MCNC: 264 MG/DL (ref 65–99)

## 2023-07-09 PROCEDURE — 700111 HCHG RX REV CODE 636 W/ 250 OVERRIDE (IP): Mod: JZ | Performed by: INTERNAL MEDICINE

## 2023-07-09 PROCEDURE — 82962 GLUCOSE BLOOD TEST: CPT

## 2023-07-09 PROCEDURE — 700102 HCHG RX REV CODE 250 W/ 637 OVERRIDE(OP): Performed by: INTERNAL MEDICINE

## 2023-07-09 PROCEDURE — 700105 HCHG RX REV CODE 258: Mod: JZ | Performed by: STUDENT IN AN ORGANIZED HEALTH CARE EDUCATION/TRAINING PROGRAM

## 2023-07-09 PROCEDURE — 700111 HCHG RX REV CODE 636 W/ 250 OVERRIDE (IP): Performed by: STUDENT IN AN ORGANIZED HEALTH CARE EDUCATION/TRAINING PROGRAM

## 2023-07-09 PROCEDURE — 700102 HCHG RX REV CODE 250 W/ 637 OVERRIDE(OP): Performed by: STUDENT IN AN ORGANIZED HEALTH CARE EDUCATION/TRAINING PROGRAM

## 2023-07-09 PROCEDURE — 99232 SBSQ HOSP IP/OBS MODERATE 35: CPT | Performed by: STUDENT IN AN ORGANIZED HEALTH CARE EDUCATION/TRAINING PROGRAM

## 2023-07-09 PROCEDURE — A9270 NON-COVERED ITEM OR SERVICE: HCPCS | Performed by: STUDENT IN AN ORGANIZED HEALTH CARE EDUCATION/TRAINING PROGRAM

## 2023-07-09 PROCEDURE — 99233 SBSQ HOSP IP/OBS HIGH 50: CPT | Performed by: INTERNAL MEDICINE

## 2023-07-09 PROCEDURE — 8E0ZXY6 ISOLATION: ICD-10-PCS | Performed by: STUDENT IN AN ORGANIZED HEALTH CARE EDUCATION/TRAINING PROGRAM

## 2023-07-09 PROCEDURE — A9270 NON-COVERED ITEM OR SERVICE: HCPCS | Performed by: INTERNAL MEDICINE

## 2023-07-09 PROCEDURE — 770006 HCHG ROOM/CARE - MED/SURG/GYN SEMI*

## 2023-07-09 RX ADMIN — INSULIN GLARGINE-YFGN 25 UNITS: 100 INJECTION, SOLUTION SUBCUTANEOUS at 18:08

## 2023-07-09 RX ADMIN — ASPIRIN 81 MG: 81 TABLET, COATED ORAL at 05:43

## 2023-07-09 RX ADMIN — VANCOMYCIN HYDROCHLORIDE 1750 MG: 5 INJECTION, POWDER, LYOPHILIZED, FOR SOLUTION INTRAVENOUS at 10:02

## 2023-07-09 RX ADMIN — VANCOMYCIN HYDROCHLORIDE 1750 MG: 5 INJECTION, POWDER, LYOPHILIZED, FOR SOLUTION INTRAVENOUS at 23:43

## 2023-07-09 RX ADMIN — HYDROCODONE BITARTRATE AND ACETAMINOPHEN 1 TABLET: 5; 325 TABLET ORAL at 10:32

## 2023-07-09 RX ADMIN — OMEPRAZOLE 20 MG: 20 CAPSULE, DELAYED RELEASE ORAL at 05:43

## 2023-07-09 RX ADMIN — INSULIN LISPRO 6 UNITS: 100 INJECTION, SOLUTION INTRAVENOUS; SUBCUTANEOUS at 18:10

## 2023-07-09 RX ADMIN — HYDROCODONE BITARTRATE AND ACETAMINOPHEN 1 TABLET: 5; 325 TABLET ORAL at 17:02

## 2023-07-09 RX ADMIN — INSULIN LISPRO 2 UNITS: 100 INJECTION, SOLUTION INTRAVENOUS; SUBCUTANEOUS at 21:23

## 2023-07-09 RX ADMIN — ONDANSETRON 4 MG: 2 INJECTION INTRAMUSCULAR; INTRAVENOUS at 13:15

## 2023-07-09 RX ADMIN — INSULIN LISPRO 2 UNITS: 100 INJECTION, SOLUTION INTRAVENOUS; SUBCUTANEOUS at 18:11

## 2023-07-09 RX ADMIN — ATORVASTATIN CALCIUM 40 MG: 40 TABLET, FILM COATED ORAL at 21:25

## 2023-07-09 RX ADMIN — INSULIN LISPRO 6 UNITS: 100 INJECTION, SOLUTION INTRAVENOUS; SUBCUTANEOUS at 10:13

## 2023-07-09 RX ADMIN — ENOXAPARIN SODIUM 40 MG: 100 INJECTION SUBCUTANEOUS at 18:07

## 2023-07-09 RX ADMIN — INSULIN LISPRO 6 UNITS: 100 INJECTION, SOLUTION INTRAVENOUS; SUBCUTANEOUS at 13:52

## 2023-07-09 RX ADMIN — INSULIN LISPRO 5 UNITS: 100 INJECTION, SOLUTION INTRAVENOUS; SUBCUTANEOUS at 10:14

## 2023-07-09 RX ADMIN — ESCITALOPRAM OXALATE 10 MG: 10 TABLET ORAL at 05:43

## 2023-07-09 RX ADMIN — COLCHICINE 0.6 MG: 0.6 TABLET ORAL at 05:43

## 2023-07-09 RX ADMIN — MORPHINE SULFATE 3 MG: 4 INJECTION, SOLUTION INTRAMUSCULAR; INTRAVENOUS at 21:25

## 2023-07-09 RX ADMIN — LISINOPRIL 10 MG: 10 TABLET ORAL at 05:43

## 2023-07-09 RX ADMIN — INSULIN LISPRO 2 UNITS: 100 INJECTION, SOLUTION INTRAVENOUS; SUBCUTANEOUS at 13:52

## 2023-07-09 ASSESSMENT — ENCOUNTER SYMPTOMS
BACK PAIN: 0
VOMITING: 0
DIZZINESS: 0
INSOMNIA: 0
SHORTNESS OF BREATH: 0
FEVER: 0
DIARRHEA: 0
BLURRED VISION: 0
PALPITATIONS: 0
HEADACHES: 0
NAUSEA: 0
COUGH: 0
ABDOMINAL PAIN: 0
EYE PAIN: 0
CHILLS: 0

## 2023-07-09 ASSESSMENT — PAIN DESCRIPTION - PAIN TYPE
TYPE: ACUTE PAIN

## 2023-07-09 ASSESSMENT — LIFESTYLE VARIABLES: SUBSTANCE_ABUSE: 0

## 2023-07-09 NOTE — PROGRESS NOTES
Hospital Medicine Daily Progress Note    Date of Service  7/9/2023    Chief Complaint  Rogelio Escudero is a 48 y.o. male admitted 7/5/2023 with foot pain.    Hospital Course  48-year-old male with history of uncontrolled diabetes, coronary artery disease s/p stent, PTSD, hypertension, alcoholism and peripheral vascular disease who presented 7/5 with worsening pain on his right leg.  Last month patient was diagnosed with osteomyelitis and he underwent amputation by Dr. Escudero at [facility], at that time patient had MRSA/Pseudomonas infection, likely bacteremia??  Echo did not show any signs of endocarditis and patient was discharged to the SNF with daptomycin and meropenem for 6-month until end of July 7/20.  However patient left AGAINST MEDICAL ADVICE from the SNF.  And patient came on 7/5 with worsening pain, and worsening swelling on the right leg, denied any fever or chills, denied any chest pain or shortness of breath.  Patient has not taking antibiotics for more than 2 weeks and patient admitted he is not taking insulin.  On admission vital signs around baseline with no fever.  White blood cell 10.3 and blood sugar more than 400 with lactic acid 2.3.  CRP 2.2.  X-ray for the right foot did not show any abnormalities.  ED physician discussed the case with Ortho, patient will be admitted to the hospital to continue antibiotics.    Interval Problem Update  No acute events overnight.  MRI foot pending. Surgery following, plan of care pending MRI results.  Continue antibiotics.  Blood cultures NGTD.  Holding home plavix for hx CAD stent 9/2022 pending surgical plans.    I have discussed this patient's plan of care and discharge plan at IDT rounds today with Case Management, Nursing, Nursing leadership, and other members of the IDT team.    Consultants/Specialty  infectious disease and orthopedics    Code Status  Full Code    Disposition  The patient is not medically cleared for discharge to home or a  post-acute facility.  Anticipate discharge to: skilled nursing facility    I have placed the appropriate orders for post-discharge needs.    Review of Systems  Review of Systems   Constitutional:  Negative for chills and fever.   Eyes:  Negative for blurred vision and pain.   Respiratory:  Negative for cough and shortness of breath.    Cardiovascular:  Negative for chest pain, palpitations and leg swelling.   Gastrointestinal:  Negative for abdominal pain, nausea and vomiting.   Genitourinary:  Negative for dysuria and urgency.   Musculoskeletal:  Negative for back pain.   Skin:  Negative for itching and rash.   Neurological:  Negative for dizziness and headaches.   Psychiatric/Behavioral:  Negative for substance abuse. The patient does not have insomnia.         Physical Exam  Temp:  [36.4 °C (97.6 °F)-36.9 °C (98.4 °F)] 36.4 °C (97.6 °F)  Pulse:  [82-90] 82  Resp:  [16-19] 19  BP: (122-141)/(76-90) 135/76  SpO2:  [96 %-100 %] 100 %    Physical Exam  Constitutional:       General: He is not in acute distress.     Appearance: He is not ill-appearing.   HENT:      Head: Normocephalic and atraumatic.      Right Ear: External ear normal.      Left Ear: External ear normal.      Mouth/Throat:      Pharynx: No oropharyngeal exudate or posterior oropharyngeal erythema.   Eyes:      Extraocular Movements: Extraocular movements intact.      Pupils: Pupils are equal, round, and reactive to light.   Cardiovascular:      Rate and Rhythm: Normal rate and regular rhythm.      Pulses: Normal pulses.      Heart sounds: Normal heart sounds.   Pulmonary:      Effort: Pulmonary effort is normal. No respiratory distress.      Breath sounds: Normal breath sounds. No wheezing.   Abdominal:      General: Bowel sounds are normal. There is no distension.      Palpations: Abdomen is soft.      Tenderness: There is no abdominal tenderness.   Musculoskeletal:         General: No swelling or tenderness.      Cervical back: Normal range of  motion and neck supple.      Comments: Right TMA   Skin:     General: Skin is warm and dry.   Neurological:      General: No focal deficit present.      Mental Status: He is oriented to person, place, and time.   Psychiatric:         Mood and Affect: Mood normal.         Behavior: Behavior normal.         Fluids    Intake/Output Summary (Last 24 hours) at 7/9/2023 1414  Last data filed at 7/9/2023 0800  Gross per 24 hour   Intake 460 ml   Output 900 ml   Net -440 ml         Laboratory  Recent Labs     07/07/23  0258   WBC 8.8   RBC 4.59*   HEMOGLOBIN 13.8*   HEMATOCRIT 41.9*   MCV 91.3   MCH 30.1   MCHC 32.9   RDW 56.6*   PLATELETCT 332   MPV 9.1       Recent Labs     07/07/23  0258   SODIUM 137   POTASSIUM 4.3   CHLORIDE 104   CO2 22   GLUCOSE 297*   BUN 11   CREATININE 0.65   CALCIUM 9.0                     Imaging  US-EXTREMITY VENOUS LOWER UNILAT RIGHT   Final Result      DX-CHEST-PORTABLE (1 VIEW)   Final Result         1.  No acute cardiopulmonary disease.      DX-FOOT-COMPLETE 3+ RIGHT   Final Result         1.  No acute traumatic bony injury.      US-EXTREMITY ARTERY LOWER BILAT W/LATASHA (COMBO)    (Results Pending)   MR-FOOT-WITH RIGHT    (Results Pending)   EC-ECHOCARDIOGRAM COMPLETE W/O CONT    (Results Pending)          Assessment/Plan  * Osteomyelitis (HCC)- (present on admission)  Assessment & Plan  History of osteomyelitis on the right foot and had amputation transmetatarsal  Wound culture at [place] showed mixed Pseudomonas and MRSA  Blood culture showed MRSA  Patient was supposed to take antibiotics until 7/20 however he left AGAINST MEDICAL ADVICE  Also patient had MRSA bacteremia  X-ray did not show osteomyelitis  Ortho was consulted, will leave the decision for MRI to LPS and Ortho  At this time we continue vancomycin and meropenem  Blood cultures pending    Noncompliance  Assessment & Plan  Patient has significant history of noncompliance and not taking insulin or medications  Patient has  complicated medical history including uncontrolled diabetes and peripheral vascular disease with coronary artery disease  Resume his medications  Continue education the patient    MRSA bacteremia  Assessment & Plan  Blood culture [facility] on 6/6 was positive for MRSA and repeat culture on 6/9 was negative  Echo did not show any endocarditis  Patient was supposed to continue daptomycin until 7/20 however he left SNF AMA  Repeat blood culture   continue vancomycin and meropenem at this time      CAD (coronary artery disease)- (present on admission)  Assessment & Plan  No chest pain and EKG did not show ischemia  Stent placement in September 2022  Holding antiplatelets pending surgery  Resume plavix after surgery completed    Peripheral artery disease (HCC)- (present on admission)  Assessment & Plan  Continue aspirin, atorvastatin and Plavix  Encouraged the patient to quit smoking    Alcohol abuse- (present on admission)  Assessment & Plan  No signs of withdrawal  Close monitoring and consider CIWA protocol if needed    Tobacco abuse- (present on admission)  Assessment & Plan  Smoking cessation consultation  Discussed risk of worsening coronary artery disease, peripheral vascular disease and death with the patient, discussed all other treatments including nicotine patch, patient understood.  Time 5 minutes    Diabetic foot ulcer (HCC)- (present on admission)  Assessment & Plan  Underwent amputation on left foot  His blood sugar is not controlled, on admission more than 400  Continue antibiotics  LPS consulted, recommend likely surgery  ortho would like MRI foot  MRI foot pending  Continue antibiotics  Blood cultures remain NGTD    Diabetes (HCC)- (present on admission)  Assessment & Plan  Uncontrolled and A1c 11.2  Patient admitted he is not taking insulin  Increase lantus to 25 U daily, continue 6U TID with meals, and ISS  Diabetes education         VTE prophylaxis: SCDs/TEDs

## 2023-07-09 NOTE — PROGRESS NOTES
Infectious Disease Progress Note    Author: Jasiel Purdy M.D. Date & Time of service: 2023  12:14 PM    Chief Complaint:  Follow-up for right foot osteomyelitis    Interval History:   patient remains afebrile, white count is 8.8, tolerating vancomycin and meropenem for now, cultures as below, creatinine 0.65, normal transaminases, magnesium 1.8, albumin 3.4,   patient remains afebrile, no CBC this morning, tolerating vancomycin, point-of-care glucose 264, Vanco trough 10.9    Labs Reviewed and Medications Reviewed.    Review of Systems:  Review of Systems   Constitutional:  Negative for chills and fever.   Gastrointestinal:  Negative for abdominal pain, diarrhea, nausea and vomiting.   Skin:  Negative for itching and rash.       Hemodynamics:  Temp (24hrs), Av.6 °C (97.9 °F), Min:36.4 °C (97.6 °F), Max:36.9 °C (98.4 °F)  Temperature: 36.4 °C (97.6 °F)  Pulse  Av.4  Min: 71  Max: 101   Blood Pressure: 135/76       Physical Exam:  Physical Exam  Vitals and nursing note reviewed.   Constitutional:       General: He is not in acute distress.     Appearance: He is not ill-appearing.   HENT:      Mouth/Throat:      Pharynx: No oropharyngeal exudate.      Comments: Poor dentition  Eyes:      General:         Right eye: No discharge.   Cardiovascular:      Rate and Rhythm: Normal rate.      Heart sounds: No murmur heard.  Pulmonary:      Effort: Pulmonary effort is normal. No respiratory distress.      Breath sounds: No stridor.   Abdominal:      General: Abdomen is flat. There is no distension.      Tenderness: There is no abdominal tenderness.   Musculoskeletal:      Comments: Right-sided TMA with dressing in place.  Pictures reviewed, wounds with surrounding swelling at amputation site   Neurological:      General: No focal deficit present.      Mental Status: He is alert and oriented to person, place, and time.   Psychiatric:         Mood and Affect: Mood normal.         Behavior: Behavior normal.          Meds:    Current Facility-Administered Medications:     vancomycin    insulin GLARGINE **AND** [DISCONTINUED] insulin lispro **AND** [DISCONTINUED] insulin lispro **AND** POC blood glucose manual result **AND** NOTIFY MD and PharmD **AND** Administer 20 grams of glucose (approximately 8 ounces of fruit juice) every 15 minutes PRN FSBG less than 70 mg/dL **AND** dextrose bolus    insulin lispro    insulin lispro    HYDROcodone-acetaminophen    morphine injection    MD Alert...Vancomycin per Pharmacy    atorvastatin    [Held by provider] clopidogrel    colchicine    escitalopram    lisinopril    omeprazole    senna-docusate **AND** polyethylene glycol/lytes **AND** magnesium hydroxide **AND** bisacodyl    enoxaparin (LOVENOX) injection    acetaminophen    ondansetron    ondansetron    promethazine    promethazine    prochlorperazine    aspirin    Labs:  Recent Labs     23  0258   WBC 8.8   RBC 4.59*   HEMOGLOBIN 13.8*   HEMATOCRIT 41.9*   MCV 91.3   MCH 30.1   RDW 56.6*   PLATELETCT 332   MPV 9.1       Recent Labs     23  0258   SODIUM 137   POTASSIUM 4.3   CHLORIDE 104   CO2 22   GLUCOSE 297*   BUN 11       Recent Labs     23  0258   CREATININE 0.65         Imaging:  US-EXTREMITY VENOUS LOWER UNILAT RIGHT    Result Date: 2023   Vascular Laboratory  CONCLUSIONS  Normal right lower extremity deep venous examination.  REGLA HAYES  Exam Date:     2023 11:25  Room #:     Inpatient  Priority:     Routine  Ht (in):             Wt (lb):  Ordering Physician:        JULY CALIX  Referring Physician:       [id number] Barreto  Sonographer:               Tim Dc RVDEANDRE  Study Type:                Complete Unilateral  Technical Quality:         Adequate  Age:    48    Gender:     M  MRN:    [id number]  :    1975      BSA:  Indications:     Localized swelling, mass and lump, right lower limb  CPT Codes:       23925  ICD Codes:       R22.41  History:          right leg edema & pain; recent right transmetatarsal foot                   amputation; no prior exam  Limitations:  PROCEDURES:  Right lower extremity venous duplex imaging.  Serial compression, color, and spectral Doppler flow evaluations were  performed.  The following venous structures were evaluated: common femoral, deep  femoral, proximal great saphenous, femoral, popliteal, peroneal, and  posterior tibial veins.  FINDINGS:  RIGHT LOWER EXTREMITY:  No deep venous thrombosis.  All veins demonstrate complete color filling and compressibility with  normal venous flow dynamics including spontaneous flow and respiratory  phasicity.  Rene Lara MD  (Electronically Signed)  Final Date:      06 July 2023                   20:02    DX-FOOT-COMPLETE 3+ RIGHT    Result Date: 7/5/2023 7/5/2023 6:13 AM HISTORY/REASON FOR EXAM: Atraumatic Pain/Swelling/Deformity TECHNIQUE/EXAM DESCRIPTION:  AP, lateral, and oblique views of the RIGHT foot. COMPARISON:  February 3, 2020 FINDINGS: Postsurgical changes of transmetatarsal amputation of the foot are seen. There is no acute fracture, subluxation, or dislocation identified. Soft tissue swelling of the forefoot is seen.     1.  No acute traumatic bony injury.    DX-CHEST-PORTABLE (1 VIEW)    Result Date: 7/5/2023 7/5/2023 6:10 AM HISTORY/REASON FOR EXAM: possible sepsis. TECHNIQUE/EXAM DESCRIPTION:  Single AP view of the chest. COMPARISON: November 16, 2022 FINDINGS: Overlying cardiac leads are present. The cardiac silhouette appears within normal limits. The mediastinal contour appears within normal limits.  The central pulmonary vasculature appears normal. Bilateral lung volumes are diminished.  Bilateral lungs are clear. No significant pleural effusions are identified. The bony structures appear age-appropriate.     1.  No acute cardiopulmonary disease.    IR PICC    Result Date: 6/16/2023  CLINICAL DATA: Long term IV abx, FINDINGS:  The nature of the procedure and  associated risks and benefits were explained to the patient and consent to perform the procedure was obtained. Maximum sterile barrier technique was utilized. Hand washing with alcohol-based antiseptic preceded the procedure. Cap, mask, sterile gown and sterile gloves were worn. The patient's skin was prepped with 2% chlorhexidine. Catheter insertion site was draped with a large sterile drape. The left arm was assessed with ultrasound to identify a suitable vein. The brachial vein was identified, flowing blood within the vein and vein compressibility indicated that the vein was patent. Subsequently, under real-time sonographic guidance a 22-gauge micropuncture needle was advanced into the lumen of the vein. An ultrasound image demonstrating the needle tip in the vein lumen was recorded in the patient record. Through the 22-gauge needle a 0.018-gauge guidewire was advanced to secure access to the vein. A vessel dilator was advanced over the guidewire in conjunction with a peel-away sheath. Appropriate catheter length was determined with the original guidewire under fluoroscopic observation. Subsequently, the PICC line was cut to appropriate length and advanced over a second guidewire to the distal superior vena cava and secured at the skin. Final catheter position was recorded with a chest x-ray. Catheter length is 47 centimeters. The fluoroscopy time was 0.3 minutes.  One image was obtained.  3.7 mg, 1 25 mcg/sq m.    PLACEMENT OF A PERIPHERALLY INSERTED CENTRAL CATHETER USING FLUOROSCOPIC AND SONOGRAPHIC GUIDANCE AS OUTLINED ABOVE.      Micro:  Results       Procedure Component Value Units Date/Time    Culture Wound With Gram Stain [[id number]]  (Abnormal)  (Susceptibility) Collected: 07/07/23 1428    Order Status: Completed Specimen: Wound from Right Foot Updated: 07/09/23 1131     Significant Indicator POS     Source WND     Site RIGHT FOOT     Culture Result -     Gram Stain Result Moderate WBCs.  No organisms  "seen.       Culture Result Methicillin Resistant Staphylococcus aureus  Light growth      Narrative:      Collected By: [facility] COLLEEN PAULSON  Collected By: [facility] MACIAS SU    Susceptibility       Methicillin resistant staphylococcus aureus (1)       Antibiotic Interpretation Microscan   Method Status    Azithromycin Resistant >4 mcg/mL RENATE Preliminary    Clindamycin Sensitive <=0.25 mcg/mL RENATE Preliminary    Cefazolin Resistant <=8 mcg/mL RENATE Preliminary    Cefepime Resistant 16 mcg/mL RENATE Preliminary    Ceftaroline Sensitive <=0.5 mcg/mL RENATE Preliminary    Daptomycin Sensitive 1 mcg/mL RENATE Preliminary    Ampicillin/sulbactam Resistant 16/8 mcg/mL RENATE Preliminary    Erythromycin Resistant >4 mcg/mL RENATE Preliminary    Vancomycin Sensitive 1 mcg/mL RENATE Preliminary    Oxacillin Resistant >2 mcg/mL RENATE Preliminary    Trimeth/Sulfa Sensitive <=0.5/9.5 mcg/mL RENATE Preliminary    Tetracycline Sensitive <=4 mcg/mL RENATE Preliminary                       BLOOD CULTURE [[id number]] Collected: 07/08/23 0737    Order Status: Completed Specimen: Blood from Peripheral Updated: 07/09/23 0635     Significant Indicator NEG     Source BLD     Site PERIPHERAL     Culture Result No Growth  Note: Blood cultures are incubated for 5 days and  are monitored continuously.Positive blood cultures  are called to the RN and reported as soon as  they are identified.      Narrative:      Per Hospital Policy: Only change Specimen Src: to \"Line\" if  specified by physician order.  Left Forearm/Arm    BLOOD CULTURE [[id number]] Collected: 07/08/23 1403    Order Status: Completed Specimen: Blood from Peripheral Updated: 07/09/23 0635     Significant Indicator NEG     Source BLD     Site PERIPHERAL     Culture Result No Growth  Note: Blood cultures are incubated for 5 days and  are monitored continuously.Positive blood cultures  are called to the RN and reported as soon as  they are identified.      Narrative:      Per Hospital Policy: Only change " "Specimen Src: to \"Line\" if  specified by physician order.  Left AC    GRAM STAIN [[id number]] Collected: 07/07/23 1428    Order Status: Completed Specimen: Wound Updated: 07/07/23 2250     Significant Indicator .     Source WND     Site RIGHT FOOT     Gram Stain Result Moderate WBCs.  No organisms seen.      Narrative:      Collected By: [facility] COLLEEN PAULSON  Collected By: [facility] COLLEEN PAULSON    Urine Culture (New) [[id number]] Collected: 07/05/23 0915    Order Status: Completed Specimen: Urine Updated: 07/07/23 0651     Significant Indicator NEG     Source UR     Site -     Culture Result No growth at 48 hours.    Narrative:      Indication for culture:->Evaluation for sepsis without a  clear source of infection  Indication for culture:->Evaluation for sepsis without a    Blood Culture [[id number]] Collected: 07/05/23 0622    Order Status: Completed Specimen: Blood from Peripheral Updated: 07/06/23 0849     Significant Indicator NEG     Source BLD     Site PERIPHERAL     Culture Result No Growth  Note: Blood cultures are incubated for 5 days and  are monitored continuously.Positive blood cultures  are called to the RN and reported as soon as  they are identified.      Narrative:      Per Hospital Policy: Only change Specimen Src: to \"Line\" if  specified by physician order.  Left AC    Blood Culture [[id number]] Collected: 07/05/23 0622    Order Status: Completed Specimen: Blood from Peripheral Updated: 07/06/23 0849     Significant Indicator NEG     Source BLD     Site PERIPHERAL     Culture Result No Growth  Note: Blood cultures are incubated for 5 days and  are monitored continuously.Positive blood cultures  are called to the RN and reported as soon as  they are identified.      Narrative:      Per Hospital Policy: Only change Specimen Src: to \"Line\" if  specified by physician order.  Right AC    MRSA By PCR (Amp) [[id number]] Collected: 07/05/23 0732    Order Status: Completed Specimen: Respirate from Nares Updated: " 07/05/23 1058     MRSA by PCR Negative    Urinalysis [[id number]]  (Abnormal) Collected: 07/05/23 0915    Order Status: Completed Specimen: Urine Updated: 07/05/23 1046     Color Yellow     Character Clear     Specific Gravity 1.044     Ph 5.0     Glucose >=1000 mg/dL      Ketones Negative mg/dL      Protein Negative mg/dL      Bilirubin Negative     Urobilinogen, Urine 0.2     Nitrite Negative     Leukocyte Esterase Negative     Occult Blood Negative     Micro Urine Req see below     Comment: Microscopic examination not performed when specimen is clear  and chemically negative for protein, blood, leukocyte esterase  and nitrite.         Narrative:      Indication for culture:->Evaluation for sepsis without a  clear source of infection            Assessment/Hospital Course:  This is a 48-year-old male patient with uncontrolled type 2 diabetes, CAD status post stent, PTSD, hypertension, alcoholism, polysubstance abuse, peripheral arterial disease, diagnosis of right foot osteomyelitis in April 2023 at [facility] status post TMA 6/8/2023. Blood cultures x2 from 6/8 were positive for MRSA and repeat blood cultures from 6/9 were negative. He was discharged to SNF with daptomycin and meropenem with plan to continue through 7/20 but patient left [place] on 6/17.  Patient now presented with several days of chills and drainage of right-sided TMA site with pain, per report pathology was positive for osteomyelitis of the margins    It appears that the reason for meropenem was a superficial skin swab from 4/13, nearly 3 months ago, that grew rare growth of ESBL E. coli along with mixed skin maryellen and cellular debris but without MRSA which are likely commensals/contaminants.  A superficial swab has been obtained here 7/7 and this is growing MRSA thus far.    Pertinent Diagnoses:   Right foot osteomyelitis, status post TMA, positive margins per pathology per report  Recent MRSA bacteremia, incompletely treated, patient left  AMA  Uncontrolled type 2 diabetes  History of polysubstance abuse  Peripheral arterial disease    Plan:  -Continue IV vancomycin, discontinued meropenem 7/8.  Monitor vancomycin levels and renal function as above  -Superficial swab obtained here growing MRSA thus far which we will continue to cover.  Any additional growth will be of uncertain clinical significance  -MRI foot is pending.  We will follow along  -TTE ordered, pending  -Follow blood cultures x2 from 7/5, no growth till date  -Monitor for any new areas of sites of seeding of infection    Disposition: Anticipate eventual return to facility for an additional 6 weeks of IV daptomycin or vancomycin  Need for PICC line: Yes, okay to place after any surgical procedures    Discussed with Dr. Dorantes.  ID will follow.  This illness poses a threat to limb function

## 2023-07-09 NOTE — PROGRESS NOTES
"Pharmacy Vancomycin Kinetics Note for 7/9/2023     48 y.o. male on Vancomycin day # 5     Vancomycin Indication (Two level/Trough based Dosing): Osteomyelitis (goal trough 10-15)    Provider specified end date: 07/19/23    Active Antibiotics (From admission, onward)      Ordered     Ordering Provider       Sun Jul 9, 2023  6:58 AM    07/09/23 0658  vancomycin (Vancocin) 1,750 mg in  mL IVPB  EVERY 12 HOURS         Héctor Dorantes M.D.       Thu Jul 6, 2023  1:49 PM    07/06/23 1349  MD Alert...Vancomycin per Pharmacy  PHARMACY TO DOSE        Question:  Indication(s) for vancomycin?  Answer:  Skin and soft tissue infection    Héctor Dorantes M.D.            Dosing Weight: 88 kg (194 lb 0.1 oz)      Admission History: Admitted on 7/5/2023 for Osteomyelitis (HCC) [M86.9]  Pertinent history: 47yo male with history of DMII, PTSD, hypertension, alcoholism and PVD presents with worsening pain in his right leg.  Last month patient was diagnosed with osteomyelitis and he underwent amputation at [facility], at that time patient had MRSA/ESBL and patient was discharged to the SNF with daptomycin and meropenem for 6 weeks but patient left AMA from the SNF after 2 days.    Allergies:     Apple and Lactose     Pertinent cultures to date:     Results       Procedure Component Value Units Date/Time    BLOOD CULTURE [[id number]] Collected: 07/08/23 0737    Order Status: Completed Specimen: Blood from Peripheral Updated: 07/09/23 0635     Significant Indicator NEG     Source BLD     Site PERIPHERAL     Culture Result No Growth  Note: Blood cultures are incubated for 5 days and  are monitored continuously.Positive blood cultures  are called to the RN and reported as soon as  they are identified.      Narrative:      Per Hospital Policy: Only change Specimen Src: to \"Line\" if  specified by physician order.  Left Forearm/Arm    BLOOD CULTURE [[id number]] Collected: 07/08/23 1403    Order Status: Completed Specimen: Blood from " "Peripheral Updated: 07/09/23 0635     Significant Indicator NEG     Source BLD     Site PERIPHERAL     Culture Result No Growth  Note: Blood cultures are incubated for 5 days and  are monitored continuously.Positive blood cultures  are called to the RN and reported as soon as  they are identified.      Narrative:      Per Hospital Policy: Only change Specimen Src: to \"Line\" if  specified by physician order.  Left AC    Culture Wound With Gram Stain [[id number]]  (Abnormal) Collected: 07/07/23 1428    Order Status: Completed Specimen: Wound from Right Foot Updated: 07/08/23 0717     Significant Indicator POS     Source WND     Site RIGHT FOOT     Culture Result -     Gram Stain Result Moderate WBCs.  No organisms seen.       Culture Result Staphylococcus aureus  Light growth  Methicillin resistant by screening method      Narrative:      Collected By: [facility] COLLEEN PAULSON  Collected By: [facility] COLLEEN PAULSON    GRAM STAIN [[id number]] Collected: 07/07/23 1428    Order Status: Completed Specimen: Wound Updated: 07/07/23 2250     Significant Indicator .     Source WND     Site RIGHT FOOT     Gram Stain Result Moderate WBCs.  No organisms seen.      Narrative:      Collected By: [facility] COLLEEN PAULSON  Collected By: [facility] COLLEEN PAULSON    Urine Culture (New) [[id number]] Collected: 07/05/23 0915    Order Status: Completed Specimen: Urine Updated: 07/07/23 0651     Significant Indicator NEG     Source UR     Site -     Culture Result No growth at 48 hours.    Narrative:      Indication for culture:->Evaluation for sepsis without a  clear source of infection  Indication for culture:->Evaluation for sepsis without a    Blood Culture [[id number]] Collected: 07/05/23 0622    Order Status: Completed Specimen: Blood from Peripheral Updated: 07/06/23 0849     Significant Indicator NEG     Source BLD     Site PERIPHERAL     Culture Result No Growth  Note: Blood cultures are incubated for 5 days and  are monitored continuously.Positive " "blood cultures  are called to the RN and reported as soon as  they are identified.      Narrative:      Per Hospital Policy: Only change Specimen Src: to \"Line\" if  specified by physician order.  Left AC    Blood Culture [[id number]] Collected: 07/05/23 0622    Order Status: Completed Specimen: Blood from Peripheral Updated: 07/06/23 0849     Significant Indicator NEG     Source BLD     Site PERIPHERAL     Culture Result No Growth  Note: Blood cultures are incubated for 5 days and  are monitored continuously.Positive blood cultures  are called to the RN and reported as soon as  they are identified.      Narrative:      Per Hospital Policy: Only change Specimen Src: to \"Line\" if  specified by physician order.  Right AC    MRSA By PCR (Amp) [[id number]] Collected: 07/05/23 0732    Order Status: Completed Specimen: Respirate from Nares Updated: 07/05/23 1058     MRSA by PCR Negative    Urinalysis [[id number]]  (Abnormal) Collected: 07/05/23 0915    Order Status: Completed Specimen: Urine Updated: 07/05/23 1046     Color Yellow     Character Clear     Specific Gravity 1.044     Ph 5.0     Glucose >=1000 mg/dL      Ketones Negative mg/dL      Protein Negative mg/dL      Bilirubin Negative     Urobilinogen, Urine 0.2     Nitrite Negative     Leukocyte Esterase Negative     Occult Blood Negative     Micro Urine Req see below     Comment: Microscopic examination not performed when specimen is clear  and chemically negative for protein, blood, leukocyte esterase  and nitrite.         Narrative:      Indication for culture:->Evaluation for sepsis without a  clear source of infection            Labs:     Estimated Creatinine Clearance: 149.6 mL/min (by C-G formula based on SCr of 0.65 mg/dL).  Recent Labs     07/07/23  0258   WBC 8.8     Recent Labs     07/07/23  0258   BUN 11   CREATININE 0.65       Intake/Output Summary (Last 24 hours) at 7/9/2023 0702  Last data filed at 7/9/2023 0319  Gross per 24 hour   Intake 820 ml " "  Output 800 ml   Net 20 ml      BP (!) 141/90   Pulse 90   Temp 36.5 °C (97.7 °F) (Temporal)   Resp 16   Ht 1.753 m (5' 9\")   Wt 88.5 kg (195 lb)   SpO2 97%  Temp (24hrs), Av.6 °C (97.9 °F), Min:36.3 °C (97.3 °F), Max:36.9 °C (98.4 °F)      List concerns for Vancomycin clearance:     None    Pharmacokinetics:    Two level kinetics:   Ke (hr ^-1): 0.1062  Half life: 6.5  Vd: Steady state Vd : 71.165  Calculated AUC: 396 mg·hr/L    Trough kinetics:   Recent Labs     23  1403 23   VANCOTROUGH  --  10.9   VANCOPEAK 21.2  --        A/P:     -  Vancomycin dose: increase to 1750 mg IV q12h    -  Next vancomycin level(s): 4-5 days    -  Predicted vancomycin AUC from two level test calculator: 462 mg·hr/L    -  Comments: AUC slightly subtherapeutic at 396 thus dosing increased to 1750 mg (~20 mg/kg) IV q12h to reach a predicted AUC of 462.  Dosing currently scheduled thru  but per RID note, pt will likely get an additional 6 weeks of antibiotics following return to SNF.  EOT date not specified.  Pharmacy will continue to monitor and adjust as needed.    Abby Betts, PharmD    "

## 2023-07-09 NOTE — CARE PLAN
Problem: Pain - Standard  Goal: Alleviation of pain or a reduction in pain to the patient’s comfort goal  Outcome: Progressing   The patient is Watcher - Medium risk of patient condition declining or worsening    Shift Goals  Clinical Goals: pain control, IV ABX  Patient Goals: manage pain  Family Goals: jacklyn

## 2023-07-09 NOTE — CARE PLAN
The patient is Stable - Low risk of patient condition declining or worsening    Shift Goals  Clinical Goals: pain control, IV ABX  Patient Goals: manage pain  Family Goals: jacklyn    Progress made toward(s) clinical / shift goals:  pain controlled    Patient is not progressing towards the following goals:

## 2023-07-09 NOTE — PROGRESS NOTES
Received bedside report from nightshift RN, pt care assumed. VSS, pt assessment complete. Pt A&Ox4 , c/o 7/10  pain at this time.medicated per MAR.POC discussed with pt and verbalizes no questions. Pt denies any additional needs at this time. Bed locked and in lowest position. Pt educated on fall risk and verbalized understanding, call light within reach, hourly rounding initiated.     Dressing change done

## 2023-07-09 NOTE — PROGRESS NOTES
Received bedside report from nightshift RN, pt care assumed. VSS, pt assessment complete. Pt A&Ox4  , no complaints of pain at this time.medicated per MAR.POC discussed with pt and verbalizes no questions. Pt denies any additional needs at this time. Bed locked and in lowest position. Pt educated on fall risk and verbalized understanding, call light within reach, hourly rounding initiated.

## 2023-07-09 NOTE — PROGRESS NOTES
4 Eyes Skin Assessment Completed by Radha, RN and Pal RN.    Head WDL  Ears WDL  Nose WDL  Mouth WDL  Neck WDL  Breast/Chest WDL  Shoulder Blades WDL  Spine WDL  (R) Arm/Elbow/Hand WDL  (L) Arm/Elbow/Hand WDL  Abdomen WDL  Groin WDL  Scrotum/Coccyx/Buttocks WDL  (R) Leg WDL  (L) Leg WDL  (R) Heel/Foot/Toe Scar and Scab RTMA  (L) Heel/Foot/Toe Discoloration Left great toe amputation           Devices In Places Blood Pressure Cuff      Interventions In Place Pillows    Possible Skin Injury No    Pictures Uploaded Into Epic Yes  Wound Consult Placed N/A  RN Wound Prevention Protocol Ordered Yes

## 2023-07-10 ENCOUNTER — APPOINTMENT (OUTPATIENT)
Dept: CARDIOLOGY | Facility: MEDICAL CENTER | Age: 48
DRG: 253 | End: 2023-07-10
Attending: INTERNAL MEDICINE
Payer: MEDICAID

## 2023-07-10 ENCOUNTER — APPOINTMENT (OUTPATIENT)
Dept: RADIOLOGY | Facility: MEDICAL CENTER | Age: 48
DRG: 253 | End: 2023-07-10
Attending: STUDENT IN AN ORGANIZED HEALTH CARE EDUCATION/TRAINING PROGRAM
Payer: MEDICAID

## 2023-07-10 LAB
ANION GAP SERPL CALC-SCNC: 10 MMOL/L (ref 7–16)
BACTERIA BLD CULT: NORMAL
BACTERIA BLD CULT: NORMAL
BUN SERPL-MCNC: 15 MG/DL (ref 8–22)
CALCIUM SERPL-MCNC: 8.7 MG/DL (ref 8.5–10.5)
CHLORIDE SERPL-SCNC: 102 MMOL/L (ref 96–112)
CO2 SERPL-SCNC: 22 MMOL/L (ref 20–33)
CREAT SERPL-MCNC: 0.55 MG/DL (ref 0.5–1.4)
ERYTHROCYTE [DISTWIDTH] IN BLOOD BY AUTOMATED COUNT: 55.7 FL (ref 35.9–50)
GFR SERPLBLD CREATININE-BSD FMLA CKD-EPI: 120 ML/MIN/1.73 M 2
GLUCOSE BLD STRIP.AUTO-MCNC: 118 MG/DL (ref 65–99)
GLUCOSE BLD STRIP.AUTO-MCNC: 144 MG/DL (ref 65–99)
GLUCOSE BLD STRIP.AUTO-MCNC: 148 MG/DL (ref 65–99)
GLUCOSE BLD STRIP.AUTO-MCNC: 271 MG/DL (ref 65–99)
GLUCOSE SERPL-MCNC: 217 MG/DL (ref 65–99)
HCT VFR BLD AUTO: 39.1 % (ref 42–52)
HGB BLD-MCNC: 13 G/DL (ref 14–18)
LV EJECT FRACT  99904: 60
LV EJECT FRACT MOD 2C 99903: 55.04
LV EJECT FRACT MOD 4C 99902: 57.86
LV EJECT FRACT MOD BP 99901: 57.48
MCH RBC QN AUTO: 30 PG (ref 27–33)
MCHC RBC AUTO-ENTMCNC: 33.2 G/DL (ref 32.3–36.5)
MCV RBC AUTO: 90.3 FL (ref 81.4–97.8)
PLATELET # BLD AUTO: 313 K/UL (ref 164–446)
PMV BLD AUTO: 9.4 FL (ref 9–12.9)
POTASSIUM SERPL-SCNC: 4 MMOL/L (ref 3.6–5.5)
RBC # BLD AUTO: 4.33 M/UL (ref 4.7–6.1)
SIGNIFICANT IND 70042: NORMAL
SIGNIFICANT IND 70042: NORMAL
SITE SITE: NORMAL
SITE SITE: NORMAL
SODIUM SERPL-SCNC: 134 MMOL/L (ref 135–145)
SOURCE SOURCE: NORMAL
SOURCE SOURCE: NORMAL
WBC # BLD AUTO: 8.2 K/UL (ref 4.8–10.8)

## 2023-07-10 PROCEDURE — 700102 HCHG RX REV CODE 250 W/ 637 OVERRIDE(OP): Performed by: STUDENT IN AN ORGANIZED HEALTH CARE EDUCATION/TRAINING PROGRAM

## 2023-07-10 PROCEDURE — 93306 TTE W/DOPPLER COMPLETE: CPT | Mod: 26 | Performed by: INTERNAL MEDICINE

## 2023-07-10 PROCEDURE — 700102 HCHG RX REV CODE 250 W/ 637 OVERRIDE(OP): Performed by: INTERNAL MEDICINE

## 2023-07-10 PROCEDURE — 306637 HCHG MISC ORTHO ITEM RC 0274

## 2023-07-10 PROCEDURE — 700111 HCHG RX REV CODE 636 W/ 250 OVERRIDE (IP): Performed by: STUDENT IN AN ORGANIZED HEALTH CARE EDUCATION/TRAINING PROGRAM

## 2023-07-10 PROCEDURE — 700105 HCHG RX REV CODE 258: Mod: JZ | Performed by: STUDENT IN AN ORGANIZED HEALTH CARE EDUCATION/TRAINING PROGRAM

## 2023-07-10 PROCEDURE — 82962 GLUCOSE BLOOD TEST: CPT | Mod: 91

## 2023-07-10 PROCEDURE — 80048 BASIC METABOLIC PNL TOTAL CA: CPT

## 2023-07-10 PROCEDURE — A9270 NON-COVERED ITEM OR SERVICE: HCPCS | Performed by: STUDENT IN AN ORGANIZED HEALTH CARE EDUCATION/TRAINING PROGRAM

## 2023-07-10 PROCEDURE — 93306 TTE W/DOPPLER COMPLETE: CPT

## 2023-07-10 PROCEDURE — 99233 SBSQ HOSP IP/OBS HIGH 50: CPT | Performed by: INTERNAL MEDICINE

## 2023-07-10 PROCEDURE — 85027 COMPLETE CBC AUTOMATED: CPT

## 2023-07-10 PROCEDURE — 770006 HCHG ROOM/CARE - MED/SURG/GYN SEMI*

## 2023-07-10 PROCEDURE — 36415 COLL VENOUS BLD VENIPUNCTURE: CPT

## 2023-07-10 PROCEDURE — L3260 AMBULATORY SURGICAL BOOT EAC: HCPCS

## 2023-07-10 PROCEDURE — A9270 NON-COVERED ITEM OR SERVICE: HCPCS | Performed by: INTERNAL MEDICINE

## 2023-07-10 PROCEDURE — 700111 HCHG RX REV CODE 636 W/ 250 OVERRIDE (IP): Mod: JZ | Performed by: INTERNAL MEDICINE

## 2023-07-10 PROCEDURE — 99232 SBSQ HOSP IP/OBS MODERATE 35: CPT | Performed by: INTERNAL MEDICINE

## 2023-07-10 RX ADMIN — ESCITALOPRAM OXALATE 10 MG: 10 TABLET ORAL at 06:04

## 2023-07-10 RX ADMIN — VANCOMYCIN HYDROCHLORIDE 1750 MG: 5 INJECTION, POWDER, LYOPHILIZED, FOR SOLUTION INTRAVENOUS at 23:11

## 2023-07-10 RX ADMIN — HYDROCODONE BITARTRATE AND ACETAMINOPHEN 1 TABLET: 5; 325 TABLET ORAL at 06:03

## 2023-07-10 RX ADMIN — OMEPRAZOLE 20 MG: 20 CAPSULE, DELAYED RELEASE ORAL at 06:03

## 2023-07-10 RX ADMIN — VANCOMYCIN HYDROCHLORIDE 1750 MG: 5 INJECTION, POWDER, LYOPHILIZED, FOR SOLUTION INTRAVENOUS at 12:32

## 2023-07-10 RX ADMIN — ASPIRIN 81 MG: 81 TABLET, COATED ORAL at 06:03

## 2023-07-10 RX ADMIN — INSULIN LISPRO 5 UNITS: 100 INJECTION, SOLUTION INTRAVENOUS; SUBCUTANEOUS at 21:09

## 2023-07-10 RX ADMIN — ATORVASTATIN CALCIUM 40 MG: 40 TABLET, FILM COATED ORAL at 21:00

## 2023-07-10 RX ADMIN — HYDROCODONE BITARTRATE AND ACETAMINOPHEN 1 TABLET: 5; 325 TABLET ORAL at 12:36

## 2023-07-10 RX ADMIN — INSULIN LISPRO 6 UNITS: 100 INJECTION, SOLUTION INTRAVENOUS; SUBCUTANEOUS at 08:53

## 2023-07-10 RX ADMIN — LISINOPRIL 10 MG: 10 TABLET ORAL at 06:04

## 2023-07-10 RX ADMIN — COLCHICINE 0.6 MG: 0.6 TABLET ORAL at 06:04

## 2023-07-10 RX ADMIN — ENOXAPARIN SODIUM 40 MG: 100 INJECTION SUBCUTANEOUS at 17:47

## 2023-07-10 RX ADMIN — INSULIN GLARGINE-YFGN 25 UNITS: 100 INJECTION, SOLUTION SUBCUTANEOUS at 17:45

## 2023-07-10 RX ADMIN — INSULIN LISPRO 6 UNITS: 100 INJECTION, SOLUTION INTRAVENOUS; SUBCUTANEOUS at 12:22

## 2023-07-10 RX ADMIN — HYDROCODONE BITARTRATE AND ACETAMINOPHEN 1 TABLET: 5; 325 TABLET ORAL at 22:16

## 2023-07-10 RX ADMIN — MORPHINE SULFATE 3 MG: 4 INJECTION, SOLUTION INTRAMUSCULAR; INTRAVENOUS at 23:00

## 2023-07-10 ASSESSMENT — PAIN DESCRIPTION - PAIN TYPE
TYPE: ACUTE PAIN

## 2023-07-10 ASSESSMENT — ENCOUNTER SYMPTOMS
DIARRHEA: 0
FEVER: 0
ABDOMINAL PAIN: 0
NERVOUS/ANXIOUS: 0
SHORTNESS OF BREATH: 0
WEAKNESS: 0
VOMITING: 0
MYALGIAS: 1
CHILLS: 0
NAUSEA: 0

## 2023-07-10 NOTE — DIETARY
Nutrition Services: Diabetes Diet Education Consult   Day 5 of admit.  Rogelio Escudero is a 48 y.o. male with admitting DX of Osteomyelitis.    RD followed up for Diabetes diet education. Spoke with pt at bedside. Pt declined diet education at this time, stating he has been through multiple classes. Explained reasoning for diet restriction and he stated understanding. Pt does state he is hungry. Noted recorded PO intake of meals usually %. Will offer larger portions of protein and vegetables and pt agreed.    No other education needs identified at this time. Consider referral to outpatient nutrition services for continuation of education as indicated or per pt preferences.     Please re-consult RD as indicated.

## 2023-07-10 NOTE — PROGRESS NOTES
Report received. Assumed care. Pt in bed sitting up . A/O x4. VSS. Responds appropriately.complains of pain,medicate PER MAR SOB. Assessment complete. Discussed POC, , pt verbalizes understanding. Explained importance of calling before getting OOB. Call light and belongings within reach. Bed alarm on. Bed in the lowest position. Treaded socks in place. Hourly rounding in progress. Will continue to monitor .     Goal:  ABX  NEW IV placement   BS control

## 2023-07-10 NOTE — CARE PLAN
The patient is Watcher - Medium risk of patient condition declining or worsening    Shift Goals  Clinical Goals: pain control. ABX, blood sugar and rest  Patient Goals: Pain control and rest  Family Goals: N/A    Progress made toward(s) clinical / shift goals:      Problem: Knowledge Deficit - Standard  Goal: Patient and family/care givers will demonstrate understanding of plan of care, disease process/condition, diagnostic tests and medications  Outcome: Progressing  Note: Patient under stand TX plan   New IV placed working well, clean dry intact and flushes  Controlled sugars.      Problem: Pain - Standard  Goal: Alleviation of pain or a reduction in pain to the patient’s comfort goal  Outcome: Progressing  Note: Medicate per MAR   Patient feels better   On pain scale goal is 2 out of 10     Patient is not progressing towards the following goals:    DC information patient is requesting

## 2023-07-10 NOTE — PROGRESS NOTES
Infectious Disease Progress Note    Author: Jasiel Purdy M.D. Date & Time of service: 7/10/2023  1:02 PM    Chief Complaint:  Follow-up for right foot osteomyelitis    Interval History:   patient remains afebrile, white count is 8.8, tolerating vancomycin and meropenem for now, cultures as below, creatinine 0.65, normal transaminases, magnesium 1.8, albumin 3.4,   patient remains afebrile, no CBC this morning, tolerating vancomycin, point-of-care glucose 264, Vanco trough 10.9  7/10 patient remains afebrile, white count is 8.2, tolerating vancomycin, still awaiting MRI, TTE with no obvious valve vegetations, creatinine 0.55, vanc trough 10.9, blood cultures as below    Labs Reviewed and Medications Reviewed.    Review of Systems:  Review of Systems   Constitutional:  Negative for chills and fever.   Gastrointestinal:  Negative for abdominal pain, diarrhea, nausea and vomiting.   Skin:  Negative for itching and rash.       Hemodynamics:  Temp (24hrs), Av.4 °C (97.6 °F), Min:36.4 °C (97.5 °F), Max:36.5 °C (97.7 °F)  Temperature: 36.5 °C (97.7 °F)  Pulse  Av  Min: 65  Max: 101   Blood Pressure: 111/68       Physical Exam:  Physical Exam  Vitals and nursing note reviewed.   Constitutional:       General: He is not in acute distress.     Appearance: He is not ill-appearing.   HENT:      Mouth/Throat:      Pharynx: No oropharyngeal exudate.      Comments: Poor dentition  Eyes:      General:         Right eye: No discharge.   Cardiovascular:      Rate and Rhythm: Normal rate.      Heart sounds: No murmur heard.  Pulmonary:      Effort: Pulmonary effort is normal. No respiratory distress.      Breath sounds: No stridor.   Abdominal:      General: Abdomen is flat. There is no distension.      Tenderness: There is no abdominal tenderness.   Musculoskeletal:      Comments: Right-sided TMA with dressing in place.  Pictures reviewed, wounds with surrounding swelling at amputation site   Neurological:       General: No focal deficit present.      Mental Status: He is alert and oriented to person, place, and time.   Psychiatric:         Mood and Affect: Mood normal.         Behavior: Behavior normal.         Meds:    Current Facility-Administered Medications:     vancomycin    insulin GLARGINE **AND** [DISCONTINUED] insulin lispro **AND** [DISCONTINUED] insulin lispro **AND** POC blood glucose manual result **AND** NOTIFY MD and PharmD **AND** Administer 20 grams of glucose (approximately 8 ounces of fruit juice) every 15 minutes PRN FSBG less than 70 mg/dL **AND** dextrose bolus    insulin lispro    HYDROcodone-acetaminophen    morphine injection    MD Alert...Vancomycin per Pharmacy    atorvastatin    [Held by provider] clopidogrel    colchicine    escitalopram    lisinopril    omeprazole    senna-docusate **AND** polyethylene glycol/lytes **AND** magnesium hydroxide **AND** bisacodyl    enoxaparin (LOVENOX) injection    acetaminophen    ondansetron    ondansetron    promethazine    promethazine    prochlorperazine    aspirin    Labs:  Recent Labs     07/10/23  0025   WBC 8.2   RBC 4.33*   HEMOGLOBIN 13.0*   HEMATOCRIT 39.1*   MCV 90.3   MCH 30.0   RDW 55.7*   PLATELETCT 313   MPV 9.4       Recent Labs     07/10/23  0025   SODIUM 134*   POTASSIUM 4.0   CHLORIDE 102   CO2 22   GLUCOSE 217*   BUN 15       Recent Labs     07/10/23  0025   CREATININE 0.55         Imaging:  US-EXTREMITY VENOUS LOWER UNILAT RIGHT    Result Date: 7/6/2023   Vascular Laboratory  CONCLUSIONS  Normal right lower extremity deep venous examination.  REGLA HAYES  Exam Date:     07/06/2023 11:25  Room #:     Inpatient  Priority:     Routine  Ht (in):             Wt (lb):  Ordering Physician:        JULY CALIX  Referring Physician:       YOGI Villalba  Sonographer:               Tim Dc RVT  Study Type:                Complete Unilateral  Technical Quality:         Adequate  Age:    48    Gender:     M   MRN:    [id number]  :    1975      BSA:  Indications:     Localized swelling, mass and lump, right lower limb  CPT Codes:       83183  ICD Codes:       R22.41  History:         right leg edema & pain; recent right transmetatarsal foot                   amputation; no prior exam  Limitations:  PROCEDURES:  Right lower extremity venous duplex imaging.  Serial compression, color, and spectral Doppler flow evaluations were  performed.  The following venous structures were evaluated: common femoral, deep  femoral, proximal great saphenous, femoral, popliteal, peroneal, and  posterior tibial veins.  FINDINGS:  RIGHT LOWER EXTREMITY:  No deep venous thrombosis.  All veins demonstrate complete color filling and compressibility with  normal venous flow dynamics including spontaneous flow and respiratory  phasicity.  Rene Lara MD  (Electronically Signed)  Final Date:      2023                   20:02    DX-FOOT-COMPLETE 3+ RIGHT    Result Date: 2023 6:13 AM HISTORY/REASON FOR EXAM: Atraumatic Pain/Swelling/Deformity TECHNIQUE/EXAM DESCRIPTION:  AP, lateral, and oblique views of the RIGHT foot. COMPARISON:  February 3, 2020 FINDINGS: Postsurgical changes of transmetatarsal amputation of the foot are seen. There is no acute fracture, subluxation, or dislocation identified. Soft tissue swelling of the forefoot is seen.     1.  No acute traumatic bony injury.    DX-CHEST-PORTABLE (1 VIEW)    Result Date: 2023 6:10 AM HISTORY/REASON FOR EXAM: possible sepsis. TECHNIQUE/EXAM DESCRIPTION:  Single AP view of the chest. COMPARISON: 2022 FINDINGS: Overlying cardiac leads are present. The cardiac silhouette appears within normal limits. The mediastinal contour appears within normal limits.  The central pulmonary vasculature appears normal. Bilateral lung volumes are diminished.  Bilateral lungs are clear. No significant pleural effusions are identified. The bony structures  appear age-appropriate.     1.  No acute cardiopulmonary disease.    IR PICC    Result Date: 6/16/2023  CLINICAL DATA: Long term IV abx, FINDINGS:  The nature of the procedure and associated risks and benefits were explained to the patient and consent to perform the procedure was obtained. Maximum sterile barrier technique was utilized. Hand washing with alcohol-based antiseptic preceded the procedure. Cap, mask, sterile gown and sterile gloves were worn. The patient's skin was prepped with 2% chlorhexidine. Catheter insertion site was draped with a large sterile drape. The left arm was assessed with ultrasound to identify a suitable vein. The brachial vein was identified, flowing blood within the vein and vein compressibility indicated that the vein was patent. Subsequently, under real-time sonographic guidance a 22-gauge micropuncture needle was advanced into the lumen of the vein. An ultrasound image demonstrating the needle tip in the vein lumen was recorded in the patient record. Through the 22-gauge needle a 0.018-gauge guidewire was advanced to secure access to the vein. A vessel dilator was advanced over the guidewire in conjunction with a peel-away sheath. Appropriate catheter length was determined with the original guidewire under fluoroscopic observation. Subsequently, the PICC line was cut to appropriate length and advanced over a second guidewire to the distal superior vena cava and secured at the skin. Final catheter position was recorded with a chest x-ray. Catheter length is 47 centimeters. The fluoroscopy time was 0.3 minutes.  One image was obtained.  3.7 mg, 1 25 mcg/sq m.    PLACEMENT OF A PERIPHERALLY INSERTED CENTRAL CATHETER USING FLUOROSCOPIC AND SONOGRAPHIC GUIDANCE AS OUTLINED ABOVE.      Micro:  Results       Procedure Component Value Units Date/Time    Culture Wound With Gram Stain [[id number]]  (Abnormal)  (Susceptibility) Collected: 07/07/23 1428    Order Status: Completed Specimen:  "Wound from Right Foot Updated: 07/10/23 1003     Significant Indicator POS     Source WND     Site RIGHT FOOT     Culture Result -     Gram Stain Result Moderate WBCs.  No organisms seen.       Culture Result Methicillin Resistant Staphylococcus aureus  Light growth        Enterococcus faecalis  Light growth      Narrative:      Collected By: [facility] COLLEEN PAULSON  Collected By: [facility] COLLEEN PAULSON    Susceptibility       Methicillin resistant staphylococcus aureus (1)       Antibiotic Interpretation Microscan   Method Status    Azithromycin Resistant >4 mcg/mL RENATE Preliminary    Clindamycin Sensitive <=0.25 mcg/mL RENATE Preliminary    Cefazolin Resistant <=8 mcg/mL RENATE Preliminary    Cefepime Resistant 16 mcg/mL RENATE Preliminary    Ceftaroline Sensitive <=0.5 mcg/mL RENATE Preliminary    Daptomycin Sensitive 1 mcg/mL RENATE Preliminary    Ampicillin/sulbactam Resistant 16/8 mcg/mL RENATE Preliminary    Erythromycin Resistant >4 mcg/mL RENATE Preliminary    Vancomycin Sensitive 1 mcg/mL RENATE Preliminary    Oxacillin Resistant >2 mcg/mL RENATE Preliminary    Trimeth/Sulfa Sensitive <=0.5/9.5 mcg/mL RENATE Preliminary    Tetracycline Sensitive <=4 mcg/mL RENATE Preliminary                       Blood Culture [[id number]] Collected: 07/05/23 0622    Order Status: Completed Specimen: Blood from Peripheral Updated: 07/10/23 0900     Significant Indicator NEG     Source BLD     Site PERIPHERAL     Culture Result No growth after 5 days of incubation.    Narrative:      Per Hospital Policy: Only change Specimen Src: to \"Line\" if  specified by physician order.  Right AC    Blood Culture [[id number]] Collected: 07/05/23 0622    Order Status: Completed Specimen: Blood from Peripheral Updated: 07/10/23 0900     Significant Indicator NEG     Source BLD     Site PERIPHERAL     Culture Result No growth after 5 days of incubation.    Narrative:      Per Hospital Policy: Only change Specimen Src: to \"Line\" if  specified by physician order.  Left AC    BLOOD " "CULTURE [[id number]] Collected: 07/08/23 0737    Order Status: Completed Specimen: Blood from Peripheral Updated: 07/09/23 0635     Significant Indicator NEG     Source BLD     Site PERIPHERAL     Culture Result No Growth  Note: Blood cultures are incubated for 5 days and  are monitored continuously.Positive blood cultures  are called to the RN and reported as soon as  they are identified.      Narrative:      Per Hospital Policy: Only change Specimen Src: to \"Line\" if  specified by physician order.  Left Forearm/Arm    BLOOD CULTURE [[id number]] Collected: 07/08/23 1403    Order Status: Completed Specimen: Blood from Peripheral Updated: 07/09/23 0635     Significant Indicator NEG     Source BLD     Site PERIPHERAL     Culture Result No Growth  Note: Blood cultures are incubated for 5 days and  are monitored continuously.Positive blood cultures  are called to the RN and reported as soon as  they are identified.      Narrative:      Per Hospital Policy: Only change Specimen Src: to \"Line\" if  specified by physician order.  Left AC    GRAM STAIN [[id number]] Collected: 07/07/23 1428    Order Status: Completed Specimen: Wound Updated: 07/07/23 2250     Significant Indicator .     Source WND     Site RIGHT FOOT     Gram Stain Result Moderate WBCs.  No organisms seen.      Narrative:      Collected By: [facility] COLLEEN PAULSON  Collected By: [facility] COLLEEN PAULSON    Urine Culture (New) [[id number]] Collected: 07/05/23 0915    Order Status: Completed Specimen: Urine Updated: 07/07/23 0651     Significant Indicator NEG     Source UR     Site -     Culture Result No growth at 48 hours.    Narrative:      Indication for culture:->Evaluation for sepsis without a  clear source of infection  Indication for culture:->Evaluation for sepsis without a    MRSA By PCR (Amp) [[id number]] Collected: 07/05/23 0732    Order Status: Completed Specimen: Respirate from Nares Updated: 07/05/23 1058     MRSA by PCR Negative    Urinalysis [[id number]]  " (Abnormal) Collected: 07/05/23 0915    Order Status: Completed Specimen: Urine Updated: 07/05/23 1046     Color Yellow     Character Clear     Specific Gravity 1.044     Ph 5.0     Glucose >=1000 mg/dL      Ketones Negative mg/dL      Protein Negative mg/dL      Bilirubin Negative     Urobilinogen, Urine 0.2     Nitrite Negative     Leukocyte Esterase Negative     Occult Blood Negative     Micro Urine Req see below     Comment: Microscopic examination not performed when specimen is clear  and chemically negative for protein, blood, leukocyte esterase  and nitrite.         Narrative:      Indication for culture:->Evaluation for sepsis without a  clear source of infection            Assessment/Hospital Course:  This is a 48-year-old male patient with uncontrolled type 2 diabetes, CAD status post stent, PTSD, hypertension, alcoholism, polysubstance abuse, peripheral arterial disease, diagnosis of right foot osteomyelitis in April 2023 at [place] status post TMA 6/8/2023. Blood cultures x2 from 6/8 were positive for MRSA and repeat blood cultures from 6/9 were negative. He was discharged to SNF with daptomycin and meropenem with plan to continue through 7/20 but patient left AMA on 6/17.  Patient now presented with several days of chills and drainage of right-sided TMA site with pain, per report pathology was positive for osteomyelitis of the margins    It appears that the reason for meropenem was a superficial skin swab from 4/13, nearly 3 months ago, that grew rare growth of ESBL E. coli along with mixed skin maryellen and cellular debris but without MRSA which are likely commensals/contaminants.  A superficial swab has been obtained here 7/7 and this is growing MRSA thus far.    Pertinent Diagnoses:   Right foot osteomyelitis, status post TMA, positive margins per pathology per report  Recent MRSA bacteremia, incompletely treated, patient left AMA  Uncontrolled type 2 diabetes  History of polysubstance  abuse  Peripheral arterial disease    Plan:  -Continue IV vancomycin, discontinued meropenem 7/8.  Monitor vancomycin levels and renal function as above  -Superficial swab obtained here growing MRSA thus far which we will continue to cover.  Any additional growth will be of uncertain clinical significance (growing E faecalis)  -MRI foot is pending.  We will follow along  -TTE with no obvious valve vegetations  -Follow blood cultures x2 from 7/5 and 7/8, no growth till date  -Monitor for any new areas of sites of seeding of infection    Disposition: Anticipate eventual return to facility for an additional 6 weeks of IV daptomycin or vancomycin  Need for PICC line: Yes, okay to place after any surgical procedures    Discussed with Dr. Dorantes.  ID will follow.  This illness poses a threat to limb function

## 2023-07-10 NOTE — PROGRESS NOTES
Hospital Medicine Daily Progress Note    Date of Service  7/10/2023    Chief Complaint  Rogelio Escudero is a 48 y.o. male admitted 7/5/2023 with foot wound    Hospital Course  47 yo man with uncontrolled diabetes, CAD S/P stent, HTN, alcoholism, PVD, PTSD who presented with right leg pain.  His right foot toes were recently amputated for osteomyelitis at [place] and he was discharged to SNF on daptomycin and meropenem until 7/20, however the patient left AMA.  He was admitted for worsening foot infection with osteomyelitis.  Ortho and ID were consulted.    Interval Problem Update  Glucose levels improving  Continue IV Vanco and monitoring renal function  He is having some pain  MRI, LATASHA, TTE are pending    I have discussed this patient's plan of care and discharge plan at IDT rounds today with Case Management, Nursing, Nursing leadership, and other members of the IDT team.    Consultants/Specialty  infectious disease and orthopedics    Code Status  Full Code    Disposition  The patient is not medically cleared for discharge to home or a post-acute facility.  Anticipate discharge to: home with organized home healthcare and close outpatient follow-up    I have placed the appropriate orders for post-discharge needs.    Review of Systems  Review of Systems   Constitutional:  Positive for malaise/fatigue.   Respiratory:  Negative for shortness of breath.    Cardiovascular:  Negative for chest pain.   Gastrointestinal:  Negative for abdominal pain and vomiting.   Musculoskeletal:  Positive for myalgias.   Neurological:  Negative for weakness.   Psychiatric/Behavioral:  The patient is not nervous/anxious.         Physical Exam  Temp:  [36.4 °C (97.5 °F)-36.5 °C (97.7 °F)] 36.5 °C (97.7 °F)  Pulse:  [65-87] 65  Resp:  [17-18] 18  BP: (111-137)/(68-86) 111/68  SpO2:  [96 %-97 %] 97 %    Physical Exam  Vitals and nursing note reviewed.   Constitutional:       General: He is not in acute distress.     Appearance: He  is not toxic-appearing.   HENT:      Head: Normocephalic.      Mouth/Throat:      Mouth: Mucous membranes are moist.   Eyes:      General:         Right eye: No discharge.         Left eye: No discharge.   Cardiovascular:      Rate and Rhythm: Normal rate and regular rhythm.   Pulmonary:      Effort: Pulmonary effort is normal. No respiratory distress.      Breath sounds: No wheezing or rales.   Abdominal:      Palpations: Abdomen is soft.      Tenderness: There is no abdominal tenderness.   Musculoskeletal:         General: No swelling.      Cervical back: Neck supple.      Comments: Right foot and wound dressings, wound photos reviewed   Skin:     General: Skin is warm and dry.   Neurological:      Mental Status: He is alert and oriented to person, place, and time.         Fluids    Intake/Output Summary (Last 24 hours) at 7/10/2023 1235  Last data filed at 7/10/2023 1100  Gross per 24 hour   Intake 240 ml   Output 1350 ml   Net -1110 ml       Laboratory  Recent Labs     07/10/23  0025   WBC 8.2   RBC 4.33*   HEMOGLOBIN 13.0*   HEMATOCRIT 39.1*   MCV 90.3   MCH 30.0   MCHC 33.2   RDW 55.7*   PLATELETCT 313   MPV 9.4     Recent Labs     07/10/23  0025   SODIUM 134*   POTASSIUM 4.0   CHLORIDE 102   CO2 22   GLUCOSE 217*   BUN 15   CREATININE 0.55   CALCIUM 8.7                   Imaging  EC-ECHOCARDIOGRAM COMPLETE W/O CONT   Final Result      US-EXTREMITY VENOUS LOWER UNILAT RIGHT   Final Result      DX-CHEST-PORTABLE (1 VIEW)   Final Result         1.  No acute cardiopulmonary disease.      DX-FOOT-COMPLETE 3+ RIGHT   Final Result         1.  No acute traumatic bony injury.      US-EXTREMITY ARTERY LOWER BILAT W/LATASHA (COMBO)    (Results Pending)   MR-FOOT-WITH RIGHT    (Results Pending)        Assessment/Plan  * Osteomyelitis (HCC)- (present on admission)  Assessment & Plan  History of osteomyelitis on the right foot and had amputation transmetatarsal  Wound culture at [place] showed mixed Pseudomonas and  MRSA  Blood culture showed MRSA  Patient was supposed to take antibiotics until 7/20 however he left AGAINST MEDICAL ADVICE  Also patient had MRSA bacteremia  X-ray did not show osteomyelitis  Ortho was consulted, MRI is pending, LATASHA pending  Continue IV vancomycin and monitor renal function.  Follow-up ID recommendation    Noncompliance  Assessment & Plan  Patient has significant history of noncompliance and not taking insulin or medications  Patient has complicated medical history including uncontrolled diabetes and peripheral vascular disease with coronary artery disease  Resume his medications  Continue education the patient    MRSA bacteremia  Assessment & Plan  Blood culture [facility] on 6/6 was positive for MRSA and repeat culture on 6/9 was negative  Blood cultures have been negative, TTE negative for endocarditis      CAD (coronary artery disease)- (present on admission)  Assessment & Plan  No chest pain and EKG did not show ischemia  Stent placement in September 2022  Holding antiplatelets pending surgery  Resume plavix after surgery completed    Peripheral artery disease (HCC)- (present on admission)  Assessment & Plan  Continue aspirin, atorvastatin.  Holding Plavix for possible surgery  Encouraged the patient to quit smoking    Alcohol abuse- (present on admission)  Assessment & Plan  No signs of withdrawal  Close monitoring and consider CIWA protocol if needed    Tobacco abuse- (present on admission)  Assessment & Plan  Smoking cessation counseling    Diabetic foot ulcer (HCC)- (present on admission)  Assessment & Plan  Underwent amputation on left foot  Good glucose control needed  Continue IV Vanco and monitor renal function  LPS consulted, recommend likely surgery  Ortho consulted  MRI foot pending, LATASHA pending  Blood cultures remain NGTD    Diabetes (HCC)- (present on admission)  Assessment & Plan  Uncontrolled and A1c 11.2  Patient admitted he is not taking insulin  Hyperglycemia improving.   Continue Lantus 25 unit daily, ISS  DM educator has seen him         VTE prophylaxis: enoxaparin ppx    I have performed a physical exam and reviewed and updated ROS and Plan today (7/10/2023). In review of yesterday's note (7/9/2023), there are no changes except as documented above.

## 2023-07-11 ENCOUNTER — APPOINTMENT (OUTPATIENT)
Dept: RADIOLOGY | Facility: MEDICAL CENTER | Age: 48
DRG: 253 | End: 2023-07-11
Attending: STUDENT IN AN ORGANIZED HEALTH CARE EDUCATION/TRAINING PROGRAM
Payer: MEDICAID

## 2023-07-11 LAB
ANION GAP SERPL CALC-SCNC: 9 MMOL/L (ref 7–16)
BASOPHILS # BLD AUTO: 0.5 % (ref 0–1.8)
BASOPHILS # BLD: 0.04 K/UL (ref 0–0.12)
BUN SERPL-MCNC: 12 MG/DL (ref 8–22)
CALCIUM SERPL-MCNC: 8.7 MG/DL (ref 8.5–10.5)
CHLORIDE SERPL-SCNC: 104 MMOL/L (ref 96–112)
CO2 SERPL-SCNC: 24 MMOL/L (ref 20–33)
CREAT SERPL-MCNC: 0.61 MG/DL (ref 0.5–1.4)
EOSINOPHIL # BLD AUTO: 0.41 K/UL (ref 0–0.51)
EOSINOPHIL NFR BLD: 5.1 % (ref 0–6.9)
ERYTHROCYTE [DISTWIDTH] IN BLOOD BY AUTOMATED COUNT: 54.4 FL (ref 35.9–50)
GFR SERPLBLD CREATININE-BSD FMLA CKD-EPI: 117 ML/MIN/1.73 M 2
GLUCOSE BLD STRIP.AUTO-MCNC: 121 MG/DL (ref 65–99)
GLUCOSE BLD STRIP.AUTO-MCNC: 154 MG/DL (ref 65–99)
GLUCOSE BLD STRIP.AUTO-MCNC: 160 MG/DL (ref 65–99)
GLUCOSE BLD STRIP.AUTO-MCNC: 195 MG/DL (ref 65–99)
GLUCOSE SERPL-MCNC: 139 MG/DL (ref 65–99)
HCT VFR BLD AUTO: 39.8 % (ref 42–52)
HGB BLD-MCNC: 13.2 G/DL (ref 14–18)
IMM GRANULOCYTES # BLD AUTO: 0.04 K/UL (ref 0–0.11)
IMM GRANULOCYTES NFR BLD AUTO: 0.5 % (ref 0–0.9)
LYMPHOCYTES # BLD AUTO: 2.93 K/UL (ref 1–4.8)
LYMPHOCYTES NFR BLD: 36.2 % (ref 22–41)
MCH RBC QN AUTO: 30.4 PG (ref 27–33)
MCHC RBC AUTO-ENTMCNC: 33.2 G/DL (ref 32.3–36.5)
MCV RBC AUTO: 91.7 FL (ref 81.4–97.8)
MONOCYTES # BLD AUTO: 0.8 K/UL (ref 0–0.85)
MONOCYTES NFR BLD AUTO: 9.9 % (ref 0–13.4)
NEUTROPHILS # BLD AUTO: 3.87 K/UL (ref 1.82–7.42)
NEUTROPHILS NFR BLD: 47.8 % (ref 44–72)
NRBC # BLD AUTO: 0 K/UL
NRBC BLD-RTO: 0 /100 WBC (ref 0–0.2)
PLATELET # BLD AUTO: 307 K/UL (ref 164–446)
PMV BLD AUTO: 9.5 FL (ref 9–12.9)
POTASSIUM SERPL-SCNC: 4 MMOL/L (ref 3.6–5.5)
RBC # BLD AUTO: 4.34 M/UL (ref 4.7–6.1)
SODIUM SERPL-SCNC: 137 MMOL/L (ref 135–145)
WBC # BLD AUTO: 8.1 K/UL (ref 4.8–10.8)

## 2023-07-11 PROCEDURE — 36415 COLL VENOUS BLD VENIPUNCTURE: CPT

## 2023-07-11 PROCEDURE — 700105 HCHG RX REV CODE 258: Mod: JZ | Performed by: STUDENT IN AN ORGANIZED HEALTH CARE EDUCATION/TRAINING PROGRAM

## 2023-07-11 PROCEDURE — 700102 HCHG RX REV CODE 250 W/ 637 OVERRIDE(OP): Performed by: STUDENT IN AN ORGANIZED HEALTH CARE EDUCATION/TRAINING PROGRAM

## 2023-07-11 PROCEDURE — 85025 COMPLETE CBC W/AUTO DIFF WBC: CPT

## 2023-07-11 PROCEDURE — 80048 BASIC METABOLIC PNL TOTAL CA: CPT

## 2023-07-11 PROCEDURE — 93925 LOWER EXTREMITY STUDY: CPT | Mod: 26 | Performed by: INTERNAL MEDICINE

## 2023-07-11 PROCEDURE — A9270 NON-COVERED ITEM OR SERVICE: HCPCS | Performed by: STUDENT IN AN ORGANIZED HEALTH CARE EDUCATION/TRAINING PROGRAM

## 2023-07-11 PROCEDURE — 11055 PARING/CUTG B9 HYPRKER LES 1: CPT | Performed by: NURSE PRACTITIONER

## 2023-07-11 PROCEDURE — 700102 HCHG RX REV CODE 250 W/ 637 OVERRIDE(OP): Performed by: INTERNAL MEDICINE

## 2023-07-11 PROCEDURE — 302043 TAPE, HYPAFIX: Performed by: INTERNAL MEDICINE

## 2023-07-11 PROCEDURE — 700111 HCHG RX REV CODE 636 W/ 250 OVERRIDE (IP): Performed by: STUDENT IN AN ORGANIZED HEALTH CARE EDUCATION/TRAINING PROGRAM

## 2023-07-11 PROCEDURE — 93922 UPR/L XTREMITY ART 2 LEVELS: CPT | Mod: 26 | Performed by: INTERNAL MEDICINE

## 2023-07-11 PROCEDURE — 93925 LOWER EXTREMITY STUDY: CPT

## 2023-07-11 PROCEDURE — 700111 HCHG RX REV CODE 636 W/ 250 OVERRIDE (IP): Mod: JZ | Performed by: INTERNAL MEDICINE

## 2023-07-11 PROCEDURE — A9270 NON-COVERED ITEM OR SERVICE: HCPCS | Performed by: INTERNAL MEDICINE

## 2023-07-11 PROCEDURE — 770006 HCHG ROOM/CARE - MED/SURG/GYN SEMI*

## 2023-07-11 PROCEDURE — 99232 SBSQ HOSP IP/OBS MODERATE 35: CPT | Mod: 25 | Performed by: NURSE PRACTITIONER

## 2023-07-11 PROCEDURE — 99233 SBSQ HOSP IP/OBS HIGH 50: CPT | Performed by: INTERNAL MEDICINE

## 2023-07-11 PROCEDURE — 93922 UPR/L XTREMITY ART 2 LEVELS: CPT

## 2023-07-11 PROCEDURE — 99232 SBSQ HOSP IP/OBS MODERATE 35: CPT | Performed by: INTERNAL MEDICINE

## 2023-07-11 PROCEDURE — 82962 GLUCOSE BLOOD TEST: CPT | Mod: 91

## 2023-07-11 RX ADMIN — OMEPRAZOLE 20 MG: 20 CAPSULE, DELAYED RELEASE ORAL at 05:54

## 2023-07-11 RX ADMIN — HYDROCODONE BITARTRATE AND ACETAMINOPHEN 1 TABLET: 5; 325 TABLET ORAL at 10:56

## 2023-07-11 RX ADMIN — ESCITALOPRAM OXALATE 10 MG: 10 TABLET ORAL at 05:54

## 2023-07-11 RX ADMIN — INSULIN LISPRO 2 UNITS: 100 INJECTION, SOLUTION INTRAVENOUS; SUBCUTANEOUS at 14:04

## 2023-07-11 RX ADMIN — INSULIN LISPRO 2 UNITS: 100 INJECTION, SOLUTION INTRAVENOUS; SUBCUTANEOUS at 20:27

## 2023-07-11 RX ADMIN — HYDROCODONE BITARTRATE AND ACETAMINOPHEN 1 TABLET: 5; 325 TABLET ORAL at 18:22

## 2023-07-11 RX ADMIN — LISINOPRIL 10 MG: 10 TABLET ORAL at 05:54

## 2023-07-11 RX ADMIN — ATORVASTATIN CALCIUM 40 MG: 40 TABLET, FILM COATED ORAL at 20:20

## 2023-07-11 RX ADMIN — INSULIN GLARGINE-YFGN 25 UNITS: 100 INJECTION, SOLUTION SUBCUTANEOUS at 18:18

## 2023-07-11 RX ADMIN — VANCOMYCIN HYDROCHLORIDE 1750 MG: 5 INJECTION, POWDER, LYOPHILIZED, FOR SOLUTION INTRAVENOUS at 13:11

## 2023-07-11 RX ADMIN — COLCHICINE 0.6 MG: 0.6 TABLET ORAL at 05:54

## 2023-07-11 RX ADMIN — ASPIRIN 81 MG: 81 TABLET, COATED ORAL at 05:54

## 2023-07-11 RX ADMIN — MORPHINE SULFATE 3 MG: 4 INJECTION, SOLUTION INTRAMUSCULAR; INTRAVENOUS at 20:21

## 2023-07-11 RX ADMIN — INSULIN LISPRO 2 UNITS: 100 INJECTION, SOLUTION INTRAVENOUS; SUBCUTANEOUS at 18:19

## 2023-07-11 RX ADMIN — VANCOMYCIN HYDROCHLORIDE 1750 MG: 5 INJECTION, POWDER, LYOPHILIZED, FOR SOLUTION INTRAVENOUS at 23:50

## 2023-07-11 RX ADMIN — ENOXAPARIN SODIUM 40 MG: 100 INJECTION SUBCUTANEOUS at 18:17

## 2023-07-11 ASSESSMENT — PAIN DESCRIPTION - PAIN TYPE
TYPE: ACUTE PAIN

## 2023-07-11 ASSESSMENT — ENCOUNTER SYMPTOMS
VOMITING: 0
MYALGIAS: 0
SHORTNESS OF BREATH: 0
NERVOUS/ANXIOUS: 0
DIARRHEA: 0
WEAKNESS: 0
CHILLS: 0
ABDOMINAL PAIN: 0
NAUSEA: 0
FEVER: 0

## 2023-07-11 NOTE — PROGRESS NOTES
Rec'd report from day shift RN. Assumed pt care. Assessment completed. AA&OX4. Denies pain at this time. No s/s of discomfort or distress. Pt ambulates to the bathroom with FWW, SBA, maintains steady gait. Dressing in place to right foot, CDI. Bed in lowest position, bed locked, treaded socks in place, RN and CNA numbers provided, call light within reach.

## 2023-07-11 NOTE — DISCHARGE PLANNING
Case Management Discharge Planning    Admission Date: 7/5/2023  GMLOS: 2.9  ALOS: 6    6-Clicks ADL Score:    6-Clicks Mobility Score: 11  PT and/or OT Eval ordered: Yes  Post-acute Referrals Ordered: No  Post-acute Choice Obtained: Yes  Has referral(s) been sent to post-acute provider:  Yes      Anticipated Discharge Dispo: Discharge Disposition: D/T to SNF with Medicare cert in anticipation of skilled care (03)    DME Needed: No    Action(s) Taken: Choice obtained and Referral(s) sent  RN CM attended IDT rounds with the team. PT OT eval pending. RN CM met with Pt to discuss discharge plans. Pt is agreeable for post acute placement that accepts his insurance. Choice form obtain and completed. Received verbal approval from Pt to send blanket referral. Faxed to [facility] for processing.     Pt came from [facility] but left [place]. Per Pt he is willing to go back to [facility] if they will accept him.     Escalations Completed: Provider    Medically Clear: No    Next Steps: f/u with pt and medical team to discuss dc needs and barriers.    Barriers to Discharge: Medical clearance and Pending Placement    Is the patient up for discharge tomorrow: No      Care Transition Team Assessment    RN CM met with pt at bedside to complete assessment. Pt A&Ox4 and able to verify the information on the face sheet.  Per Pt he lives in his cousin's house in [facility]. On Baseline, Pt was independent  in all his ADL's and IADL's. Pt has not had previous HH nor does he use DME or home O2. PCP is Kelle MURGUIA. Receives $914 monthly check from [employer].        Information Source  Orientation Level: Oriented X4  Information Given By: Patient  Who is responsible for making decisions for patient? : Patient    Readmission Evaluation  Is this a readmission?: No    Elopement Risk  Legal Hold: No  Ambulatory or Self Mobile in Wheelchair: Yes  Disoriented: No  Psychiatric Symptoms: None  History of Wandering: No  Elopement this Admit: No  Vocalizing  Wanting to Leave: No  Displays Behaviors, Body Language Wanting to Leave: No-Not at Risk for Elopement  Elopement Risk: Not at Risk for Elopement         Discharge Preparedness  What are your discharge supports?: Other (comment) (cousin)  Prior Functional Level: Ambulatory, Independent with Activities of Daily Living, Drives Self, Independent with Medication Management  Difficulity with ADLs: None  Difficulity with IADLs: None    Functional Assesment  Prior Functional Level: Ambulatory, Independent with Activities of Daily Living, Drives Self, Independent with Medication Management    Finances  Financial Barriers to Discharge: No  Prescription Coverage: Yes    Vision / Hearing Impairment  Hearing Impairment : Yes  Hearing Impairment: Both Ears, Hearing Device Not Available         Advance Directive  Advance Directive?: None    Domestic Abuse  Have you ever been the victim of abuse or violence?: No  Physical Abuse or Sexual Abuse: No  Verbal Abuse or Emotional Abuse: No  Possible Abuse/Neglect Reported to:: Not Applicable    Psychological Assessment  History of Substance Abuse: Alcohol, Cocaine  Date Last Used - Alcohol: 07/2023  Date Last Used - Cocaine: 06/2023    Discharge Risks or Barriers  Discharge risks or barriers?: Complex medical needs  Patient risk factors: Complex medical needs    Anticipated Discharge Information  Discharge Disposition: D/T to SNF with Medicare cert in anticipation of skilled care (03)

## 2023-07-11 NOTE — CARE PLAN
The patient is Stable - Low risk of patient condition declining or worsening    Shift Goals  Clinical Goals: pain control  Patient Goals: pain control  Family Goals: N/A    Progress made toward(s) clinical / shift goals:  Patient resting.    Patient is not progressing towards the following goals:
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
## 2023-07-11 NOTE — CARE PLAN
The patient is Stable - Low risk of patient condition declining or worsening    Shift Goals  Clinical Goals: pain control  Patient Goals: pain control      Progress made toward(s) clinical / shift goals:  Medicated per MAR for pain 8/10 during dressing change, relieved to 4/10 with IV Morphine use.      Problem: Knowledge Deficit - Standard  Goal: Patient and family/care givers will demonstrate understanding of plan of care, disease process/condition, diagnostic tests and medications  Outcome: Progressing     Problem: Pain - Standard  Goal: Alleviation of pain or a reduction in pain to the patient’s comfort goal  Outcome: Progressing       Patient is not progressing towards the following goals:

## 2023-07-11 NOTE — PROGRESS NOTES
Infectious Disease Progress Note    Author: Jasiel Purdy M.D. Date & Time of service: 2023  4:01 PM    Chief Complaint:  Follow-up for right foot osteomyelitis    Interval History:   patient remains afebrile, white count is 8.8, tolerating vancomycin and meropenem for now, cultures as below, creatinine 0.65, normal transaminases, magnesium 1.8, albumin 3.4,   patient remains afebrile, no CBC this morning, tolerating vancomycin, point-of-care glucose 264, Vanco trough 10.9  7/10 patient remains afebrile, white count is 8.2, tolerating vancomycin, still awaiting MRI, TTE with no obvious valve vegetations, creatinine 0.55, vanc trough 10.9, blood cultures as below   still awaiting MRI.  Anticipated today.  Patient remains afebrile, count is 8.1, tolerating vancomycin, creatinine 0.61, last vancomycin peak 21.2, culture results as below    Labs Reviewed and Medications Reviewed.    Review of Systems:  Review of Systems   Constitutional:  Negative for chills and fever.   Gastrointestinal:  Negative for abdominal pain, diarrhea, nausea and vomiting.   Skin:  Negative for itching and rash.       Hemodynamics:  Temp (24hrs), Av.5 °C (97.7 °F), Min:36.2 °C (97.1 °F), Max:36.7 °C (98 °F)  Temperature: 36.4 °C (97.6 °F)  Pulse  Av.5  Min: 65  Max: 101   Blood Pressure: (!) 134/90       Physical Exam:  Physical Exam  Vitals and nursing note reviewed.   Constitutional:       General: He is not in acute distress.     Appearance: He is not ill-appearing.   HENT:      Mouth/Throat:      Pharynx: No oropharyngeal exudate.      Comments: Poor dentition  Eyes:      General:         Right eye: No discharge.   Cardiovascular:      Rate and Rhythm: Normal rate.      Heart sounds: No murmur heard.  Pulmonary:      Effort: Pulmonary effort is normal. No respiratory distress.      Breath sounds: No stridor.   Abdominal:      General: Abdomen is flat. There is no distension.      Tenderness: There is no abdominal  tenderness.   Musculoskeletal:      Comments: Right-sided TMA site with improved swelling, small opening laterally packed by wound care, no significant discharge   Neurological:      General: No focal deficit present.      Mental Status: He is alert and oriented to person, place, and time.   Psychiatric:         Mood and Affect: Mood normal.         Behavior: Behavior normal.         Meds:    Current Facility-Administered Medications:     vancomycin    insulin GLARGINE **AND** [DISCONTINUED] insulin lispro **AND** [DISCONTINUED] insulin lispro **AND** POC blood glucose manual result **AND** NOTIFY MD and PharmD **AND** Administer 20 grams of glucose (approximately 8 ounces of fruit juice) every 15 minutes PRN FSBG less than 70 mg/dL **AND** dextrose bolus    insulin lispro    HYDROcodone-acetaminophen    morphine injection    MD Alert...Vancomycin per Pharmacy    atorvastatin    [Held by provider] clopidogrel    colchicine    escitalopram    lisinopril    omeprazole    senna-docusate **AND** polyethylene glycol/lytes **AND** magnesium hydroxide **AND** bisacodyl    enoxaparin (LOVENOX) injection    acetaminophen    ondansetron    ondansetron    promethazine    promethazine    prochlorperazine    aspirin    Labs:  Recent Labs     07/10/23  0025 07/11/23  0859   WBC 8.2 8.1   RBC 4.33* 4.34*   HEMOGLOBIN 13.0* 13.2*   HEMATOCRIT 39.1* 39.8*   MCV 90.3 91.7   MCH 30.0 30.4   RDW 55.7* 54.4*   PLATELETCT 313 307   MPV 9.4 9.5   NEUTSPOLYS  --  47.80   LYMPHOCYTES  --  36.20   MONOCYTES  --  9.90   EOSINOPHILS  --  5.10   BASOPHILS  --  0.50       Recent Labs     07/10/23  0025 07/11/23  0859   SODIUM 134* 137   POTASSIUM 4.0 4.0   CHLORIDE 102 104   CO2 22 24   GLUCOSE 217* 139*   BUN 15 12       Recent Labs     07/10/23  0025 07/11/23  0859   CREATININE 0.55 0.61         Imaging:  US-EXTREMITY VENOUS LOWER UNILAT RIGHT    Result Date: 7/6/2023   Vascular Laboratory  CONCLUSIONS  Normal right lower extremity deep venous  examination.  REGLA HAYES  Exam Date:     2023 11:25  Room #:     Inpatient  Priority:     Routine  Ht (in):             Wt (lb):  Ordering Physician:        JULY CALIX  Referring Physician:       [id number] Barreto  Sonographer:               Tim Dc RVDEANDRE  Study Type:                Complete Unilateral  Technical Quality:         Adequate  Age:    48    Gender:     M  MRN:    [id number]  :    1975      BSA:  Indications:     Localized swelling, mass and lump, right lower limb  CPT Codes:       01385  ICD Codes:       R22.41  History:         right leg edema & pain; recent right transmetatarsal foot                   amputation; no prior exam  Limitations:  PROCEDURES:  Right lower extremity venous duplex imaging.  Serial compression, color, and spectral Doppler flow evaluations were  performed.  The following venous structures were evaluated: common femoral, deep  femoral, proximal great saphenous, femoral, popliteal, peroneal, and  posterior tibial veins.  FINDINGS:  RIGHT LOWER EXTREMITY:  No deep venous thrombosis.  All veins demonstrate complete color filling and compressibility with  normal venous flow dynamics including spontaneous flow and respiratory  phasicity.  Rene Lara MD  (Electronically Signed)  Final Date:      2023                   20:02    DX-FOOT-COMPLETE 3+ RIGHT    Result Date: 2023 6:13 AM HISTORY/REASON FOR EXAM: Atraumatic Pain/Swelling/Deformity TECHNIQUE/EXAM DESCRIPTION:  AP, lateral, and oblique views of the RIGHT foot. COMPARISON:  February 3, 2020 FINDINGS: Postsurgical changes of transmetatarsal amputation of the foot are seen. There is no acute fracture, subluxation, or dislocation identified. Soft tissue swelling of the forefoot is seen.     1.  No acute traumatic bony injury.    DX-CHEST-PORTABLE (1 VIEW)    Result Date: 2023 6:10 AM HISTORY/REASON FOR EXAM: possible sepsis.  TECHNIQUE/EXAM DESCRIPTION:  Single AP view of the chest. COMPARISON: November 16, 2022 FINDINGS: Overlying cardiac leads are present. The cardiac silhouette appears within normal limits. The mediastinal contour appears within normal limits.  The central pulmonary vasculature appears normal. Bilateral lung volumes are diminished.  Bilateral lungs are clear. No significant pleural effusions are identified. The bony structures appear age-appropriate.     1.  No acute cardiopulmonary disease.    IR PICC    Result Date: 6/16/2023  CLINICAL DATA: Long term IV abx, FINDINGS:  The nature of the procedure and associated risks and benefits were explained to the patient and consent to perform the procedure was obtained. Maximum sterile barrier technique was utilized. Hand washing with alcohol-based antiseptic preceded the procedure. Cap, mask, sterile gown and sterile gloves were worn. The patient's skin was prepped with 2% chlorhexidine. Catheter insertion site was draped with a large sterile drape. The left arm was assessed with ultrasound to identify a suitable vein. The brachial vein was identified, flowing blood within the vein and vein compressibility indicated that the vein was patent. Subsequently, under real-time sonographic guidance a 22-gauge micropuncture needle was advanced into the lumen of the vein. An ultrasound image demonstrating the needle tip in the vein lumen was recorded in the patient record. Through the 22-gauge needle a 0.018-gauge guidewire was advanced to secure access to the vein. A vessel dilator was advanced over the guidewire in conjunction with a peel-away sheath. Appropriate catheter length was determined with the original guidewire under fluoroscopic observation. Subsequently, the PICC line was cut to appropriate length and advanced over a second guidewire to the distal superior vena cava and secured at the skin. Final catheter position was recorded with a chest x-ray. Catheter length is 47  centimeters. The fluoroscopy time was 0.3 minutes.  One image was obtained.  3.7 mg, 1 25 mcg/sq m.    PLACEMENT OF A PERIPHERALLY INSERTED CENTRAL CATHETER USING FLUOROSCOPIC AND SONOGRAPHIC GUIDANCE AS OUTLINED ABOVE.      Micro:  Results       Procedure Component Value Units Date/Time    Culture Wound With Gram Stain [[id number]]  (Abnormal)  (Susceptibility) Collected: 07/07/23 1428    Order Status: Completed Specimen: Wound from Right Foot Updated: 07/11/23 0900     Significant Indicator POS     Source WND     Site RIGHT FOOT     Culture Result -     Gram Stain Result Moderate WBCs.  No organisms seen.       Culture Result Methicillin Resistant Staphylococcus aureus  Light growth        Enterococcus faecalis  Light growth  Susceptibilities in progress      Narrative:      Collected By: [facility]  Collected By: [facility]    Susceptibility       Methicillin resistant staphylococcus aureus (1)       Antibiotic Interpretation Microscan   Method Status    Azithromycin Resistant >4 mcg/mL RENATE Preliminary    Clindamycin Sensitive <=0.25 mcg/mL RENATE Preliminary    Cefazolin Resistant <=8 mcg/mL RENATE Preliminary    Cefepime Resistant 16 mcg/mL RENATE Preliminary    Ceftaroline Sensitive <=0.5 mcg/mL RENATE Preliminary    Daptomycin Sensitive 1 mcg/mL RENATE Preliminary    Ampicillin/sulbactam Resistant 16/8 mcg/mL RENATE Preliminary    Erythromycin Resistant >4 mcg/mL RENATE Preliminary    Vancomycin Sensitive 1 mcg/mL RENATE Preliminary    Oxacillin Resistant >2 mcg/mL RENATE Preliminary    Trimeth/Sulfa Sensitive <=0.5/9.5 mcg/mL RENATE Preliminary    Tetracycline Sensitive <=4 mcg/mL RENATE Preliminary                       Blood Culture [[id number]] Collected: 07/05/23 0622    Order Status: Completed Specimen: Blood from Peripheral Updated: 07/10/23 0900     Significant Indicator NEG     Source BLD     Site PERIPHERAL     Culture Result No growth after 5 days of incubation.    Narrative:      Per Hospital Policy: Only change  "Specimen Src: to \"Line\" if  specified by physician order.  Right AC    Blood Culture [[id number]] Collected: 07/05/23 0622    Order Status: Completed Specimen: Blood from Peripheral Updated: 07/10/23 0900     Significant Indicator NEG     Source BLD     Site PERIPHERAL     Culture Result No growth after 5 days of incubation.    Narrative:      Per Hospital Policy: Only change Specimen Src: to \"Line\" if  specified by physician order.  Left AC    BLOOD CULTURE [[id number]] Collected: 07/08/23 0737    Order Status: Completed Specimen: Blood from Peripheral Updated: 07/09/23 0635     Significant Indicator NEG     Source BLD     Site PERIPHERAL     Culture Result No Growth  Note: Blood cultures are incubated for 5 days and  are monitored continuously.Positive blood cultures  are called to the RN and reported as soon as  they are identified.      Narrative:      Per Hospital Policy: Only change Specimen Src: to \"Line\" if  specified by physician order.  Left Forearm/Arm    BLOOD CULTURE [[id number]] Collected: 07/08/23 1403    Order Status: Completed Specimen: Blood from Peripheral Updated: 07/09/23 0635     Significant Indicator NEG     Source BLD     Site PERIPHERAL     Culture Result No Growth  Note: Blood cultures are incubated for 5 days and  are monitored continuously.Positive blood cultures  are called to the RN and reported as soon as  they are identified.      Narrative:      Per Hospital Policy: Only change Specimen Src: to \"Line\" if  specified by physician order.  Left AC    GRAM STAIN [[id number]] Collected: 07/07/23 1428    Order Status: Completed Specimen: Wound Updated: 07/07/23 2250     Significant Indicator .     Source WND     Site RIGHT FOOT     Gram Stain Result Moderate WBCs.  No organisms seen.      Narrative:      Collected By: [facility] COLLEEN PAULSON  Collected By: [facility] COLLEEN PAULSON    Urine Culture (New) [[id number]] Collected: 07/05/23 0915    Order Status: Completed Specimen: Urine Updated: 07/07/23 " 0651     Significant Indicator NEG     Source UR     Site -     Culture Result No growth at 48 hours.    Narrative:      Indication for culture:->Evaluation for sepsis without a  clear source of infection  Indication for culture:->Evaluation for sepsis without a    MRSA By PCR (Amp) [[id number]] Collected: 07/05/23 0732    Order Status: Completed Specimen: Respirate from Nares Updated: 07/05/23 1058     MRSA by PCR Negative    Urinalysis [[id number]]  (Abnormal) Collected: 07/05/23 0915    Order Status: Completed Specimen: Urine Updated: 07/05/23 1046     Color Yellow     Character Clear     Specific Gravity 1.044     Ph 5.0     Glucose >=1000 mg/dL      Ketones Negative mg/dL      Protein Negative mg/dL      Bilirubin Negative     Urobilinogen, Urine 0.2     Nitrite Negative     Leukocyte Esterase Negative     Occult Blood Negative     Micro Urine Req see below     Comment: Microscopic examination not performed when specimen is clear  and chemically negative for protein, blood, leukocyte esterase  and nitrite.         Narrative:      Indication for culture:->Evaluation for sepsis without a  clear source of infection            Assessment/Hospital Course:  This is a 48-year-old male patient with uncontrolled type 2 diabetes, CAD status post stent, PTSD, hypertension, alcoholism, polysubstance abuse, peripheral arterial disease, diagnosis of right foot osteomyelitis in April 2023 at [facility] post TMA 6/8/2023. Blood cultures x2 from 6/8 were positive for MRSA and repeat blood cultures from 6/9 were negative. He was discharged to SNF with daptomycin and meropenem with plan to continue through 7/20 but patient left AMA on 6/17.  Patient now presented with several days of chills and drainage of right-sided TMA site with pain, per report pathology was positive for osteomyelitis of the margins    It appears that the reason for meropenem was a superficial skin swab from 4/13, nearly 3 months ago, that grew rare  growth of ESBL E. coli along with mixed skin maryellen and cellular debris but without MRSA which are likely commensals/contaminants.  A superficial swab has been obtained here 7/7 and this is growing MRSA thus far.    Pertinent Diagnoses:   Right foot osteomyelitis, status post TMA, positive margins per pathology per report  Recent MRSA bacteremia, incompletely treated, patient left AMA  Uncontrolled type 2 diabetes  History of polysubstance abuse  Peripheral arterial disease    Plan:  -Continue IV vancomycin, discontinued meropenem 7/8.  Monitor vancomycin levels and renal function as above  -Superficial swab obtained here growing MRSA thus far which we will continue to cover.  Any additional growth will be of uncertain clinical significance (growing E faecalis)  -MRI foot is still pending.  We will follow along  -TTE with no obvious valve vegetations  -Follow blood cultures x2 from 7/5 and 7/8, no growth till date  -Monitor for any new areas of sites of seeding of infection    Disposition: Anticipate eventual return to facility for an additional 6 weeks of IV daptomycin or vancomycin  Need for PICC line: Yes, okay to place after any surgical procedures    Discussed with Dr. Harry.  ID will follow.  This illness poses a threat to limb function

## 2023-07-11 NOTE — PROGRESS NOTES
Hospital Medicine Daily Progress Note    Date of Service  7/11/2023    Chief Complaint  Rogelio Escudero is a 48 y.o. male admitted 7/5/2023 with foot wound    Hospital Course  49 yo man with uncontrolled diabetes, CAD S/P stent, HTN, alcoholism, PVD, PTSD who presented with right leg pain.  His right foot toes were recently amputated for osteomyelitis at [place] and he was discharged to SNF on daptomycin and meropenem until 7/20, however the patient left AMA.  He was admitted for worsening foot infection with osteomyelitis.  Ortho and ID were consulted.    Interval Problem Update  He denies any pain.  MRI says patient will likely have his MRI this evening.  ABIs pending.  He denies any nausea or diarrhea..  Continue IV Vanco and monitor renal function.    I have discussed this patient's plan of care and discharge plan at IDT rounds today with Case Management, Nursing, Nursing leadership, and other members of the IDT team.    Consultants/Specialty  infectious disease and orthopedics    Code Status  Full Code    Disposition  The patient is not medically cleared for discharge to home or a post-acute facility.  Anticipate discharge to: skilled nursing facility    I have placed the appropriate orders for post-discharge needs.    Review of Systems  Review of Systems   Constitutional:  Negative for malaise/fatigue.   Respiratory:  Negative for shortness of breath.    Cardiovascular:  Negative for chest pain.   Gastrointestinal:  Negative for abdominal pain, diarrhea and vomiting.   Musculoskeletal:  Negative for myalgias.   Neurological:  Negative for weakness.   Psychiatric/Behavioral:  The patient is not nervous/anxious.         Physical Exam  Temp:  [36.2 °C (97.1 °F)-36.7 °C (98 °F)] 36.2 °C (97.1 °F)  Pulse:  [70-75] 71  Resp:  [17-18] 18  BP: (115-137)/(65-94) 137/94  SpO2:  [93 %-100 %] 100 %    Physical Exam  Vitals and nursing note reviewed.   Constitutional:       General: He is not in acute distress.      Appearance: He is not toxic-appearing.   HENT:      Head: Normocephalic.      Mouth/Throat:      Mouth: Mucous membranes are moist.   Eyes:      General:         Right eye: No discharge.         Left eye: No discharge.   Cardiovascular:      Rate and Rhythm: Normal rate and regular rhythm.   Pulmonary:      Effort: Pulmonary effort is normal. No respiratory distress.      Breath sounds: No wheezing or rales.   Abdominal:      Palpations: Abdomen is soft.      Tenderness: There is no abdominal tenderness.   Musculoskeletal:         General: No swelling.      Cervical back: Neck supple.      Comments: Right foot and wound dressings, wound photos reviewed   Skin:     General: Skin is warm and dry.   Neurological:      Mental Status: He is alert and oriented to person, place, and time.         Fluids    Intake/Output Summary (Last 24 hours) at 7/11/2023 1300  Last data filed at 7/11/2023 1000  Gross per 24 hour   Intake 220 ml   Output 850 ml   Net -630 ml       Laboratory  Recent Labs     07/10/23  0025 07/11/23  0859   WBC 8.2 8.1   RBC 4.33* 4.34*   HEMOGLOBIN 13.0* 13.2*   HEMATOCRIT 39.1* 39.8*   MCV 90.3 91.7   MCH 30.0 30.4   MCHC 33.2 33.2   RDW 55.7* 54.4*   PLATELETCT 313 307   MPV 9.4 9.5     Recent Labs     07/10/23  0025 07/11/23  0859   SODIUM 134* 137   POTASSIUM 4.0 4.0   CHLORIDE 102 104   CO2 22 24   GLUCOSE 217* 139*   BUN 15 12   CREATININE 0.55 0.61   CALCIUM 8.7 8.7                   Imaging  EC-ECHOCARDIOGRAM COMPLETE W/O CONT   Final Result      US-EXTREMITY VENOUS LOWER UNILAT RIGHT   Final Result      DX-CHEST-PORTABLE (1 VIEW)   Final Result         1.  No acute cardiopulmonary disease.      DX-FOOT-COMPLETE 3+ RIGHT   Final Result         1.  No acute traumatic bony injury.      US-EXTREMITY ARTERY LOWER BILAT W/LATASHA (COMBO)    (Results Pending)   MR-FOOT-WITH RIGHT    (Results Pending)   US-LATASHA SINGLE LEVEL BILAT    (Results Pending)        Assessment/Plan  * Osteomyelitis (HCC)- (present on  admission)  Assessment & Plan  History of osteomyelitis on the right foot and had amputation transmetatarsal  Wound culture at [facility] showed mixed Pseudomonas and MRSA  Blood culture showed MRSA.    Patient was supposed to take antibiotics until 7/20 however he left AGAINST MEDICAL ADVICE  Also patient had MRSA bacteremia  X-ray did not show osteomyelitis  Ortho was consulted, MRI is pending, LATASHA pending  Wound culture is growing MRSA and Enterococcus  Continue IV vancomycin and monitor renal function.  Follow-up ID recommendation    Noncompliance  Assessment & Plan  Patient has significant history of noncompliance and not taking insulin or medications  Patient has complicated medical history including uncontrolled diabetes and peripheral vascular disease with coronary artery disease  Resume his medications  Continue education the patient    MRSA bacteremia  Assessment & Plan  Blood culture [facility] on 6/6 was positive for MRSA and repeat culture on 6/9 was negative  Blood cultures have been negative, TTE negative for endocarditis      CAD (coronary artery disease)- (present on admission)  Assessment & Plan  No chest pain and EKG did not show ischemia  Stent placement in September 2022  Holding antiplatelets pending surgery  Resume plavix after surgery completed    Peripheral artery disease (HCC)- (present on admission)  Assessment & Plan  Continue aspirin, atorvastatin.  Holding Plavix for possible surgery  Encouraged the patient to quit smoking    Alcohol abuse- (present on admission)  Assessment & Plan  No signs of withdrawal  Close monitoring and consider CIWA protocol if needed    Tobacco abuse- (present on admission)  Assessment & Plan  Smoking cessation counseling    Diabetic foot ulcer (HCC)- (present on admission)  Assessment & Plan  Underwent amputation on left foot  Good glucose control needed  Continue IV Vanco and monitor renal function  LPS consulted, recommend likely surgery  Ortho  consulted  MRI foot pending, LATASHA pending  Blood cultures remain NGTD    Diabetes (HCC)- (present on admission)  Assessment & Plan  Uncontrolled and A1c 11.2  Patient admitted he is not taking insulin  Hyperglycemia improved. Continue Lantus 25 unit daily, ISS  DM educator has seen him         VTE prophylaxis: enoxaparin ppx    I have performed a physical exam and reviewed and updated ROS and Plan today (7/11/2023). In review of yesterday's note (7/10/2023), there are no changes except as documented above.

## 2023-07-12 ENCOUNTER — APPOINTMENT (OUTPATIENT)
Dept: RADIOLOGY | Facility: MEDICAL CENTER | Age: 48
DRG: 253 | End: 2023-07-12
Attending: SURGERY
Payer: MEDICAID

## 2023-07-12 LAB
ANION GAP SERPL CALC-SCNC: 10 MMOL/L (ref 7–16)
BACTERIA WND AEROBE CULT: ABNORMAL
BASOPHILS # BLD AUTO: 0.7 % (ref 0–1.8)
BASOPHILS # BLD: 0.06 K/UL (ref 0–0.12)
BUN SERPL-MCNC: 14 MG/DL (ref 8–22)
CALCIUM SERPL-MCNC: 8.8 MG/DL (ref 8.5–10.5)
CHLORIDE SERPL-SCNC: 105 MMOL/L (ref 96–112)
CO2 SERPL-SCNC: 25 MMOL/L (ref 20–33)
CREAT SERPL-MCNC: 0.74 MG/DL (ref 0.5–1.4)
EOSINOPHIL # BLD AUTO: 0.45 K/UL (ref 0–0.51)
EOSINOPHIL NFR BLD: 5.1 % (ref 0–6.9)
ERYTHROCYTE [DISTWIDTH] IN BLOOD BY AUTOMATED COUNT: 54.1 FL (ref 35.9–50)
GFR SERPLBLD CREATININE-BSD FMLA CKD-EPI: 110 ML/MIN/1.73 M 2
GLUCOSE BLD STRIP.AUTO-MCNC: 132 MG/DL (ref 65–99)
GLUCOSE BLD STRIP.AUTO-MCNC: 196 MG/DL (ref 65–99)
GLUCOSE BLD STRIP.AUTO-MCNC: 197 MG/DL (ref 65–99)
GLUCOSE BLD STRIP.AUTO-MCNC: 279 MG/DL (ref 65–99)
GLUCOSE SERPL-MCNC: 267 MG/DL (ref 65–99)
GRAM STN SPEC: ABNORMAL
HCT VFR BLD AUTO: 38.6 % (ref 42–52)
HGB BLD-MCNC: 12.6 G/DL (ref 14–18)
IMM GRANULOCYTES # BLD AUTO: 0.03 K/UL (ref 0–0.11)
IMM GRANULOCYTES NFR BLD AUTO: 0.3 % (ref 0–0.9)
LYMPHOCYTES # BLD AUTO: 2.86 K/UL (ref 1–4.8)
LYMPHOCYTES NFR BLD: 32.3 % (ref 22–41)
MCH RBC QN AUTO: 30 PG (ref 27–33)
MCHC RBC AUTO-ENTMCNC: 32.6 G/DL (ref 32.3–36.5)
MCV RBC AUTO: 91.9 FL (ref 81.4–97.8)
MONOCYTES # BLD AUTO: 0.92 K/UL (ref 0–0.85)
MONOCYTES NFR BLD AUTO: 10.4 % (ref 0–13.4)
NEUTROPHILS # BLD AUTO: 4.54 K/UL (ref 1.82–7.42)
NEUTROPHILS NFR BLD: 51.2 % (ref 44–72)
NRBC # BLD AUTO: 0 K/UL
NRBC BLD-RTO: 0 /100 WBC (ref 0–0.2)
PLATELET # BLD AUTO: 332 K/UL (ref 164–446)
PMV BLD AUTO: 9.6 FL (ref 9–12.9)
POTASSIUM SERPL-SCNC: 4.1 MMOL/L (ref 3.6–5.5)
RBC # BLD AUTO: 4.2 M/UL (ref 4.7–6.1)
SIGNIFICANT IND 70042: ABNORMAL
SITE SITE: ABNORMAL
SODIUM SERPL-SCNC: 140 MMOL/L (ref 135–145)
SOURCE SOURCE: ABNORMAL
WBC # BLD AUTO: 8.9 K/UL (ref 4.8–10.8)

## 2023-07-12 PROCEDURE — A9270 NON-COVERED ITEM OR SERVICE: HCPCS | Performed by: STUDENT IN AN ORGANIZED HEALTH CARE EDUCATION/TRAINING PROGRAM

## 2023-07-12 PROCEDURE — 700105 HCHG RX REV CODE 258: Mod: JZ | Performed by: STUDENT IN AN ORGANIZED HEALTH CARE EDUCATION/TRAINING PROGRAM

## 2023-07-12 PROCEDURE — 99233 SBSQ HOSP IP/OBS HIGH 50: CPT | Performed by: INTERNAL MEDICINE

## 2023-07-12 PROCEDURE — 770006 HCHG ROOM/CARE - MED/SURG/GYN SEMI*

## 2023-07-12 PROCEDURE — 82962 GLUCOSE BLOOD TEST: CPT | Mod: 91

## 2023-07-12 PROCEDURE — 700105 HCHG RX REV CODE 258: Performed by: SURGERY

## 2023-07-12 PROCEDURE — 99254 IP/OBS CNSLTJ NEW/EST MOD 60: CPT | Performed by: SURGERY

## 2023-07-12 PROCEDURE — 80048 BASIC METABOLIC PNL TOTAL CA: CPT

## 2023-07-12 PROCEDURE — 93970 EXTREMITY STUDY: CPT | Mod: 26 | Performed by: INTERNAL MEDICINE

## 2023-07-12 PROCEDURE — 700105 HCHG RX REV CODE 258: Performed by: INTERNAL MEDICINE

## 2023-07-12 PROCEDURE — 36415 COLL VENOUS BLD VENIPUNCTURE: CPT

## 2023-07-12 PROCEDURE — 85025 COMPLETE CBC W/AUTO DIFF WBC: CPT

## 2023-07-12 PROCEDURE — A9270 NON-COVERED ITEM OR SERVICE: HCPCS | Performed by: INTERNAL MEDICINE

## 2023-07-12 PROCEDURE — 700111 HCHG RX REV CODE 636 W/ 250 OVERRIDE (IP): Performed by: INTERNAL MEDICINE

## 2023-07-12 PROCEDURE — 700102 HCHG RX REV CODE 250 W/ 637 OVERRIDE(OP): Performed by: INTERNAL MEDICINE

## 2023-07-12 PROCEDURE — 97162 PT EVAL MOD COMPLEX 30 MIN: CPT

## 2023-07-12 PROCEDURE — 700102 HCHG RX REV CODE 250 W/ 637 OVERRIDE(OP): Performed by: STUDENT IN AN ORGANIZED HEALTH CARE EDUCATION/TRAINING PROGRAM

## 2023-07-12 PROCEDURE — 93970 EXTREMITY STUDY: CPT

## 2023-07-12 PROCEDURE — 700111 HCHG RX REV CODE 636 W/ 250 OVERRIDE (IP): Mod: JZ | Performed by: STUDENT IN AN ORGANIZED HEALTH CARE EDUCATION/TRAINING PROGRAM

## 2023-07-12 RX ORDER — SODIUM CHLORIDE 9 MG/ML
INJECTION, SOLUTION INTRAVENOUS CONTINUOUS
Status: DISCONTINUED | OUTPATIENT
Start: 2023-07-12 | End: 2023-07-15

## 2023-07-12 RX ADMIN — ATORVASTATIN CALCIUM 40 MG: 40 TABLET, FILM COATED ORAL at 20:10

## 2023-07-12 RX ADMIN — HYDROCODONE BITARTRATE AND ACETAMINOPHEN 1 TABLET: 5; 325 TABLET ORAL at 20:10

## 2023-07-12 RX ADMIN — ASPIRIN 81 MG: 81 TABLET, COATED ORAL at 05:40

## 2023-07-12 RX ADMIN — SODIUM CHLORIDE: 9 INJECTION, SOLUTION INTRAVENOUS at 14:54

## 2023-07-12 RX ADMIN — INSULIN LISPRO 5 UNITS: 100 INJECTION, SOLUTION INTRAVENOUS; SUBCUTANEOUS at 21:10

## 2023-07-12 RX ADMIN — HYDROCODONE BITARTRATE AND ACETAMINOPHEN 1 TABLET: 5; 325 TABLET ORAL at 08:48

## 2023-07-12 RX ADMIN — LISINOPRIL 10 MG: 10 TABLET ORAL at 05:40

## 2023-07-12 RX ADMIN — INSULIN LISPRO 2 UNITS: 100 INJECTION, SOLUTION INTRAVENOUS; SUBCUTANEOUS at 08:12

## 2023-07-12 RX ADMIN — VANCOMYCIN HYDROCHLORIDE 1750 MG: 5 INJECTION, POWDER, LYOPHILIZED, FOR SOLUTION INTRAVENOUS at 23:07

## 2023-07-12 RX ADMIN — MORPHINE SULFATE 3 MG: 4 INJECTION, SOLUTION INTRAMUSCULAR; INTRAVENOUS at 23:53

## 2023-07-12 RX ADMIN — ESCITALOPRAM OXALATE 10 MG: 10 TABLET ORAL at 05:40

## 2023-07-12 RX ADMIN — COLCHICINE 0.6 MG: 0.6 TABLET ORAL at 05:40

## 2023-07-12 RX ADMIN — VANCOMYCIN HYDROCHLORIDE 1750 MG: 5 INJECTION, POWDER, LYOPHILIZED, FOR SOLUTION INTRAVENOUS at 11:12

## 2023-07-12 RX ADMIN — MORPHINE SULFATE 3 MG: 4 INJECTION, SOLUTION INTRAMUSCULAR; INTRAVENOUS at 11:30

## 2023-07-12 RX ADMIN — OMEPRAZOLE 20 MG: 20 CAPSULE, DELAYED RELEASE ORAL at 05:40

## 2023-07-12 RX ADMIN — INSULIN LISPRO 2 UNITS: 100 INJECTION, SOLUTION INTRAVENOUS; SUBCUTANEOUS at 17:10

## 2023-07-12 ASSESSMENT — COGNITIVE AND FUNCTIONAL STATUS - GENERAL
MOBILITY SCORE: 18
SUGGESTED CMS G CODE MODIFIER MOBILITY: CK
STANDING UP FROM CHAIR USING ARMS: A LITTLE
MOVING FROM LYING ON BACK TO SITTING ON SIDE OF FLAT BED: A LITTLE
TURNING FROM BACK TO SIDE WHILE IN FLAT BAD: A LITTLE
MOVING TO AND FROM BED TO CHAIR: A LITTLE
WALKING IN HOSPITAL ROOM: A LITTLE
CLIMB 3 TO 5 STEPS WITH RAILING: A LITTLE

## 2023-07-12 ASSESSMENT — ENCOUNTER SYMPTOMS
DIARRHEA: 0
ABDOMINAL PAIN: 0
FEVER: 0
MYALGIAS: 0
NAUSEA: 0
SHORTNESS OF BREATH: 0
VOMITING: 0
WEAKNESS: 0
CHILLS: 0
NERVOUS/ANXIOUS: 0

## 2023-07-12 ASSESSMENT — PAIN DESCRIPTION - PAIN TYPE
TYPE: ACUTE PAIN

## 2023-07-12 ASSESSMENT — GAIT ASSESSMENTS
DISTANCE (FEET): 30
ASSISTIVE DEVICE: FRONT WHEEL WALKER
GAIT LEVEL OF ASSIST: SUPERVISED
DEVIATION: ANTALGIC;STEP TO;BRADYKINETIC

## 2023-07-12 NOTE — THERAPY
Physical Therapy   Initial Evaluation     Patient Name: Rogelio Escudero  Age:  48 y.o., Sex:  male  Medical Record #: [id number]  Today's Date: 7/12/2023     Precautions  Precautions:  (off loading shoe with mobility)  Comments: (P) heel WB RLE  in offloading shoe ( shoe at bedside)    Assessment  Patient is 48 y.o. male with a diagnosis of right foot osteomyelitis. HWB R LE in off loading shoe.  Pm Hx includes; uncontrolled diabetes, CAD S/P stent, HTN, alcoholism, PVD, PTSD   Per chart review, pt recently had Right toes amputated for osteomyelitis at [place] and he was discharged to SNF on daptomycin and meropenem until 7/20, however the patient left AMA, states he went to stay with his cousin in [place].   Pt now admitted for worsening foot infection with osteomyelitis. Currently awaiting MRI and possible further surgical intervention .    Pt demonstrating the ability to mobilize in and out of bed  w/o difficulty and in room with FWW. Pt educated on  heel WB and use of provided off loading shoe. No skilled inpatient PT indicated at this time. May require reassessment pending future surgical procedures.       Plan  Patient will not be actively followed for physical therapy services at this time, however may be seen if requested by physician for 1 more visit within 30 days to address any discharge or equipment needs.   Physical Therapy Initial Treatment Plan   Duration: (P) Discharge Needs Only    DC Equipment Recommendations: (P) Unable to determine at this time (TBA pending discharge destination, FWW if home with family.)  Discharge Recommendations: (P) Anticipate that the patient will have no further physical therapy needs after discharge from the hospital         Initial Contact Note    Initial Contact Note Order Received and Verified, Physical Therapy Evaluation in Progress with Full Report to Follow.   Precautions   Precautions   (off loading shoe with mobility)   Comments heel WB RLE  in offloading shoe (  shoe at bedside)   Pain 0 - 10 Group   Location Foot   Location Orientation Right   Pain Rating Scale (NPRS) 6   Therapist Pain Assessment Post Activity Pain Same as Prior to Activity;Nurse Notified;6   Prior Living Situation   Prior Services   (left SNF AMA, went to cousins apartment)   Housing / Facility 3 Story Apartment / Condo   Elevator Yes   Equipment Owned None   Lives with - Patient's Self Care Capacity Related Adult   Comments states he was staying with his cousin who can assist as needed.   Prior Level of Functional Mobility   Bed Mobility Independent   Transfer Status Independent   Ambulation Independent   Assistive Devices Used None   History of Falls   History of Falls No   Cognition    Level of Consciousness Alert   Active ROM Lower Body    Active ROM Lower Body  WDL   Strength Lower Body   Lower Body Strength  WDL   Comments TMA amp RLE   Sensation Lower Body   Lower Extremity Sensation   X   Paresthesia Present    Comments B feet.   Lower Body Muscle Tone   Lower Body Muscle Tone  WDL   Other Treatments   Other Treatments Provided education on benefit of FWW for offloading BLE and balance.   Balance Assessment   Sitting Balance (Static) Good   Sitting Balance (Dynamic) Good   Standing Balance (Static) Good   Standing Balance (Dynamic) Fair +   Weight Shift Sitting Good   Weight Shift Standing Fair   Comments w/FWW   Bed Mobility    Supine to Sit Independent   Sit to Supine Independent   Scooting Independent   Rolling Independent   Gait Analysis   Gait Level Of Assist Supervised   Assistive Device Front Wheel Walker   Distance (Feet) 30  (in room only.)   Deviation Antalgic;Step To;Bradykinetic   Weight Bearing Status heel WB in off loading shoe RLE   Comments able to ambulate to and from bathroom with FWW, cues to wear offloading shoe   Functional Mobility   Sit to Stand Supervised   Bed, Chair, Wheelchair Transfer Supervised   Toilet Transfers Supervised   Transfer Method Stand Step   How much  difficulty does the patient currently have...   Turning over in bed (including adjusting bedclothes, sheets and blankets)? 3   Sitting down on and standing up from a chair with arms (e.g., wheelchair, bedside commode, etc.) 3   Moving from lying on back to sitting on the side of the bed? 3   How much help from another person does the patient currently need...   Moving to and from a bed to a chair (including a wheelchair)? 3   Need to walk in a hospital room? 3   Climbing 3-5 steps with a railing? 3   6 clicks Mobility Score 18   Edema / Skin Assessment   Comments dressing right foot. CDi   Education Group   Education Provided Role of Physical Therapist;Gait Training;Use of Assistive Device;Weight Bearing Precautions  (use of off loading shoe)   Role of Physical Therapist Patient Response Patient;Acceptance;Explanation;Verbal Demonstration   Gait Training Patient Response Patient;Acceptance;Explanation;Action Demonstration;Verbal Demonstration   Use of Assistive Device Patient Response Patient;Acceptance;Explanation;Action Demonstration   Weight Bearing Precautions Patient Response Patient;Acceptance;Explanation;Demonstration;Reinforcement Needed   Physical Therapy Initial Treatment Plan    Duration Discharge Needs Only   Problem List    Problems Impaired Balance;Pain   Anticipated Discharge Equipment and Recommendations   DC Equipment Recommendations Unable to determine at this time  (TBA pending discharge destination, FWW if home with family.)   Discharge Recommendations Anticipate that the patient will have no further physical therapy needs after discharge from the hospital   Interdisciplinary Plan of Care Collaboration   IDT Collaboration with  Nursing   Patient Position at End of Therapy Edge of Bed;Tray Table within Reach;Call Light within Reach;Phone within Reach   Collaboration Comments RN updated.   Session Information   Date / Session Number  7/12 DC needs only.

## 2023-07-12 NOTE — PROGRESS NOTES
Infectious Disease Progress Note    Author: Jasiel Purdy M.D. Date & Time of service: 2023  8:36 AM    Chief Complaint:  Follow-up for right foot osteomyelitis    Interval History:   patient remains afebrile, white count is 8.8, tolerating vancomycin and meropenem for now, cultures as below, creatinine 0.65, normal transaminases, magnesium 1.8, albumin 3.4,   patient remains afebrile, no CBC this morning, tolerating vancomycin, point-of-care glucose 264, Vanco trough 10.9  7/10 patient remains afebrile, white count is 8.2, tolerating vancomycin, still awaiting MRI, TTE with no obvious valve vegetations, creatinine 0.55, vanc trough 10.9, blood cultures as below   still awaiting MRI.  Anticipated today.  Patient remains afebrile, count is 8.1, tolerating vancomycin, creatinine 0.61, last vancomycin peak 21.2, culture results as below   still awaiting MRI that was ordered on .  Patient is afebrile white count is 8.9, tolerating vancomycin, creatinine 0.74    Labs Reviewed and Medications Reviewed.    Review of Systems:  Review of Systems   Constitutional:  Negative for chills and fever.   Gastrointestinal:  Negative for abdominal pain, diarrhea, nausea and vomiting.   Skin:  Negative for itching and rash.       Hemodynamics:  Temp (24hrs), Av.4 °C (97.6 °F), Min:36.3 °C (97.3 °F), Max:36.6 °C (97.9 °F)  Temperature: 36.3 °C (97.3 °F)  Pulse  Av.7  Min: 65  Max: 101   Blood Pressure: 136/73       Physical Exam:  Physical Exam  Vitals and nursing note reviewed.   Constitutional:       General: He is not in acute distress.     Appearance: He is not ill-appearing.   HENT:      Mouth/Throat:      Pharynx: No oropharyngeal exudate.      Comments: Poor dentition  Eyes:      General:         Right eye: No discharge.   Cardiovascular:      Rate and Rhythm: Normal rate.      Heart sounds: No murmur heard.  Pulmonary:      Effort: Pulmonary effort is normal. No respiratory distress.      Breath  sounds: No stridor.   Abdominal:      General: Abdomen is flat. There is no distension.      Tenderness: There is no abdominal tenderness.   Musculoskeletal:      Comments: Right-sided TMA site with improved swelling, small opening laterally packed by wound care, no significant discharge   Neurological:      General: No focal deficit present.      Mental Status: He is alert and oriented to person, place, and time.   Psychiatric:         Mood and Affect: Mood normal.         Behavior: Behavior normal.         Meds:    Current Facility-Administered Medications:     vancomycin    insulin GLARGINE **AND** [DISCONTINUED] insulin lispro **AND** [DISCONTINUED] insulin lispro **AND** POC blood glucose manual result **AND** NOTIFY MD and PharmD **AND** Administer 20 grams of glucose (approximately 8 ounces of fruit juice) every 15 minutes PRN FSBG less than 70 mg/dL **AND** dextrose bolus    insulin lispro    HYDROcodone-acetaminophen    morphine injection    MD Alert...Vancomycin per Pharmacy    atorvastatin    [Held by provider] clopidogrel    colchicine    escitalopram    lisinopril    omeprazole    senna-docusate **AND** polyethylene glycol/lytes **AND** magnesium hydroxide **AND** bisacodyl    enoxaparin (LOVENOX) injection    acetaminophen    ondansetron    ondansetron    promethazine    promethazine    prochlorperazine    aspirin    Labs:  Recent Labs     07/10/23  0025 07/11/23  0859 07/12/23  0354   WBC 8.2 8.1 8.9   RBC 4.33* 4.34* 4.20*   HEMOGLOBIN 13.0* 13.2* 12.6*   HEMATOCRIT 39.1* 39.8* 38.6*   MCV 90.3 91.7 91.9   MCH 30.0 30.4 30.0   RDW 55.7* 54.4* 54.1*   PLATELETCT 313 307 332   MPV 9.4 9.5 9.6   NEUTSPOLYS  --  47.80 51.20   LYMPHOCYTES  --  36.20 32.30   MONOCYTES  --  9.90 10.40   EOSINOPHILS  --  5.10 5.10   BASOPHILS  --  0.50 0.70       Recent Labs     07/10/23  0025 07/11/23  0859 07/12/23  0354   SODIUM 134* 137 140   POTASSIUM 4.0 4.0 4.1   CHLORIDE 102 104 105   CO2 22 24 25   GLUCOSE 217* 139*  267*   BUN 15 12 14       Recent Labs     07/10/23  0025 23  0859 23  0354   CREATININE 0.55 0.61 0.74         Imaging:  US-EXTREMITY VENOUS LOWER UNILAT RIGHT    Result Date: 2023   Vascular Laboratory  CONCLUSIONS  Normal right lower extremity deep venous examination.  REGLA HAYES  Exam Date:     2023 11:25  Room #:     Inpatient  Priority:     Routine  Ht (in):             Wt (lb):  Ordering Physician:        JULY CALXI  Referring Physician:       YOGI Villalba  Sonographer:               Tim Dc RVDEANDRE  Study Type:                Complete Unilateral  Technical Quality:         Adequate  Age:    48    Gender:     M  MRN:    [id number]  :    1975      BSA:  Indications:     Localized swelling, mass and lump, right lower limb  CPT Codes:       74831  ICD Codes:       R22.41  History:         right leg edema & pain; recent right transmetatarsal foot                   amputation; no prior exam  Limitations:  PROCEDURES:  Right lower extremity venous duplex imaging.  Serial compression, color, and spectral Doppler flow evaluations were  performed.  The following venous structures were evaluated: common femoral, deep  femoral, proximal great saphenous, femoral, popliteal, peroneal, and  posterior tibial veins.  FINDINGS:  RIGHT LOWER EXTREMITY:  No deep venous thrombosis.  All veins demonstrate complete color filling and compressibility with  normal venous flow dynamics including spontaneous flow and respiratory  phasicity.  Rene Lara MD  (Electronically Signed)  Final Date:      2023                   20:02    DX-FOOT-COMPLETE 3+ RIGHT    Result Date: 2023 6:13 AM HISTORY/REASON FOR EXAM: Atraumatic Pain/Swelling/Deformity TECHNIQUE/EXAM DESCRIPTION:  AP, lateral, and oblique views of the RIGHT foot. COMPARISON:  February 3, 2020 FINDINGS: Postsurgical changes of transmetatarsal amputation of the foot are seen. There is  no acute fracture, subluxation, or dislocation identified. Soft tissue swelling of the forefoot is seen.     1.  No acute traumatic bony injury.    DX-CHEST-PORTABLE (1 VIEW)    Result Date: 7/5/2023 7/5/2023 6:10 AM HISTORY/REASON FOR EXAM: possible sepsis. TECHNIQUE/EXAM DESCRIPTION:  Single AP view of the chest. COMPARISON: November 16, 2022 FINDINGS: Overlying cardiac leads are present. The cardiac silhouette appears within normal limits. The mediastinal contour appears within normal limits.  The central pulmonary vasculature appears normal. Bilateral lung volumes are diminished.  Bilateral lungs are clear. No significant pleural effusions are identified. The bony structures appear age-appropriate.     1.  No acute cardiopulmonary disease.    IR PICC    Result Date: 6/16/2023  CLINICAL DATA: Long term IV abx, FINDINGS:  The nature of the procedure and associated risks and benefits were explained to the patient and consent to perform the procedure was obtained. Maximum sterile barrier technique was utilized. Hand washing with alcohol-based antiseptic preceded the procedure. Cap, mask, sterile gown and sterile gloves were worn. The patient's skin was prepped with 2% chlorhexidine. Catheter insertion site was draped with a large sterile drape. The left arm was assessed with ultrasound to identify a suitable vein. The brachial vein was identified, flowing blood within the vein and vein compressibility indicated that the vein was patent. Subsequently, under real-time sonographic guidance a 22-gauge micropuncture needle was advanced into the lumen of the vein. An ultrasound image demonstrating the needle tip in the vein lumen was recorded in the patient record. Through the 22-gauge needle a 0.018-gauge guidewire was advanced to secure access to the vein. A vessel dilator was advanced over the guidewire in conjunction with a peel-away sheath. Appropriate catheter length was determined with the original guidewire  under fluoroscopic observation. Subsequently, the PICC line was cut to appropriate length and advanced over a second guidewire to the distal superior vena cava and secured at the skin. Final catheter position was recorded with a chest x-ray. Catheter length is 47 centimeters. The fluoroscopy time was 0.3 minutes.  One image was obtained.  3.7 mg, 1 25 mcg/sq m.    PLACEMENT OF A PERIPHERALLY INSERTED CENTRAL CATHETER USING FLUOROSCOPIC AND SONOGRAPHIC GUIDANCE AS OUTLINED ABOVE.      Micro:  Results       Procedure Component Value Units Date/Time    Culture Wound With Gram Stain [[id number]]  (Abnormal)  (Susceptibility) Collected: 07/07/23 1428    Order Status: Completed Specimen: Wound from Right Foot Updated: 07/12/23 0736     Significant Indicator POS     Source WND     Site RIGHT FOOT     Culture Result -     Gram Stain Result Moderate WBCs.  No organisms seen.       Culture Result Methicillin Resistant Staphylococcus aureus  Light growth        Enterococcus faecalis  Light growth  The susceptibility profile for this organism indicates that  Streptomycin would not be an effective component of  combination therapy.  The susceptibility profile for this organism indicates that  Gentamycin would not be an effective component of combination  therapy.      Narrative:      Collected By: [facility] COLLEEN PAULSON  Collected By: [facility] COLLEEN PAULSON    Susceptibility       Methicillin resistant staphylococcus aureus (1)       Antibiotic Interpretation Microscan   Method Status    Azithromycin Resistant >4 mcg/mL RENATE Final    Clindamycin Sensitive <=0.25 mcg/mL RENATE Final    Cefazolin Resistant <=8 mcg/mL RENATE Final    Cefepime Resistant 16 mcg/mL RENATE Final    Ceftaroline Sensitive <=0.5 mcg/mL RENATE Final    Daptomycin Sensitive 1 mcg/mL RENATE Final    Erythromycin Resistant >4 mcg/mL RENATE Final    Ampicillin/sulbactam Resistant 16/8 mcg/mL RENATE Final    Oxacillin Resistant >2 mcg/mL RENATE Final    Trimeth/Sulfa Sensitive <=0.5/9.5  "mcg/mL RENATE Final    Tetracycline Sensitive <=4 mcg/mL RENATE Final    Vancomycin Sensitive 1 mcg/mL RENATE Final              Enterococcus faecalis (2)       Antibiotic Interpretation Microscan   Method Status    Ampicillin Sensitive <=2 mcg/mL RENATE Final    Daptomycin Sensitive 2 mcg/mL RENATE Final    Strep Synergy Resistant >1000 mcg/mL RENATE Final    Vancomycin Sensitive 1 mcg/mL RENATE Final    Gent Synergy Resistant >500 mcg/mL RENATE Final                       Blood Culture [[id number]] Collected: 07/05/23 0622    Order Status: Completed Specimen: Blood from Peripheral Updated: 07/10/23 0900     Significant Indicator NEG     Source BLD     Site PERIPHERAL     Culture Result No growth after 5 days of incubation.    Narrative:      Per Hospital Policy: Only change Specimen Src: to \"Line\" if  specified by physician order.  Right AC    Blood Culture [[id number]] Collected: 07/05/23 0622    Order Status: Completed Specimen: Blood from Peripheral Updated: 07/10/23 0900     Significant Indicator NEG     Source BLD     Site PERIPHERAL     Culture Result No growth after 5 days of incubation.    Narrative:      Per Hospital Policy: Only change Specimen Src: to \"Line\" if  specified by physician order.  Left AC    BLOOD CULTURE [[id number]] Collected: 07/08/23 0737    Order Status: Completed Specimen: Blood from Peripheral Updated: 07/09/23 0635     Significant Indicator NEG     Source BLD     Site PERIPHERAL     Culture Result No Growth  Note: Blood cultures are incubated for 5 days and  are monitored continuously.Positive blood cultures  are called to the RN and reported as soon as  they are identified.      Narrative:      Per Hospital Policy: Only change Specimen Src: to \"Line\" if  specified by physician order.  Left Forearm/Arm    BLOOD CULTURE [[id number]] Collected: 07/08/23 1403    Order Status: Completed Specimen: Blood from Peripheral Updated: 07/09/23 0635     Significant Indicator NEG     Source BLD     Site PERIPHERAL     " "Culture Result No Growth  Note: Blood cultures are incubated for 5 days and  are monitored continuously.Positive blood cultures  are called to the RN and reported as soon as  they are identified.      Narrative:      Per Hospital Policy: Only change Specimen Src: to \"Line\" if  specified by physician order.  Left AC    GRAM STAIN [[id number]] Collected: 07/07/23 1428    Order Status: Completed Specimen: Wound Updated: 07/07/23 2250     Significant Indicator .     Source WND     Site RIGHT FOOT     Gram Stain Result Moderate WBCs.  No organisms seen.      Narrative:      Collected By: [facility] MACIAS SU  [facility] By: [facility]    Urine Culture (New) [[id number]] Collected: 07/05/23 0915    Order Status: Completed Specimen: Urine Updated: 07/07/23 0651     Significant Indicator NEG     Source UR     Site -     Culture Result No growth at 48 hours.    Narrative:      Indication for culture:->Evaluation for sepsis without a  clear source of infection  Indication for culture:->Evaluation for sepsis without a    MRSA By PCR (Amp) [[id number]] Collected: 07/05/23 0732    Order Status: Completed Specimen: Respirate from Nares Updated: 07/05/23 1058     MRSA by PCR Negative    Urinalysis [[id number]]  (Abnormal) Collected: 07/05/23 0915    Order Status: Completed Specimen: Urine Updated: 07/05/23 1046     Color Yellow     Character Clear     Specific Gravity 1.044     Ph 5.0     Glucose >=1000 mg/dL      Ketones Negative mg/dL      Protein Negative mg/dL      Bilirubin Negative     Urobilinogen, Urine 0.2     Nitrite Negative     Leukocyte Esterase Negative     Occult Blood Negative     Micro Urine Req see below     Comment: Microscopic examination not performed when specimen is clear  and chemically negative for protein, blood, leukocyte esterase  and nitrite.         Narrative:      Indication for culture:->Evaluation for sepsis without a  clear source of infection            Assessment/Hospital Course:  This is a " 48-year-old male patient with uncontrolled type 2 diabetes, CAD status post stent, PTSD, hypertension, alcoholism, polysubstance abuse, peripheral arterial disease, diagnosis of right foot osteomyelitis in April 2023 at [place] status post TMA 6/8/2023. Blood cultures x2 from 6/8 were positive for MRSA and repeat blood cultures from 6/9 were negative. He was discharged to SNF with daptomycin and meropenem with plan to continue through 7/20 but patient left AMA on 6/17.  Patient now presented with several days of chills and drainage of right-sided TMA site with pain, per report pathology was positive for osteomyelitis of the margins    It appears that the reason for meropenem was a superficial skin swab from 4/13, nearly 3 months ago, that grew rare growth of ESBL E. coli along with mixed skin maryellen and cellular debris but without MRSA which are likely commensals/contaminants.  A superficial swab has been obtained here 7/7 and this is growing MRSA thus far.    Pertinent Diagnoses:   Right foot osteomyelitis, status post TMA, positive margins per pathology per report  Recent MRSA bacteremia, incompletely treated, patient left AMA  Uncontrolled type 2 diabetes  History of polysubstance abuse  Peripheral arterial disease    Plan:  -Continue IV vancomycin, discontinued meropenem 7/8.  Monitor vancomycin levels and renal function as above  -Superficial swab obtained here growing MRSA and E faecalis, the latter likely a skin commensal  -MRI foot is still pending (ordered 7/7).  We will follow along  -TTE with no obvious valve vegetations  -Follow blood cultures x2 from 7/5 and 7/8, no growth till date  -Monitor for any new areas of sites of seeding of infection    Disposition: Anticipate eventual return to facility for an additional 6 weeks of IV daptomycin or vancomycin.  Awaiting MRI and potential surgical intervention  Need for PICC line: Yes, okay to place after any surgical procedures    Discussed with Dr. Harry.  ID  will follow.  This illness poses a threat to limb function

## 2023-07-12 NOTE — CARE PLAN
The patient is Stable - Low risk of patient condition declining or worsening    Shift Goals  Clinical Goals: pain control  Patient Goals: pain control      Progress made toward(s) clinical / shift goals:  Pt medicated for pain to right foot 8/10, relieved to 4/10 pain with IV Morphine intervention.      Problem: Knowledge Deficit - Standard  Goal: Patient and family/care givers will demonstrate understanding of plan of care, disease process/condition, diagnostic tests and medications  Outcome: Progressing     Problem: Pain - Standard  Goal: Alleviation of pain or a reduction in pain to the patient’s comfort goal  Outcome: Progressing       Patient is not progressing towards the following goals:

## 2023-07-12 NOTE — PROGRESS NOTES
Report received from night shift RN. Pt. Without needs or concerns at this time. Pain rated 4/10. On room air. Call light and personal items in reach. Bed wheels locked, bed in lowest position.  Right foot drsg CDI. Urinal at bedside

## 2023-07-12 NOTE — PROGRESS NOTES
Hospital Medicine Daily Progress Note    Date of Service  7/12/2023    Chief Complaint  Rogelio Escudero is a 48 y.o. male admitted 7/5/2023 with foot wound    Hospital Course  47 yo man with uncontrolled diabetes, CAD S/P stent, HTN, alcoholism, PVD, PTSD who presented with right leg pain.  His right foot toes were recently amputated for osteomyelitis at [facility] and he was discharged to SNF on daptomycin and meropenem until 7/20, however the patient left AMA.  He was admitted for worsening foot infection with osteomyelitis.  Ortho and ID were consulted.    Interval Problem Update  Glucose has been fluctuating  Arterial ultrasound showed segmental occlusion of right femoral artery stent with reconstitution distally. I spoke with Dr. Calderon, he will see the patient, patient may need an angiogram  Spoke with radiology, had supervisor made aware that the patient has been waiting for an MRI since 7/7  Patient is otherwise doing well, has been hungry  Case management says so far all skilled nursing facilities have declined    I have discussed this patient's plan of care and discharge plan at IDT rounds today with Case Management, Nursing, Nursing leadership, and other members of the IDT team.    Consultants/Specialty  infectious disease and orthopedics    Code Status  Full Code    Disposition  The patient is not medically cleared for discharge to home or a post-acute facility.  Anticipate discharge to: home with organized home healthcare and close outpatient follow-up    I have placed the appropriate orders for post-discharge needs.    Review of Systems  Review of Systems   Constitutional:  Negative for malaise/fatigue.   Respiratory:  Negative for shortness of breath.    Cardiovascular:  Negative for chest pain.   Gastrointestinal:  Negative for abdominal pain, diarrhea and vomiting.   Musculoskeletal:  Negative for myalgias.   Neurological:  Negative for weakness.   Psychiatric/Behavioral:  The patient is not  nervous/anxious.         Physical Exam  Temp:  [36.1 °C (97 °F)-36.6 °C (97.9 °F)] 36.1 °C (97 °F)  Pulse:  [66-71] 70  Resp:  [17-18] 17  BP: (130-136)/(70-90) 133/70  SpO2:  [97 %-98 %] 98 %    Physical Exam  Vitals and nursing note reviewed.   Constitutional:       General: He is not in acute distress.     Appearance: He is not toxic-appearing.   HENT:      Head: Normocephalic.      Mouth/Throat:      Mouth: Mucous membranes are moist.   Eyes:      General:         Right eye: No discharge.         Left eye: No discharge.   Pulmonary:      Effort: No respiratory distress.   Abdominal:      Palpations: Abdomen is soft.      Tenderness: There is no abdominal tenderness.   Musculoskeletal:         General: No swelling.      Cervical back: Neck supple.      Comments: Right foot amputation surgical wound with some edema, no drainage seen   Skin:     General: Skin is warm and dry.   Neurological:      Mental Status: He is alert and oriented to person, place, and time.         Fluids    Intake/Output Summary (Last 24 hours) at 7/12/2023 1256  Last data filed at 7/12/2023 1112  Gross per 24 hour   Intake 1100 ml   Output 600 ml   Net 500 ml       Laboratory  Recent Labs     07/10/23  0025 07/11/23  0859 07/12/23  0354   WBC 8.2 8.1 8.9   RBC 4.33* 4.34* 4.20*   HEMOGLOBIN 13.0* 13.2* 12.6*   HEMATOCRIT 39.1* 39.8* 38.6*   MCV 90.3 91.7 91.9   MCH 30.0 30.4 30.0   MCHC 33.2 33.2 32.6   RDW 55.7* 54.4* 54.1*   PLATELETCT 313 307 332   MPV 9.4 9.5 9.6     Recent Labs     07/10/23  0025 07/11/23  0859 07/12/23  0354   SODIUM 134* 137 140   POTASSIUM 4.0 4.0 4.1   CHLORIDE 102 104 105   CO2 22 24 25   GLUCOSE 217* 139* 267*   BUN 15 12 14   CREATININE 0.55 0.61 0.74   CALCIUM 8.7 8.7 8.8                   Imaging  US-EXTREMITY ARTERY LOWER BILAT   Final Result      US-LATASHA SINGLE LEVEL BILAT   Final Result      EC-ECHOCARDIOGRAM COMPLETE W/O CONT   Final Result      US-EXTREMITY VENOUS LOWER UNILAT RIGHT   Final Result       DX-CHEST-PORTABLE (1 VIEW)   Final Result         1.  No acute cardiopulmonary disease.      DX-FOOT-COMPLETE 3+ RIGHT   Final Result         1.  No acute traumatic bony injury.      MR-FOOT-WITH RIGHT    (Results Pending)   US-VEIN MAPPING LOWER EXTREMITY UNILAT RIGHT    (Results Pending)        Assessment/Plan  * Osteomyelitis (HCC)- (present on admission)  Assessment & Plan  History of osteomyelitis on the right foot and had amputation transmetatarsal  Wound culture at [facility] showed mixed Pseudomonas and MRSA  Blood culture showed MRSA.    Patient was supposed to take antibiotics until 7/20 however he left AGAINST MEDICAL ADVICE  Also patient had MRSA bacteremia  X-ray did not show osteomyelitis  Ortho was consulted, MRI is pending  LATASHA abnormal, discussed with Dr. Calderon and plan for angiogram tomorrow  Wound culture is growing MRSA and Enterococcus  Continue IV vancomycin and monitor renal function.  Follow-up ID recommendation    Noncompliance  Assessment & Plan  Patient has significant history of noncompliance and not taking insulin or medications  Patient has complicated medical history including uncontrolled diabetes and peripheral vascular disease with coronary artery disease  Resume his medications  Continue education the patient    MRSA bacteremia  Assessment & Plan  Blood culture [facility] on 6/6 was positive for MRSA and repeat culture on 6/9 was negative  Blood cultures have been negative, TTE negative for endocarditis      CAD (coronary artery disease)- (present on admission)  Assessment & Plan  No chest pain and EKG did not show ischemia  Stent placement in September 2022  Holding antiplatelets pending surgery  Resume plavix after surgery completed    Peripheral artery disease (HCC)- (present on admission)  Assessment & Plan  Continue aspirin, atorvastatin.  Holding Plavix for possible surgery  Encouraged the patient to quit smoking    Alcohol abuse- (present on admission)  Assessment &  Plan  No signs of withdrawal    Tobacco abuse- (present on admission)  Assessment & Plan  Smoking cessation counseling    Diabetic foot ulcer (HCC)- (present on admission)  Assessment & Plan  Underwent amputation on left foot  Good glucose control needed  Continue IV Vanco and monitor renal function  LPS consulted, recommend likely surgery  Ortho consulted  MRI foot pending  LATASHA abnormal, I spoke with Dr. Calderon will plan for angiogram tomorrow  Blood cultures remain NGTD    Diabetes (HCC)- (present on admission)  Assessment & Plan  Uncontrolled and A1c 11.2  Patient admitted he is not taking insulin  Hyperglycemia improved. Continue Lantus 25 unit daily, ISS  DM educator has seen him         VTE prophylaxis: enoxaparin ppx    I have performed a physical exam and reviewed and updated ROS and Plan today (7/12/2023). In review of yesterday's note (7/11/2023), there are no changes except as documented above.

## 2023-07-12 NOTE — CARE PLAN
The patient is Stable - Low risk of patient condition declining or worsening    Shift Goals  Clinical Goals: pt. will be at a comfortable pain goal of 3/10 or less this shift  Patient Goals: pt. will get MRI done today  Family Goals: N/A    Progress made toward(s) clinical / shift goals:  pt. Is at a comfortable pain goal after pain medication this shift. Pt. Remains free from falls and verbalizes understanding of plan of care at this time. Pt. MRI status changed to urgent in hopes of getting it completed this shift.       Problem: Knowledge Deficit - Standard  Goal: Patient and family/care givers will demonstrate understanding of plan of care, disease process/condition, diagnostic tests and medications  Description: Target End Date:  1-3 days or as soon as patient condition allows    Document in Patient Education    1.  Patient and family/caregiver oriented to unit, equipment, visitation policy and means for communicating concern  2.  Complete/review Learning Assessment  3.  Assess knowledge level of disease process/condition, treatment plan, diagnostic tests and medications  4.  Explain disease process/condition, treatment plan, diagnostic tests and medications  Outcome: Progressing     Problem: Fall Risk  Goal: Patient will remain free from falls  Description: Target End Date:  Prior to discharge or change in level of care    Document interventions on the [facility] Fall Risk Assessment    1.  Assess for fall risk factors  2.  Implement fall precautions  Outcome: Progressing     Problem: Psychosocial  Goal: Patient's level of anxiety will decrease  Description: Target End Date:  1-3 days or as soon as patient condition allows    1.  Collaborate with patient and family/caregiver to identify triggers and develop strategies to cope with anxiety  2.  Implement stimuli reduction, calming techniques  3.  Pharmacologic management per provider order  4.  Encourage patient/family/care giver participation  5.   Collaborate with interdisciplinary team including Psychologist or Behavioral Health Team as needed  Outcome: Progressing     Problem: Pain - Standard  Goal: Alleviation of pain or a reduction in pain to the patient’s comfort goal  Description: Target End Date:  Prior to discharge or change in level of care    Document on Vitals flowsheet    1.  Document pain using the appropriate pain scale per order or unit policy  2.  Educate and implement non-pharmacologic comfort measures (i.e. relaxation, distraction, massage, cold/heat therapy, etc.)  3.  Pain management medications as ordered  4.  Reassess pain after pain med administration per policy  5.  If opiods administered assess patient's response to pain medication is appropriate per POSS sedation scale  6.  Follow pain management plan developed in collaboration with patient and interdisciplinary team (including palliative care or pain specialists if applicable)  Outcome: Progressing       Patient is not progressing towards the following goals:
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
## 2023-07-13 ENCOUNTER — APPOINTMENT (OUTPATIENT)
Dept: RADIOLOGY | Facility: MEDICAL CENTER | Age: 48
DRG: 253 | End: 2023-07-13
Attending: SURGERY
Payer: MEDICAID

## 2023-07-13 ENCOUNTER — APPOINTMENT (OUTPATIENT)
Dept: RADIOLOGY | Facility: MEDICAL CENTER | Age: 48
DRG: 253 | End: 2023-07-13
Attending: INTERNAL MEDICINE
Payer: MEDICAID

## 2023-07-13 LAB
ANION GAP SERPL CALC-SCNC: 8 MMOL/L (ref 7–16)
BACTERIA BLD CULT: NORMAL
BACTERIA BLD CULT: NORMAL
BASOPHILS # BLD AUTO: 0.6 % (ref 0–1.8)
BASOPHILS # BLD: 0.05 K/UL (ref 0–0.12)
BUN SERPL-MCNC: 12 MG/DL (ref 8–22)
CALCIUM SERPL-MCNC: 8.9 MG/DL (ref 8.5–10.5)
CHLORIDE SERPL-SCNC: 105 MMOL/L (ref 96–112)
CO2 SERPL-SCNC: 26 MMOL/L (ref 20–33)
CREAT SERPL-MCNC: 0.69 MG/DL (ref 0.5–1.4)
EOSINOPHIL # BLD AUTO: 0.42 K/UL (ref 0–0.51)
EOSINOPHIL NFR BLD: 5.4 % (ref 0–6.9)
ERYTHROCYTE [DISTWIDTH] IN BLOOD BY AUTOMATED COUNT: 53.7 FL (ref 35.9–50)
GFR SERPLBLD CREATININE-BSD FMLA CKD-EPI: 112 ML/MIN/1.73 M 2
GLUCOSE BLD STRIP.AUTO-MCNC: 117 MG/DL (ref 65–99)
GLUCOSE BLD STRIP.AUTO-MCNC: 165 MG/DL (ref 65–99)
GLUCOSE BLD STRIP.AUTO-MCNC: 238 MG/DL (ref 65–99)
GLUCOSE BLD STRIP.AUTO-MCNC: 274 MG/DL (ref 65–99)
GLUCOSE SERPL-MCNC: 117 MG/DL (ref 65–99)
HCT VFR BLD AUTO: 41.7 % (ref 42–52)
HGB BLD-MCNC: 13.8 G/DL (ref 14–18)
IMM GRANULOCYTES # BLD AUTO: 0.03 K/UL (ref 0–0.11)
IMM GRANULOCYTES NFR BLD AUTO: 0.4 % (ref 0–0.9)
LYMPHOCYTES # BLD AUTO: 2.14 K/UL (ref 1–4.8)
LYMPHOCYTES NFR BLD: 27.7 % (ref 22–41)
MCH RBC QN AUTO: 30.3 PG (ref 27–33)
MCHC RBC AUTO-ENTMCNC: 33.1 G/DL (ref 32.3–36.5)
MCV RBC AUTO: 91.6 FL (ref 81.4–97.8)
MONOCYTES # BLD AUTO: 0.7 K/UL (ref 0–0.85)
MONOCYTES NFR BLD AUTO: 9.1 % (ref 0–13.4)
NEUTROPHILS # BLD AUTO: 4.39 K/UL (ref 1.82–7.42)
NEUTROPHILS NFR BLD: 56.8 % (ref 44–72)
NRBC # BLD AUTO: 0 K/UL
NRBC BLD-RTO: 0 /100 WBC (ref 0–0.2)
PLATELET # BLD AUTO: 312 K/UL (ref 164–446)
PMV BLD AUTO: 9.1 FL (ref 9–12.9)
POTASSIUM SERPL-SCNC: 4.3 MMOL/L (ref 3.6–5.5)
RBC # BLD AUTO: 4.55 M/UL (ref 4.7–6.1)
SIGNIFICANT IND 70042: NORMAL
SIGNIFICANT IND 70042: NORMAL
SITE SITE: NORMAL
SITE SITE: NORMAL
SODIUM SERPL-SCNC: 139 MMOL/L (ref 135–145)
SOURCE SOURCE: NORMAL
SOURCE SOURCE: NORMAL
VANCOMYCIN TROUGH SERPL-MCNC: 12.5 UG/ML (ref 10–20)
WBC # BLD AUTO: 7.7 K/UL (ref 4.8–10.8)

## 2023-07-13 PROCEDURE — 36415 COLL VENOUS BLD VENIPUNCTURE: CPT

## 2023-07-13 PROCEDURE — 99233 SBSQ HOSP IP/OBS HIGH 50: CPT | Performed by: INTERNAL MEDICINE

## 2023-07-13 PROCEDURE — A9579 GAD-BASE MR CONTRAST NOS,1ML: HCPCS | Performed by: INTERNAL MEDICINE

## 2023-07-13 PROCEDURE — 700111 HCHG RX REV CODE 636 W/ 250 OVERRIDE (IP): Performed by: STUDENT IN AN ORGANIZED HEALTH CARE EDUCATION/TRAINING PROGRAM

## 2023-07-13 PROCEDURE — 700117 HCHG RX CONTRAST REV CODE 255: Performed by: INTERNAL MEDICINE

## 2023-07-13 PROCEDURE — 700105 HCHG RX REV CODE 258: Performed by: INTERNAL MEDICINE

## 2023-07-13 PROCEDURE — 75710 ARTERY X-RAYS ARM/LEG: CPT | Mod: 26 | Performed by: SURGERY

## 2023-07-13 PROCEDURE — 80048 BASIC METABOLIC PNL TOTAL CA: CPT

## 2023-07-13 PROCEDURE — 700111 HCHG RX REV CODE 636 W/ 250 OVERRIDE (IP): Mod: JZ | Performed by: INTERNAL MEDICINE

## 2023-07-13 PROCEDURE — 85025 COMPLETE CBC W/AUTO DIFF WBC: CPT

## 2023-07-13 PROCEDURE — 99152 MOD SED SAME PHYS/QHP 5/>YRS: CPT | Performed by: SURGERY

## 2023-07-13 PROCEDURE — 36200 PLACE CATHETER IN AORTA: CPT | Mod: 51,59 | Performed by: SURGERY

## 2023-07-13 PROCEDURE — 99153 MOD SED SAME PHYS/QHP EA: CPT

## 2023-07-13 PROCEDURE — 700111 HCHG RX REV CODE 636 W/ 250 OVERRIDE (IP): Mod: JZ

## 2023-07-13 PROCEDURE — 770006 HCHG ROOM/CARE - MED/SURG/GYN SEMI*

## 2023-07-13 PROCEDURE — 73720 MRI LWR EXTREMITY W/O&W/DYE: CPT | Mod: RT

## 2023-07-13 PROCEDURE — 700111 HCHG RX REV CODE 636 W/ 250 OVERRIDE (IP): Performed by: INTERNAL MEDICINE

## 2023-07-13 PROCEDURE — 36246 INS CATH ABD/L-EXT ART 2ND: CPT | Performed by: SURGERY

## 2023-07-13 PROCEDURE — 700102 HCHG RX REV CODE 250 W/ 637 OVERRIDE(OP): Performed by: INTERNAL MEDICINE

## 2023-07-13 PROCEDURE — 75625 CONTRAST EXAM ABDOMINL AORTA: CPT | Mod: 26 | Performed by: SURGERY

## 2023-07-13 PROCEDURE — A9270 NON-COVERED ITEM OR SERVICE: HCPCS | Performed by: INTERNAL MEDICINE

## 2023-07-13 PROCEDURE — 82962 GLUCOSE BLOOD TEST: CPT | Mod: 91

## 2023-07-13 PROCEDURE — 700102 HCHG RX REV CODE 250 W/ 637 OVERRIDE(OP): Performed by: STUDENT IN AN ORGANIZED HEALTH CARE EDUCATION/TRAINING PROGRAM

## 2023-07-13 PROCEDURE — 99153 MOD SED SAME PHYS/QHP EA: CPT | Performed by: SURGERY

## 2023-07-13 PROCEDURE — B41D1ZZ FLUOROSCOPY OF AORTA AND BILATERAL LOWER EXTREMITY ARTERIES USING LOW OSMOLAR CONTRAST: ICD-10-PCS | Performed by: SURGERY

## 2023-07-13 PROCEDURE — 80202 ASSAY OF VANCOMYCIN: CPT

## 2023-07-13 RX ORDER — MIDAZOLAM HYDROCHLORIDE 1 MG/ML
INJECTION INTRAMUSCULAR; INTRAVENOUS
Status: COMPLETED
Start: 2023-07-13 | End: 2023-07-13

## 2023-07-13 RX ORDER — ONDANSETRON 2 MG/ML
4 INJECTION INTRAMUSCULAR; INTRAVENOUS PRN
Status: DISCONTINUED | OUTPATIENT
Start: 2023-07-13 | End: 2023-07-13 | Stop reason: HOSPADM

## 2023-07-13 RX ORDER — HEPARIN SODIUM 1000 [USP'U]/ML
INJECTION, SOLUTION INTRAVENOUS; SUBCUTANEOUS
Status: COMPLETED
Start: 2023-07-13 | End: 2023-07-13

## 2023-07-13 RX ORDER — MIDAZOLAM HYDROCHLORIDE 1 MG/ML
.5-2 INJECTION INTRAMUSCULAR; INTRAVENOUS PRN
Status: DISCONTINUED | OUTPATIENT
Start: 2023-07-13 | End: 2023-07-13 | Stop reason: HOSPADM

## 2023-07-13 RX ORDER — SODIUM CHLORIDE 9 MG/ML
500 INJECTION, SOLUTION INTRAVENOUS
Status: DISCONTINUED | OUTPATIENT
Start: 2023-07-13 | End: 2023-07-13 | Stop reason: HOSPADM

## 2023-07-13 RX ORDER — IODIXANOL 270 MG/ML
73 INJECTION, SOLUTION INTRAVASCULAR ONCE
Status: ACTIVE | OUTPATIENT
Start: 2023-07-13 | End: 2023-07-14

## 2023-07-13 RX ADMIN — MIDAZOLAM 1 MG: 1 INJECTION, SOLUTION INTRAMUSCULAR; INTRAVENOUS at 11:41

## 2023-07-13 RX ADMIN — INSULIN LISPRO 3 UNITS: 100 INJECTION, SOLUTION INTRAVENOUS; SUBCUTANEOUS at 22:25

## 2023-07-13 RX ADMIN — INSULIN LISPRO 3 UNITS: 100 INJECTION, SOLUTION INTRAVENOUS; SUBCUTANEOUS at 18:24

## 2023-07-13 RX ADMIN — COLCHICINE 0.6 MG: 0.6 TABLET ORAL at 06:30

## 2023-07-13 RX ADMIN — SENNOSIDES AND DOCUSATE SODIUM 2 TABLET: 50; 8.6 TABLET ORAL at 06:30

## 2023-07-13 RX ADMIN — LISINOPRIL 10 MG: 10 TABLET ORAL at 06:30

## 2023-07-13 RX ADMIN — ATORVASTATIN CALCIUM 40 MG: 40 TABLET, FILM COATED ORAL at 21:39

## 2023-07-13 RX ADMIN — ONDANSETRON 4 MG: 2 INJECTION INTRAMUSCULAR; INTRAVENOUS at 08:28

## 2023-07-13 RX ADMIN — OMEPRAZOLE 20 MG: 20 CAPSULE, DELAYED RELEASE ORAL at 06:30

## 2023-07-13 RX ADMIN — MORPHINE SULFATE 3 MG: 4 INJECTION, SOLUTION INTRAMUSCULAR; INTRAVENOUS at 21:40

## 2023-07-13 RX ADMIN — MORPHINE SULFATE 3 MG: 4 INJECTION, SOLUTION INTRAMUSCULAR; INTRAVENOUS at 16:47

## 2023-07-13 RX ADMIN — ASPIRIN 81 MG: 81 TABLET, COATED ORAL at 06:30

## 2023-07-13 RX ADMIN — MORPHINE SULFATE 3 MG: 4 INJECTION, SOLUTION INTRAMUSCULAR; INTRAVENOUS at 08:29

## 2023-07-13 RX ADMIN — MIDAZOLAM HYDROCHLORIDE 1 MG: 1 INJECTION INTRAMUSCULAR; INTRAVENOUS at 11:41

## 2023-07-13 RX ADMIN — VANCOMYCIN HYDROCHLORIDE 1750 MG: 5 INJECTION, POWDER, LYOPHILIZED, FOR SOLUTION INTRAVENOUS at 14:49

## 2023-07-13 RX ADMIN — GADOTERIDOL 20 ML: 279.3 INJECTION, SOLUTION INTRAVENOUS at 14:56

## 2023-07-13 RX ADMIN — FENTANYL CITRATE 50 MCG: 50 INJECTION, SOLUTION INTRAMUSCULAR; INTRAVENOUS at 11:41

## 2023-07-13 RX ADMIN — ESCITALOPRAM OXALATE 10 MG: 10 TABLET ORAL at 06:30

## 2023-07-13 RX ADMIN — ONDANSETRON 4 MG: 4 TABLET, ORALLY DISINTEGRATING ORAL at 20:25

## 2023-07-13 RX ADMIN — INSULIN LISPRO 2 UNITS: 100 INJECTION, SOLUTION INTRAVENOUS; SUBCUTANEOUS at 08:58

## 2023-07-13 ASSESSMENT — PAIN SCALES - PAIN ASSESSMENT IN ADVANCED DEMENTIA (PAINAD)
BREATHING: NORMAL
BREATHING: NORMAL

## 2023-07-13 ASSESSMENT — PAIN DESCRIPTION - PAIN TYPE
TYPE: ACUTE PAIN

## 2023-07-13 ASSESSMENT — ENCOUNTER SYMPTOMS
ABDOMINAL PAIN: 0
MYALGIAS: 0
DIARRHEA: 0
NERVOUS/ANXIOUS: 0
NAUSEA: 0
CHILLS: 0
FEVER: 0
WEAKNESS: 0
VOMITING: 0
SHORTNESS OF BREATH: 0

## 2023-07-13 ASSESSMENT — PATIENT HEALTH QUESTIONNAIRE - PHQ9
1. LITTLE INTEREST OR PLEASURE IN DOING THINGS: NOT AT ALL
2. FEELING DOWN, DEPRESSED, IRRITABLE, OR HOPELESS: NOT AT ALL
SUM OF ALL RESPONSES TO PHQ9 QUESTIONS 1 AND 2: 0

## 2023-07-13 NOTE — PROGRESS NOTES
VASCULAR SURGERY PROGRESS NOTE       Angiogram results were discussed with patient.    Discussed with patient the option of undergoing a right femoral to popliteal bypass using right greater saphenous vein, possible left greater saphenous vein, to improve circulation to his right foot to help with wound healing.  Risks of the procedure were discussed which include, but not limited to, bleeding, infection, seroma formation, seroma leak, nonhealing of surgical incisions, and perioperative anesthetic complications.  The alternative of not having the procedure done was also discussed along with the pros and cons.  After a long discussion, patient indicated understanding and would like to proceed with right femoral to popliteal bypass, fully understanding all risks..  All questions were answered.    Surgery is on schedule for tomorrow at 1 PM.

## 2023-07-13 NOTE — OR SURGEON
VASCULAR SURGERY IMMEDIATE POST OP NOTE       PreOp Diagnosis: Peripheral arterial disease and infected, nonhealing right transmetatarsal amputation site.      PostOp Diagnosis: Same.      Procedure(s):  1) Percutaneous cannulation of left common femoral artery under ultrasound guidance.    2) Catheter, abdominal aorta.    3) Abdominal aortogram and bilateral iliac angiogram.    4) Selective catheterization, right common femoral artery from contralateral approach.    5) Right leg angiogram using diluted contrast.    6) Application of StarClose, left common femoral artery.      Surgeon: Angy Calderon MD.    Type of Anesthesia: IV sedation with fentanyl and Versed for 30 minutes and local anesthesia with 10 mm 1% lidocaine solution.    IR Staff:    Specimens removed if any: None.  * No specimens in log *    Estimated Blood Loss: 10 mL.    Contrast used: 34 mL Visipaque.    Findings: Occluded proximal to mid/distal right superficial femoral artery with reconstitution of flow in popliteal artery and three-vessel runoffs.    Complications: None.    Dictated, #[id number].        7/13/2023 12:24 PM Angy Calderon M.D.

## 2023-07-13 NOTE — PROGRESS NOTES
Patient returned to room from IR. No complaints of pain. A&Ox4. CMS intact. Patient to remain flat until 14:30. Post-op vitals in process.

## 2023-07-13 NOTE — PROGRESS NOTES
Infectious Disease Progress Note    Author: Jasiel Purdy M.D. Date & Time of service: 2023  8:36 AM    Chief Complaint:  Follow-up for right foot osteomyelitis    Interval History:   patient remains afebrile, white count is 8.8, tolerating vancomycin and meropenem for now, cultures as below, creatinine 0.65, normal transaminases, magnesium 1.8, albumin 3.4,   patient remains afebrile, no CBC this morning, tolerating vancomycin, point-of-care glucose 264, Vanco trough 10.9  7/10 patient remains afebrile, white count is 8.2, tolerating vancomycin, still awaiting MRI, TTE with no obvious valve vegetations, creatinine 0.55, vanc trough 10.9, blood cultures as below   still awaiting MRI.  Anticipated today.  Patient remains afebrile, count is 8.1, tolerating vancomycin, creatinine 0.61, last vancomycin peak 21.2, culture results as below   still awaiting MRI that was ordered on .  Patient is afebrile white count is 8.9, tolerating vancomycin, creatinine 0.74   still awaiting MRI scan originally ordered on .  Angiogram planned for assessment and possible intervention of vascular disease.  Patient afebrile, no CBC this morning, tolerating vancomycin.  Vancomycin trough level obtained this morning, point-of-care glucose 279    Labs Reviewed and Medications Reviewed.    Review of Systems:  Review of Systems   Constitutional:  Negative for chills and fever.   Gastrointestinal:  Negative for abdominal pain, diarrhea, nausea and vomiting.   Skin:  Negative for itching and rash.       Hemodynamics:  Temp (24hrs), Av.4 °C (97.6 °F), Min:36.1 °C (97 °F), Max:37.2 °C (98.9 °F)  Temperature: 36.5 °C (97.7 °F)  Pulse  Av.8  Min: 65  Max: 101   Blood Pressure: (!) 136/90       Physical Exam:  Physical Exam  Vitals and nursing note reviewed.   Constitutional:       General: He is not in acute distress.     Appearance: He is not ill-appearing.   HENT:      Mouth/Throat:      Pharynx: No  oropharyngeal exudate.      Comments: Poor dentition  Eyes:      General:         Right eye: No discharge.   Cardiovascular:      Rate and Rhythm: Normal rate.      Heart sounds: No murmur heard.  Pulmonary:      Effort: Pulmonary effort is normal. No respiratory distress.      Breath sounds: No stridor.   Abdominal:      General: Abdomen is flat. There is no distension.      Tenderness: There is no abdominal tenderness.   Musculoskeletal:      Comments: Right-sided TMA site with improved swelling, small opening laterally packed by wound care, no significant discharge   Neurological:      General: No focal deficit present.      Mental Status: He is alert and oriented to person, place, and time.   Psychiatric:         Mood and Affect: Mood normal.         Behavior: Behavior normal.         Meds:    Current Facility-Administered Medications:     NS    vancomycin    insulin GLARGINE **AND** [DISCONTINUED] insulin lispro **AND** [DISCONTINUED] insulin lispro **AND** POC blood glucose manual result **AND** NOTIFY MD and PharmD **AND** Administer 20 grams of glucose (approximately 8 ounces of fruit juice) every 15 minutes PRN FSBG less than 70 mg/dL **AND** dextrose bolus    insulin lispro    HYDROcodone-acetaminophen    morphine injection    MD Alert...Vancomycin per Pharmacy    atorvastatin    [Held by provider] clopidogrel    colchicine    escitalopram    lisinopril    omeprazole    senna-docusate **AND** polyethylene glycol/lytes **AND** magnesium hydroxide **AND** bisacodyl    [Held by provider] enoxaparin (LOVENOX) injection    acetaminophen    ondansetron    ondansetron    promethazine    promethazine    prochlorperazine    aspirin    Labs:  Recent Labs     07/11/23  0859 07/12/23  0354   WBC 8.1 8.9   RBC 4.34* 4.20*   HEMOGLOBIN 13.2* 12.6*   HEMATOCRIT 39.8* 38.6*   MCV 91.7 91.9   MCH 30.4 30.0   RDW 54.4* 54.1*   PLATELETCT 307 332   MPV 9.5 9.6   NEUTSPOLYS 47.80 51.20   LYMPHOCYTES 36.20 32.30   MONOCYTES  9.90 10.40   EOSINOPHILS 5.10 5.10   BASOPHILS 0.50 0.70       Recent Labs     23  0859 23  0354   SODIUM 137 140   POTASSIUM 4.0 4.1   CHLORIDE 104 105   CO2 24 25   GLUCOSE 139* 267*   BUN 12 14       Recent Labs     23  0859 23  0354   CREATININE 0.61 0.74         Imaging:  US-EXTREMITY VENOUS LOWER UNILAT RIGHT    Result Date: 2023   Vascular Laboratory  CONCLUSIONS  Normal right lower extremity deep venous examination.  REGLA HAYES  Exam Date:     2023 11:25  Room #:     Inpatient  Priority:     Routine  Ht (in):             Wt (lb):  Ordering Physician:        JULY CALIX  Referring Physician:       YOGI Villalba  Sonographer:               Tim Dc RVT  Study Type:                Complete Unilateral  Technical Quality:         Adequate  Age:    48    Gender:     M  MRN:    [id number]  :    1975      BSA:  Indications:     Localized swelling, mass and lump, right lower limb  CPT Codes:       43683  ICD Codes:       R22.41  History:         right leg edema & pain; recent right transmetatarsal foot                   amputation; no prior exam  Limitations:  PROCEDURES:  Right lower extremity venous duplex imaging.  Serial compression, color, and spectral Doppler flow evaluations were  performed.  The following venous structures were evaluated: common femoral, deep  femoral, proximal great saphenous, femoral, popliteal, peroneal, and  posterior tibial veins.  FINDINGS:  RIGHT LOWER EXTREMITY:  No deep venous thrombosis.  All veins demonstrate complete color filling and compressibility with  normal venous flow dynamics including spontaneous flow and respiratory  phasicity.  Rene Lara MD  (Electronically Signed)  Final Date:      2023                   20:02    DX-FOOT-COMPLETE 3+ RIGHT    Result Date: 2023 6:13 AM HISTORY/REASON FOR EXAM: Atraumatic Pain/Swelling/Deformity TECHNIQUE/EXAM DESCRIPTION:  AP,  lateral, and oblique views of the RIGHT foot. COMPARISON:  February 3, 2020 FINDINGS: Postsurgical changes of transmetatarsal amputation of the foot are seen. There is no acute fracture, subluxation, or dislocation identified. Soft tissue swelling of the forefoot is seen.     1.  No acute traumatic bony injury.    DX-CHEST-PORTABLE (1 VIEW)    Result Date: 7/5/2023 7/5/2023 6:10 AM HISTORY/REASON FOR EXAM: possible sepsis. TECHNIQUE/EXAM DESCRIPTION:  Single AP view of the chest. COMPARISON: November 16, 2022 FINDINGS: Overlying cardiac leads are present. The cardiac silhouette appears within normal limits. The mediastinal contour appears within normal limits.  The central pulmonary vasculature appears normal. Bilateral lung volumes are diminished.  Bilateral lungs are clear. No significant pleural effusions are identified. The bony structures appear age-appropriate.     1.  No acute cardiopulmonary disease.    IR PICC    Result Date: 6/16/2023  CLINICAL DATA: Long term IV abx, FINDINGS:  The nature of the procedure and associated risks and benefits were explained to the patient and consent to perform the procedure was obtained. Maximum sterile barrier technique was utilized. Hand washing with alcohol-based antiseptic preceded the procedure. Cap, mask, sterile gown and sterile gloves were worn. The patient's skin was prepped with 2% chlorhexidine. Catheter insertion site was draped with a large sterile drape. The left arm was assessed with ultrasound to identify a suitable vein. The brachial vein was identified, flowing blood within the vein and vein compressibility indicated that the vein was patent. Subsequently, under real-time sonographic guidance a 22-gauge micropuncture needle was advanced into the lumen of the vein. An ultrasound image demonstrating the needle tip in the vein lumen was recorded in the patient record. Through the 22-gauge needle a 0.018-gauge guidewire was advanced to secure access to the  vein. A vessel dilator was advanced over the guidewire in conjunction with a peel-away sheath. Appropriate catheter length was determined with the original guidewire under fluoroscopic observation. Subsequently, the PICC line was cut to appropriate length and advanced over a second guidewire to the distal superior vena cava and secured at the skin. Final catheter position was recorded with a chest x-ray. Catheter length is 47 centimeters. The fluoroscopy time was 0.3 minutes.  One image was obtained.  3.7 mg, 1 25 mcg/sq m.    PLACEMENT OF A PERIPHERALLY INSERTED CENTRAL CATHETER USING FLUOROSCOPIC AND SONOGRAPHIC GUIDANCE AS OUTLINED ABOVE.      Micro:  Results       Procedure Component Value Units Date/Time    Culture Wound With Gram Stain [[id number]]  (Abnormal)  (Susceptibility) Collected: 07/07/23 1428    Order Status: Completed Specimen: Wound from Right Foot Updated: 07/12/23 0736     Significant Indicator POS     Source WND     Site RIGHT FOOT     Culture Result -     Gram Stain Result Moderate WBCs.  No organisms seen.       Culture Result Methicillin Resistant Staphylococcus aureus  Light growth        Enterococcus faecalis  Light growth  The susceptibility profile for this organism indicates that  Streptomycin would not be an effective component of  combination therapy.  The susceptibility profile for this organism indicates that  Gentamycin would not be an effective component of combination  therapy.      Narrative:      Collected By: [facility] COLLEEN PAULSON  Collected By: [facility] COLLEEN PAULSON    Susceptibility       Methicillin resistant staphylococcus aureus (1)       Antibiotic Interpretation Microscan   Method Status    Azithromycin Resistant >4 mcg/mL RENATE Final    Clindamycin Sensitive <=0.25 mcg/mL RENATE Final    Cefazolin Resistant <=8 mcg/mL RENATE Final    Cefepime Resistant 16 mcg/mL RENATE Final    Ceftaroline Sensitive <=0.5 mcg/mL RENATE Final    Daptomycin Sensitive 1 mcg/mL RENATE Final    Erythromycin  "Resistant >4 mcg/mL RENATE Final    Ampicillin/sulbactam Resistant 16/8 mcg/mL RENATE Final    Oxacillin Resistant >2 mcg/mL RENATE Final    Trimeth/Sulfa Sensitive <=0.5/9.5 mcg/mL RENATE Final    Tetracycline Sensitive <=4 mcg/mL RENATE Final    Vancomycin Sensitive 1 mcg/mL RENATE Final              Enterococcus faecalis (2)       Antibiotic Interpretation Microscan   Method Status    Ampicillin Sensitive <=2 mcg/mL RENATE Final    Daptomycin Sensitive 2 mcg/mL RENATE Final    Strep Synergy Resistant >1000 mcg/mL RENATE Final    Vancomycin Sensitive 1 mcg/mL RENATE Final    Gent Synergy Resistant >500 mcg/mL RENATE Final                       Blood Culture [[id number]] Collected: 07/05/23 0622    Order Status: Completed Specimen: Blood from Peripheral Updated: 07/10/23 0900     Significant Indicator NEG     Source BLD     Site PERIPHERAL     Culture Result No growth after 5 days of incubation.    Narrative:      Per Hospital Policy: Only change Specimen Src: to \"Line\" if  specified by physician order.  Right AC    Blood Culture [[id number]] Collected: 07/05/23 0622    Order Status: Completed Specimen: Blood from Peripheral Updated: 07/10/23 0900     Significant Indicator NEG     Source BLD     Site PERIPHERAL     Culture Result No growth after 5 days of incubation.    Narrative:      Per Hospital Policy: Only change Specimen Src: to \"Line\" if  specified by physician order.  Left AC    BLOOD CULTURE [[id number]] Collected: 07/08/23 0737    Order Status: Completed Specimen: Blood from Peripheral Updated: 07/09/23 0635     Significant Indicator NEG     Source BLD     Site PERIPHERAL     Culture Result No Growth  Note: Blood cultures are incubated for 5 days and  are monitored continuously.Positive blood cultures  are called to the RN and reported as soon as  they are identified.      Narrative:      Per Hospital Policy: Only change Specimen Src: to \"Line\" if  specified by physician order.  Left Forearm/Arm    BLOOD CULTURE [[id number]] Collected: " "07/08/23 1403    Order Status: Completed Specimen: Blood from Peripheral Updated: 07/09/23 0635     Significant Indicator NEG     Source BLD     Site PERIPHERAL     Culture Result No Growth  Note: Blood cultures are incubated for 5 days and  are monitored continuously.Positive blood cultures  are called to the RN and reported as soon as  they are identified.      Narrative:      Per Hospital Policy: Only change Specimen Src: to \"Line\" if  specified by physician order.  Left AC    GRAM STAIN [[id number]] Collected: 07/07/23 1428    Order Status: Completed Specimen: Wound Updated: 07/07/23 2250     Significant Indicator .     Source WND     Site RIGHT FOOT     Gram Stain Result Moderate WBCs.  No organisms seen.      Narrative:      Collected By: [facility] COLLEEN PAULSON  Collected By: [facility] COLLEEN PAULSON    Urine Culture (New) [[id number]] Collected: 07/05/23 0915    Order Status: Completed Specimen: Urine Updated: 07/07/23 0651     Significant Indicator NEG     Source UR     Site -     Culture Result No growth at 48 hours.    Narrative:      Indication for culture:->Evaluation for sepsis without a  clear source of infection  Indication for culture:->Evaluation for sepsis without a            Assessment/Hospital Course:  This is a 48-year-old male patient with uncontrolled type 2 diabetes, CAD status post stent, PTSD, hypertension, alcoholism, polysubstance abuse, peripheral arterial disease, diagnosis of right foot osteomyelitis in April 2023 at [facility] post TMA 6/8/2023. Blood cultures x2 from 6/8 were positive for MRSA and repeat blood cultures from 6/9 were negative. He was discharged to SNF with daptomycin and meropenem with plan to continue through 7/20 but patient left A on 6/17.  Patient now presented with several days of chills and drainage of right-sided TMA site with pain, per report pathology was positive for osteomyelitis of the margins    It appears that the reason for meropenem was a superficial " skin swab from 4/13, nearly 3 months ago, that grew rare growth of ESBL E. coli along with mixed skin maryellen and cellular debris but without MRSA which are likely commensals/contaminants.  A superficial swab has been obtained here 7/7 and this is growing MRSA thus far.    Pertinent Diagnoses:   Right foot osteomyelitis, status post TMA, positive margins per pathology per report  Recent MRSA bacteremia, incompletely treated, patient left AMA  Uncontrolled type 2 diabetes  History of polysubstance abuse  Peripheral arterial disease    Plan:  -Continue IV vancomycin, discontinued meropenem 7/8.  Monitor vancomycin levels and renal function as above  -Superficial swab obtained here growing MRSA and E faecalis, the latter likely a skin commensal  -MRI foot is still pending (ordered 7/7).  We will follow along  -TTE with no obvious valve vegetations  -Blood cultures x2 from 7/5 and 7/8, negative  -Monitor for any new areas of sites of seeding of infection  -Noted plans for angiogram by vascular surgery    Disposition: Anticipate eventual return to facility for an additional 6 weeks of IV daptomycin or vancomycin.  Awaiting MRI and potential surgical intervention  Need for PICC line: Yes, okay to place after any surgical procedures    Discussed with Dr. Harry.  ID will follow.  This illness poses a threat to limb function

## 2023-07-13 NOTE — CARE PLAN
The patient is Stable - Low risk of patient condition declining or worsening    Shift Goals  Clinical Goals: pain control  Patient Goals: pain and nausea control, anxiety reduction  Family Goals: na    Progress made toward(s) clinical / shift goals:  Administered pain and nausea medication as ordered. Patient denies pain and nausea post angiogram. Patient tolerating diet and educated on surgical plan for tomorrow by MD.     Problem: Knowledge Deficit - Standard  Goal: Patient and family/care givers will demonstrate understanding of plan of care, disease process/condition, diagnostic tests and medications  Outcome: Progressing     Problem: Fall Risk  Goal: Patient will remain free from falls  Outcome: Progressing     Problem: Optimal Care for Alcohol Withdrawal  Goal: Optimal Care for the alcohol withdrawal patient  Outcome: Progressing     Problem: Seizure Precautions  Goal: Implementation of seizure precautions  Outcome: Progressing     Problem: Lifestyle Changes  Goal: Patient's ability to identify lifestyle changes and available resources to help reduce recurrence of condition will improve  Outcome: Progressing     Problem: Psychosocial  Goal: Patient's level of anxiety will decrease  Outcome: Progressing  Goal: Spiritual and cultural needs incorporated into hospitalization  Outcome: Progressing     Problem: Risk for Aspiration  Goal: Patient's risk for aspiration will be absent or decrease  Outcome: Progressing     Problem: Pain - Standard  Goal: Alleviation of pain or a reduction in pain to the patient’s comfort goal  Outcome: Progressing       Patient is not progressing towards the following goals:
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
## 2023-07-13 NOTE — PROGRESS NOTES
"Pharmacy Vancomycin Kinetics Note for 7/13/2023     48 y.o. male on Vancomycin day # 9     Vancomycin Indication (Two level/Trough based Dosing): Osteomyelitis (goal trough 10-15)    Provider specified end date:  (Definitive stop date TBD)    Active Antibiotics (From admission, onward)      Ordered     Ordering Provider       Sun Jul 9, 2023  6:58 AM    07/09/23 0658  vancomycin (Vancocin) 1,750 mg in  mL IVPB  EVERY 12 HOURS         Rosangela Harry M.D.       Thu Jul 6, 2023  1:49 PM    07/06/23 1349  MD Alert...Vancomycin per Pharmacy  PHARMACY TO DOSE        Question:  Indication(s) for vancomycin?  Answer:  Skin and soft tissue infection    Rosangela Harry M.D.            Dosing Weight: 88 kg (194 lb 0.1 oz)      Admission History: Admitted on 7/5/2023 for Osteomyelitis (HCC) [M86.9]  Pertinent history: 49yo male with history of DMII, PTSD, hypertension, alcoholism and PVD presents with worsening pain in his right leg.  Last month patient was diagnosed with osteomyelitis and he underwent amputation at [facility], at that time patient had MRSA/ESBL and patient was discharged to the SNF with daptomycin and meropenem for 6 weeks but patient left AMA from the SNF after 2 days. Patient underwent TMA on 7/7 and patient on vancomycin for OR cultures that returned positive for MRSA and E. faecalis.    Allergies:     Apple and Lactose     Pertinent cultures to date:     Results       Procedure Component Value Units Date/Time    BLOOD CULTURE [[id number]] Collected: 07/08/23 1403    Order Status: Completed Specimen: Blood from Peripheral Updated: 07/13/23 1500     Significant Indicator NEG     Source BLD     Site PERIPHERAL     Culture Result No growth after 5 days of incubation.    Narrative:      Per Hospital Policy: Only change Specimen Src: to \"Line\" if  specified by physician order.  Left AC    BLOOD CULTURE [[id number]] Collected: 07/08/23 0737    Order Status: Completed Specimen: Blood from Peripheral Updated: " "07/13/23 0900     Significant Indicator NEG     Source BLD     Site PERIPHERAL     Culture Result No growth after 5 days of incubation.    Narrative:      Per Hospital Policy: Only change Specimen Src: to \"Line\" if  specified by physician order.  Left Forearm/Arm    Culture Wound With Gram Stain [[id number]]  (Abnormal)  (Susceptibility) Collected: 07/07/23 1428    Order Status: Completed Specimen: Wound from Right Foot Updated: 07/12/23 0736     Significant Indicator POS     Source WND     Site RIGHT FOOT     Culture Result -     Gram Stain Result Moderate WBCs.  No organisms seen.       Culture Result Methicillin Resistant Staphylococcus aureus  Light growth        Enterococcus faecalis  Light growth  The susceptibility profile for this organism indicates that  Streptomycin would not be an effective component of  combination therapy.  The susceptibility profile for this organism indicates that  Gentamycin would not be an effective component of combination  therapy.      Narrative:      Collected By: [facility] COLLEEN PAULSON  Collected By: [facility] COLLEEN PAULSON    Blood Culture [[id number]] Collected: 07/05/23 0622    Order Status: Completed Specimen: Blood from Peripheral Updated: 07/10/23 0900     Significant Indicator NEG     Source BLD     Site PERIPHERAL     Culture Result No growth after 5 days of incubation.    Narrative:      Per Hospital Policy: Only change Specimen Src: to \"Line\" if  specified by physician order.  Right AC    Blood Culture [[id number]] Collected: 07/05/23 0622    Order Status: Completed Specimen: Blood from Peripheral Updated: 07/10/23 0900     Significant Indicator NEG     Source BLD     Site PERIPHERAL     Culture Result No growth after 5 days of incubation.    Narrative:      Per Hospital Policy: Only change Specimen Src: to \"Line\" if  specified by physician order.  Left AC    GRAM STAIN [[id number]] Collected: 07/07/23 1428    Order Status: Completed Specimen: Wound Updated: 07/07/23 2250     " Significant Indicator .     Source WND     Site RIGHT FOOT     Gram Stain Result Moderate WBCs.  No organisms seen.      Narrative:      Collected By: [facility] COLLEEN PAULSON  Collected By: [facility] COLLEEN PAULSON    Urine Culture (New) [[id number]] Collected: 07/05/23 0915    Order Status: Completed Specimen: Urine Updated: 07/07/23 0651     Significant Indicator NEG     Source UR     Site -     Culture Result No growth at 48 hours.    Narrative:      Indication for culture:->Evaluation for sepsis without a  clear source of infection  Indication for culture:->Evaluation for sepsis without a    MRSA By PCR (Amp) [[id number]] Collected: 07/05/23 0732    Order Status: Completed Specimen: Respirate from Nares Updated: 07/05/23 1058     MRSA by PCR Negative    Urinalysis [[id number]]  (Abnormal) Collected: 07/05/23 0915    Order Status: Completed Specimen: Urine Updated: 07/05/23 1046     Color Yellow     Character Clear     Specific Gravity 1.044     Ph 5.0     Glucose >=1000 mg/dL      Ketones Negative mg/dL      Protein Negative mg/dL      Bilirubin Negative     Urobilinogen, Urine 0.2     Nitrite Negative     Leukocyte Esterase Negative     Occult Blood Negative     Micro Urine Req see below     Comment: Microscopic examination not performed when specimen is clear  and chemically negative for protein, blood, leukocyte esterase  and nitrite.         Narrative:      Indication for culture:->Evaluation for sepsis without a  clear source of infection            Labs:     Estimated Creatinine Clearance: 140.9 mL/min (by C-G formula based on SCr of 0.69 mg/dL).  Recent Labs     07/11/23  0859 07/12/23  0354 07/13/23  1323   WBC 8.1 8.9 7.7   NEUTSPOLYS 47.80 51.20 56.80     Recent Labs     07/11/23  0859 07/12/23  0354 07/13/23  1323   BUN 12 14 12   CREATININE 0.61 0.74 0.69       Intake/Output Summary (Last 24 hours) at 7/13/2023 1607  Last data filed at 7/13/2023 0748  Gross per 24 hour   Intake --   Output 500 ml   Net -500 ml  "     /85   Pulse 67   Temp 36.7 °C (98 °F) (Temporal)   Resp 17   Ht 1.753 m (5' 9\")   Wt 88.5 kg (195 lb)   SpO2 99%  Temp (24hrs), Av.6 °C (97.8 °F), Min:36.2 °C (97.1 °F), Max:37.2 °C (98.9 °F)      List concerns for Vancomycin clearance:     Other (Potential prolonged course of vancomycin therapy)    Pharmacokinetics:     Trough kinetics:   Recent Labs     23  1323   VANCOTROUGH 12.5       A/P:     -  Vancomycin dose: Continue vancomycin 1750 mg IV q12hrs    -  Next vancomycin level(s): 5-7 days (not currently ordered)    - Calculated AUC (from trough): 454 mg·hr/L    -  Comments: Patient remains overall clinically stable on vancomycin, MRI completer today for guidance on definitive duration of antibiotic therapy. Renal indices stable and no new concerns for accumulation of vancomycin identified. Patient's calculated AUC within therapeutic range today based on trough/prior calculated AUC. Will continue current vancomycin dosing and plan repeat trough in ~5-7 days to ensure patient remains within therapeutic range (will check sooner if acute concerns for renal function arise).     Pharmacy will continue to follow.     Renea Romo, PharmD, BCCCP    "

## 2023-07-13 NOTE — CARE PLAN
The patient is Stable - Low risk of patient condition declining or worsening    Shift Goals  Clinical Goals: reduced pain to comfort level  Patient Goals: surgery tomorrow  Family Goals: na    Progress made toward(s) clinical / shift goals:  PAIN WELL CONTROLLED WITH PRN MEDS,     Patient is not progressing towards the following goals:

## 2023-07-13 NOTE — PROGRESS NOTES
Hospital Medicine Daily Progress Note    Date of Service  7/13/2023    Chief Complaint  Rogelio Escudero is a 48 y.o. male admitted 7/5/2023 with foot wound    Hospital Course  49 yo man with uncontrolled diabetes, CAD S/P stent, HTN, alcoholism, PVD, PTSD who presented with right leg pain.  His right foot toes were recently amputated for osteomyelitis at [place] and he was discharged to SNF on daptomycin and meropenem until 7/20, however the patient left AMA.  He was admitted for worsening foot infection with osteomyelitis.  Ortho and ID were consulted.    Interval Problem Update  Patient says his foot was draining some purulent fluid last night.  Denies pain.  Angiogram scheduled for today.  Monitor his glucose levels as he has been n.p.o. and Lantus was held.  Spoke with radiology, his MRI will be completed today    I have discussed this patient's plan of care and discharge plan at IDT rounds today with Case Management, Nursing, Nursing leadership, and other members of the IDT team.    Consultants/Specialty  infectious disease and orthopedics    Code Status  Full Code    Disposition  The patient is not medically cleared for discharge to home or a post-acute facility.  Anticipate discharge to: home with organized home healthcare and close outpatient follow-up    I have placed the appropriate orders for post-discharge needs.    Review of Systems  Review of Systems   Constitutional:  Negative for malaise/fatigue.   Respiratory:  Negative for shortness of breath.    Cardiovascular:  Negative for chest pain.   Gastrointestinal:  Negative for abdominal pain, diarrhea and vomiting.   Musculoskeletal:  Negative for myalgias.   Neurological:  Negative for weakness.   Psychiatric/Behavioral:  The patient is not nervous/anxious.         Physical Exam  Temp:  [36.2 °C (97.1 °F)-37.2 °C (98.9 °F)] 36.7 °C (98 °F)  Pulse:  [63-84] 67  Resp:  [10-18] 17  BP: (125-159)/() 131/85  SpO2:  [95 %-99 %] 99 %    Physical  Exam  Vitals and nursing note reviewed.   Constitutional:       General: He is not in acute distress.     Appearance: He is not toxic-appearing.   HENT:      Head: Normocephalic.      Mouth/Throat:      Mouth: Mucous membranes are moist.   Eyes:      General:         Right eye: No discharge.         Left eye: No discharge.   Pulmonary:      Effort: No respiratory distress.   Abdominal:      Palpations: Abdomen is soft.      Tenderness: There is no abdominal tenderness.   Musculoskeletal:         General: No swelling.      Cervical back: Neck supple.      Comments: Right foot amputation surgical wound with some edema, mild serosanguineous fluid laterally   Skin:     General: Skin is warm and dry.   Neurological:      Mental Status: He is alert and oriented to person, place, and time.         Fluids    Intake/Output Summary (Last 24 hours) at 7/13/2023 1403  Last data filed at 7/13/2023 0748  Gross per 24 hour   Intake --   Output 500 ml   Net -500 ml       Laboratory  Recent Labs     07/11/23  0859 07/12/23  0354 07/13/23  1323   WBC 8.1 8.9 7.7   RBC 4.34* 4.20* 4.55*   HEMOGLOBIN 13.2* 12.6* 13.8*   HEMATOCRIT 39.8* 38.6* 41.7*   MCV 91.7 91.9 91.6   MCH 30.4 30.0 30.3   MCHC 33.2 32.6 33.1   RDW 54.4* 54.1* 53.7*   PLATELETCT 307 332 312   MPV 9.5 9.6 9.1     Recent Labs     07/11/23  0859 07/12/23  0354 07/13/23  1323   SODIUM 137 140 139   POTASSIUM 4.0 4.1 4.3   CHLORIDE 104 105 105   CO2 24 25 26   GLUCOSE 139* 267* 117*   BUN 12 14 12   CREATININE 0.61 0.74 0.69   CALCIUM 8.7 8.8 8.9                   Imaging  US-VEIN MAPPING LOWER EXTREMITY BILAT   Final Result      US-EXTREMITY ARTERY LOWER BILAT   Final Result      US-LATASHA SINGLE LEVEL BILAT   Final Result      EC-ECHOCARDIOGRAM COMPLETE W/O CONT   Final Result      US-EXTREMITY VENOUS LOWER UNILAT RIGHT   Final Result      DX-CHEST-PORTABLE (1 VIEW)   Final Result         1.  No acute cardiopulmonary disease.      DX-FOOT-COMPLETE 3+ RIGHT   Final Result          1.  No acute traumatic bony injury.      IR-EXTREMITY ANGIOGRAM-UNILATERAL RIGHT    (Results Pending)   MR-FOOT-WITH & W/O RIGHT    (Results Pending)        Assessment/Plan  * Osteomyelitis (HCC)- (present on admission)  Assessment & Plan  History of osteomyelitis on the right foot and had amputation transmetatarsal  Wound culture at [facility] showed mixed Pseudomonas and MRSA  Blood culture showed MRSA.    Patient was supposed to take antibiotics until 7/20 however he left AGAINST MEDICAL ADVICE  Also patient had MRSA bacteremia  X-ray did not show osteomyelitis  Ortho was consulted, MRI is pending  LATASHA abnormal, discussed with Dr. Calderon and plan for angiogram today  Wound culture is growing MRSA and Enterococcus  Continue IV vancomycin and monitor renal function.  Follow-up ID recommendation    Noncompliance  Assessment & Plan  Patient has significant history of noncompliance and not taking insulin or medications  Patient has complicated medical history including uncontrolled diabetes and peripheral vascular disease with coronary artery disease  Resume his medications  Continue education the patient    MRSA bacteremia  Assessment & Plan  Blood culture [facility] on 6/6 was positive for MRSA and repeat culture on 6/9 was negative  Blood cultures have been negative, TTE negative for endocarditis      CAD (coronary artery disease)- (present on admission)  Assessment & Plan  No chest pain and EKG did not show ischemia  Stent placement in September 2022  Holding antiplatelets pending surgery  Resume plavix after surgery completed    Peripheral artery disease (HCC)- (present on admission)  Assessment & Plan  Continue aspirin, atorvastatin.  Holding Plavix for possible surgery  Encouraged the patient to quit smoking    Alcohol abuse- (present on admission)  Assessment & Plan  No signs of withdrawal    Tobacco abuse- (present on admission)  Assessment & Plan  Smoking cessation counseling    Diabetic foot ulcer  ([facility])- (present on admission)  Assessment & Plan  Underwent amputation on left foot  Good glucose control needed  Continue IV Vanco and monitor renal function  LPS consulted, recommend likely surgery  Ortho consulted, MRI foot pending  LATASHA abnormal, I spoke with Dr. Calderon will plan for angiogram today.  He may need grafting depending on the angiogram.  Blood cultures remain NGTD    Diabetes ([facility])- (present on admission)  Assessment & Plan  Uncontrolled and A1c 11.2  Patient admitted he is not taking insulin  Hyperglycemia improved. Continue Lantus 25 unit daily, ISS  DM educator has seen him         VTE prophylaxis: enoxaparin ppx    I have performed a physical exam and reviewed and updated ROS and Plan today (7/13/2023). In review of yesterday's note (7/12/2023), there are no changes except as documented above.

## 2023-07-13 NOTE — PROGRESS NOTES
Pt presents to IR1.     SCARLET Escudero was consented by MD at bedside, confirmed by this RN and consent at bedside. Pt transferred to IR1 table in supine position. Patient underwent a aortogram with right lower extremity angiogram without intervention by Dr. Calderon. Procedure site was marked by MD and verified using imaging guidance. Pt placed on monitor, prepped and draped in a sterile fashion. Vitals were taken every 5 minutes and remained stable during procedure (see doc flow sheet for results). CO2 waveform capnography was monitored and remained WNL throughout procedure.     Report called to HANS Arguello. Pt transported by stretcher with RN to S520.     [facility] [employer]    REF: [id number]  LOT: [id number]  EXP: 2024-12-01

## 2023-07-13 NOTE — PROGRESS NOTES
Vascular surgery.    Angiogram showed chronically occluded right superficial femoral artery, not amenable to endovascular intervention.    Plan for right femoral to above-knee popliteal bypass using right greater saphenous vein tomorrow, pending operating room availability.

## 2023-07-14 ENCOUNTER — ANESTHESIA EVENT (OUTPATIENT)
Dept: SURGERY | Facility: MEDICAL CENTER | Age: 48
DRG: 253 | End: 2023-07-14
Payer: MEDICAID

## 2023-07-14 ENCOUNTER — ANESTHESIA (OUTPATIENT)
Dept: SURGERY | Facility: MEDICAL CENTER | Age: 48
DRG: 253 | End: 2023-07-14
Payer: MEDICAID

## 2023-07-14 LAB
ABO GROUP BLD: NORMAL
ANION GAP SERPL CALC-SCNC: 12 MMOL/L (ref 7–16)
BASOPHILS # BLD AUTO: 0.6 % (ref 0–1.8)
BASOPHILS # BLD: 0.05 K/UL (ref 0–0.12)
BLD GP AB SCN SERPL QL: NORMAL
BUN SERPL-MCNC: 13 MG/DL (ref 8–22)
CALCIUM SERPL-MCNC: 8.7 MG/DL (ref 8.5–10.5)
CHLORIDE SERPL-SCNC: 103 MMOL/L (ref 96–112)
CO2 SERPL-SCNC: 22 MMOL/L (ref 20–33)
CREAT SERPL-MCNC: 0.61 MG/DL (ref 0.5–1.4)
EOSINOPHIL # BLD AUTO: 0.29 K/UL (ref 0–0.51)
EOSINOPHIL NFR BLD: 3.3 % (ref 0–6.9)
ERYTHROCYTE [DISTWIDTH] IN BLOOD BY AUTOMATED COUNT: 51.6 FL (ref 35.9–50)
GFR SERPLBLD CREATININE-BSD FMLA CKD-EPI: 117 ML/MIN/1.73 M 2
GLUCOSE BLD STRIP.AUTO-MCNC: 151 MG/DL (ref 65–99)
GLUCOSE BLD STRIP.AUTO-MCNC: 190 MG/DL (ref 65–99)
GLUCOSE BLD STRIP.AUTO-MCNC: 192 MG/DL (ref 65–99)
GLUCOSE BLD STRIP.AUTO-MCNC: 267 MG/DL (ref 65–99)
GLUCOSE SERPL-MCNC: 225 MG/DL (ref 65–99)
HCT VFR BLD AUTO: 38.9 % (ref 42–52)
HGB BLD-MCNC: 13.1 G/DL (ref 14–18)
IMM GRANULOCYTES # BLD AUTO: 0.03 K/UL (ref 0–0.11)
IMM GRANULOCYTES NFR BLD AUTO: 0.3 % (ref 0–0.9)
LYMPHOCYTES # BLD AUTO: 2.01 K/UL (ref 1–4.8)
LYMPHOCYTES NFR BLD: 22.8 % (ref 22–41)
MCH RBC QN AUTO: 30.4 PG (ref 27–33)
MCHC RBC AUTO-ENTMCNC: 33.7 G/DL (ref 32.3–36.5)
MCV RBC AUTO: 90.3 FL (ref 81.4–97.8)
MONOCYTES # BLD AUTO: 0.79 K/UL (ref 0–0.85)
MONOCYTES NFR BLD AUTO: 9 % (ref 0–13.4)
NEUTROPHILS # BLD AUTO: 5.64 K/UL (ref 1.82–7.42)
NEUTROPHILS NFR BLD: 64 % (ref 44–72)
NRBC # BLD AUTO: 0 K/UL
NRBC BLD-RTO: 0 /100 WBC (ref 0–0.2)
PLATELET # BLD AUTO: 341 K/UL (ref 164–446)
PMV BLD AUTO: 9.7 FL (ref 9–12.9)
POTASSIUM SERPL-SCNC: 3.7 MMOL/L (ref 3.6–5.5)
RBC # BLD AUTO: 4.31 M/UL (ref 4.7–6.1)
RH BLD: NORMAL
SODIUM SERPL-SCNC: 137 MMOL/L (ref 135–145)
WBC # BLD AUTO: 8.8 K/UL (ref 4.8–10.8)

## 2023-07-14 PROCEDURE — 160002 HCHG RECOVERY MINUTES (STAT): Performed by: SURGERY

## 2023-07-14 PROCEDURE — 160041 HCHG SURGERY MINUTES - EA ADDL 1 MIN LEVEL 4: Performed by: SURGERY

## 2023-07-14 PROCEDURE — 85025 COMPLETE CBC W/AUTO DIFF WBC: CPT

## 2023-07-14 PROCEDURE — 700102 HCHG RX REV CODE 250 W/ 637 OVERRIDE(OP): Performed by: INTERNAL MEDICINE

## 2023-07-14 PROCEDURE — 36415 COLL VENOUS BLD VENIPUNCTURE: CPT

## 2023-07-14 PROCEDURE — 160009 HCHG ANES TIME/MIN: Performed by: SURGERY

## 2023-07-14 PROCEDURE — 700101 HCHG RX REV CODE 250: Performed by: STUDENT IN AN ORGANIZED HEALTH CARE EDUCATION/TRAINING PROGRAM

## 2023-07-14 PROCEDURE — 700102 HCHG RX REV CODE 250 W/ 637 OVERRIDE(OP): Performed by: STUDENT IN AN ORGANIZED HEALTH CARE EDUCATION/TRAINING PROGRAM

## 2023-07-14 PROCEDURE — 700105 HCHG RX REV CODE 258: Performed by: INTERNAL MEDICINE

## 2023-07-14 PROCEDURE — 160029 HCHG SURGERY MINUTES - 1ST 30 MINS LEVEL 4: Performed by: SURGERY

## 2023-07-14 PROCEDURE — 700111 HCHG RX REV CODE 636 W/ 250 OVERRIDE (IP): Performed by: INTERNAL MEDICINE

## 2023-07-14 PROCEDURE — 160035 HCHG PACU - 1ST 60 MINS PHASE I: Performed by: SURGERY

## 2023-07-14 PROCEDURE — 110454 HCHG SHELL REV 250: Performed by: SURGERY

## 2023-07-14 PROCEDURE — A9270 NON-COVERED ITEM OR SERVICE: HCPCS | Performed by: SURGERY

## 2023-07-14 PROCEDURE — 86850 RBC ANTIBODY SCREEN: CPT

## 2023-07-14 PROCEDURE — A9270 NON-COVERED ITEM OR SERVICE: HCPCS | Performed by: STUDENT IN AN ORGANIZED HEALTH CARE EDUCATION/TRAINING PROGRAM

## 2023-07-14 PROCEDURE — C1725 CATH, TRANSLUMIN NON-LASER: HCPCS | Performed by: SURGERY

## 2023-07-14 PROCEDURE — 99233 SBSQ HOSP IP/OBS HIGH 50: CPT | Performed by: INTERNAL MEDICINE

## 2023-07-14 PROCEDURE — C1751 CATH, INF, PER/CENT/MIDLINE: HCPCS | Performed by: SURGERY

## 2023-07-14 PROCEDURE — 86900 BLOOD TYPING SEROLOGIC ABO: CPT

## 2023-07-14 PROCEDURE — 35583 VEIN BYP GRFT FEM-POPLITEAL: CPT | Mod: RT | Performed by: SURGERY

## 2023-07-14 PROCEDURE — 01270 ANES PX ARTERIES UPR LEG NOS: CPT | Performed by: STUDENT IN AN ORGANIZED HEALTH CARE EDUCATION/TRAINING PROGRAM

## 2023-07-14 PROCEDURE — 80048 BASIC METABOLIC PNL TOTAL CA: CPT

## 2023-07-14 PROCEDURE — 700111 HCHG RX REV CODE 636 W/ 250 OVERRIDE (IP): Performed by: SURGERY

## 2023-07-14 PROCEDURE — 700102 HCHG RX REV CODE 250 W/ 637 OVERRIDE(OP): Performed by: SURGERY

## 2023-07-14 PROCEDURE — 770006 HCHG ROOM/CARE - MED/SURG/GYN SEMI*

## 2023-07-14 PROCEDURE — 110371 HCHG SHELL REV 272: Performed by: SURGERY

## 2023-07-14 PROCEDURE — 82962 GLUCOSE BLOOD TEST: CPT | Mod: 91

## 2023-07-14 PROCEDURE — 75625 CONTRAST EXAM ABDOMINL AORTA: CPT | Mod: 26,AS | Performed by: SPECIALIST

## 2023-07-14 PROCEDURE — 700111 HCHG RX REV CODE 636 W/ 250 OVERRIDE (IP): Mod: JZ | Performed by: STUDENT IN AN ORGANIZED HEALTH CARE EDUCATION/TRAINING PROGRAM

## 2023-07-14 PROCEDURE — A9270 NON-COVERED ITEM OR SERVICE: HCPCS | Performed by: INTERNAL MEDICINE

## 2023-07-14 PROCEDURE — 35583 VEIN BYP GRFT FEM-POPLITEAL: CPT | Mod: AS,RT | Performed by: SPECIALIST

## 2023-07-14 PROCEDURE — 041K09L BYPASS RIGHT FEMORAL ARTERY TO POPLITEAL ARTERY WITH AUTOLOGOUS VENOUS TISSUE, OPEN APPROACH: ICD-10-PCS | Performed by: SURGERY

## 2023-07-14 PROCEDURE — 700105 HCHG RX REV CODE 258: Mod: JZ | Performed by: STUDENT IN AN ORGANIZED HEALTH CARE EDUCATION/TRAINING PROGRAM

## 2023-07-14 PROCEDURE — 86901 BLOOD TYPING SEROLOGIC RH(D): CPT

## 2023-07-14 PROCEDURE — C1713 ANCHOR/SCREW BN/BN,TIS/BN: HCPCS | Performed by: SURGERY

## 2023-07-14 PROCEDURE — 700111 HCHG RX REV CODE 636 W/ 250 OVERRIDE (IP): Performed by: STUDENT IN AN ORGANIZED HEALTH CARE EDUCATION/TRAINING PROGRAM

## 2023-07-14 PROCEDURE — 700105 HCHG RX REV CODE 258: Performed by: SURGERY

## 2023-07-14 PROCEDURE — 160036 HCHG PACU - EA ADDL 30 MINS PHASE I: Performed by: SURGERY

## 2023-07-14 PROCEDURE — 75710 ARTERY X-RAYS ARM/LEG: CPT | Mod: 26,AS,RT | Performed by: SPECIALIST

## 2023-07-14 PROCEDURE — 160048 HCHG OR STATISTICAL LEVEL 1-5: Performed by: SURGERY

## 2023-07-14 PROCEDURE — 700101 HCHG RX REV CODE 250: Performed by: SURGERY

## 2023-07-14 RX ORDER — ONDANSETRON 2 MG/ML
4 INJECTION INTRAMUSCULAR; INTRAVENOUS
Status: DISCONTINUED | OUTPATIENT
Start: 2023-07-14 | End: 2023-07-14 | Stop reason: HOSPADM

## 2023-07-14 RX ORDER — EPHEDRINE SULFATE 50 MG/ML
INJECTION, SOLUTION INTRAVENOUS PRN
Status: DISCONTINUED | OUTPATIENT
Start: 2023-07-14 | End: 2023-07-14 | Stop reason: SURG

## 2023-07-14 RX ORDER — ROCURONIUM BROMIDE 10 MG/ML
INJECTION, SOLUTION INTRAVENOUS PRN
Status: DISCONTINUED | OUTPATIENT
Start: 2023-07-14 | End: 2023-07-14 | Stop reason: SURG

## 2023-07-14 RX ORDER — BUPROPION HYDROCHLORIDE 75 MG/1
150 TABLET ORAL DAILY
Status: DISCONTINUED | OUTPATIENT
Start: 2023-07-14 | End: 2023-07-17

## 2023-07-14 RX ORDER — OXYCODONE HCL 5 MG/5 ML
10 SOLUTION, ORAL ORAL
Status: COMPLETED | OUTPATIENT
Start: 2023-07-14 | End: 2023-07-14

## 2023-07-14 RX ORDER — DEXTROSE MONOHYDRATE 25 G/50ML
25 INJECTION, SOLUTION INTRAVENOUS
Status: DISCONTINUED | OUTPATIENT
Start: 2023-07-14 | End: 2023-07-14 | Stop reason: HOSPADM

## 2023-07-14 RX ORDER — PROTAMINE SULFATE 10 MG/ML
INJECTION, SOLUTION INTRAVENOUS PRN
Status: DISCONTINUED | OUTPATIENT
Start: 2023-07-14 | End: 2023-07-14 | Stop reason: SURG

## 2023-07-14 RX ORDER — MORPHINE SULFATE 4 MG/ML
1-2 INJECTION INTRAVENOUS
Status: DISCONTINUED | OUTPATIENT
Start: 2023-07-14 | End: 2023-07-15

## 2023-07-14 RX ORDER — MIDAZOLAM HYDROCHLORIDE 1 MG/ML
INJECTION INTRAMUSCULAR; INTRAVENOUS PRN
Status: DISCONTINUED | OUTPATIENT
Start: 2023-07-14 | End: 2023-07-14 | Stop reason: SURG

## 2023-07-14 RX ORDER — ONDANSETRON 2 MG/ML
INJECTION INTRAMUSCULAR; INTRAVENOUS PRN
Status: DISCONTINUED | OUTPATIENT
Start: 2023-07-14 | End: 2023-07-14 | Stop reason: SURG

## 2023-07-14 RX ORDER — SODIUM CHLORIDE, SODIUM LACTATE, POTASSIUM CHLORIDE, CALCIUM CHLORIDE 600; 310; 30; 20 MG/100ML; MG/100ML; MG/100ML; MG/100ML
INJECTION, SOLUTION INTRAVENOUS
Status: DISCONTINUED | OUTPATIENT
Start: 2023-07-14 | End: 2023-07-14 | Stop reason: SURG

## 2023-07-14 RX ORDER — DIPHENHYDRAMINE HYDROCHLORIDE 50 MG/ML
12.5 INJECTION INTRAMUSCULAR; INTRAVENOUS
Status: DISCONTINUED | OUTPATIENT
Start: 2023-07-14 | End: 2023-07-14 | Stop reason: HOSPADM

## 2023-07-14 RX ORDER — ACETAMINOPHEN 500 MG
1000 TABLET ORAL ONCE
Status: DISCONTINUED | OUTPATIENT
Start: 2023-07-14 | End: 2023-07-14 | Stop reason: HOSPADM

## 2023-07-14 RX ORDER — HYDROMORPHONE HYDROCHLORIDE 1 MG/ML
0.2 INJECTION, SOLUTION INTRAMUSCULAR; INTRAVENOUS; SUBCUTANEOUS
Status: DISCONTINUED | OUTPATIENT
Start: 2023-07-14 | End: 2023-07-14 | Stop reason: HOSPADM

## 2023-07-14 RX ORDER — LIDOCAINE HYDROCHLORIDE 40 MG/ML
SOLUTION TOPICAL PRN
Status: DISCONTINUED | OUTPATIENT
Start: 2023-07-14 | End: 2023-07-14 | Stop reason: SURG

## 2023-07-14 RX ORDER — HYDROMORPHONE HYDROCHLORIDE 1 MG/ML
0.1 INJECTION, SOLUTION INTRAMUSCULAR; INTRAVENOUS; SUBCUTANEOUS
Status: DISCONTINUED | OUTPATIENT
Start: 2023-07-14 | End: 2023-07-14 | Stop reason: HOSPADM

## 2023-07-14 RX ORDER — ACETAMINOPHEN 325 MG/1
TABLET ORAL PRN
Status: DISCONTINUED | OUTPATIENT
Start: 2023-07-14 | End: 2023-07-14 | Stop reason: HOSPADM

## 2023-07-14 RX ORDER — HEPARIN SODIUM 1000 [USP'U]/ML
INJECTION, SOLUTION INTRAVENOUS; SUBCUTANEOUS PRN
Status: DISCONTINUED | OUTPATIENT
Start: 2023-07-14 | End: 2023-07-14 | Stop reason: SURG

## 2023-07-14 RX ORDER — OXYCODONE HCL 5 MG/5 ML
5 SOLUTION, ORAL ORAL
Status: COMPLETED | OUTPATIENT
Start: 2023-07-14 | End: 2023-07-14

## 2023-07-14 RX ORDER — HEPARIN SODIUM,PORCINE 1000/ML
VIAL (ML) INJECTION
Status: DISCONTINUED | OUTPATIENT
Start: 2023-07-14 | End: 2023-07-14 | Stop reason: HOSPADM

## 2023-07-14 RX ORDER — HYDROCODONE BITARTRATE AND ACETAMINOPHEN 5; 325 MG/1; MG/1
1-2 TABLET ORAL EVERY 6 HOURS PRN
Status: DISCONTINUED | OUTPATIENT
Start: 2023-07-14 | End: 2023-07-15

## 2023-07-14 RX ORDER — HALOPERIDOL 5 MG/ML
1 INJECTION INTRAMUSCULAR
Status: DISCONTINUED | OUTPATIENT
Start: 2023-07-14 | End: 2023-07-14 | Stop reason: HOSPADM

## 2023-07-14 RX ORDER — HYDROMORPHONE HYDROCHLORIDE 1 MG/ML
0.4 INJECTION, SOLUTION INTRAMUSCULAR; INTRAVENOUS; SUBCUTANEOUS
Status: DISCONTINUED | OUTPATIENT
Start: 2023-07-14 | End: 2023-07-14 | Stop reason: HOSPADM

## 2023-07-14 RX ORDER — HYDROMORPHONE HYDROCHLORIDE 2 MG/ML
INJECTION, SOLUTION INTRAMUSCULAR; INTRAVENOUS; SUBCUTANEOUS PRN
Status: DISCONTINUED | OUTPATIENT
Start: 2023-07-14 | End: 2023-07-14 | Stop reason: HOSPADM

## 2023-07-14 RX ORDER — DEXAMETHASONE SODIUM PHOSPHATE 4 MG/ML
INJECTION, SOLUTION INTRA-ARTICULAR; INTRALESIONAL; INTRAMUSCULAR; INTRAVENOUS; SOFT TISSUE PRN
Status: DISCONTINUED | OUTPATIENT
Start: 2023-07-14 | End: 2023-07-14 | Stop reason: SURG

## 2023-07-14 RX ADMIN — ONDANSETRON 4 MG: 2 INJECTION INTRAMUSCULAR; INTRAVENOUS at 15:20

## 2023-07-14 RX ADMIN — ASPIRIN 81 MG: 81 TABLET, COATED ORAL at 05:08

## 2023-07-14 RX ADMIN — HYDROCODONE BITARTRATE AND ACETAMINOPHEN 1 TABLET: 5; 325 TABLET ORAL at 09:31

## 2023-07-14 RX ADMIN — OXYCODONE HYDROCHLORIDE 10 MG: 5 SOLUTION ORAL at 16:33

## 2023-07-14 RX ADMIN — FENTANYL CITRATE 100 MCG: 50 INJECTION, SOLUTION INTRAMUSCULAR; INTRAVENOUS at 13:20

## 2023-07-14 RX ADMIN — INSULIN LISPRO 5 UNITS: 100 INJECTION, SOLUTION INTRAVENOUS; SUBCUTANEOUS at 21:10

## 2023-07-14 RX ADMIN — ROCURONIUM BROMIDE 10 MG: 50 INJECTION, SOLUTION INTRAVENOUS at 14:43

## 2023-07-14 RX ADMIN — SODIUM CHLORIDE: 9 INJECTION, SOLUTION INTRAVENOUS at 19:50

## 2023-07-14 RX ADMIN — ATORVASTATIN CALCIUM 40 MG: 40 TABLET, FILM COATED ORAL at 20:47

## 2023-07-14 RX ADMIN — PROTAMINE SULFATE 10 MG: 10 INJECTION, SOLUTION INTRAVENOUS at 15:20

## 2023-07-14 RX ADMIN — ROCURONIUM BROMIDE 10 MG: 50 INJECTION, SOLUTION INTRAVENOUS at 15:35

## 2023-07-14 RX ADMIN — HYDROMORPHONE HYDROCHLORIDE 0.6 MG: 2 INJECTION INTRAMUSCULAR; INTRAVENOUS; SUBCUTANEOUS at 14:21

## 2023-07-14 RX ADMIN — VANCOMYCIN HYDROCHLORIDE 1750 MG: 5 INJECTION, POWDER, LYOPHILIZED, FOR SOLUTION INTRAVENOUS at 13:34

## 2023-07-14 RX ADMIN — SODIUM CHLORIDE: 9 INJECTION, SOLUTION INTRAVENOUS at 00:26

## 2023-07-14 RX ADMIN — DEXAMETHASONE SODIUM PHOSPHATE 4 MG: 4 INJECTION INTRA-ARTICULAR; INTRALESIONAL; INTRAMUSCULAR; INTRAVENOUS; SOFT TISSUE at 13:44

## 2023-07-14 RX ADMIN — COLCHICINE 0.6 MG: 0.6 TABLET ORAL at 05:08

## 2023-07-14 RX ADMIN — LIDOCAINE HYDROCHLORIDE 4 ML: 40 SOLUTION TOPICAL at 13:23

## 2023-07-14 RX ADMIN — PROTAMINE SULFATE 20 MG: 10 INJECTION, SOLUTION INTRAVENOUS at 15:28

## 2023-07-14 RX ADMIN — INSULIN GLARGINE-YFGN 25 UNITS: 100 INJECTION, SOLUTION SUBCUTANEOUS at 18:13

## 2023-07-14 RX ADMIN — MIDAZOLAM 2 MG: 1 INJECTION, SOLUTION INTRAMUSCULAR; INTRAVENOUS at 13:16

## 2023-07-14 RX ADMIN — HYDROMORPHONE HYDROCHLORIDE 0.4 MG: 2 INJECTION INTRAMUSCULAR; INTRAVENOUS; SUBCUTANEOUS at 13:44

## 2023-07-14 RX ADMIN — ROCURONIUM BROMIDE 70 MG: 50 INJECTION, SOLUTION INTRAVENOUS at 13:20

## 2023-07-14 RX ADMIN — ACETAMINOPHEN 1000 MG: 325 TABLET ORAL at 12:55

## 2023-07-14 RX ADMIN — MORPHINE SULFATE 2 MG: 4 INJECTION, SOLUTION INTRAMUSCULAR; INTRAVENOUS at 18:36

## 2023-07-14 RX ADMIN — SUGAMMADEX 200 MG: 100 INJECTION, SOLUTION INTRAVENOUS at 16:07

## 2023-07-14 RX ADMIN — PROPOFOL 180 MG: 10 INJECTION, EMULSION INTRAVENOUS at 13:20

## 2023-07-14 RX ADMIN — VANCOMYCIN HYDROCHLORIDE 1750 MG: 5 INJECTION, POWDER, LYOPHILIZED, FOR SOLUTION INTRAVENOUS at 02:40

## 2023-07-14 RX ADMIN — PROPOFOL 20 MG: 10 INJECTION, EMULSION INTRAVENOUS at 16:03

## 2023-07-14 RX ADMIN — ESCITALOPRAM OXALATE 10 MG: 10 TABLET ORAL at 05:08

## 2023-07-14 RX ADMIN — MORPHINE SULFATE 3 MG: 4 INJECTION, SOLUTION INTRAMUSCULAR; INTRAVENOUS at 08:00

## 2023-07-14 RX ADMIN — SODIUM CHLORIDE, POTASSIUM CHLORIDE, SODIUM LACTATE AND CALCIUM CHLORIDE: 600; 310; 30; 20 INJECTION, SOLUTION INTRAVENOUS at 13:15

## 2023-07-14 RX ADMIN — FENTANYL CITRATE 50 MCG: 50 INJECTION, SOLUTION INTRAMUSCULAR; INTRAVENOUS at 13:50

## 2023-07-14 RX ADMIN — BUPROPION HYDROCHLORIDE 150 MG: 75 TABLET, FILM COATED ORAL at 11:31

## 2023-07-14 RX ADMIN — HYDROCODONE BITARTRATE AND ACETAMINOPHEN 2 TABLET: 5; 325 TABLET ORAL at 19:00

## 2023-07-14 RX ADMIN — FENTANYL CITRATE 50 MCG: 50 INJECTION, SOLUTION INTRAMUSCULAR; INTRAVENOUS at 16:34

## 2023-07-14 RX ADMIN — LISINOPRIL 10 MG: 10 TABLET ORAL at 05:08

## 2023-07-14 RX ADMIN — INSULIN LISPRO 2 UNITS: 100 INJECTION, SOLUTION INTRAVENOUS; SUBCUTANEOUS at 18:14

## 2023-07-14 RX ADMIN — MORPHINE SULFATE 2 MG: 4 INJECTION, SOLUTION INTRAMUSCULAR; INTRAVENOUS at 20:47

## 2023-07-14 RX ADMIN — FENTANYL CITRATE 50 MCG: 50 INJECTION, SOLUTION INTRAMUSCULAR; INTRAVENOUS at 15:35

## 2023-07-14 RX ADMIN — OMEPRAZOLE 20 MG: 20 CAPSULE, DELAYED RELEASE ORAL at 05:08

## 2023-07-14 RX ADMIN — PROTAMINE SULFATE 20 MG: 10 INJECTION, SOLUTION INTRAVENOUS at 15:21

## 2023-07-14 RX ADMIN — HEPARIN SODIUM 6000 UNITS: 1000 INJECTION, SOLUTION INTRAVENOUS; SUBCUTANEOUS at 14:16

## 2023-07-14 RX ADMIN — EPHEDRINE SULFATE 10 MG: 50 INJECTION, SOLUTION INTRAVENOUS at 14:42

## 2023-07-14 RX ADMIN — FENTANYL CITRATE 50 MCG: 50 INJECTION, SOLUTION INTRAMUSCULAR; INTRAVENOUS at 16:45

## 2023-07-14 RX ADMIN — FENTANYL CITRATE 50 MCG: 50 INJECTION, SOLUTION INTRAMUSCULAR; INTRAVENOUS at 14:59

## 2023-07-14 ASSESSMENT — PAIN DESCRIPTION - PAIN TYPE
TYPE: SURGICAL PAIN
TYPE: SURGICAL PAIN
TYPE: ACUTE PAIN;SURGICAL PAIN
TYPE: ACUTE PAIN
TYPE: SURGICAL PAIN
TYPE: SURGICAL PAIN
TYPE: ACUTE PAIN;CHRONIC PAIN
TYPE: ACUTE PAIN;CHRONIC PAIN
TYPE: SURGICAL PAIN
TYPE: SURGICAL PAIN
TYPE: ACUTE PAIN;SURGICAL PAIN

## 2023-07-14 ASSESSMENT — PAIN SCALES - WONG BAKER
WONGBAKER_NUMERICALRESPONSE: HURTS A WHOLE LOT
WONGBAKER_NUMERICALRESPONSE: HURTS EVEN MORE
WONGBAKER_NUMERICALRESPONSE: HURTS A LITTLE MORE

## 2023-07-14 ASSESSMENT — ENCOUNTER SYMPTOMS
WEAKNESS: 0
VOMITING: 0
ABDOMINAL PAIN: 0
NERVOUS/ANXIOUS: 0
DIARRHEA: 0
MYALGIAS: 0
SHORTNESS OF BREATH: 0

## 2023-07-14 NOTE — ANESTHESIA PROCEDURE NOTES
Airway    Date/Time: 7/14/2023 1:23 PM    Performed by: Howard Wilkerson M.D.  Authorized by: Howard Wilkerson M.D.    Location:  OR  Urgency:  Elective  Indications for Airway Management:  Anesthesia      Spontaneous Ventilation: absent    Sedation Level:  Deep  Preoxygenated: Yes    Patient Position:  Sniffing  Mask Difficulty Assessment:  1 - vent by mask  Final Airway Type:  Endotracheal airway  Final Endotracheal Airway:  ETT  Cuffed: Yes    Technique Used for Successful ETT Placement:  Direct laryngoscopy  Devices/Methods Used in Placement:  Intubating stylet    Insertion Site:  Oral  Blade Type:  Dimple  Laryngoscope Blade/Videolaryngoscope Blade Size:  3  ETT Size (mm):  7.5  Measured from:  Teeth  ETT to Teeth (cm):  22  Placement Verified by: auscultation and capnometry    Cormack-Lehane Classification:  Grade IIa - partial view of glottis  Number of Attempts at Approach:  1

## 2023-07-14 NOTE — DISCHARGE PLANNING
Case Management Discharge Planning    Admission Date: 7/5/2023  GMLOS: 3.3  ALOS: 9    6-Clicks ADL Score:    6-Clicks Mobility Score: 18      Anticipated Discharge Dispo: Discharge Disposition: D/T to SNF under Medicaid but not cert under Medicare w/planned hosp IP readmit(92)    DME Needed: No    Action(s) Taken: Discussed in IDT rounds, pt has a couple more surgeries before being medically cleared, currently ID stating IV antibiotics for 6 weeks. Pt reported he could stay his cousins house while he receives IV ABX, will need to see who accepts Medicaid FFS for ABX- [facility].     Escalations Completed: None    Medically Clear: No    Next Steps: Pending surgery/medical clearance.     Barriers to Discharge: Medical clearance    Is the patient up for discharge tomorrow: No

## 2023-07-14 NOTE — PROGRESS NOTES
1040  Received report from Catalina MAXWELL. Assumed care at 0700  Patient a & o x 4.   Voiding w/o difficulty  BLE with numbness/tingling. Generalized RLE edema. RLE warm to touch, pale, blanching. Right  1+ pedal pulse  Diet NPO  Pt states pain 9/10 to right foot  Patient ambulating with sb assistance. Gait steady.   Patient oriented to room, surroundings and call bell. Bed alarm on. Bed in lowest position, locked and upper side rails up. Patient demonstrated correct use of call bell. Call bell/belongings within reach. Patient educated on hourly rounding.   Plan   right femoral to above-knee popliteal bypass at 1300  Pain control.    1215  CHG complete. Pt taken to preop.

## 2023-07-14 NOTE — PROGRESS NOTES
Assumed care of pt at 1900H. Report received from dayshift RN. Pt is A&O x 4, on room air breathing is even and unlabored, no complains of SOB. Patient stated that their pain is 8/10 informed next dose of pain medication and patient stated he can wait whenever its due. Pt's pain comfort goal is 3/10. Top dressing on tight foot changed due to getting wet but packing is still in place and clean, right leg CMS intact.    Pt educated on how to use the call light, pt verbalized understanding. Bed in lowest locked position, call light within reach, hourly rounding in place. Labs reviewed, orders reviewed, communication board updated.     POC discussed with patient. Reminded NPO at midnight as well as restarting IV fluids once he's PO. No concerns.    Pt declines any further needs at this time.

## 2023-07-14 NOTE — CARE PLAN
The patient is Stable - Low risk of patient condition declining or worsening    Shift Goals  Clinical Goals: pending procedure in AM, IV antibioitc therapy  Patient Goals: pain control,  Family Goals: na    Progress made toward(s) clinical / shift goals:        Problem: Knowledge Deficit - Standard  Goal: Patient and family/care givers will demonstrate understanding of plan of care, disease process/condition, diagnostic tests and medications  Outcome: Progressing     Problem: Fall Risk  Goal: Patient will remain free from falls  Outcome: Progressing     Problem: Pain - Standard  Goal: Alleviation of pain or a reduction in pain to the patient’s comfort goal  Outcome: Progressing      The patient is a 60y Male complaining of hand pain/injury.

## 2023-07-14 NOTE — PROGRESS NOTES
Vascular surgery.    Right leg was successfully revascularized.    Management of right transmetatarsal amputation stump infection as per orthopedic service.    Dr. Nicholas is covering vascular service through Sunday.

## 2023-07-14 NOTE — OR NURSING
Pt arrives from OR to PACU at 1618. Pt identification verified by team, pt placed on all monitors with alarms audible, report and care of pt received from Anesthesiologist and RN. Assessment completed, pt changed into hospital gown and provided with warm blankets.     1645- Fingerstick= 170mg/dl. No coverage needed at this time per Anesthesiologist.     1655- Pt given urinal. Right leg propped up on two pillows per Dr. Calderon's orders. Pt aware of bedrest requirements and is agreeable to plan of care.

## 2023-07-14 NOTE — ANESTHESIA TIME REPORT
Anesthesia Start and Stop Event Times     Date Time Event    7/14/2023 1257 Ready for Procedure     1315 Anesthesia Start     1628 Anesthesia Stop        Responsible Staff  07/14/23    Name Role Begin End    Min HELLEN Wilkerson M.D. Anesth 1315 1628        Overtime Reason:  overtime    Comments:

## 2023-07-14 NOTE — OR SURGEON
VASCULAR SURGERY IMMEDIATE POST OP NOTE       PreOp Diagnosis: Peripheral arterial disease with infected, nonhealing right lateral transmetatarsal amputation stump.      PostOp Diagnosis: Same.      Procedure(s):  Right femoral to above-knee popliteal bypass using in situ right greater saphenous vein - Wound Class: Clean    Surgeon(s):  Angy Calderon M.D.    Anesthesiologist/Type of Anesthesia:  Anesthesiologist: Howard Wilkerson M.D./General    Surgical Staff:  Assistant: Amelia Holly P.A.-C.  Circulator: Eleazar Mckoy R.N.  Scrub Person: Kushal Leo    Specimens removed if any:  * No specimens in log *    Estimated Blood Loss: 100 mL.    IV fluids: 1.3 L.    Findings:   Palpable right posterior tibial pulses with multiphasic flow post bypass.    Complications: None.    Dictated, #[id number].        7/14/2023 4:19 PM Angy Calderon M.D.

## 2023-07-14 NOTE — ANESTHESIA POSTPROCEDURE EVALUATION
Patient: Rogelio Escudero    Procedure Summary     Date: 07/14/23 Room / Location: [place] [address] / SURGERY [facility]    Anesthesia Start: 1315 Anesthesia Stop: 1628    Procedure: CREATION, BYPASS, ARTERIAL, RIGHT FEMORAL TO POPLITEAL, USING RIGHT GREATER SAPHENOUS VEIN GRAFT (Right: Groin) Diagnosis: (Peripheral arterial disease and infected, nonhealing right transmetatarsal amputation site)    Surgeons: Angy Calderon M.D. Responsible Provider: Howard Wilkerson M.D.    Anesthesia Type: general ASA Status: 3          Final Anesthesia Type: general  Last vitals  BP   Blood Pressure: (!) 165/97    Temp   35.9 °C (96.7 °F)    Pulse   86   Resp   12    SpO2   92 %      Anesthesia Post Evaluation    Patient location during evaluation: PACU  Patient participation: complete - patient participated  Level of consciousness: awake and alert    Airway patency: patent  Anesthetic complications: no  Cardiovascular status: hemodynamically stable  Respiratory status: acceptable  Hydration status: euvolemic    PONV: none          No notable events documented.     Nurse Pain Score: 7 (NPRS)

## 2023-07-14 NOTE — ANESTHESIA PREPROCEDURE EVALUATION
Case: [id number] Date/Time: 07/14/23 1245    Procedure: CREATION, BYPASS, ARTERIAL, FEMORAL TO POPLITEAL, USING GRAFT (Right)    Location: [facility] OR 14 / SURGERY [facility]    Surgeons: Angy Calderon M.D.       47yo M w/ CAD s/p stents in 9/2022, HTN, uncontrolled DM2 on insulin (A1c 11.3, Gluc 151 in preop), PTSD, smoker, PVD, admitted on 7/5/23 w/ leg pain. On vancomycin. Got aspirin today; last dose plavix on 7/6. NPO. Has 20G PIV Left AC. Hb 13.1.    ECHO (7/2023): normal    Relevant Problems   CARDIAC   (positive) CAD (coronary artery disease)   (positive) Peripheral artery disease (HCC)      Other   (positive) Osteomyelitis (HCC)       Physical Exam    Airway   Mallampati: I  TM distance: >3 FB  Neck ROM: full       Cardiovascular - normal exam  Rhythm: regular  Rate: normal  (-) murmur     Dental       Very poor dentition   Pulmonary - normal exam  Breath sounds clear to auscultation     Abdominal    Neurological - normal exam               Anesthesia Plan    ASA 3   ASA physical status 3 criteria: diabetes - poorly controlled and CAD/stents (> 3 months)    Plan - general       Airway plan will be ETT          Induction: intravenous    Postoperative Plan: Postoperative administration of opioids is intended.    Pertinent diagnostic labs and testing reviewed    Informed Consent:    Anesthetic plan and risks discussed with patient.    Use of blood products discussed with: patient whom consented to blood products.

## 2023-07-14 NOTE — PROGRESS NOTES
Infectious Disease Progress Note    Author: Jasiel Purdy M.D. Date & Time of service: 2023  10:30 AM    Chief Complaint:  Follow-up for right foot osteomyelitis    Interval History:   patient remains afebrile, white count is 8.8, tolerating vancomycin and meropenem for now, cultures as below, creatinine 0.65, normal transaminases, magnesium 1.8, albumin 3.4,   patient remains afebrile, no CBC this morning, tolerating vancomycin, point-of-care glucose 264, Vanco trough 10.9  7/10 patient remains afebrile, white count is 8.2, tolerating vancomycin, still awaiting MRI, TTE with no obvious valve vegetations, creatinine 0.55, vanc trough 10.9, blood cultures as below   still awaiting MRI.  Anticipated today.  Patient remains afebrile, count is 8.1, tolerating vancomycin, creatinine 0.61, last vancomycin peak 21.2, culture results as below   still awaiting MRI that was ordered on .  Patient is afebrile white count is 8.9, tolerating vancomycin, creatinine 0.74   still awaiting MRI scan originally ordered on .  Angiogram planned for assessment and possible intervention of vascular disease.  Patient afebrile, no CBC this morning, tolerating vancomycin.  Vancomycin trough level obtained this morning, point-of-care glucose 279   patient remains afebrile, white count is 8.8, tolerating vancomycin, MRI scan concerning for abscess and underlying residual osteomyelitis at the amputation site.  Underwent angiogram yesterday, chronically occluded right superficial femoral artery not amenable to endovascular intervention, plan is for right femoral to above-knee popliteal bypass today, leaving for this procedure.  Creatinine 0.61, culture results as below, vancomycin trough 12.5    Labs Reviewed and Medications Reviewed.    Review of Systems:  Review of Systems   Unable to perform ROS: Other       Hemodynamics:  Temp (24hrs), Av.6 °C (97.8 °F), Min:36.4 °C (97.5 °F), Max:36.8 °C (98.2  °F)  Temperature: 36.4 °C (97.5 °F)  Pulse  Av.3  Min: 63  Max: 105   Blood Pressure: 115/79       Physical Exam:  Physical Exam  Vitals and nursing note reviewed.   Constitutional:       General: He is not in acute distress.     Appearance: He is not ill-appearing.   HENT:      Mouth/Throat:      Pharynx: No oropharyngeal exudate.      Comments: Poor dentition  Eyes:      General:         Right eye: No discharge.   Cardiovascular:      Rate and Rhythm: Normal rate.      Heart sounds: No murmur heard.  Pulmonary:      Effort: Pulmonary effort is normal. No respiratory distress.      Breath sounds: No stridor.   Abdominal:      General: Abdomen is flat. There is no distension.      Tenderness: There is no abdominal tenderness.   Musculoskeletal:      Comments: Right-sided TMA site with improved swelling, small opening laterally packed by wound care, no significant discharge   Neurological:      General: No focal deficit present.      Mental Status: He is alert and oriented to person, place, and time.   Psychiatric:         Mood and Affect: Mood normal.         Behavior: Behavior normal.         Meds:    Current Facility-Administered Medications:     buPROPion    iodixanol    NS    vancomycin    [Held by provider] insulin GLARGINE **AND** [DISCONTINUED] insulin lispro **AND** [DISCONTINUED] insulin lispro **AND** POC blood glucose manual result **AND** NOTIFY MD and PharmD **AND** Administer 20 grams of glucose (approximately 8 ounces of fruit juice) every 15 minutes PRN FSBG less than 70 mg/dL **AND** dextrose bolus    insulin lispro    HYDROcodone-acetaminophen    morphine injection    MD Alert...Vancomycin per Pharmacy    atorvastatin    [Held by provider] clopidogrel    colchicine    escitalopram    lisinopril    omeprazole    senna-docusate **AND** polyethylene glycol/lytes **AND** magnesium hydroxide **AND** bisacodyl    [Held by provider] enoxaparin (LOVENOX) injection    acetaminophen    ondansetron     ondansetron    promethazine    promethazine    prochlorperazine    aspirin    Labs:  Recent Labs     23  0354 23  1323 23  0017   WBC 8.9 7.7 8.8   RBC 4.20* 4.55* 4.31*   HEMOGLOBIN 12.6* 13.8* 13.1*   HEMATOCRIT 38.6* 41.7* 38.9*   MCV 91.9 91.6 90.3   MCH 30.0 30.3 30.4   RDW 54.1* 53.7* 51.6*   PLATELETCT 332 312 341   MPV 9.6 9.1 9.7   NEUTSPOLYS 51.20 56.80 64.00   LYMPHOCYTES 32.30 27.70 22.80   MONOCYTES 10.40 9.10 9.00   EOSINOPHILS 5.10 5.40 3.30   BASOPHILS 0.70 0.60 0.60       Recent Labs     23  0354 23  1323 23  0017   SODIUM 140 139 137   POTASSIUM 4.1 4.3 3.7   CHLORIDE 105 105 103   CO2 25 26 22   GLUCOSE 267* 117* 225*   BUN 14 12 13       Recent Labs     23  0354 23  1323 23  0017   CREATININE 0.74 0.69 0.61         Imaging:  US-EXTREMITY VENOUS LOWER UNILAT RIGHT    Result Date: 2023   Vascular Laboratory  CONCLUSIONS  Normal right lower extremity deep venous examination.  REGLA HAYES  Exam Date:     2023 11:25  Room #:     Inpatient  Priority:     Routine  Ht (in):             Wt (lb):  Ordering Physician:        JULY CALIX  Referring Physician:       [id number] Barreto  Sonographer:               Tim Dc RVDEANDRE  Study Type:                Complete Unilateral  Technical Quality:         Adequate  Age:    48    Gender:     M  MRN:    [id number]  :    1975      BSA:  Indications:     Localized swelling, mass and lump, right lower limb  CPT Codes:       30465  ICD Codes:       R22.41  History:         right leg edema & pain; recent right transmetatarsal foot                   amputation; no prior exam  Limitations:  PROCEDURES:  Right lower extremity venous duplex imaging.  Serial compression, color, and spectral Doppler flow evaluations were  performed.  The following venous structures were evaluated: common femoral, deep  femoral, proximal great saphenous, femoral, popliteal, peroneal, and   posterior tibial veins.  FINDINGS:  RIGHT LOWER EXTREMITY:  No deep venous thrombosis.  All veins demonstrate complete color filling and compressibility with  normal venous flow dynamics including spontaneous flow and respiratory  phasicity.  Rene Lara MD  (Electronically Signed)  Final Date:      06 July 2023                   20:02    DX-FOOT-COMPLETE 3+ RIGHT    Result Date: 7/5/2023 7/5/2023 6:13 AM HISTORY/REASON FOR EXAM: Atraumatic Pain/Swelling/Deformity TECHNIQUE/EXAM DESCRIPTION:  AP, lateral, and oblique views of the RIGHT foot. COMPARISON:  February 3, 2020 FINDINGS: Postsurgical changes of transmetatarsal amputation of the foot are seen. There is no acute fracture, subluxation, or dislocation identified. Soft tissue swelling of the forefoot is seen.     1.  No acute traumatic bony injury.    DX-CHEST-PORTABLE (1 VIEW)    Result Date: 7/5/2023 7/5/2023 6:10 AM HISTORY/REASON FOR EXAM: possible sepsis. TECHNIQUE/EXAM DESCRIPTION:  Single AP view of the chest. COMPARISON: November 16, 2022 FINDINGS: Overlying cardiac leads are present. The cardiac silhouette appears within normal limits. The mediastinal contour appears within normal limits.  The central pulmonary vasculature appears normal. Bilateral lung volumes are diminished.  Bilateral lungs are clear. No significant pleural effusions are identified. The bony structures appear age-appropriate.     1.  No acute cardiopulmonary disease.    IR PICC    Result Date: 6/16/2023  CLINICAL DATA: Long term IV abx, FINDINGS:  The nature of the procedure and associated risks and benefits were explained to the patient and consent to perform the procedure was obtained. Maximum sterile barrier technique was utilized. Hand washing with alcohol-based antiseptic preceded the procedure. Cap, mask, sterile gown and sterile gloves were worn. The patient's skin was prepped with 2% chlorhexidine. Catheter insertion site was draped with a large sterile drape. The  "left arm was assessed with ultrasound to identify a suitable vein. The brachial vein was identified, flowing blood within the vein and vein compressibility indicated that the vein was patent. Subsequently, under real-time sonographic guidance a 22-gauge micropuncture needle was advanced into the lumen of the vein. An ultrasound image demonstrating the needle tip in the vein lumen was recorded in the patient record. Through the 22-gauge needle a 0.018-gauge guidewire was advanced to secure access to the vein. A vessel dilator was advanced over the guidewire in conjunction with a peel-away sheath. Appropriate catheter length was determined with the original guidewire under fluoroscopic observation. Subsequently, the PICC line was cut to appropriate length and advanced over a second guidewire to the distal superior vena cava and secured at the skin. Final catheter position was recorded with a chest x-ray. Catheter length is 47 centimeters. The fluoroscopy time was 0.3 minutes.  One image was obtained.  3.7 mg, 1 25 mcg/sq m.    PLACEMENT OF A PERIPHERALLY INSERTED CENTRAL CATHETER USING FLUOROSCOPIC AND SONOGRAPHIC GUIDANCE AS OUTLINED ABOVE.      Micro:  Results       Procedure Component Value Units Date/Time    BLOOD CULTURE [[id number]] Collected: 07/08/23 1403    Order Status: Completed Specimen: Blood from Peripheral Updated: 07/13/23 1500     Significant Indicator NEG     Source BLD     Site PERIPHERAL     Culture Result No growth after 5 days of incubation.    Narrative:      Per Hospital Policy: Only change Specimen Src: to \"Line\" if  specified by physician order.  Left AC    BLOOD CULTURE [[id number]] Collected: 07/08/23 0737    Order Status: Completed Specimen: Blood from Peripheral Updated: 07/13/23 0900     Significant Indicator NEG     Source BLD     Site PERIPHERAL     Culture Result No growth after 5 days of incubation.    Narrative:      Per Hospital Policy: Only change Specimen Src: to \"Line\" " if  specified by physician order.  Left Forearm/Arm    Culture Wound With Gram Stain [[id number]]  (Abnormal)  (Susceptibility) Collected: 07/07/23 1428    Order Status: Completed Specimen: Wound from Right Foot Updated: 07/12/23 0736     Significant Indicator POS     Source WND     Site RIGHT FOOT     Culture Result -     Gram Stain Result Moderate WBCs.  No organisms seen.       Culture Result Methicillin Resistant Staphylococcus aureus  Light growth        Enterococcus faecalis  Light growth  The susceptibility profile for this organism indicates that  Streptomycin would not be an effective component of  combination therapy.  The susceptibility profile for this organism indicates that  Gentamycin would not be an effective component of combination  therapy.      Narrative:      Collected By: [facility] COLLEEN PAULSON  Collected By: [facility] COLLEEN PAULSON    Susceptibility       Methicillin resistant staphylococcus aureus (1)       Antibiotic Interpretation Microscan   Method Status    Azithromycin Resistant >4 mcg/mL RENATE Final    Clindamycin Sensitive <=0.25 mcg/mL RENATE Final    Cefazolin Resistant <=8 mcg/mL RENATE Final    Cefepime Resistant 16 mcg/mL RENATE Final    Ceftaroline Sensitive <=0.5 mcg/mL RENATE Final    Daptomycin Sensitive 1 mcg/mL RENATE Final    Erythromycin Resistant >4 mcg/mL RENATE Final    Ampicillin/sulbactam Resistant 16/8 mcg/mL RENATE Final    Oxacillin Resistant >2 mcg/mL RENATE Final    Trimeth/Sulfa Sensitive <=0.5/9.5 mcg/mL RENATE Final    Tetracycline Sensitive <=4 mcg/mL RENATE Final    Vancomycin Sensitive 1 mcg/mL RENATE Final              Enterococcus faecalis (2)       Antibiotic Interpretation Microscan   Method Status    Ampicillin Sensitive <=2 mcg/mL RENATE Final    Daptomycin Sensitive 2 mcg/mL RENATE Final    Strep Synergy Resistant >1000 mcg/mL RENATE Final    Vancomycin Sensitive 1 mcg/mL RENATE Final    Gent Synergy Resistant >500 mcg/mL RENATE Final                       Blood Culture [[id number]] Collected: 07/05/23  "0622    Order Status: Completed Specimen: Blood from Peripheral Updated: 07/10/23 0900     Significant Indicator NEG     Source BLD     Site PERIPHERAL     Culture Result No growth after 5 days of incubation.    Narrative:      Per Hospital Policy: Only change Specimen Src: to \"Line\" if  specified by physician order.  Right AC    Blood Culture [[id number]] Collected: 07/05/23 0622    Order Status: Completed Specimen: Blood from Peripheral Updated: 07/10/23 0900     Significant Indicator NEG     Source BLD     Site PERIPHERAL     Culture Result No growth after 5 days of incubation.    Narrative:      Per Hospital Policy: Only change Specimen Src: to \"Line\" if  specified by physician order.  Left AC    GRAM STAIN [[id number]] Collected: 07/07/23 1428    Order Status: Completed Specimen: Wound Updated: 07/07/23 2250     Significant Indicator .     Source WND     Site RIGHT FOOT     Gram Stain Result Moderate WBCs.  No organisms seen.      Narrative:      Collected By: [facility] COLLEEN PAULSON  Collected By: [facility] COLLEEN PAULSON            Assessment/Hospital Course:  This is a 48-year-old male patient with uncontrolled type 2 diabetes, CAD status post stent, PTSD, hypertension, alcoholism, polysubstance abuse, peripheral arterial disease, diagnosis of right foot osteomyelitis in April 2023 at [facility] status post TMA 6/8/2023. Blood cultures x2 from 6/8 were positive for MRSA and repeat blood cultures from 6/9 were negative. He was discharged to SNF with daptomycin and meropenem with plan to continue through 7/20 but patient left AMA on 6/17.  Patient now presented with several days of chills and drainage of right-sided TMA site with pain, per report pathology was positive for osteomyelitis of the margins    It appears that the reason for meropenem was a superficial skin swab from 4/13, nearly 3 months ago, that grew rare growth of ESBL E. coli along with mixed skin maryellen and cellular debris but without MRSA which are " likely commensals/contaminants.  A superficial swab has been obtained here 7/7 and this is growing MRSA thus far.    Pertinent Diagnoses:   Right foot osteomyelitis, status post TMA, positive margins per pathology per report  Recent MRSA bacteremia, incompletely treated, patient left AMA  Uncontrolled type 2 diabetes  History of polysubstance abuse  Peripheral arterial disease    Plan:  -Continue IV vancomycin, discontinued meropenem 7/8.  Monitor vancomycin levels and renal function as above  -Superficial swab obtained here growing MRSA and E faecalis, the latter likely a skin commensal  -MRI foot concerning for abscess and underlying residual osteomyelitis at the amputation site.  Underwent angiogram 7/13, chronically occluded right superficial femoral artery not amenable to endovascular intervention, plan is for right femoral to above-knee popliteal bypass 7/14.  -TTE with no obvious valve vegetations  -Blood cultures x2 from 7/5 and 7/8, negative    Disposition: Anticipate eventual return to facility for an additional 6 weeks of IV daptomycin or vancomycin depending on clinical course and surgical procedures.  Awaiting pending surgical interventions  Need for PICC line: Likely yes eventually depending on surgical procedures    Discussed with Dr. Harry.  ID will follow.  This illness poses a threat to limb function

## 2023-07-14 NOTE — PROGRESS NOTES
Hospital Medicine Daily Progress Note    Date of Service  7/14/2023    Chief Complaint  Rogelio Escudero is a 48 y.o. male admitted 7/5/2023 with foot wound    Hospital Course  49 yo man with uncontrolled diabetes, CAD S/P stent, HTN, alcoholism, PVD, PTSD who presented with right leg pain.  His right foot toes were recently amputated for osteomyelitis at [place] and he was discharged to SNF on daptomycin and meropenem until 7/20, however the patient left AMA.  He was admitted for worsening foot infection with osteomyelitis.  Ortho and ID were consulted.    Interval Problem Update  Vascular grafting with vascular surgery today  Spoke with Dr. Escudero, he will review the MRI and discussed with the patient  Continue on IV Vanco and monitor renal function  He is motivated to quit smoking, he is okay with starting Wellbutrin    I have discussed this patient's plan of care and discharge plan at IDT rounds today with Case Management, Nursing, Nursing leadership, and other members of the IDT team.    Consultants/Specialty  infectious disease and orthopedics    Code Status  Full Code    Disposition  The patient is not medically cleared for discharge to home or a post-acute facility.  Anticipate discharge to: home with organized home healthcare and close outpatient follow-up    I have placed the appropriate orders for post-discharge needs.    Review of Systems  Review of Systems   Constitutional:  Negative for malaise/fatigue.   Respiratory:  Negative for shortness of breath.    Cardiovascular:  Negative for chest pain.   Gastrointestinal:  Negative for abdominal pain, diarrhea and vomiting.   Musculoskeletal:  Negative for myalgias.   Neurological:  Negative for weakness.   Psychiatric/Behavioral:  The patient is not nervous/anxious.         Physical Exam  Temp:  [36.4 °C (97.5 °F)-36.8 °C (98.2 °F)] 36.4 °C (97.6 °F)  Pulse:  [] 68  Resp:  [18] 18  BP: (115-164)/() 137/87  SpO2:  [95 %-99 %] 95  %    Physical Exam  Vitals and nursing note reviewed.   Constitutional:       General: He is not in acute distress.     Appearance: He is not toxic-appearing.   HENT:      Head: Normocephalic.      Mouth/Throat:      Mouth: Mucous membranes are moist.   Eyes:      General:         Right eye: No discharge.         Left eye: No discharge.   Pulmonary:      Effort: No respiratory distress.   Abdominal:      Palpations: Abdomen is soft.      Tenderness: There is no abdominal tenderness.   Musculoskeletal:         General: No swelling.      Cervical back: Neck supple.      Comments: Right foot amputation surgical wound with some edema, mild serosanguineous fluid laterally   Skin:     General: Skin is warm and dry.   Neurological:      Mental Status: He is alert and oriented to person, place, and time.         Fluids    Intake/Output Summary (Last 24 hours) at 7/14/2023 1316  Last data filed at 7/14/2023 1000  Gross per 24 hour   Intake 25 ml   Output 1500 ml   Net -1475 ml       Laboratory  Recent Labs     07/12/23  0354 07/13/23  1323 07/14/23  0017   WBC 8.9 7.7 8.8   RBC 4.20* 4.55* 4.31*   HEMOGLOBIN 12.6* 13.8* 13.1*   HEMATOCRIT 38.6* 41.7* 38.9*   MCV 91.9 91.6 90.3   MCH 30.0 30.3 30.4   MCHC 32.6 33.1 33.7   RDW 54.1* 53.7* 51.6*   PLATELETCT 332 312 341   MPV 9.6 9.1 9.7     Recent Labs     07/12/23  0354 07/13/23  1323 07/14/23  0017   SODIUM 140 139 137   POTASSIUM 4.1 4.3 3.7   CHLORIDE 105 105 103   CO2 25 26 22   GLUCOSE 267* 117* 225*   BUN 14 12 13   CREATININE 0.74 0.69 0.61   CALCIUM 8.8 8.9 8.7                   Imaging  MR-FOOT-WITH & W/O RIGHT   Final Result      1.  Recent first through fifth transmetatarsal amputation.      2.  Marrow edema involving the residual first through fifth metatarsals. Given recent amputation, this could be related to postsurgical change versus representative of osteomyelitis and is nonspecific.      3.  Rim-enhancing fluid collection within the soft tissues adjacent to  the amputation site overlying the first metatarsal measuring 2.6 x 1 x 2.3 cm in size possibly representing postsurgical seroma versus abscess.      4.  Extensive cellulitis.      US-VEIN MAPPING LOWER EXTREMITY BILAT   Final Result      US-EXTREMITY ARTERY LOWER BILAT   Final Result      US-LATASHA SINGLE LEVEL BILAT   Final Result      EC-ECHOCARDIOGRAM COMPLETE W/O CONT   Final Result      US-EXTREMITY VENOUS LOWER UNILAT RIGHT   Final Result      DX-CHEST-PORTABLE (1 VIEW)   Final Result         1.  No acute cardiopulmonary disease.      DX-FOOT-COMPLETE 3+ RIGHT   Final Result         1.  No acute traumatic bony injury.      IR-EXTREMITY ANGIOGRAM-UNILATERAL RIGHT    (Results Pending)        Assessment/Plan  * Osteomyelitis (HCC)- (present on admission)  Assessment & Plan  History of osteomyelitis on the right foot and had amputation transmetatarsal  Wound culture at [place] showed mixed Pseudomonas and MRSA  Blood culture showed MRSA.    Patient was supposed to take antibiotics until 7/20 however he left AGAINST MEDICAL ADVICE  Also patient had MRSA bacteremia  X-ray did not show osteomyelitis  Ortho was consulted, MRI is is done and Ortho recommendations pending  LATASHA abnormal, discussed with Dr. Calderon and plan for vascular graft today  Wound culture is growing MRSA and Enterococcus  Continue IV vancomycin and monitor renal function.  Follow-up ID recommendation    Noncompliance  Assessment & Plan  Patient has significant history of noncompliance and not taking insulin or medications  Patient has complicated medical history including uncontrolled diabetes and peripheral vascular disease with coronary artery disease  Resume his medications  Continue education the patient    MRSA bacteremia  Assessment & Plan  Blood culture [place] on 6/6 was positive for MRSA and repeat culture on 6/9 was negative  Blood cultures have been negative, TTE negative for endocarditis      CAD (coronary artery disease)- (present  on admission)  Assessment & Plan  No chest pain and EKG did not show ischemia  Stent placement in September 2022  Holding antiplatelets pending surgery  Resume plavix after surgery completed    Peripheral artery disease (HCC)- (present on admission)  Assessment & Plan  Continue aspirin, atorvastatin.  Holding Plavix for possible surgery  Encouraged the patient to quit smoking    Alcohol abuse- (present on admission)  Assessment & Plan  No signs of withdrawal    Tobacco abuse- (present on admission)  Assessment & Plan  He is motivated to stop smoking.  I started Wellbutrin, if tolerating can uptitrate    Diabetic foot ulcer (HCC)- (present on admission)  Assessment & Plan  Underwent amputation on left foot  Good glucose control needed  Continue IV Vanco and monitor renal function.  ID consulted and following  LPS consulted, recommend likely surgery  Ortho consulted, MRI is done.  I spoke with Dr. Escudero who will follow-up with recommendations  LATASHA abnormal.  Having vascular grafting with Dr. Calderon today  Blood cultures remain NGTD    Diabetes (HCC)- (present on admission)  Assessment & Plan  Uncontrolled and A1c 11.2  Patient admitted he is not taking insulin at home  Lantus 25 units while n.p.o. for surgery  ISS  DM educator has seen him         VTE prophylaxis: enoxaparin ppx    I have performed a physical exam and reviewed and updated ROS and Plan today (7/14/2023). In review of yesterday's note (7/13/2023), there are no changes except as documented above.

## 2023-07-14 NOTE — PROGRESS NOTES
Angely RN placed patient wallet in safe box on floor with patient permission. Amelia Willson witnessed.

## 2023-07-14 NOTE — ANESTHESIA PROCEDURE NOTES
Peripheral IV    Date/Time: 7/14/2023 1:34 PM    Performed by: Howard Wilkerson M.D.  Authorized by: Howard Wilkerson M.D.    Size:  16 G  Laterality:  Right  Peripheral IV Location:  Forearm  Local Anesthetic:  None  Site Prep:  Alcohol  Technique:  Ultrasound guided  Attempts:  1

## 2023-07-15 LAB
ANION GAP SERPL CALC-SCNC: 12 MMOL/L (ref 7–16)
BASOPHILS # BLD AUTO: 0.2 % (ref 0–1.8)
BASOPHILS # BLD: 0.02 K/UL (ref 0–0.12)
BUN SERPL-MCNC: 12 MG/DL (ref 8–22)
CALCIUM SERPL-MCNC: 8.1 MG/DL (ref 8.5–10.5)
CHLORIDE SERPL-SCNC: 104 MMOL/L (ref 96–112)
CO2 SERPL-SCNC: 21 MMOL/L (ref 20–33)
CREAT SERPL-MCNC: 0.75 MG/DL (ref 0.5–1.4)
EOSINOPHIL # BLD AUTO: 0.01 K/UL (ref 0–0.51)
EOSINOPHIL NFR BLD: 0.1 % (ref 0–6.9)
ERYTHROCYTE [DISTWIDTH] IN BLOOD BY AUTOMATED COUNT: 51.5 FL (ref 35.9–50)
GFR SERPLBLD CREATININE-BSD FMLA CKD-EPI: 110 ML/MIN/1.73 M 2
GLUCOSE BLD STRIP.AUTO-MCNC: 170 MG/DL (ref 65–99)
GLUCOSE BLD STRIP.AUTO-MCNC: 171 MG/DL (ref 65–99)
GLUCOSE BLD STRIP.AUTO-MCNC: 234 MG/DL (ref 65–99)
GLUCOSE BLD STRIP.AUTO-MCNC: 250 MG/DL (ref 65–99)
GLUCOSE BLD STRIP.AUTO-MCNC: 255 MG/DL (ref 65–99)
GLUCOSE SERPL-MCNC: 257 MG/DL (ref 65–99)
HCT VFR BLD AUTO: 33.3 % (ref 42–52)
HGB BLD-MCNC: 11.4 G/DL (ref 14–18)
IMM GRANULOCYTES # BLD AUTO: 0.04 K/UL (ref 0–0.11)
IMM GRANULOCYTES NFR BLD AUTO: 0.4 % (ref 0–0.9)
LYMPHOCYTES # BLD AUTO: 1.33 K/UL (ref 1–4.8)
LYMPHOCYTES NFR BLD: 13.8 % (ref 22–41)
MCH RBC QN AUTO: 30.7 PG (ref 27–33)
MCHC RBC AUTO-ENTMCNC: 34.2 G/DL (ref 32.3–36.5)
MCV RBC AUTO: 89.8 FL (ref 81.4–97.8)
MONOCYTES # BLD AUTO: 1.14 K/UL (ref 0–0.85)
MONOCYTES NFR BLD AUTO: 11.8 % (ref 0–13.4)
NEUTROPHILS # BLD AUTO: 7.09 K/UL (ref 1.82–7.42)
NEUTROPHILS NFR BLD: 73.7 % (ref 44–72)
NRBC # BLD AUTO: 0 K/UL
NRBC BLD-RTO: 0 /100 WBC (ref 0–0.2)
PLATELET # BLD AUTO: 310 K/UL (ref 164–446)
PMV BLD AUTO: 9.5 FL (ref 9–12.9)
POTASSIUM SERPL-SCNC: 3.6 MMOL/L (ref 3.6–5.5)
RBC # BLD AUTO: 3.71 M/UL (ref 4.7–6.1)
SODIUM SERPL-SCNC: 137 MMOL/L (ref 135–145)
WBC # BLD AUTO: 9.6 K/UL (ref 4.8–10.8)

## 2023-07-15 PROCEDURE — 82962 GLUCOSE BLOOD TEST: CPT | Mod: 91

## 2023-07-15 PROCEDURE — 85025 COMPLETE CBC W/AUTO DIFF WBC: CPT

## 2023-07-15 PROCEDURE — 700102 HCHG RX REV CODE 250 W/ 637 OVERRIDE(OP): Performed by: INTERNAL MEDICINE

## 2023-07-15 PROCEDURE — 770006 HCHG ROOM/CARE - MED/SURG/GYN SEMI*

## 2023-07-15 PROCEDURE — A9270 NON-COVERED ITEM OR SERVICE: HCPCS

## 2023-07-15 PROCEDURE — 36415 COLL VENOUS BLD VENIPUNCTURE: CPT

## 2023-07-15 PROCEDURE — 700102 HCHG RX REV CODE 250 W/ 637 OVERRIDE(OP)

## 2023-07-15 PROCEDURE — 700111 HCHG RX REV CODE 636 W/ 250 OVERRIDE (IP): Mod: JZ | Performed by: SURGERY

## 2023-07-15 PROCEDURE — 80048 BASIC METABOLIC PNL TOTAL CA: CPT

## 2023-07-15 PROCEDURE — A9270 NON-COVERED ITEM OR SERVICE: HCPCS | Performed by: INTERNAL MEDICINE

## 2023-07-15 PROCEDURE — 99232 SBSQ HOSP IP/OBS MODERATE 35: CPT | Performed by: INTERNAL MEDICINE

## 2023-07-15 PROCEDURE — 700111 HCHG RX REV CODE 636 W/ 250 OVERRIDE (IP): Mod: JZ | Performed by: INTERNAL MEDICINE

## 2023-07-15 PROCEDURE — 700105 HCHG RX REV CODE 258: Performed by: SURGERY

## 2023-07-15 RX ORDER — HYDROCODONE BITARTRATE AND ACETAMINOPHEN 5; 325 MG/1; MG/1
1-2 TABLET ORAL EVERY 4 HOURS PRN
Status: DISCONTINUED | OUTPATIENT
Start: 2023-07-15 | End: 2023-07-15

## 2023-07-15 RX ORDER — MORPHINE SULFATE 4 MG/ML
4 INJECTION INTRAVENOUS
Status: DISCONTINUED | OUTPATIENT
Start: 2023-07-15 | End: 2023-07-18 | Stop reason: HOSPADM

## 2023-07-15 RX ORDER — OXYCODONE HYDROCHLORIDE 5 MG/1
5-10 TABLET ORAL
Status: DISCONTINUED | OUTPATIENT
Start: 2023-07-15 | End: 2023-07-18 | Stop reason: HOSPADM

## 2023-07-15 RX ORDER — INSULIN LISPRO 100 [IU]/ML
5 INJECTION, SOLUTION INTRAVENOUS; SUBCUTANEOUS
Status: DISCONTINUED | OUTPATIENT
Start: 2023-07-15 | End: 2023-07-16

## 2023-07-15 RX ADMIN — OMEPRAZOLE 20 MG: 20 CAPSULE, DELAYED RELEASE ORAL at 05:07

## 2023-07-15 RX ADMIN — ATORVASTATIN CALCIUM 40 MG: 40 TABLET, FILM COATED ORAL at 21:00

## 2023-07-15 RX ADMIN — ESCITALOPRAM OXALATE 10 MG: 10 TABLET ORAL at 05:07

## 2023-07-15 RX ADMIN — MORPHINE SULFATE 2 MG: 4 INJECTION, SOLUTION INTRAMUSCULAR; INTRAVENOUS at 00:01

## 2023-07-15 RX ADMIN — COLCHICINE 0.6 MG: 0.6 TABLET ORAL at 05:08

## 2023-07-15 RX ADMIN — OXYCODONE HYDROCHLORIDE 10 MG: 5 TABLET ORAL at 12:40

## 2023-07-15 RX ADMIN — HYDROCODONE BITARTRATE AND ACETAMINOPHEN 2 TABLET: 5; 325 TABLET ORAL at 01:09

## 2023-07-15 RX ADMIN — SODIUM CHLORIDE: 9 INJECTION, SOLUTION INTRAVENOUS at 05:25

## 2023-07-15 RX ADMIN — INSULIN LISPRO 3 UNITS: 100 INJECTION, SOLUTION INTRAVENOUS; SUBCUTANEOUS at 18:09

## 2023-07-15 RX ADMIN — LISINOPRIL 10 MG: 10 TABLET ORAL at 05:08

## 2023-07-15 RX ADMIN — ONDANSETRON 4 MG: 2 INJECTION INTRAMUSCULAR; INTRAVENOUS at 12:49

## 2023-07-15 RX ADMIN — INSULIN LISPRO 3 UNITS: 100 INJECTION, SOLUTION INTRAVENOUS; SUBCUTANEOUS at 13:47

## 2023-07-15 RX ADMIN — INSULIN LISPRO 2 UNITS: 100 INJECTION, SOLUTION INTRAVENOUS; SUBCUTANEOUS at 20:56

## 2023-07-15 RX ADMIN — BUPROPION HYDROCHLORIDE 150 MG: 75 TABLET, FILM COATED ORAL at 05:07

## 2023-07-15 RX ADMIN — INSULIN LISPRO 5 UNITS: 100 INJECTION, SOLUTION INTRAVENOUS; SUBCUTANEOUS at 08:31

## 2023-07-15 RX ADMIN — INSULIN LISPRO 5 UNITS: 100 INJECTION, SOLUTION INTRAVENOUS; SUBCUTANEOUS at 18:08

## 2023-07-15 RX ADMIN — MORPHINE SULFATE 2 MG: 4 INJECTION, SOLUTION INTRAMUSCULAR; INTRAVENOUS at 07:29

## 2023-07-15 RX ADMIN — ASPIRIN 81 MG: 81 TABLET, COATED ORAL at 05:07

## 2023-07-15 RX ADMIN — ENOXAPARIN SODIUM 40 MG: 100 INJECTION SUBCUTANEOUS at 18:26

## 2023-07-15 RX ADMIN — INSULIN GLARGINE-YFGN 25 UNITS: 100 INJECTION, SOLUTION SUBCUTANEOUS at 18:10

## 2023-07-15 RX ADMIN — OXYCODONE HYDROCHLORIDE 10 MG: 5 TABLET ORAL at 18:26

## 2023-07-15 RX ADMIN — HYDROCODONE BITARTRATE AND ACETAMINOPHEN 2 TABLET: 5; 325 TABLET ORAL at 05:12

## 2023-07-15 ASSESSMENT — PAIN DESCRIPTION - PAIN TYPE
TYPE: ACUTE PAIN
TYPE: SURGICAL PAIN
TYPE: ACUTE PAIN
TYPE: SURGICAL PAIN
TYPE: ACUTE PAIN;SURGICAL PAIN
TYPE: SURGICAL PAIN
TYPE: SURGICAL PAIN
TYPE: ACUTE PAIN
TYPE: SURGICAL PAIN
TYPE: SURGICAL PAIN

## 2023-07-15 ASSESSMENT — ENCOUNTER SYMPTOMS
SHORTNESS OF BREATH: 0
FEVER: 0
MYALGIAS: 1
WEAKNESS: 0
DIARRHEA: 0
VOMITING: 0
ABDOMINAL PAIN: 0
NERVOUS/ANXIOUS: 0

## 2023-07-15 ASSESSMENT — PAIN SCALES - WONG BAKER
WONGBAKER_NUMERICALRESPONSE: HURTS A LITTLE MORE
WONGBAKER_NUMERICALRESPONSE: HURTS JUST A LITTLE BIT
WONGBAKER_NUMERICALRESPONSE: HURTS AS MUCH AS POSSIBLE
WONGBAKER_NUMERICALRESPONSE: HURTS EVEN MORE
WONGBAKER_NUMERICALRESPONSE: HURTS AS MUCH AS POSSIBLE

## 2023-07-15 NOTE — PROGRESS NOTES
Hospital Medicine Daily Progress Note    Date of Service  7/15/2023    Chief Complaint  Rogelio Escudero is a 48 y.o. male admitted 7/5/2023 with foot wound    Hospital Course  47 yo man with uncontrolled diabetes, CAD S/P stent, HTN, alcoholism, PVD, PTSD who presented with right leg pain.  His right foot toes were recently amputated for osteomyelitis at [facility] and he was discharged to SNF on daptomycin and meropenem until 7/20, however the patient left AMA.  He was admitted for worsening foot infection with osteomyelitis.  Ortho and ID were consulted.  He underwent right femoral above-knee popliteal bypass with Dr. Calderon 7/14.  His wound culture grew MRSA.  He was continued on IV Vanco per ID.  MRI of his foot showed soft tissue infection with questionable osteomyelitis.    Interval Problem Update  He is having right leg pain after surgery.  I adjusted his pain medications.  He has been more hyperglycemic but did get glargine 25 you last night.  Trend glucose today, I may restart mealtime insulin  He is tolerating Wellbutrin so far  Follow-up with Ortho and ID recs    I have discussed this patient's plan of care and discharge plan at IDT rounds today with Case Management, Nursing, Nursing leadership, and other members of the IDT team.    Consultants/Specialty  infectious disease and orthopedics    Code Status  Full Code    Disposition  The patient is not medically cleared for discharge to home or a post-acute facility.  Anticipate discharge to: home with organized home healthcare and close outpatient follow-up    I have placed the appropriate orders for post-discharge needs.    Review of Systems  Review of Systems   Constitutional:  Negative for malaise/fatigue.   Respiratory:  Negative for shortness of breath.    Cardiovascular:  Negative for chest pain.   Gastrointestinal:  Negative for abdominal pain, diarrhea and vomiting.   Musculoskeletal:  Positive for myalgias.   Neurological:  Negative for  weakness.   Psychiatric/Behavioral:  The patient is not nervous/anxious.         Physical Exam  Temp:  [35.9 °C (96.7 °F)-37.1 °C (98.7 °F)] 37 °C (98.6 °F)  Pulse:  [68-96] 82  Resp:  [12-18] 17  BP: (109-165)/() 109/62  SpO2:  [92 %-100 %] 99 %    Physical Exam  Vitals and nursing note reviewed.   Constitutional:       General: He is not in acute distress.     Appearance: He is not toxic-appearing.   HENT:      Head: Normocephalic.      Mouth/Throat:      Mouth: Mucous membranes are moist.   Eyes:      General:         Right eye: No discharge.         Left eye: No discharge.   Cardiovascular:      Rate and Rhythm: Normal rate and regular rhythm.   Pulmonary:      Effort: No respiratory distress.      Breath sounds: Normal breath sounds.   Abdominal:      Palpations: Abdomen is soft.      Tenderness: There is no abdominal tenderness.   Musculoskeletal:         General: No swelling.      Cervical back: Neck supple.      Comments: Right foot amputation surgical wound with some edema, mild serosanguineous fluid laterally  Right leg with incisional wound VAC from upper thigh to below knee   Skin:     General: Skin is warm and dry.   Neurological:      Mental Status: He is alert and oriented to person, place, and time.         Fluids    Intake/Output Summary (Last 24 hours) at 7/15/2023 1117  Last data filed at 7/15/2023 0800  Gross per 24 hour   Intake 3180 ml   Output 2100 ml   Net 1080 ml       Laboratory  Recent Labs     07/13/23  1323 07/14/23  0017 07/15/23  0144   WBC 7.7 8.8 9.6   RBC 4.55* 4.31* 3.71*   HEMOGLOBIN 13.8* 13.1* 11.4*   HEMATOCRIT 41.7* 38.9* 33.3*   MCV 91.6 90.3 89.8   MCH 30.3 30.4 30.7   MCHC 33.1 33.7 34.2   RDW 53.7* 51.6* 51.5*   PLATELETCT 312 341 310   MPV 9.1 9.7 9.5     Recent Labs     07/13/23  1323 07/14/23  0017 07/15/23  0144   SODIUM 139 137 137   POTASSIUM 4.3 3.7 3.6   CHLORIDE 105 103 104   CO2 26 22 21   GLUCOSE 117* 225* 257*   BUN 12 13 12   CREATININE 0.69 0.61 0.75    CALCIUM 8.9 8.7 8.1*                   Imaging  MR-FOOT-WITH & W/O RIGHT   Final Result      1.  Recent first through fifth transmetatarsal amputation.      2.  Marrow edema involving the residual first through fifth metatarsals. Given recent amputation, this could be related to postsurgical change versus representative of osteomyelitis and is nonspecific.      3.  Rim-enhancing fluid collection within the soft tissues adjacent to the amputation site overlying the first metatarsal measuring 2.6 x 1 x 2.3 cm in size possibly representing postsurgical seroma versus abscess.      4.  Extensive cellulitis.      US-VEIN MAPPING LOWER EXTREMITY BILAT   Final Result      US-EXTREMITY ARTERY LOWER BILAT   Final Result      US-LATASHA SINGLE LEVEL BILAT   Final Result      EC-ECHOCARDIOGRAM COMPLETE W/O CONT   Final Result      US-EXTREMITY VENOUS LOWER UNILAT RIGHT   Final Result      DX-CHEST-PORTABLE (1 VIEW)   Final Result         1.  No acute cardiopulmonary disease.      DX-FOOT-COMPLETE 3+ RIGHT   Final Result         1.  No acute traumatic bony injury.      IR-EXTREMITY ANGIOGRAM-UNILATERAL RIGHT    (Results Pending)        Assessment/Plan  * Osteomyelitis (HCC)- (present on admission)  Assessment & Plan  History of osteomyelitis on the right foot and had amputation transmetatarsal  Wound culture at [facility] showed mixed Pseudomonas and MRSA  Blood culture showed MRSA.    Patient was supposed to take antibiotics until 7/20 however he left AGAINST MEDICAL ADVICE  Also patient had MRSA bacteremia  X-ray did not show osteomyelitis  Ortho was consulted, MRI is is done and Ortho recommendations pending  LATASHA abnormal, discussed with Dr. Calderon and plan for vascular graft 7/14  Wound culture is growing MRSA and Enterococcus  Continue IV vancomycin and monitor renal function.  Follow-up ID recommendation    Noncompliance  Assessment & Plan  Patient has significant history of noncompliance and not taking insulin or  medications  Patient has complicated medical history including uncontrolled diabetes and peripheral vascular disease with coronary artery disease  Resume his medications  Continue education the patient    MRSA bacteremia  Assessment & Plan  Blood culture [place] on 6/6 was positive for MRSA and repeat culture on 6/9 was negative  Blood cultures have been negative, TTE negative for endocarditis      CAD (coronary artery disease)- (present on admission)  Assessment & Plan  No chest pain and EKG did not show ischemia  Stent placement in September 2022  Holding antiplatelets pending surgery  Resume plavix after surgery completed    Peripheral artery disease (HCC)- (present on admission)  Assessment & Plan  Continue aspirin, atorvastatin.  Holding Plavix for possible surgery  Encouraged the patient to quit smoking    Alcohol abuse- (present on admission)  Assessment & Plan  No signs of withdrawal    Tobacco abuse- (present on admission)  Assessment & Plan  He is motivated to stop smoking.  I started Wellbutrin, if tolerating can uptitrate    Diabetic foot ulcer (HCC)- (present on admission)  Assessment & Plan  Underwent amputation on left foot  Good glucose control needed  Continue IV Vanco and monitor renal function.  ID consulted and following  LPS consulted, recommend likely surgery  Ortho consulted, MRI is done.  I spoke with Dr. Escudero who will follow-up with recommendations  LATASHA abnormal.  Having vascular grafting with Dr. Calderon 7/14  Blood cultures remain NGTD    Diabetes (HCC)- (present on admission)  Assessment & Plan  Uncontrolled and A1c 11.2  Patient admitted he is not taking insulin at home  Continue glargine 25 U nightly  ISS.  I may start mealtime insulin if his glucose remains high  DM educator has seen him         VTE prophylaxis: enoxaparin ppx    I have performed a physical exam and reviewed and updated ROS and Plan today (7/15/2023). In review of yesterday's note (7/14/2023), there are no changes  except as documented above.

## 2023-07-15 NOTE — PROGRESS NOTES
Pt returned from OR. RLE warm to touch. 1+ right pedal pulse. Pt able to move RLE. Prevena wound vac with leak and beeping. Unable to fix leak d/t 10/10 pain to RLE. Will administer pain meds and attempt to fix prevena dressing leak when pain managed. Pt on BR. Keep right leg straight and elevated on 2 pillows. May be out of bed to chair tomorrow.  Avoid sitting upright to prevent graft kink.  Sit in semi-lounge chair position instead.

## 2023-07-15 NOTE — CARE PLAN
The patient is Stable - Low risk of patient condition declining or worsening    Shift Goals  Clinical Goals: pain control, iv hydration  Patient Goals: pain management  Family Goals: NA    Progress made toward(s) clinical / shift goals:  Patient is alert and oriented and medicated as per MAR. Receiving normal saline iv at 100 ml/hr. His right lateral toes amputation dressing changed, silver strip came back with greenish pus.Prevena wound vac in place for right groin and right medial leg. Bed in lowest and locked position. Hourly rounding in place.    Patient is not progressing towards the following goals:  Problem: Knowledge Deficit - Standard  Goal: Patient and family/care givers will demonstrate understanding of plan of care, disease process/condition, diagnostic tests and medications  Description: Target End Date:  1-3 days or as soon as patient condition allows    Document in Patient Education    1.  Patient and family/caregiver oriented to unit, equipment, visitation policy and means for communicating concern  2.  Complete/review Learning Assessment  3.  Assess knowledge level of disease process/condition, treatment plan, diagnostic tests and medications  4.  Explain disease process/condition, treatment plan, diagnostic tests and medications  Outcome: Progressing     Problem: Fall Risk  Goal: Patient will remain free from falls  Description: Target End Date:  Prior to discharge or change in level of care    Document interventions on the [place] [place] Fall Risk Assessment    1.  Assess for fall risk factors  2.  Implement fall precautions  Outcome: Progressing     Problem: Lifestyle Changes  Goal: Patient's ability to identify lifestyle changes and available resources to help reduce recurrence of condition will improve  Description: Target End Date:  1 to 3 days    1.  Discuss recommended lifestyle changes  2.  Identify available resources and support systems  3.  Consider referral to substance abuse  program  Outcome: Progressing     Problem: Pain - Standard  Goal: Alleviation of pain or a reduction in pain to the patient’s comfort goal  Description: Target End Date:  Prior to discharge or change in level of care    Document on Vitals flowsheet    1.  Document pain using the appropriate pain scale per order or unit policy  2.  Educate and implement non-pharmacologic comfort measures (i.e. relaxation, distraction, massage, cold/heat therapy, etc.)  3.  Pain management medications as ordered  4.  Reassess pain after pain med administration per policy  5.  If opiods administered assess patient's response to pain medication is appropriate per POSS sedation scale  6.  Follow pain management plan developed in collaboration with patient and interdisciplinary team (including palliative care or pain specialists if applicable)  Outcome: Progressing

## 2023-07-15 NOTE — PROGRESS NOTES
Infectious Disease Progress Note    Author: Vicki Luu M.D. Date & Time of service: 7/15/2023  12:25 PM    Chief Complaint:  Follow-up for right foot osteomyelitis    Interval History:  7/8 patient remains afebrile, white count is 8.8, tolerating vancomycin and meropenem for now, cultures as below, creatinine 0.65, normal transaminases, magnesium 1.8, albumin 3.4,  7/9 patient remains afebrile, no CBC this morning, tolerating vancomycin, point-of-care glucose 264, Vanco trough 10.9  7/10 patient remains afebrile, white count is 8.2, tolerating vancomycin, still awaiting MRI, TTE with no obvious valve vegetations, creatinine 0.55, vanc trough 10.9, blood cultures as below  7/11 still awaiting MRI.  Anticipated today.  Patient remains afebrile, count is 8.1, tolerating vancomycin, creatinine 0.61, last vancomycin peak 21.2, culture results as below  7/12 still awaiting MRI that was ordered on 7/7.  Patient is afebrile white count is 8.9, tolerating vancomycin, creatinine 0.74  7/13 still awaiting MRI scan originally ordered on 7/7.  Angiogram planned for assessment and possible intervention of vascular disease.  Patient afebrile, no CBC this morning, tolerating vancomycin.  Vancomycin trough level obtained this morning, point-of-care glucose 279  7/14 patient remains afebrile, white count is 8.8, tolerating vancomycin, MRI scan concerning for abscess and underlying residual osteomyelitis at the amputation site.  Underwent angiogram yesterday, chronically occluded right superficial femoral artery not amenable to endovascular intervention, plan is for right femoral to above-knee popliteal bypass today, leaving for this procedure.  Creatinine 0.61, culture results as below, vancomycin trough 12.5  7/15 AF s/p AKA    Labs Reviewed and Medications Reviewed.    Review of Systems:  Review of Systems   Unable to perform ROS: Other   Constitutional:  Negative for fever.   All other systems reviewed and are  negative.      Hemodynamics:  Temp (24hrs), Av.7 °C (98.1 °F), Min:35.9 °C (96.7 °F), Max:37.1 °C (98.7 °F)  Temperature: 37 °C (98.6 °F)  Pulse  Av.7  Min: 63  Max: 105   Blood Pressure: 109/62       Physical Exam:  Physical Exam  Vitals and nursing note reviewed.   Cardiovascular:      Rate and Rhythm: Normal rate.   Pulmonary:      Effort: Pulmonary effort is normal. No respiratory distress.   Musculoskeletal:      Comments: S/p AKA         Meds:    Current Facility-Administered Medications:     morphine injection    oxyCODONE immediate-release    buPROPion    insulin GLARGINE **AND** [DISCONTINUED] insulin lispro **AND** [DISCONTINUED] insulin lispro **AND** POC blood glucose manual result **AND** NOTIFY MD and PharmD **AND** Administer 20 grams of glucose (approximately 8 ounces of fruit juice) every 15 minutes PRN FSBG less than 70 mg/dL **AND** dextrose bolus    insulin lispro    atorvastatin    colchicine    escitalopram    lisinopril    omeprazole    senna-docusate **AND** polyethylene glycol/lytes **AND** magnesium hydroxide **AND** bisacodyl    enoxaparin (LOVENOX) injection    acetaminophen    ondansetron    ondansetron    promethazine    promethazine    prochlorperazine    aspirin    Labs:  Recent Labs     23  1323 23  0017 07/15/23  0144   WBC 7.7 8.8 9.6   RBC 4.55* 4.31* 3.71*   HEMOGLOBIN 13.8* 13.1* 11.4*   HEMATOCRIT 41.7* 38.9* 33.3*   MCV 91.6 90.3 89.8   MCH 30.3 30.4 30.7   RDW 53.7* 51.6* 51.5*   PLATELETCT 312 341 310   MPV 9.1 9.7 9.5   NEUTSPOLYS 56.80 64.00 73.70*   LYMPHOCYTES 27.70 22.80 13.80*   MONOCYTES 9.10 9.00 11.80   EOSINOPHILS 5.40 3.30 0.10   BASOPHILS 0.60 0.60 0.20       Recent Labs     23  1323 23  0017 07/15/23  0144   SODIUM 139 137 137   POTASSIUM 4.3 3.7 3.6   CHLORIDE 105 103 104   CO2 26 22 21   GLUCOSE 117* 225* 257*   BUN 12 13 12       Recent Labs     23  1323 23  0017 07/15/23  0144   CREATININE 0.69 0.61 0.75          Imaging:  US-EXTREMITY VENOUS LOWER UNILAT RIGHT    Result Date: 2023   Vascular Laboratory  CONCLUSIONS  Normal right lower extremity deep venous examination.  REGLA HAYES  Exam Date:     2023 11:25  Room #:     Inpatient  Priority:     Routine  Ht (in):             Wt (lb):  Ordering Physician:        JULY CALIX  Referring Physician:       [id number] Turk  Sonographer:               Tim Dc RVT  Study Type:                Complete Unilateral  Technical Quality:         Adequate  Age:    48    Gender:     M  MRN:    [id number]  :    1975      BSA:  Indications:     Localized swelling, mass and lump, right lower limb  CPT Codes:       38066  ICD Codes:       R22.41  History:         right leg edema & pain; recent right transmetatarsal foot                   amputation; no prior exam  Limitations:  PROCEDURES:  Right lower extremity venous duplex imaging.  Serial compression, color, and spectral Doppler flow evaluations were  performed.  The following venous structures were evaluated: common femoral, deep  femoral, proximal great saphenous, femoral, popliteal, peroneal, and  posterior tibial veins.  FINDINGS:  RIGHT LOWER EXTREMITY:  No deep venous thrombosis.  All veins demonstrate complete color filling and compressibility with  normal venous flow dynamics including spontaneous flow and respiratory  phasicity.  Rene Lara MD  (Electronically Signed)  Final Date:      2023                   20:02    DX-FOOT-COMPLETE 3+ RIGHT    Result Date: 2023 6:13 AM HISTORY/REASON FOR EXAM: Atraumatic Pain/Swelling/Deformity TECHNIQUE/EXAM DESCRIPTION:  AP, lateral, and oblique views of the RIGHT foot. COMPARISON:  February 3, 2020 FINDINGS: Postsurgical changes of transmetatarsal amputation of the foot are seen. There is no acute fracture, subluxation, or dislocation identified. Soft tissue swelling of the forefoot is seen.     1.  No  acute traumatic bony injury.    DX-CHEST-PORTABLE (1 VIEW)    Result Date: 7/5/2023 7/5/2023 6:10 AM HISTORY/REASON FOR EXAM: possible sepsis. TECHNIQUE/EXAM DESCRIPTION:  Single AP view of the chest. COMPARISON: November 16, 2022 FINDINGS: Overlying cardiac leads are present. The cardiac silhouette appears within normal limits. The mediastinal contour appears within normal limits.  The central pulmonary vasculature appears normal. Bilateral lung volumes are diminished.  Bilateral lungs are clear. No significant pleural effusions are identified. The bony structures appear age-appropriate.     1.  No acute cardiopulmonary disease.    IR PICC    Result Date: 6/16/2023  CLINICAL DATA: Long term IV abx, FINDINGS:  The nature of the procedure and associated risks and benefits were explained to the patient and consent to perform the procedure was obtained. Maximum sterile barrier technique was utilized. Hand washing with alcohol-based antiseptic preceded the procedure. Cap, mask, sterile gown and sterile gloves were worn. The patient's skin was prepped with 2% chlorhexidine. Catheter insertion site was draped with a large sterile drape. The left arm was assessed with ultrasound to identify a suitable vein. The brachial vein was identified, flowing blood within the vein and vein compressibility indicated that the vein was patent. Subsequently, under real-time sonographic guidance a 22-gauge micropuncture needle was advanced into the lumen of the vein. An ultrasound image demonstrating the needle tip in the vein lumen was recorded in the patient record. Through the 22-gauge needle a 0.018-gauge guidewire was advanced to secure access to the vein. A vessel dilator was advanced over the guidewire in conjunction with a peel-away sheath. Appropriate catheter length was determined with the original guidewire under fluoroscopic observation. Subsequently, the PICC line was cut to appropriate length and advanced over a second  "guidewire to the distal superior vena cava and secured at the skin. Final catheter position was recorded with a chest x-ray. Catheter length is 47 centimeters. The fluoroscopy time was 0.3 minutes.  One image was obtained.  3.7 mg, 1 25 mcg/sq m.    PLACEMENT OF A PERIPHERALLY INSERTED CENTRAL CATHETER USING FLUOROSCOPIC AND SONOGRAPHIC GUIDANCE AS OUTLINED ABOVE.      Micro:  Results       Procedure Component Value Units Date/Time    BLOOD CULTURE [[id number]] Collected: 07/08/23 1403    Order Status: Completed Specimen: Blood from Peripheral Updated: 07/13/23 1500     Significant Indicator NEG     Source BLD     Site PERIPHERAL     Culture Result No growth after 5 days of incubation.    Narrative:      Per Hospital Policy: Only change Specimen Src: to \"Line\" if  specified by physician order.  Left AC    BLOOD CULTURE [[id number]] Collected: 07/08/23 0737    Order Status: Completed Specimen: Blood from Peripheral Updated: 07/13/23 0900     Significant Indicator NEG     Source BLD     Site PERIPHERAL     Culture Result No growth after 5 days of incubation.    Narrative:      Per Hospital Policy: Only change Specimen Src: to \"Line\" if  specified by physician order.  Left Forearm/Arm    Culture Wound With Gram Stain [[id number]]  (Abnormal)  (Susceptibility) Collected: 07/07/23 1428    Order Status: Completed Specimen: Wound from Right Foot Updated: 07/12/23 0736     Significant Indicator POS     Source WND     Site RIGHT FOOT     Culture Result -     Gram Stain Result Moderate WBCs.  No organisms seen.       Culture Result Methicillin Resistant Staphylococcus aureus  Light growth        Enterococcus faecalis  Light growth  The susceptibility profile for this organism indicates that  Streptomycin would not be an effective component of  combination therapy.  The susceptibility profile for this organism indicates that  Gentamycin would not be an effective component of combination  therapy.      Narrative:      Collected " "By: [facility] COLLEEN PAULSON  Collected By: [facility] COLLEEN PAULSON    Susceptibility       Methicillin resistant staphylococcus aureus (1)       Antibiotic Interpretation Microscan   Method Status    Azithromycin Resistant >4 mcg/mL RENATE Final    Clindamycin Sensitive <=0.25 mcg/mL RENATE Final    Cefazolin Resistant <=8 mcg/mL RENATE Final    Cefepime Resistant 16 mcg/mL RENATE Final    Ceftaroline Sensitive <=0.5 mcg/mL RENATE Final    Daptomycin Sensitive 1 mcg/mL RENATE Final    Erythromycin Resistant >4 mcg/mL RENATE Final    Ampicillin/sulbactam Resistant 16/8 mcg/mL RENATE Final    Oxacillin Resistant >2 mcg/mL RENATE Final    Trimeth/Sulfa Sensitive <=0.5/9.5 mcg/mL RENATE Final    Tetracycline Sensitive <=4 mcg/mL RENATE Final    Vancomycin Sensitive 1 mcg/mL RENATE Final              Enterococcus faecalis (2)       Antibiotic Interpretation Microscan   Method Status    Ampicillin Sensitive <=2 mcg/mL RENATE Final    Daptomycin Sensitive 2 mcg/mL RENATE Final    Strep Synergy Resistant >1000 mcg/mL RENATE Final    Vancomycin Sensitive 1 mcg/mL RENATE Final    Gent Synergy Resistant >500 mcg/mL RENATE Final                       Blood Culture [[id number]] Collected: 07/05/23 0622    Order Status: Completed Specimen: Blood from Peripheral Updated: 07/10/23 0900     Significant Indicator NEG     Source BLD     Site PERIPHERAL     Culture Result No growth after 5 days of incubation.    Narrative:      Per Hospital Policy: Only change Specimen Src: to \"Line\" if  specified by physician order.  Right AC    Blood Culture [[id number]] Collected: 07/05/23 0622    Order Status: Completed Specimen: Blood from Peripheral Updated: 07/10/23 0900     Significant Indicator NEG     Source BLD     Site PERIPHERAL     Culture Result No growth after 5 days of incubation.    Narrative:      Per Hospital Policy: Only change Specimen Src: to \"Line\" if  specified by physician order.  Left AC            Assessment/Hospital Course:  This is a 48-year-old male patient with uncontrolled type " 2 diabetes, CAD status post stent, PTSD, hypertension, alcoholism, polysubstance abuse, peripheral arterial disease, diagnosis of right foot osteomyelitis in April 2023 at [place] status post TMA 6/8/2023. Blood cultures x2 from 6/8 were positive for MRSA and repeat blood cultures from 6/9 were negative. He was discharged to SNF with daptomycin and meropenem with plan to continue through 7/20 but patient left AMA on 6/17.  Patient now presented with several days of chills and drainage of right-sided TMA site with pain, per report pathology was positive for osteomyelitis of the margins    It appears that the reason for meropenem was a superficial skin swab from 4/13, nearly 3 months ago, that grew rare growth of ESBL E. coli along with mixed skin maryellen and cellular debris but without MRSA which are likely commensals/contaminants.  A superficial swab has been obtained here 7/7 and this is growing MRSA thus far.    Pertinent Diagnoses:   Right foot osteomyelitis, status post TMA, positive margins per pathology per report  -s/p AKA 7/14  Recent MRSA bacteremia, incompletely treated, patient left AMA  -blood culture here negative  Uncontrolled type 2 diabetes  History of polysubstance abuse  Peripheral arterial disease    Plan:  -Continue IV vancomycin, discontinued meropenem 7/8.    Monitor vancomycin levels and renal function as above  -TTE with no obvious valve vegetations  -Blood cultures x2 from 7/5 and 7/8, negative  -AT risk for poor wound healing-monitor  Stop date antibiotics 8/15/2023    Disposition: Anticipate eventual return to facility to complete 6 weeks of IV daptomycin or vancomycin for prior MRSA bacteremia  Need for PICC/midline line: Likely yes if placed    Discussed with Dr. Harry.  ID will sign off  FU ID clinic 2 weeks after discharge  OK to see Teresa MURGUIA

## 2023-07-15 NOTE — PROGRESS NOTES
Assumed care of patient at 0700 from [place]. Patient is A&O x4, states pain level is 0/10. Bed locked in lowest position with 2 rail up. Call light  in place, belongings at bedside. Hourly rounding is in place.

## 2023-07-15 NOTE — OP REPORT
VASCULAR SURGERY OPERATIVE REPORT       DATE OF SERVICE:  07/14/2023     SURGEON:  Angy Calderon MD     ASSISTANT:  DILIA Boyd     ANESTHESIOLOGIST:  Howard Wilkerson MD     TYPE OF ANESTHESIA:  General anesthesia.     PREOPERATIVE DIAGNOSIS:  Peripheral arterial disease with infected, nonhealing   right lateral transmetatarsal amputation stump.     POSTOPERATIVE DIAGNOSIS:  Peripheral arterial disease with infected,   nonhealing right lateral transmetatarsal amputation stump.     PROCEDURE:  Right femoral to above-knee popliteal bypass using in situ greater   saphenous vein.     INDICATIONS FOR PROCEDURE:  This is a 48-year-old male with multiple medical   problems who was admitted with an infected, nonhealing right lateral   transmetatarsal amputation stump.  Noninvasive vascular study was suggestive   of significant arterial occlusive disease.  The patient underwent angiogram,   which showed occlusion of the right superficial femoral artery.  This was not   treatable with endovascular intervention.  Discussion was made with the   patient.  He would like to undergo surgical bypass to improve circulation to   his right foot to help with wound healing, fully understanding all risks.     DESCRIPTION OF PROCEDURE:  Informed consent was obtained.  The patient was   taken to the operating room and was placed in the supine position.  He has   been on vancomycin intravenously.  General anesthesia was induced.     Next, his right lower abdomen and bilateral legs were sterilely prepped and   draped in the normal fashion.  A timeout procedure was done.  An incision was   made in the right groin over the old scar.  The incision was extended through   subcutaneous tissue using the electrocautery and Harmonic scalpel.  Dense   amount of scar tissue was encountered.  The common femoral, profunda femoral,   and superficial femoral arteries were identified and carefully dissected free   from the surrounding tissues.   The greater saphenous vein was also identified   and carefully dissected down to the saphenofemoral junction.  The incision was   then extended down to the knee over the course of the greater saphenous vein.    The remaining greater saphenous vein down to the knee was identified,   carefully dissected.  The side branches were clipped with Hemoclips.     Through the same incision in the distal medial thigh, dissection was extended   deeper.  The fascia was opened.  The above-knee popliteal artery was   identified and carefully dissected free from these surrounding tissues.     The patient was given heparin 6000 units intravenously.  After the heparin was   allowed to circulate systemically for 3 minutes, the greater saphenous vein   were clamped at the saphenofemoral junction and divided above the   saphenofemoral junction.  The saphenofemoral junction was oversewn with 5-0   Prolene suture.  The first valve of the greater saphenous vein was resected.     Vascular control of the proximal common femoral artery and profunda femoral   artery was obtained with vascular clamps.  The superficial femoral artery was   resected approximately 2 cm below its origin.  It was found to be occluded   below this spot.  The proximal 2 cm of the superficial femoral artery was   found to be patent.  The greater saphenous vein was anastomosed end-to-end to   the proximal superficial femoral artery using running 6-0 Prolene suture.    After this was done, flow was restored into the bypass graft and then into the   profunda femoral artery.     Next, the greater saphenous vein was ligated at the knee using 3-0 silk tie   and divided above the ligation.  A LeMaitre valvulotome was passed through the   greater saphenous vein toward the anastomosis, opened, and retracted.    The valvulotome was passed 3 times.  Excellent inflow was seen.     Next, vascular control of the above-knee popliteal artery was obtained with   vascular clamps.  An  arteriotomy was made on the medial aspect of the   above-knee popliteal artery.  The arteriotomy was widened using a 4-0 aortic   punch.  The greater saphenous vein was cut to appropriate length, bevelled and   anastomosed end-to-side to the above-knee popliteal artery using running 6-0   Prolene suture.  Prior to completing the anastomosis, backbleeding and   flushing were obtained.  The anastomosis was completed.  Flow was first   restored retrograde into the superficial femoral artery and then antegrade   into the popliteal artery.  A sterile Doppler probe was brought into the   operative field.  Excellent Doppler flow signals were obtained over the   popliteal artery below the anastomosis.  With temporary compression of the   bypass graft, there was attenuation of the arterial flow signals.     Next, the side branches of the bypass graft were identified using a handheld   Doppler.  The side branches were clipped with Hemoclips.  No further side   branches were identified.     The patient was given protamine 50 mg intravenously.  The wound was  Irrigated and was found to have good hemostasis.  The wound was closed   subcutaneously in layers with running 3-0 Vicryl sutures and subcuticularly with   4-0 Monocryl suture.  The wound was cleaned and customizable Prevena   dressing was applied.     ESTIMATED BLOOD LOSS:  100 mL     INTRAVENOUS FLUIDS:  1.3 liters crystalloid.     Sponge and instrument count was correct x2.     The patient was then awakened, extubated, taken to recovery area in stable   condition with palpable right posterior tibial pulses with multiphasic flow.        ______________________________  MD EDDIE Longo/SHAVON    DD:  07/14/2023 16:27  DT:  07/14/2023 17:42    Job#:  [id number]

## 2023-07-15 NOTE — PROGRESS NOTES
Vascular Surgery       Postop day 1 status post right femoral to above-knee popliteal artery bypass using ipsilateral saphenous vein in situ  Awake Alert  Patient doing well  Palpable pedal pulse  Right foot warm well perfused  Incision looks good    Doing well after bypass surgery  Activity as tolerated    Vascular surgery following    Jun Nicholas MD

## 2023-07-16 LAB
ANION GAP SERPL CALC-SCNC: 8 MMOL/L (ref 7–16)
BASOPHILS # BLD AUTO: 0.5 % (ref 0–1.8)
BASOPHILS # BLD: 0.05 K/UL (ref 0–0.12)
BUN SERPL-MCNC: 9 MG/DL (ref 8–22)
CALCIUM SERPL-MCNC: 8.2 MG/DL (ref 8.5–10.5)
CHLORIDE SERPL-SCNC: 105 MMOL/L (ref 96–112)
CO2 SERPL-SCNC: 25 MMOL/L (ref 20–33)
CREAT SERPL-MCNC: 0.9 MG/DL (ref 0.5–1.4)
EOSINOPHIL # BLD AUTO: 0.6 K/UL (ref 0–0.51)
EOSINOPHIL NFR BLD: 5.6 % (ref 0–6.9)
ERYTHROCYTE [DISTWIDTH] IN BLOOD BY AUTOMATED COUNT: 52.3 FL (ref 35.9–50)
GFR SERPLBLD CREATININE-BSD FMLA CKD-EPI: 104 ML/MIN/1.73 M 2
GLUCOSE BLD STRIP.AUTO-MCNC: 160 MG/DL (ref 65–99)
GLUCOSE BLD STRIP.AUTO-MCNC: 169 MG/DL (ref 65–99)
GLUCOSE BLD STRIP.AUTO-MCNC: 172 MG/DL (ref 65–99)
GLUCOSE BLD STRIP.AUTO-MCNC: 197 MG/DL (ref 65–99)
GLUCOSE SERPL-MCNC: 161 MG/DL (ref 65–99)
HCT VFR BLD AUTO: 31.5 % (ref 42–52)
HGB BLD-MCNC: 10.6 G/DL (ref 14–18)
IMM GRANULOCYTES # BLD AUTO: 0.04 K/UL (ref 0–0.11)
IMM GRANULOCYTES NFR BLD AUTO: 0.4 % (ref 0–0.9)
LYMPHOCYTES # BLD AUTO: 2.1 K/UL (ref 1–4.8)
LYMPHOCYTES NFR BLD: 19.6 % (ref 22–41)
MCH RBC QN AUTO: 30.3 PG (ref 27–33)
MCHC RBC AUTO-ENTMCNC: 33.7 G/DL (ref 32.3–36.5)
MCV RBC AUTO: 90 FL (ref 81.4–97.8)
MONOCYTES # BLD AUTO: 1.49 K/UL (ref 0–0.85)
MONOCYTES NFR BLD AUTO: 13.9 % (ref 0–13.4)
NEUTROPHILS # BLD AUTO: 6.44 K/UL (ref 1.82–7.42)
NEUTROPHILS NFR BLD: 60 % (ref 44–72)
NRBC # BLD AUTO: 0 K/UL
NRBC BLD-RTO: 0 /100 WBC (ref 0–0.2)
PLATELET # BLD AUTO: 305 K/UL (ref 164–446)
PMV BLD AUTO: 10.1 FL (ref 9–12.9)
POTASSIUM SERPL-SCNC: 3.5 MMOL/L (ref 3.6–5.5)
RBC # BLD AUTO: 3.5 M/UL (ref 4.7–6.1)
SODIUM SERPL-SCNC: 138 MMOL/L (ref 135–145)
WBC # BLD AUTO: 10.7 K/UL (ref 4.8–10.8)

## 2023-07-16 PROCEDURE — A9270 NON-COVERED ITEM OR SERVICE: HCPCS | Performed by: INTERNAL MEDICINE

## 2023-07-16 PROCEDURE — 700102 HCHG RX REV CODE 250 W/ 637 OVERRIDE(OP): Performed by: INTERNAL MEDICINE

## 2023-07-16 PROCEDURE — 99232 SBSQ HOSP IP/OBS MODERATE 35: CPT | Performed by: INTERNAL MEDICINE

## 2023-07-16 PROCEDURE — 700111 HCHG RX REV CODE 636 W/ 250 OVERRIDE (IP): Mod: JZ | Performed by: INTERNAL MEDICINE

## 2023-07-16 PROCEDURE — 770006 HCHG ROOM/CARE - MED/SURG/GYN SEMI*

## 2023-07-16 PROCEDURE — 36415 COLL VENOUS BLD VENIPUNCTURE: CPT

## 2023-07-16 PROCEDURE — 82962 GLUCOSE BLOOD TEST: CPT

## 2023-07-16 PROCEDURE — 80048 BASIC METABOLIC PNL TOTAL CA: CPT

## 2023-07-16 PROCEDURE — 85025 COMPLETE CBC W/AUTO DIFF WBC: CPT

## 2023-07-16 PROCEDURE — 700105 HCHG RX REV CODE 258: Performed by: INTERNAL MEDICINE

## 2023-07-16 RX ORDER — 0.9 % SODIUM CHLORIDE 0.9 %
3 VIAL (ML) INJECTION PRN
Status: CANCELLED | OUTPATIENT
Start: 2023-07-16

## 2023-07-16 RX ORDER — 0.9 % SODIUM CHLORIDE 0.9 %
10 VIAL (ML) INJECTION PRN
Status: CANCELLED | OUTPATIENT
Start: 2023-07-16

## 2023-07-16 RX ORDER — 0.9 % SODIUM CHLORIDE 0.9 %
VIAL (ML) INJECTION PRN
Status: CANCELLED | OUTPATIENT
Start: 2023-07-16

## 2023-07-16 RX ORDER — ATORVASTATIN CALCIUM 20 MG/1
20 TABLET, FILM COATED ORAL
Status: DISCONTINUED | OUTPATIENT
Start: 2023-07-16 | End: 2023-07-18 | Stop reason: HOSPADM

## 2023-07-16 RX ORDER — CLOPIDOGREL BISULFATE 75 MG/1
75 TABLET ORAL DAILY
Status: DISCONTINUED | OUTPATIENT
Start: 2023-07-16 | End: 2023-07-18 | Stop reason: HOSPADM

## 2023-07-16 RX ORDER — POTASSIUM CHLORIDE 20 MEQ/1
20 TABLET, EXTENDED RELEASE ORAL ONCE
Status: COMPLETED | OUTPATIENT
Start: 2023-07-16 | End: 2023-07-16

## 2023-07-16 RX ADMIN — ASPIRIN 81 MG: 81 TABLET, COATED ORAL at 04:47

## 2023-07-16 RX ADMIN — CLOPIDOGREL BISULFATE 75 MG: 75 TABLET ORAL at 13:18

## 2023-07-16 RX ADMIN — ENOXAPARIN SODIUM 40 MG: 100 INJECTION SUBCUTANEOUS at 17:53

## 2023-07-16 RX ADMIN — OXYCODONE HYDROCHLORIDE 10 MG: 5 TABLET ORAL at 04:45

## 2023-07-16 RX ADMIN — ATORVASTATIN CALCIUM 20 MG: 20 TABLET, FILM COATED ORAL at 21:01

## 2023-07-16 RX ADMIN — INSULIN LISPRO 5 UNITS: 100 INJECTION, SOLUTION INTRAVENOUS; SUBCUTANEOUS at 09:36

## 2023-07-16 RX ADMIN — INSULIN LISPRO 2 UNITS: 100 INJECTION, SOLUTION INTRAVENOUS; SUBCUTANEOUS at 13:20

## 2023-07-16 RX ADMIN — ESCITALOPRAM OXALATE 10 MG: 10 TABLET ORAL at 04:47

## 2023-07-16 RX ADMIN — INSULIN LISPRO 2 UNITS: 100 INJECTION, SOLUTION INTRAVENOUS; SUBCUTANEOUS at 21:01

## 2023-07-16 RX ADMIN — INSULIN LISPRO 2 UNITS: 100 INJECTION, SOLUTION INTRAVENOUS; SUBCUTANEOUS at 17:54

## 2023-07-16 RX ADMIN — ONDANSETRON 4 MG: 2 INJECTION INTRAMUSCULAR; INTRAVENOUS at 18:21

## 2023-07-16 RX ADMIN — VANCOMYCIN HYDROCHLORIDE 1750 MG: 5 INJECTION, POWDER, LYOPHILIZED, FOR SOLUTION INTRAVENOUS at 19:26

## 2023-07-16 RX ADMIN — MORPHINE SULFATE 4 MG: 4 INJECTION, SOLUTION INTRAMUSCULAR; INTRAVENOUS at 13:29

## 2023-07-16 RX ADMIN — BUPROPION HYDROCHLORIDE 150 MG: 75 TABLET, FILM COATED ORAL at 04:46

## 2023-07-16 RX ADMIN — INSULIN GLARGINE-YFGN 25 UNITS: 100 INJECTION, SOLUTION SUBCUTANEOUS at 17:54

## 2023-07-16 RX ADMIN — INSULIN LISPRO 2 UNITS: 100 INJECTION, SOLUTION INTRAVENOUS; SUBCUTANEOUS at 09:36

## 2023-07-16 RX ADMIN — OXYCODONE HYDROCHLORIDE 10 MG: 5 TABLET ORAL at 17:56

## 2023-07-16 RX ADMIN — LISINOPRIL 10 MG: 10 TABLET ORAL at 04:47

## 2023-07-16 RX ADMIN — OMEPRAZOLE 20 MG: 20 CAPSULE, DELAYED RELEASE ORAL at 04:47

## 2023-07-16 RX ADMIN — OXYCODONE HYDROCHLORIDE 10 MG: 5 TABLET ORAL at 07:48

## 2023-07-16 RX ADMIN — VANCOMYCIN HYDROCHLORIDE 2250 MG: 5 INJECTION, POWDER, LYOPHILIZED, FOR SOLUTION INTRAVENOUS at 08:23

## 2023-07-16 RX ADMIN — POTASSIUM CHLORIDE 20 MEQ: 1500 TABLET, EXTENDED RELEASE ORAL at 08:20

## 2023-07-16 RX ADMIN — COLCHICINE 0.6 MG: 0.6 TABLET ORAL at 04:47

## 2023-07-16 ASSESSMENT — ENCOUNTER SYMPTOMS
NERVOUS/ANXIOUS: 0
ABDOMINAL PAIN: 0
WEAKNESS: 0
DIARRHEA: 0
SHORTNESS OF BREATH: 0
VOMITING: 0
MYALGIAS: 1

## 2023-07-16 ASSESSMENT — PAIN DESCRIPTION - PAIN TYPE
TYPE: SURGICAL PAIN
TYPE: ACUTE PAIN;SURGICAL PAIN
TYPE: ACUTE PAIN
TYPE: SURGICAL PAIN

## 2023-07-16 ASSESSMENT — PAIN SCALES - WONG BAKER
WONGBAKER_NUMERICALRESPONSE: HURTS A LITTLE MORE

## 2023-07-16 NOTE — CARE PLAN
The patient is Stable - Low risk of patient condition declining or worsening    Shift Goals  Clinical Goals: Pain control  Patient Goals: Pain control  Family Goals: NA    Progress made toward(s) clinical / shift goals:    Problem: Knowledge Deficit - Standard  Goal: Patient and family/care givers will demonstrate understanding of plan of care, disease process/condition, diagnostic tests and medications  Outcome: Progressing     Problem: Fall Risk  Goal: Patient will remain free from falls  Outcome: Progressing. Patient free from falls hourly rounding.     Problem: Pain - Standard  Goal: Alleviation of pain or a reduction in pain to the patient’s comfort goal  Outcome: Progressing. Patient resting in bed.       Patient is not progressing towards the following goals:

## 2023-07-16 NOTE — PROGRESS NOTES
Vascular surgery    Patient awake and alert  Incision is clean dry intact  Nadine in place  Palpable posterior tibial pulse  Foot well-perfused    Status post femoral to above-knee popliteal artery bypass using ipsilateral vein    Increase activity level  Disposition will be based on ambulatory status  Physical therapy eval    Patient doing well from vascular surgery standpoint with a well-perfused extremity    Jun Nicholas MD

## 2023-07-16 NOTE — PROGRESS NOTES
"Pharmacy Vancomycin Kinetics Note for 7/16/2023     48 y.o. male on Vancomycin day # 12     Vancomycin Indication (AUC Dosing): Osteomyelitis    Provider specified end date: 08/15/23    Active Antibiotics (From admission, onward)      Ordered     Ordering Provider       Antoinette Jul 16, 2023  6:53 AM    07/16/23 0653  vancomycin (Vancocin) 2,250 mg in  mL IVPB  (vancomycin (VANCOCIN) IV (LD + Maintenance))  ONCE         TOMAS Mock Jul 16, 2023  6:45 AM    07/16/23 0645  MD Alert...Vancomycin per Pharmacy  PHARMACY TO DOSE        Question:  Indication(s) for vancomycin?  Answer:  Osteomyelitis    Rosangela Harry M.D.            Dosing Weight: 88 kg (194 lb 0.1 oz)      Admission History: Admitted on 7/5/2023 for Osteomyelitis (HCC) [M86.9]  Pertinent history: 49yo male with history of DMII, PTSD, hypertension, alcoholism and PVD presents with worsening pain in his right leg.  Last month patient was diagnosed with osteomyelitis and he underwent amputation at [facility], at that time patient had MRSA/ESBL and patient was discharged to the SNF with daptomycin and meropenem for 6 weeks but patient left AMA from the SNF after 2 days. Patient underwent TMA on 7/7 and patient on vancomycin for OR cultures that returned positive for MRSA and E. faecalis (thought to be commensal), and fem-pop bypass on 7/14. Plan for 6 weeks of vancomyicn for MRSA osteo.    Allergies:     Apple and Lactose     Pertinent cultures to date:     Results       Procedure Component Value Units Date/Time    BLOOD CULTURE [[id number]] Collected: 07/08/23 1403    Order Status: Completed Specimen: Blood from Peripheral Updated: 07/13/23 1500     Significant Indicator NEG     Source BLD     Site PERIPHERAL     Culture Result No growth after 5 days of incubation.    Narrative:      Per Hospital Policy: Only change Specimen Src: to \"Line\" if  specified by physician order.  Left AC    BLOOD CULTURE [[id number]] Collected: 07/08/23 0737 " "   Order Status: Completed Specimen: Blood from Peripheral Updated: 07/13/23 0900     Significant Indicator NEG     Source BLD     Site PERIPHERAL     Culture Result No growth after 5 days of incubation.    Narrative:      Per Hospital Policy: Only change Specimen Src: to \"Line\" if  specified by physician order.  Left Forearm/Arm    Culture Wound With Gram Stain [[id number]]  (Abnormal)  (Susceptibility) Collected: 07/07/23 1428    Order Status: Completed Specimen: Wound from Right Foot Updated: 07/12/23 0736     Significant Indicator POS     Source WND     Site RIGHT FOOT     Culture Result -     Gram Stain Result Moderate WBCs.  No organisms seen.       Culture Result Methicillin Resistant Staphylococcus aureus  Light growth        Enterococcus faecalis  Light growth  The susceptibility profile for this organism indicates that  Streptomycin would not be an effective component of  combination therapy.  The susceptibility profile for this organism indicates that  Gentamycin would not be an effective component of combination  therapy.      Narrative:      Collected By: [facility] COLLEEN PAULSON  Collected By: [facility] COLLEEN PAULSON    Blood Culture [[id number]] Collected: 07/05/23 0622    Order Status: Completed Specimen: Blood from Peripheral Updated: 07/10/23 0900     Significant Indicator NEG     Source BLD     Site PERIPHERAL     Culture Result No growth after 5 days of incubation.    Narrative:      Per Hospital Policy: Only change Specimen Src: to \"Line\" if  specified by physician order.  Right AC    Blood Culture [[id number]] Collected: 07/05/23 0622    Order Status: Completed Specimen: Blood from Peripheral Updated: 07/10/23 0900     Significant Indicator NEG     Source BLD     Site PERIPHERAL     Culture Result No growth after 5 days of incubation.    Narrative:      Per Hospital Policy: Only change Specimen Src: to \"Line\" if  specified by physician order.  Left AC    GRAM STAIN [[id number]] Collected: 07/07/23 1428    " Order Status: Completed Specimen: Wound Updated: 07/07/23 2250     Significant Indicator .     Source WND     Site RIGHT FOOT     Gram Stain Result Moderate WBCs.  No organisms seen.      Narrative:      Collected By: [facility] COLLEEN PAULSON  Collected By: [facility] COLLEEN PAULSON    Urine Culture (New) [[id number]] Collected: 07/05/23 0915    Order Status: Completed Specimen: Urine Updated: 07/07/23 0651     Significant Indicator NEG     Source UR     Site -     Culture Result No growth at 48 hours.    Narrative:      Indication for culture:->Evaluation for sepsis without a  clear source of infection  Indication for culture:->Evaluation for sepsis without a    MRSA By PCR (Amp) [[id number]] Collected: 07/05/23 0732    Order Status: Completed Specimen: Respirate from Nares Updated: 07/05/23 1058     MRSA by PCR Negative    Urinalysis [[id number]]  (Abnormal) Collected: 07/05/23 0915    Order Status: Completed Specimen: Urine Updated: 07/05/23 1046     Color Yellow     Character Clear     Specific Gravity 1.044     Ph 5.0     Glucose >=1000 mg/dL      Ketones Negative mg/dL      Protein Negative mg/dL      Bilirubin Negative     Urobilinogen, Urine 0.2     Nitrite Negative     Leukocyte Esterase Negative     Occult Blood Negative     Micro Urine Req see below     Comment: Microscopic examination not performed when specimen is clear  and chemically negative for protein, blood, leukocyte esterase  and nitrite.         Narrative:      Indication for culture:->Evaluation for sepsis without a  clear source of infection            Labs:     Estimated Creatinine Clearance: 108.1 mL/min (by C-G formula based on SCr of 0.9 mg/dL).  Recent Labs     07/13/23  1323 07/14/23 0017 07/15/23  0144 07/16/23  0038   WBC 7.7 8.8 9.6 10.7   NEUTSPOLYS 56.80 64.00 73.70* 60.00     Recent Labs     07/13/23  1323 07/14/23  0017 07/15/23  0144 07/16/23  0038   BUN 12 13 12 9   CREATININE 0.69 0.61 0.75 0.90       Intake/Output Summary (Last 24  "hours) at 2023 0848  Last data filed at 7/15/2023 1630  Gross per 24 hour   Intake 240 ml   Output 225 ml   Net 15 ml      /81   Pulse 97   Temp 36.4 °C (97.6 °F) (Temporal)   Resp 18   Ht 1.753 m (5' 9\")   Wt 88.5 kg (195 lb)   SpO2 98%  Temp (24hrs), Av.9 °C (98.5 °F), Min:36.4 °C (97.6 °F), Max:37.3 °C (99.2 °F)      List concerns for Vancomycin clearance:          Pharmacokinetics:     AUC kinetics:   Ke (hr ^-1): 0.094 hr^-1  Half life: 7.37 hr  Clearance: 5.377  Estimated TDD: 2688.5  Estimated Dose: 1053  Estimated interval: 9.4    Trough kinetics:   Recent Labs     23  1323   VANCOTROUGH 12.5       A/P:     -  Vancomycin dose: 1750mg iv q12h (800, )    -  Next vancomycin level(s): 3-5 days    -  Predicted vancomycin AUC from initial AUC test calculator: 651 mg·hr/L    -  Comments: Vancomycin x 6 weeks per ID for MRSA osteo. Vancomycin previously ordered, unclear why it was discontinued - confusion w/ post-op orders following fem-pop bypass on ? Given patient w/o therapy for 48+ hours, will reload and resume previous dose of vancomycin 20mg/kg iv q12h and tentatively plan for repeat level in 3-5 days. Note, calculated AUC w/ current dose slightly supra-therapeutic, likely d/t slight bump in Scr (UO appears to be adequate > 0.5mL/kg/hr) - will continue to trend. Pharmacy will continue to follow.     Sylvia Coto, PharmD, BCCCP    "

## 2023-07-16 NOTE — CARE PLAN
The patient is Stable - Low risk of patient condition declining or worsening    Shift Goals  Clinical Goals: pain control, safety  Patient Goals: safety, pain control  Family Goals: NA    Progress made toward(s) clinical / shift goals:  Patient is alert and a bit drowsy. When he was asked to rate his pain level he said : 12/10. But when he is alone he is fast asleep. He is able to follow command. His BS  171 for 2 Units of  Humalog SC. As soon as I finished with his HS med. He said what else I got?  Oxycodon is until 2130 hr.  I warned him that he is getting too much and too close because he keep eyes closed as soon as one stop talking to him.Bed in lowest and locked position. Call bell within reach. Hourly rounding in place.    Patient is not progressing towards the following goals:  Problem: Knowledge Deficit - Standard  Goal: Patient and family/care givers will demonstrate understanding of plan of care, disease process/condition, diagnostic tests and medications  Description: Target End Date:  1-3 days or as soon as patient condition allows    Document in Patient Education    1.  Patient and family/caregiver oriented to unit, equipment, visitation policy and means for communicating concern  2.  Complete/review Learning Assessment  3.  Assess knowledge level of disease process/condition, treatment plan, diagnostic tests and medications  4.  Explain disease process/condition, treatment plan, diagnostic tests and medications  7/15/2023 2204 by Jatin Nazario R.N.  Outcome: Progressing  7/15/2023 2204 by Jatin Nazario R.N.  Outcome: Progressing     Problem: Fall Risk  Goal: Patient will remain free from falls  Description: Target End Date:  Prior to discharge or change in level of care    Document interventions on the [facility] Fall Risk Assessment    1.  Assess for fall risk factors  2.  Implement fall precautions  7/15/2023 2204 by Jatin Nazario R.N.  Outcome: Progressing  7/15/2023 2204 by  Jatin Nazario R.N.  Outcome: Progressing     Problem: Optimal Care for Alcohol Withdrawal  Goal: Optimal Care for the alcohol withdrawal patient  Description: Target End Date:  1 to 3 days    1.  Alcohol history screening done on admission  2.  CIWA-AR score assessment (includes assessment of nausea/vomiting, tremor, sweats, anxiety, agitation, tactile, visual and auditory disturbances, headache and orientation/sensorium).  Document on CIWA flowsheet.  3.  Follow CIWA-AR score protocol  4.  Frequent reorientation  5.  Monitor for fluid and electrolyte imbalance.  6.  Assess for respiratory depression due to sedation (pulse ox)  7.  Consider thiamine, multivitamins, folic acid and magnesium sulfate per provider order  8.  Collaborate with Social Workers/Case Management  9.  Collaborate with mental health  Outcome: Progressing     Problem: Seizure Precautions  Goal: Implementation of seizure precautions  Description: Target End Date:  Prior to discharge or change in level of care    1.  Padded side rails up at all times  2.  Suction equipment and oxygen delivery system at bedside  3.  Continuous pulse oximeter in use  4.  Implement fall precautions, bed alarm on, bed in lowest position  5.  IV access (per order)  6.  Provide low stimulus environment, avoid exposure to triggers  7.  Instruct patient to use call light/seizure button if having warning signs of impending seizure  Outcome: Progressing     Problem: Psychosocial  Goal: Patient's level of anxiety will decrease  Description: Target End Date:  1-3 days or as soon as patient condition allows    1.  Collaborate with patient and family/caregiver to identify triggers and develop strategies to cope with anxiety  2.  Implement stimuli reduction, calming techniques  3.  Pharmacologic management per provider order  4.  Encourage patient/family/care giver participation  5.  Collaborate with interdisciplinary team including Psychologist or Behavioral Health Team as  needed  Outcome: Progressing

## 2023-07-16 NOTE — PROGRESS NOTES
Called by hosp-patient declined by every SNF  Not a candidate for PICC due to drug use  If [facility] agreeable to placing peripheral IV daily for infusion he can get his abx there  Orders placed-dapto through 8/15  Decreased dose lipitor while on dapto

## 2023-07-16 NOTE — PROGRESS NOTES
Hospital Medicine Daily Progress Note    Date of Service  7/16/2023    Chief Complaint  Rogelio Escudero is a 48 y.o. male admitted 7/5/2023 with foot wound    Hospital Course  47 yo man with uncontrolled diabetes, CAD S/P stent, HTN, alcoholism, PVD, PTSD who presented with right leg pain.  His right foot toes were recently amputated for osteomyelitis at [place] and he was discharged to SNF on daptomycin and meropenem until 7/20, however the patient left AMA.  He was admitted for worsening foot infection with osteomyelitis.  Ortho and ID were consulted.  He underwent right femoral above-knee popliteal bypass with Dr. Calderon 7/14.  His wound culture grew MRSA.  He was continued on IV Vanco per ID.  MRI of his foot showed soft tissue infection with questionable osteomyelitis.    Interval Problem Update  Hypo-K 3.5, replete  His leg pain is doing better  I discussed with infectious disease, will look into outpatient daily antibiotic IV with peripheral IV  Follow-up Ortho recs  Glucose is better controlled today    I have discussed this patient's plan of care and discharge plan at IDT rounds today with Case Management, Nursing, Nursing leadership, and other members of the IDT team.    Consultants/Specialty  infectious disease and orthopedics    Code Status  Full Code    Disposition  The patient is not medically cleared for discharge to home or a post-acute facility.  Anticipate discharge to: home with close outpatient follow-up    I have placed the appropriate orders for post-discharge needs.    Review of Systems  Review of Systems   Constitutional:  Negative for malaise/fatigue.   Respiratory:  Negative for shortness of breath.    Cardiovascular:  Negative for chest pain.   Gastrointestinal:  Negative for abdominal pain, diarrhea and vomiting.   Musculoskeletal:  Positive for myalgias.   Neurological:  Negative for weakness.   Psychiatric/Behavioral:  The patient is not nervous/anxious.         Physical  Exam  Temp:  [36.4 °C (97.6 °F)-37.3 °C (99.2 °F)] 36.4 °C (97.6 °F)  Pulse:  [80-97] 97  Resp:  [16-19] 18  BP: (113-136)/(71-83) 129/81  SpO2:  [95 %-98 %] 98 %    Physical Exam  Vitals and nursing note reviewed.   Constitutional:       General: He is not in acute distress.     Appearance: He is not toxic-appearing.   HENT:      Head: Normocephalic.      Mouth/Throat:      Mouth: Mucous membranes are moist.   Eyes:      General:         Right eye: No discharge.         Left eye: No discharge.   Pulmonary:      Effort: No respiratory distress.   Abdominal:      Palpations: Abdomen is soft.      Tenderness: There is no abdominal tenderness.   Musculoskeletal:         General: No swelling.      Cervical back: Neck supple.      Comments: Right foot amputation surgical wound with some edema, mild serosanguineous fluid laterally  Right leg with incisional wound VAC from upper thigh to below knee   Skin:     General: Skin is warm and dry.   Neurological:      Mental Status: He is alert and oriented to person, place, and time.         Fluids    Intake/Output Summary (Last 24 hours) at 7/16/2023 1129  Last data filed at 7/16/2023 1000  Gross per 24 hour   Intake 800 ml   Output 225 ml   Net 575 ml       Laboratory  Recent Labs     07/14/23  0017 07/15/23  0144 07/16/23  0038   WBC 8.8 9.6 10.7   RBC 4.31* 3.71* 3.50*   HEMOGLOBIN 13.1* 11.4* 10.6*   HEMATOCRIT 38.9* 33.3* 31.5*   MCV 90.3 89.8 90.0   MCH 30.4 30.7 30.3   MCHC 33.7 34.2 33.7   RDW 51.6* 51.5* 52.3*   PLATELETCT 341 310 305   MPV 9.7 9.5 10.1     Recent Labs     07/14/23 0017 07/15/23  0144 07/16/23 0038   SODIUM 137 137 138   POTASSIUM 3.7 3.6 3.5*   CHLORIDE 103 104 105   CO2 22 21 25   GLUCOSE 225* 257* 161*   BUN 13 12 9   CREATININE 0.61 0.75 0.90   CALCIUM 8.7 8.1* 8.2*                   Imaging  MR-FOOT-WITH & W/O RIGHT   Final Result      1.  Recent first through fifth transmetatarsal amputation.      2.  Marrow edema involving the residual first  through fifth metatarsals. Given recent amputation, this could be related to postsurgical change versus representative of osteomyelitis and is nonspecific.      3.  Rim-enhancing fluid collection within the soft tissues adjacent to the amputation site overlying the first metatarsal measuring 2.6 x 1 x 2.3 cm in size possibly representing postsurgical seroma versus abscess.      4.  Extensive cellulitis.      US-VEIN MAPPING LOWER EXTREMITY BILAT   Final Result      US-EXTREMITY ARTERY LOWER BILAT   Final Result      US-LATASHA SINGLE LEVEL BILAT   Final Result      EC-ECHOCARDIOGRAM COMPLETE W/O CONT   Final Result      US-EXTREMITY VENOUS LOWER UNILAT RIGHT   Final Result      DX-CHEST-PORTABLE (1 VIEW)   Final Result         1.  No acute cardiopulmonary disease.      DX-FOOT-COMPLETE 3+ RIGHT   Final Result         1.  No acute traumatic bony injury.      IR-EXTREMITY ANGIOGRAM-UNILATERAL RIGHT    (Results Pending)        Assessment/Plan  * Osteomyelitis (HCC)- (present on admission)  Assessment & Plan  History of osteomyelitis on the right foot and had amputation transmetatarsal  Wound culture at [place] showed mixed Pseudomonas and MRSA  Blood culture showed MRSA.    Patient was supposed to take antibiotics until 7/20 however he left AGAINST MEDICAL ADVICE  Also patient had MRSA bacteremia  X-ray did not show osteomyelitis  Ortho was consulted, MRI is is done, I will follow-up with Ortho recommendations  LATASHA abnormal, discussed with Dr. Calderon and plan for vascular graft 7/14  Wound culture is growing MRSA and Enterococcus  Continue IV vancomycin and monitor renal function.  ID assisting in arranging outpatient antibiotic infusion till 8/15 on daptomycin    Noncompliance  Assessment & Plan  Patient has significant history of noncompliance and not taking insulin or medications  Patient has complicated medical history including uncontrolled diabetes and peripheral vascular disease with coronary artery  disease  Resume his medications  Continue education the patient    MRSA bacteremia  Assessment & Plan  Blood culture [facility] on 6/6 was positive for MRSA and repeat culture on 6/9 was negative  Blood cultures have been negative, TTE negative for endocarditis      CAD (coronary artery disease)- (present on admission)  Assessment & Plan  No chest pain and EKG did not show ischemia  Stent placement in September 2022  Holding antiplatelets pending surgery  Resume plavix if no further surgeries, follow-up with Ortho    Peripheral artery disease (HCC)- (present on admission)  Assessment & Plan  Continue aspirin, atorvastatin.  Holding Plavix for possible surgery with Ortho  Encouraged the patient to quit smoking    Alcohol abuse- (present on admission)  Assessment & Plan  No signs of withdrawal    Tobacco abuse- (present on admission)  Assessment & Plan  He is motivated to stop smoking.  He is tolerating Wellbutrin so far    Diabetic foot ulcer (HCC)- (present on admission)  Assessment & Plan  Underwent amputation on left foot  Good glucose control needed  Continue IV Vanco and monitor renal function.  ID consulted and following  LPS consulted, recommend likely surgery  Ortho consulted, MRI is done.  I spoke with Dr. Escudero who will follow-up with recommendations  LATASHA abnormal.  Having vascular grafting with Dr. Calderon 7/14  Blood cultures remain NGTD    Diabetes (HCC)- (present on admission)  Assessment & Plan  Uncontrolled and A1c 11.2  Patient admitted he is not taking insulin at home  Continue glargine 25 U nightly  ISS.    Hyperglycemia improving  DM educator has seen him         VTE prophylaxis: enoxaparin ppx    I have performed a physical exam and reviewed and updated ROS and Plan today (7/16/2023). In review of yesterday's note (7/15/2023), there are no changes except as documented above.

## 2023-07-16 NOTE — WOUND TEAM
This Wound Care RN asked by primary RN Dede Sinha to assist with patient's prevena surgical dressing that had been beeping all night.     Prevena dressing to right groin clean, dry, and intact with good seal connected to alarming prevena plus. All drape checked and verified with good seal.     New prevena plus obtained from central supply and connected to intact prevena dressing. No alarm for approximately 20 minutes then began alarming. MD Nicholas contacted by primary RN and he stated to defer to wound care recommendations.     Ulta wound vac obtained from central supply and connected to prevena dressing. Ulta vac set to prevena setting and seal achieved with no alarming.     Wound Care Team signing off at this time. Prevena with Ulta Vac is to be managed by surgical team. Please have surgeon contact wound care team if patient has further advanced wound care needs.

## 2023-07-17 ENCOUNTER — APPOINTMENT (OUTPATIENT)
Dept: RADIOLOGY | Facility: MEDICAL CENTER | Age: 48
DRG: 253 | End: 2023-07-17
Attending: INTERNAL MEDICINE
Payer: MEDICAID

## 2023-07-17 LAB
ANION GAP SERPL CALC-SCNC: 12 MMOL/L (ref 7–16)
BUN SERPL-MCNC: 10 MG/DL (ref 8–22)
CALCIUM SERPL-MCNC: 8.7 MG/DL (ref 8.5–10.5)
CHLORIDE SERPL-SCNC: 99 MMOL/L (ref 96–112)
CO2 SERPL-SCNC: 21 MMOL/L (ref 20–33)
CREAT SERPL-MCNC: 0.66 MG/DL (ref 0.5–1.4)
GFR SERPLBLD CREATININE-BSD FMLA CKD-EPI: 114 ML/MIN/1.73 M 2
GLUCOSE BLD STRIP.AUTO-MCNC: 158 MG/DL (ref 65–99)
GLUCOSE BLD STRIP.AUTO-MCNC: 172 MG/DL (ref 65–99)
GLUCOSE BLD STRIP.AUTO-MCNC: 183 MG/DL (ref 65–99)
GLUCOSE BLD STRIP.AUTO-MCNC: 191 MG/DL (ref 65–99)
GLUCOSE SERPL-MCNC: 159 MG/DL (ref 65–99)
POTASSIUM SERPL-SCNC: 3.6 MMOL/L (ref 3.6–5.5)
SODIUM SERPL-SCNC: 132 MMOL/L (ref 135–145)

## 2023-07-17 PROCEDURE — 36415 COLL VENOUS BLD VENIPUNCTURE: CPT

## 2023-07-17 PROCEDURE — 80048 BASIC METABOLIC PNL TOTAL CA: CPT

## 2023-07-17 PROCEDURE — 700102 HCHG RX REV CODE 250 W/ 637 OVERRIDE(OP): Performed by: INTERNAL MEDICINE

## 2023-07-17 PROCEDURE — 99232 SBSQ HOSP IP/OBS MODERATE 35: CPT | Performed by: INTERNAL MEDICINE

## 2023-07-17 PROCEDURE — A9270 NON-COVERED ITEM OR SERVICE: HCPCS | Performed by: INTERNAL MEDICINE

## 2023-07-17 PROCEDURE — 700111 HCHG RX REV CODE 636 W/ 250 OVERRIDE (IP): Mod: JZ | Performed by: INTERNAL MEDICINE

## 2023-07-17 PROCEDURE — 11042 DBRDMT SUBQ TIS 1ST 20SQCM/<: CPT | Performed by: NURSE PRACTITIONER

## 2023-07-17 PROCEDURE — 770001 HCHG ROOM/CARE - MED/SURG/GYN PRIV*

## 2023-07-17 PROCEDURE — RXMED WILLOW AMBULATORY MEDICATION CHARGE: Performed by: INTERNAL MEDICINE

## 2023-07-17 PROCEDURE — 99233 SBSQ HOSP IP/OBS HIGH 50: CPT | Mod: 25 | Performed by: NURSE PRACTITIONER

## 2023-07-17 PROCEDURE — 0JBQ0ZZ EXCISION OF RIGHT FOOT SUBCUTANEOUS TISSUE AND FASCIA, OPEN APPROACH: ICD-10-PCS | Performed by: NURSE PRACTITIONER

## 2023-07-17 PROCEDURE — 700111 HCHG RX REV CODE 636 W/ 250 OVERRIDE (IP): Performed by: INTERNAL MEDICINE

## 2023-07-17 PROCEDURE — 82962 GLUCOSE BLOOD TEST: CPT | Mod: 91

## 2023-07-17 PROCEDURE — 700105 HCHG RX REV CODE 258: Performed by: INTERNAL MEDICINE

## 2023-07-17 RX ORDER — LISINOPRIL 10 MG/1
10 TABLET ORAL DAILY
Qty: 90 TABLET | Refills: 0 | Status: ON HOLD | OUTPATIENT
Start: 2023-07-17 | End: 2023-07-28

## 2023-07-17 RX ORDER — ATORVASTATIN CALCIUM 40 MG/1
40 TABLET, FILM COATED ORAL
Qty: 30 TABLET | Refills: 3 | Status: SHIPPED | OUTPATIENT
Start: 2023-07-17 | End: 2023-07-18 | Stop reason: SDUPTHER

## 2023-07-17 RX ORDER — CLOPIDOGREL BISULFATE 75 MG/1
75 TABLET ORAL DAILY
Qty: 90 TABLET | Refills: 3 | Status: SHIPPED | OUTPATIENT
Start: 2023-07-17

## 2023-07-17 RX ORDER — GLUCOSAMINE HCL/CHONDROITIN SU 500-400 MG
CAPSULE ORAL
Qty: 100 EACH | Refills: 0 | Status: SHIPPED | OUTPATIENT
Start: 2023-07-17 | End: 2023-07-20

## 2023-07-17 RX ORDER — ASPIRIN 81 MG/1
81 TABLET ORAL DAILY
Qty: 100 TABLET | Refills: 0 | Status: SHIPPED | OUTPATIENT
Start: 2023-07-18 | End: 2023-07-20

## 2023-07-17 RX ORDER — INSULIN GLARGINE 100 [IU]/ML
25 INJECTION, SOLUTION SUBCUTANEOUS DAILY
Qty: 9 ML | Refills: 3 | Status: ON HOLD | OUTPATIENT
Start: 2023-07-17 | End: 2023-07-28

## 2023-07-17 RX ORDER — OXYCODONE HYDROCHLORIDE AND ACETAMINOPHEN 5; 325 MG/1; MG/1
1-2 TABLET ORAL EVERY 6 HOURS PRN
Qty: 15 TABLET | Refills: 0 | Status: ON HOLD | OUTPATIENT
Start: 2023-07-17 | End: 2023-07-28

## 2023-07-17 RX ORDER — BUPROPION HYDROCHLORIDE 75 MG/1
150 TABLET ORAL 2 TIMES DAILY
Status: DISCONTINUED | OUTPATIENT
Start: 2023-07-17 | End: 2023-07-18 | Stop reason: HOSPADM

## 2023-07-17 RX ORDER — LANCETS
EACH MISCELLANEOUS
Qty: 100 EACH | Refills: 0 | Status: SHIPPED | OUTPATIENT
Start: 2023-07-17 | End: 2023-07-20

## 2023-07-17 RX ORDER — ESCITALOPRAM OXALATE 10 MG/1
10 TABLET ORAL DAILY
Qty: 30 TABLET | Refills: 0 | Status: SHIPPED | OUTPATIENT
Start: 2023-07-17

## 2023-07-17 RX ORDER — DIPHENHYDRAMINE HYDROCHLORIDE 25 MG/1
CAPSULE, LIQUID FILLED ORAL
Qty: 1 KIT | Refills: 0 | Status: SHIPPED | OUTPATIENT
Start: 2023-07-17 | End: 2023-07-20

## 2023-07-17 RX ORDER — COLCHICINE 0.6 MG/1
0.6 TABLET ORAL DAILY
Qty: 30 TABLET | Refills: 0 | Status: SHIPPED | OUTPATIENT
Start: 2023-07-17

## 2023-07-17 RX ORDER — OMEPRAZOLE 20 MG/1
20 CAPSULE, DELAYED RELEASE ORAL EVERY MORNING
Qty: 30 CAPSULE | Refills: 0 | Status: SHIPPED | OUTPATIENT
Start: 2023-07-18

## 2023-07-17 RX ORDER — BUPROPION HYDROCHLORIDE 75 MG/1
150 TABLET ORAL 2 TIMES DAILY
Qty: 120 TABLET | Refills: 0 | Status: ON HOLD | OUTPATIENT
Start: 2023-07-17 | End: 2023-08-16

## 2023-07-17 RX ADMIN — ATORVASTATIN CALCIUM 20 MG: 20 TABLET, FILM COATED ORAL at 21:39

## 2023-07-17 RX ADMIN — OXYCODONE HYDROCHLORIDE 10 MG: 5 TABLET ORAL at 11:47

## 2023-07-17 RX ADMIN — INSULIN LISPRO 2 UNITS: 100 INJECTION, SOLUTION INTRAVENOUS; SUBCUTANEOUS at 08:11

## 2023-07-17 RX ADMIN — ESCITALOPRAM OXALATE 10 MG: 10 TABLET ORAL at 05:33

## 2023-07-17 RX ADMIN — OXYCODONE HYDROCHLORIDE 10 MG: 5 TABLET ORAL at 21:41

## 2023-07-17 RX ADMIN — CLOPIDOGREL BISULFATE 75 MG: 75 TABLET ORAL at 05:33

## 2023-07-17 RX ADMIN — INSULIN LISPRO 2 UNITS: 100 INJECTION, SOLUTION INTRAVENOUS; SUBCUTANEOUS at 21:37

## 2023-07-17 RX ADMIN — ONDANSETRON 4 MG: 2 INJECTION INTRAMUSCULAR; INTRAVENOUS at 11:47

## 2023-07-17 RX ADMIN — OXYCODONE HYDROCHLORIDE 10 MG: 5 TABLET ORAL at 08:14

## 2023-07-17 RX ADMIN — OXYCODONE HYDROCHLORIDE 10 MG: 5 TABLET ORAL at 01:11

## 2023-07-17 RX ADMIN — VANCOMYCIN HYDROCHLORIDE 1750 MG: 5 INJECTION, POWDER, LYOPHILIZED, FOR SOLUTION INTRAVENOUS at 08:03

## 2023-07-17 RX ADMIN — BUPROPION HYDROCHLORIDE 150 MG: 75 TABLET, FILM COATED ORAL at 05:33

## 2023-07-17 RX ADMIN — MORPHINE SULFATE 4 MG: 4 INJECTION, SOLUTION INTRAMUSCULAR; INTRAVENOUS at 05:40

## 2023-07-17 RX ADMIN — LISINOPRIL 10 MG: 10 TABLET ORAL at 05:32

## 2023-07-17 RX ADMIN — VANCOMYCIN HYDROCHLORIDE 1750 MG: 5 INJECTION, POWDER, LYOPHILIZED, FOR SOLUTION INTRAVENOUS at 19:39

## 2023-07-17 RX ADMIN — MORPHINE SULFATE 4 MG: 4 INJECTION, SOLUTION INTRAMUSCULAR; INTRAVENOUS at 16:08

## 2023-07-17 RX ADMIN — BUPROPION HYDROCHLORIDE 150 MG: 75 TABLET, FILM COATED ORAL at 17:40

## 2023-07-17 RX ADMIN — INSULIN GLARGINE-YFGN 25 UNITS: 100 INJECTION, SOLUTION SUBCUTANEOUS at 17:34

## 2023-07-17 RX ADMIN — OMEPRAZOLE 20 MG: 20 CAPSULE, DELAYED RELEASE ORAL at 05:33

## 2023-07-17 RX ADMIN — COLCHICINE 0.6 MG: 0.6 TABLET ORAL at 05:33

## 2023-07-17 RX ADMIN — INSULIN LISPRO 2 UNITS: 100 INJECTION, SOLUTION INTRAVENOUS; SUBCUTANEOUS at 13:00

## 2023-07-17 RX ADMIN — INSULIN LISPRO 2 UNITS: 100 INJECTION, SOLUTION INTRAVENOUS; SUBCUTANEOUS at 17:35

## 2023-07-17 RX ADMIN — ASPIRIN 81 MG: 81 TABLET, COATED ORAL at 05:33

## 2023-07-17 ASSESSMENT — PAIN DESCRIPTION - PAIN TYPE
TYPE: SURGICAL PAIN;ACUTE PAIN
TYPE: ACUTE PAIN;SURGICAL PAIN
TYPE: SURGICAL PAIN
TYPE: ACUTE PAIN
TYPE: SURGICAL PAIN
TYPE: ACUTE PAIN
TYPE: SURGICAL PAIN

## 2023-07-17 ASSESSMENT — ENCOUNTER SYMPTOMS
ABDOMINAL PAIN: 0
WEAKNESS: 0
SHORTNESS OF BREATH: 0
DIARRHEA: 0
MYALGIAS: 1
NERVOUS/ANXIOUS: 0
VOMITING: 0

## 2023-07-17 ASSESSMENT — PAIN SCALES - WONG BAKER
WONGBAKER_NUMERICALRESPONSE: HURTS A LITTLE MORE
WONGBAKER_NUMERICALRESPONSE: HURTS JUST A LITTLE BIT
WONGBAKER_NUMERICALRESPONSE: HURTS A LITTLE MORE
WONGBAKER_NUMERICALRESPONSE: HURTS JUST A LITTLE BIT

## 2023-07-17 NOTE — PROGRESS NOTES
Hospital Medicine Daily Progress Note    Date of Service  7/17/2023    Chief Complaint  Rogelio Escudero is a 48 y.o. male admitted 7/5/2023 with foot wound    Hospital Course  49 yo man with uncontrolled diabetes, CAD S/P stent, HTN, alcoholism, PVD, PTSD who presented with right leg pain.  His right foot toes were recently amputated for osteomyelitis at [place] and he was discharged to SNF on daptomycin and meropenem until 7/20, however the patient left AMA.  He was admitted for worsening foot infection with osteomyelitis.  Ortho and ID were consulted.  He underwent right femoral above-knee popliteal bypass with Dr. Calderon 7/14.  His wound culture grew MRSA.  He was continued on IV Vanco per ID.  MRI of his foot showed soft tissue infection with questionable osteomyelitis.  Arterial ultrasound showed right femoral disease.  He underwent right femoral above-knee popliteal bypass with Dr. Calderon 7/14.  Infectious disease recommends continuing IV antibiotics, daptomycin on discharge until 8/15.  There have been no SNF acceptance.  Infectious disease changes antibiotics to outpatient, patient to have a peripheral IV placed each visit.      Interval Problem Update  Continue on IV Vanco, monitor renal function  Discussed with Dr. Escudero, no further Ortho surgery is planned.  Patient can follow-up with him outpatient, may need debridement in the future.  I discussed with KAIN Villarreal and she will see the patient tomorrow.  Wound VAC attached to Provena can come off tomorrow.  Angelica with case management to work on outpatient IV antibiotic appointment and wound care clinic appointment  Patient has some pain to his right leg where he had his incision.  I encouraged him to ambulate today    I have discussed this patient's plan of care and discharge plan at IDT rounds today with Case Management, Nursing, Nursing leadership, and other members of the IDT team.    Consultants/Specialty  infectious disease and  orthopedics    Code Status  Full Code    Disposition  The patient is not medically cleared for discharge to home or a post-acute facility.  Anticipate discharge to: home with close outpatient follow-up    I have placed the appropriate orders for post-discharge needs.    Review of Systems  Review of Systems   Constitutional:  Negative for malaise/fatigue.   Respiratory:  Negative for shortness of breath.    Cardiovascular:  Negative for chest pain.   Gastrointestinal:  Negative for abdominal pain, diarrhea and vomiting.   Musculoskeletal:  Positive for myalgias.   Neurological:  Negative for weakness.   Psychiatric/Behavioral:  The patient is not nervous/anxious.         Physical Exam  Temp:  [36.1 °C (97 °F)-36.7 °C (98 °F)] 36.7 °C (98 °F)  Pulse:  [92-93] 92  Resp:  [18-19] 18  BP: (125-134)/(76-86) 127/86  SpO2:  [96 %-97 %] 96 %    Physical Exam  Vitals and nursing note reviewed.   Constitutional:       General: He is not in acute distress.     Appearance: He is not toxic-appearing.   HENT:      Head: Normocephalic.      Mouth/Throat:      Mouth: Mucous membranes are moist.   Eyes:      General:         Right eye: No discharge.         Left eye: No discharge.   Pulmonary:      Effort: No respiratory distress.   Abdominal:      Palpations: Abdomen is soft.      Tenderness: There is no abdominal tenderness.   Musculoskeletal:         General: No swelling.      Cervical back: Neck supple.      Comments: Right foot amputation surgical wound with some edema, mild serosanguineous fluid laterally  Right leg with incisional wound VAC from upper thigh to below knee   Skin:     General: Skin is warm and dry.   Neurological:      Mental Status: He is alert and oriented to person, place, and time.         Fluids    Intake/Output Summary (Last 24 hours) at 7/17/2023 1345  Last data filed at 7/16/2023 1800  Gross per 24 hour   Intake --   Output 670 ml   Net -670 ml       Laboratory  Recent Labs     07/15/23  0147  07/16/23  0038   WBC 9.6 10.7   RBC 3.71* 3.50*   HEMOGLOBIN 11.4* 10.6*   HEMATOCRIT 33.3* 31.5*   MCV 89.8 90.0   MCH 30.7 30.3   MCHC 34.2 33.7   RDW 51.5* 52.3*   PLATELETCT 310 305   MPV 9.5 10.1     Recent Labs     07/15/23  0144 07/16/23  0038 07/17/23  0152   SODIUM 137 138 132*   POTASSIUM 3.6 3.5* 3.6   CHLORIDE 104 105 99   CO2 21 25 21   GLUCOSE 257* 161* 159*   BUN 12 9 10   CREATININE 0.75 0.90 0.66   CALCIUM 8.1* 8.2* 8.7                   Imaging  MR-FOOT-WITH & W/O RIGHT   Final Result      1.  Recent first through fifth transmetatarsal amputation.      2.  Marrow edema involving the residual first through fifth metatarsals. Given recent amputation, this could be related to postsurgical change versus representative of osteomyelitis and is nonspecific.      3.  Rim-enhancing fluid collection within the soft tissues adjacent to the amputation site overlying the first metatarsal measuring 2.6 x 1 x 2.3 cm in size possibly representing postsurgical seroma versus abscess.      4.  Extensive cellulitis.      US-VEIN MAPPING LOWER EXTREMITY BILAT   Final Result      US-EXTREMITY ARTERY LOWER BILAT   Final Result      US-LATASHA SINGLE LEVEL BILAT   Final Result      EC-ECHOCARDIOGRAM COMPLETE W/O CONT   Final Result      US-EXTREMITY VENOUS LOWER UNILAT RIGHT   Final Result      DX-CHEST-PORTABLE (1 VIEW)   Final Result         1.  No acute cardiopulmonary disease.      DX-FOOT-COMPLETE 3+ RIGHT   Final Result         1.  No acute traumatic bony injury.      IR-EXTREMITY ANGIOGRAM-UNILATERAL RIGHT    (Results Pending)   IR-US GUIDED PIV    (Results Pending)        Assessment/Plan  * Osteomyelitis (HCC)- (present on admission)  Assessment & Plan  History of osteomyelitis on the right foot and had amputation transmetatarsal  Wound culture at [place] showed mixed Pseudomonas and MRSA  Blood culture showed MRSA.    Patient was supposed to take antibiotics until 7/20 however he left AGAINST MEDICAL ADVICE  Also  patient had MRSA bacteremia  X-ray did not show osteomyelitis  Ortho was consulted, MRI is is done, I will follow-up with Ortho recommendations  LATASHA abnormal, discussed with Dr. Calderon and plan for vascular graft 7/14  Wound culture is growing MRSA and Enterococcus  Continue IV vancomycin and monitor renal function.  ID assisting in arranging outpatient antibiotic infusion till 8/15 on daptomycin    Noncompliance  Assessment & Plan  Patient has significant history of noncompliance and not taking insulin or medications  Patient has complicated medical history including uncontrolled diabetes and peripheral vascular disease with coronary artery disease  Resume his medications  Continue education the patient    MRSA bacteremia  Assessment & Plan  Blood culture [place] on 6/6 was positive for MRSA and repeat culture on 6/9 was negative  Blood cultures have been negative, TTE negative for endocarditis      CAD (coronary artery disease)- (present on admission)  Assessment & Plan  No chest pain and EKG did not show ischemia  Stent placement in September 2022  Resumed on aspirin and Plavix    Peripheral artery disease (HCC)- (present on admission)  Assessment & Plan  Continue aspirin, atorvastatin.  Holding Plavix for possible surgery with Ortho  Encouraged the patient to quit smoking    Alcohol abuse- (present on admission)  Assessment & Plan  No signs of withdrawal    Tobacco abuse- (present on admission)  Assessment & Plan  He is motivated to stop smoking.  He is tolerating Wellbutrin so far, increased dose    Diabetic foot ulcer (HCC)- (present on admission)  Assessment & Plan  Underwent amputation on left foot  Good glucose control needed  Continue IV Vanco and monitor renal function.  ID consulted and following  LPS consulted, will see tomorrow  Ortho consulted, MRI is done.  I spoke with Dr. Escudero and no surgery is planned during this hospitalization  LATASHA abnormal.  Having vascular grafting with Dr. Calderon  7/14  Blood cultures remain NGTD    Diabetes (HCC)- (present on admission)  Assessment & Plan  Uncontrolled and A1c 11.2  Patient admitted he is not taking insulin at home  Continue glargine 25 U nightly  ISS.    Hyperglycemia improving  DM educator has seen him         VTE prophylaxis: enoxaparin ppx    I have performed a physical exam and reviewed and updated ROS and Plan today (7/17/2023). In review of yesterday's note (7/16/2023), there are no changes except as documented above.
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
## 2023-07-17 NOTE — CARE PLAN
Problem: Knowledge Deficit - Standard  Goal: Patient and family/care givers will demonstrate understanding of plan of care, disease process/condition, diagnostic tests and medications  Outcome: Progressing     Problem: Pain - Standard  Goal: Alleviation of pain or a reduction in pain to the patient’s comfort goal  Outcome: Progressing   The patient is Stable - Low risk of patient condition declining or worsening    Shift Goals  Clinical Goals: ABX, Discharge planning.  Patient Goals: abx  Family Goals: NA    Progress made toward(s) clinical / shift goals:  Surgeon plans on removing vac tomorrow.     Patient is not progressing towards the following goals:

## 2023-07-17 NOTE — FACE TO FACE
Face to Face Note  -  Durable Medical Equipment    Rosangela Harry M.D. - NPI: [id number]  I certify that this patient is under my care and that they had a durable medical equipment(DME)face to face encounter by myself that meets the physician DME face-to-face encounter requirements with this patient on:    Date of encounter:   Patient:                    MRN:                       YOB: 2025  Rogelio Escudero  [id number]  1975     The encounter with the patient was in whole, or in part, for the following medical condition, which is the primary reason for durable medical equipment:  Diabetes and Wound Care    I certify that, based on my findings, the following durable medical equipment is medically necessary:    Wound Vac.    My Clinical findings support the need for the above equipment due to:  Wound infection, Wound/Incision

## 2023-07-17 NOTE — DISCHARGE PLANNING
note:  Noted woundvac order placed.   DANIKA called Leelee at [employer] and alerted her of order.   Leelee requested facesheet to be faxed to her so she can run insurance.     Woundvac order form needs signed by MD.

## 2023-07-17 NOTE — DISCHARGE PLANNING
Per MD. Pt has a Prevena but woundcare would like to see pt Tuesday or Wed so MD will dc after woundcare has seen pt.

## 2023-07-17 NOTE — DISCHARGE PLANNING
note:  DANIKA talked to Francis at [facility]. She needs ID to update the order with an end date on [facility] Plan (Aug 15). Dr. Luu agreeable.

## 2023-07-18 ENCOUNTER — PHARMACY VISIT (OUTPATIENT)
Dept: PHARMACY | Facility: MEDICAL CENTER | Age: 48
End: 2023-07-18
Payer: COMMERCIAL

## 2023-07-18 VITALS
HEIGHT: 69 IN | DIASTOLIC BLOOD PRESSURE: 76 MMHG | HEART RATE: 85 BPM | BODY MASS INDEX: 28.88 KG/M2 | WEIGHT: 195 LBS | SYSTOLIC BLOOD PRESSURE: 118 MMHG | RESPIRATION RATE: 18 BRPM | OXYGEN SATURATION: 98 % | TEMPERATURE: 98.1 F

## 2023-07-18 LAB
ANION GAP SERPL CALC-SCNC: 9 MMOL/L (ref 7–16)
BASOPHILS # BLD AUTO: 0.5 % (ref 0–1.8)
BASOPHILS # BLD: 0.05 K/UL (ref 0–0.12)
BUN SERPL-MCNC: 9 MG/DL (ref 8–22)
CALCIUM SERPL-MCNC: 8.9 MG/DL (ref 8.5–10.5)
CHLORIDE SERPL-SCNC: 100 MMOL/L (ref 96–112)
CO2 SERPL-SCNC: 24 MMOL/L (ref 20–33)
CREAT SERPL-MCNC: 0.62 MG/DL (ref 0.5–1.4)
EOSINOPHIL # BLD AUTO: 0.58 K/UL (ref 0–0.51)
EOSINOPHIL NFR BLD: 5.4 % (ref 0–6.9)
ERYTHROCYTE [DISTWIDTH] IN BLOOD BY AUTOMATED COUNT: 51.3 FL (ref 35.9–50)
GFR SERPLBLD CREATININE-BSD FMLA CKD-EPI: 116 ML/MIN/1.73 M 2
GLUCOSE BLD STRIP.AUTO-MCNC: 153 MG/DL (ref 65–99)
GLUCOSE BLD STRIP.AUTO-MCNC: 158 MG/DL (ref 65–99)
GLUCOSE SERPL-MCNC: 191 MG/DL (ref 65–99)
HCT VFR BLD AUTO: 30.4 % (ref 42–52)
HGB BLD-MCNC: 10.2 G/DL (ref 14–18)
IMM GRANULOCYTES # BLD AUTO: 0.06 K/UL (ref 0–0.11)
IMM GRANULOCYTES NFR BLD AUTO: 0.6 % (ref 0–0.9)
LYMPHOCYTES # BLD AUTO: 2.15 K/UL (ref 1–4.8)
LYMPHOCYTES NFR BLD: 19.9 % (ref 22–41)
MCH RBC QN AUTO: 30.2 PG (ref 27–33)
MCHC RBC AUTO-ENTMCNC: 33.6 G/DL (ref 32.3–36.5)
MCV RBC AUTO: 89.9 FL (ref 81.4–97.8)
MONOCYTES # BLD AUTO: 1.32 K/UL (ref 0–0.85)
MONOCYTES NFR BLD AUTO: 12.2 % (ref 0–13.4)
NEUTROPHILS # BLD AUTO: 6.63 K/UL (ref 1.82–7.42)
NEUTROPHILS NFR BLD: 61.4 % (ref 44–72)
NRBC # BLD AUTO: 0 K/UL
NRBC BLD-RTO: 0 /100 WBC (ref 0–0.2)
PLATELET # BLD AUTO: 361 K/UL (ref 164–446)
PMV BLD AUTO: 9.6 FL (ref 9–12.9)
POTASSIUM SERPL-SCNC: 3.5 MMOL/L (ref 3.6–5.5)
RBC # BLD AUTO: 3.38 M/UL (ref 4.7–6.1)
SODIUM SERPL-SCNC: 133 MMOL/L (ref 135–145)
WBC # BLD AUTO: 10.8 K/UL (ref 4.8–10.8)

## 2023-07-18 PROCEDURE — A9270 NON-COVERED ITEM OR SERVICE: HCPCS | Performed by: INTERNAL MEDICINE

## 2023-07-18 PROCEDURE — 700102 HCHG RX REV CODE 250 W/ 637 OVERRIDE(OP): Mod: JZ | Performed by: STUDENT IN AN ORGANIZED HEALTH CARE EDUCATION/TRAINING PROGRAM

## 2023-07-18 PROCEDURE — 700102 HCHG RX REV CODE 250 W/ 637 OVERRIDE(OP): Performed by: INTERNAL MEDICINE

## 2023-07-18 PROCEDURE — 700111 HCHG RX REV CODE 636 W/ 250 OVERRIDE (IP): Performed by: INTERNAL MEDICINE

## 2023-07-18 PROCEDURE — 80048 BASIC METABOLIC PNL TOTAL CA: CPT

## 2023-07-18 PROCEDURE — 36415 COLL VENOUS BLD VENIPUNCTURE: CPT

## 2023-07-18 PROCEDURE — RXMED WILLOW AMBULATORY MEDICATION CHARGE: Performed by: STUDENT IN AN ORGANIZED HEALTH CARE EDUCATION/TRAINING PROGRAM

## 2023-07-18 PROCEDURE — RXMED WILLOW AMBULATORY MEDICATION CHARGE: Performed by: INTERNAL MEDICINE

## 2023-07-18 PROCEDURE — A9270 NON-COVERED ITEM OR SERVICE: HCPCS | Mod: JZ | Performed by: STUDENT IN AN ORGANIZED HEALTH CARE EDUCATION/TRAINING PROGRAM

## 2023-07-18 PROCEDURE — 85025 COMPLETE CBC W/AUTO DIFF WBC: CPT

## 2023-07-18 PROCEDURE — 82962 GLUCOSE BLOOD TEST: CPT | Mod: 91

## 2023-07-18 PROCEDURE — 700105 HCHG RX REV CODE 258: Performed by: INTERNAL MEDICINE

## 2023-07-18 PROCEDURE — 99239 HOSP IP/OBS DSCHRG MGMT >30: CPT | Performed by: STUDENT IN AN ORGANIZED HEALTH CARE EDUCATION/TRAINING PROGRAM

## 2023-07-18 RX ORDER — POTASSIUM CHLORIDE 20 MEQ/1
40 TABLET, EXTENDED RELEASE ORAL ONCE
Status: COMPLETED | OUTPATIENT
Start: 2023-07-18 | End: 2023-07-18

## 2023-07-18 RX ORDER — ATORVASTATIN CALCIUM 20 MG/1
20 TABLET, FILM COATED ORAL
Qty: 30 TABLET | Refills: 0 | Status: ON HOLD | OUTPATIENT
Start: 2023-07-18 | End: 2023-07-28

## 2023-07-18 RX ORDER — ASPIRIN 81 MG/1
81 TABLET ORAL DAILY
Qty: 30 TABLET | Refills: 0 | Status: SHIPPED | OUTPATIENT
Start: 2023-07-18 | End: 2023-08-17

## 2023-07-18 RX ORDER — BUPROPION HYDROCHLORIDE 75 MG/1
150 TABLET ORAL 2 TIMES DAILY
Qty: 120 TABLET | Refills: 0 | Status: SHIPPED | OUTPATIENT
Start: 2023-07-18 | End: 2023-07-20

## 2023-07-18 RX ADMIN — OXYCODONE HYDROCHLORIDE 10 MG: 5 TABLET ORAL at 09:04

## 2023-07-18 RX ADMIN — VANCOMYCIN HYDROCHLORIDE 1750 MG: 5 INJECTION, POWDER, LYOPHILIZED, FOR SOLUTION INTRAVENOUS at 09:07

## 2023-07-18 RX ADMIN — INSULIN LISPRO 2 UNITS: 100 INJECTION, SOLUTION INTRAVENOUS; SUBCUTANEOUS at 09:01

## 2023-07-18 RX ADMIN — CLOPIDOGREL BISULFATE 75 MG: 75 TABLET ORAL at 05:44

## 2023-07-18 RX ADMIN — COLCHICINE 0.6 MG: 0.6 TABLET ORAL at 05:45

## 2023-07-18 RX ADMIN — INSULIN LISPRO 2 UNITS: 100 INJECTION, SOLUTION INTRAVENOUS; SUBCUTANEOUS at 12:41

## 2023-07-18 RX ADMIN — ESCITALOPRAM OXALATE 10 MG: 10 TABLET ORAL at 05:45

## 2023-07-18 RX ADMIN — BUPROPION HYDROCHLORIDE 150 MG: 75 TABLET, FILM COATED ORAL at 05:45

## 2023-07-18 RX ADMIN — ASPIRIN 81 MG: 81 TABLET, COATED ORAL at 05:45

## 2023-07-18 RX ADMIN — OMEPRAZOLE 20 MG: 20 CAPSULE, DELAYED RELEASE ORAL at 05:45

## 2023-07-18 RX ADMIN — POTASSIUM CHLORIDE 40 MEQ: 1500 TABLET, EXTENDED RELEASE ORAL at 12:40

## 2023-07-18 RX ADMIN — LISINOPRIL 10 MG: 10 TABLET ORAL at 05:45

## 2023-07-18 RX ADMIN — OXYCODONE HYDROCHLORIDE 10 MG: 5 TABLET ORAL at 01:51

## 2023-07-18 RX ADMIN — OXYCODONE HYDROCHLORIDE 10 MG: 5 TABLET ORAL at 05:56

## 2023-07-18 ASSESSMENT — PAIN DESCRIPTION - PAIN TYPE
TYPE: ACUTE PAIN

## 2023-07-18 ASSESSMENT — PAIN SCALES - WONG BAKER
WONGBAKER_NUMERICALRESPONSE: HURTS JUST A LITTLE BIT
WONGBAKER_NUMERICALRESPONSE: HURTS A LITTLE MORE
WONGBAKER_NUMERICALRESPONSE: HURTS A LITTLE MORE

## 2023-07-18 NOTE — DISCHARGE INSTRUCTIONS
Discharge Instructions per Rishi Singh M.D.    Please follow-up with PCP as outpatient.  Please go to outpatient infusion center daily for peripheral IV daptomycin daily until 8/15/23  Follow up with outpatient wound clinic and podiatry    Return to ER in the event of new or worsening symptoms. Please note importance of compliance and the patient has agreed to proceed with all medical recommendations and follow up plan indicated above. All medications come with benefits and risks. Risks may include permanent injury or death and these risks can be minimized with close reassessment and monitoring. Please make it to your scheduled follow ups with PCP, podiatry, wound clinic    _____________________________________________________         VASCULAR SURGERY SERVICE    Discharge Instructions     Procedure: Lower Extremity Bypass     - ACTIVITIES: No strenuous activities or heavy lifting (greater than 15 pounds) for 4 weeks.     - DRIVING: You may drive whenever you are off pain medications and are able to perform the activities needed to drive, i.e. turning, bending, twisting, etc.      - WOUND/BATHING: It is not unusual for patients to experience swelling and even bruising, as well as a small amount of drainage from the incisions. This is normal and will resolve over the next 1-2 weeks. You can shower starting the day of discharge. You may remove any white bandages 2 days after discharge and after that you can shower and get the incisions wet and only need to cover the incisions if there is drainage from the incisions.    - PAIN MEDICATION: You will be given a prescription for pain medication at discharge. Please take these as directed. It is important to remember not to take medications on an empty stomach as this may cause nausea. Also, you may get a prescription for a stool softener which you should take as long as you are taking pain medication. It is also OK to take over-the-counter medication for pain like Tylenol  and Ibuprofen.     - BOWEL FUNCTION: After surgery, it is not uncommon for patients to experience constipation. This is due to decreasing activity levels as well as pain medications. You may need to use a laxative (Milk of Magnesia, Ex-lax; Senokot, etc.) if you go more than 1-2 days without a bowel movement.     - CALL IF YOU HAVE: (1) Fevers to more than 101.5 F, (2) Unusual or excessive pain, (3) Drainage or fluid from incision that may be foul smelling, increased tenderness or soreness at the wound or the wound edges are no longer together, redness or swelling at the incision site. Please do not hesitate to call with any other questions.   If you have any additional questions, please do not hesitate to call the office and speak to either myself or the physician on call.      - Please call to schedule a follow up visit with your surgeon 2-3 weeks after discharge. It is also recommended that you schedule a visit with your primary care doctor 2-4 weeks after discharge so they can evaluate you after the surgery as well and review your medications.     Office address:   [facility]  [facility] [address]  [place] [address]  [phone]  Fax [phone]    _____________________________________________________

## 2023-07-18 NOTE — PROGRESS NOTES
"Patient by 2330 hr asked for poporn and 2 Sprites sodas. When I returned to him with these items, he said: \"When is my next pain medication?\"  He does not seems to be in pain and recently I had dispensed his 10 mg of Oxy about 2230. Also, had a good BM about 2300 hr.  "

## 2023-07-18 NOTE — PROGRESS NOTES
"About 0558 hr patient asked: \"Jatin I want you to give me Morphine this time'. I explained that he is up to be discharged home today and as part of discharge planing the policy is not to give IV antibiotics, fluids or IV pain medication since no one will administrate to you. I can give you the 10 mg of Oxy if you really need them.  Patient said: \"I am not going home today\"  Yes you are. Then he said: \"they said they are going to put me in another pump before to go home\" . That is right.  "

## 2023-07-18 NOTE — DISCHARGE PLANNING
Case Management Discharge Planning    Admission Date: 7/5/2023  GMLOS: 3.9  ALOS: 13    6-Clicks ADL Score:    6-Clicks Mobility Score: 18      Anticipated Discharge Dispo: Discharge Disposition: HOME      DME Needed: None    Action(s) Taken: [facility] ([facility]fusion) scheduled pt for 07/19 at 1614 for first IV infusion.    Escalations Completed: DC     Medically Clear: yes after woundvac is removed    Next Steps: none    Barriers to Discharge: per MD- woundvac to be removed today.     Is the patient up for discharge tomorrow: today.

## 2023-07-18 NOTE — PROGRESS NOTES
VASCULAR SURGERY SERVICE                        Progress Note  _________________________________    Doing well  Bypass patent with palpable right DP pulse  Right prevena removed, incision CDI, healing well    Discharge anytime from vascular standpoint  FU 2-3 weeks with Dr. Calderon for postop check    [facility]   Voalte preferred or call my office [phone]  __________________________________________________  Patient:Rogelio Escudero   MRN:[id number]

## 2023-07-18 NOTE — THERAPY
Occupational Therapy   Initial Evaluation     Patient Name: Rogelio Escudero  Age:  48 y.o., Sex:  male  Medical Record #: [id number]  Today's Date: 7/18/2023     Precautions  Precautions:  (off loading shoe with mobility)  Comments: heel WB RLE  in offloading shoe ( shoe at bedside)       07/17/23 0823   Interdisciplinary Plan of Care Collaboration   Collaboration Comments Attempted to see pt this am, pt refused stating not today. Will attempt eval again when able

## 2023-07-18 NOTE — PROGRESS NOTES
Discharge order received from MD. Discharge education provided to patient. All questions/concerns addressed. [id number] given to patient. IV removed with tip intact. All personal belongings present and accounted for. Patient provided FWW. Patient taken down in wheelchair accompanied by family.

## 2023-07-18 NOTE — DISCHARGE PLANNING
Case Management Discharge Planning    Admission Date: 7/5/2023  GMLOS: 3.3  ALOS: 12    6-Clicks ADL Score:    6-Clicks Mobility Score: 18      Anticipated Discharge Dispo: Discharge Disposition: D/T to SNF under Medicaid but not cert under Medicare w/planned hosp IP readmit(92)  Home     DME Needed: none    Action(s) Taken:   Per Dr. Harry, pt will not need a woundvac for home since MD will remove tomorrow.     Dr. Luu completed order on [facility] so [facility] will schedule pt for outpt infusions.     Escalations Completed: n/a    Medically Clear: no    Next Steps: follow up with [facility]    Barriers to Discharge: dc woundvac and IV infusion set up    Is the patient up for discharge tomorrow: yes.

## 2023-07-18 NOTE — PROGRESS NOTES
Diabetes education: Pt does not have home medications. CDE spoke with Dr. Harry who reviewed home medications and ordered what pt needs to continue with. All medications for discharge, ordered by Dr. Harry and will be filled by [facility] [place] for discharge tomorrow. Met with pt to review this and questions were answered.

## 2023-07-18 NOTE — DISCHARGE SUMMARY
Discharge Summary    CHIEF COMPLAINT ON ADMISSION  Chief Complaint   Patient presents with    T-5000 Extremity Pain     Pt report having his toes amputated a month ago and is having increased swelling and pain in his right leg       Reason for Admission  ems     Admission Date  7/5/2023    CODE STATUS  Full Code    HPI & HOSPITAL COURSE  This is a 47 yo man with uncontrolled diabetes, CAD S/P stent, HTN, alcoholism, PVD, PTSD who presented with right leg pain.  His right foot toes were recently amputated for osteomyelitis at [place] and he was discharged to SNF on daptomycin and meropenem until 7/20, however the patient left AMA.  He was admitted for worsening foot infection with osteomyelitis.  Ortho and ID were consulted.  MRI of his foot showed soft tissue infection with questionable osteomyelitis.  Arterial ultrasound showed right femoral disease.  He underwent right femoral above-knee popliteal bypass with Dr. Calderon 7/14. His wound culture grew MRSA.  He was continued on IV Vanco per ID.  Infectious disease recommends continuing IV antibiotics, daptomycin on discharge until 8/15.  There have been no SNF acceptance.  Infectious disease changes antibiotics daptomycin to outpatient. Not a candidate for PICC due to drug use. Patient to have a peripheral IV daily for infusion till 8/15.  On the day of discharge, prevena removed, incision CDI, healing well. Patient is advised to follow up with wound clinic, podiatry and vascular surgery Dr. Calderon for post op check.     Regarding His DM, uncontrolled. A1c 11.2. He is continued with metformin, lantus, Jardiance. Medications compliance discussed with patient. He needs to follow up with PCP in one week.    Therefore, he is discharged in good and stable condition to home with close outpatient follow-up.    The patient met 2-midnight criteria for an inpatient stay at the time of discharge.    Discharge Date  7/18/23    FOLLOW UP ITEMS POST DISCHARGE  PCP  Wound  clinic  Podiatry  Dr. Calderon vascular surgery    DISCHARGE DIAGNOSES  Principal Problem:    Osteomyelitis (HCC) (POA: Yes)  Active Problems:    Diabetes (HCC) (POA: Yes)    Diabetic foot ulcer (HCC) (POA: Yes)    Tobacco abuse (POA: Yes)    Alcohol abuse (POA: Yes)    Peripheral artery disease (HCC) (POA: Yes)    CAD (coronary artery disease) (POA: Yes)    MRSA bacteremia (POA: Unknown)    Noncompliance (POA: Unknown)  Resolved Problems:    * No resolved hospital problems. *      FOLLOW UP  Future Appointments   Date Time Provider Department Center   7/19/2023  4:45 PM [facility]   7/20/2023  4:45 PM [facility]   7/21/2023  4:45 PM [facility]   7/22/2023  4:45 PM [facility]   7/23/2023  4:15 PM [facility]   7/24/2023  4:45 PM [facility]   7/25/2023  4:45 PM [facility]   7/26/2023  4:45 PM [facility]   7/27/2023  4:45 PM [facility]   7/28/2023  4:45 PM [facility]   7/29/2023  4:30 PM [facility]   7/30/2023  4:30 PM [facility]   7/31/2023  4:45 PM [facility]   8/1/2023  4:45 PM [facility]   8/2/2023  4:45 PM [facility]   8/3/2023  4:45 PM [facility]   8/4/2023  4:45 PM [facility]   8/5/2023  5:30 PM [facility]   8/6/2023  5:00 PM [facility]   8/7/2023  4:45 PM [facility]   8/8/2023  4:45 PM [facility]   8/9/2023  4:45 PM [facility]   8/10/2023  4:30 PM [facility]   8/11/2023  4:30 PM [facility]   8/12/2023  5:00 PM [facility]    8/13/2023  4:30 PM [facility]   8[facility]14[facility]2023  [facility]   [facility]     [facility]  [address] [facility] [place] [address]  [phone]    Follow up in 1 week(s)        MEDICATIONS ON DISCHARGE     Medication List        START taking these medications        Instructions   Alcohol Prep 70 % Pads   Doctor's comments: Per formulary preference. ICD-10 code: E11.65 Uncontrolled type 2 Diabetes Mellitus  Wipe site with prep pad prior to injection.     * Aspirin Low Dose 81 MG EC tablet  Generic drug: aspirin   Take 1 Tablet by mouth every day.  Dose: 81 mg     * aspirin 81 MG EC tablet   Take 1 Tablet by mouth every day for 30 days.  Dose: 81 mg     BD Pen Needle Mary U/F  Generic drug: Insulin Pen Needle 32 G x 4 mm   Doctor's comments: Per patient/formulary preference. ICD-10 code: E11.65 Uncontrolled type 2 Diabetes Mellitus  Use one pen needle in pen device to inject insulin once daily.     * buPROPion 75 MG Tabs  Commonly known as: Wellbutrin   Take 2 Tablets by mouth 2 times a day for 30 days.  Dose: 150 mg     * buPROPion 75 MG Tabs  Commonly known as: Wellbutrin   Take 2 Tablets by mouth 2 times a day for 30 days.  Dose: 150 mg     omeprazole 20 MG delayed-release capsule  Commonly known as: PriLOSEC  Replaces: pantoprazole 40 MG Pack   Take 1 Capsule by mouth every morning.  Dose: 20 mg     oxyCODONE-acetaminophen 5-325 MG Tabs  Commonly known as: Percocet   Take 1-2 Tablets by mouth every 6 hours as needed for Severe Pain or Moderate Pain for up to 5 days.  Dose: 1-2 Tablet     TechLite Lancets Misc   Doctor's comments: Or per formulary preference. ICD-10 code: E11.65 Uncontrolled type 2 Diabetes Mellitus  Use one lancet to test blood sugar twice daily before meals.     True Metrix Blood Glucose Test strip  Generic drug: glucose blood   Doctor's comments: Or per formulary preference. ICD-10 code: E11.65  Uncontrolled type 2 Diabetes Mellitus  Use one True Metrix strip to test blood sugar twice daily before meals.     True Metrix Meter w/Device Kit   Doctor's comments: Or per formulary preference. ICD-10 code: E11.65 Uncontrolled type 2 Diabetes Mellitus  Test blood sugar as recommended by provider. True Metrix blood glucose monitoring kit.           * This list has 4 medication(s) that are the same as other medications prescribed for you. Read the directions carefully, and ask your doctor or other care provider to review them with you.                CHANGE how you take these medications        Instructions   atorvastatin 20 MG Tabs  What changed:   medication strength  how much to take  Commonly known as: Lipitor   Take 1 Tablet by mouth at bedtime.  Dose: 20 mg            CONTINUE taking these medications        Instructions   clopidogrel 75 MG Tabs  Commonly known as: Plavix   Take 1 Tablet by mouth every day.  Dose: 75 mg     colchicine 0.6 MG Tabs  Commonly known as: Colcrys   Take 1 Tablet by mouth every day.  Dose: 0.6 mg     escitalopram 10 MG Tabs  Commonly known as: Lexapro   Take 1 Tablet by mouth every day.  Dose: 10 mg     Jardiance 25 MG Tabs  Generic drug: Empagliflozin   Take 25 mg by mouth every day.  Dose: 25 mg     Lantus SoloStar 100 UNIT/ML Sopn injection  Generic drug: insulin glargine   Inject 25 Units under the skin every day.  Dose: 25 Units     lisinopril 10 MG Tabs  Commonly known as: Prinivil   Take 1 Tablet by mouth every day.  Dose: 10 mg     metformin 1000 MG tablet  Commonly known as: Glucophage   Take 1 Tablet by mouth 2 times a day.  Dose: 1,000 mg            STOP taking these medications      pantoprazole 40 MG Pack  Commonly known as: Protonix  Replaced by: omeprazole 20 MG delayed-release capsule              Allergies  Allergies   Allergen Reactions    Apple Hives    Lactose Diarrhea     Diarrhea         DIET  Orders Placed This Encounter   Procedures    Diet Order Diet:  Consistent CHO (Diabetic)     Standing Status:   Standing     Number of Occurrences:   1     Order Specific Question:   Diet:     Answer:   Consistent CHO (Diabetic) [4]       ACTIVITY  As tolerated.  Weight bearing as tolerated    CONSULTATIONS  ID  Vascular  LPS  orthopedics    PROCEDURES  S/p  Right femoral to above-knee popliteal bypass using in situ greaterb saphenous vein 7/14    LABORATORY  Lab Results   Component Value Date    SODIUM 133 (L) 07/18/2023    POTASSIUM 3.5 (L) 07/18/2023    CHLORIDE 100 07/18/2023    CO2 24 07/18/2023    GLUCOSE 191 (H) 07/18/2023    BUN 9 07/18/2023    CREATININE 0.62 07/18/2023    CREATININE 1.2 01/13/2008        Lab Results   Component Value Date    WBC 10.8 07/18/2023    HEMOGLOBIN 10.2 (L) 07/18/2023    HEMATOCRIT 30.4 (L) 07/18/2023    PLATELETCT 361 07/18/2023      IR-US GUIDED PIV   Final Result    Ultrasound-guided PERIPHERAL IV INSERTION performed by    qualified nursing staff as above.      MR-FOOT-WITH & W/O RIGHT   Final Result      1.  Recent first through fifth transmetatarsal amputation.      2.  Marrow edema involving the residual first through fifth metatarsals. Given recent amputation, this could be related to postsurgical change versus representative of osteomyelitis and is nonspecific.      3.  Rim-enhancing fluid collection within the soft tissues adjacent to the amputation site overlying the first metatarsal measuring 2.6 x 1 x 2.3 cm in size possibly representing postsurgical seroma versus abscess.      4.  Extensive cellulitis.      US-VEIN MAPPING LOWER EXTREMITY BILAT   Final Result      US-EXTREMITY ARTERY LOWER BILAT   Final Result      US-LATASHA SINGLE LEVEL BILAT   Final Result      EC-ECHOCARDIOGRAM COMPLETE W/O CONT   Final Result      US-EXTREMITY VENOUS LOWER UNILAT RIGHT   Final Result      DX-CHEST-PORTABLE (1 VIEW)   Final Result         1.  No acute cardiopulmonary disease.      DX-FOOT-COMPLETE 3+ RIGHT   Final Result         1.  No acute  traumatic bony injury.      IR-EXTREMITY ANGIOGRAM-UNILATERAL RIGHT    (Results Pending)         Total time of the discharge process 39 minutes.

## 2023-07-20 ENCOUNTER — APPOINTMENT (OUTPATIENT)
Dept: RADIOLOGY | Facility: MEDICAL CENTER | Age: 48
DRG: 863 | End: 2023-07-20
Attending: EMERGENCY MEDICINE
Payer: MEDICAID

## 2023-07-20 ENCOUNTER — TELEPHONE (OUTPATIENT)
Dept: INFECTIOUS DISEASES | Facility: MEDICAL CENTER | Age: 48
End: 2023-07-20
Payer: MEDICAID

## 2023-07-20 ENCOUNTER — TELEPHONE (OUTPATIENT)
Dept: ONCOLOGY | Facility: MEDICAL CENTER | Age: 48
End: 2023-07-20

## 2023-07-20 ENCOUNTER — HOSPITAL ENCOUNTER (INPATIENT)
Facility: MEDICAL CENTER | Age: 48
LOS: 8 days | DRG: 863 | End: 2023-07-28
Attending: EMERGENCY MEDICINE | Admitting: HOSPITALIST
Payer: MEDICAID

## 2023-07-20 DIAGNOSIS — R78.81 MRSA BACTEREMIA: ICD-10-CM

## 2023-07-20 DIAGNOSIS — L97.521 ULCERATED, FOOT, LEFT, LIMITED TO BREAKDOWN OF SKIN (HCC): ICD-10-CM

## 2023-07-20 DIAGNOSIS — B95.62 MRSA BACTEREMIA: ICD-10-CM

## 2023-07-20 DIAGNOSIS — I73.9 PERIPHERAL ARTERY DISEASE (HCC): ICD-10-CM

## 2023-07-20 DIAGNOSIS — M79.604 PAIN OF RIGHT LOWER EXTREMITY: ICD-10-CM

## 2023-07-20 DIAGNOSIS — G89.18 POST-OP PAIN: ICD-10-CM

## 2023-07-20 DIAGNOSIS — T81.40XA POSTOPERATIVE INFECTION, UNSPECIFIED TYPE, INITIAL ENCOUNTER: ICD-10-CM

## 2023-07-20 PROBLEM — I10 HYPERTENSION: Status: ACTIVE | Noted: 2023-07-20

## 2023-07-20 PROBLEM — T81.49XA POSTOPERATIVE CELLULITIS OF SURGICAL WOUND: Status: ACTIVE | Noted: 2023-07-20

## 2023-07-20 PROBLEM — M54.50 LOW BACK PAIN: Status: ACTIVE | Noted: 2023-07-20

## 2023-07-20 LAB
ALBUMIN SERPL BCP-MCNC: 3.8 G/DL (ref 3.2–4.9)
ALBUMIN/GLOB SERPL: 1 G/DL
ALP SERPL-CCNC: 126 U/L (ref 30–99)
ALT SERPL-CCNC: 12 U/L (ref 2–50)
ANION GAP SERPL CALC-SCNC: 13 MMOL/L (ref 7–16)
APPEARANCE UR: CLEAR
AST SERPL-CCNC: 12 U/L (ref 12–45)
BACTERIA #/AREA URNS HPF: NEGATIVE /HPF
BASOPHILS # BLD AUTO: 0.4 % (ref 0–1.8)
BASOPHILS # BLD: 0.05 K/UL (ref 0–0.12)
BILIRUB SERPL-MCNC: 0.8 MG/DL (ref 0.1–1.5)
BILIRUB UR QL STRIP.AUTO: NEGATIVE
BUN SERPL-MCNC: 12 MG/DL (ref 8–22)
CALCIUM ALBUM COR SERPL-MCNC: 9.5 MG/DL (ref 8.5–10.5)
CALCIUM SERPL-MCNC: 9.3 MG/DL (ref 8.5–10.5)
CHLORIDE SERPL-SCNC: 103 MMOL/L (ref 96–112)
CO2 SERPL-SCNC: 22 MMOL/L (ref 20–33)
COLOR UR: YELLOW
CREAT SERPL-MCNC: 0.64 MG/DL (ref 0.5–1.4)
CRP SERPL HS-MCNC: 15.86 MG/DL (ref 0–0.75)
EOSINOPHIL # BLD AUTO: 0.47 K/UL (ref 0–0.51)
EOSINOPHIL NFR BLD: 4.2 % (ref 0–6.9)
EPI CELLS #/AREA URNS HPF: NEGATIVE /HPF
ERYTHROCYTE [DISTWIDTH] IN BLOOD BY AUTOMATED COUNT: 50.2 FL (ref 35.9–50)
ERYTHROCYTE [SEDIMENTATION RATE] IN BLOOD BY WESTERGREN METHOD: 123 MM/HOUR (ref 0–20)
GFR SERPLBLD CREATININE-BSD FMLA CKD-EPI: 115 ML/MIN/1.73 M 2
GLOBULIN SER CALC-MCNC: 3.8 G/DL (ref 1.9–3.5)
GLUCOSE BLD STRIP.AUTO-MCNC: 122 MG/DL (ref 65–99)
GLUCOSE SERPL-MCNC: 132 MG/DL (ref 65–99)
GLUCOSE UR STRIP.AUTO-MCNC: >=1000 MG/DL
HCT VFR BLD AUTO: 31.4 % (ref 42–52)
HGB BLD-MCNC: 10.5 G/DL (ref 14–18)
HYALINE CASTS #/AREA URNS LPF: ABNORMAL /LPF
IMM GRANULOCYTES # BLD AUTO: 0.08 K/UL (ref 0–0.11)
IMM GRANULOCYTES NFR BLD AUTO: 0.7 % (ref 0–0.9)
KETONES UR STRIP.AUTO-MCNC: NEGATIVE MG/DL
LACTATE SERPL-SCNC: 0.8 MMOL/L (ref 0.5–2)
LACTATE SERPL-SCNC: 1.4 MMOL/L (ref 0.5–2)
LEUKOCYTE ESTERASE UR QL STRIP.AUTO: NEGATIVE
LYMPHOCYTES # BLD AUTO: 1.99 K/UL (ref 1–4.8)
LYMPHOCYTES NFR BLD: 17.7 % (ref 22–41)
MCH RBC QN AUTO: 29.8 PG (ref 27–33)
MCHC RBC AUTO-ENTMCNC: 33.4 G/DL (ref 32.3–36.5)
MCV RBC AUTO: 89.2 FL (ref 81.4–97.8)
MICRO URNS: ABNORMAL
MONOCYTES # BLD AUTO: 1.39 K/UL (ref 0–0.85)
MONOCYTES NFR BLD AUTO: 12.3 % (ref 0–13.4)
NEUTROPHILS # BLD AUTO: 7.28 K/UL (ref 1.82–7.42)
NEUTROPHILS NFR BLD: 64.7 % (ref 44–72)
NITRITE UR QL STRIP.AUTO: NEGATIVE
NRBC # BLD AUTO: 0 K/UL
NRBC BLD-RTO: 0 /100 WBC (ref 0–0.2)
PH UR STRIP.AUTO: 6 [PH] (ref 5–8)
PLATELET # BLD AUTO: 298 K/UL (ref 164–446)
PMV BLD AUTO: 9 FL (ref 9–12.9)
POTASSIUM SERPL-SCNC: 3.4 MMOL/L (ref 3.6–5.5)
PROT SERPL-MCNC: 7.6 G/DL (ref 6–8.2)
PROT UR QL STRIP: NEGATIVE MG/DL
RBC # BLD AUTO: 3.52 M/UL (ref 4.7–6.1)
RBC # URNS HPF: ABNORMAL /HPF
RBC UR QL AUTO: ABNORMAL
SODIUM SERPL-SCNC: 138 MMOL/L (ref 135–145)
SP GR UR STRIP.AUTO: >=1.045
UROBILINOGEN UR STRIP.AUTO-MCNC: 1 MG/DL
WBC # BLD AUTO: 11.3 K/UL (ref 4.8–10.8)
WBC #/AREA URNS HPF: ABNORMAL /HPF

## 2023-07-20 PROCEDURE — 71045 X-RAY EXAM CHEST 1 VIEW: CPT

## 2023-07-20 PROCEDURE — 700117 HCHG RX CONTRAST REV CODE 255: Mod: UD | Performed by: EMERGENCY MEDICINE

## 2023-07-20 PROCEDURE — 96375 TX/PRO/DX INJ NEW DRUG ADDON: CPT

## 2023-07-20 PROCEDURE — 80053 COMPREHEN METABOLIC PANEL: CPT

## 2023-07-20 PROCEDURE — 85025 COMPLETE CBC W/AUTO DIFF WBC: CPT

## 2023-07-20 PROCEDURE — 99285 EMERGENCY DEPT VISIT HI MDM: CPT

## 2023-07-20 PROCEDURE — 85652 RBC SED RATE AUTOMATED: CPT

## 2023-07-20 PROCEDURE — 86140 C-REACTIVE PROTEIN: CPT

## 2023-07-20 PROCEDURE — 99223 1ST HOSP IP/OBS HIGH 75: CPT | Performed by: HOSPITALIST

## 2023-07-20 PROCEDURE — 770001 HCHG ROOM/CARE - MED/SURG/GYN PRIV*

## 2023-07-20 PROCEDURE — 700105 HCHG RX REV CODE 258: Mod: UD | Performed by: EMERGENCY MEDICINE

## 2023-07-20 PROCEDURE — 36415 COLL VENOUS BLD VENIPUNCTURE: CPT

## 2023-07-20 PROCEDURE — 73706 CT ANGIO LWR EXTR W/O&W/DYE: CPT | Mod: RT

## 2023-07-20 PROCEDURE — 700111 HCHG RX REV CODE 636 W/ 250 OVERRIDE (IP): Mod: JZ | Performed by: HOSPITALIST

## 2023-07-20 PROCEDURE — 87070 CULTURE OTHR SPECIMN AEROBIC: CPT

## 2023-07-20 PROCEDURE — 87040 BLOOD CULTURE FOR BACTERIA: CPT | Mod: 91

## 2023-07-20 PROCEDURE — 700102 HCHG RX REV CODE 250 W/ 637 OVERRIDE(OP): Performed by: HOSPITALIST

## 2023-07-20 PROCEDURE — 82962 GLUCOSE BLOOD TEST: CPT

## 2023-07-20 PROCEDURE — 700111 HCHG RX REV CODE 636 W/ 250 OVERRIDE (IP): Mod: UD | Performed by: EMERGENCY MEDICINE

## 2023-07-20 PROCEDURE — 81001 URINALYSIS AUTO W/SCOPE: CPT

## 2023-07-20 PROCEDURE — 96365 THER/PROPH/DIAG IV INF INIT: CPT

## 2023-07-20 PROCEDURE — 83605 ASSAY OF LACTIC ACID: CPT

## 2023-07-20 PROCEDURE — 87205 SMEAR GRAM STAIN: CPT

## 2023-07-20 PROCEDURE — 99407 BEHAV CHNG SMOKING > 10 MIN: CPT

## 2023-07-20 PROCEDURE — 87086 URINE CULTURE/COLONY COUNT: CPT

## 2023-07-20 PROCEDURE — A9270 NON-COVERED ITEM OR SERVICE: HCPCS | Performed by: HOSPITALIST

## 2023-07-20 PROCEDURE — 700105 HCHG RX REV CODE 258: Performed by: HOSPITALIST

## 2023-07-20 RX ORDER — LABETALOL HYDROCHLORIDE 5 MG/ML
10 INJECTION, SOLUTION INTRAVENOUS EVERY 4 HOURS PRN
Status: DISCONTINUED | OUTPATIENT
Start: 2023-07-20 | End: 2023-07-28 | Stop reason: HOSPADM

## 2023-07-20 RX ORDER — ATORVASTATIN CALCIUM 20 MG/1
20 TABLET, FILM COATED ORAL
Status: DISCONTINUED | OUTPATIENT
Start: 2023-07-20 | End: 2023-07-28 | Stop reason: HOSPADM

## 2023-07-20 RX ORDER — POTASSIUM CHLORIDE 20 MEQ/1
40 TABLET, EXTENDED RELEASE ORAL ONCE
Status: COMPLETED | OUTPATIENT
Start: 2023-07-20 | End: 2023-07-20

## 2023-07-20 RX ORDER — MORPHINE SULFATE 4 MG/ML
4 INJECTION INTRAVENOUS ONCE
Status: COMPLETED | OUTPATIENT
Start: 2023-07-20 | End: 2023-07-20

## 2023-07-20 RX ORDER — SODIUM CHLORIDE 9 MG/ML
INJECTION, SOLUTION INTRAVENOUS CONTINUOUS
Status: DISCONTINUED | OUTPATIENT
Start: 2023-07-20 | End: 2023-07-21

## 2023-07-20 RX ORDER — ONDANSETRON 2 MG/ML
4 INJECTION INTRAMUSCULAR; INTRAVENOUS EVERY 4 HOURS PRN
Status: DISCONTINUED | OUTPATIENT
Start: 2023-07-20 | End: 2023-07-28 | Stop reason: HOSPADM

## 2023-07-20 RX ORDER — ESCITALOPRAM OXALATE 10 MG/1
10 TABLET ORAL DAILY
Status: DISCONTINUED | OUTPATIENT
Start: 2023-07-21 | End: 2023-07-28 | Stop reason: HOSPADM

## 2023-07-20 RX ORDER — MORPHINE SULFATE 4 MG/ML
4 INJECTION INTRAVENOUS
Status: DISCONTINUED | OUTPATIENT
Start: 2023-07-20 | End: 2023-07-22

## 2023-07-20 RX ORDER — ACETAMINOPHEN 325 MG/1
650 TABLET ORAL EVERY 6 HOURS PRN
Status: DISCONTINUED | OUTPATIENT
Start: 2023-07-20 | End: 2023-07-28 | Stop reason: HOSPADM

## 2023-07-20 RX ORDER — PROMETHAZINE HYDROCHLORIDE 25 MG/1
12.5-25 TABLET ORAL EVERY 4 HOURS PRN
Status: DISCONTINUED | OUTPATIENT
Start: 2023-07-20 | End: 2023-07-28 | Stop reason: HOSPADM

## 2023-07-20 RX ORDER — ASPIRIN 81 MG/1
81 TABLET ORAL DAILY
Status: DISCONTINUED | OUTPATIENT
Start: 2023-07-21 | End: 2023-07-28 | Stop reason: HOSPADM

## 2023-07-20 RX ORDER — ONDANSETRON 2 MG/ML
4 INJECTION INTRAMUSCULAR; INTRAVENOUS ONCE
Status: COMPLETED | OUTPATIENT
Start: 2023-07-20 | End: 2023-07-20

## 2023-07-20 RX ORDER — BUPROPION HYDROCHLORIDE 100 MG/1
150 TABLET ORAL 2 TIMES DAILY
Status: DISCONTINUED | OUTPATIENT
Start: 2023-07-20 | End: 2023-07-28 | Stop reason: HOSPADM

## 2023-07-20 RX ORDER — LISINOPRIL 10 MG/1
10 TABLET ORAL DAILY
Status: DISCONTINUED | OUTPATIENT
Start: 2023-07-21 | End: 2023-07-28 | Stop reason: HOSPADM

## 2023-07-20 RX ORDER — OXYCODONE HYDROCHLORIDE 5 MG/1
5 TABLET ORAL
Status: DISCONTINUED | OUTPATIENT
Start: 2023-07-20 | End: 2023-07-23

## 2023-07-20 RX ORDER — OXYCODONE HYDROCHLORIDE 10 MG/1
10 TABLET ORAL
Status: DISCONTINUED | OUTPATIENT
Start: 2023-07-20 | End: 2023-07-23

## 2023-07-20 RX ORDER — PROMETHAZINE HYDROCHLORIDE 25 MG/1
12.5-25 SUPPOSITORY RECTAL EVERY 4 HOURS PRN
Status: DISCONTINUED | OUTPATIENT
Start: 2023-07-20 | End: 2023-07-28 | Stop reason: HOSPADM

## 2023-07-20 RX ORDER — OMEPRAZOLE 20 MG/1
20 CAPSULE, DELAYED RELEASE ORAL EVERY MORNING
Status: DISCONTINUED | OUTPATIENT
Start: 2023-07-21 | End: 2023-07-28 | Stop reason: HOSPADM

## 2023-07-20 RX ORDER — DEXTROSE MONOHYDRATE 25 G/50ML
25 INJECTION, SOLUTION INTRAVENOUS
Status: DISCONTINUED | OUTPATIENT
Start: 2023-07-20 | End: 2023-07-28 | Stop reason: HOSPADM

## 2023-07-20 RX ORDER — PROCHLORPERAZINE EDISYLATE 5 MG/ML
5-10 INJECTION INTRAMUSCULAR; INTRAVENOUS EVERY 4 HOURS PRN
Status: DISCONTINUED | OUTPATIENT
Start: 2023-07-20 | End: 2023-07-28 | Stop reason: HOSPADM

## 2023-07-20 RX ORDER — ONDANSETRON 4 MG/1
4 TABLET, ORALLY DISINTEGRATING ORAL EVERY 4 HOURS PRN
Status: DISCONTINUED | OUTPATIENT
Start: 2023-07-20 | End: 2023-07-28 | Stop reason: HOSPADM

## 2023-07-20 RX ORDER — CLOPIDOGREL BISULFATE 75 MG/1
75 TABLET ORAL DAILY
Status: DISCONTINUED | OUTPATIENT
Start: 2023-07-21 | End: 2023-07-28 | Stop reason: HOSPADM

## 2023-07-20 RX ADMIN — OXYCODONE HYDROCHLORIDE 10 MG: 10 TABLET ORAL at 20:43

## 2023-07-20 RX ADMIN — POTASSIUM CHLORIDE 40 MEQ: 1500 TABLET, EXTENDED RELEASE ORAL at 20:44

## 2023-07-20 RX ADMIN — ONDANSETRON 4 MG: 2 INJECTION INTRAMUSCULAR; INTRAVENOUS at 17:03

## 2023-07-20 RX ADMIN — AMPICILLIN AND SULBACTAM 3 G: 1; 2 INJECTION, POWDER, FOR SOLUTION INTRAMUSCULAR; INTRAVENOUS at 20:51

## 2023-07-20 RX ADMIN — VANCOMYCIN HYDROCHLORIDE 2000 MG: 5 INJECTION, POWDER, LYOPHILIZED, FOR SOLUTION INTRAVENOUS at 19:36

## 2023-07-20 RX ADMIN — SODIUM CHLORIDE: 9 INJECTION, SOLUTION INTRAVENOUS at 23:35

## 2023-07-20 RX ADMIN — ATORVASTATIN CALCIUM 20 MG: 20 TABLET, FILM COATED ORAL at 20:43

## 2023-07-20 RX ADMIN — IOHEXOL 100 ML: 350 INJECTION, SOLUTION INTRAVENOUS at 18:36

## 2023-07-20 RX ADMIN — BUPROPION HYDROCHLORIDE 150 MG: 75 TABLET, FILM COATED ORAL at 22:13

## 2023-07-20 RX ADMIN — MORPHINE SULFATE 4 MG: 4 INJECTION, SOLUTION INTRAMUSCULAR; INTRAVENOUS at 17:03

## 2023-07-20 ASSESSMENT — COGNITIVE AND FUNCTIONAL STATUS - GENERAL
DRESSING REGULAR UPPER BODY CLOTHING: A LITTLE
DRESSING REGULAR LOWER BODY CLOTHING: A LOT
TURNING FROM BACK TO SIDE WHILE IN FLAT BAD: A LITTLE
STANDING UP FROM CHAIR USING ARMS: A LOT
MOVING TO AND FROM BED TO CHAIR: A LITTLE
PERSONAL GROOMING: A LITTLE
WALKING IN HOSPITAL ROOM: A LOT
TOILETING: A LITTLE
SUGGESTED CMS G CODE MODIFIER MOBILITY: CL
SUGGESTED CMS G CODE MODIFIER DAILY ACTIVITY: CK
DAILY ACTIVITIY SCORE: 17
HELP NEEDED FOR BATHING: A LOT
CLIMB 3 TO 5 STEPS WITH RAILING: A LOT
MOBILITY SCORE: 14
MOVING FROM LYING ON BACK TO SITTING ON SIDE OF FLAT BED: A LOT

## 2023-07-20 ASSESSMENT — ENCOUNTER SYMPTOMS
DIARRHEA: 0
SHORTNESS OF BREATH: 0
PALPITATIONS: 0
DEPRESSION: 0
VOMITING: 0
TINGLING: 0
PHOTOPHOBIA: 0
CLAUDICATION: 0
FLANK PAIN: 0
DIZZINESS: 0
CONSTIPATION: 0
SPUTUM PRODUCTION: 0
NAUSEA: 0
MYALGIAS: 0
WHEEZING: 0
HEARTBURN: 0
BLOOD IN STOOL: 0
BACK PAIN: 0
SORE THROAT: 0
HEMOPTYSIS: 0
HALLUCINATIONS: 0
POLYDIPSIA: 0
HEADACHES: 0
BRUISES/BLEEDS EASILY: 0
BLURRED VISION: 0
STRIDOR: 0
ABDOMINAL PAIN: 0
DIAPHORESIS: 0
EYE PAIN: 0
PND: 0
FEVER: 0
TREMORS: 0
ORTHOPNEA: 0
CHILLS: 1
DOUBLE VISION: 0
SINUS PAIN: 0
FALLS: 0
NECK PAIN: 0
COUGH: 0
WEAKNESS: 0

## 2023-07-20 ASSESSMENT — PAIN DESCRIPTION - PAIN TYPE
TYPE: ACUTE PAIN

## 2023-07-20 ASSESSMENT — PATIENT HEALTH QUESTIONNAIRE - PHQ9
SUM OF ALL RESPONSES TO PHQ9 QUESTIONS 1 AND 2: 0
1. LITTLE INTEREST OR PLEASURE IN DOING THINGS: NOT AT ALL
2. FEELING DOWN, DEPRESSED, IRRITABLE, OR HOPELESS: NOT AT ALL

## 2023-07-20 ASSESSMENT — FIBROSIS 4 INDEX: FIB4 SCORE: 0.37

## 2023-07-20 ASSESSMENT — LIFESTYLE VARIABLES: SUBSTANCE_ABUSE: 0

## 2023-07-20 NOTE — ED TRIAGE NOTES
Chief Complaint   Patient presents with    Post-Op Complications     BIBA for above complaint. Patient was discharge from [facility] on 7/18 after have a bypass in his right leg to increase blood flow. Patient reports that he fell last night while going to the bathroom and had to crawl to bed. Patient presents with 10/10 in right leg. Large surgical site of right groin to knee. Dried blood present on wound. Patient was suppose to go to infusion therapy the last 2 days for antibiotic but did not go.     VSS  A+Ox4

## 2023-07-20 NOTE — TELEPHONE ENCOUNTER
Received message from dr May patient missed infusion today. I called patient to ask if he would be coming in to infusion and to schedule [facility] appt. Phone not accepting calls at this time will attempt to call later.

## 2023-07-20 NOTE — TELEPHONE ENCOUNTER
Monika May M.D.  You 8 minutes ago (4:04 PM)       Thank you for the update.  I sent a voalte to infusion RN (Alena Latif) today stating that if he continues to not show for his infusions, we will be forced to cancel his infusion appts due to noncompliance.

## 2023-07-20 NOTE — ED PROVIDER NOTES
ED Provider Note    CHIEF COMPLAINT  Chief Complaint   Patient presents with    Post-Op Complications       EXTERNAL RECORDS REVIEWED  Reviewed recent hospitalization here in [place] and based labs for comparison.    HPI/ROS  LIMITATION TO HISTORY   Select: : None  OUTSIDE HISTORIAN(S):  None    Rogelio Escudero is a 48 y.o. male who presents to the emergency department complaining of postoperative pain.  Patient has had fairly recent bypass in his right lower extremity this was around July 5.  The femoral tibial bypass secondary to limb ischemia and a foot infection.  This has been complicated by infections placement antibiotics but has been unable to do so.  Last night he reports following his right leg has had severe right leg pain since that time.  Denies fevers or chills.  The pain is in the medial aspect of the leg.  Worsened by movement and palpation.  Denies any other aggravating relieving factors or associated complaints.    PAST MEDICAL HISTORY   has a past medical history of Alcohol abuse, Dental disorder, Depression (2010), Diabetes, Diabetic neuropathy ([facility]), Heart burn, Hemorrhagic disorder ([facility]), Hiatus hernia syndrome, High cholesterol, Hypertension, Pain, Pneumonia (approx 2011), PTSD (post-traumatic stress disorder), and PVD (peripheral vascular disease) ([facility]).    SURGICAL HISTORY   has a past surgical history that includes irrigation & debridement general (Right, 2/6/2020); other; shldr arthroscop,surg,w/rotat cuff repr (Right, 11/18/2021); shldr arthroscop,part acromioplas (Right, 11/18/2021); arthroscopy shoulder surgical biceps tenodes* (Right, 11/18/2021); debridement, labrum, hip, arthroscopic (Right, 11/18/2021); and femoral popliteal bypass (Right, 7/14/2023).    FAMILY HISTORY  No family history on file.    SOCIAL HISTORY  Social History     Tobacco Use    Smoking status: Every Day     Packs/day: 0.25     Years: 20.00     Pack years: 5.00     Types: Cigarettes    Smokeless tobacco:  "Never    Tobacco comments:     6   Vaping Use    Vaping Use: Never used   Substance and Sexual Activity    Alcohol use: Yes    Drug use: Yes     Types: Inhaled     Comment: marijuana    Sexual activity: Not on file       CURRENT MEDICATIONS  Home Medications       Reviewed by Natalie Leary R.N. (Registered Nurse) on 07/20/23 at 1614  Med List Status: Partial     Medication Last Dose Status   Alcohol Swabs  Active   aspirin 81 MG EC tablet  Active   aspirin 81 MG EC tablet  Active   atorvastatin (LIPITOR) 20 MG Tab  Active   Blood Glucose Monitoring Suppl (BLOOD GLUCOSE MONITOR SYSTEM) w/Device Kit  Active   buPROPion (WELLBUTRIN) 75 MG Tab  Active   buPROPion (WELLBUTRIN) 75 MG Tab  Active   clopidogrel (PLAVIX) 75 MG Tab  Active   colchicine (COLCRYS) 0.6 MG Tab  Active   Empagliflozin 25 MG Tab  Active   escitalopram (LEXAPRO) 10 MG Tab  Active   glucose blood strip  Active   insulin glargine (LANTUS SOLOSTAR) 100 UNIT/ML Solution Pen-injector injection  Active   Insulin Pen Needle 32 G x 4 mm  Active   lisinopril (PRINIVIL) 10 MG Tab  Active   metformin (GLUCOPHAGE) 1000 MG tablet  Active   omeprazole (PRILOSEC) 20 MG delayed-release capsule  Active   oxyCODONE-acetaminophen (PERCOCET) 5-325 MG Tab  Active   TechLite Lancets Misc  Active                    ALLERGIES  Allergies   Allergen Reactions    Apple Hives    Lactose Diarrhea     Diarrhea         PHYSICAL EXAM  VITAL SIGNS: BP (!) 162/113   Pulse (!) 107   Temp 36.6 °C (97.8 °F) (Temporal)   Resp 18   Ht 1.753 m (5' 9\")   Wt 81.6 kg (180 lb)   SpO2 98%   BMI 26.58 kg/m²    Constitutional: Awake alert anxious, moderate distress from pain  HENT: Normocephalic, Atraumatic,   Eyes: PERRL, EOMI, Conjunctiva normal, No discharge.   Neck: Normal range of motion, No tenderness, Supple, No stridor.   Cardiovascular: Normal heart rate, Normal rhythm, No murmurs, No rubs, No gallops.   Thorax & Lungs: Normal breath sounds, No respiratory distress, No " wheezing, No chest tenderness.   Abdomen: Bowel sounds normal, Soft, No tenderness  Skin: Warm, Dry, No erythema, No rash.   Back: No tenderness, No CVA tenderness.   Musculoskeletal: Good range of motion in all major joints.  Leg is held in flexion.  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  Lower extremities warm and well-perfused.  There is a large surgical incision in the medial aspect of the groin.  There is diffuse tenderness mostly in the inferior posterior aspect of this with some fluctuance.  Is mildly hot to the touch.  Neurologic: Alert, No focal deficits noted.   Psychiatric: Affect normal      DIAGNOSTIC STUDIES / PROCEDURES  Results for orders placed or performed during the hospital encounter of 07/20/23   LACTIC ACID   Result Value Ref Range    Lactic Acid 1.4 0.5 - 2.0 mmol/L   CBC WITH DIFFERENTIAL   Result Value Ref Range    WBC 11.3 (H) 4.8 - 10.8 K/uL    RBC 3.52 (L) 4.70 - 6.10 M/uL    Hemoglobin 10.5 (L) 14.0 - 18.0 g/dL    Hematocrit 31.4 (L) 42.0 - 52.0 %    MCV 89.2 81.4 - 97.8 fL    MCH 29.8 27.0 - 33.0 pg    MCHC 33.4 32.3 - 36.5 g/dL    RDW 50.2 (H) 35.9 - 50.0 fL    Platelet Count 298 164 - 446 K/uL    MPV 9.0 9.0 - 12.9 fL    Neutrophils-Polys 64.70 44.00 - 72.00 %    Lymphocytes 17.70 (L) 22.00 - 41.00 %    Monocytes 12.30 0.00 - 13.40 %    Eosinophils 4.20 0.00 - 6.90 %    Basophils 0.40 0.00 - 1.80 %    Immature Granulocytes 0.70 0.00 - 0.90 %    Nucleated RBC 0.00 0.00 - 0.20 /100 WBC    Neutrophils (Absolute) 7.28 1.82 - 7.42 K/uL    Lymphs (Absolute) 1.99 1.00 - 4.80 K/uL    Monos (Absolute) 1.39 (H) 0.00 - 0.85 K/uL    Eos (Absolute) 0.47 0.00 - 0.51 K/uL    Baso (Absolute) 0.05 0.00 - 0.12 K/uL    Immature Granulocytes (abs) 0.08 0.00 - 0.11 K/uL    NRBC (Absolute) 0.00 K/uL   COMP METABOLIC PANEL   Result Value Ref Range    Sodium 138 135 - 145 mmol/L    Potassium 3.4 (L) 3.6 - 5.5 mmol/L    Chloride 103 96 - 112 mmol/L    Co2 22 20 - 33 mmol/L    Anion Gap 13.0 7.0 - 16.0     Glucose 132 (H) 65 - 99 mg/dL    Bun 12 8 - 22 mg/dL    Creatinine 0.64 0.50 - 1.40 mg/dL    Calcium 9.3 8.5 - 10.5 mg/dL    Correct Calcium 9.5 8.5 - 10.5 mg/dL    AST(SGOT) 12 12 - 45 U/L    ALT(SGPT) 12 2 - 50 U/L    Alkaline Phosphatase 126 (H) 30 - 99 U/L    Total Bilirubin 0.8 0.1 - 1.5 mg/dL    Albumin 3.8 3.2 - 4.9 g/dL    Total Protein 7.6 6.0 - 8.2 g/dL    Globulin 3.8 (H) 1.9 - 3.5 g/dL    A-G Ratio 1.0 g/dL   ESTIMATED GFR   Result Value Ref Range    GFR (CKD-EPI) 115 >60 mL/min/1.73 m 2        RADIOLOGY  I have independently interpreted the diagnostic imaging associated with this visit and am waiting the final reading from the radiologist.   My preliminary interpretation is as follows: See Gastrografin likely related to the large abscess.    Radiologist interpretation:   CT-CTA LOWER EXT WITH & W/O-POST PROCESS RIGHT   Final Result      1.  Recent RIGHT lower extremity vascular surgery with femoral to popliteal graft which appears patent.  Retrograde enhancement of distal femoral artery.   2.  Normal enhancement of trifurcation vessels.   3.  No hematoma or evidence for ongoing arterial hemorrhage.   4.  Diffuse subcutaneous edema of the RIGHT lower extremity.   5.  Fluid and gas tracks along the graft, consistent with recent surgery.   6.  RIGHT groin and inguinal adenopathy, likely reactive.      DX-CHEST-PORTABLE (1 VIEW)   Final Result      Hypoinflation without other evidence for acute cardiopulmonary disease.            COURSE & MEDICAL DECISION MAKING    ED Observation Status? Yes; I am placing the patient in to an observation status due to a diagnostic uncertainty as well as therapeutic intensity. Patient placed in observation status at 4:59 PM, 7/20/2023.     Observation plan is as follows: Patient is admitted to observation was worked up for his pain and possible infection of his leg    Upon Reevaluation, the patient's condition has: not improved; and will be escalated to  hospitalization.    Patient discharged from ED Observation status at 1915 (Time) 7/20/23 (Date).     INITIAL ASSESSMENT, COURSE AND PLAN  Care Narrative:     Patient presents with pain and swelling on the medial aspect of the right leg.  He has had a vascular bypass complicated by some infections that he has pain since a fall.  Differential diagnosis includes but is not limited to postoperative infection, hematoma, graft failure, graft thrombus, musculoskeletal injury.    Patient is worked up with labs and a CT.  He is cultured because he could be septic.  Is given morphine and Zofran for pain and nausea.    Labs are obtained.  He has a mild leukocytosis.  CT shows multiple chronic abnormalities no signs of hemorrhage, hematoma, graft failure or graft occlusion.  Some gas along the graft which is felt to be postoperative.    No other complication no bony abnormality.      The patient's chart for his recent hospitalization was reviewed.  He was on long-term antibiotics and the plan was to send him home however they did not want to place a PICC line because of his potential for drug abuse.  He was discharged on oral daptomycin to stay with a cousin.  He is unable to care for himself and has been falling down and having more pain and weakness.    At this point the patient requires rehospitalization for pain control, continued antibiotic therapy likely a better disposition.  I will speak with the hospitalist and care is transferred at that time.  Case discussed with Dr. Ramires and care is transferred at that time.              ADDITIONAL PROBLEM LIST  Peripheral arterial disease  Lower extremity infection.        DISPOSITION AND DISCUSSIONS  I have discussed management of the patient with the following physicians and YURIY's: \[place] hospitalist  Discussion of management with other QHP or appropriate source(s): We will discuss with the pharmacy to restart antibiotics        Barriers to care at this time, including but  not limited to: Patient is homeless and Patient lacks transportation .     Decision tools and prescription drugs considered including, but not limited to: Patient is cultured per the sepsis protocol.    FINAL DIAGNOSIS  1. Post-op pain    2. Postoperative infection, unspecified type, initial encounter    3. Pain of right lower extremity           Electronically signed by: Chucho Dawn M.D., 7/20/2023 4:58 PM

## 2023-07-21 ENCOUNTER — OUTPATIENT INFUSION SERVICES (OUTPATIENT)
Dept: ONCOLOGY | Facility: MEDICAL CENTER | Age: 48
End: 2023-07-21
Attending: INTERNAL MEDICINE
Payer: MEDICAID

## 2023-07-21 PROBLEM — T81.49XA POSTOPERATIVE CELLULITIS OF SURGICAL WOUND: Status: RESOLVED | Noted: 2023-07-20 | Resolved: 2023-07-21

## 2023-07-21 LAB
ALBUMIN SERPL BCP-MCNC: 3.3 G/DL (ref 3.2–4.9)
ALBUMIN/GLOB SERPL: 1 G/DL
ALP SERPL-CCNC: 104 U/L (ref 30–99)
ALT SERPL-CCNC: 15 U/L (ref 2–50)
ANION GAP SERPL CALC-SCNC: 10 MMOL/L (ref 7–16)
AST SERPL-CCNC: 13 U/L (ref 12–45)
BASOPHILS # BLD AUTO: 0.7 % (ref 0–1.8)
BASOPHILS # BLD: 0.07 K/UL (ref 0–0.12)
BILIRUB SERPL-MCNC: 0.7 MG/DL (ref 0.1–1.5)
BUN SERPL-MCNC: 12 MG/DL (ref 8–22)
CALCIUM ALBUM COR SERPL-MCNC: 9.2 MG/DL (ref 8.5–10.5)
CALCIUM SERPL-MCNC: 8.6 MG/DL (ref 8.5–10.5)
CHLORIDE SERPL-SCNC: 107 MMOL/L (ref 96–112)
CO2 SERPL-SCNC: 22 MMOL/L (ref 20–33)
CREAT SERPL-MCNC: 0.64 MG/DL (ref 0.5–1.4)
EOSINOPHIL # BLD AUTO: 0.68 K/UL (ref 0–0.51)
EOSINOPHIL NFR BLD: 6.6 % (ref 0–6.9)
ERYTHROCYTE [DISTWIDTH] IN BLOOD BY AUTOMATED COUNT: 52.3 FL (ref 35.9–50)
GFR SERPLBLD CREATININE-BSD FMLA CKD-EPI: 115 ML/MIN/1.73 M 2
GLOBULIN SER CALC-MCNC: 3.4 G/DL (ref 1.9–3.5)
GLUCOSE BLD STRIP.AUTO-MCNC: 118 MG/DL (ref 65–99)
GLUCOSE BLD STRIP.AUTO-MCNC: 163 MG/DL (ref 65–99)
GLUCOSE BLD STRIP.AUTO-MCNC: 164 MG/DL (ref 65–99)
GLUCOSE BLD STRIP.AUTO-MCNC: 164 MG/DL (ref 65–99)
GLUCOSE SERPL-MCNC: 157 MG/DL (ref 65–99)
GRAM STN SPEC: NORMAL
HCT VFR BLD AUTO: 30.3 % (ref 42–52)
HGB BLD-MCNC: 9.8 G/DL (ref 14–18)
IMM GRANULOCYTES # BLD AUTO: 0.1 K/UL (ref 0–0.11)
IMM GRANULOCYTES NFR BLD AUTO: 1 % (ref 0–0.9)
LACTATE SERPL-SCNC: 0.9 MMOL/L (ref 0.5–2)
LYMPHOCYTES # BLD AUTO: 2.67 K/UL (ref 1–4.8)
LYMPHOCYTES NFR BLD: 25.8 % (ref 22–41)
MAGNESIUM SERPL-MCNC: 1.9 MG/DL (ref 1.5–2.5)
MCH RBC QN AUTO: 30.1 PG (ref 27–33)
MCHC RBC AUTO-ENTMCNC: 32.3 G/DL (ref 32.3–36.5)
MCV RBC AUTO: 92.9 FL (ref 81.4–97.8)
MONOCYTES # BLD AUTO: 1.12 K/UL (ref 0–0.85)
MONOCYTES NFR BLD AUTO: 10.8 % (ref 0–13.4)
NEUTROPHILS # BLD AUTO: 5.71 K/UL (ref 1.82–7.42)
NEUTROPHILS NFR BLD: 55.1 % (ref 44–72)
NRBC # BLD AUTO: 0 K/UL
NRBC BLD-RTO: 0 /100 WBC (ref 0–0.2)
PLATELET # BLD AUTO: 482 K/UL (ref 164–446)
PMV BLD AUTO: 9 FL (ref 9–12.9)
POTASSIUM SERPL-SCNC: 3.6 MMOL/L (ref 3.6–5.5)
PROT SERPL-MCNC: 6.7 G/DL (ref 6–8.2)
RBC # BLD AUTO: 3.26 M/UL (ref 4.7–6.1)
SIGNIFICANT IND 70042: NORMAL
SITE SITE: NORMAL
SODIUM SERPL-SCNC: 139 MMOL/L (ref 135–145)
SOURCE SOURCE: NORMAL
WBC # BLD AUTO: 10.4 K/UL (ref 4.8–10.8)

## 2023-07-21 PROCEDURE — 700102 HCHG RX REV CODE 250 W/ 637 OVERRIDE(OP): Performed by: HOSPITALIST

## 2023-07-21 PROCEDURE — 99221 1ST HOSP IP/OBS SF/LOW 40: CPT | Performed by: SURGERY

## 2023-07-21 PROCEDURE — A9270 NON-COVERED ITEM OR SERVICE: HCPCS

## 2023-07-21 PROCEDURE — 83735 ASSAY OF MAGNESIUM: CPT

## 2023-07-21 PROCEDURE — 99223 1ST HOSP IP/OBS HIGH 75: CPT | Performed by: INTERNAL MEDICINE

## 2023-07-21 PROCEDURE — 700105 HCHG RX REV CODE 258: Mod: JZ | Performed by: HOSPITALIST

## 2023-07-21 PROCEDURE — 97597 DBRDMT OPN WND 1ST 20 CM/<: CPT

## 2023-07-21 PROCEDURE — 97162 PT EVAL MOD COMPLEX 30 MIN: CPT

## 2023-07-21 PROCEDURE — 36415 COLL VENOUS BLD VENIPUNCTURE: CPT

## 2023-07-21 PROCEDURE — 83605 ASSAY OF LACTIC ACID: CPT

## 2023-07-21 PROCEDURE — 700111 HCHG RX REV CODE 636 W/ 250 OVERRIDE (IP): Mod: JZ | Performed by: HOSPITALIST

## 2023-07-21 PROCEDURE — 82962 GLUCOSE BLOOD TEST: CPT | Mod: 91

## 2023-07-21 PROCEDURE — 770001 HCHG ROOM/CARE - MED/SURG/GYN PRIV*

## 2023-07-21 PROCEDURE — 80053 COMPREHEN METABOLIC PANEL: CPT

## 2023-07-21 PROCEDURE — 85025 COMPLETE CBC W/AUTO DIFF WBC: CPT

## 2023-07-21 PROCEDURE — 99233 SBSQ HOSP IP/OBS HIGH 50: CPT | Performed by: INTERNAL MEDICINE

## 2023-07-21 PROCEDURE — 700102 HCHG RX REV CODE 250 W/ 637 OVERRIDE(OP)

## 2023-07-21 PROCEDURE — A9270 NON-COVERED ITEM OR SERVICE: HCPCS | Performed by: HOSPITALIST

## 2023-07-21 RX ORDER — POTASSIUM CHLORIDE 20 MEQ/1
40 TABLET, EXTENDED RELEASE ORAL ONCE
Status: COMPLETED | OUTPATIENT
Start: 2023-07-21 | End: 2023-07-21

## 2023-07-21 RX ADMIN — MORPHINE SULFATE 4 MG: 4 INJECTION, SOLUTION INTRAMUSCULAR; INTRAVENOUS at 18:43

## 2023-07-21 RX ADMIN — VANCOMYCIN HYDROCHLORIDE 1500 MG: 5 INJECTION, POWDER, LYOPHILIZED, FOR SOLUTION INTRAVENOUS at 08:49

## 2023-07-21 RX ADMIN — POTASSIUM CHLORIDE 40 MEQ: 1500 TABLET, EXTENDED RELEASE ORAL at 08:49

## 2023-07-21 RX ADMIN — VANCOMYCIN HYDROCHLORIDE 1500 MG: 5 INJECTION, POWDER, LYOPHILIZED, FOR SOLUTION INTRAVENOUS at 18:47

## 2023-07-21 RX ADMIN — AMPICILLIN AND SULBACTAM 3 G: 1; 2 INJECTION, POWDER, FOR SOLUTION INTRAMUSCULAR; INTRAVENOUS at 13:22

## 2023-07-21 RX ADMIN — OXYCODONE HYDROCHLORIDE 10 MG: 10 TABLET ORAL at 09:43

## 2023-07-21 RX ADMIN — OMEPRAZOLE 20 MG: 20 CAPSULE, DELAYED RELEASE ORAL at 05:56

## 2023-07-21 RX ADMIN — OXYCODONE HYDROCHLORIDE 10 MG: 10 TABLET ORAL at 06:02

## 2023-07-21 RX ADMIN — MORPHINE SULFATE 4 MG: 4 INJECTION, SOLUTION INTRAMUSCULAR; INTRAVENOUS at 03:35

## 2023-07-21 RX ADMIN — MORPHINE SULFATE 4 MG: 4 INJECTION, SOLUTION INTRAMUSCULAR; INTRAVENOUS at 00:07

## 2023-07-21 RX ADMIN — INSULIN HUMAN 3 UNITS: 100 INJECTION, SOLUTION PARENTERAL at 20:55

## 2023-07-21 RX ADMIN — CLOPIDOGREL BISULFATE 75 MG: 75 TABLET ORAL at 05:56

## 2023-07-21 RX ADMIN — ASPIRIN 81 MG: 81 TABLET, COATED ORAL at 05:56

## 2023-07-21 RX ADMIN — MORPHINE SULFATE 4 MG: 4 INJECTION, SOLUTION INTRAMUSCULAR; INTRAVENOUS at 23:33

## 2023-07-21 RX ADMIN — BUPROPION HYDROCHLORIDE 150 MG: 75 TABLET, FILM COATED ORAL at 18:18

## 2023-07-21 RX ADMIN — LISINOPRIL 10 MG: 10 TABLET ORAL at 05:56

## 2023-07-21 RX ADMIN — AMPICILLIN AND SULBACTAM 3 G: 1; 2 INJECTION, POWDER, FOR SOLUTION INTRAMUSCULAR; INTRAVENOUS at 00:11

## 2023-07-21 RX ADMIN — AMPICILLIN AND SULBACTAM 3 G: 1; 2 INJECTION, POWDER, FOR SOLUTION INTRAMUSCULAR; INTRAVENOUS at 05:52

## 2023-07-21 RX ADMIN — ATORVASTATIN CALCIUM 20 MG: 20 TABLET, FILM COATED ORAL at 20:52

## 2023-07-21 RX ADMIN — ESCITALOPRAM OXALATE 10 MG: 10 TABLET ORAL at 05:56

## 2023-07-21 RX ADMIN — OXYCODONE HYDROCHLORIDE 10 MG: 10 TABLET ORAL at 21:51

## 2023-07-21 RX ADMIN — INSULIN HUMAN 3 UNITS: 100 INJECTION, SOLUTION PARENTERAL at 18:17

## 2023-07-21 RX ADMIN — BUPROPION HYDROCHLORIDE 150 MG: 75 TABLET, FILM COATED ORAL at 05:56

## 2023-07-21 ASSESSMENT — ENCOUNTER SYMPTOMS
NAUSEA: 0
ORTHOPNEA: 0
DEPRESSION: 0
PALPITATIONS: 0
TINGLING: 0
COUGH: 0
WHEEZING: 0
BLOOD IN STOOL: 0
NECK PAIN: 0
MYALGIAS: 0
DIARRHEA: 0
SINUS PAIN: 0
SPUTUM PRODUCTION: 0
STRIDOR: 0
DOUBLE VISION: 0
DIAPHORESIS: 0
WEAKNESS: 0
EYES NEGATIVE: 1
HEMOPTYSIS: 0
CLAUDICATION: 0
BLURRED VISION: 0
SENSORY CHANGE: 1
NERVOUS/ANXIOUS: 1
CONSTIPATION: 0
SHORTNESS OF BREATH: 0
HEADACHES: 0
SORE THROAT: 0
FALLS: 0
FLANK PAIN: 0
BRUISES/BLEEDS EASILY: 0
PHOTOPHOBIA: 0
HEARTBURN: 0
ABDOMINAL PAIN: 0
MYALGIAS: 1
PND: 0
EYE PAIN: 0
TREMORS: 0
CHILLS: 0
DIZZINESS: 0
DEPRESSION: 1
POLYDIPSIA: 0
FEVER: 0
HALLUCINATIONS: 0
BACK PAIN: 0
VOMITING: 0

## 2023-07-21 ASSESSMENT — LIFESTYLE VARIABLES
CONSUMPTION TOTAL: NEGATIVE
DOES PATIENT WANT TO STOP DRINKING: NO
HAVE PEOPLE ANNOYED YOU BY CRITICIZING YOUR DRINKING: NO
TOTAL SCORE: 0
HAVE YOU EVER FELT YOU SHOULD CUT DOWN ON YOUR DRINKING: NO
SUBSTANCE_ABUSE: 0
EVER FELT BAD OR GUILTY ABOUT YOUR DRINKING: NO
TOTAL SCORE: 0
EVER HAD A DRINK FIRST THING IN THE MORNING TO STEADY YOUR NERVES TO GET RID OF A HANGOVER: NO
ON A TYPICAL DAY WHEN YOU DRINK ALCOHOL HOW MANY DRINKS DO YOU HAVE: 0
ALCOHOL_USE: YES
HOW MANY TIMES IN THE PAST YEAR HAVE YOU HAD 5 OR MORE DRINKS IN A DAY: 0
TOTAL SCORE: 0
AVERAGE NUMBER OF DAYS PER WEEK YOU HAVE A DRINK CONTAINING ALCOHOL: 0
SUBSTANCE_ABUSE: 1

## 2023-07-21 ASSESSMENT — PAIN DESCRIPTION - PAIN TYPE
TYPE: ACUTE PAIN
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
## 2023-07-21 NOTE — ASSESSMENT & PLAN NOTE
- on insulin sliding scale with serial Accu-Checks, glucose 200's,  glargine increased to12 units   -HbA1c 11.3% 07/06/23  -Hypoglycemic protocol in place   -goal blood glucose <150 to control infection per ID recommendations  -long acting insulin to be held if with concern for hypoglycemia

## 2023-07-21 NOTE — ASSESSMENT & PLAN NOTE
- IV vancomycin transitioned to Daptomycin today 07/27/23 , stop date antibiotics 8/17/2023  -With recent MRSA bacteremia, incompletely treated as patient left AMA  --Blood cultures x2 from 7/5 and 7/8 has been negative, following blood culture this admission, negative to date  -patient is afebrile, hemodynamically stable  -echo from 7/10/23 negative for vegetations  -we fernando monitor vancomycin trough levels and cmp for renal function

## 2023-07-21 NOTE — H&P
Hospital Medicine History & Physical Note    Date of Service  7/20/2023    Primary Care Physician  GRACIE Pepper.    Consultants  vascular surgery    Specialist Names:      Code Status  Full Code    Chief Complaint  Chief Complaint   Patient presents with    Post-Op Complications       History of Presenting Illness  Rogelio Escudero is a 48 y.o. male who presented 7/20/2023 with past medical history of diabetes, coronary disease status post stent, hypertension, peripheral vascular disease who presents to the hospital for right leg and groin pain.  His pain starts in the right leg and radiates to his back.  He is also feeling weakness in his legs.  Patient states that he fell 2 days ago.  After he left the hospital he did notice some drainage from his right foot which has now resolved.  Patient recently had a femoral palpable bypass completed by Dr. Calderon on 7/14.  He was found to have osteomyelitis and leg ischemia.  At the time he grew Enterococcus and MRSA.  Patient was discharged to SNF on daptomycin and meropenem until 7/20.  Patient states that he has been noncompliant with his home insulin since he was not discharged with needles.  Patient states that he is still smoking cigarettes.    Chest x-ray interpreted by me found no acute pulmonary process    I discussed the plan of care with patient.    Review of Systems  Review of Systems   Constitutional:  Positive for chills and malaise/fatigue. Negative for diaphoresis and fever.   HENT:  Negative for congestion, ear discharge, ear pain, hearing loss, nosebleeds, sinus pain, sore throat and tinnitus.    Eyes:  Negative for blurred vision, double vision, photophobia and pain.   Respiratory:  Negative for cough, hemoptysis, sputum production, shortness of breath, wheezing and stridor.    Cardiovascular:  Positive for leg swelling. Negative for chest pain, palpitations, orthopnea, claudication and PND.   Gastrointestinal:  Negative for  abdominal pain, blood in stool, constipation, diarrhea, heartburn, melena, nausea and vomiting.   Genitourinary:  Negative for dysuria, flank pain, frequency, hematuria and urgency.   Musculoskeletal:  Negative for back pain, falls, joint pain, myalgias and neck pain.   Skin:  Negative for itching and rash.   Neurological:  Negative for dizziness, tingling, tremors, weakness and headaches.   Endo/Heme/Allergies:  Negative for environmental allergies and polydipsia. Does not bruise/bleed easily.   Psychiatric/Behavioral:  Negative for depression, hallucinations, substance abuse and suicidal ideas.        Past Medical History   has a past medical history of Alcohol abuse, Dental disorder, Depression (2010), Diabetes, Diabetic neuropathy ([facility]), Heart burn, Hemorrhagic disorder ([facility]), Hiatus hernia syndrome, High cholesterol, Hypertension, Pain, Pneumonia (approx 2011), PTSD (post-traumatic stress disorder), and PVD (peripheral vascular disease) ([facility]).    Surgical History   has a past surgical history that includes irrigation & debridement general (Right, 2/6/2020); other; pr shldr arthroscop,surg,w/rotat cuff repr (Right, 11/18/2021); pr shldr arthroscop,part acromioplas (Right, 11/18/2021); pr arthroscopy shoulder surgical biceps tenodes* (Right, 11/18/2021); debridement, labrum, hip, arthroscopic (Right, 11/18/2021); and femoral popliteal bypass (Right, 7/14/2023).     Family History  family history is not on file.   Family history reviewed with patient. There is no family history that is pertinent to the chief complaint.     Social History   reports that he has been smoking cigarettes. He has a 5.00 pack-year smoking history. He has never used smokeless tobacco. He reports current alcohol use. He reports current drug use. Drug: Inhaled.    Allergies  Allergies   Allergen Reactions    Apple Hives    Lactose Diarrhea     Diarrhea         Medications  Prior to Admission Medications   Prescriptions Last Dose Informant  Patient Reported? Taking?   Alcohol Swabs   No No   Sig: Wipe site with prep pad prior to injection.   Blood Glucose Monitoring Suppl (BLOOD GLUCOSE MONITOR SYSTEM) w/Device Kit   No No   Sig: Test blood sugar as recommended by provider. True Metrix blood glucose monitoring kit.   Empagliflozin 25 MG Tab   No No   Sig: Take 25 mg by mouth every day.   Insulin Pen Needle 32 G x 4 mm   No No   Sig: Use one pen needle in pen device to inject insulin once daily.   TechLite Lancets Misc   No No   Sig: Use one lancet to test blood sugar twice daily before meals.   aspirin 81 MG EC tablet   No No   Sig: Take 1 Tablet by mouth every day.   aspirin 81 MG EC tablet   No No   Sig: Take 1 Tablet by mouth every day for 30 days.   atorvastatin (LIPITOR) 20 MG Tab   No No   Sig: Take 1 Tablet by mouth at bedtime.   buPROPion (WELLBUTRIN) 75 MG Tab   No No   Sig: Take 2 Tablets by mouth 2 times a day for 30 days.   buPROPion (WELLBUTRIN) 75 MG Tab   No No   Sig: Take 2 Tablets by mouth 2 times a day for 30 days.   clopidogrel (PLAVIX) 75 MG Tab   No No   Sig: Take 1 Tablet by mouth every day.   colchicine (COLCRYS) 0.6 MG Tab   No No   Sig: Take 1 Tablet by mouth every day.   escitalopram (LEXAPRO) 10 MG Tab   No No   Sig: Take 1 Tablet by mouth every day.   glucose blood strip   No No   Sig: Use one True Metrix strip to test blood sugar twice daily before meals.   insulin glargine (LANTUS SOLOSTAR) 100 UNIT/ML Solution Pen-injector injection   No No   Sig: Inject 25 Units under the skin every day.   lisinopril (PRINIVIL) 10 MG Tab   No No   Sig: Take 1 Tablet by mouth every day.   metformin (GLUCOPHAGE) 1000 MG tablet   No No   Sig: Take 1 Tablet by mouth 2 times a day.   omeprazole (PRILOSEC) 20 MG delayed-release capsule   No No   Sig: Take 1 Capsule by mouth every morning.   oxyCODONE-acetaminophen (PERCOCET) 5-325 MG Tab   No No   Sig: Take 1-2 Tablets by mouth every 6 hours as needed for Severe Pain or Moderate Pain for up to  5 days.      Facility-Administered Medications: None       Physical Exam  Temp:  [36.6 °C (97.8 °F)] 36.6 °C (97.8 °F)  Pulse:  [] 94  Resp:  [18-21] 21  BP: (116-162)/() 126/82  SpO2:  [94 %-98 %] 97 %  Blood Pressure: 126/82   Temperature: 36.6 °C (97.8 °F)   Pulse: 94   Respiration: (!) 21   Pulse Oximetry: 97 %       Physical Exam  Vitals and nursing note reviewed.   Constitutional:       General: He is not in acute distress.     Appearance: Normal appearance. He is not ill-appearing, toxic-appearing or diaphoretic.   HENT:      Head: Normocephalic and atraumatic.      Nose: No congestion or rhinorrhea.      Mouth/Throat:      Pharynx: No posterior oropharyngeal erythema.   Eyes:      General: No scleral icterus.        Right eye: No discharge.   Cardiovascular:      Rate and Rhythm: Normal rate and regular rhythm.      Pulses: Normal pulses.      Heart sounds: Normal heart sounds. No murmur heard.     No friction rub. No gallop.   Pulmonary:      Effort: Pulmonary effort is normal. No respiratory distress.      Breath sounds: Normal breath sounds. No stridor. No wheezing, rhonchi or rales.   Abdominal:      General: There is no distension.      Tenderness: There is no abdominal tenderness.   Musculoskeletal:         General: No swelling, tenderness, deformity or signs of injury.      Cervical back: Normal range of motion.      Right lower leg: Edema present.      Left lower leg: No edema.   Skin:     Capillary Refill: Capillary refill takes less than 2 seconds.      Coloration: Skin is not jaundiced or pale.      Findings: Erythema present. No bruising, lesion or rash.      Comments: Right leg tenderness   Neurological:      General: No focal deficit present.      Mental Status: He is alert and oriented to person, place, and time.         Laboratory:  Recent Labs     07/18/23  0346 07/20/23  1709   WBC 10.8 11.3*   RBC 3.38* 3.52*   HEMOGLOBIN 10.2* 10.5*   HEMATOCRIT 30.4* 31.4*   MCV 89.9 89.2    MCH 30.2 29.8   MCHC 33.6 33.4   RDW 51.3* 50.2*   PLATELETCT 361 298   MPV 9.6 9.0     Recent Labs     07/18/23  0346 07/20/23  1637   SODIUM 133* 138   POTASSIUM 3.5* 3.4*   CHLORIDE 100 103   CO2 24 22   GLUCOSE 191* 132*   BUN 9 12   CREATININE 0.62 0.64   CALCIUM 8.9 9.3     Recent Labs     07/18/23  0346 07/20/23  1637   ALTSGPT  --  12   ASTSGOT  --  12   ALKPHOSPHAT  --  126*   TBILIRUBIN  --  0.8   GLUCOSE 191* 132*         No results for input(s): NTPROBNP in the last 72 hours.      No results for input(s): TROPONINT in the last 72 hours.    Imaging:  CT-CTA LOWER EXT WITH & W/O-POST PROCESS RIGHT   Final Result      1.  Recent RIGHT lower extremity vascular surgery with femoral to popliteal graft which appears patent.  Retrograde enhancement of distal femoral artery.   2.  Normal enhancement of trifurcation vessels.   3.  No hematoma or evidence for ongoing arterial hemorrhage.   4.  Diffuse subcutaneous edema of the RIGHT lower extremity.   5.  Fluid and gas tracks along the graft, consistent with recent surgery.   6.  RIGHT groin and inguinal adenopathy, likely reactive.      DX-CHEST-PORTABLE (1 VIEW)   Final Result      Hypoinflation without other evidence for acute cardiopulmonary disease.          X-Ray:  I have personally reviewed the images and compared with prior images.    Assessment/Plan:  Justification for Admission Status  I anticipate this patient will require at least two midnights for appropriate medical management, necessitating inpatient admission because postop wound    Patient will need a Med/Surg bed on MEDICAL service .  The need is secondary to postop wound.    * Postoperative cellulitis of surgical wound- (present on admission)  Assessment & Plan  Patient is 6 days postop and now presents with drainage of the right leg and pain  Start vancomycin and will follow trough levels  Start IV Unasyn  Obtain wound cultures  ESR and CRP    Hypertension  Assessment &  Plan  controlled  Continue current home medications  IV as needed medications have been ordered      Low back pain  Assessment & Plan  Patient does have a history of MRSA bacteremia  Currently states that he has back pain but is not radiating down the legs  Continue to monitor and if any progression MRI L-spine should be completed    CAD (coronary artery disease)- (present on admission)  Assessment & Plan  Continue aspirin and statins    Peripheral artery disease (HCC)- (present on admission)  Assessment & Plan  Continue home aspirin Plavix and statins    Diabetes (HCC)- (present on admission)  Assessment & Plan  Start on insulin sliding scale with serial Accu-Checks  HbA1c 11  Hypoglycemic protocol in place  Lantus 25 units    Tobacco abuse- (present on admission)  Assessment & Plan  Tobacco cessation education provided for more than 11 minutes.  We discussed the risks of smoking including increased risk of heart disease, stroke, cancer and COPD. We discussed the benefits of quitting smoking. We discussed options of nicotine patch, wellbutrin and chantix.  The patient has the intention to quit smoking. Nicotine replacement protocol will be provided to the patient.          VTE prophylaxis: SCDs/TEDs

## 2023-07-21 NOTE — PROGRESS NOTES
Pharmacy Vancomycin Kinetics Note for 7/20/2023     48 y.o. male on Vancomycin day # 1     Vancomycin Indication (AUC Dosing): Skin/skin structure infection    Provider specified end date: 07/27/23    Active Antibiotics (From admission, onward)      Ordered     Ordering Provider       Thu Jul 20, 2023  7:40 PM    07/20/23 1940  ampicillin/sulbactam (Unasyn) 3 g in  mL IVPB  EVERY 6 HOURS         Radha Ramires M.D.       Thu Jul 20, 2023  7:40 PM    07/20/23 1940  MD Alert...Vancomycin per Pharmacy  PHARMACY TO DOSE        Question:  Indication(s) for vancomycin?  Answer:  Skin and soft tissue infection    Radha Ramires M.D.       Thu Jul 20, 2023  7:22 PM    07/20/23 1922  vancomycin (Vancocin) 2,000 mg in  mL IVPB  (vancomycin (VANCOCIN) IV (LD + Maintenance))  ONCE         Chucho Dawn M.D.            Dosing Weight: 81.6 kg (179 lb 14.3 oz)      Admission History: Admitted on 7/20/2023 for Postoperative cellulitis of surgical wound [T81.49XA]  Pertinent history: Patient with lower extremity wound with recent history on 7/7/23 with MRSA and enterococcus. Patient was supposed to be on daptomycin outpatient but did not go to [facility] visits. Plan for vancomycin inpatient and to follow ID recommendations.    Allergies:     Apple and Lactose     Pertinent cultures to date:     Results       Procedure Component Value Units Date/Time    CULTURE WOUND W/ GRAM STAIN [[id number]] Collected: 07/20/23 2002    Order Status: Sent Specimen: Wound from Right Leg Updated: 07/20/23 2019    URINALYSIS [[id number]]  (Abnormal) Collected: 07/20/23 1852    Order Status: Completed Specimen: Urine Updated: 07/20/23 1925     Color Yellow     Character Clear     Specific Gravity >=1.045     Ph 6.0     Glucose >=1000 mg/dL      Ketones Negative mg/dL      Protein Negative mg/dL      Bilirubin Negative     Urobilinogen, Urine 1.0     Nitrite Negative     Leukocyte Esterase Negative     Occult Blood Trace     Micro Urine Req  "Microscopic    Narrative:      Indication for culture:->Emergency Room Patient    URINE CULTURE(NEW) [[id number]] Collected: 23    Order Status: Sent Specimen: Urine Updated: 23    Narrative:      Indication for culture:->Emergency Room Patient    BLOOD CULTURE [[id number]] Collected: 23 170    Order Status: Sent Specimen: Blood from Peripheral Updated: 23 172    Narrative:      Per Hospital Policy: Only change Specimen Src: to \"Line\" if  specified by physician order.    BLOOD CULTURE [[id number]] Collected: 23 1637    Order Status: Sent Specimen: Blood from Peripheral Updated: 23 171    Narrative:      Per Hospital Policy: Only change Specimen Src: to \"Line\" if  specified by physician order.            Labs:     Estimated Creatinine Clearance: 138.1 mL/min (by C-G formula based on SCr of 0.64 mg/dL).  Recent Labs     23  0346 23  1709   WBC 10.8 11.3*   NEUTSPOLYS 61.40 64.70     Recent Labs     23  0346 23  1637   BUN 9 12   CREATININE 0.62 0.64   ALBUMIN  --  3.8     No intake or output data in the 24 hours ending 23 2110   /82   Pulse 94   Temp 36.6 °C (97.8 °F) (Temporal)   Resp (!) 21   Ht 1.753 m (5' 9\")   Wt 81.6 kg (180 lb)   SpO2 97%  Temp (24hrs), Av.6 °C (97.8 °F), Min:36.6 °C (97.8 °F), Max:36.6 °C (97.8 °F)      List concerns for Vancomycin clearance:     Receipt of contrast dye    Pharmacokinetics: AUC Dosing    AUC kinetics:   Ke (hr ^-1): 0.119 hr^-1  Half life: 5.82 hr  Clearance: 6.312  Estimated TDD: 3156  Estimated Dose: 1026  Estimated interval: 7.8  rs.    A/P:     -  Vancomycin dose: 1500mg q 12h    -  Next vancomycin level(s): Not currently scheduled, consider after 4th maintenance dose.     -  Predicted vancomycin AUC from initial AUC test calculator: 475 mg·hr/L    -  Comments: Please continue to monitor renal function, urine output and vancomycin levels for dosing adjustments. Please also follow " cultures and signs of clinical cure for de-escalation of therapy.       Ale Mathews, PharmD

## 2023-07-21 NOTE — ASSESSMENT & PLAN NOTE
-S/ p recent right fem-pop bypass with Dr. Calderon 07/14/23  -seen again by vascular during this admission,no interventions at this time, they have signed off   -CTA of lower ext 07/20/23 demonstrated bypass graft still patent  - continue home aspirin , Plavix and statins

## 2023-07-21 NOTE — CARE PLAN
The patient is Stable - Low risk of patient condition declining or worsening    Shift Goals  Clinical Goals: Pain control, follow diet orders, medication administration, skin integriy  Patient Goals: Pain control, eat before midnight, comfort  Family Goals: LAXMI    Progress made toward(s) clinical / shift goals:    Problem: Knowledge Deficit - Standard  Goal: Patient and family/care givers will demonstrate understanding of plan of care, disease process/condition, diagnostic tests and medications  Outcome: Progressing  Note: Patient and I discussed POC. He verbalizes understanding of mediations, diet order, and equipment.      Problem: Pain - Standard  Goal: Alleviation of pain or a reduction in pain to the patient’s comfort goal  Outcome: Progressing  Note: PRN pain medications in use for pain control.       Problem: Fall Risk  Goal: Patient will remain free from falls  Outcome: Progressing  Note: Patient has remained free of falls this shift. Instructed patient to use call light for help. Call light always placed within reach when leaving room. Patient's room has been cleared of clutter such as cords and extra chairs.        Patient is not progressing towards the following goals:

## 2023-07-21 NOTE — PROGRESS NOTES
.  [facility] Internal Medicine Daily Progress Note    Date of Service  7/21/2023    R Team: R IM White Team   Attending: Qing Calzada M.d.  Senior Resident: Dr. Johnson  Contact Number: [phone]    Chief Complaint  Rogelio Escudero is a 48 y.o. male admitted 7/20/2023 with possible infection, leg pain, history of osteomyelitis/MRSA    Hospital Course  Rogelio Escudero is a 48 y.o. male who presented 7/20/2023 with past medical history of diabetes, coronary disease status post stent, hypertension, peripheral vascular disease who presents to the hospital for right leg and groin pain.  His pain starts in the right leg and radiates to his back.  He is also feeling weakness in his legs.  Patient states that he fell 2 days ago.  After he left the hospital he did notice some drainage from his right foot which has now resolved.  Patient recently had a femoral palpable bypass completed by Dr. Calderon on 7/14.  He was found to have osteomyelitis and leg ischemia.  At the time he grew Enterococcus and MRSA.  Patient was discharged to SNF on daptomycin and meropenem until 7/20.  Patient states that he has been noncompliant with his home insulin since he was not discharged with needles.  Patient states that he is still smoking cigarettes.       Interval Problem Update  No overnight events.  Patient continues to be afebrile, no WBC count, currently non-tachycardic.  Patient still complains of leg pain.  Vascular surgery consulted, appreciate recommendations: no current indication for any surgical procedure, if with clinical worsening over the next 1-2 days , plan for washout of the right thigh incision.  Infectious disease also consulted with recommendations to continue IV vancomycin for prior MRSA sepsis/bacteremia.     Wound culture on thigh 07/20/23 negative to date.     I have discussed this patient's plan of care and discharge plan at IDT rounds today with Case Management, Nursing, Nursing leadership, and other  members of the IDT team.    Consultants/Specialty  vascular surgery  Infectious disease    Code Status  Full Code    Disposition  Not medically cleared  I have placed the appropriate orders for post-discharge needs.    Review of Systems  Review of Systems   Constitutional:  Negative for chills, diaphoresis, fever and malaise/fatigue.   HENT:  Negative for congestion, ear discharge, ear pain, hearing loss, nosebleeds, sinus pain, sore throat and tinnitus.    Eyes:  Negative for blurred vision, double vision, photophobia and pain.   Respiratory:  Negative for cough, hemoptysis, sputum production, shortness of breath, wheezing and stridor.    Cardiovascular:  Negative for chest pain, palpitations, orthopnea, claudication and PND.   Gastrointestinal:  Negative for abdominal pain, blood in stool, constipation, diarrhea, heartburn, melena, nausea and vomiting.   Genitourinary:  Negative for dysuria, flank pain, frequency, hematuria and urgency.   Musculoskeletal:  Negative for back pain, falls, joint pain, myalgias and neck pain.        See HPI   Skin:  Negative for itching and rash.   Neurological:  Negative for dizziness, tingling, tremors, weakness and headaches.   Endo/Heme/Allergies:  Negative for environmental allergies and polydipsia. Does not bruise/bleed easily.   Psychiatric/Behavioral:  Negative for depression, hallucinations, substance abuse and suicidal ideas.         Physical Exam  Temp:  [36.6 °C (97.8 °F)-36.9 °C (98.5 °F)] 36.8 °C (98.2 °F)  Pulse:  [] 88  Resp:  [17-21] 17  BP: (104-162)/() 131/84  SpO2:  [93 %-98 %] 97 %    Physical Exam  Vitals and nursing note reviewed.   Constitutional:       General: He is not in acute distress.     Appearance: Normal appearance. He is not ill-appearing, toxic-appearing or diaphoretic.   HENT:      Head: Normocephalic and atraumatic.      Nose: No congestion or rhinorrhea.      Mouth/Throat:      Pharynx: No posterior oropharyngeal erythema.   Eyes:       General: No scleral icterus.        Right eye: No discharge.   Cardiovascular:      Rate and Rhythm: Normal rate and regular rhythm.      Pulses: Normal pulses.      Heart sounds: Normal heart sounds. No murmur heard.     No friction rub. No gallop.   Pulmonary:      Effort: Pulmonary effort is normal. No respiratory distress.      Breath sounds: Normal breath sounds. No stridor. No wheezing, rhonchi or rales.   Abdominal:      General: There is no distension.      Tenderness: There is no abdominal tenderness.   Musculoskeletal:         General: No swelling, tenderness, deformity or signs of injury.      Cervical back: Normal range of motion.      Right lower leg: Edema present.      Left lower leg: No edema.   Skin:     Capillary Refill: Capillary refill takes less than 2 seconds.      Coloration: Skin is not jaundiced or pale.      Findings: Erythema (minimal erythema on incisional site) present. No bruising, lesion or rash.      Comments: Right leg tenderness, no gross purulence noted on wounds, incisional area   Neurological:      General: No focal deficit present.      Mental Status: He is alert and oriented to person, place, and time.           Fluids    Intake/Output Summary (Last 24 hours) at 7/21/2023 1541  Last data filed at 7/21/2023 1200  Gross per 24 hour   Intake --   Output 800 ml   Net -800 ml       Laboratory  Recent Labs     07/20/23  1709 07/21/23  0326   WBC 11.3* 10.4   RBC 3.52* 3.26*   HEMOGLOBIN 10.5* 9.8*   HEMATOCRIT 31.4* 30.3*   MCV 89.2 92.9   MCH 29.8 30.1   MCHC 33.4 32.3   RDW 50.2* 52.3*   PLATELETCT 298 482*   MPV 9.0 9.0     Recent Labs     07/20/23  1637 07/21/23  0326   SODIUM 138 139   POTASSIUM 3.4* 3.6   CHLORIDE 103 107   CO2 22 22   GLUCOSE 132* 157*   BUN 12 12   CREATININE 0.64 0.64   CALCIUM 9.3 8.6                   Imaging  CT-CTA LOWER EXT WITH & W/O-POST PROCESS RIGHT   Final Result      1.  Recent RIGHT lower extremity vascular surgery with femoral to popliteal graft  which appears patent.  Retrograde enhancement of distal femoral artery.   2.  Normal enhancement of trifurcation vessels.   3.  No hematoma or evidence for ongoing arterial hemorrhage.   4.  Diffuse subcutaneous edema of the RIGHT lower extremity.   5.  Fluid and gas tracks along the graft, consistent with recent surgery.   6.  RIGHT groin and inguinal adenopathy, likely reactive.      DX-CHEST-PORTABLE (1 VIEW)   Final Result      Hypoinflation without other evidence for acute cardiopulmonary disease.           Assessment/Plan  Problem Representation:    MRSA bacteremia- (present on admission)  Assessment & Plan  -With recent MRSA bacteremia, incompletely treated as patient left AMA  --Blood cultures x2 from 7/5 and 7/8 has been negative, following blood culture this admission, negative to date  -patient is afebrile, hemodynamically stable  -echo from 7/10/23 negative for vegetations  -continue IV vancomycin per infectious disease recommendations, stop date antibiotics 8/17/2023  -we fernando monitor vancomycin trough levels and cmp for renal function  -per ID recommendations: anticipate need to complete 6 weeks of IV daptomycin or vancomycin for prior MRSA bacteremia (PICC Line not advisable for concern of drug use)    Osteomyelitis (HCC)- (present on admission)  Assessment & Plan  -Osteomyelitis, Right foot, S/P TMA with positive margins , intraop culture on 07/08/23 positive for enterecoccus faecalis and MRSA, sensitive to vancomycin and daptomycin  -per ID recommendations: addditional antibiotics will not cure his chronic osteomyelitis, also in the absence of debridement of any necrotic tissues that will serve as a continued nidus of infection, surgery as only curative option  - tight control of BS under 150 to help control infection    Peripheral artery disease (HCC)- (present on admission)  Assessment & Plan  -S/ p recent right fem-pop bypass with Dr. Calderon 07/14/23, DP pulse barely palpable  -CTA of lower ext  07/20/23 demonstrated bypass graft still patent, vascular surgery Dr. Rowell consulted with no surgical interventions at this time,  continued treatment with antibiotics  - closely monitor as current symptoms are expected.   - possible incision washout in 1-2 days time if symptoms do not improve  -to continue home aspirin , Plavix and statins for now    Diabetes (HCC)- (present on admission)  Assessment & Plan  - on insulin sliding scale with serial Accu-Checks  -HbA1c 11.3% 07/06/23  -Hypoglycemic protocol in place  -25 units glargine initially ordered, 10 units for now since patient's glucose has been at goal with diabetic diet, we will continue to adjust as necessary, goal blood glucose <150 to control infection per ID recommendations  -long acting insulin to be held if with concern for hypoglycemia    Low back pain  Assessment & Plan  Patient does have a history of MRSA bacteremia, incompletely treated, crrently states that he has chronic back pain but is non radiating  Patient does not present with any red flag symptoms such as urinary /bowel incontinence, no fever, no weight loss, on physical examination does not present with spinal tenderness, also with new onset weakness/sensory deficit  - we will continue to monitor and if with any progression MRI L-spine could be considered    CAD (coronary artery disease)- (present on admission)  Assessment & Plan  -Continue aspirin, plavix and atorvastatin as above      Tobacco abuse- (present on admission)  Assessment & Plan  -Contact patient and reiterated the need for cessation of tobacco especially with his significant peripheral arterial occlusive disease  -Tobacco cessation education provided for more than 11 minutes.  We discussed the risks of smoking including increased risk of heart disease, stroke, cancer and COPD. We discussed the benefits of quitting smoking. We discussed options of nicotine patch, wellbutrin and chantix.  The patient has the intention to  quit smoking. Nicotine replacement protocol will be provided to the patient.      Hypertension  Assessment & Plan  -Controlled, with holding parameters for lisinopril 10 mg             VTE prophylaxis:     I have performed a physical exam and reviewed and updated ROS and Plan today (7/21/2023). In review of yesterday's note (7/20/2023), there are no changes except as documented above.

## 2023-07-21 NOTE — PROGRESS NOTES
[facility] Internal Medicine Daily Progress Note    This note is intended for the purposes of medical student education and feedback only.   Please refer to the documentation by this patient's assigned medical practitioner for details of care and plans.    Date of Service  7/21/2023    [facility] Team: [employer] IM White Team   Attending: Qing Calzada M.d.  Senior Resident: Dr. Sofie Johnson  Intern:  [email or web address]  Contact Number: [phone]    Chief Complaint  Rogelio Escudero is a 48 y.o. male admitted 7/20/2023 with osteomyelitis.     Hospital Course  Mr. Escudero is a 48 y.o. male with a PMH of uncontrolled type II DM, CAD s/p stent placement 9/2022, HTN, polysubstance and alcohol abuse, and peripheral vascular disease who presented to the ED on 7/20/23 with right leg and groin pain and wound drainage.     Review of records show that he initially was admitted to [facility] between 4/12-4/17 and underwent amputation of his right 3rd toe distal phalanx. Wound cultures grew out E. Coli ESBL with a multiresistant pattern and he was placed on meropenem. He was discharged on a course of Augmentin which he reports having completed but returned to [facility] ED with right foot swelling. X-ray demonstrated extensive evidence of osteomyelitis in the 3rd toe. Underwent right third toe amputation on 5/22 and continued on IV meropenem with completion date scheduled 7/4. Patient left AMA prior to a formal treatment schedule was formed. He returned to [facility] ED on 6/6 with further right foot pain and swelling. Blood cultures from admission grew out MRSA, repeat culture on 6/9 was negative. TTE was negative for any valvular vegetations. Patient was taken for right transmetatarsal amputation on 6/8 and pathology demonstrated osteomyelitis to the margins of the amputation. [facility] ID was consulted and recommended treatment with daptomycin 500mg IV daily and meropenem 1g IV Q8H with a target completion date of 7/20. Patient was discharged to  [facility] but shortly left AMA and did not receive any outpatient antibiotic treatment. He presented to [facility] ED on 7/5 with recurrent RLE pain and was admitted for osteomyelitis. MRI demonstrated soft tissue infection with questionable osteomyelitis. Arterial US showed right femoral disease and patient underwent a right femoral above-knee popliteal bypass on 7/14. Wound culture was positive for MRSA and enterococcus faecalis, both sensitive to vancomycin and daptomycin. ID recommended continuing IV daptomycin until 8/15 and treatment was changed to outpatient daily infusions. He was not a candidate for outpatient PICC placement due to drug use. Patient was discharged on 7/18.     In the ED, patient reported worsening right lower extremity pain, particularly over his medial thigh at the site of his post-operative surgical wound. He reports that he has been staying with his cousin for the past two months and fell two days prior, rendering him unable to follow up with outpatient daily IV antibiotics. Denied any head trauma or loss of consciousness. No fevers or chills and endorses associated back pain radiating from his RLE that is worsened with movement. Vitals showed he was afebrile with a BP of 162/113, pulse 107, and RR 18. H&H decreased 10.5/30.4. ESR elevated 123. Lactic acid 1.4. UA was negative for infection. CRP elevated 15.86. Wound culture pending. CXR showed hypoinflation, no acute abnormality. CT-CTA of his RLE: demonstrated a patent femoral to popliteal graft, diffuse subcutaneous edema, and right groin and inguinal reactive lymphadenopathy. He was started on IV vancomycin and Unasyn with Lantus 25U and SSI initiated for glycemic control.     Interval Problem Update  No acute overnight events. Patient has remained NPO for possible vascular intervention. He reports his pain is severe and significantly limits his mobility, unable to bear weight. Pain is not well controlled on oxycodone 10mg and  morphine. He states he did not receive any antibiotic treatment following his previous discharge from [facility] two days ago. He has noticed some moderate bleeding from his graft wound but denies any fevers or chills. Pain from RLE radiates into his lower back. He is currently staying with his cousin and moved here from [place] approximately 7-8 months ago. He states his current medical issues have left him feeling anxious and depressed but denies any active SI/HI.     I have discussed this patient's plan of care and discharge plan at IDT rounds today with Case Management, Nursing, Nursing leadership, and other members of the IDT team.     Consultants/Specialty  infectious disease  Vascular    Code Status  Full Code    Disposition  The patient is not medically cleared for discharge to home or a post-acute facility.      I have placed the appropriate orders for post-discharge needs.     Review of Systems  Review of Systems   Constitutional:  Negative for chills and fever.   HENT: Negative.     Eyes: Negative.    Respiratory:  Negative for cough and shortness of breath.    Cardiovascular:  Positive for leg swelling. Negative for chest pain and palpitations.   Gastrointestinal:  Negative for abdominal pain, blood in stool, constipation, diarrhea, melena, nausea and vomiting.   Genitourinary:  Negative for dysuria and hematuria.   Musculoskeletal:  Positive for myalgias (RLE).   Skin:  Negative for rash.        Redness and swelling of right lower extremity, particularly in the medial thigh.    Neurological:  Positive for sensory change (Decreased sensation bilateral LEs). Negative for dizziness and headaches.   Endo/Heme/Allergies: Negative.    Psychiatric/Behavioral:  Positive for depression and substance abuse. Negative for suicidal ideas. The patient is nervous/anxious.    All other systems reviewed and are negative.       Physical Exam  Temp:  [36.6 °C (97.8 °F)-36.9 °C (98.5 °F)] 36.8 °C (98.2  °F)  Pulse:  [] 88  Resp:  [17-21] 17  BP: (104-162)/() 131/84  SpO2:  [93 %-98 %] 97 %    Physical Exam  Vitals and nursing note reviewed.   Constitutional:       General: He is not in acute distress.     Appearance: He is not toxic-appearing or diaphoretic.   HENT:      Head: Normocephalic and atraumatic.      Nose: Nose normal.      Mouth/Throat:      Mouth: Mucous membranes are moist.   Eyes:      Extraocular Movements: Extraocular movements intact.      Conjunctiva/sclera: Conjunctivae normal.      Pupils: Pupils are equal, round, and reactive to light.   Cardiovascular:      Rate and Rhythm: Normal rate and regular rhythm.   Pulmonary:      Effort: Pulmonary effort is normal.      Breath sounds: Normal breath sounds.   Abdominal:      General: Abdomen is flat. There is no distension.      Palpations: Abdomen is soft.      Tenderness: There is no abdominal tenderness.   Musculoskeletal:         General: Swelling (RLE) and tenderness (RLE) present.      Cervical back: Normal range of motion.      Right lower leg: Edema present.   Skin:     General: Skin is warm.      Findings: Erythema (RLE) present.      Comments: Surgical wound extending from right medial groin region to right medial knee. Thigh is diffusely erythematous and edematous. No active bleeding or purulence observed. No fluctuance or crepitus felt. No drainage. No LLE edema. RLE digits 1-5 are amputated. LLE digits 1-2 are amputated. Distal pulse present in LLE, not palpated on right.    Neurological:      Mental Status: He is alert and oriented to person, place, and time. Mental status is at baseline.   Psychiatric:         Mood and Affect: Mood normal.         Fluids    Intake/Output Summary (Last 24 hours) at 7/21/2023 1345  Last data filed at 7/21/2023 1200  Gross per 24 hour   Intake --   Output 800 ml   Net -800 ml       Laboratory  Recent Labs     07/20/23  1709 07/21/23  0326   WBC 11.3* 10.4   RBC 3.52* 3.26*   HEMOGLOBIN 10.5*  9.8*   HEMATOCRIT 31.4* 30.3*   MCV 89.2 92.9   MCH 29.8 30.1   MCHC 33.4 32.3   RDW 50.2* 52.3*   PLATELETCT 298 482*   MPV 9.0 9.0     Recent Labs     07/20/23  1637 07/21/23  0326   SODIUM 138 139   POTASSIUM 3.4* 3.6   CHLORIDE 103 107   CO2 22 22   GLUCOSE 132* 157*   BUN 12 12   CREATININE 0.64 0.64   CALCIUM 9.3 8.6                   Imaging  CT-CTA LOWER EXT WITH & W/O-POST PROCESS RIGHT   Final Result      1.  Recent RIGHT lower extremity vascular surgery with femoral to popliteal graft which appears patent.  Retrograde enhancement of distal femoral artery.   2.  Normal enhancement of trifurcation vessels.   3.  No hematoma or evidence for ongoing arterial hemorrhage.   4.  Diffuse subcutaneous edema of the RIGHT lower extremity.   5.  Fluid and gas tracks along the graft, consistent with recent surgery.   6.  RIGHT groin and inguinal adenopathy, likely reactive.      DX-CHEST-PORTABLE (1 VIEW)   Final Result      Hypoinflation without other evidence for acute cardiopulmonary disease.           Assessment/Plan  Problem Representation:  Mr. Escudero is a 49 YO male with a PMH of uncontrolled type II DM, CAD s/p stent placement 9/2022, HTN, polysubstance and alcohol abuse, cellulitis, osteomyelitis, MRSA bacteremia, non-healing ulcers s/p right transmetatarsal amputation 6/8/23, and peripheral vascular disease s/p femoral-popliteal bypass graft 7/14/23. He has been noncompliant with regards to antibiotic treatment and was most recently discharged from [facility] on 7/18 with plans for outpatient daily IV daptomycin until 8/15. He returned to the ED on 7/20/23 with worsening RLE pain and surgical wound drainage. Imaging demonstrated a patent femoral to popliteal graft and diffuse subcutaneous edema of the RLE. Patient was initiated back on IV vancomycin and Unasyn as prior wound culture from 7/8 had grown enterococcus faecalis and MRSA.     Osteomyelitis (HCC)- (present on admission)  Postoperative cellulitis of  surgical wound- (present on admission)  Assessment & Plan  History of right foot osteomyelitis s/p TMA 6/8 with wound culture growing MRSA. He has been noncompliant with his antibiotic treatment regimen since then and returns with worsening RLE pain and swelling. Most recent wound culture from 7/8 grew enterococcus faecalis and MRSA. ID had recommended daily outpatient IV daptomycin until 8/15 following his recent discharge on 7/18 for continued treatment. He has not received any treatments since discharge and IV vancomycin and Unasyn were initiated yesterday on admission. PICC line was not established due to prior history of polysubstance abuse. Prior TTE was negative for any evidence of valvular vegetations suggestive of endocarditis. Met 1/4 SIRS criteria on admission (pulse 107), most recent WBC had decreased from 11.3 to 10.4. PE demonstrates diffuse erythema and edema of the RLE, particularly at the medial thigh. No active bleeding or purulence noted over the surgical wound which seems to be healing. Severe tenderness to palpation is present. No fluctuance or crepitus felt. He is afebrile and hemodynamically stable at this time.   -Appreciate Vascular consultation. They believe current findings are within expectations following his bypass graft and recommended continued antibiotic treatment. If clinical symptoms do not improve, they will consider washout of the right thigh incision.   -Appreciate ID consultation. Per their recommendation, continue IV vancomycin for prior MRSA sepsis / bacteremia. They commented that additional antibiotics are not effective for curing his chronic osteomyelitis. With the absence of debridement of necrotic tissues, his risk of infection and subsequent need for additional amputation increases. Pathology from previous TMA demonstrated osteomyelitis present up to the margins of amputation. Stop date of antibiotics was determined to be 8/17.   -Will monitor renal function while on  continued vancomycin treatment.   -Repeat CBCs and assess for change in SIRS criteria for possible sepsis progression.   -Awaiting results of wound culture.    Peripheral artery disease (HCC)- (present on admission)  CAD (coronary artery disease)- (present on admission)  Assessment & Plan  Patient is s/p right femoral to popliteal arterial bypass graft on 7/14 with Dr. Calderon. Examination demonstrated a barely palpable distal pulse in the RLE but intact pulse in the LLE. CT demonstrated bypass graft was still patent at this time. Consultation by  vascular surgery recommended continued treatment with antibiotics and close monitor as current symptoms are expected.   -Will continue home aspirin and Plavix as no surgical intervention is necessitated at this time. Patient's graft appears to be functional and vascular have recommended incision washout in 1-2 days time if symptoms do not improve.     Diabetes (HCC)- (present on admission)  Assessment & Plan  Review of previous records showed that patient reported non-compliance with insulin at home. Most recent HbA1C was 11.3%. He has a long history of uncontrolled BG levels with diabetic neuropathy and chronic, non-healing ulcerations of his bilateral lower extremities. He was initiated on Lantus 25U and SSI QID following admission and last BG at 12:41 was 164. Lantus was held this AM due to potential vascular intervention and NPO status; however, as no procedure is planned at this time, can restart treatment.   -Will continue Lantus 25U daily and SSI QID with BG < 150 per ID recommendation for optimal wound healing.   -Hypoglycemic protocol in place.   -Continue daily chemistries.   -Will likely need diabetic consultation once current symptoms have been resolved for better glycemic control.     Low back pain  Assessment & Plan  Patient has a positive history of MRSA bacteremia with concern for potential spinal abscess formation. On examination, lumbar spine region is not  tender to palpation. He is afebrile without any neurologic dysfunction, thus reducing risk of spinal abscess formation.   -Will continue lab and symptom monitoring. If condition worsens, can evaluate further with a MRI L-spine to assess for any potential soft tissue infection.     Hypertension  Assessment & Plan  BP readings have been stable.   -Continue home medications.      VTE prophylaxis: Plavix and aspirin     I have performed a physical exam and reviewed and updated ROS and Plan today (7/21/2023). In review of yesterday's note (7/20/2023), there are no changes except as documented above.

## 2023-07-21 NOTE — ASSESSMENT & PLAN NOTE
- history of MRSA bacteremia, incompletely treated, crrently states that he has chronic back pain but is non radiating  -Patient does not present with any red flag symptoms such as urinary /bowel incontinence, no fever, no weight loss, on physical examination does not present with spinal tenderness, also with new onset weakness/sensory deficit  -  if with any progression MRI L-spine could be considered
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
## 2023-07-21 NOTE — DISCHARGE PLANNING
Case Management Discharge Planning    Admission Date: 7/20/2023  GMLOS: 3.4  ALOS: 1    6-Clicks ADL Score: 17  6-Clicks Mobility Score: 14  PT and/or OT Eval ordered: Yes  Post-acute Referrals Ordered: Yes  Post-acute Choice Obtained: Yes  Has referral(s) been sent to post-acute provider:  Yes      Anticipated Discharge Dispo: Discharge Disposition: Discharged to home/self care (01)    DME Needed: No    Action(s) Taken: DC Assessment Complete (See below) and Choice obtained    LSW met with patient at bedside and confirmed demographic information. Patient reports he lives in [place] with a cousin. Per patient he has been using a walker and expressed needing assistance with ADLs.    Confirmed PCP and insurance.     Patient reports he would not be opposed to SNF placement if recommended as he needs therapy. Obtained choice for SNF if recommended by PT/OT.    **1100    Patient discussed during IDT rounds. Patient is not medically clear at this time. Receiving IV abx. SNF choice in media.    **1457  PASRR:[id number]  LOC:[id number]    Escalations Completed: None    Medically Clear: No    Next Steps: LSW to follow up with patient and medical team regarding discharge needs and barriers.     Barriers to Discharge: Medical clearance and Pending PT Evaluation    Care Transition Team Assessment    Information Source  Orientation Level: Oriented X4  Information Given By: Patient  Who is responsible for making decisions for patient? : Patient    Readmission Evaluation  Is this a readmission?: Yes - unplanned readmission    Elopement Risk  Legal Hold: No  Ambulatory or Self Mobile in Wheelchair: No-Not an Elopement Risk  Disoriented: No  Psychiatric Symptoms: None  History of Wandering: No  Elopement this Admit: No  Vocalizing Wanting to Leave: No  Displays Behaviors, Body Language Wanting to Leave: No-Not at Risk for Elopement    Interdisciplinary Discharge Planning  Lives with - Patient's Self Care Capacity: Related  Adult  Patient or legal guardian wants to designate a caregiver: No  Support Systems: Family Member(s), Friends / Neighbors  Housing / Facility: 3 Story Apartment / Condo  Durable Medical Equipment: Walker    Discharge Preparedness  What is your plan after discharge?: Skilled nursing facility  What are your discharge supports?: Other (comment) (Cousin)  Prior Functional Level: Uses Walker, Needs Assist with Activities of Daily Living    Functional Assesment  Prior Functional Level: Uses Walker, Needs Assist with Activities of Daily Living    Finances  Financial Barriers to Discharge: No  Prescription Coverage: Yes    Vision / Hearing Impairment  Vision Impairment : No  Hearing Impairment : Yes  Hearing Impairment: Both Ears  Does Pt Need Special Equipment for the Hearing Impaired?: No         Advance Directive  Advance Directive?: None    Domestic Abuse  Have you ever been the victim of abuse or violence?: No  Physical Abuse or Sexual Abuse: No  Verbal Abuse or Emotional Abuse: No  Possible Abuse/Neglect Reported to:: Not Applicable  Possible Abuse/Neglect Reported to:: Not Applicable              Anticipated Discharge Information  Discharge Disposition: Discharged to home/self care (01)

## 2023-07-21 NOTE — ASSESSMENT & PLAN NOTE
-CT scan , Rt foot 07/25/23 negative for abscess. Previous MRI 07/12/23 during patient's last admission: residual first through fifth metatarsals with findings of possible postsurgical change versus osteomyelitis vs abscess, per LPS most likely seroma, they have been consulted and no surgical interventions at this time, they have signed off.   -Osteomyelitis, Right foot, S/P TMA with positive margins , intraop culture on 07/08/23 positive for enterecoccus faecalis and MRSA, sensitive to vancomycin and daptomycin  -per ID recommendations: addditional antibiotics will not cure his chronic osteomyelitis, also in the absence of debridement of any necrotic tissues that will serve as a continued nidus of infection, surgery as only curative option  - tight control of BS under 150 to help control infection(see plan below)

## 2023-07-21 NOTE — ASSESSMENT & PLAN NOTE
Patient is 6 days postop and now presents with drainage of the right leg and pain  Start vancomycin and will follow trough levels  Start IV Unasyn  Obtain wound cultures  ESR and CRP  Vascular consulted

## 2023-07-21 NOTE — ASSESSMENT & PLAN NOTE
-reiterated the need for cessation of tobacco especially with his significant peripheral arterial occlusive disease  -Tobacco cessation education provided for more than 11 minutes.  We discussed the risks of smoking including increased risk of heart disease, stroke, cancer and COPD. We discussed the benefits of quitting smoking. We discussed options of nicotine patch, wellbutrin and chantix.  The patient has the intention to quit smoking. Nicotine replacement protocol will be provided to the patient.

## 2023-07-21 NOTE — THERAPY
Physical Therapy Contact Note    Patient Name: Rogelio Escudero  Age:  48 y.o., Sex:  male  Medical Record #: [id number]  Today's Date: 7/21/2023    PT Consult received/acknowledged, RN expressing concern regarding OOB mobility assessment per instructions to leave RLE elevated. Note no mobility restrictions in orders. Reached out to resident to determine appropriateness of initiating PT eval at this time, pt pending POC from vascular at this time. PT will follow-up and initiate mobility assessment as appropriate.    Faiza Browning, PT, DPT  Ext. [phone]

## 2023-07-21 NOTE — HOSPITAL COURSE
Rogelio Escudero is a 48 y.o. male who presented 7/20/2023 with past medical history of diabetes, coronary disease status post stent, hypertension, peripheral vascular disease who presents to the hospital for right leg and groin pain.  His pain starts in the right leg and radiates to his back.  He is also feeling weakness in his legs.  Patient states that he fell 2 days ago.  After he left the hospital he did notice some drainage from his right foot which has now resolved.  Patient recently had a femoral palpable bypass completed by Dr. Calderon on 7/14.  He was found to have osteomyelitis and leg ischemia.  At the time he grew Enterococcus and MRSA.  Patient was discharged to SNF on daptomycin and meropenem until 7/20.  Patient states that he has been noncompliant with his home insulin since he was not discharged with needles.  Patient states that he is still smoking cigarettes.       Vascular surgery was consulted and followed patient during hospital stay, graft was patent, post op site with no concerns for infection and I&D not indicated, likewise no othet vascular interventions recommended.      For history of MRSA Bacteremia, during patient's hospital stay, Infectious Disease was consulted with recommendations to continue Vancomycyin, stop date antibiotics 8/17/2023 which was transitioned to Daptomycin 8mg/kg q24hrs(650 mg every 24 hours) with CBC, CMP, CPK weekly to be done while patient is in SNF%%%....Blood cultures x2 from 7/5 and 7/8 has been negative, blood culture this admission has also been negative to date. He continue to be afebrile, echo from 7/10/23 negative for vegetations.     CT scan of Rt foot was done on 07/25/23 which was negative for abscess. Previous MRI 07/12/23 during patient's last admission: residual first through fifth metatarsals with findings of possible postsurgical change versus osteomyelitis vs abscess, per LPS most likely seroma. LPS was re consulted to assess patient's wound  and have not recommended any  surgical interventions.     Patient's  BS was monitored with goal of <150 to help control infection.Insulin sliding scale with serial Accu-Checks, glargine increased to 12 units (home insulin 25 units)Aspirin , Plavix and statins  were continued.     For patient's osteomyelitis, Right foot, S/P TMA with positive margins , intraop culture on 07/08/23 positive for enterecoccus faecalis and MRSA, sensitive to vancomycin and daptomycin  Per ID recommendations during recent admission, additional antibiotics will not cure his chronic osteomyelitis, also in the absence of debridement of any necrotic tissues that will serve as a continued nidus of infection, surgery as only curative option. His WBC downtrended, noted thrombocytosis which was likely reactive. Patient's lisinopril was held. PT/OT followed patient throughout his hospital stay with recommendation for SNF.    Per recent ID recommendation: once patient finishes antibiotics in skilled nursing facility, he would need to follow-up with outpatient community infectious disease,patient is on ID clinic follow up list.  Likewise, as above patient will be needing weekly CBC with differential, CMP and CPK monitoring while on daptomycin at the skilled nursing facility.  Results of the test to be faxed at: [phone].

## 2023-07-21 NOTE — PROGRESS NOTES
4 Eyes Skin Assessment Completed by HANS Wang and HANS Flores.    Head WDL  Ears WDL  Nose WDL  Mouth WDL  Neck WDL  Breast/Chest WDL  Shoulder Blades WDL  Spine WDL  (R) Arm/Elbow/Hand WDL  (L) Arm/Elbow/Hand WDL  Abdomen WDL  Groin Redness, Scab, Swelling, and Incision(Right side from groin down inner R thigh, Bipass)  Scrotum/Coccyx/Buttocks WDL  (R) Leg Redness, Blanching, Swelling, Edema, and Incision  (L) Leg WDL  (R) Heel/Foot/Toe Swelling, Scab, and Edema, dry, crusting, open, painful. No toes on R foot  (L) Heel/Foot/Toe Discoloration and Scar, dry, Left big toe amputation          Devices In Places Pulse Ox      Interventions In Place Sacral Mepilex, Waffle Overlay, Pillows, and Q2 Turns TAPS(HEEL MEPILEX OFFERED AND PT REFUSED)    Possible Skin Injury Yes    Pictures Uploaded Into Epic Yes  Wound Consult Placed Yes  RN Wound Prevention Protocol Ordered No

## 2023-07-21 NOTE — DISCHARGE PLANNING
Received Choice form @: 1022  Agency/Facility Name: [facility]  Referral sent per Choice form @: Not sent

## 2023-07-22 ENCOUNTER — APPOINTMENT (OUTPATIENT)
Dept: ONCOLOGY | Facility: MEDICAL CENTER | Age: 48
End: 2023-07-22
Attending: INTERNAL MEDICINE
Payer: MEDICAID

## 2023-07-22 LAB
ALBUMIN SERPL BCP-MCNC: 3.4 G/DL (ref 3.2–4.9)
ALBUMIN/GLOB SERPL: 0.9 G/DL
ALP SERPL-CCNC: 112 U/L (ref 30–99)
ALT SERPL-CCNC: 14 U/L (ref 2–50)
ANION GAP SERPL CALC-SCNC: 10 MMOL/L (ref 7–16)
AST SERPL-CCNC: 13 U/L (ref 12–45)
BACTERIA UR CULT: NORMAL
BACTERIA WND AEROBE CULT: NORMAL
BASOPHILS # BLD AUTO: 0.8 % (ref 0–1.8)
BASOPHILS # BLD: 0.08 K/UL (ref 0–0.12)
BILIRUB SERPL-MCNC: 0.5 MG/DL (ref 0.1–1.5)
BUN SERPL-MCNC: 11 MG/DL (ref 8–22)
CALCIUM ALBUM COR SERPL-MCNC: 9.7 MG/DL (ref 8.5–10.5)
CALCIUM SERPL-MCNC: 9.2 MG/DL (ref 8.5–10.5)
CHLORIDE SERPL-SCNC: 100 MMOL/L (ref 96–112)
CO2 SERPL-SCNC: 24 MMOL/L (ref 20–33)
CREAT SERPL-MCNC: 0.65 MG/DL (ref 0.5–1.4)
EOSINOPHIL # BLD AUTO: 0.88 K/UL (ref 0–0.51)
EOSINOPHIL NFR BLD: 8.3 % (ref 0–6.9)
ERYTHROCYTE [DISTWIDTH] IN BLOOD BY AUTOMATED COUNT: 50.9 FL (ref 35.9–50)
GFR SERPLBLD CREATININE-BSD FMLA CKD-EPI: 114 ML/MIN/1.73 M 2
GLOBULIN SER CALC-MCNC: 4 G/DL (ref 1.9–3.5)
GLUCOSE BLD STRIP.AUTO-MCNC: 139 MG/DL (ref 65–99)
GLUCOSE BLD STRIP.AUTO-MCNC: 176 MG/DL (ref 65–99)
GLUCOSE BLD STRIP.AUTO-MCNC: 181 MG/DL (ref 65–99)
GLUCOSE BLD STRIP.AUTO-MCNC: 195 MG/DL (ref 65–99)
GLUCOSE SERPL-MCNC: 146 MG/DL (ref 65–99)
GRAM STN SPEC: NORMAL
HCT VFR BLD AUTO: 33.1 % (ref 42–52)
HGB BLD-MCNC: 10.7 G/DL (ref 14–18)
IMM GRANULOCYTES # BLD AUTO: 0.1 K/UL (ref 0–0.11)
IMM GRANULOCYTES NFR BLD AUTO: 0.9 % (ref 0–0.9)
LYMPHOCYTES # BLD AUTO: 2.32 K/UL (ref 1–4.8)
LYMPHOCYTES NFR BLD: 22 % (ref 22–41)
MCH RBC QN AUTO: 30 PG (ref 27–33)
MCHC RBC AUTO-ENTMCNC: 32.3 G/DL (ref 32.3–36.5)
MCV RBC AUTO: 92.7 FL (ref 81.4–97.8)
MONOCYTES # BLD AUTO: 1.07 K/UL (ref 0–0.85)
MONOCYTES NFR BLD AUTO: 10.1 % (ref 0–13.4)
NEUTROPHILS # BLD AUTO: 6.11 K/UL (ref 1.82–7.42)
NEUTROPHILS NFR BLD: 57.9 % (ref 44–72)
NRBC # BLD AUTO: 0 K/UL
NRBC BLD-RTO: 0 /100 WBC (ref 0–0.2)
PLATELET # BLD AUTO: 568 K/UL (ref 164–446)
PMV BLD AUTO: 8.6 FL (ref 9–12.9)
POTASSIUM SERPL-SCNC: 3.8 MMOL/L (ref 3.6–5.5)
PROT SERPL-MCNC: 7.4 G/DL (ref 6–8.2)
RBC # BLD AUTO: 3.57 M/UL (ref 4.7–6.1)
SIGNIFICANT IND 70042: NORMAL
SIGNIFICANT IND 70042: NORMAL
SITE SITE: NORMAL
SITE SITE: NORMAL
SODIUM SERPL-SCNC: 134 MMOL/L (ref 135–145)
SOURCE SOURCE: NORMAL
SOURCE SOURCE: NORMAL
VANCOMYCIN PEAK SERPL-MCNC: 37.1 UG/ML (ref 20–40)
WBC # BLD AUTO: 10.6 K/UL (ref 4.8–10.8)

## 2023-07-22 PROCEDURE — 700102 HCHG RX REV CODE 250 W/ 637 OVERRIDE(OP)

## 2023-07-22 PROCEDURE — 80202 ASSAY OF VANCOMYCIN: CPT

## 2023-07-22 PROCEDURE — 80053 COMPREHEN METABOLIC PANEL: CPT

## 2023-07-22 PROCEDURE — 700111 HCHG RX REV CODE 636 W/ 250 OVERRIDE (IP): Mod: JZ | Performed by: HOSPITALIST

## 2023-07-22 PROCEDURE — 700105 HCHG RX REV CODE 258: Mod: JZ | Performed by: HOSPITALIST

## 2023-07-22 PROCEDURE — 36415 COLL VENOUS BLD VENIPUNCTURE: CPT

## 2023-07-22 PROCEDURE — 700102 HCHG RX REV CODE 250 W/ 637 OVERRIDE(OP): Performed by: HOSPITALIST

## 2023-07-22 PROCEDURE — 82962 GLUCOSE BLOOD TEST: CPT

## 2023-07-22 PROCEDURE — 85025 COMPLETE CBC W/AUTO DIFF WBC: CPT

## 2023-07-22 PROCEDURE — 770001 HCHG ROOM/CARE - MED/SURG/GYN PRIV*

## 2023-07-22 PROCEDURE — 99232 SBSQ HOSP IP/OBS MODERATE 35: CPT | Performed by: INTERNAL MEDICINE

## 2023-07-22 PROCEDURE — A9270 NON-COVERED ITEM OR SERVICE: HCPCS | Performed by: HOSPITALIST

## 2023-07-22 RX ORDER — MORPHINE SULFATE 4 MG/ML
2 INJECTION INTRAVENOUS
Status: DISCONTINUED | OUTPATIENT
Start: 2023-07-22 | End: 2023-07-28 | Stop reason: HOSPADM

## 2023-07-22 RX ADMIN — OXYCODONE HYDROCHLORIDE 10 MG: 10 TABLET ORAL at 14:45

## 2023-07-22 RX ADMIN — OMEPRAZOLE 20 MG: 20 CAPSULE, DELAYED RELEASE ORAL at 05:06

## 2023-07-22 RX ADMIN — MORPHINE SULFATE 4 MG: 4 INJECTION, SOLUTION INTRAMUSCULAR; INTRAVENOUS at 03:00

## 2023-07-22 RX ADMIN — LISINOPRIL 10 MG: 10 TABLET ORAL at 05:08

## 2023-07-22 RX ADMIN — VANCOMYCIN HYDROCHLORIDE 1500 MG: 5 INJECTION, POWDER, LYOPHILIZED, FOR SOLUTION INTRAVENOUS at 20:49

## 2023-07-22 RX ADMIN — ESCITALOPRAM OXALATE 10 MG: 10 TABLET ORAL at 05:07

## 2023-07-22 RX ADMIN — OXYCODONE HYDROCHLORIDE 10 MG: 10 TABLET ORAL at 20:37

## 2023-07-22 RX ADMIN — VANCOMYCIN HYDROCHLORIDE 1500 MG: 5 INJECTION, POWDER, LYOPHILIZED, FOR SOLUTION INTRAVENOUS at 08:56

## 2023-07-22 RX ADMIN — INSULIN HUMAN 3 UNITS: 100 INJECTION, SOLUTION PARENTERAL at 08:49

## 2023-07-22 RX ADMIN — ASPIRIN 81 MG: 81 TABLET, COATED ORAL at 06:00

## 2023-07-22 RX ADMIN — INSULIN GLARGINE-YFGN 10 UNITS: 100 INJECTION, SOLUTION SUBCUTANEOUS at 08:48

## 2023-07-22 RX ADMIN — BUPROPION HYDROCHLORIDE 150 MG: 75 TABLET, FILM COATED ORAL at 18:05

## 2023-07-22 RX ADMIN — CLOPIDOGREL BISULFATE 75 MG: 75 TABLET ORAL at 05:08

## 2023-07-22 RX ADMIN — INSULIN HUMAN 3 UNITS: 100 INJECTION, SOLUTION PARENTERAL at 20:38

## 2023-07-22 RX ADMIN — ATORVASTATIN CALCIUM 20 MG: 20 TABLET, FILM COATED ORAL at 20:37

## 2023-07-22 RX ADMIN — OXYCODONE HYDROCHLORIDE 10 MG: 10 TABLET ORAL at 08:50

## 2023-07-22 RX ADMIN — INSULIN HUMAN 3 UNITS: 100 INJECTION, SOLUTION PARENTERAL at 18:07

## 2023-07-22 RX ADMIN — BUPROPION HYDROCHLORIDE 150 MG: 75 TABLET, FILM COATED ORAL at 05:06

## 2023-07-22 ASSESSMENT — PAIN DESCRIPTION - PAIN TYPE
TYPE: ACUTE PAIN
TYPE: ACUTE PAIN;SURGICAL PAIN
TYPE: ACUTE PAIN

## 2023-07-22 ASSESSMENT — ENCOUNTER SYMPTOMS
PND: 0
DIZZINESS: 0
DEPRESSION: 0
FEVER: 0
NAUSEA: 0
PALPITATIONS: 0
NECK PAIN: 0
HEARTBURN: 0
WEAKNESS: 0
COUGH: 0

## 2023-07-22 ASSESSMENT — FIBROSIS 4 INDEX: FIB4 SCORE: 0.31

## 2023-07-22 NOTE — PROGRESS NOTES
Diabetes education: Pt is known from last admit. Please see consult note.  Plan: Education done at previous visit. CDE to continue to follow . Please make sure pt has pen needles at discharge.

## 2023-07-22 NOTE — PROGRESS NOTES
.  [facility] Internal Medicine Daily Progress Note    Date of Service  7/22/2023    [facility] Team: R IM White Team   Attending: Qing Calzada M.d.  Senior Resident: Dr. Johnson  Contact Number: [phone]    Chief Complaint  Rogelio Escudero is a 48 y.o. male admitted 7/20/2023 with possible infection, leg pain, history of osteomyelitis/MRSA    Hospital Course  Rogelio Escudero is a 48 y.o. male who presented 7/20/2023 with past medical history of diabetes, coronary disease status post stent, hypertension, peripheral vascular disease who presents to the hospital for right leg and groin pain.  His pain starts in the right leg and radiates to his back.  He is also feeling weakness in his legs.  Patient states that he fell 2 days ago.  After he left the hospital he did notice some drainage from his right foot which has now resolved.  Patient recently had a femoral palpable bypass completed by Dr. Calderon on 7/14.  He was found to have osteomyelitis and leg ischemia.  At the time he grew Enterococcus and MRSA.  Patient was discharged to SNF on daptomycin and meropenem until 7/20.  Patient states that he has been noncompliant with his home insulin since he was not discharged with needles.  Patient states that he is still smoking cigarettes.       Interval Problem Update  No overnight events.  Patient continues to be afebrile, no WBC count, currently non-tachycardic.  Patient still complains of leg pain.  Vascular surgery consulted, appreciate recommendations: no current indication for any surgical procedure, if with clinical worsening over the next 1-2 days , plan for washout of the right thigh incision.  Infectious disease also consulted with recommendations to continue IV vancomycin for prior MRSA sepsis/bacteremia.  Wound culture on thigh 07/20/23 negative to date.     7/22/23-no event, pain is better. However pt asking for morphine IV, even pt sedated, and claiming that pain 10/10, while he is not in distress. Vitals  are stable.   Continue vanco. ID following  No surgery per Dr. Rowell  Not medical cleared due to antibiotic choice and length     I have discussed this patient's plan of care and discharge plan at IDT rounds today with Case Management, Nursing, Nursing leadership, and other members of the IDT team.    Consultants/Specialty  vascular surgery  Infectious disease    Code Status  Full Code    Disposition  Not medically cleared  I have placed the appropriate orders for post-discharge needs.    Review of Systems  Review of Systems   Constitutional:  Negative for fever and malaise/fatigue.   Respiratory:  Negative for cough.    Cardiovascular:  Negative for chest pain, palpitations and PND.   Gastrointestinal:  Negative for heartburn and nausea.   Genitourinary:  Negative for dysuria.   Musculoskeletal:  Negative for neck pain.        See HPI   Neurological:  Negative for dizziness and weakness.   Psychiatric/Behavioral:  Negative for depression.         Physical Exam  Temp:  [36.3 °C (97.3 °F)-36.7 °C (98.1 °F)] 36.4 °C (97.6 °F)  Pulse:  [75-87] 87  Resp:  [17-18] 18  BP: (118-124)/(77-84) 124/84  SpO2:  [91 %-100 %] 100 %    Physical Exam  Vitals and nursing note reviewed.   Constitutional:       General: He is not in acute distress.     Appearance: Normal appearance. He is not ill-appearing, toxic-appearing or diaphoretic.   HENT:      Head: Normocephalic and atraumatic.      Nose: No congestion or rhinorrhea.      Mouth/Throat:      Pharynx: No posterior oropharyngeal erythema.   Eyes:      General: No scleral icterus.        Right eye: No discharge.   Cardiovascular:      Rate and Rhythm: Normal rate and regular rhythm.      Pulses: Normal pulses.      Heart sounds: Normal heart sounds. No murmur heard.     No friction rub. No gallop.   Pulmonary:      Effort: Pulmonary effort is normal. No respiratory distress.      Breath sounds: Normal breath sounds. No stridor. No wheezing, rhonchi or rales.   Abdominal:       General: There is no distension.      Tenderness: There is no abdominal tenderness.   Musculoskeletal:         General: No swelling, tenderness, deformity or signs of injury.      Cervical back: Normal range of motion.      Right lower leg: Edema present.      Left lower leg: No edema.   Skin:     Capillary Refill: Capillary refill takes less than 2 seconds.      Coloration: Skin is not jaundiced or pale.      Findings: Erythema (minimal erythema on incisional site) present. No bruising, lesion or rash.      Comments: Right leg tenderness, no gross purulence noted on wounds, incisional area   Neurological:      General: No focal deficit present.      Mental Status: He is alert and oriented to person, place, and time.           Fluids    Intake/Output Summary (Last 24 hours) at 7/22/2023 1441  Last data filed at 7/22/2023 0856  Gross per 24 hour   Intake --   Output 3160 ml   Net -3160 ml         Laboratory  Recent Labs     07/20/23  1709 07/21/23  0326 07/22/23  0723   WBC 11.3* 10.4 10.6   RBC 3.52* 3.26* 3.57*   HEMOGLOBIN 10.5* 9.8* 10.7*   HEMATOCRIT 31.4* 30.3* 33.1*   MCV 89.2 92.9 92.7   MCH 29.8 30.1 30.0   MCHC 33.4 32.3 32.3   RDW 50.2* 52.3* 50.9*   PLATELETCT 298 482* 568*   MPV 9.0 9.0 8.6*       Recent Labs     07/20/23  1637 07/21/23  0326 07/22/23  0723   SODIUM 138 139 134*   POTASSIUM 3.4* 3.6 3.8   CHLORIDE 103 107 100   CO2 22 22 24   GLUCOSE 132* 157* 146*   BUN 12 12 11   CREATININE 0.64 0.64 0.65   CALCIUM 9.3 8.6 9.2                     Imaging  CT-CTA LOWER EXT WITH & W/O-POST PROCESS RIGHT   Final Result      1.  Recent RIGHT lower extremity vascular surgery with femoral to popliteal graft which appears patent.  Retrograde enhancement of distal femoral artery.   2.  Normal enhancement of trifurcation vessels.   3.  No hematoma or evidence for ongoing arterial hemorrhage.   4.  Diffuse subcutaneous edema of the RIGHT lower extremity.   5.  Fluid and gas tracks along the graft, consistent  with recent surgery.   6.  RIGHT groin and inguinal adenopathy, likely reactive.      DX-CHEST-PORTABLE (1 VIEW)   Final Result      Hypoinflation without other evidence for acute cardiopulmonary disease.             Assessment/Plan  Problem Representation:    Hypertension  Assessment & Plan  -Controlled, with holding parameters for lisinopril 10 mg        Low back pain  Assessment & Plan  Patient does have a history of MRSA bacteremia, incompletely treated, crrently states that he has chronic back pain but is non radiating  Patient does not present with any red flag symptoms such as urinary /bowel incontinence, no fever, no weight loss, on physical examination does not present with spinal tenderness, also with new onset weakness/sensory deficit  - we will continue to monitor and if with any progression MRI L-spine could be considered    MRSA bacteremia- (present on admission)  Assessment & Plan  -With recent MRSA bacteremia, incompletely treated as patient left AMA  --Blood cultures x2 from 7/5 and 7/8 has been negative, following blood culture this admission, negative to date  -patient is afebrile, hemodynamically stable  -echo from 7/10/23 negative for vegetations  -continue IV vancomycin per infectious disease recommendations, stop date antibiotics 8/17/2023  -we fernando monitor vancomycin trough levels and cmp for renal function  -per ID recommendations: anticipate need to complete 6 weeks of IV daptomycin or vancomycin for prior MRSA bacteremia (PICC Line not advisable for concern of drug use)    CAD (coronary artery disease)- (present on admission)  Assessment & Plan  -Continue aspirin, plavix and atorvastatin as above      Peripheral artery disease (HCC)- (present on admission)  Assessment & Plan  -S/ p recent right fem-pop bypass with Dr. Calderon 07/14/23, DP pulse barely palpable  -CTA of lower ext 07/20/23 demonstrated bypass graft still patent, vascular surgery Dr. Rowell consulted with no surgical  interventions at this time,  continued treatment with antibiotics  - closely monitor as current symptoms are expected.   - possible incision washout in 1-2 days time if symptoms do not improve  -to continue home aspirin , Plavix and statins for now    Osteomyelitis (HCC)- (present on admission)  Assessment & Plan  -Osteomyelitis, Right foot, S/P TMA with positive margins , intraop culture on 07/08/23 positive for enterecoccus faecalis and MRSA, sensitive to vancomycin and daptomycin  -per ID recommendations: addditional antibiotics will not cure his chronic osteomyelitis, also in the absence of debridement of any necrotic tissues that will serve as a continued nidus of infection, surgery as only curative option  - tight control of BS under 150 to help control infection    Tobacco abuse- (present on admission)  Assessment & Plan  -Contact patient and reiterated the need for cessation of tobacco especially with his significant peripheral arterial occlusive disease  -Tobacco cessation education provided for more than 11 minutes.  We discussed the risks of smoking including increased risk of heart disease, stroke, cancer and COPD. We discussed the benefits of quitting smoking. We discussed options of nicotine patch, wellbutrin and chantix.  The patient has the intention to quit smoking. Nicotine replacement protocol will be provided to the patient.      Diabetes (HCC)- (present on admission)  Assessment & Plan  - on insulin sliding scale with serial Accu-Checks  -HbA1c 11.3% 07/06/23  -Hypoglycemic protocol in place  -25 units glargine initially ordered, 10 units for now since patient's glucose has been at goal with diabetic diet, we will continue to adjust as necessary, goal blood glucose <150 to control infection per ID recommendations  -long acting insulin to be held if with concern for hypoglycemia         VTE prophylaxis:     I have performed a physical exam and reviewed and updated ROS and Plan today (7/22/2023).  In review of yesterday's note (7/21/2023), there are no changes except as documented above.

## 2023-07-22 NOTE — PROGRESS NOTES
"  VASCULAR SURGERY SERVICE  Progress Note  ___________________________________    7/14/23 Right femoral-above knee popliteal bypass with in situ saphenous vein    7/22/2023:  ARI, pain controlled, feeling better today, less pain, no drainage from right leg incision    Vitals  /77   Pulse 75   Temp 36.6 °C (97.8 °F) (Temporal)   Resp 18   Ht 1.753 m (5' 9\")   Wt 81.6 kg (180 lb)   SpO2 95%   BMI 26.58 kg/m²     Exam  General: alert, conversant, NAD  Extremities: Right leg warm with swelling around incision line, swelling and pain improved from yesterday     Labs  WBC 10 x2 days   Hgb 10 x2 days   Creatinine normal     A/P)  Pain improved on left leg, improved swelling from yesterday. Keep incision open to air. No plan for I&D at this time. Pt verbalized understanding, agrees with plan  Continue antibiotics    Appreciate Hospitalist services support  Following along    [facility]  [place] preferred or call my office [phone]  __________________________________________________________________  Patient:Rogelio Escudero   MRN:[id number]   CSN:[id number]     "

## 2023-07-22 NOTE — CARE PLAN
The patient is Stable - Low risk of patient condition declining or worsening    Shift Goals  Clinical Goals: Pain managment, Antibiotics, NPO at midnight  Patient Goals: Pain management  Family Goals: LAXMI    Progress made toward(s) clinical / shift goals:    Problem: Knowledge Deficit - Standard  Goal: Patient and family/care givers will demonstrate understanding of plan of care, disease process/condition, diagnostic tests and medications  Outcome: Progressing  Note: Patient and I discussed his condition. He understands that the surgeon will (hopefully) be deciding today wether or not he feels surgery is necessary. We discussed his medications including pain medications, antibiotics, and insulin. In addition we discussed the wound care orders put in place by the wound team to keep his incision in the best shape we can and free from infection. Patient is compliant with all medications and wound care changes. Patient understands this importance of keeping his R leg elevated, as well as turing himself often/to call when he feels unable to turn. Patient has heel mepilex on to maintain skin integrity. He refused sacral mepilex over night.      Problem: Pain - Standard  Goal: Alleviation of pain or a reduction in pain to the patient’s comfort goal  Outcome: Progressing  Note: PRN pain medications in use for pain control.  Patient understands the time frame in which he can get medications. Patient also understanding of the order:  starting with oral meds then moving to IV if pain is not relieved. Pain well controlled within shift.      Problem: Fall Risk  Goal: Patient will remain free from falls  Outcome: Progressing  Note: Patient has remained free of falls this shift. Instructed patient to use call light for help. Call light always placed within reach when leaving room. Patient's room has been cleared of clutter such as cords and extra chairs.      Problem: Skin Integrity  Goal: Skin integrity is maintained or  improved  Outcome: Progressing  Note: Skin remains intact this shift. There are no new wounds or skin issues noted overnight.  Dressing placed on wound following orders of wound team.       Patient is not progressing towards the following goals:

## 2023-07-22 NOTE — CONSULTS
Diabetes education: Pt has a hx of diabetes and is known from last admit. Pt was admitted with blood sugar of 132 and Hga1c of 11.3% from 7/6/23. Pt had stated he could not take his insulin as he did not get insulin pen needles at discharge ( per AVS they were ordered).  Pt is currently on Glargine 10 units in AM ( first dose tomorrow am) with regular insulin per sliding scale coverage ac and hs with blood sugars of  118 ,164 (missed at 11) and not downloaded, or charted ( 3 units).  Met with pt  briefly. Pt states at last discharge he got his insulin but never got insulin pens.  Discussed, at discharge, check discharge medications with RN/pharmacist before leaving and if something is missed ( not caught), call the pharmacy to get help. Do not stop taking your insulin.  Plan: Education done at previous visit. CDE to continue to follow . Please make sure pt has pen needles at discharge.

## 2023-07-23 ENCOUNTER — APPOINTMENT (OUTPATIENT)
Dept: ONCOLOGY | Facility: MEDICAL CENTER | Age: 48
End: 2023-07-23
Attending: INTERNAL MEDICINE
Payer: MEDICAID

## 2023-07-23 PROBLEM — D75.839 THROMBOCYTOSIS: Status: ACTIVE | Noted: 2023-07-23

## 2023-07-23 LAB
ALBUMIN SERPL BCP-MCNC: 3.4 G/DL (ref 3.2–4.9)
BUN SERPL-MCNC: 17 MG/DL (ref 8–22)
CALCIUM ALBUM COR SERPL-MCNC: 9.7 MG/DL (ref 8.5–10.5)
CALCIUM SERPL-MCNC: 9.2 MG/DL (ref 8.5–10.5)
CHLORIDE SERPL-SCNC: 101 MMOL/L (ref 96–112)
CO2 SERPL-SCNC: 22 MMOL/L (ref 20–33)
CREAT SERPL-MCNC: 0.73 MG/DL (ref 0.5–1.4)
ERYTHROCYTE [DISTWIDTH] IN BLOOD BY AUTOMATED COUNT: 51.5 FL (ref 35.9–50)
GFR SERPLBLD CREATININE-BSD FMLA CKD-EPI: 110 ML/MIN/1.73 M 2
GLUCOSE BLD STRIP.AUTO-MCNC: 129 MG/DL (ref 65–99)
GLUCOSE BLD STRIP.AUTO-MCNC: 156 MG/DL (ref 65–99)
GLUCOSE BLD STRIP.AUTO-MCNC: 177 MG/DL (ref 65–99)
GLUCOSE BLD STRIP.AUTO-MCNC: 183 MG/DL (ref 65–99)
GLUCOSE BLD STRIP.AUTO-MCNC: 186 MG/DL (ref 65–99)
GLUCOSE SERPL-MCNC: 167 MG/DL (ref 65–99)
HCT VFR BLD AUTO: 34.1 % (ref 42–52)
HGB BLD-MCNC: 11.1 G/DL (ref 14–18)
MAGNESIUM SERPL-MCNC: 2 MG/DL (ref 1.5–2.5)
MCH RBC QN AUTO: 30.2 PG (ref 27–33)
MCHC RBC AUTO-ENTMCNC: 32.6 G/DL (ref 32.3–36.5)
MCV RBC AUTO: 92.7 FL (ref 81.4–97.8)
PHOSPHATE SERPL-MCNC: 3.6 MG/DL (ref 2.5–4.5)
PLATELET # BLD AUTO: 632 K/UL (ref 164–446)
PMV BLD AUTO: 8.5 FL (ref 9–12.9)
POTASSIUM SERPL-SCNC: 4.2 MMOL/L (ref 3.6–5.5)
RBC # BLD AUTO: 3.68 M/UL (ref 4.7–6.1)
SODIUM SERPL-SCNC: 134 MMOL/L (ref 135–145)
VANCOMYCIN TROUGH SERPL-MCNC: 13.8 UG/ML (ref 10–20)
WBC # BLD AUTO: 11 K/UL (ref 4.8–10.8)

## 2023-07-23 PROCEDURE — A9270 NON-COVERED ITEM OR SERVICE: HCPCS | Performed by: INTERNAL MEDICINE

## 2023-07-23 PROCEDURE — 770001 HCHG ROOM/CARE - MED/SURG/GYN PRIV*

## 2023-07-23 PROCEDURE — 700111 HCHG RX REV CODE 636 W/ 250 OVERRIDE (IP): Performed by: HOSPITALIST

## 2023-07-23 PROCEDURE — 700105 HCHG RX REV CODE 258: Mod: JZ | Performed by: HOSPITALIST

## 2023-07-23 PROCEDURE — A9270 NON-COVERED ITEM OR SERVICE: HCPCS | Performed by: HOSPITALIST

## 2023-07-23 PROCEDURE — 82962 GLUCOSE BLOOD TEST: CPT | Mod: 91

## 2023-07-23 PROCEDURE — 700102 HCHG RX REV CODE 250 W/ 637 OVERRIDE(OP): Performed by: INTERNAL MEDICINE

## 2023-07-23 PROCEDURE — 700105 HCHG RX REV CODE 258: Mod: JZ | Performed by: INTERNAL MEDICINE

## 2023-07-23 PROCEDURE — 85027 COMPLETE CBC AUTOMATED: CPT

## 2023-07-23 PROCEDURE — 80202 ASSAY OF VANCOMYCIN: CPT

## 2023-07-23 PROCEDURE — 36415 COLL VENOUS BLD VENIPUNCTURE: CPT

## 2023-07-23 PROCEDURE — 700111 HCHG RX REV CODE 636 W/ 250 OVERRIDE (IP): Performed by: INTERNAL MEDICINE

## 2023-07-23 PROCEDURE — 99233 SBSQ HOSP IP/OBS HIGH 50: CPT | Mod: GC | Performed by: INTERNAL MEDICINE

## 2023-07-23 PROCEDURE — 700102 HCHG RX REV CODE 250 W/ 637 OVERRIDE(OP): Performed by: HOSPITALIST

## 2023-07-23 PROCEDURE — 80069 RENAL FUNCTION PANEL: CPT

## 2023-07-23 PROCEDURE — 83735 ASSAY OF MAGNESIUM: CPT

## 2023-07-23 PROCEDURE — 97163 PT EVAL HIGH COMPLEX 45 MIN: CPT

## 2023-07-23 RX ORDER — OXYCODONE HYDROCHLORIDE 10 MG/1
10 TABLET ORAL EVERY 4 HOURS PRN
Status: DISCONTINUED | OUTPATIENT
Start: 2023-07-23 | End: 2023-07-28 | Stop reason: HOSPADM

## 2023-07-23 RX ADMIN — INSULIN HUMAN 3 UNITS: 100 INJECTION, SOLUTION PARENTERAL at 08:35

## 2023-07-23 RX ADMIN — INSULIN HUMAN 3 UNITS: 100 INJECTION, SOLUTION PARENTERAL at 17:42

## 2023-07-23 RX ADMIN — OXYCODONE HYDROCHLORIDE 15 MG: 10 TABLET ORAL at 13:45

## 2023-07-23 RX ADMIN — OXYCODONE HYDROCHLORIDE 15 MG: 10 TABLET ORAL at 20:46

## 2023-07-23 RX ADMIN — VANCOMYCIN HYDROCHLORIDE 1500 MG: 5 INJECTION, POWDER, LYOPHILIZED, FOR SOLUTION INTRAVENOUS at 20:48

## 2023-07-23 RX ADMIN — CLOPIDOGREL BISULFATE 75 MG: 75 TABLET ORAL at 05:51

## 2023-07-23 RX ADMIN — VANCOMYCIN HYDROCHLORIDE 1500 MG: 5 INJECTION, POWDER, LYOPHILIZED, FOR SOLUTION INTRAVENOUS at 08:38

## 2023-07-23 RX ADMIN — OXYCODONE HYDROCHLORIDE 10 MG: 10 TABLET ORAL at 03:28

## 2023-07-23 RX ADMIN — LISINOPRIL 10 MG: 10 TABLET ORAL at 05:51

## 2023-07-23 RX ADMIN — ASPIRIN 81 MG: 81 TABLET, COATED ORAL at 05:51

## 2023-07-23 RX ADMIN — OXYCODONE HYDROCHLORIDE 15 MG: 10 TABLET ORAL at 08:36

## 2023-07-23 RX ADMIN — BUPROPION HYDROCHLORIDE 150 MG: 75 TABLET, FILM COATED ORAL at 05:50

## 2023-07-23 RX ADMIN — OMEPRAZOLE 20 MG: 20 CAPSULE, DELAYED RELEASE ORAL at 05:51

## 2023-07-23 RX ADMIN — ESCITALOPRAM OXALATE 10 MG: 10 TABLET ORAL at 05:51

## 2023-07-23 RX ADMIN — INSULIN GLARGINE-YFGN 10 UNITS: 100 INJECTION, SOLUTION SUBCUTANEOUS at 05:50

## 2023-07-23 RX ADMIN — INSULIN HUMAN 3 UNITS: 100 INJECTION, SOLUTION PARENTERAL at 12:47

## 2023-07-23 RX ADMIN — ATORVASTATIN CALCIUM 20 MG: 20 TABLET, FILM COATED ORAL at 20:46

## 2023-07-23 RX ADMIN — BUPROPION HYDROCHLORIDE 150 MG: 75 TABLET, FILM COATED ORAL at 17:42

## 2023-07-23 ASSESSMENT — ENCOUNTER SYMPTOMS
COUGH: 0
WEAKNESS: 0
DEPRESSION: 0
PALPITATIONS: 0
NECK PAIN: 0
DIZZINESS: 0
NAUSEA: 0
PND: 0
FEVER: 0
HEARTBURN: 0

## 2023-07-23 ASSESSMENT — GAIT ASSESSMENTS
GAIT LEVEL OF ASSIST: MINIMAL ASSIST
DISTANCE (FEET): 3
DEVIATION: STEP TO;BRADYKINETIC;ANTALGIC
ASSISTIVE DEVICE: FRONT WHEEL WALKER

## 2023-07-23 ASSESSMENT — COGNITIVE AND FUNCTIONAL STATUS - GENERAL
STANDING UP FROM CHAIR USING ARMS: A LITTLE
MOVING FROM LYING ON BACK TO SITTING ON SIDE OF FLAT BED: UNABLE
TURNING FROM BACK TO SIDE WHILE IN FLAT BAD: A LITTLE
SUGGESTED CMS G CODE MODIFIER MOBILITY: CL
MOVING TO AND FROM BED TO CHAIR: A LITTLE
WALKING IN HOSPITAL ROOM: A LOT
MOBILITY SCORE: 14
CLIMB 3 TO 5 STEPS WITH RAILING: A LOT

## 2023-07-23 ASSESSMENT — PAIN DESCRIPTION - PAIN TYPE
TYPE: ACUTE PAIN

## 2023-07-23 NOTE — WOUND TEAM
Wound consult placed on 07/20/23 regarding R Foot. Chart and images reviewed. Pt was evaluated by LPS service and orders were placed. Wound consult completed. Please See LPS orders and note.

## 2023-07-23 NOTE — PROGRESS NOTES
Pharmacy Vancomycin Kinetics Note for 7/23/2023     48 y.o. male on Vancomycin day # 4     Vancomycin Indication (Two level/Trough based Dosing): Bacteremia (goal trough 15-20)    Provider specified end date: 08/17/23    Active Antibiotics (From admission, onward)      Ordered     Ordering Provider       Antoinette Jul 23, 2023  8:57 AM    07/23/23 0857  MD Alert...Vancomycin per Pharmacy  PHARMACY TO DOSE        Note to Pharmacy: Per ID - end date 8/17/23.   Question:  Indication(s) for vancomycin?  Answer:  Skin and soft tissue infection    Vicki Luu M.D.    07/23/23 0857  vancomycin (Vancocin) 1,500 mg in  mL IVPB  (vancomycin (VANCOCIN) IV (LD + Maintenance))  EVERY 12 HOURS        Note to Pharmacy: Per ID - end date 8/17/23.    Vicki Luu M.D.          Dosing Weight: 81.6 kg (179 lb 14.3 oz)    Admission History: Admitted on 7/20/2023 for Postoperative cellulitis of surgical wound [T81.49XA]    Allergies:     Apple and Lactose     Pertinent cultures to date:     Results       Procedure Component Value Units Date/Time    CULTURE WOUND W/ GRAM STAIN [[id number]] Collected: 07/20/23 2002    Order Status: Completed Specimen: Wound from Right Leg Updated: 07/22/23 1231     Significant Indicator NEG     Source WND     Site RIGHT LEG     Culture Result Light growth usual skin maryellen.     Gram Stain Result No organisms seen.    URINE CULTURE(NEW) [[id number]] Collected: 07/20/23 1852    Order Status: Completed Specimen: Urine Updated: 07/22/23 0648     Significant Indicator NEG     Source UR     Site -     Culture Result No growth at 48 hours.    Narrative:      Indication for culture:->Emergency Room Patient  Indication for culture:->Emergency Room Patient    BLOOD CULTURE [[id number]] Collected: 07/20/23 1709    Order Status: Completed Specimen: Blood from Peripheral Updated: 07/21/23 0721     Significant Indicator NEG     Source BLD     Site PERIPHERAL     Culture Result No Growth  Note: Blood cultures  "are incubated for 5 days and  are monitored continuously.Positive blood cultures  are called to the RN and reported as soon as  they are identified.      Narrative:      Per Hospital Policy: Only change Specimen Src: to \"Line\" if  specified by physician order.  Right AC    BLOOD CULTURE [[id number]] Collected: 07/20/23 1637    Order Status: Completed Specimen: Blood from Peripheral Updated: 07/21/23 0721     Significant Indicator NEG     Source BLD     Site PERIPHERAL     Culture Result No Growth  Note: Blood cultures are incubated for 5 days and  are monitored continuously.Positive blood cultures  are called to the RN and reported as soon as  they are identified.      Narrative:      Per Hospital Policy: Only change Specimen Src: to \"Line\" if  specified by physician order.  No site indicated    GRAM STAIN [[id number]] Collected: 07/20/23 2002    Order Status: Completed Specimen: Wound Updated: 07/21/23 0542     Significant Indicator .     Source WND     Site RIGHT LEG     Gram Stain Result No organisms seen.    URINALYSIS [[id number]]  (Abnormal) Collected: 07/20/23 1852    Order Status: Completed Specimen: Urine Updated: 07/20/23 1925     Color Yellow     Character Clear     Specific Gravity >=1.045     Ph 6.0     Glucose >=1000 mg/dL      Ketones Negative mg/dL      Protein Negative mg/dL      Bilirubin Negative     Urobilinogen, Urine 1.0     Nitrite Negative     Leukocyte Esterase Negative     Occult Blood Trace     Micro Urine Req Microscopic    Narrative:      Indication for culture:->Emergency Room Patient          Labs:     Estimated Creatinine Clearance: 121.1 mL/min (by C-G formula based on SCr of 0.73 mg/dL).  Recent Labs     07/20/23  1709 07/21/23  0326 07/22/23 0723 07/23/23  0734   WBC 11.3* 10.4 10.6 11.0*   NEUTSPOLYS 64.70 55.10 57.90  --      Recent Labs     07/20/23  1637 07/21/23  0326 07/22/23  0723 07/23/23  0734   BUN 12 12 11 17   CREATININE 0.64 0.64 0.65 0.73   ALBUMIN 3.8 3.3 3.4 3.4 " "      Intake/Output Summary (Last 24 hours) at 2023 0859  Last data filed at 2023 0402  Gross per 24 hour   Intake 600 ml   Output 2050 ml   Net -1450 ml      /87   Pulse 82   Temp 36.4 °C (97.5 °F) (Temporal)   Resp 19   Ht 1.753 m (5' 9\")   Wt 81.6 kg (179 lb 14.3 oz)   SpO2 100%  Temp (24hrs), Av.6 °C (97.9 °F), Min:36.4 °C (97.5 °F), Max:36.9 °C (98.5 °F)    List concerns for Vancomycin clearance:     None    Pharmacokinetics:     Trough kinetics:   Recent Labs     23  0734   VANCOTROUGH  --  13.8   VANCOPEAK 37.1  --      A/P:     -  Vancomycin dose: 1500 mg Q12H    -  Next vancomycin level(s): peak  @ 2230, trough  @ 0730    Previous MRSA bacteremia in setting of R foot OM s/p TMA on . Patient discharged to SNF on dapto/meropenem, however was not compliant w/ outpatient ABX's. Re-admitted w/ sepsis & drainage from surgical site w/ residual OM seen on MRI - patient was started on vanco. Wcx on  +MRSA & E.faecalis. All cx's this admission NGTD. Prior TTE w/o vegetations. ID following - recommends continuing vanco monotherapy through .    Steady state levels ordered, however peak level was drawn incorrectly during the vancomycin infusion - needs to be drawn after the infusion has completed. Repeat steady state levels ordered for tomorrow as noted above. Anticipate AUC will likely be therapeutic, renal fx remains stable @ baseline. Continue current vanco dosing at 1500 mg Q12H pending repeat levels.    Pharmacy will continue following.      Chucho Fang, PharmD    "

## 2023-07-23 NOTE — CARE PLAN
The patient is Watcher - Medium risk of patient condition declining or worsening    Shift Goals  Clinical Goals: Pain management, Continue ABX  Patient Goals: Pain management  Family Goals: LAXMI    Progress made toward(s) clinical / shift goals:        Problem: Knowledge Deficit - Standard  Goal: Patient and family/care givers will demonstrate understanding of plan of care, disease process/condition, diagnostic tests and medications  Note:   Patient able to l demonstrate understanding of plan of care, disease process/condition, diagnostic tests and   Medications. Patient needed some reenforcement in regards to pain medication administration.      Problem: Fall Risk  Goal: Patient will remain free from falls  Outcome: Progressing  Note: Personal belonging and call light within reach. Bed locked and in lowest position. Call light within reach        Patient is not progressing towards the following goals:      Problem: Pain - Standard  Goal: Alleviation of pain or a reduction in pain to the patient’s comfort goal  Outcome: Not Met  Note: Patient needed re education/enforcement on pain medication administration. Provided by primary nurse and [employer] Team

## 2023-07-23 NOTE — PROGRESS NOTES
[facility] Internal Medicine Daily Progress Note    Date of Service  7/23/2023    R Team: R IM White Team   Attending: Qing Calzada M.d.  Senior Resident: Dr. Johnson  Contact Number: [phone]    Chief Complaint  Rogelio Escudero is a 48 y.o. male admitted 7/20/2023 with possible infection, leg pain, history of osteomyelitis/MRSA    Hospital Course  Rogelio Escudero is a 48 y.o. male who presented 7/20/2023 with past medical history of diabetes, coronary disease status post stent, hypertension, peripheral vascular disease who presents to the hospital for right leg and groin pain.  His pain starts in the right leg and radiates to his back.  He is also feeling weakness in his legs.  Patient states that he fell 2 days ago.  After he left the hospital he did notice some drainage from his right foot which has now resolved.  Patient recently had a femoral palpable bypass completed by Dr. Calderon on 7/14.  He was found to have osteomyelitis and leg ischemia.  At the time he grew Enterococcus and MRSA.  Patient was discharged to SNF on daptomycin and meropenem until 7/20.  Patient states that he has been noncompliant with his home insulin since he was not discharged with needles.  Patient states that he is still smoking cigarettes.       Interval Problem Update  No overnight events noted.  Patient states improvement in the pain on pain regimen, also notes decreased swelling on surgical site on bypass area.  Patient with leukocytosis  11.0 from 10.6, otherwise patient has been afebrile, denies fever, chills.  Vascular surgery Dr. Rowell following, no I&D plans, pending infectious disease recommendations regarding antibiotics.  Currently on vancomycin with stop date 8/17/2023 per previous ID recommendations.    I have discussed this patient's plan of care and discharge plan at IDT rounds today with Case Management, Nursing, Nursing leadership, and other members of the IDT team.    Consultants/Specialty  vascular  surgery  Infectious disease    Code Status  Full Code    Disposition  Not medically cleared  I have placed the appropriate orders for post-discharge needs.    Review of Systems  Review of Systems   Constitutional:  Negative for fever and malaise/fatigue.   Respiratory:  Negative for cough.    Cardiovascular:  Negative for chest pain, palpitations and PND.   Gastrointestinal:  Negative for heartburn and nausea.   Genitourinary:  Negative for dysuria.   Musculoskeletal:  Negative for neck pain.        See HPI   Neurological:  Negative for dizziness and weakness.   Psychiatric/Behavioral:  Negative for depression.         Physical Exam  Temp:  [36.4 °C (97.5 °F)-36.9 °C (98.5 °F)] 36.4 °C (97.5 °F)  Pulse:  [73-92] 82  Resp:  [16-20] 19  BP: (114-128)/(74-88) 128/87  SpO2:  [95 %-100 %] 100 %    Physical Exam  Vitals and nursing note reviewed.   Constitutional:       General: He is not in acute distress.     Appearance: Normal appearance. He is not ill-appearing, toxic-appearing or diaphoretic.   HENT:      Head: Normocephalic and atraumatic.      Nose: No congestion or rhinorrhea.      Mouth/Throat:      Pharynx: No posterior oropharyngeal erythema.   Eyes:      General: No scleral icterus.        Right eye: No discharge.   Cardiovascular:      Rate and Rhythm: Normal rate and regular rhythm.      Pulses: Normal pulses.      Heart sounds: Normal heart sounds. No murmur heard.     No friction rub. No gallop.   Pulmonary:      Effort: Pulmonary effort is normal. No respiratory distress.      Breath sounds: Normal breath sounds. No stridor. No wheezing, rhonchi or rales.   Abdominal:      General: There is no distension.      Tenderness: There is no abdominal tenderness.   Musculoskeletal:         General: No swelling, tenderness, deformity or signs of injury.      Cervical back: Normal range of motion.      Right lower leg: Edema present.      Left lower leg: No edema.   Skin:     Capillary Refill: Capillary refill  takes less than 2 seconds.      Coloration: Skin is not jaundiced or pale.      Findings: Erythema (minimal erythema on incisional site) present. No bruising, lesion or rash.      Comments: Right leg tenderness, no gross purulence noted on wounds, incisional area   Neurological:      General: No focal deficit present.      Mental Status: He is alert and oriented to person, place, and time.           Fluids    Intake/Output Summary (Last 24 hours) at 7/23/2023 1614  Last data filed at 7/23/2023 1345  Gross per 24 hour   Intake 600 ml   Output 2850 ml   Net -2250 ml       Laboratory  Recent Labs     07/21/23  0326 07/22/23  0723 07/23/23  0734   WBC 10.4 10.6 11.0*   RBC 3.26* 3.57* 3.68*   HEMOGLOBIN 9.8* 10.7* 11.1*   HEMATOCRIT 30.3* 33.1* 34.1*   MCV 92.9 92.7 92.7   MCH 30.1 30.0 30.2   MCHC 32.3 32.3 32.6   RDW 52.3* 50.9* 51.5*   PLATELETCT 482* 568* 632*   MPV 9.0 8.6* 8.5*     Recent Labs     07/21/23  0326 07/22/23  0723 07/23/23  0734   SODIUM 139 134* 134*   POTASSIUM 3.6 3.8 4.2   CHLORIDE 107 100 101   CO2 22 24 22   GLUCOSE 157* 146* 167*   BUN 12 11 17   CREATININE 0.64 0.65 0.73   CALCIUM 8.6 9.2 9.2                   Imaging  CT-CTA LOWER EXT WITH & W/O-POST PROCESS RIGHT   Final Result      1.  Recent RIGHT lower extremity vascular surgery with femoral to popliteal graft which appears patent.  Retrograde enhancement of distal femoral artery.   2.  Normal enhancement of trifurcation vessels.   3.  No hematoma or evidence for ongoing arterial hemorrhage.   4.  Diffuse subcutaneous edema of the RIGHT lower extremity.   5.  Fluid and gas tracks along the graft, consistent with recent surgery.   6.  RIGHT groin and inguinal adenopathy, likely reactive.      DX-CHEST-PORTABLE (1 VIEW)   Final Result      Hypoinflation without other evidence for acute cardiopulmonary disease.             Assessment/Plan  Problem Representation:    MRSA bacteremia- (present on admission)  Assessment & Plan  -continue IV  vancomycin per infectious disease recommendations, stop date antibiotics 8/17/2023, awaiting further recommendations from infectious,  to see patient tomorrow, appreciate involvement in patient's care  -With recent MRSA bacteremia, incompletely treated as patient left AMA  --Blood cultures x2 from 7/5 and 7/8 has been negative, following blood culture this admission, negative to date  -patient is afebrile, hemodynamically stable  -echo from 7/10/23 negative for vegetations    -we fernando monitor vancomycin trough levels and cmp for renal function  -per ID recommendations: anticipate need to complete 6 weeks of IV daptomycin or vancomycin for prior MRSA bacteremia (PICC Line not advisable for concern of drug use)    Osteomyelitis (HCC)- (present on admission)  Assessment & Plan  -Osteomyelitis, Right foot, S/P TMA with positive margins , intraop culture on 07/08/23 positive for enterecoccus faecalis and MRSA, sensitive to vancomycin and daptomycin  -per ID recommendations: addditional antibiotics will not cure his chronic osteomyelitis, also in the absence of debridement of any necrotic tissues that will serve as a continued nidus of infection, surgery as only curative option  - tight control of BS under 150 to help control infection    Peripheral artery disease (HCC)- (present on admission)  Assessment & Plan  -S/ p recent right fem-pop bypass with Dr. Calderon 07/14/23, DP pulse barely palpable  -CTA of lower ext 07/20/23 demonstrated bypass graft still patent, vascular surgery Dr. Rowell consulted with no surgical interventions at this time,  continued treatment with antibiotics  - closely monitor as current symptoms are expected.   - possible incision washout in 1-2 days time if symptoms do not improve  -to continue home aspirin , Plavix and statins for now    Diabetes (HCC)- (present on admission)  Assessment & Plan  - on insulin sliding scale with serial Accu-Checks  -HbA1c 11.3% 07/06/23  -Hypoglycemic  protocol in place   -25 units glargine initially ordered,hold 10 units for now, adjust as necessary, since patient's glucose has been at goal with diabetic diet, we will continue to adjust as necessary, goal blood glucose <150 to control infection per ID recommendations  -long acting insulin to be held if with concern for hypoglycemia    Thrombocytosis  Assessment & Plan  - Likely reactive secondary to ongoing infection, also in the setting of leukocytosis  -We will continue to monitor    Low back pain  Assessment & Plan  Patient does have a history of MRSA bacteremia, incompletely treated, crrently states that he has chronic back pain but is non radiating  Patient does not present with any red flag symptoms such as urinary /bowel incontinence, no fever, no weight loss, on physical examination does not present with spinal tenderness, also with new onset weakness/sensory deficit  - we will continue to monitor and if with any progression MRI L-spine could be considered    CAD (coronary artery disease)- (present on admission)  Assessment & Plan  -Continue aspirin, plavix and atorvastatin as above      Tobacco abuse- (present on admission)  Assessment & Plan  -Contact patient and reiterated the need for cessation of tobacco especially with his significant peripheral arterial occlusive disease  -Tobacco cessation education provided for more than 11 minutes.  We discussed the risks of smoking including increased risk of heart disease, stroke, cancer and COPD. We discussed the benefits of quitting smoking. We discussed options of nicotine patch, wellbutrin and chantix.  The patient has the intention to quit smoking. Nicotine replacement protocol will be provided to the patient.      Hypertension  Assessment & Plan  -Controlled, with holding parameters for lisinopril 10 mg             VTE prophylaxis: SCD    I have performed a physical exam and reviewed and updated ROS and Plan today (7/23/2023). In review of yesterday's  note (7/22/2023), there are no changes except as documented above.

## 2023-07-23 NOTE — THERAPY
Physical Therapy   Initial Evaluation     Patient Name: Rogelio Escudero  Age:  48 y.o., Sex:  male  Medical Record #: [id number]  Today's Date: 7/23/2023     Precautions  Precautions: Weight Bearing As Tolerated Right Lower Extremity (R TMA sx)  Comments: supposed to have post op shoe but it is at home per pt.    Assessment  Patient is 48 y.o. male with possible infection,right leg and groin pain, history of osteomyelitis/MRSA, GLF 7/18/23, was at SNF until 7/20 and left AMA. 6/8 had TMA sx.  7/14/23 Right femoral-above knee popliteal bypass with in situ saphenous vein.  No plan for I&D at this time per vascular. Pt was staying at his cousins apt. He's primary complaint is pain and edema at R thigh causing difficulties with sitting and amb.  While sitting at EOB, he propped his R LE up on chair with knee bent to provide room between his legs to stand up, once standing, he was able to stand up with FWW and pivot over to chair. He had to sit at the edge of the chair for comfort. Assisted pt back to bed. Pt will continue to benefit from acute physical therapy to assist progress towards pt's goals. Anticipate pt will benefit from post acute placement upon DC from hospital.          past medical history of diabetes, coronary disease status post stent, hypertension, peripheral vascular disease, chronic back pain    Plan    Physical Therapy Initial Treatment Plan   Treatment Plan : Equipment, Gait Training, Therapeutic Activities, Therapeutic Exercise, Stair Training, Orthotics Training , Neuro Re-Education / Balance  Treatment Frequency: 3 Times per Week  Duration: Until Therapy Goals Met    DC Equipment Recommendations: Unable to determine at this time (dont know if pt got a FWW at SNF?)  Discharge Recommendations: Recommend post-acute placement for additional physical therapy services prior to discharge home       Subjective    Pt agreed to PT eval. Appreciated being up but frustrated he cant sit up longer due to his  pain and edema at Right thigh.      Objective       07/23/23 1249   Initial Contact Note    Initial Contact Note Order Received and Verified, Physical Therapy Evaluation in Progress with Full Report to Follow.   Precautions   Precautions Weight Bearing As Tolerated Right Lower Extremity  (R TMA sx)   Comments supposed to have post op shoe but it is at home per pt.   Pain 0 - 10 Group   Location Leg   Location Orientation Right;Mid;Upper   Description Aching;Burning;Sharp   Therapist Pain Assessment Post Activity Pain Same as Prior to Activity;Nurse Notified   Prior Living Situation   Housing / Facility 3 Story Apartment / Condo   Elevator Yes   Lives with - Patient's Self Care Capacity Related Adult   Comments recently moved from [place], staying with his cousin   Prior Level of Functional Mobility   Comments was at SNF then left AMA   Cognition    Cognition / Consciousness WDL   Level of Consciousness Alert   Active ROM Upper Body   Active ROM Upper Body  WDL   Strength Upper Body   Upper Body Strength  WDL   Strength Lower Body   Lower Body Strength  X   Comments R LE painful,  TMA R   Coordination Upper Body   Coordination WDL   Coordination Lower Body    Coordination Lower Body  WDL   Other Treatments   Other Treatments Provided Supine>EOB>transfer to chair with FWW>squat pivot back to EOB>back to bed   Balance Assessment   Sitting Balance (Static) Fair +   Sitting Balance (Dynamic) Fair   Standing Balance (Static) Fair   Standing Balance (Dynamic) Fair -   Weight Shift Sitting Fair   Weight Shift Standing Fair   Comments pt with increased pain and edema with is limiting weight shifting with sitting and standing, no LOB but has to sit on chair at the edge of the chair and standing is unconventional   Bed Mobility    Supine to Sit Supervised   Sit to Supine Supervised   Scooting Supervised   Gait Analysis   Gait Level Of Assist Minimal Assist   Assistive Device Front Wheel Walker   Distance (Feet) 3   Deviation Step  To;Bradykinetic;Antalgic  (pt with decreased WBing R LE due to pain)   Weight Bearing Status WBAT R, pt does not have his post op shoe with him, per MD notes pt was not compliant waearing his shoe  (pt demonstrating TDWB today due to pain)   Functional Mobility   Sit to Stand Minimal Assist   Bed, Chair, Wheelchair Transfer Minimal Assist   Transfer Method Stand Step;Squat Pivot   Mobility with FWWfrom EOB to chair, squat pivot from chair to EOB   How much difficulty does the patient currently have...   Turning over in bed (including adjusting bedclothes, sheets and blankets)? 3   Sitting down on and standing up from a chair with arms (e.g., wheelchair, bedside commode, etc.) 1   Moving from lying on back to sitting on the side of the bed? 3   How much help from another person does the patient currently need...   Moving to and from a bed to a chair (including a wheelchair)? 3   Need to walk in a hospital room? 2   Climbing 3-5 steps with a railing? 2   6 clicks Mobility Score 14   Activity Tolerance   Sitting in Chair limited due to discomfort of right inner thigh, pt has to sit at edge of chair for comfort, not safe for pt to sit in chair on own for long period to time, assisted back to bed   Edema / Skin Assessment   Comments edematous R inner thigh and pt stated min at scrotom. R TMA dry and clean.   Patient / Family Goals    Patient / Family Goal #1 to have less pain   Short Term Goals    Short Term Goal # 1 transfers with FWW SBA in 6 visits   Short Term Goal # 2 amb with FWW 100ft SBA in 6 visits   Short Term Goal # 3 up/down 5 steps with railing SBA in 6 visits   Education Group   Education Provided Role of Physical Therapist;Gait Training;Use of Assistive Device;Weight Bearing Status   Role of Physical Therapist Patient Response Patient;Acceptance;Explanation   Gait Training Patient Response Patient;Acceptance;Demonstration;Action Demonstration   Use of Assistive Device Patient Response  Patient;Acceptance;Explanation;Action Demonstration   Weight Bearing Status Patient Response Patient;Acceptance;Explanation;Action Demonstration   Physical Therapy Initial Treatment Plan    Treatment Plan  Equipment;Gait Training;Therapeutic Activities;Therapeutic Exercise;Stair Training;Orthotics Training ;Neuro Re-Education / Balance   Treatment Frequency 3 Times per Week   Duration Until Therapy Goals Met   Problem List    Problems Pain;Impaired Transfers;Impaired Ambulation;Impaired Balance;Decreased Activity Tolerance   Anticipated Discharge Equipment and Recommendations   DC Equipment Recommendations Unable to determine at this time  (dont know if pt got a FWW at SNF?)   Discharge Recommendations Recommend post-acute placement for additional physical therapy services prior to discharge home   Interdisciplinary Plan of Care Collaboration   IDT Collaboration with  Nursing;Physician   Patient Position at End of Therapy In Bed;Tray Table within Reach;Call Light within Reach;Phone within Reach;Bed Alarm On   Collaboration Comments Dr Alex styles to eval today and MIRNA HORTON

## 2023-07-23 NOTE — DIETARY
Nutrition Services: Diabetes Education Consult   Day 3 of admit.  Rogelio Escudero is a 48 y.o. male with admitting DX of Postoperative cellulitis of surgical wound.    RD able to visit pt at bedside to provide consistent CHO diet education. RD discussed appropriate carb amounts, adequate protein and fiber, as well as appropriate beverages. RD provided education booklet and additional handouts reinforcing topics discussed. Pt demonstrated appropriate readiness and adequate evidence of learning. RD able to answer all questions to patient's satisfaction. RD to add extra protein with meals and high protein snacks per pt preference.     No other education needs identified at this time. Consider referral to outpatient nutrition services for continuation of education as indicated or per pt preferences.     Please re-consult RD as indicated.

## 2023-07-23 NOTE — CARE PLAN
The patient is Stable - Low risk of patient condition declining or worsening    Shift Goals  Clinical Goals: IV Abx, Wound care, pian management  Patient Goals: Pain relief  Family Goals: not present at this time    Progress made toward(s) clinical / shift goals:    Problem: Skin Integrity  Goal: Skin integrity is maintained or improved  Outcome: Not Progressing  Note: Pt refused wound care with mepilex on R metatarsal amputation with sutures despite education provided.     Problem: Knowledge Deficit - Standard  Goal: Patient and family/care givers will demonstrate understanding of plan of care, disease process/condition, diagnostic tests and medications  Outcome: Progressing  Note: Updated POC, Wound care, monitoring blood sugar, pain management, Abx therapy. Pt verbalized understanding     Problem: Pain - Standard  Goal: Alleviation of pain or a reduction in pain to the patient’s comfort goal  Outcome: Progressing  Note: Pain management by prn pain meds     Problem: Fall Risk  Goal: Patient will remain free from falls  Outcome: Progressing  Note: Fall precaution and hourly rounding in place       Patient is not progressing towards the following goals:      Problem: Skin Integrity  Goal: Skin integrity is maintained or improved  Outcome: Not Progressing  Note: Pt refused wound care with mepilex on R metatarsal amputation with sutures despite education provided.

## 2023-07-23 NOTE — PROGRESS NOTES
"Pt requests to remove the mepilex on his right heel and mepilex over the amputated toes. This RN informed the pt that I will do the wound care per WC order.. The R toes amputated site has very small drainage. Educated pt re: importance of wound care but pt refused at tis time. Pt states \" I'll let it air out for now because Im not comfortable with the dressing.\". Will continue to monitor.  "

## 2023-07-23 NOTE — ASSESSMENT & PLAN NOTE
- likely reactive secondary to infection, that is being treated appropriately, also in the setting of leukocytosis  - continue antibiotics, every 3 days CBC while pending placement

## 2023-07-24 ENCOUNTER — APPOINTMENT (OUTPATIENT)
Dept: ONCOLOGY | Facility: MEDICAL CENTER | Age: 48
End: 2023-07-24
Attending: INTERNAL MEDICINE
Payer: MEDICAID

## 2023-07-24 LAB
ANION GAP SERPL CALC-SCNC: 9 MMOL/L (ref 7–16)
BASOPHILS # BLD AUTO: 0.7 % (ref 0–1.8)
BASOPHILS # BLD: 0.08 K/UL (ref 0–0.12)
BUN SERPL-MCNC: 19 MG/DL (ref 8–22)
CALCIUM SERPL-MCNC: 9.4 MG/DL (ref 8.5–10.5)
CHLORIDE SERPL-SCNC: 100 MMOL/L (ref 96–112)
CO2 SERPL-SCNC: 23 MMOL/L (ref 20–33)
CREAT SERPL-MCNC: 0.71 MG/DL (ref 0.5–1.4)
EOSINOPHIL # BLD AUTO: 0.78 K/UL (ref 0–0.51)
EOSINOPHIL NFR BLD: 7 % (ref 0–6.9)
ERYTHROCYTE [DISTWIDTH] IN BLOOD BY AUTOMATED COUNT: 52 FL (ref 35.9–50)
GFR SERPLBLD CREATININE-BSD FMLA CKD-EPI: 111 ML/MIN/1.73 M 2
GLUCOSE BLD STRIP.AUTO-MCNC: 151 MG/DL (ref 65–99)
GLUCOSE BLD STRIP.AUTO-MCNC: 158 MG/DL (ref 65–99)
GLUCOSE BLD STRIP.AUTO-MCNC: 181 MG/DL (ref 65–99)
GLUCOSE SERPL-MCNC: 189 MG/DL (ref 65–99)
HCT VFR BLD AUTO: 34.9 % (ref 42–52)
HGB BLD-MCNC: 11.1 G/DL (ref 14–18)
IMM GRANULOCYTES # BLD AUTO: 0.23 K/UL (ref 0–0.11)
IMM GRANULOCYTES NFR BLD AUTO: 2.1 % (ref 0–0.9)
LYMPHOCYTES # BLD AUTO: 2.46 K/UL (ref 1–4.8)
LYMPHOCYTES NFR BLD: 22 % (ref 22–41)
MCH RBC QN AUTO: 29.5 PG (ref 27–33)
MCHC RBC AUTO-ENTMCNC: 31.8 G/DL (ref 32.3–36.5)
MCV RBC AUTO: 92.8 FL (ref 81.4–97.8)
MONOCYTES # BLD AUTO: 1.02 K/UL (ref 0–0.85)
MONOCYTES NFR BLD AUTO: 9.1 % (ref 0–13.4)
NEUTROPHILS # BLD AUTO: 6.61 K/UL (ref 1.82–7.42)
NEUTROPHILS NFR BLD: 59.1 % (ref 44–72)
NRBC # BLD AUTO: 0 K/UL
NRBC BLD-RTO: 0 /100 WBC (ref 0–0.2)
PLATELET # BLD AUTO: 688 K/UL (ref 164–446)
PMV BLD AUTO: 8.4 FL (ref 9–12.9)
POTASSIUM SERPL-SCNC: 4.1 MMOL/L (ref 3.6–5.5)
RBC # BLD AUTO: 3.76 M/UL (ref 4.7–6.1)
SODIUM SERPL-SCNC: 132 MMOL/L (ref 135–145)
VANCOMYCIN PEAK SERPL-MCNC: 32.1 UG/ML (ref 20–40)
VANCOMYCIN TROUGH SERPL-MCNC: 16.1 UG/ML (ref 10–20)
WBC # BLD AUTO: 11.2 K/UL (ref 4.8–10.8)

## 2023-07-24 PROCEDURE — 82962 GLUCOSE BLOOD TEST: CPT | Mod: 91

## 2023-07-24 PROCEDURE — 700102 HCHG RX REV CODE 250 W/ 637 OVERRIDE(OP): Performed by: HOSPITALIST

## 2023-07-24 PROCEDURE — 36415 COLL VENOUS BLD VENIPUNCTURE: CPT

## 2023-07-24 PROCEDURE — A9270 NON-COVERED ITEM OR SERVICE: HCPCS | Performed by: HOSPITALIST

## 2023-07-24 PROCEDURE — 99233 SBSQ HOSP IP/OBS HIGH 50: CPT | Mod: GC | Performed by: INTERNAL MEDICINE

## 2023-07-24 PROCEDURE — 80202 ASSAY OF VANCOMYCIN: CPT

## 2023-07-24 PROCEDURE — A9270 NON-COVERED ITEM OR SERVICE: HCPCS | Performed by: INTERNAL MEDICINE

## 2023-07-24 PROCEDURE — 700105 HCHG RX REV CODE 258: Mod: JZ | Performed by: INTERNAL MEDICINE

## 2023-07-24 PROCEDURE — 700111 HCHG RX REV CODE 636 W/ 250 OVERRIDE (IP): Performed by: INTERNAL MEDICINE

## 2023-07-24 PROCEDURE — 770001 HCHG ROOM/CARE - MED/SURG/GYN PRIV*

## 2023-07-24 PROCEDURE — 85025 COMPLETE CBC W/AUTO DIFF WBC: CPT

## 2023-07-24 PROCEDURE — 700111 HCHG RX REV CODE 636 W/ 250 OVERRIDE (IP): Mod: JZ | Performed by: HOSPITALIST

## 2023-07-24 PROCEDURE — A9270 NON-COVERED ITEM OR SERVICE: HCPCS | Performed by: STUDENT IN AN ORGANIZED HEALTH CARE EDUCATION/TRAINING PROGRAM

## 2023-07-24 PROCEDURE — 700102 HCHG RX REV CODE 250 W/ 637 OVERRIDE(OP): Performed by: STUDENT IN AN ORGANIZED HEALTH CARE EDUCATION/TRAINING PROGRAM

## 2023-07-24 PROCEDURE — 80048 BASIC METABOLIC PNL TOTAL CA: CPT

## 2023-07-24 PROCEDURE — 700102 HCHG RX REV CODE 250 W/ 637 OVERRIDE(OP): Performed by: INTERNAL MEDICINE

## 2023-07-24 RX ORDER — OXYCODONE HYDROCHLORIDE 5 MG/1
5 TABLET ORAL ONCE
Status: COMPLETED | OUTPATIENT
Start: 2023-07-24 | End: 2023-07-24

## 2023-07-24 RX ORDER — GABAPENTIN 100 MG/1
100 CAPSULE ORAL 3 TIMES DAILY PRN
Status: DISCONTINUED | OUTPATIENT
Start: 2023-07-24 | End: 2023-07-28 | Stop reason: HOSPADM

## 2023-07-24 RX ADMIN — BUPROPION HYDROCHLORIDE 150 MG: 75 TABLET, FILM COATED ORAL at 17:50

## 2023-07-24 RX ADMIN — INSULIN HUMAN 3 UNITS: 100 INJECTION, SOLUTION PARENTERAL at 17:50

## 2023-07-24 RX ADMIN — VANCOMYCIN HYDROCHLORIDE 1500 MG: 5 INJECTION, POWDER, LYOPHILIZED, FOR SOLUTION INTRAVENOUS at 09:03

## 2023-07-24 RX ADMIN — ONDANSETRON 4 MG: 2 INJECTION INTRAMUSCULAR; INTRAVENOUS at 09:29

## 2023-07-24 RX ADMIN — OMEPRAZOLE 20 MG: 20 CAPSULE, DELAYED RELEASE ORAL at 05:55

## 2023-07-24 RX ADMIN — ASPIRIN 81 MG: 81 TABLET, COATED ORAL at 05:55

## 2023-07-24 RX ADMIN — INSULIN HUMAN 3 UNITS: 100 INJECTION, SOLUTION PARENTERAL at 21:43

## 2023-07-24 RX ADMIN — VANCOMYCIN HYDROCHLORIDE 1500 MG: 5 INJECTION, POWDER, LYOPHILIZED, FOR SOLUTION INTRAVENOUS at 23:12

## 2023-07-24 RX ADMIN — OXYCODONE HYDROCHLORIDE 10 MG: 10 TABLET ORAL at 20:18

## 2023-07-24 RX ADMIN — LISINOPRIL 10 MG: 10 TABLET ORAL at 05:55

## 2023-07-24 RX ADMIN — CLOPIDOGREL BISULFATE 75 MG: 75 TABLET ORAL at 05:55

## 2023-07-24 RX ADMIN — ATORVASTATIN CALCIUM 20 MG: 20 TABLET, FILM COATED ORAL at 20:19

## 2023-07-24 RX ADMIN — INSULIN HUMAN 3 UNITS: 100 INJECTION, SOLUTION PARENTERAL at 12:13

## 2023-07-24 RX ADMIN — OXYCODONE HYDROCHLORIDE 15 MG: 10 TABLET ORAL at 09:01

## 2023-07-24 RX ADMIN — ESCITALOPRAM OXALATE 10 MG: 10 TABLET ORAL at 05:55

## 2023-07-24 RX ADMIN — OXYCODONE HYDROCHLORIDE 15 MG: 10 TABLET ORAL at 01:05

## 2023-07-24 RX ADMIN — OXYCODONE HYDROCHLORIDE 5 MG: 5 TABLET ORAL at 22:35

## 2023-07-24 RX ADMIN — BUPROPION HYDROCHLORIDE 150 MG: 75 TABLET, FILM COATED ORAL at 05:55

## 2023-07-24 ASSESSMENT — PAIN DESCRIPTION - PAIN TYPE
TYPE: ACUTE PAIN

## 2023-07-24 ASSESSMENT — ENCOUNTER SYMPTOMS
PALPITATIONS: 0
FEVER: 0
NECK PAIN: 0
WEAKNESS: 0
DEPRESSION: 0
COUGH: 0
PND: 0
NAUSEA: 0
HEARTBURN: 0
DIZZINESS: 0

## 2023-07-24 ASSESSMENT — PAIN SCALES - WONG BAKER: WONGBAKER_NUMERICALRESPONSE: HURTS EVEN MORE

## 2023-07-24 NOTE — CARE PLAN
The patient is Watcher - Medium risk of patient condition declining or worsening    Shift Goals  Clinical Goals: IV abx, wound care, pain mgmt  Patient Goals: decreased pain  Family Goals: LAXMI    Progress made toward(s) clinical / shift goals:       Problem: Knowledge Deficit - Standard  Goal: Patient and family/care givers will demonstrate understanding of plan of care, disease process/condition, diagnostic tests and medications  Outcome: Progressing  Note: Educated pt on WBC remaining slightly elevated, plan to cont IV vanc q12hr per ID. MRI foot ordered to re-screen for osteomyelitis/abscess. Painted R ft wound with betadine and covered with kerlix per order.     Problem: Pain - Standard  Goal: Alleviation of pain or a reduction in pain to the patient’s comfort goal  Outcome: Progressing  Note: PRN oxy takes pt to stated tolerable level of 6/10. To hold this afternoon per MD until BP improves. BP 88/53, pt asymptomatic/otherwise VSS. Will CTM.

## 2023-07-24 NOTE — PROGRESS NOTES
[facility] [place] [facility] ATTENDING NOTE:       I saw and examined the patient and discussed the management with the resident staff.  I reviewed the resident's note and agree with the resident's findings and plan as documented in the resident's note except as documented in the attending note. Please reference resident daily note for complete information.     The chart was reviewed and summarized.  Available labs, imaging, O2 sats ,  EKGs were reviewed. Available nursing, consultant, and resident notes were reviewed. I am actively involved in the patient's care    DOS 7/24/2023    Rogelio Escudero is a 48 y.o. male who presented 7/20/2023 with past medical history of diabetes, coronary disease status post stent, hypertension, peripheral vascular disease who presents to the hospital for right leg and groin pain.  His pain starts in the right leg and radiates to his back.  He is also feeling weakness in his legs.  Patient states that he fell 2 days ago. Pt was recently   found to have osteomyelitis and leg ischemia.  At the time he grew Enterococcus and MRSA.  Patient was discharged to SNF on daptomycin and meropenem until 7/20. He left AMA from SNF. He was readmitted to [facility] 7/5/2023. He did have Right femoral to above-knee popliteal bypass using in situ greater   saphenous vein with Dr. Calderon 7/14/23. He was discharge 7/18/2023 with outpatient IV antibiotic daily line and infusion, because no SNF accept him due to previous drug use and poor compliance.  Patient states that he has been noncompliant since he was discharged. He never was able to make it at outpatient infusion. On arrival CTA lower extremity on arrival showed just post surgical changes. Surgical wound was swollen, some discharges. He was started on vanco and unasyn   ID and vascular surgery consulted and are following.  Unasyn stopped 7/21/23. Blood cultures NGT (7/20/23). Wound culture NGT  R lower part of his foot at the  surgical site, slight  swollen. LPS is consulted, but pt has not been compliant with dressing and wound care. There is small part of swelling on the stump, TMA, surgical site. Besides him being on vancomycin WBC and thrombocytosis slight trending up. Discussed again with ID and recs for repeat MRI R foot to rule out any other abscess at the surgical site.   Disposition is difficult. Pt is not a candidate for outpatient IV antibiotic. Pt can have a line if stays in the hospital or SNF. If not SNF, I strongly recs for midline and pt remain inpatient to complete IV antibiotic treatment. MRI foot R ordered for further eval      7/24/23- tells me that he did not sleep well last night due to pain on his leg.   He is taking oxycodone 15 mg three times. Continue current pain management    Wound upper thigh, improving, there is slight indurated, but most likely post surgical per vascular surgery, and no further surgical  intervention per vascular surgery.  Right stump slight swollen.   WBC and plts are trending up. On vancomycin. Discussed case with ID,and recs for repeat MRI foot since he did not get debridement last time  Diabetes reasonable controled while inpatient   Pls discuss with LPS to follow pt too. He is still high risk for decompensation and AKA.   Daily labs, CBC/renal function      Assessment and plan   H/o MRSA bacteremia 8/15, never completed treatment   PVD   Right foot osteomyelitis   Diabetes mellitus   Hyponatremia   Thrombocytosis   Tobacco use   Low back pain   CAD   Patient is has a high medical complexity, complex decision making and is at high risk for complication, morbidity, and mortality.  My total time spent caring for the patient on the day of the encounter was 55 minutes.   This does not include time spent on separately billable procedures/tests.

## 2023-07-24 NOTE — PROGRESS NOTES
Verified with the Pharmacist re: order Vanco Peak at 2230 but the Vancomycin still running. Per Pharmacist draw Vanco peak at 2315, Lab [facility] notidied

## 2023-07-24 NOTE — PROGRESS NOTES
[facility] Daily Progress Note    Date of Service  7/24/2023    [facility] Team: [facility] White Team   Attending: Qing Calzada M.d.  Senior Resident: Dr. Johnson  Contact Number: [phone]    Chief Complaint  Rogelio Escudero is a 48 y.o. male admitted 7/20/2023 with possible infection, leg pain, history of osteomyelitis/MRSA    Hospital Course  Rogelio Escudero is a 48 y.o. male who presented 7/20/2023 with past medical history of diabetes, coronary disease status post stent, hypertension, peripheral vascular disease who presents to the hospital for right leg and groin pain.  His pain starts in the right leg and radiates to his back.  He is also feeling weakness in his legs.  Patient states that he fell 2 days ago.  After he left the hospital he did notice some drainage from his right foot which has now resolved.  Patient recently had a femoral palpable bypass completed by Dr. Calderon on 7/14.  He was found to have osteomyelitis and leg ischemia.  At the time he grew Enterococcus and MRSA.  Patient was discharged to SNF on daptomycin and meropenem until 7/20.  Patient states that he has been noncompliant with his home insulin since he was not discharged with needles.  Patient states that he is still smoking cigarettes.       Interval Problem Update    No overnight events.  Patient is taking oxycodone 15 mg prn about three times yesterday, reports better pain control however with difficulty sleeping. Added gabapentin for additional symptom control.  Slight induration noted on postsurgical wound on upper thigh, vascular following, no I&D needed at this time.  Infectious disease following , appreciate recommendations.  Pending MRI of foot, previous MRI during patient's last admission: residual first through fifth metatarsals with findings of possible postsurgical change versus osteomyelitis vs abscess.     I have discussed this patient's plan of care and discharge plan at IDT rounds today with Case  Management, Nursing, Nursing leadership, and other members of the IDT team.    Consultants/Specialty  vascular surgery  Infectious disease    Code Status  Full Code    Disposition  Not medically cleared  I have placed the appropriate orders for post-discharge needs.    Review of Systems  Review of Systems   Constitutional:  Negative for fever and malaise/fatigue.   Respiratory:  Negative for cough.    Cardiovascular:  Negative for chest pain, palpitations and PND.   Gastrointestinal:  Negative for heartburn and nausea.   Genitourinary:  Negative for dysuria.   Musculoskeletal:  Negative for neck pain.        See HPI   Neurological:  Negative for dizziness and weakness.   Psychiatric/Behavioral:  Negative for depression.         Physical Exam  Temp:  [36.1 °C (96.9 °F)-36.7 °C (98.1 °F)] 36.1 °C (96.9 °F)  Pulse:  [76-86] 76  Resp:  [17-19] 18  BP: (109-127)/(75-76) 110/75  SpO2:  [96 %-100 %] 100 %    Physical Exam  Vitals and nursing note reviewed.   Constitutional:       General: He is not in acute distress.     Appearance: Normal appearance. He is not ill-appearing, toxic-appearing or diaphoretic.   HENT:      Head: Normocephalic and atraumatic.      Nose: No congestion or rhinorrhea.      Mouth/Throat:      Pharynx: No posterior oropharyngeal erythema.   Eyes:      General: No scleral icterus.        Right eye: No discharge.   Cardiovascular:      Rate and Rhythm: Normal rate and regular rhythm.      Pulses: Normal pulses.      Heart sounds: Normal heart sounds. No murmur heard.     No friction rub. No gallop.   Pulmonary:      Effort: Pulmonary effort is normal. No respiratory distress.      Breath sounds: Normal breath sounds. No stridor. No wheezing, rhonchi or rales.   Abdominal:      General: There is no distension.      Tenderness: There is no abdominal tenderness.   Musculoskeletal:         General: No swelling, tenderness, deformity or signs of injury.      Cervical back: Normal range of motion.       Right lower leg: No edema.      Left lower leg: No edema.   Skin:     Capillary Refill: Capillary refill takes less than 2 seconds.      Coloration: Skin is not jaundiced or pale.      Findings: Erythema (minimal erythema on incisional site) present. No bruising, lesion or rash.      Comments: Right leg tenderness, no gross purulence noted on wounds, incisional area   Neurological:      General: No focal deficit present.      Mental Status: He is alert and oriented to person, place, and time.           Fluids    Intake/Output Summary (Last 24 hours) at 7/24/2023 1351  Last data filed at 7/24/2023 1300  Gross per 24 hour   Intake 1040 ml   Output 3000 ml   Net -1960 ml       Laboratory  Recent Labs     07/22/23  0723 07/23/23  0734 07/24/23  0733   WBC 10.6 11.0* 11.2*   RBC 3.57* 3.68* 3.76*   HEMOGLOBIN 10.7* 11.1* 11.1*   HEMATOCRIT 33.1* 34.1* 34.9*   MCV 92.7 92.7 92.8   MCH 30.0 30.2 29.5   MCHC 32.3 32.6 31.8*   RDW 50.9* 51.5* 52.0*   PLATELETCT 568* 632* 688*   MPV 8.6* 8.5* 8.4*     Recent Labs     07/22/23  0723 07/23/23  0734 07/24/23  0733   SODIUM 134* 134* 132*   POTASSIUM 3.8 4.2 4.1   CHLORIDE 100 101 100   CO2 24 22 23   GLUCOSE 146* 167* 189*   BUN 11 17 19   CREATININE 0.65 0.73 0.71   CALCIUM 9.2 9.2 9.4                   Imaging  CT-CTA LOWER EXT WITH & W/O-POST PROCESS RIGHT   Final Result      1.  Recent RIGHT lower extremity vascular surgery with femoral to popliteal graft which appears patent.  Retrograde enhancement of distal femoral artery.   2.  Normal enhancement of trifurcation vessels.   3.  No hematoma or evidence for ongoing arterial hemorrhage.   4.  Diffuse subcutaneous edema of the RIGHT lower extremity.   5.  Fluid and gas tracks along the graft, consistent with recent surgery.   6.  RIGHT groin and inguinal adenopathy, likely reactive.      DX-CHEST-PORTABLE (1 VIEW)   Final Result      Hypoinflation without other evidence for acute cardiopulmonary disease.      MR-FOOT-WITH &  W/O RIGHT    (Results Pending)          Assessment/Plan  Problem Representation:    MRSA bacteremia- (present on admission)  Assessment & Plan  -continue IV vancomycin per infectious disease recommendations, stop date antibiotics 8/17/2023, appreciate recommendations from infectious,  appreciate involvement in patient's care  -With recent MRSA bacteremia, incompletely treated as patient left AMA  --Blood cultures x2 from 7/5 and 7/8 has been negative, following blood culture this admission, negative to date  -patient is afebrile, hemodynamically stable  -echo from 7/10/23 negative for vegetations  -we fernando monitor vancomycin trough levels and cmp for renal function  -per ID recommendations: anticipate need to complete 6 weeks of IV daptomycin or vancomycin for prior MRSA bacteremia (PICC Line not advisable for concern of drug use)    Osteomyelitis (HCC)- (present on admission)  Assessment & Plan  -Pending MRI of right foot, previous MRI 07/12/23 during patient's last admission: residual first through fifth metatarsals with findings of possible postsurgical change versus osteomyelitis vs abscess, appreciate infectious disease recommendations  -Osteomyelitis, Right foot, S/P TMA with positive margins , intraop culture on 07/08/23 positive for enterecoccus faecalis and MRSA, sensitive to vancomycin and daptomycin  -per ID recommendations: addditional antibiotics will not cure his chronic osteomyelitis, also in the absence of debridement of any necrotic tissues that will serve as a continued nidus of infection, surgery as only curative option  - tight control of BS under 150 to help control infection    Peripheral artery disease (HCC)- (present on admission)  Assessment & Plan  -S/ p recent right fem-pop bypass with Dr. Calderon 07/14/23, DP pulse barely palpable  -CTA of lower ext 07/20/23 demonstrated bypass graft still patent, vascular surgery Dr. Rowell consulted with no surgical interventions at this time,  continued  treatment with antibiotics  - closely monitor as current symptoms are expected.   - possible incision washout in 1-2 days time if symptoms do not improve  -to continue home aspirin , Plavix and statins for now    Diabetes (HCC)- (present on admission)  Assessment & Plan  - on insulin sliding scale with serial Accu-Checks  -HbA1c 11.3% 07/06/23  -Hypoglycemic protocol in place   -25 units glargine initially ordered,hold 10 units for now, adjust as necessary, since patient's glucose has been at goal with diabetic diet, we will continue to adjust as necessary, goal blood glucose <150 to control infection per ID recommendations  -long acting insulin to be held if with concern for hypoglycemia    Thrombocytosis  Assessment & Plan  - Still uptrending, likely reactive secondary to ongoing infection, also in the setting of leukocytosis  -Discussed with infectious , we will continue to monitor    Low back pain  Assessment & Plan  Patient does have a history of MRSA bacteremia, incompletely treated, crrently states that he has chronic back pain but is non radiating  Patient does not present with any red flag symptoms such as urinary /bowel incontinence, no fever, no weight loss, on physical examination does not present with spinal tenderness, also with new onset weakness/sensory deficit  - we will continue to monitor and if with any progression MRI L-spine could be considered    CAD (coronary artery disease)- (present on admission)  Assessment & Plan  -Continue aspirin, plavix and atorvastatin as above      Tobacco abuse- (present on admission)  Assessment & Plan  -Contact patient and reiterated the need for cessation of tobacco especially with his significant peripheral arterial occlusive disease  -Tobacco cessation education provided for more than 11 minutes.  We discussed the risks of smoking including increased risk of heart disease, stroke, cancer and COPD. We discussed the benefits of quitting smoking. We discussed  options of nicotine patch, wellbutrin and chantix.  The patient has the intention to quit smoking. Nicotine replacement protocol will be provided to the patient.      Hypertension  Assessment & Plan  -Controlled, with holding parameters for lisinopril 10 mg             VTE prophylaxis: SCD    I have performed a physical exam and reviewed and updated ROS and Plan today (7/24/2023). In review of yesterday's note (7/23/2023), there are no changes except as documented above.

## 2023-07-24 NOTE — PROGRESS NOTES
Chronic, comes and goes.   Continue current management with methocarbamol 750 mg as needed.   Pharmacy Vancomycin Kinetics Note for 7/24/2023     48 y.o. male on Vancomycin day # 5     Vancomycin Indication (Two level/Trough based Dosing): Bacteremia (goal trough 15-20)    Provider specified end date: 08/17/23    Active Antibiotics (From admission, onward)      Ordered     Ordering Provider       Antoinette Jul 23, 2023  8:57 AM    07/23/23 0857  MD Alert...Vancomycin per Pharmacy  PHARMACY TO DOSE        Note to Pharmacy: Per ID - end date 8/17/23.   Question:  Indication(s) for vancomycin?  Answer:  Skin and soft tissue infection    Vicki Luu M.D.    07/23/23 0857  vancomycin (Vancocin) 1,500 mg in  mL IVPB  (vancomycin (VANCOCIN) IV (LD + Maintenance))  EVERY 12 HOURS        Note to Pharmacy: Per ID - end date 8/17/23.    Vicki Luu M.D.            Dosing Weight: 81.6 kg (179 lb 14.3 oz)      Admission History: Admitted on 7/20/2023 for Postoperative cellulitis of surgical wound [T81.49XA]  Pertinent history: Previous MRSA bacteremia in setting of R foot OM s/p TMA on 6/8. Patient discharged to SNF on dapto/meropenem, however was not compliant w/ outpatient ABX's. Re-admitted w/ sepsis & drainage from surgical site w/ residual OM seen on MRI - patient was started on vanco. Wcx on 7/7 +MRSA & E.faecalis. All cx's this admission NGTD. Prior TTE w/o vegetations. ID following - recommends continuing vanco monotherapy through 8/17.    Allergies:     Apple and Lactose     Pertinent cultures to date:     Results       Procedure Component Value Units Date/Time    CULTURE WOUND W/ GRAM STAIN [[id number]] Collected: 07/20/23 2002    Order Status: Completed Specimen: Wound from Right Leg Updated: 07/22/23 1231     Significant Indicator NEG     Source WND     Site RIGHT LEG     Culture Result Light growth usual skin maryellen.     Gram Stain Result No organisms seen.    URINE CULTURE(NEW) [[id number]] Collected: 07/20/23 1852    Order Status: Completed Specimen: Urine Updated: 07/22/23 0648      "Significant Indicator NEG     Source UR     Site -     Culture Result No growth at 48 hours.    Narrative:      Indication for culture:->Emergency Room Patient  Indication for culture:->Emergency Room Patient    BLOOD CULTURE [[id number]] Collected: 07/20/23 1709    Order Status: Completed Specimen: Blood from Peripheral Updated: 07/21/23 0721     Significant Indicator NEG     Source BLD     Site PERIPHERAL     Culture Result No Growth  Note: Blood cultures are incubated for 5 days and  are monitored continuously.Positive blood cultures  are called to the RN and reported as soon as  they are identified.      Narrative:      Per Hospital Policy: Only change Specimen Src: to \"Line\" if  specified by physician order.  Right AC    BLOOD CULTURE [[id number]] Collected: 07/20/23 1637    Order Status: Completed Specimen: Blood from Peripheral Updated: 07/21/23 0721     Significant Indicator NEG     Source BLD     Site PERIPHERAL     Culture Result No Growth  Note: Blood cultures are incubated for 5 days and  are monitored continuously.Positive blood cultures  are called to the RN and reported as soon as  they are identified.      Narrative:      Per Hospital Policy: Only change Specimen Src: to \"Line\" if  specified by physician order.  No site indicated    GRAM STAIN [[id number]] Collected: 07/20/23 2002    Order Status: Completed Specimen: Wound Updated: 07/21/23 0542     Significant Indicator .     Source WND     Site RIGHT LEG     Gram Stain Result No organisms seen.    URINALYSIS [[id number]]  (Abnormal) Collected: 07/20/23 1852    Order Status: Completed Specimen: Urine Updated: 07/20/23 1925     Color Yellow     Character Clear     Specific Gravity >=1.045     Ph 6.0     Glucose >=1000 mg/dL      Ketones Negative mg/dL      Protein Negative mg/dL      Bilirubin Negative     Urobilinogen, Urine 1.0     Nitrite Negative     Leukocyte Esterase Negative     Occult Blood Trace     Micro Urine Req Microscopic    Narrative:      " "Indication for culture:->Emergency Room Patient            Labs:     Estimated Creatinine Clearance: 124.5 mL/min (by C-G formula based on SCr of 0.71 mg/dL).  Recent Labs     23  0723 23  0734 23  0733   WBC 10.6 11.0* 11.2*   NEUTSPOLYS 57.90  --  59.10     Recent Labs     23  0723 23  0734 23  0733   BUN 11 17 19   CREATININE 0.65 0.73 0.71   ALBUMIN 3.4 3.4  --        Intake/Output Summary (Last 24 hours) at 2023 1157  Last data filed at 2023 0755  Gross per 24 hour   Intake 440 ml   Output 3100 ml   Net -2660 ml      /75   Pulse 76   Temp 36.1 °C (96.9 °F) (Temporal)   Resp 18   Ht 1.753 m (5' 9\")   Wt 81.6 kg (179 lb 14.3 oz)   SpO2 100%  Temp (24hrs), Av.3 °C (97.4 °F), Min:36.1 °C (96.9 °F), Max:36.7 °C (98.1 °F)      List concerns for Vancomycin clearance:     None    Pharmacokinetics:     Two level kinetics:   Ke (hr ^-1): 0.0828  Half life: 8.4  Vd: Steady state Vd : 65.604  Calculated AUC: 551 mg·hr/L    Trough kinetics:   Recent Labs     23  2313 23  0733   VANCOTROUGH  --  16.1   VANCOPEAK 32.1  --        A/P:     -  Vancomycin dose: Continue 1500 mg q12h (00/)    -  Next vancomycin level(s):    - TBD in 5-7 days or sooner if clinical change    - Calculated AUC (from trough): 551    -  Comments: levels were drawn appropriately and produced a therapeutic AUC. Renal function has remain stable. No changes to current dose    Arlene Castrejon, PharmD    "

## 2023-07-24 NOTE — CARE PLAN
The patient is Watcher - Medium risk of patient condition declining or worsening    Shift Goals  Clinical Goals: Continue IV ABX, pain management, wound care to (R) Leg, (R) TMA, Sacrum and heels, Q2 turns  Patient Goals: Pain management  Family Goals: LAXMI    Progress made toward(s) clinical / shift goals:        Problem: Knowledge Deficit - Standard  Goal: Patient and family/care givers will demonstrate understanding of plan of care, disease process/condition, diagnostic tests and medications  Note: Patient able to demonstrate understanding of plan of care, disease process/condition, diagnostic tests and Medications. Patient able to verbalize understanding plan for discharge and discharge barriers. Patient verbalized understanding of insulin sliding scale, medication being administered and plan of care/discharge once medically stable       Problem: Fall Risk  Goal: Patient will remain free from falls  Outcome: Progressing  Note: Patient remained free from falls. Call light and personal belonging within use. Educated on calling for assistance        Patient is not progressing towards the following goals:      Problem: Pain - Standard  Goal: Alleviation of pain or a reduction in pain to the patient’s comfort goal  Outcome: Not Met  Note: Patient continues to complain of pain to BLE.  Techniques used to decrease pain include Medication, repositioning and decreasing stimuli

## 2023-07-24 NOTE — PROGRESS NOTES
VASCULAR SURGERY PROGRESS  NOTE       Awake, no complaints.  AF, VSS.    General: Pleasant male in none apparent distress.    Right lower extremity: Surgical incision intact with surrounding induration from proximal to mid thigh and no surrounding erythema, drainage, or fluctuation.  Strongly palpable right posterior tibial pulses with multiphasic flow.    Labs: Reviewed.    Assessment: Status post right femoral to above-knee popliteal bypass using in situ right greater saphenous vein.    Plan:  Stable from vascular standpoint.  Patient has old hematoma in the surrounding the incision in the proximal to mid thigh, which is not surprising following bypass.  There is no evidence of infection.    Paint surgical incision with Betadine once a day.    Keep right leg elevated when not on feet.    Patient may be discharged to skilled nursing facility or home from vascular standpoint.    Follow-up with me in 2 to 3 weeks after discharge.    Vascular service will sign off.  Please call for questions or concerns.

## 2023-07-24 NOTE — CARE PLAN
The patient is Stable - Low risk of patient condition declining or worsening    Shift Goals  Clinical Goals: IV Abx, Wound care, Pain management  Patient Goals: pain relief, rest  Family Goals: not present    Progress made toward(s) clinical / shift goals:    Problem: Knowledge Deficit - Standard  Goal: Patient and family/care givers will demonstrate understanding of plan of care, disease process/condition, diagnostic tests and medications  Outcome: Progressing  Note: Updated POC, Abx therapy, blood glucose monitoring, activity and wound care. Pt verbalized understanding.     Problem: Pain - Standard  Goal: Alleviation of pain or a reduction in pain to the patient’s comfort goal  Outcome: Progressing  Note: Pain management prn pain meds.     Problem: Fall Risk  Goal: Patient will remain free from falls  Outcome: Progressing  Note: Fall precaution and hourly rounding in place.     Problem: Skin Integrity  Goal: Skin integrity is maintained or improved  Outcome: Progressing  Note: Wound care following on R metatarsal amputation with sutures CDI       Patient is not progressing towards the following goals:

## 2023-07-24 NOTE — PROGRESS NOTES
"  VASCULAR SURGERY SERVICE  Progress Note  ___________________________________    7/14/23  Underwent right femoral-above knee popliteal bypass with in situ saphenous vein    7/22/23:  ARI, pain controlled, feeling better today, less pain, no drainage from right leg incision    7/22/23:  ARI, pain controlled, feeling better today, less pain, no drainage from right leg incision.  Try to work with PT today but he says that was painful, particular bearing weight on the right foot    Vitals  /75   Pulse 86   Temp 36.7 °C (98.1 °F) (Temporal)   Resp 19   Ht 1.753 m (5' 9\")   Wt 81.6 kg (179 lb 14.3 oz)   SpO2 100%   BMI 26.57 kg/m²     Exam  General: alert, conversant, NAD  Extremities: Right leg warm with swelling around incision line, swelling roughly stable compared to yesterday, pain improved from yesterday     Labs  WBC 10 -> 10 -> 11 today  Hgb 10 -> 11 today   Creatinine normal     A/P)  Pain improved in left leg, swelling about the same. Keep incision open to air. No plan for I&D at this time. Pt verbalized understanding, agrees with plan  Continue antibiotics    Appreciate Hospitalist services support  Following along. Dr. Calderon taking over tomorrow.    [facility]  [facility] preferred or call my office [phone]  __________________________________________________________________  Patient:Rogelio Escudero   MRN:[id number]   CSN:[id number]     "

## 2023-07-24 NOTE — DISCHARGE PLANNING
Case Management Discharge Planning    Admission Date: 7/20/2023  GMLOS: 3.4  ALOS: 4    6-Clicks ADL Score: 17  6-Clicks Mobility Score: 14  PT and/or OT Eval ordered: Yes  Post-acute Referrals Ordered: Yes  Post-acute Choice Obtained: Yes  Has referral(s) been sent to post-acute provider:  Yes      Anticipated Discharge Dispo: Discharge Disposition: Discharged to SNF    DME Needed: No    Action(s) Taken: Referral(s) sent    LSW requesting DPA send out SNF referral choice form. PT has evaluated patient and found him appropriate for placement. PASRR/LOC charted.     **1045  Patient discussed during IDT rounds. Patient is not medically clear at this time. Pending surgery clearance. Patient has no accepting SNF at this time. No Medicaid beds.     Escalations Completed: None    Medically Clear: No    Next Steps: LSW to follow up with patient and medical team regarding discharge needs and barriers.     Barriers to Discharge: Medical clearance and Pending Placement

## 2023-07-25 ENCOUNTER — APPOINTMENT (OUTPATIENT)
Dept: RADIOLOGY | Facility: MEDICAL CENTER | Age: 48
DRG: 863 | End: 2023-07-25
Payer: MEDICAID

## 2023-07-25 LAB
ANION GAP SERPL CALC-SCNC: 13 MMOL/L (ref 7–16)
BACTERIA BLD CULT: NORMAL
BACTERIA BLD CULT: NORMAL
BASOPHILS # BLD AUTO: 0.9 % (ref 0–1.8)
BASOPHILS # BLD: 0.12 K/UL (ref 0–0.12)
BUN SERPL-MCNC: 24 MG/DL (ref 8–22)
CALCIUM SERPL-MCNC: 9.2 MG/DL (ref 8.5–10.5)
CHLORIDE SERPL-SCNC: 100 MMOL/L (ref 96–112)
CO2 SERPL-SCNC: 21 MMOL/L (ref 20–33)
CREAT SERPL-MCNC: 0.89 MG/DL (ref 0.5–1.4)
EOSINOPHIL # BLD AUTO: 0.8 K/UL (ref 0–0.51)
EOSINOPHIL NFR BLD: 6.2 % (ref 0–6.9)
ERYTHROCYTE [DISTWIDTH] IN BLOOD BY AUTOMATED COUNT: 52.5 FL (ref 35.9–50)
GFR SERPLBLD CREATININE-BSD FMLA CKD-EPI: 104 ML/MIN/1.73 M 2
GLUCOSE BLD STRIP.AUTO-MCNC: 166 MG/DL (ref 65–99)
GLUCOSE BLD STRIP.AUTO-MCNC: 170 MG/DL (ref 65–99)
GLUCOSE BLD STRIP.AUTO-MCNC: 170 MG/DL (ref 65–99)
GLUCOSE BLD STRIP.AUTO-MCNC: 190 MG/DL (ref 65–99)
GLUCOSE SERPL-MCNC: 149 MG/DL (ref 65–99)
HCT VFR BLD AUTO: 33.8 % (ref 42–52)
HGB BLD-MCNC: 10.8 G/DL (ref 14–18)
IMM GRANULOCYTES # BLD AUTO: 0.29 K/UL (ref 0–0.11)
IMM GRANULOCYTES NFR BLD AUTO: 2.3 % (ref 0–0.9)
LYMPHOCYTES # BLD AUTO: 2.91 K/UL (ref 1–4.8)
LYMPHOCYTES NFR BLD: 22.7 % (ref 22–41)
MCH RBC QN AUTO: 30 PG (ref 27–33)
MCHC RBC AUTO-ENTMCNC: 32 G/DL (ref 32.3–36.5)
MCV RBC AUTO: 93.9 FL (ref 81.4–97.8)
MONOCYTES # BLD AUTO: 1.09 K/UL (ref 0–0.85)
MONOCYTES NFR BLD AUTO: 8.5 % (ref 0–13.4)
NEUTROPHILS # BLD AUTO: 7.63 K/UL (ref 1.82–7.42)
NEUTROPHILS NFR BLD: 59.4 % (ref 44–72)
NRBC # BLD AUTO: 0 K/UL
NRBC BLD-RTO: 0 /100 WBC (ref 0–0.2)
PLATELET # BLD AUTO: 668 K/UL (ref 164–446)
PMV BLD AUTO: 8.2 FL (ref 9–12.9)
POTASSIUM SERPL-SCNC: 4.2 MMOL/L (ref 3.6–5.5)
RBC # BLD AUTO: 3.6 M/UL (ref 4.7–6.1)
SIGNIFICANT IND 70042: NORMAL
SIGNIFICANT IND 70042: NORMAL
SITE SITE: NORMAL
SITE SITE: NORMAL
SODIUM SERPL-SCNC: 134 MMOL/L (ref 135–145)
SOURCE SOURCE: NORMAL
SOURCE SOURCE: NORMAL
WBC # BLD AUTO: 12.8 K/UL (ref 4.8–10.8)

## 2023-07-25 PROCEDURE — 0HBMXZZ EXCISION OF RIGHT FOOT SKIN, EXTERNAL APPROACH: ICD-10-PCS | Performed by: INTERNAL MEDICINE

## 2023-07-25 PROCEDURE — 99232 SBSQ HOSP IP/OBS MODERATE 35: CPT | Mod: 25 | Performed by: NURSE PRACTITIONER

## 2023-07-25 PROCEDURE — 700117 HCHG RX CONTRAST REV CODE 255

## 2023-07-25 PROCEDURE — 700102 HCHG RX REV CODE 250 W/ 637 OVERRIDE(OP): Performed by: HOSPITALIST

## 2023-07-25 PROCEDURE — 36415 COLL VENOUS BLD VENIPUNCTURE: CPT

## 2023-07-25 PROCEDURE — 99232 SBSQ HOSP IP/OBS MODERATE 35: CPT | Mod: GC | Performed by: STUDENT IN AN ORGANIZED HEALTH CARE EDUCATION/TRAINING PROGRAM

## 2023-07-25 PROCEDURE — A9270 NON-COVERED ITEM OR SERVICE: HCPCS | Performed by: HOSPITALIST

## 2023-07-25 PROCEDURE — 700111 HCHG RX REV CODE 636 W/ 250 OVERRIDE (IP): Performed by: INTERNAL MEDICINE

## 2023-07-25 PROCEDURE — 700102 HCHG RX REV CODE 250 W/ 637 OVERRIDE(OP): Performed by: INTERNAL MEDICINE

## 2023-07-25 PROCEDURE — 82962 GLUCOSE BLOOD TEST: CPT | Mod: 91

## 2023-07-25 PROCEDURE — 73701 CT LOWER EXTREMITY W/DYE: CPT | Mod: RT

## 2023-07-25 PROCEDURE — 770001 HCHG ROOM/CARE - MED/SURG/GYN PRIV*

## 2023-07-25 PROCEDURE — 11055 PARING/CUTG B9 HYPRKER LES 1: CPT | Performed by: NURSE PRACTITIONER

## 2023-07-25 PROCEDURE — 85025 COMPLETE CBC W/AUTO DIFF WBC: CPT

## 2023-07-25 PROCEDURE — 97167 OT EVAL HIGH COMPLEX 60 MIN: CPT

## 2023-07-25 PROCEDURE — 80048 BASIC METABOLIC PNL TOTAL CA: CPT

## 2023-07-25 PROCEDURE — A9270 NON-COVERED ITEM OR SERVICE: HCPCS | Performed by: INTERNAL MEDICINE

## 2023-07-25 PROCEDURE — 700105 HCHG RX REV CODE 258: Mod: JZ | Performed by: INTERNAL MEDICINE

## 2023-07-25 PROCEDURE — 700111 HCHG RX REV CODE 636 W/ 250 OVERRIDE (IP): Mod: JZ | Performed by: HOSPITALIST

## 2023-07-25 PROCEDURE — 700111 HCHG RX REV CODE 636 W/ 250 OVERRIDE (IP): Mod: JZ | Performed by: INTERNAL MEDICINE

## 2023-07-25 RX ADMIN — INSULIN HUMAN 3 UNITS: 100 INJECTION, SOLUTION PARENTERAL at 20:58

## 2023-07-25 RX ADMIN — MORPHINE SULFATE 2 MG: 4 INJECTION, SOLUTION INTRAMUSCULAR; INTRAVENOUS at 11:33

## 2023-07-25 RX ADMIN — ESCITALOPRAM OXALATE 10 MG: 10 TABLET ORAL at 06:19

## 2023-07-25 RX ADMIN — BUPROPION HYDROCHLORIDE 150 MG: 75 TABLET, FILM COATED ORAL at 06:18

## 2023-07-25 RX ADMIN — INSULIN HUMAN 3 UNITS: 100 INJECTION, SOLUTION PARENTERAL at 07:56

## 2023-07-25 RX ADMIN — ONDANSETRON 4 MG: 2 INJECTION INTRAMUSCULAR; INTRAVENOUS at 07:56

## 2023-07-25 RX ADMIN — OXYCODONE HYDROCHLORIDE 15 MG: 10 TABLET ORAL at 00:43

## 2023-07-25 RX ADMIN — OMEPRAZOLE 20 MG: 20 CAPSULE, DELAYED RELEASE ORAL at 06:19

## 2023-07-25 RX ADMIN — OXYCODONE HYDROCHLORIDE 15 MG: 10 TABLET ORAL at 17:16

## 2023-07-25 RX ADMIN — OXYCODONE HYDROCHLORIDE 15 MG: 10 TABLET ORAL at 13:19

## 2023-07-25 RX ADMIN — IOHEXOL 80 ML: 350 INJECTION, SOLUTION INTRAVENOUS at 20:45

## 2023-07-25 RX ADMIN — BUPROPION HYDROCHLORIDE 150 MG: 75 TABLET, FILM COATED ORAL at 17:17

## 2023-07-25 RX ADMIN — OXYCODONE HYDROCHLORIDE 15 MG: 10 TABLET ORAL at 06:19

## 2023-07-25 RX ADMIN — INSULIN HUMAN 3 UNITS: 100 INJECTION, SOLUTION PARENTERAL at 13:12

## 2023-07-25 RX ADMIN — INSULIN HUMAN 3 UNITS: 100 INJECTION, SOLUTION PARENTERAL at 17:25

## 2023-07-25 RX ADMIN — OXYCODONE HYDROCHLORIDE 15 MG: 10 TABLET ORAL at 21:03

## 2023-07-25 RX ADMIN — ASPIRIN 81 MG: 81 TABLET, COATED ORAL at 06:19

## 2023-07-25 RX ADMIN — VANCOMYCIN HYDROCHLORIDE 1500 MG: 5 INJECTION, POWDER, LYOPHILIZED, FOR SOLUTION INTRAVENOUS at 23:28

## 2023-07-25 RX ADMIN — ATORVASTATIN CALCIUM 20 MG: 20 TABLET, FILM COATED ORAL at 21:03

## 2023-07-25 RX ADMIN — CLOPIDOGREL BISULFATE 75 MG: 75 TABLET ORAL at 06:19

## 2023-07-25 RX ADMIN — VANCOMYCIN HYDROCHLORIDE 1500 MG: 5 INJECTION, POWDER, LYOPHILIZED, FOR SOLUTION INTRAVENOUS at 11:24

## 2023-07-25 ASSESSMENT — PAIN DESCRIPTION - PAIN TYPE
TYPE: ACUTE PAIN

## 2023-07-25 ASSESSMENT — ENCOUNTER SYMPTOMS
NECK PAIN: 0
PND: 0
WEAKNESS: 0
HEARTBURN: 0
PALPITATIONS: 0
COUGH: 0
FEVER: 0
DEPRESSION: 0
DIZZINESS: 0
NAUSEA: 0

## 2023-07-25 ASSESSMENT — COGNITIVE AND FUNCTIONAL STATUS - GENERAL
DAILY ACTIVITIY SCORE: 17
HELP NEEDED FOR BATHING: A LOT
DRESSING REGULAR UPPER BODY CLOTHING: A LITTLE
PERSONAL GROOMING: A LITTLE
DRESSING REGULAR LOWER BODY CLOTHING: A LOT
TOILETING: A LITTLE
SUGGESTED CMS G CODE MODIFIER DAILY ACTIVITY: CK

## 2023-07-25 ASSESSMENT — ACTIVITIES OF DAILY LIVING (ADL): TOILETING: INDEPENDENT

## 2023-07-25 NOTE — DISCHARGE PLANNING
Case Management Discharge Planning    Admission Date: 7/20/2023  GMLOS: 3.4  ALOS: 5    6-Clicks ADL Score: 17  6-Clicks Mobility Score: 14  PT and/or OT Eval ordered: Yes  Post-acute Referrals Ordered: Yes  Post-acute Choice Obtained: Yes  Has referral(s) been sent to post-acute provider:  Yes      Anticipated Discharge Dispo: Discharge Disposition: D/T to SNF with Medicare cert in anticipation of skilled care (03)    DME Needed: No    Action(s) Taken: Updated Provider/Nurse on Discharge Plan    Patient discussed during IDT rounds. Patient will need IV abx until 8/17. He is not a safe discharge home. Patient has left [facility] in the past. No accepting SNF at this time due to Medicaid or him leaving [facility].     Per MD, patient is now more compliant and realizes his medical problems were aggravated due to his noncompliance. LSW escalating to leaders.      Escalations Completed: Leadership    Medically Clear: Yes    Next Steps: LSW to follow up with patient and medical team regarding discharge needs and barriers.     Barriers to Discharge: Pending Placement

## 2023-07-25 NOTE — DISCHARGE PLANNING
Agency/Facility Name: [facility]   Spoke To: Elder   Outcome: DPA called regarding a status a status on referral, per Elder she requested DPA to resend.

## 2023-07-25 NOTE — THERAPY
Occupational Therapy   Initial Evaluation     Patient Name: Rogelio Escudero  Age:  48 y.o., Sex:  male  Medical Record #: [id number]  Today's Date: 7/25/2023     Precautions: Fall Risk, Weight Bearing As Tolerated Right Lower Extremity  Comments: with post op shoe (at home per pt)    Assessment  Patient is 48 y.o. male admitted with infection diabetes, coronary disease status post stent, hypertension, peripheral vascular disease. Pt with recent femoral palpable bypass now s/p R foot TMA. Pt reports hx of R shoulder rotator cuff sx with residual weakness/limited ROM.     Today pt is limited by pain, impaired balance, and poor activity tolerance. He reported improved pain and mobility compared to previous session with PT. Today he was able to ambulate short distances to the bathroom for toileting and LB dressing. Unclear, DC options, however pt reports previously residing with his cousin. Recommend placement at this time 2/2 deficits impacting functional participation in ADLs. Will follow for skilled OT.    Plan    Occupational Therapy Initial Treatment Plan   Treatment Interventions: Self Care / Activities of Daily Living, Neuro Re-Education / Balance, Therapeutic Exercises, Therapeutic Activity, Adaptive Equipment  Treatment Frequency: 3 Times per Week  Duration: Until Therapy Goals Met    DC Equipment Recommendations: Unable to determine at this time  Discharge Recommendations: Recommend post-acute placement for additional occupational therapy services prior to discharge home      07/25/23 1132   Prior Living Situation   Prior Services   (previously at SNF)   Housing / Facility 3 Story Apartment / Condo   Bathroom Set up Bathtub / Shower Combination   Lives with - Patient's Self Care Capacity Related Adult   Comments Pt vague in home set up report, per pt he was living with his cousin, unclear if this is still an option   Prior Level of ADL Function   Self Feeding Independent   Grooming / Hygiene Independent    Bathing Independent   Dressing Independent   Toileting Independent   Precautions   Precautions Fall Risk;Weight Bearing As Tolerated Right Lower Extremity   Pain   Pain Scales 0 to 10 Scale    Pain 0 - 10 Group   Location Leg   Location Orientation Right   Pain Rating Scale (NPRS) 8   Therapist Pain Assessment   (reported improved pain levels compared to previous therapy session (yesterday with PT))   Cognition    Cognition / Consciousness WDL   Comments agreeable, appreciative post sessoin   Active ROM Upper Body   Active ROM Upper Body  X   Comments R shoulder ROM limited by old rotator cuff injury   Strength Upper Body   Upper Body Strength  WDL   Balance Assessment   Sitting Balance (Static) Fair +   Sitting Balance (Dynamic) Fair   Standing Balance (Static) Fair -   Standing Balance (Dynamic) Fair -   Weight Shift Sitting Fair   Weight Shift Standing Fair   Comments with FWW   Bed Mobility    Supine to Sit Supervised   Sit to Supine Supervised   Scooting Supervised   ADL Assessment   Grooming Minimal Assist;Seated   Lower Body Dressing Maximal Assist  (socks)   Toileting Contact Guard Assist   Comments requesting a shower, however too fatigued after toileting   How much help from another person does the patient currently need...   6 Clicks Daily Activity Score 17   Functional Mobility   Sit to Stand Minimal Assist   Bed, Chair, Wheelchair Transfer Minimal Assist   Toilet Transfers Minimal Assist   Mobility room/bathroom distances with FWW   Patient / Family Goals   Patient / Family Goal #1 to go home   Short Term Goals   Short Term Goal # 1 Pt will demo LB dressing SPV   Short Term Goal # 2 Pt will complete toilet txf SPV   Short Term Goal # 3 Pt will tolerate 5 min standing ADLs SPV   Education Group   Role of Occupational Therapist Patient Response Patient;Acceptance;Explanation   Occupational Therapy Initial Treatment Plan    Treatment Interventions Self Care / Activities of Daily Living;Neuro Re-Education  / Balance;Therapeutic Exercises;Therapeutic Activity;Adaptive Equipment   Treatment Frequency 3 Times per Week   Duration Until Therapy Goals Met   Problem List   Problem List Decreased Active Daily Living Skills;Decreased Homemaking Skills;Decreased Functional Mobility;Decreased Activity Tolerance;Safety Awareness Deficits / Cognition;Impaired Postural Control / Balance   Anticipated Discharge Equipment and Recommendations   DC Equipment Recommendations Unable to determine at this time   Discharge Recommendations Recommend post-acute placement for additional occupational therapy services prior to discharge home

## 2023-07-25 NOTE — PROGRESS NOTES
" [facility]ICE  PROGRESS NOTE    HPI: 48-year-old male admitted 7/20/2023 for postoperative cellulitis of surgical wound following right femoropopliteal bypass by Dr. Calderon on 7/14/2023.  History of DM, peripheral neuropathy, PVD, CAD.  LPS involved for follow-up of right TMA lateral ulcer.      INTERVAL HISTORY:  7/25/2023: Patient denies fevers, chills, nausea, vomiting.  Pain controlled.  Edema improved to right thigh per patient.  Per vascular surgery, patient with hematoma to right thigh.  No vascular intervention needed as incision is approximated.  Vascular has signed off.  Patient reports that when he readmitted to the hospital after he fell with severe pain and right leg weakness, the strip packing was removed to the lateral foot and since then has not been replaced.      PERTINENT LPS RESULTS:   MRI pending of left foot  MRI 7/13/2023:  1.  Recent first through fifth transmetatarsal amputation.     2.  Marrow edema involving the residual first through fifth metatarsals. Given recent amputation, this could be related to postsurgical change versus representative of osteomyelitis and is nonspecific.     3.  Rim-enhancing fluid collection within the soft tissues adjacent to the amputation site overlying the first metatarsal measuring 2.6 x 1 x 2.3 cm in size possibly representing postsurgical seroma versus abscess.     4.  Extensive cellulitis.        EXAM:      /69   Pulse 72   Temp 36.1 °C (97 °F) (Temporal)   Resp 16   Ht 1.753 m (5' 9\")   Wt 81.6 kg (179 lb 14.3 oz)   SpO2 99%   BMI 26.57 kg/m²     Pedal Pulses:   R foot: palpable DP, palpable PT  L foot: palpable DP, palpable PT      Sensation:     Feet Insensate to light touch      Wound(s) :    Wound location: Right TMA  Ulcer resolved to lateral aspect.  Thick callus primarily to lateral foot.  Over medial aspect, no fluctuance, no purulence  No pain with palpation to foot  No erythema  No edema    Wound photo: " "                Procedure:  Using scissors, forceps excised callus, hyper keratinized tissue to right TMA.  Total area debrided less than 20 cm² to skin level.  No ulceration noted.  Previous openings from suture sites have resolved.  Wound care completed by KAIN APRN. Applied dimethicone lotion to BLE.             DIABETES MANAGEMENT:    A1c:   Lab Results   Component Value Date/Time    HBA1C 11.3 (H) 07/06/2023 01:13 AM            INFECTION MANAGEMENT:    Results from last 7 days   Lab Units 07/25/23  0115 07/24/23  0733 07/23/23  0734 07/22/23  0723 07/21/23  0326 07/20/23  1709   WBC 1501 K/uL 12.8* 11.2* 11.0* 10.6 10.4 11.3*   PLATELET COUNT 1518 K/uL 668* 688* 632* 568* 482* 298     Wound culture results:   Results       Procedure Component Value Units Date/Time    CULTURE WOUND W/ GRAM STAIN [[id number]] Collected: 07/20/23 2002    Order Status: Completed Specimen: Wound from Right Leg Updated: 07/22/23 1231     Significant Indicator NEG     Source WND     Site RIGHT LEG     Culture Result Light growth usual skin maryellen.     Gram Stain Result No organisms seen.    URINE CULTURE(NEW) [[id number]] Collected: 07/20/23 1852    Order Status: Completed Specimen: Urine Updated: 07/22/23 0648     Significant Indicator NEG     Source UR     Site -     Culture Result No growth at 48 hours.    Narrative:      Indication for culture:->Emergency Room Patient  Indication for culture:->Emergency Room Patient    BLOOD CULTURE [[id number]] Collected: 07/20/23 1709    Order Status: Completed Specimen: Blood from Peripheral Updated: 07/21/23 0721     Significant Indicator NEG     Source BLD     Site PERIPHERAL     Culture Result No Growth  Note: Blood cultures are incubated for 5 days and  are monitored continuously.Positive blood cultures  are called to the RN and reported as soon as  they are identified.      Narrative:      Per Hospital Policy: Only change Specimen Src: to \"Line\" if  specified by physician order.  Right AC    " "BLOOD CULTURE [[id number]] Collected: 07/20/23 1637    Order Status: Completed Specimen: Blood from Peripheral Updated: 07/21/23 0721     Significant Indicator NEG     Source BLD     Site PERIPHERAL     Culture Result No Growth  Note: Blood cultures are incubated for 5 days and  are monitored continuously.Positive blood cultures  are called to the RN and reported as soon as  they are identified.      Narrative:      Per Hospital Policy: Only change Specimen Src: to \"Line\" if  specified by physician order.  No site indicated    GRAM STAIN [[id number]] Collected: 07/20/23 2002    Order Status: Completed Specimen: Wound Updated: 07/21/23 0542     Significant Indicator .     Source WND     Site RIGHT LEG     Gram Stain Result No organisms seen.    URINALYSIS [[id number]]  (Abnormal) Collected: 07/20/23 1852    Order Status: Completed Specimen: Urine Updated: 07/20/23 1925     Color Yellow     Character Clear     Specific Gravity >=1.045     Ph 6.0     Glucose >=1000 mg/dL      Ketones Negative mg/dL      Protein Negative mg/dL      Bilirubin Negative     Urobilinogen, Urine 1.0     Nitrite Negative     Leukocyte Esterase Negative     Occult Blood Trace     Micro Urine Req Microscopic    Narrative:      Indication for culture:->Emergency Room Patient                       ASSESSMENT/PLAN:   48 y.o. admitted for Postoperative cellulitis of surgical wound [T81.49XA].     -POD #11 s/p right femoropopliteal by Dr. Calderon  -Right TMA ulcer to lateral aspect resolved.  No fluctuance to medial aspect.  Recommend canceling MRI to right foot.  Discussed with [facility].  Callus debrided to right TMA.      Wound care:   BLE: Dimethicone lotion twice daily    Labs/Imaging:  Recommend canceling MRI as MRI 7/13/2023 was negative for abscess.  Fluid collection to medial aspect probable seroma as there was no wound to that side and currently there is no wound.    -no further labs or imaging at this time        Vascular status:   -  palpable " pedal pulses bilaterally  -Vascular surgery consulted.  No plans for surgery.  Vascular signed off      Surgery:   No plans for Ortho surgery    Antibiotics:     -ID: involved.     Weight Bearing Status:   -Right foot: Weight bearing as tolerated      Offloading:   -Given offloading shoe during last admission.  Currently at home.  Reports she was too big.  Declines new offloading shoe.  During last admission Rx for diabetic shoes and inserts given to patient.  Patient reports he still has this at home.  Encourage patient as soon as he discharges from hospital follow-up with orthotic company for impressions to obtain new shoes and inserts  -Orthotic company: [employer]      PT/OT :   -involved,        Diabetes Education:   -involved, last seen 7/21/2023  -  - Implications of loss of protective sensation (LOPS) discussed with patient- including increased risk for amputation.  Advised to check feet at least daily, moisturize feet, and to always wear protective foot wear.   -avoid trimming own nails. See podiatrist or certified foot and nail RN  -keep blood sugars <150 for improved wound healing        DISCHARGE PLAN:    Disposition:   referrals have been placed for SNF   Ok to discharge from LPS standpoint      LPS to follow as needed      Discussed with: pt, RN, Dr. Ayala      Total time: 35min I spent greater than 50% of the time for patient care, counseling, and coordination on this date, including patient face-to face time, unit/floor time with review of records/pertinent lab data and studies, as well as discussing diagnostic evaluation/work up, planned therapeutic interventions, and future disposition of care, as per the interval events/subjective and the assessment and plan as noted above.  This is excluding above procedure.      Please note that this dictation was created using voice recognition software. I have  worked with technical experts from [employer] to optimize the interface.  I have made every  reasonable attempt to correct obvious errors, but there may be errors of grammar and possibly content that I did not discover before finalizing the note.    Christiane Jarquin, A.P.R.N.    If any questions or concerns, please contact LPS through voalte.

## 2023-07-25 NOTE — PROGRESS NOTES
Pt's 2 PIV painful and occluded at the beginning of the shift. Charge RN tried 2 times but unsuccessful. This RN and another nurse Laura, tried. Pt refused to be poked again at this time. Pt has scheduled Vancomycin IV at 2100. Charge RN Lexie powell, will call rapid RN to insert PIV.

## 2023-07-25 NOTE — PROGRESS NOTES
[facility] Internal Medicine Daily Progress Note    Date of Service  7/25/2023    R Team: R IM White Team   Attending: Ariel Hong M.d.  Senior Resident: Dr. Johnson  Contact Number: [phone]    Chief Complaint  Rogelio Escudero is a 48 y.o. male admitted 7/20/2023 with possible infection, leg pain, history of osteomyelitis/MRSA    Hospital Course  Rogelio Escudero is a 48 y.o. male who presented 7/20/2023 with past medical history of diabetes, coronary disease status post stent, hypertension, peripheral vascular disease who presents to the hospital for right leg and groin pain.  His pain starts in the right leg and radiates to his back.  He is also feeling weakness in his legs.  Patient states that he fell 2 days ago.  After he left the hospital he did notice some drainage from his right foot which has now resolved.  Patient recently had a femoral palpable bypass completed by Dr. Calderon on 7/14.  He was found to have osteomyelitis and leg ischemia.  At the time he grew Enterococcus and MRSA.  Patient was discharged to SNF on daptomycin and meropenem until 7/20.  Patient states that he has been noncompliant with his home insulin since he was not discharged with needles.  Patient states that he is still smoking cigarettes.       Interval Problem Update    No overnight events.  Patient is on oxycodone 15 mg prn q4, takes about 2 times in a day, has not needed IV pain med overnight. On gabapentin 100 mg prn for additional symptom control.  Slight induration noted on postsurgical wound on upper thigh, vascular has signed off, no I&D needed at this time.  Infectious disease has signed off, appreciate recommendations for IV antibiotics.  Still pending MRI of foot, previous MRI during patient's last admission: residual first through fifth metatarsals with findings of possible postsurgical change versus osteomyelitis vs abscess.     Patient needs Vancomycin until 8/17.  Midline okay to be placed while in  \[facility] [place] [facility]\""      I have discussed this patient's plan of care and discharge plan at IDT rounds today with Case Management, Nursing, Nursing leadership, and other members of the IDT team.    Consultants/Specialty  vascular surgery  Infectious disease    Code Status  Full Code    Disposition  Not medically cleared  Disposition planning  I have placed the appropriate orders for post-discharge needs.    Review of Systems  Review of Systems   Constitutional:  Negative for fever and malaise/fatigue.   Respiratory:  Negative for cough.    Cardiovascular:  Negative for chest pain, palpitations and PND.   Gastrointestinal:  Negative for heartburn and nausea.   Genitourinary:  Negative for dysuria.   Musculoskeletal:  Negative for neck pain.        See HPI   Neurological:  Negative for dizziness and weakness.   Psychiatric/Behavioral:  Negative for depression.         Physical Exam  Temp:  [36.1 °C (97 °F)-36.4 °C (97.6 °F)] 36.1 °C (97 °F)  Pulse:  [72-86] 72  Resp:  [16-18] 16  BP: ()/(53-73) 105/69  SpO2:  [94 %-99 %] 99 %    Physical Exam  Vitals and nursing note reviewed.   Constitutional:       General: He is not in acute distress.     Appearance: Normal appearance. He is not ill-appearing, toxic-appearing or diaphoretic.   HENT:      Head: Normocephalic and atraumatic.      Nose: No congestion or rhinorrhea.      Mouth/Throat:      Pharynx: No posterior oropharyngeal erythema.   Eyes:      General: No scleral icterus.        Right eye: No discharge.   Cardiovascular:      Rate and Rhythm: Normal rate and regular rhythm.      Pulses: Normal pulses.      Heart sounds: Normal heart sounds. No murmur heard.     No friction rub. No gallop.   Pulmonary:      Effort: Pulmonary effort is normal. No respiratory distress.      Breath sounds: Normal breath sounds. No stridor. No wheezing, rhonchi or rales.   Abdominal:      General: There is no distension.      Tenderness: There is no abdominal tenderness.   Musculoskeletal:          General: No swelling, tenderness, deformity or signs of injury.      Cervical back: Normal range of motion.      Right lower leg: No edema.      Left lower leg: No edema.   Skin:     Capillary Refill: Capillary refill takes less than 2 seconds.      Coloration: Skin is not jaundiced or pale.      Findings: Erythema (minimal erythema on incisional site) present. No bruising, lesion or rash.      Comments: Right leg tenderness, no gross purulence noted on wounds, incisional area   Neurological:      General: No focal deficit present.      Mental Status: He is alert and oriented to person, place, and time.           Fluids    Intake/Output Summary (Last 24 hours) at 7/25/2023 1252  Last data filed at 7/25/2023 0819  Gross per 24 hour   Intake 1520 ml   Output 2400 ml   Net -880 ml       Laboratory  Recent Labs     07/23/23  0734 07/24/23  0733 07/25/23  0115   WBC 11.0* 11.2* 12.8*   RBC 3.68* 3.76* 3.60*   HEMOGLOBIN 11.1* 11.1* 10.8*   HEMATOCRIT 34.1* 34.9* 33.8*   MCV 92.7 92.8 93.9   MCH 30.2 29.5 30.0   MCHC 32.6 31.8* 32.0*   RDW 51.5* 52.0* 52.5*   PLATELETCT 632* 688* 668*   MPV 8.5* 8.4* 8.2*     Recent Labs     07/23/23  0734 07/24/23  0733 07/25/23  0115   SODIUM 134* 132* 134*   POTASSIUM 4.2 4.1 4.2   CHLORIDE 101 100 100   CO2 22 23 21   GLUCOSE 167* 189* 149*   BUN 17 19 24*   CREATININE 0.73 0.71 0.89   CALCIUM 9.2 9.4 9.2                   Imaging  CT-CTA LOWER EXT WITH & W/O-POST PROCESS RIGHT   Final Result      1.  Recent RIGHT lower extremity vascular surgery with femoral to popliteal graft which appears patent.  Retrograde enhancement of distal femoral artery.   2.  Normal enhancement of trifurcation vessels.   3.  No hematoma or evidence for ongoing arterial hemorrhage.   4.  Diffuse subcutaneous edema of the RIGHT lower extremity.   5.  Fluid and gas tracks along the graft, consistent with recent surgery.   6.  RIGHT groin and inguinal adenopathy, likely reactive.      DX-CHEST-PORTABLE  (1 VIEW)   Final Result      Hypoinflation without other evidence for acute cardiopulmonary disease.      MR-FOOT-WITH & W/O RIGHT    (Results Pending)          Assessment/Plan  Problem Representation:    MRSA bacteremia- (present on admission)  Assessment & Plan  -continue IV vancomycin per infectious disease recommendations, stop date antibiotics 8/17/2023  -With recent MRSA bacteremia, incompletely treated as patient left AMA  --Blood cultures x2 from 7/5 and 7/8 has been negative, following blood culture this admission, negative to date  -patient is afebrile, hemodynamically stable  -echo from 7/10/23 negative for vegetations  -we fernando monitor vancomycin trough levels and cmp for renal function      Osteomyelitis (HCC)- (present on admission)  Assessment & Plan  -Pending MRI of right foot, previous MRI 07/12/23 during patient's last admission: residual first through fifth metatarsals with findings of possible postsurgical change versus osteomyelitis vs abscess, appreciate infectious disease recommendations  -Osteomyelitis, Right foot, S/P TMA with positive margins , intraop culture on 07/08/23 positive for enterecoccus faecalis and MRSA, sensitive to vancomycin and daptomycin  -per ID recommendations: addditional antibiotics will not cure his chronic osteomyelitis, also in the absence of debridement of any necrotic tissues that will serve as a continued nidus of infection, surgery as only curative option  - tight control of BS under 150 to help control infection    Peripheral artery disease (HCC)- (present on admission)  Assessment & Plan  -S/ p recent right fem-pop bypass with Dr. Calderon 07/14/23  -CTA of lower ext 07/20/23 demonstrated bypass graft still patent, vascular surgery Dr. Rowell consulted with no surgical interventions at this time,  continued treatment with antibiotics  - closely monitor as current symptoms are expected  -to continue home aspirin , Plavix and statins for now    Diabetes (HCC)-  (present on admission)  Assessment & Plan  - on insulin sliding scale with serial Accu-Checks  -HbA1c 11.3% 07/06/23  -Hypoglycemic protocol in place   -5 units glargine for now, goal blood glucose <150 to control infection per ID recommendations  -long acting insulin to be held if with concern for hypoglycemia    Thrombocytosis  Assessment & Plan  - likely reactive secondary to ongoing infection, also in the setting of leukocytosis  -Discussed with infectious , we will continue to monitor, continue antibiotics    Low back pain  Assessment & Plan  Patient does have a history of MRSA bacteremia, incompletely treated, crrently states that he has chronic back pain but is non radiating  Patient does not present with any red flag symptoms such as urinary /bowel incontinence, no fever, no weight loss, on physical examination does not present with spinal tenderness, also with new onset weakness/sensory deficit  - we will continue to monitor and if with any progression MRI L-spine could be considered    CAD (coronary artery disease)- (present on admission)  Assessment & Plan  -Continue aspirin, plavix and atorvastatin as above      Tobacco abuse- (present on admission)  Assessment & Plan  -Contact patient and reiterated the need for cessation of tobacco especially with his significant peripheral arterial occlusive disease  -Tobacco cessation education provided for more than 11 minutes.  We discussed the risks of smoking including increased risk of heart disease, stroke, cancer and COPD. We discussed the benefits of quitting smoking. We discussed options of nicotine patch, wellbutrin and chantix.  The patient has the intention to quit smoking. Nicotine replacement protocol will be provided to the patient.      Hypertension  Assessment & Plan  -Controlled, with holding parameters for lisinopril 10 mg             VTE prophylaxis: SCD    I have performed a physical exam and reviewed and updated ROS and Plan today (7/25/2023).  In review of yesterday's note (7/24/2023), there are no changes except as documented above.              Medically Cleared

## 2023-07-25 NOTE — CARE PLAN
The patient is Stable - Low risk of patient condition declining or worsening    Shift Goals  Clinical Goals: IV abx, possible midline/PICC placement?, wound care, pain mgmt  Patient Goals: shave, pain relief  Family Goals: LAXMI    Progress made toward(s) clinical / shift goals:      Problem: Knowledge Deficit - Standard  Goal: Patient and family/care givers will demonstrate understanding of plan of care, disease process/condition, diagnostic tests and medications  Outcome: Progressing  Note: Cont'd IV abx until mid August, plan for midline placement. Informed pt MRI no longer necessary per ID recs, plan for CT foot tonight at 2000.     Problem: Pain - Standard  Goal: Alleviation of pain or a reduction in pain to the patient’s comfort goal  Outcome: Progressing  Note: Pain managed with oxy 15mg PRN, morphine IV given for dressing change with relief at 1130.       Patient is not progressing towards the following goals:

## 2023-07-25 NOTE — CARE PLAN
The patient is Stable - Low risk of patient condition declining or worsening    Shift Goals  Clinical Goals: IV Abx, wound care, pain management  Patient Goals: pain relief, food  Family Goals: not present    Progress made toward(s) clinical / shift goals:    Problem: Pain - Standard  Goal: Alleviation of pain or a reduction in pain to the patient’s comfort goal  Outcome: Not Progressing  Note: Pt still c/o pain despite pain meds prn administration.     Problem: Knowledge Deficit - Standard  Goal: Patient and family/care givers will demonstrate understanding of plan of care, disease process/condition, diagnostic tests and medications  Outcome: Progressing  Note: Updated POC, wound care dressing, IV Abx, monitoring blood glucose,activity. Pt verbalized understanding.     Problem: Fall Risk  Goal: Patient will remain free from falls  Outcome: Progressing  Note: Fall precaution and hourly rounding in place     Problem: Skin Integrity  Goal: Skin integrity is maintained or improved  Outcome: Progressing  Note: Wound care dressing every 72 hours and prn. Wound care following       Patient is not progressing towards the following goals:      Problem: Pain - Standard  Goal: Alleviation of pain or a reduction in pain to the patient’s comfort goal  Outcome: Not Progressing  Note: Pt still c/o pain despite pain meds prn administration.

## 2023-07-26 ENCOUNTER — APPOINTMENT (OUTPATIENT)
Dept: RADIOLOGY | Facility: MEDICAL CENTER | Age: 48
DRG: 863 | End: 2023-07-26
Attending: STUDENT IN AN ORGANIZED HEALTH CARE EDUCATION/TRAINING PROGRAM
Payer: MEDICAID

## 2023-07-26 ENCOUNTER — APPOINTMENT (OUTPATIENT)
Dept: ONCOLOGY | Facility: MEDICAL CENTER | Age: 48
End: 2023-07-26
Attending: INTERNAL MEDICINE
Payer: MEDICAID

## 2023-07-26 LAB
ANION GAP SERPL CALC-SCNC: 19 MMOL/L (ref 7–16)
BUN SERPL-MCNC: 21 MG/DL (ref 8–22)
CALCIUM SERPL-MCNC: 9.9 MG/DL (ref 8.5–10.5)
CHLORIDE SERPL-SCNC: 100 MMOL/L (ref 96–112)
CO2 SERPL-SCNC: 17 MMOL/L (ref 20–33)
CREAT SERPL-MCNC: 0.82 MG/DL (ref 0.5–1.4)
CRP SERPL HS-MCNC: 3.58 MG/DL (ref 0–0.75)
ERYTHROCYTE [DISTWIDTH] IN BLOOD BY AUTOMATED COUNT: 51.3 FL (ref 35.9–50)
ERYTHROCYTE [SEDIMENTATION RATE] IN BLOOD BY WESTERGREN METHOD: 59 MM/HOUR (ref 0–20)
GFR SERPLBLD CREATININE-BSD FMLA CKD-EPI: 107 ML/MIN/1.73 M 2
GLUCOSE BLD STRIP.AUTO-MCNC: 148 MG/DL (ref 65–99)
GLUCOSE BLD STRIP.AUTO-MCNC: 174 MG/DL (ref 65–99)
GLUCOSE BLD STRIP.AUTO-MCNC: 200 MG/DL (ref 65–99)
GLUCOSE BLD STRIP.AUTO-MCNC: 216 MG/DL (ref 65–99)
GLUCOSE SERPL-MCNC: 122 MG/DL (ref 65–99)
HCT VFR BLD AUTO: 34.3 % (ref 42–52)
HGB BLD-MCNC: 11 G/DL (ref 14–18)
MCH RBC QN AUTO: 29.6 PG (ref 27–33)
MCHC RBC AUTO-ENTMCNC: 32.1 G/DL (ref 32.3–36.5)
MCV RBC AUTO: 92.5 FL (ref 81.4–97.8)
PLATELET # BLD AUTO: 692 K/UL (ref 164–446)
PMV BLD AUTO: 8.3 FL (ref 9–12.9)
POTASSIUM SERPL-SCNC: 4.3 MMOL/L (ref 3.6–5.5)
RBC # BLD AUTO: 3.71 M/UL (ref 4.7–6.1)
SODIUM SERPL-SCNC: 136 MMOL/L (ref 135–145)
WBC # BLD AUTO: 11.5 K/UL (ref 4.8–10.8)

## 2023-07-26 PROCEDURE — 85652 RBC SED RATE AUTOMATED: CPT

## 2023-07-26 PROCEDURE — C1751 CATH, INF, PER/CENT/MIDLINE: HCPCS

## 2023-07-26 PROCEDURE — 82962 GLUCOSE BLOOD TEST: CPT

## 2023-07-26 PROCEDURE — 86140 C-REACTIVE PROTEIN: CPT

## 2023-07-26 PROCEDURE — 700111 HCHG RX REV CODE 636 W/ 250 OVERRIDE (IP): Performed by: INTERNAL MEDICINE

## 2023-07-26 PROCEDURE — 85027 COMPLETE CBC AUTOMATED: CPT

## 2023-07-26 PROCEDURE — 700111 HCHG RX REV CODE 636 W/ 250 OVERRIDE (IP): Mod: JZ | Performed by: INTERNAL MEDICINE

## 2023-07-26 PROCEDURE — 80048 BASIC METABOLIC PNL TOTAL CA: CPT

## 2023-07-26 PROCEDURE — 99232 SBSQ HOSP IP/OBS MODERATE 35: CPT | Mod: GC | Performed by: STUDENT IN AN ORGANIZED HEALTH CARE EDUCATION/TRAINING PROGRAM

## 2023-07-26 PROCEDURE — A9270 NON-COVERED ITEM OR SERVICE: HCPCS | Performed by: HOSPITALIST

## 2023-07-26 PROCEDURE — 700102 HCHG RX REV CODE 250 W/ 637 OVERRIDE(OP): Performed by: HOSPITALIST

## 2023-07-26 PROCEDURE — 700105 HCHG RX REV CODE 258: Mod: JZ | Performed by: INTERNAL MEDICINE

## 2023-07-26 PROCEDURE — 700111 HCHG RX REV CODE 636 W/ 250 OVERRIDE (IP): Mod: JZ | Performed by: HOSPITALIST

## 2023-07-26 PROCEDURE — 36415 COLL VENOUS BLD VENIPUNCTURE: CPT

## 2023-07-26 PROCEDURE — A9270 NON-COVERED ITEM OR SERVICE: HCPCS | Performed by: INTERNAL MEDICINE

## 2023-07-26 PROCEDURE — 770001 HCHG ROOM/CARE - MED/SURG/GYN PRIV*

## 2023-07-26 PROCEDURE — 05HY33Z INSERTION OF INFUSION DEVICE INTO UPPER VEIN, PERCUTANEOUS APPROACH: ICD-10-PCS | Performed by: STUDENT IN AN ORGANIZED HEALTH CARE EDUCATION/TRAINING PROGRAM

## 2023-07-26 PROCEDURE — 700102 HCHG RX REV CODE 250 W/ 637 OVERRIDE(OP): Performed by: INTERNAL MEDICINE

## 2023-07-26 RX ADMIN — VANCOMYCIN HYDROCHLORIDE 1500 MG: 5 INJECTION, POWDER, LYOPHILIZED, FOR SOLUTION INTRAVENOUS at 23:20

## 2023-07-26 RX ADMIN — BUPROPION HYDROCHLORIDE 150 MG: 75 TABLET, FILM COATED ORAL at 05:17

## 2023-07-26 RX ADMIN — OXYCODONE HYDROCHLORIDE 15 MG: 10 TABLET ORAL at 21:09

## 2023-07-26 RX ADMIN — INSULIN HUMAN 4 UNITS: 100 INJECTION, SOLUTION PARENTERAL at 17:36

## 2023-07-26 RX ADMIN — CLOPIDOGREL BISULFATE 75 MG: 75 TABLET ORAL at 05:17

## 2023-07-26 RX ADMIN — ONDANSETRON 4 MG: 2 INJECTION INTRAMUSCULAR; INTRAVENOUS at 15:21

## 2023-07-26 RX ADMIN — INSULIN HUMAN 3 UNITS: 100 INJECTION, SOLUTION PARENTERAL at 11:29

## 2023-07-26 RX ADMIN — ESCITALOPRAM OXALATE 10 MG: 10 TABLET ORAL at 05:17

## 2023-07-26 RX ADMIN — ATORVASTATIN CALCIUM 20 MG: 20 TABLET, FILM COATED ORAL at 21:09

## 2023-07-26 RX ADMIN — VANCOMYCIN HYDROCHLORIDE 1500 MG: 5 INJECTION, POWDER, LYOPHILIZED, FOR SOLUTION INTRAVENOUS at 11:29

## 2023-07-26 RX ADMIN — INSULIN HUMAN 3 UNITS: 100 INJECTION, SOLUTION PARENTERAL at 21:07

## 2023-07-26 RX ADMIN — OXYCODONE HYDROCHLORIDE 15 MG: 10 TABLET ORAL at 05:16

## 2023-07-26 RX ADMIN — OXYCODONE HYDROCHLORIDE 15 MG: 10 TABLET ORAL at 15:31

## 2023-07-26 RX ADMIN — MORPHINE SULFATE 2 MG: 4 INJECTION, SOLUTION INTRAMUSCULAR; INTRAVENOUS at 00:36

## 2023-07-26 RX ADMIN — ASPIRIN 81 MG: 81 TABLET, COATED ORAL at 05:16

## 2023-07-26 RX ADMIN — OMEPRAZOLE 20 MG: 20 CAPSULE, DELAYED RELEASE ORAL at 05:17

## 2023-07-26 ASSESSMENT — ENCOUNTER SYMPTOMS
FEVER: 0
PALPITATIONS: 0
HEARTBURN: 0
COUGH: 0
DEPRESSION: 0
WEAKNESS: 0
NAUSEA: 0
NECK PAIN: 0
DIZZINESS: 0
PND: 0

## 2023-07-26 ASSESSMENT — PAIN DESCRIPTION - PAIN TYPE
TYPE: ACUTE PAIN

## 2023-07-26 NOTE — PROCEDURES
Vascular Access Team    Date of Insertion: 7/26/23  Arm Circumference: 32.5  Internal length: 16  External Length: 0  Vein Occupancy %: 32  Reason for Midline: antibiotic therapy  Labs: WBC 11.5, , BUN 24, Cr 0.89, , INR na    Orders confirmed, vessel patency confirmed with ultrasound. Risks and benefits of procedure explained to patient and education regarding line associated bloodstream infections provided. Questions answered.     Power Midline placed in RUE per licensed provider order with ultrasound guidance. 4 Fr, single lumen Power Midline placed in basilic vein after 1 attempt(s). 2 mL of 1% lidocaine injected intradermally, 21 gauge microintroducer needle was visualized entering the vein and modified Seldinger technique used. 16 cm catheter inserted with good blood return. Secured at 0 cm marker. Internal positioning stylet removed and verified to be intact. Each lumen flushed without resistance with 10 mL 0.9% normal saline. Midline secured with Biopatch and Tegaderm.     Midline placement is confirmed by nurse using ultrasound and ability to flush and draw blood. Midline is appropriate for use at this time.  Patient tolerated procedure well, without complications.  No X-ray is needed for placement confirmation.  Patient condition relayed to unit RN or ordering physician via this post procedure note in the EMR.     Ultrasound images uploaded to PACS and viewable in the EMR - yes  Ultrasound imaged printed and placed in paper chart - no     BARD Power Midline ref # [id number], Lot # [id number], Expiration Date 07/31/2024

## 2023-07-26 NOTE — CARE PLAN
The patient is Stable - Low risk of patient condition declining or worsening    Shift Goals  Clinical Goals: Abx, Wound care, Pain Management  Patient Goals: Rest, Pain control  Family Goals: LAXMI    Progress made toward(s) clinical / shift goals:     Problem: Fall Risk  Goal: Patient will remain free from falls    Description  Target End Date: Prior to discharge or change in level of care     Document interventions on the [facility] Fall Risk Assessment   1. Assess for fall risk factors   2. Implement fall precautions    Outcome: Progressing  Note: Encouraged and instructed to call for assistance. Bed alarm on. Side rails up. Call button within reach. Rounded on patient frequently. Provided walker as needed. Placed on standby assists.    Problem: Pain - Standard  Goal: Alleviation of pain or a reduction in pain to the patient’s comfort goal    Description  Target End Date: Prior to discharge or change in level of care     Document on Vitals flowsheet   1. Document pain using the appropriate pain scale per order or unit policy   2. Educate and implement non-pharmacologic comfort measures (i.e. relaxation, distraction, massage, cold/heat therapy, etc.)   3. Pain management medications as ordered   4. Reassess pain after pain med administration per policy   5. If opiods administered assess patient's response to pain medication is appropriate per POSS sedation scale 6. Follow pain management plan developed in collaboration with patient and interdisciplinary team (including palliative care or pain specialists if applicable)    Outcome: Progressing  Note: Patient reports pain at PS 9 /10 on BLEs R>L. PRN Oxycodone 15 mg PO given. Monitored for sedation level and side effects. Patient pain was relieved after medication administration per patient. Continued pain level assessment and monitoring all throughout shift.      Problem: Skin Integrity  Goal: Skin integrity is maintained or improved    Description  Target End  Date: Prior to discharge or change in level of care     Document interventions on Skin Risk/Rogelio flowsheet groups and corresponding LDA     1. Assess and monitor skin integrity, appearance and/or temperature   2. Assess risk factors for impaired skin integrity and/or pressures ulcers   3. Implement precautions to protect skin integrity in collaboration with interdisciplinary team   4. Implement pressure ulcer prevention protocol if at risk for skin breakdown   5. Confirm wound care consult if at risk for skin breakdown 6. Ensure patient use of pressure relieving devices (Low air loss bed, waffle overlay, heel protectors, ROHO cushion, etc)    Outcome: Progressing  Note: Encouraged turning side to side; patient able to reposition self. Waffle mattress in place. Extra pillow used for positional support and heel offloading. Barrier cream as needed. Wound dressing changed.

## 2023-07-26 NOTE — PROGRESS NOTES
[facility] Internal Medicine Daily Progress Note    Date of Service  7/26/2023    R Team: R IM White Team   Attending: Ariel Hong M.d.  Senior Resident: Dr. Johnson  Contact Number: [phone]    Chief Complaint  Rogelio Escudero is a 48 y.o. male admitted 7/20/2023 with possible infection, leg pain, history of osteomyelitis/MRSA    Hospital Course  Rogelio Escudero is a 48 y.o. male who presented 7/20/2023 with past medical history of diabetes, coronary disease status post stent, hypertension, peripheral vascular disease who presents to the hospital for right leg and groin pain.  His pain starts in the right leg and radiates to his back.  He is also feeling weakness in his legs.  Patient states that he fell 2 days ago.  After he left the hospital he did notice some drainage from his right foot which has now resolved.  Patient recently had a femoral palpable bypass completed by Dr. Calderon on 7/14.  He was found to have osteomyelitis and leg ischemia.  At the time he grew Enterococcus and MRSA.  Patient was discharged to SNF on daptomycin and meropenem until 7/20.  Patient states that he has been noncompliant with his home insulin since he was not discharged with needles.  Patient states that he is still smoking cigarettes.       Interval Problem Update    No overnight events. Midline placed. Infectious disease has signed off, stop date of Vancomycin 08/17/23. LPS saw patient also has signed out, no need for intervention.  Pain improved, glucose 160-170, we will increase glargine to 7 units nightly. Ct of right foot negative for abscess. Patient is medically clear for discharge, pending placement. Complex disposition planning.       I have discussed this patient's plan of care and discharge plan at IDT rounds today with Case Management, Nursing, Nursing leadership, and other members of the IDT team.    Consultants/Specialty  vascular surgery  Infectious disease  LPS    Code Status  Full  Code    Disposition  Medically cleared  Disposition planning  I have placed the appropriate orders for post-discharge needs.    Review of Systems  Review of Systems   Constitutional:  Negative for fever and malaise/fatigue.   Respiratory:  Negative for cough.    Cardiovascular:  Negative for chest pain, palpitations and PND.   Gastrointestinal:  Negative for heartburn and nausea.   Genitourinary:  Negative for dysuria.   Musculoskeletal:  Negative for neck pain.   Neurological:  Negative for dizziness and weakness.   Psychiatric/Behavioral:  Negative for depression.         Physical Exam  Temp:  [35.9 °C (96.7 °F)-36.3 °C (97.3 °F)] 35.9 °C (96.7 °F)  Pulse:  [70-82] 70  Resp:  [16-18] 18  BP: (101-108)/(64-72) 108/72  SpO2:  [97 %-99 %] 99 %    Physical Exam  Vitals and nursing note reviewed.   Constitutional:       General: He is not in acute distress.     Appearance: Normal appearance. He is not ill-appearing, toxic-appearing or diaphoretic.   HENT:      Head: Normocephalic and atraumatic.      Nose: No congestion or rhinorrhea.      Mouth/Throat:      Pharynx: No posterior oropharyngeal erythema.   Eyes:      General: No scleral icterus.        Right eye: No discharge.   Cardiovascular:      Rate and Rhythm: Normal rate and regular rhythm.      Pulses: Normal pulses.      Heart sounds: Normal heart sounds. No murmur heard.     No friction rub. No gallop.   Pulmonary:      Effort: Pulmonary effort is normal. No respiratory distress.      Breath sounds: Normal breath sounds. No stridor. No wheezing, rhonchi or rales.   Abdominal:      General: There is no distension.      Tenderness: There is no abdominal tenderness.   Musculoskeletal:         General: No swelling, tenderness, deformity or signs of injury.      Cervical back: Normal range of motion.      Right lower leg: No edema.      Left lower leg: No edema.      Comments: Midline in place, right   Skin:     Capillary Refill: Capillary refill takes less than 2  seconds.      Coloration: Skin is not jaundiced or pale.      Findings: Erythema (swelling/very minimal erythema on incisional site) present. No bruising, lesion or rash.      Comments: Right leg tenderness, no gross purulence noted on wounds, incisional area   Neurological:      General: No focal deficit present.      Mental Status: He is alert and oriented to person, place, and time.           Fluids    Intake/Output Summary (Last 24 hours) at 7/26/2023 1138  Last data filed at 7/26/2023 1100  Gross per 24 hour   Intake 1560 ml   Output 3450 ml   Net -1890 ml       Laboratory  Recent Labs     07/24/23  0733 07/25/23  0115 07/26/23  0432   WBC 11.2* 12.8* 11.5*   RBC 3.76* 3.60* 3.71*   HEMOGLOBIN 11.1* 10.8* 11.0*   HEMATOCRIT 34.9* 33.8* 34.3*   MCV 92.8 93.9 92.5   MCH 29.5 30.0 29.6   MCHC 31.8* 32.0* 32.1*   RDW 52.0* 52.5* 51.3*   PLATELETCT 688* 668* 692*   MPV 8.4* 8.2* 8.3*     Recent Labs     07/24/23  0733 07/25/23  0115   SODIUM 132* 134*   POTASSIUM 4.1 4.2   CHLORIDE 100 100   CO2 23 21   GLUCOSE 189* 149*   BUN 19 24*   CREATININE 0.71 0.89   CALCIUM 9.4 9.2                   Imaging  IR-MIDLINE CATHETER INSERTION WO GUIDANCE > AGE 3   Final Result                  Ultrasound-guided midline placement performed by qualified nursing staff    as above.          CT-FOOT WITH PLUS RECONS RIGHT   Final Result         1.  No acute traumatic bony injury identified. No destructive osseous lesion is identified to indicate osteomyelitis, note that radiography can be insensitive for evaluation of osteomyelitis and bone scan would offer improved diagnostic sensitivity.   2.  Subcutaneous fat stranding and overlying skin thickening. Changes of cellulitis.   3.  No enhancing fluid collection is identified to suggest abscess at this time.      CT-CTA LOWER EXT WITH & W/O-POST PROCESS RIGHT   Final Result      1.  Recent RIGHT lower extremity vascular surgery with femoral to popliteal graft which appears patent.   Retrograde enhancement of distal femoral artery.   2.  Normal enhancement of trifurcation vessels.   3.  No hematoma or evidence for ongoing arterial hemorrhage.   4.  Diffuse subcutaneous edema of the RIGHT lower extremity.   5.  Fluid and gas tracks along the graft, consistent with recent surgery.   6.  RIGHT groin and inguinal adenopathy, likely reactive.      DX-CHEST-PORTABLE (1 VIEW)   Final Result      Hypoinflation without other evidence for acute cardiopulmonary disease.             Assessment/Plan  Problem Representation:    MRSA bacteremia- (present on admission)  Assessment & Plan  -continue IV vancomycin per infectious disease recommendations, stop date antibiotics 8/17/2023  -With recent MRSA bacteremia, incompletely treated as patient left AMA  --Blood cultures x2 from 7/5 and 7/8 has been negative, following blood culture this admission, negative to date  -patient is afebrile, hemodynamically stable  -echo from 7/10/23 negative for vegetations  -we fernando monitor vancomycin trough levels and cmp for renal function      Osteomyelitis (HCC)- (present on admission)  Assessment & Plan  -CT scan , Rt foot 07/25/23 negative for abscess. Previous MRI 07/12/23 during patient's last admission: residual first through fifth metatarsals with findings of possible postsurgical change versus osteomyelitis vs abscess, per LPS most likely seroma, they have been consulted and no surgical interventions at this time, they have signed off.   -Osteomyelitis, Right foot, S/P TMA with positive margins , intraop culture on 07/08/23 positive for enterecoccus faecalis and MRSA, sensitive to vancomycin and daptomycin  -per ID recommendations: addditional antibiotics will not cure his chronic osteomyelitis, also in the absence of debridement of any necrotic tissues that will serve as a continued nidus of infection, surgery as only curative option  - tight control of BS under 150 to help control infection(see plan  below)    Peripheral artery disease (HCC)- (present on admission)  Assessment & Plan  -S/ p recent right fem-pop bypass with Dr. Calderon 07/14/23  -seen again by vascular during this admission,no interventions at this time, they have signed off   -CTA of lower ext 07/20/23 demonstrated bypass graft still patent  - continue home aspirin , Plavix and statins     Diabetes (HCC)- (present on admission)  Assessment & Plan  - on insulin sliding scale with serial Accu-Checks, 5 units glargine increase to 7 units   -HbA1c 11.3% 07/06/23  -Hypoglycemic protocol in place   -goal blood glucose <150 to control infection per ID recommendations  -long acting insulin to be held if with concern for hypoglycemia    Thrombocytosis  Assessment & Plan  - likely reactive secondary to infection, that is being treated appropriately, also in the setting of leukocytosis  - continue antibiotics, every 3 days CBC while pending placement    Low back pain  Assessment & Plan  - history of MRSA bacteremia, incompletely treated, crrently states that he has chronic back pain but is non radiating  -Patient does not present with any red flag symptoms such as urinary /bowel incontinence, no fever, no weight loss, on physical examination does not present with spinal tenderness, also with new onset weakness/sensory deficit  -  if with any progression MRI L-spine could be considered    CAD (coronary artery disease)- (present on admission)  Assessment & Plan  -Continue aspirin, plavix and atorvastatin as above      Tobacco abuse- (present on admission)  Assessment & Plan  -reiterated the need for cessation of tobacco especially with his significant peripheral arterial occlusive disease  -Tobacco cessation education provided for more than 11 minutes.  We discussed the risks of smoking including increased risk of heart disease, stroke, cancer and COPD. We discussed the benefits of quitting smoking. We discussed options of nicotine patch, wellbutrin and  chantix.  The patient has the intention to quit smoking. Nicotine replacement protocol will be provided to the patient.      Hypertension  Assessment & Plan  -Controlled, held lisinopril 10 mg due to soft blood pressure  -asymptomatic             VTE prophylaxis: SCD    I have performed a physical exam and reviewed and updated ROS and Plan today (7/26/2023). In review of yesterday's note (7/25/2023), there are no changes except as documented above.              Medically Cleared

## 2023-07-26 NOTE — DISCHARGE PLANNING
Agency/Facility Name: [facility]  Spoke To: Elder  Outcome: ROLANDO called facility regarding if they are able to take patient in tomorrow. Per Elder she will call back ROLANDO after her morning meeting.

## 2023-07-26 NOTE — PROGRESS NOTES
Wound care done; patient requesting morphine. Morphine was given earlier requested another dose from Dr. Yi; may give morphine 2 mg IV for wound care.

## 2023-07-26 NOTE — CARE PLAN
Problem: Pain - Standard  Goal: Alleviation of pain or a reduction in pain to the patient’s comfort goal  Outcome: Progressing     Problem: Skin Integrity  Goal: Skin integrity is maintained or improved  Outcome: Progressing   The patient is Stable - Low risk of patient condition declining or worsening    Shift Goals  Clinical Goals: abx, wound care, pain mgmt  Patient Goals: pain control, rest  Family Goals: jacklyn    Progress made toward(s) clinical / shift goals:  patient medicated for pain, continue to monitor, patient is a 2 hour turn, assesses ramirez prominences, keep heels off loaded, monitor wounds.    Patient is not progressing towards the following goals:

## 2023-07-27 ENCOUNTER — APPOINTMENT (OUTPATIENT)
Dept: ONCOLOGY | Facility: MEDICAL CENTER | Age: 48
End: 2023-07-27
Attending: INTERNAL MEDICINE
Payer: MEDICAID

## 2023-07-27 LAB
GLUCOSE BLD STRIP.AUTO-MCNC: 147 MG/DL (ref 65–99)
GLUCOSE BLD STRIP.AUTO-MCNC: 158 MG/DL (ref 65–99)
GLUCOSE BLD STRIP.AUTO-MCNC: 176 MG/DL (ref 65–99)
GLUCOSE BLD STRIP.AUTO-MCNC: 193 MG/DL (ref 65–99)

## 2023-07-27 PROCEDURE — 700102 HCHG RX REV CODE 250 W/ 637 OVERRIDE(OP): Performed by: HOSPITALIST

## 2023-07-27 PROCEDURE — 700102 HCHG RX REV CODE 250 W/ 637 OVERRIDE(OP): Performed by: INTERNAL MEDICINE

## 2023-07-27 PROCEDURE — 99232 SBSQ HOSP IP/OBS MODERATE 35: CPT | Mod: GC | Performed by: STUDENT IN AN ORGANIZED HEALTH CARE EDUCATION/TRAINING PROGRAM

## 2023-07-27 PROCEDURE — 770001 HCHG ROOM/CARE - MED/SURG/GYN PRIV*

## 2023-07-27 PROCEDURE — 700111 HCHG RX REV CODE 636 W/ 250 OVERRIDE (IP): Mod: JZ | Performed by: HOSPITALIST

## 2023-07-27 PROCEDURE — 700111 HCHG RX REV CODE 636 W/ 250 OVERRIDE (IP): Mod: JZ

## 2023-07-27 PROCEDURE — A9270 NON-COVERED ITEM OR SERVICE: HCPCS | Performed by: INTERNAL MEDICINE

## 2023-07-27 PROCEDURE — A9270 NON-COVERED ITEM OR SERVICE: HCPCS | Performed by: HOSPITALIST

## 2023-07-27 PROCEDURE — 82962 GLUCOSE BLOOD TEST: CPT | Mod: 91

## 2023-07-27 PROCEDURE — 97116 GAIT TRAINING THERAPY: CPT

## 2023-07-27 PROCEDURE — 700105 HCHG RX REV CODE 258

## 2023-07-27 RX ADMIN — OXYCODONE HYDROCHLORIDE 15 MG: 10 TABLET ORAL at 13:27

## 2023-07-27 RX ADMIN — OXYCODONE HYDROCHLORIDE 15 MG: 10 TABLET ORAL at 05:33

## 2023-07-27 RX ADMIN — OXYCODONE HYDROCHLORIDE 15 MG: 10 TABLET ORAL at 17:32

## 2023-07-27 RX ADMIN — ONDANSETRON 4 MG: 2 INJECTION INTRAMUSCULAR; INTRAVENOUS at 10:35

## 2023-07-27 RX ADMIN — OXYCODONE HYDROCHLORIDE 15 MG: 10 TABLET ORAL at 22:20

## 2023-07-27 RX ADMIN — INSULIN HUMAN 3 UNITS: 100 INJECTION, SOLUTION PARENTERAL at 20:46

## 2023-07-27 RX ADMIN — INSULIN HUMAN 3 UNITS: 100 INJECTION, SOLUTION PARENTERAL at 17:32

## 2023-07-27 RX ADMIN — CLOPIDOGREL BISULFATE 75 MG: 75 TABLET ORAL at 05:34

## 2023-07-27 RX ADMIN — OXYCODONE HYDROCHLORIDE 10 MG: 10 TABLET ORAL at 09:48

## 2023-07-27 RX ADMIN — DAPTOMYCIN 650 MG: 500 INJECTION, POWDER, LYOPHILIZED, FOR SOLUTION INTRAVENOUS at 13:33

## 2023-07-27 RX ADMIN — OMEPRAZOLE 20 MG: 20 CAPSULE, DELAYED RELEASE ORAL at 05:33

## 2023-07-27 RX ADMIN — INSULIN HUMAN 3 UNITS: 100 INJECTION, SOLUTION PARENTERAL at 08:45

## 2023-07-27 RX ADMIN — OXYCODONE HYDROCHLORIDE 15 MG: 10 TABLET ORAL at 01:31

## 2023-07-27 RX ADMIN — ASPIRIN 81 MG: 81 TABLET, COATED ORAL at 05:33

## 2023-07-27 RX ADMIN — ESCITALOPRAM OXALATE 10 MG: 10 TABLET ORAL at 05:33

## 2023-07-27 ASSESSMENT — ENCOUNTER SYMPTOMS
DEPRESSION: 0
COUGH: 0
HEARTBURN: 0
WEAKNESS: 0
PND: 0
NAUSEA: 0
NECK PAIN: 0
PALPITATIONS: 0
FEVER: 0
DIZZINESS: 0

## 2023-07-27 ASSESSMENT — PAIN DESCRIPTION - PAIN TYPE
TYPE: ACUTE PAIN

## 2023-07-27 ASSESSMENT — COGNITIVE AND FUNCTIONAL STATUS - GENERAL
SUGGESTED CMS G CODE MODIFIER MOBILITY: CK
MOVING TO AND FROM BED TO CHAIR: A LITTLE
MOVING FROM LYING ON BACK TO SITTING ON SIDE OF FLAT BED: A LITTLE
MOBILITY SCORE: 18
STANDING UP FROM CHAIR USING ARMS: A LITTLE
WALKING IN HOSPITAL ROOM: A LITTLE
CLIMB 3 TO 5 STEPS WITH RAILING: A LOT

## 2023-07-27 ASSESSMENT — PATIENT HEALTH QUESTIONNAIRE - PHQ9
1. LITTLE INTEREST OR PLEASURE IN DOING THINGS: NOT AT ALL
1. LITTLE INTEREST OR PLEASURE IN DOING THINGS: NOT AT ALL
2. FEELING DOWN, DEPRESSED, IRRITABLE, OR HOPELESS: NOT AT ALL
2. FEELING DOWN, DEPRESSED, IRRITABLE, OR HOPELESS: NOT AT ALL
SUM OF ALL RESPONSES TO PHQ9 QUESTIONS 1 AND 2: 0
SUM OF ALL RESPONSES TO PHQ9 QUESTIONS 1 AND 2: 0

## 2023-07-27 ASSESSMENT — GAIT ASSESSMENTS
ASSISTIVE DEVICE: FRONT WHEEL WALKER
DISTANCE (FEET): 160
DEVIATION: STEP TO;BRADYKINETIC;ANTALGIC
GAIT LEVEL OF ASSIST: STANDBY ASSIST

## 2023-07-27 NOTE — THERAPY
Physical Therapy   Daily Treatment     Patient Name: Rogelio Escudero  Age:  48 y.o., Sex:  male  Medical Record #: [id number]  Today's Date: 7/27/2023     Precautions  Precautions: Fall Risk;Weight Bearing As Tolerated Right Lower Extremity  Comments: supposed to have post op shoe but it is at home per pt.    Assessment    Pt seen for PT treatment session. Demonstrated improved functional mobility, gait with FWW, and overall activity tolerance. Continues to be limited by pain at bypass graft site; however, pt able to progressively improve upright posture and reciprocal gait with continued ambulation today. Pt reports he is currently residing with his cousin and apartment has elevator access so no steps to negotiate. At this time, pt appears capable of DC home when medically cleared but may benefit from post acute placement for medical needs (ABX treatment, wound care).     Plan    Treatment Plan Status: Continue Current Treatment Plan  Type of Treatment: Equipment, Gait Training, Therapeutic Activities, Therapeutic Exercise, Stair Training, Orthotics Training , Neuro Re-Education / Balance  Treatment Frequency: 3 Times per Week  Treatment Duration: Until Therapy Goals Met    DC Equipment Recommendations: Unable to determine at this time (dont know if pt got a FWW at SNF?)  Discharge Recommendations:  (placement vs home with HH depending on wound care/ABX needs)       07/27/23 1511   Precautions   Precautions Fall Risk;Weight Bearing As Tolerated Right Lower Extremity   Comments supposed to have post op shoe but it is at home per pt.   Vitals   O2 Delivery Device None - Room Air   Pain 0 - 10 Group   Therapist Pain Assessment During Activity;Nurse Notified  (groin pain at bypass site)   Cognition    Cognition / Consciousness WDL   Level of Consciousness Alert   Comments agreeable and greatful of mobility opportunity   Active ROM Lower Body    Active ROM Lower Body  X   Comments R LE limited by pain from bypass  graft site   Strength Lower Body   Lower Body Strength  X   Comments R LE painful but otherwise WFL, R TMA.   Balance   Sitting Balance (Static) Good   Sitting Balance (Dynamic) Fair +   Standing Balance (Static) Fair +   Standing Balance (Dynamic) Fair   Weight Shift Sitting Good   Weight Shift Standing Fair   Skilled Intervention Verbal Cuing   Comments w/ FWW   Bed Mobility    Supine to Sit Supervised   Sit to Supine   (seated in chair at end of session)   Scooting Supervised   Skilled Intervention Verbal Cuing   Gait Analysis   Gait Level Of Assist Standby Assist   Assistive Device Front Wheel Walker   Distance (Feet) 160   Deviation Step To;Bradykinetic;Antalgic  (progressing to reciprocal gait with dec step length)   # of Stairs Climbed 0   Weight Bearing Status WBAT R LE, has post op shoe at home. EMR notes minimally compliant with footwear   Skilled Intervention Verbal Cuing   Comments progressively able to improve upright posture, hip extension and reciprocal gait as ambulation continued   Functional Mobility   Sit to Stand Standby Assist   Bed, Chair, Wheelchair Transfer Standby Assist   Transfer Method Stand Step   Skilled Intervention Verbal Cuing   How much difficulty does the patient currently have...   Turning over in bed (including adjusting bedclothes, sheets and blankets)? 4   Sitting down on and standing up from a chair with arms (e.g., wheelchair, bedside commode, etc.) 3   Moving from lying on back to sitting on the side of the bed? 3   How much help from another person does the patient currently need...   Moving to and from a bed to a chair (including a wheelchair)? 3   Need to walk in a hospital room? 3   Climbing 3-5 steps with a railing? 2   6 clicks Mobility Score 18   Activity Tolerance   Sitting in Chair up to chair at end of session   Sitting Edge of Bed 3 min   Standing 8-10 min   Patient / Family Goals    Patient / Family Goal #1 to have less pain   Goal #1 Outcome Progressing as  expected   Short Term Goals    Short Term Goal # 1 transfers with FWW SBA in 6 visits   Goal Outcome # 1 Goal met   Short Term Goal # 2 amb with FWW 100ft SBA in 6 visits   Goal Outcome # 2 Goal met, new goal added   Short Term Goal # 2 B  Pt will ambulate > 200 ft with LRAD and SPV to access community in 6 visits   Short Term Goal # 3 up/down 5 steps with railing SBA in 6 visits   Goal Outcome # 3 Goal not met   Physical Therapy Treatment Plan   Physical Therapy Treatment Plan Continue Current Treatment Plan   Anticipated Discharge Equipment and Recommendations   Discharge Recommendations   (placement vs home with HH depending on wound care/ABX needs)   Interdisciplinary Plan of Care Collaboration   IDT Collaboration with  Nursing   Patient Position at End of Therapy Seated;Chair Alarm On;Call Light within Reach;Tray Table within Reach;Phone within Reach   Collaboration Comments aware of PT session   Session Information   Date / Session Number  7/27- 2 (2/3, 7/29)

## 2023-07-27 NOTE — PROGRESS NOTES
[facility] Internal Medicine Daily Progress Note    Date of Service  7/27/2023    [facility] Team: [facility] IM White Team   Attending: Ariel Hong M.d.  Senior Resident: Dr. Johnson  Contact Number: [phone]    Chief Complaint  Rogelio Escudero is a 48 y.o. male admitted 7/20/2023 with possible infection, leg pain, history of osteomyelitis/MRSA    Hospital Course  Rogelio Escudero is a 48 y.o. male who presented 7/20/2023 with past medical history of diabetes, coronary disease status post stent, hypertension, peripheral vascular disease who presents to the hospital for right leg and groin pain.  His pain starts in the right leg and radiates to his back.  He is also feeling weakness in his legs.  Patient states that he fell 2 days ago.  After he left the hospital he did notice some drainage from his right foot which has now resolved.  Patient recently had a femoral palpable bypass completed by Dr. Calderon on 7/14.  He was found to have osteomyelitis and leg ischemia.  At the time he grew Enterococcus and MRSA.  Patient was discharged to SNF on daptomycin and meropenem until 7/20.  Patient states that he has been noncompliant with his home insulin since he was not discharged with needles.  Patient states that he is still smoking cigarettes.       Interval Problem Update    Patient transitioned to daptomycin today 7/27/2023, stop date 08/17/23.    Medically cleared for discharge. Awaiting acceptance at [facility], pending bed availability.   Appreciate input from  today 07/27/23 , once patient finishes antibiotics in skilled nursing facility, he would need to follow-up with outpatient community infectious disease,patient is on ID clinic follow up list.  Likewise, patient will be needing weekly CBC with differential, CMP and CPK monitoring while on daptomycin at the skilled nursing facility.  Results of the test to be faxed at: [phone].    I have discussed this patient's plan of care and discharge plan at IDT  rounds today with Case Management, Nursing, Nursing leadership, and other members of the IDT team.    Consultants/Specialty  vascular surgery  Infectious disease  LPS    Code Status  Full Code    Disposition  Medically cleared, awaiting bed/acceptance at SNF    I have placed the appropriate orders for post-discharge needs.    Review of Systems  Review of Systems   Constitutional:  Negative for fever and malaise/fatigue.   Respiratory:  Negative for cough.    Cardiovascular:  Negative for chest pain, palpitations and PND.   Gastrointestinal:  Negative for heartburn and nausea.   Genitourinary:  Negative for dysuria.   Musculoskeletal:  Negative for neck pain.   Neurological:  Negative for dizziness and weakness.   Psychiatric/Behavioral:  Negative for depression.         Physical Exam  Temp:  [36.3 °C (97.3 °F)-36.6 °C (97.9 °F)] 36.6 °C (97.9 °F)  Pulse:  [73-90] 90  Resp:  [16-18] 16  BP: (105-118)/(59-73) 107/73  SpO2:  [97 %-100 %] 98 %    Physical Exam  Vitals and nursing note reviewed.   Constitutional:       General: He is not in acute distress.     Appearance: Normal appearance. He is not ill-appearing, toxic-appearing or diaphoretic.   HENT:      Head: Normocephalic and atraumatic.      Nose: No congestion or rhinorrhea.      Mouth/Throat:      Pharynx: No posterior oropharyngeal erythema.   Eyes:      General: No scleral icterus.        Right eye: No discharge.   Cardiovascular:      Rate and Rhythm: Normal rate and regular rhythm.      Pulses: Normal pulses.      Heart sounds: Normal heart sounds. No murmur heard.     No friction rub. No gallop.   Pulmonary:      Effort: Pulmonary effort is normal. No respiratory distress.      Breath sounds: Normal breath sounds. No stridor. No wheezing, rhonchi or rales.   Abdominal:      General: There is no distension.      Tenderness: There is no abdominal tenderness.   Musculoskeletal:         General: No swelling, tenderness, deformity or signs of injury.       Cervical back: Normal range of motion.      Right lower leg: No edema.      Left lower leg: No edema.      Comments: Midline in place, right   Skin:     Capillary Refill: Capillary refill takes less than 2 seconds.      Coloration: Skin is not jaundiced or pale.      Findings: No bruising, erythema (swelling/very minimal erythema on incisional site), lesion or rash.      Comments: Very minimal right leg tenderness, no gross purulence noted on wounds/ incisional area   Neurological:      General: No focal deficit present.      Mental Status: He is alert and oriented to person, place, and time.           Fluids    Intake/Output Summary (Last 24 hours) at 7/27/2023 1612  Last data filed at 7/27/2023 1518  Gross per 24 hour   Intake 1680 ml   Output 1650 ml   Net 30 ml       Laboratory  Recent Labs     07/25/23  0115 07/26/23  0432   WBC 12.8* 11.5*   RBC 3.60* 3.71*   HEMOGLOBIN 10.8* 11.0*   HEMATOCRIT 33.8* 34.3*   MCV 93.9 92.5   MCH 30.0 29.6   MCHC 32.0* 32.1*   RDW 52.5* 51.3*   PLATELETCT 668* 692*   MPV 8.2* 8.3*     Recent Labs     07/25/23  0115 07/26/23  0432   SODIUM 134* 136   POTASSIUM 4.2 4.3   CHLORIDE 100 100   CO2 21 17*   GLUCOSE 149* 122*   BUN 24* 21   CREATININE 0.89 0.82   CALCIUM 9.2 9.9                   Imaging  IR-MIDLINE CATHETER INSERTION WO GUIDANCE > AGE 3   Final Result                  Ultrasound-guided midline placement performed by qualified nursing staff    as above.          CT-FOOT WITH PLUS RECONS RIGHT   Final Result         1.  No acute traumatic bony injury identified. No destructive osseous lesion is identified to indicate osteomyelitis, note that radiography can be insensitive for evaluation of osteomyelitis and bone scan would offer improved diagnostic sensitivity.   2.  Subcutaneous fat stranding and overlying skin thickening. Changes of cellulitis.   3.  No enhancing fluid collection is identified to suggest abscess at this time.      CT-CTA LOWER EXT WITH & W/O-POST  PROCESS RIGHT   Final Result      1.  Recent RIGHT lower extremity vascular surgery with femoral to popliteal graft which appears patent.  Retrograde enhancement of distal femoral artery.   2.  Normal enhancement of trifurcation vessels.   3.  No hematoma or evidence for ongoing arterial hemorrhage.   4.  Diffuse subcutaneous edema of the RIGHT lower extremity.   5.  Fluid and gas tracks along the graft, consistent with recent surgery.   6.  RIGHT groin and inguinal adenopathy, likely reactive.      DX-CHEST-PORTABLE (1 VIEW)   Final Result      Hypoinflation without other evidence for acute cardiopulmonary disease.             Assessment/Plan  Problem Representation:    MRSA bacteremia- (present on admission)  Assessment & Plan  - IV vancomycin transitioned to Daptomycin today 07/27/23 , stop date antibiotics 8/17/2023  -With recent MRSA bacteremia, incompletely treated as patient left AMA  --Blood cultures x2 from 7/5 and 7/8 has been negative, following blood culture this admission, negative to date  -patient is afebrile, hemodynamically stable  -echo from 7/10/23 negative for vegetations  -we fernando monitor vancomycin trough levels and cmp for renal function      Osteomyelitis (HCC)- (present on admission)  Assessment & Plan  -CT scan , Rt foot 07/25/23 negative for abscess. Previous MRI 07/12/23 during patient's last admission: residual first through fifth metatarsals with findings of possible postsurgical change versus osteomyelitis vs abscess, per LPS most likely seroma, they have been consulted and no surgical interventions at this time, they have signed off.   -Osteomyelitis, Right foot, S/P TMA with positive margins , intraop culture on 07/08/23 positive for enterecoccus faecalis and MRSA, sensitive to vancomycin and daptomycin  -per ID recommendations: addditional antibiotics will not cure his chronic osteomyelitis, also in the absence of debridement of any necrotic tissues that will serve as a continued  nidus of infection, surgery as only curative option  - tight control of BS under 150 to help control infection(see plan below)    Peripheral artery disease (HCC)- (present on admission)  Assessment & Plan  -S/ p recent right fem-pop bypass with Dr. Calderon 07/14/23  -seen again by vascular during this admission,no interventions at this time, they have signed off   -CTA of lower ext 07/20/23 demonstrated bypass graft still patent  - continue home aspirin , Plavix and statins     Diabetes (HCC)- (present on admission)  Assessment & Plan  - on insulin sliding scale with serial Accu-Checks, glucose 200's,  glargine increased to12 units   -HbA1c 11.3% 07/06/23  -Hypoglycemic protocol in place   -goal blood glucose <150 to control infection per ID recommendations  -long acting insulin to be held if with concern for hypoglycemia    Thrombocytosis  Assessment & Plan  - likely reactive secondary to infection, that is being treated appropriately, also in the setting of leukocytosis  - continue antibiotics, every 3 days CBC while pending placement    Low back pain  Assessment & Plan  - history of MRSA bacteremia, incompletely treated, crrently states that he has chronic back pain but is non radiating  -Patient does not present with any red flag symptoms such as urinary /bowel incontinence, no fever, no weight loss, on physical examination does not present with spinal tenderness, also with new onset weakness/sensory deficit  -  if with any progression MRI L-spine could be considered    CAD (coronary artery disease)- (present on admission)  Assessment & Plan  -Continue aspirin, plavix and atorvastatin as above      Tobacco abuse- (present on admission)  Assessment & Plan  -reiterated the need for cessation of tobacco especially with his significant peripheral arterial occlusive disease  -Tobacco cessation education provided for more than 11 minutes.  We discussed the risks of smoking including increased risk of heart disease,  stroke, cancer and COPD. We discussed the benefits of quitting smoking. We discussed options of nicotine patch, wellbutrin and chantix.  The patient has the intention to quit smoking. Nicotine replacement protocol will be provided to the patient.      Hypertension  Assessment & Plan  -Controlled, holding lisinopril 10 mg due to soft blood pressure  -asymptomatic             VTE prophylaxis: SCD    I have performed a physical exam and reviewed and updated ROS and Plan today (7/27/2023). In review of yesterday's note (7/26/2023), there are no changes except as documented above.              Medically Cleared

## 2023-07-27 NOTE — CARE PLAN
The patient is Stable - Low risk of patient condition declining or worsening    Shift Goals  Clinical Goals: discharge to [facility]  Patient Goals: pain control  Family Goals: jacklyn    Progress made toward(s) clinical / shift goals:    Problem: Knowledge Deficit - Standard  Goal: Patient and family/care givers will demonstrate understanding of plan of care, disease process/condition, diagnostic tests and medications  Description: Target End Date:  1-3 days or as soon as patient condition allows    Document in Patient Education    1.  Patient and family/caregiver oriented to unit, equipment, visitation policy and means for communicating concern  2.  Complete/review Learning Assessment  3.  Assess knowledge level of disease process/condition, treatment plan, diagnostic tests and medications  4.  Explain disease process/condition, treatment plan, diagnostic tests and medications  Outcome: Progressing  Note: Pt and family educated on POC, all questions answered in regards to disease process, treatment and diet. Pt and family verbalize understanding and voice no further questions at this time.      Problem: Fall Risk  Goal: Patient will remain free from falls  Description: Target End Date:  Prior to discharge or change in level of care    Document interventions on the [facility] Fall Risk Assessment    1.  Assess for fall risk factors  2.  Implement fall precautions  Outcome: Progressing  Note: Fall risk assessed, fall precautions in place, bed alarm on, pt verbalizes understanding of fall risk.        Patient is not progressing towards the following goals:

## 2023-07-27 NOTE — PROGRESS NOTES
Dimethicone lotion applied to regina legs, removed dressing from right foot, no drainage, patient wanted to leave open to air, removed heel protectors and applied foam boots bilaterally.

## 2023-07-27 NOTE — CARE PLAN
The patient is Stable - Low risk of patient condition declining or worsening    Shift Goals  Clinical Goals: IV Abx, Wound care, Pain control, Placement  Patient Goals: Pain control, Rest  Family Goals: LAXMI    Progress made toward(s) clinical / shift goals:     Problem: Fall Risk  Goal: Patient will remain free from falls     Description  Target End Date: Prior to discharge or change in level of care      Document interventions on the [facility] Fall Risk Assessment   1. Assess for fall risk factors   2. Implement fall precautions     Outcome: Progressing  Note: Encouraged and instructed to call for assistance. Bed alarm on. Side rails up. Call button within reach. Rounded on patient frequently. Provided walker as needed. Assisted to bathroom, stayed within close proximity. Placed on standby assists.     Problem: Pain - Standard  Goal: Alleviation of pain or a reduction in pain to the patient’s comfort goal     Description  Target End Date: Prior to discharge or change in level of care      Document on Vitals flowsheet   1. Document pain using the appropriate pain scale per order or unit policy   2. Educate and implement non-pharmacologic comfort measures (i.e. relaxation, distraction, massage, cold/heat therapy, etc.)   3. Pain management medications as ordered   4. Reassess pain after pain med administration per policy   5. If opiods administered assess patient's response to pain medication is appropriate per POSS sedation scale 6. Follow pain management plan developed in collaboration with patient and interdisciplinary team (including palliative care or pain specialists if applicable)     Outcome: Progressing  Note: Patient reports pain at PS 9 /10 on Right foot. PRN Oxycodone 15 mg PO given. Monitored for sedation level and side effects. Patient pain was relieved after medication administration per patient. Continued pain level assessment and monitoring all throughout shift.      Problem: Skin Integrity  Goal:  Skin integrity is maintained or improved     Description  Target End Date: Prior to discharge or change in level of care      Document interventions on Skin Risk/Rogelio flowsheet groups and corresponding LDA      1. Assess and monitor skin integrity, appearance and/or temperature   2. Assess risk factors for impaired skin integrity and/or pressures ulcers   3. Implement precautions to protect skin integrity in collaboration with interdisciplinary team   4. Implement pressure ulcer prevention protocol if at risk for skin breakdown   5. Confirm wound care consult if at risk for skin breakdown 6. Ensure patient use of pressure relieving devices (Low air loss bed, waffle overlay, heel protectors, ROHO cushion, etc)     Outcome: Progressing  Note: Encouraged turning side to side; patient able to reposition self. Waffle mattress in place. Extra pillow used for positional support and heel offloading. Barrier cream as needed. Wound care done.

## 2023-07-27 NOTE — DISCHARGE SUMMARY
[facility] Internal Medicine Discharge Summary    Attending: Ariel Hong M.d.  Senior Resident: Dr. Kraus  Contact Number: [phone]    CHIEF COMPLAINT ON ADMISSION  Chief Complaint   Patient presents with    Post-Op Complications       Reason for Admission  Fall/concern for post op infection    Admission Date  7/20/2023    CODE STATUS  Full Code    HPI & HOSPITAL COURSE    Rogelio Escudero is a 48 y.o. male who presented 7/20/2023 with past medical history of diabetes, coronary disease status post stent, hypertension, peripheral vascular disease who presents to the hospital for right leg and groin pain.  His pain starts in the right leg and radiates to his back.  He is also feeling weakness in his legs.  Patient states that he fell 2 days ago.  After he left the hospital he did notice some drainage from his right foot which has now resolved.  Patient recently had a femoral palpable bypass completed by Dr. Calderon on 7/14.  He was found to have osteomyelitis and leg ischemia.  At the time he grew Enterococcus and MRSA.  Patient was discharged to SNF on daptomycin and meropenem until 7/20.  Patient states that he has been noncompliant with his home insulin since he was not discharged with needles.  Patient states that he is still smoking cigarettes.       Vascular surgery was consulted and followed patient during hospital stay, graft was patent, post op site with no concerns for infection and I&D not indicated, likewise no othet vascular interventions recommended.      For history of MRSA Bacteremia, during patient's hospital stay, Infectious Disease was consulted with recommendations to continue Vancomycyin, stop date antibiotics 8/17/2023 which was transitioned to Daptomycin 8mg/kg q24hrs(650 mg every 24 hours) with CBC, CMP, CPK weekly to be done while patient is in SNF%%%....Blood cultures x2 from 7/5 and 7/8 has been negative, blood culture this admission has also been negative to date. He continue to be  afebrile, echo from 7/10/23 negative for vegetations.     CT scan of Rt foot was done on 07/25/23 which was negative for abscess. Previous MRI 07/12/23 during patient's last admission: residual first through fifth metatarsals with findings of possible postsurgical change versus osteomyelitis vs abscess, per LPS most likely seroma. LPS was re consulted to assess patient's wound and have not recommended any  surgical interventions.     Patient's  BS was monitored with goal of <150 to help control infection.Insulin sliding scale with serial Accu-Checks, glargine increased to 12 units (home insulin 25 units)Aspirin , Plavix and statins  were continued.     For patient's osteomyelitis, Right foot, S/P TMA with positive margins , intraop culture on 07/08/23 positive for enterecoccus faecalis and MRSA, sensitive to vancomycin and daptomycin  Per ID recommendations during recent admission, additional antibiotics will not cure his chronic osteomyelitis, also in the absence of debridement of any necrotic tissues that will serve as a continued nidus of infection, surgery as only curative option. His WBC downtrended, noted thrombocytosis which was likely reactive. Patient's lisinopril was held. PT/OT followed patient throughout his hospital stay with recommendation for SNF.    Per recent ID recommendation: once patient finishes antibiotics in skilled nursing facility, he would need to follow-up with outpatient community infectious disease,patient is on ID clinic follow up list.  Likewise, as above patient will be needing weekly CBC with differential, CMP and CPK monitoring while on daptomycin at the skilled nursing facility.  Results of the test to be faxed at: [phone].                      Therefore, he is discharged in fair and stable condition to skilled nursing facility.    The patient met 2-midnight criteria for an inpatient stay at the time of discharge.    Discharge date:   07/28/23      Physical Exam on Day of  Discharge  Physical Exam  Vitals and nursing note reviewed.   Constitutional:       General: He is not in acute distress.     Appearance: Normal appearance. He is not ill-appearing, toxic-appearing or diaphoretic.   HENT:      Head: Normocephalic and atraumatic.      Nose: No congestion or rhinorrhea.      Mouth/Throat:      Pharynx: No posterior oropharyngeal erythema.   Eyes:      General: No scleral icterus.        Right eye: No discharge.   Cardiovascular:      Rate and Rhythm: Normal rate and regular rhythm.      Pulses: Normal pulses.      Heart sounds: Normal heart sounds. No murmur heard.     No friction rub. No gallop.   Pulmonary:      Effort: Pulmonary effort is normal. No respiratory distress.      Breath sounds: Normal breath sounds. No stridor. No wheezing, rhonchi or rales.   Abdominal:      General: There is no distension.      Tenderness: There is no abdominal tenderness.   Musculoskeletal:         General: No swelling, tenderness, deformity or signs of injury.      Cervical back: Normal range of motion.      Right lower leg: No edema.      Left lower leg: No edema.      Comments: Midline in place, right   Skin:     Capillary Refill: Capillary refill takes less than 2 seconds.      Coloration: Skin is not jaundiced or pale.      Findings: No bruising, erythema (swelling/very minimal erythema on incisional site), lesion or rash.      Comments: Very minimal right leg tenderness, no gross purulence noted on wounds/ incisional area   Neurological:      General: No focal deficit present.      Mental Status: He is alert and oriented to person, place, and time.         FOLLOW UP ITEMS POST DISCHARGE      DISCHARGE DIAGNOSES  Principal Problem (Resolved):    Postoperative cellulitis of surgical wound (POA: Yes)  Active Problems:    Diabetes (HCC) (POA: Yes)    Tobacco abuse (POA: Yes)    Osteomyelitis (HCC) (POA: Yes)    Peripheral artery disease (HCC) (POA: Yes)    CAD (coronary artery disease) (POA:  Yes)    MRSA bacteremia (POA: Yes)    Low back pain (POA: Unknown)    Hypertension (POA: Unknown)    Thrombocytosis (POA: Unknown)      FOLLOW UP    [facility]  [address] [facility] [address]  [phone]          MEDICATIONS ON DISCHARGE     Medication List        START taking these medications        Instructions   acetaminophen 325 MG Tabs  Commonly known as: Tylenol   Take 2 Tablets by mouth every 6 hours as needed for Mild Pain or Moderate Pain.  Dose: 650 mg     gabapentin 100 MG Caps  Commonly known as: Neurontin   Take 1 Capsule by mouth 3 times a day as needed (pain).  Dose: 100 mg     insulin GLARGINE 100 UNIT/ML Soln  Commonly known as: Lantus,Semglee  Replaces: Lantus SoloStar 100 UNIT/ML Sopn injection   Inject 15 Units under the skin every evening.  Dose: 15 Units     insulin regular 100 Unit/mL Soln  Commonly known as: Humulin R   Inject 3-14 Units under the skin 4 Times a Day,Before Meals and at Bedtime.  Dose: 3-14 Units     * NS SOLN 50 mL with DAPTOmycin 500 MG SOLR 650 mg   Infuse 650 mg into a venous catheter every 24 hours.  Dose: 8 mg/kg     * DAPTOmycin 10 mg/mL in NS   Infuse 65.3 mL into a venous catheter every 24 hours for 20 days.  Dose: 8 mg/kg     ondansetron 4 MG Tbdp  Commonly known as: Zofran ODT   Take 1 Tablet by mouth every 8 hours as needed for Nausea/Vomiting (give PO if no IV route available).  Dose: 4 mg     oxycodone 15 MG immediate release tablet  Commonly known as: Oxy-IR   Take 1 Tablet by mouth every four hours as needed for Severe Pain for up to 5 days.  Dose: 15 mg           * This list has 2 medication(s) that are the same as other medications prescribed for you. Read the directions carefully, and ask your doctor or other care provider to review them with you.                CONTINUE taking these medications        Instructions   aspirin 81 MG EC tablet   Take 1 Tablet by mouth every day for 30 days.  Dose: 81 mg     buPROPion 75 MG Tabs  Commonly  known as: Wellbutrin   Take 2 Tablets by mouth 2 times a day for 30 days.  Dose: 150 mg     clopidogrel 75 MG Tabs  Commonly known as: Plavix   Take 1 Tablet by mouth every day.  Dose: 75 mg     colchicine 0.6 MG Tabs  Commonly known as: Colcrys   Take 1 Tablet by mouth every day.  Dose: 0.6 mg     escitalopram 10 MG Tabs  Commonly known as: Lexapro   Take 1 Tablet by mouth every day.  Dose: 10 mg     omeprazole 20 MG delayed-release capsule  Commonly known as: PriLOSEC   Take 1 Capsule by mouth every morning.  Dose: 20 mg            STOP taking these medications      atorvastatin 20 MG Tabs  Commonly known as: Lipitor     Jardiance 25 MG Tabs  Generic drug: Empagliflozin     Lantus SoloStar 100 UNIT/ML Sopn injection  Generic drug: insulin glargine  Replaced by: insulin GLARGINE 100 UNIT/ML Soln     lisinopril 10 MG Tabs  Commonly known as: Prinivil     metformin 1000 MG tablet  Commonly known as: Glucophage     oxyCODONE-acetaminophen 5-325 MG Tabs  Commonly known as: Percocet              Allergies  Allergies   Allergen Reactions    Apple Hives    Lactose Diarrhea     Diarrhea         DIET  Orders Placed This Encounter   Procedures    Diet Order Diet: Consistent CHO (Diabetic)     Standing Status:   Standing     Number of Occurrences:   1     Order Specific Question:   Diet:     Answer:   Consistent CHO (Diabetic) [4]       ACTIVITY  As tolerated and directed by rehab.  Weight bearing per recommendations    CONSULTATIONS  Infectious   Vascular surgery  LPS  PT/OT    PROCEDURES  N/A    LABORATORY  Lab Results   Component Value Date    SODIUM 136 07/26/2023    POTASSIUM 4.3 07/26/2023    CHLORIDE 100 07/26/2023    CO2 17 (L) 07/26/2023    GLUCOSE 122 (H) 07/26/2023    BUN 21 07/26/2023    CREATININE 0.82 07/26/2023    CREATININE 1.2 01/13/2008        Lab Results   Component Value Date    WBC 11.5 (H) 07/26/2023    HEMOGLOBIN 11.0 (L) 07/26/2023    HEMATOCRIT 34.3 (L) 07/26/2023    PLATELETCT 692 (H) 07/26/2023         Total time of the discharge process exceeds 35 minutes.

## 2023-07-27 NOTE — DISCHARGE PLANNING
Agency/Facility Name: [facility]   Spoke To: Elder  Outcome: ROLANDO called facility regarding if they are able to take patient in today, per Elder she is out of the office at an appt. Elder mentioned patient's isolation and insurance might be a barrier but will look into it once she is in the office. Elder will call back ROLANDO with updates.    @[phone]  Agency/Facility Name: [facility]   Outcome: ROLANDO received v-mail, per Elder she does not have any isolation beds available. Elder will call ROLANDO once she has an opening.

## 2023-07-27 NOTE — PROGRESS NOTES
Diabetes education: Unclear what happened at previous admit/discharge regarding insulin pen needles. Spoke with pharmacy, who states pen needles were scanned at discharge and should have been in the bag.   Pt to dc to SNF. Pt will need insulin pen needles at discharge from SNF. CDE will give sample pen needles if able prior to discharge and remind pt to get rx for pen needles at discharge from SNF. Please text when time for discharge known.

## 2023-07-28 ENCOUNTER — APPOINTMENT (OUTPATIENT)
Dept: ONCOLOGY | Facility: MEDICAL CENTER | Age: 48
End: 2023-07-28
Attending: INTERNAL MEDICINE
Payer: MEDICAID

## 2023-07-28 VITALS
SYSTOLIC BLOOD PRESSURE: 120 MMHG | OXYGEN SATURATION: 99 % | RESPIRATION RATE: 18 BRPM | TEMPERATURE: 97.6 F | DIASTOLIC BLOOD PRESSURE: 76 MMHG | HEART RATE: 74 BPM | WEIGHT: 179.9 LBS | BODY MASS INDEX: 26.64 KG/M2 | HEIGHT: 69 IN

## 2023-07-28 LAB — GLUCOSE BLD STRIP.AUTO-MCNC: 169 MG/DL (ref 65–99)

## 2023-07-28 PROCEDURE — 700102 HCHG RX REV CODE 250 W/ 637 OVERRIDE(OP): Performed by: INTERNAL MEDICINE

## 2023-07-28 PROCEDURE — 97535 SELF CARE MNGMENT TRAINING: CPT | Mod: CO

## 2023-07-28 PROCEDURE — A9270 NON-COVERED ITEM OR SERVICE: HCPCS | Performed by: INTERNAL MEDICINE

## 2023-07-28 PROCEDURE — 82962 GLUCOSE BLOOD TEST: CPT

## 2023-07-28 PROCEDURE — 99239 HOSP IP/OBS DSCHRG MGMT >30: CPT | Mod: GC | Performed by: STUDENT IN AN ORGANIZED HEALTH CARE EDUCATION/TRAINING PROGRAM

## 2023-07-28 PROCEDURE — 700105 HCHG RX REV CODE 258

## 2023-07-28 PROCEDURE — A9270 NON-COVERED ITEM OR SERVICE: HCPCS | Performed by: HOSPITALIST

## 2023-07-28 PROCEDURE — 700111 HCHG RX REV CODE 636 W/ 250 OVERRIDE (IP): Mod: JZ

## 2023-07-28 PROCEDURE — 700102 HCHG RX REV CODE 250 W/ 637 OVERRIDE(OP): Performed by: HOSPITALIST

## 2023-07-28 RX ORDER — ACETAMINOPHEN 325 MG/1
650 TABLET ORAL EVERY 6 HOURS PRN
Qty: 30 TABLET | Refills: 0 | Status: SHIPPED
Start: 2023-07-28 | End: 2023-07-28 | Stop reason: SDUPTHER

## 2023-07-28 RX ORDER — OXYCODONE HYDROCHLORIDE 15 MG/1
15 TABLET ORAL EVERY 4 HOURS PRN
Qty: 20 TABLET | Refills: 0 | Status: SHIPPED
Start: 2023-07-28 | End: 2023-07-28 | Stop reason: SDUPTHER

## 2023-07-28 RX ORDER — GABAPENTIN 100 MG/1
100 CAPSULE ORAL 3 TIMES DAILY PRN
Qty: 90 CAPSULE | Refills: 0 | Status: SHIPPED
Start: 2023-07-28 | End: 2023-07-28 | Stop reason: SDUPTHER

## 2023-07-28 RX ORDER — DEXTROSE MONOHYDRATE 25 G/50ML
25 INJECTION, SOLUTION INTRAVENOUS PRN
Qty: 50 ML | Refills: 0 | Status: SHIPPED
Start: 2023-07-28 | End: 2023-07-28

## 2023-07-28 RX ORDER — ACETAMINOPHEN 325 MG/1
650 TABLET ORAL EVERY 6 HOURS PRN
Qty: 30 TABLET | Refills: 0 | Status: SHIPPED
Start: 2023-07-28 | End: 2023-08-27

## 2023-07-28 RX ORDER — GABAPENTIN 100 MG/1
100 CAPSULE ORAL 3 TIMES DAILY PRN
Qty: 90 CAPSULE | Refills: 0 | Status: ON HOLD
Start: 2023-07-28 | End: 2023-08-17

## 2023-07-28 RX ORDER — OXYCODONE HYDROCHLORIDE 15 MG/1
15 TABLET ORAL EVERY 4 HOURS PRN
Qty: 20 TABLET | Refills: 0 | Status: SHIPPED | OUTPATIENT
Start: 2023-07-28 | End: 2023-08-02

## 2023-07-28 RX ORDER — ONDANSETRON 4 MG/1
4 TABLET, ORALLY DISINTEGRATING ORAL EVERY 8 HOURS PRN
Qty: 10 TABLET | Refills: 0 | Status: SHIPPED
Start: 2023-07-28 | End: 2023-08-27

## 2023-07-28 RX ADMIN — OXYCODONE HYDROCHLORIDE 15 MG: 10 TABLET ORAL at 05:09

## 2023-07-28 RX ADMIN — ESCITALOPRAM OXALATE 10 MG: 10 TABLET ORAL at 05:10

## 2023-07-28 RX ADMIN — ASPIRIN 81 MG: 81 TABLET, COATED ORAL at 05:10

## 2023-07-28 RX ADMIN — OXYCODONE HYDROCHLORIDE 15 MG: 10 TABLET ORAL at 09:14

## 2023-07-28 RX ADMIN — DAPTOMYCIN 650 MG: 500 INJECTION, POWDER, LYOPHILIZED, FOR SOLUTION INTRAVENOUS at 06:09

## 2023-07-28 RX ADMIN — INSULIN HUMAN 3 UNITS: 100 INJECTION, SOLUTION PARENTERAL at 08:55

## 2023-07-28 RX ADMIN — OMEPRAZOLE 20 MG: 20 CAPSULE, DELAYED RELEASE ORAL at 05:10

## 2023-07-28 RX ADMIN — CLOPIDOGREL BISULFATE 75 MG: 75 TABLET ORAL at 05:10

## 2023-07-28 ASSESSMENT — COGNITIVE AND FUNCTIONAL STATUS - GENERAL
DAILY ACTIVITIY SCORE: 17
TOILETING: A LITTLE
DRESSING REGULAR LOWER BODY CLOTHING: A LOT
DRESSING REGULAR UPPER BODY CLOTHING: A LITTLE
HELP NEEDED FOR BATHING: A LOT
SUGGESTED CMS G CODE MODIFIER DAILY ACTIVITY: CK
PERSONAL GROOMING: A LITTLE

## 2023-07-28 ASSESSMENT — PAIN DESCRIPTION - PAIN TYPE
TYPE: ACUTE PAIN

## 2023-07-28 NOTE — DISCHARGE PLANNING
Agency/Facility Name: [facility]   Spoke To: Elder   Outcome: ROLANDO received call, per Elder she is able to take patient in today and requested DPA to set up transport for 1200. Elder mentioned she would also like patient's antibiotics listed on the discharge summary.    @[phone]  Agency/Facility Name: [facility]   Spoke To: Elder  Outcome: DPA called facility letting them know patient's transport time is at 1130.

## 2023-07-28 NOTE — THERAPY
Occupational Therapy  Daily Treatment     Patient Name: Rogelio Escudero  Age:  48 y.o., Sex:  male  Medical Record #: [id number]  Today's Date: 7/28/2023       Precautions: Fall Risk, Weight Bearing As Tolerated Right Lower Extremity  Comments: supposed to have post op shoe but it is at home per pt.    Assessment    Pt seen for OT tx. Continues to be limited by swelling and pain impacting ability to complete ADLs and ADL transfers independently. Pt requiring assist w/ LE for dressing but able to complete it on L. Pt pleasant and cooperative. Amb w/ FWW in room w/ SBA. Will continue to follow while in house.     Plan    Treatment Plan Status: Continue Current Treatment Plan    DC Equipment Recommendations: Unable to determine at this time  Discharge Recommendations: Recommend post-acute placement for additional occupational therapy services prior to discharge home       07/28/23 0955   Balance   Sitting Balance (Static) Good   Sitting Balance (Dynamic) Fair +   Weight Shift Sitting Good   Bed Mobility    Supine to Sit Supervised   Sit to Supine Supervised   Activities of Daily Living   Grooming Minimal Assist;Seated   Upper Body Dressing Minimal Assist   Lower Body Dressing Maximal Assist  (RLE, able to complete LLE LB dressing)   Toileting Contact Guard Assist   Functional Mobility   Sit to Stand Standby Assist   Bed, Chair, Wheelchair Transfer Standby Assist   Toilet Transfers Standby Assist  (heavily using grab bars)   Short Term Goals   Short Term Goal # 1 Pt will demo LB dressing SPV   Goal Outcome # 1 Progressing as expected   Short Term Goal # 2 Pt will complete toilet txf SPV   Goal Outcome # 2 Progressing as expected   Short Term Goal # 3 Pt will tolerate 5 min standing ADLs SPV   Goal Outcome # 3 Progressing as expected   Anticipated Discharge Equipment and Recommendations   DC Equipment Recommendations Unable to determine at this time   Discharge Recommendations Recommend post-acute placement for  additional occupational therapy services prior to discharge home

## 2023-07-28 NOTE — DISCHARGE PLANNING
DC Transport Scheduled    Received request at: 7/28/2023 at 80140    Transport Company Scheduled:  [employer]  Spoke with Cat at [facility] to schedule transport.  [facility] Trip #: [id number]     Scheduled Date: 7/28/2023  Scheduled Time: 1130    Destination: [facility] at [address] [facility] [place]     Notified care team of scheduled transport via Voalte.     If there are any changes needed to the DC transportation scheduled, please contact [facility] at ext. [phone] between the hours of 0390-6774 Mon-Fri. If outside those hours, contact the ED Case Manager at ext. [phone].

## 2023-07-28 NOTE — DISCHARGE PLANNING
Case Management Discharge Planning    Admission Date: 7/20/2023  GMLOS: 3.4  ALOS: 8    6-Clicks ADL Score: 17  6-Clicks Mobility Score: 18  PT and/or OT Eval ordered: Yes  Post-acute Referrals Ordered: Yes  Post-acute Choice Obtained: Yes  Has referral(s) been sent to post-acute provider:  Yes      Anticipated Discharge Dispo: Discharge Disposition: D/T to SNF with Medicare cert in anticipation of skilled care (03)    DME Needed: No    Action(s) Taken: Updated Provider/Nurse on Discharge Plan    DPA informed this writer Harish can take patient today and they are requesting we set up a 1200 transport. LSW faxed transport request to george @[phone]. LSW sent voalte to resident and RN with update and requested discharge summary at their earliest convenience as Harish would like to review discharge medications.     **0909  Spoke to patient at bedside and informed him of transport to [place] today at 1200- pending time confirmation. Patient had no questions for this LSW.    **0920  Transport confirmed for today at 1130. LSW updated RN and resident.     **0949  Transfer packet completed. Handed to RN Shyam @[facility].     Escalations Completed: None    Medically Clear: Yes    Next Steps: LSW to follow up with patient and medical team regarding discharge needs and barriers.     Barriers to Discharge: None

## 2023-07-28 NOTE — PROGRESS NOTES
Diabetes education: Met with pt this afternoon and 2 pkgs of 5 each (total 10) clare insulin pen needles given for pt to use once dc'd from SNF as he will need SNF to to order pen needles once discharged.

## 2023-07-28 NOTE — PROGRESS NOTES
Transport picked patient up. IV to [facility] intact. Pt belongings packed. Harish called twice for report.Pt reports pain controlled. Pt discharged via wheelchair.

## 2023-07-28 NOTE — CARE PLAN
The patient is Stable - Low risk of patient condition declining or worsening    Shift Goals  Clinical Goals: IV abx, Wound care, Pain management, Placement  Patient Goals: Pan control, Comfort  Family Goals: LAXMI    Progress made toward(s) clinical / shift goals:     Problem: Fall Risk  Goal: Patient will remain free from falls     Description  Target End Date: Prior to discharge or change in level of care      Document interventions on the [place] [place] Fall Risk Assessment   1. Assess for fall risk factors   2. Implement fall precautions     Outcome: Progressing  Note: Encouraged and instructed to call for assistance. Bed alarm on. Side rails up. Call button within reach. Rounded on patient frequently. Provided walker as needed. Assisted to bathroom, stayed within close proximity. Placed on standby assists.     Problem: Pain - Standard  Goal: Alleviation of pain or a reduction in pain to the patient’s comfort goal     Description  Target End Date: Prior to discharge or change in level of care      Document on Vitals flowsheet   1. Document pain using the appropriate pain scale per order or unit policy   2. Educate and implement non-pharmacologic comfort measures (i.e. relaxation, distraction, massage, cold/heat therapy, etc.)   3. Pain management medications as ordered   4. Reassess pain after pain med administration per policy   5. If opiods administered assess patient's response to pain medication is appropriate per POSS sedation scale 6. Follow pain management plan developed in collaboration with patient and interdisciplinary team (including palliative care or pain specialists if applicable)     Outcome: Progressing  Note: Patient reports pain at PS 8/10 on Right foot. PRN Oxycodone 15 mg PO given. Monitored for sedation level and side effects. Patient pain was relieved after medication administration per patient. Continued pain level assessment and monitoring all throughout shift.      Problem: Skin  Integrity  Goal: Skin integrity is maintained or improved     Description  Target End Date: Prior to discharge or change in level of care      Document interventions on Skin Risk/Rogelio flowsheet groups and corresponding LDA      1. Assess and monitor skin integrity, appearance and/or temperature   2. Assess risk factors for impaired skin integrity and/or pressures ulcers   3. Implement precautions to protect skin integrity in collaboration with interdisciplinary team   4. Implement pressure ulcer prevention protocol if at risk for skin breakdown   5. Confirm wound care consult if at risk for skin breakdown 6. Ensure patient use of pressure relieving devices (Low air loss bed, waffle overlay, heel protectors, ROHO cushion, etc)     Outcome: Progressing  Note: Encouraged turning side to side; patient able to reposition self. Waffle mattress in place. Extra pillow used for positional support and heel offloading. Barrier cream as needed. Wound care done.

## 2023-07-29 ENCOUNTER — APPOINTMENT (OUTPATIENT)
Dept: ONCOLOGY | Facility: MEDICAL CENTER | Age: 48
End: 2023-07-29
Attending: INTERNAL MEDICINE
Payer: MEDICAID

## 2023-07-30 ENCOUNTER — APPOINTMENT (OUTPATIENT)
Dept: ONCOLOGY | Facility: MEDICAL CENTER | Age: 48
End: 2023-07-30
Attending: INTERNAL MEDICINE
Payer: MEDICAID

## 2023-07-31 ENCOUNTER — APPOINTMENT (OUTPATIENT)
Dept: ONCOLOGY | Facility: MEDICAL CENTER | Age: 48
End: 2023-07-31
Attending: INTERNAL MEDICINE
Payer: MEDICAID

## 2023-08-01 ENCOUNTER — APPOINTMENT (OUTPATIENT)
Dept: ONCOLOGY | Facility: MEDICAL CENTER | Age: 48
End: 2023-08-01
Attending: INTERNAL MEDICINE
Payer: MEDICAID

## 2023-08-02 ENCOUNTER — APPOINTMENT (OUTPATIENT)
Dept: ONCOLOGY | Facility: MEDICAL CENTER | Age: 48
End: 2023-08-02
Attending: INTERNAL MEDICINE
Payer: MEDICAID

## 2023-08-03 ENCOUNTER — APPOINTMENT (OUTPATIENT)
Dept: ONCOLOGY | Facility: MEDICAL CENTER | Age: 48
End: 2023-08-03
Attending: INTERNAL MEDICINE
Payer: MEDICAID

## 2023-08-04 ENCOUNTER — APPOINTMENT (OUTPATIENT)
Dept: ONCOLOGY | Facility: MEDICAL CENTER | Age: 48
End: 2023-08-04
Attending: INTERNAL MEDICINE
Payer: MEDICAID

## 2023-08-04 ENCOUNTER — TELEPHONE (OUTPATIENT)
Dept: INFECTIOUS DISEASES | Facility: MEDICAL CENTER | Age: 48
End: 2023-08-04
Payer: MEDICAID

## 2023-08-04 NOTE — TELEPHONE ENCOUNTER
Left message for SNF to return my call and schedule patient for hospital follow up -referral in chart

## 2023-08-05 ENCOUNTER — APPOINTMENT (OUTPATIENT)
Dept: ONCOLOGY | Facility: MEDICAL CENTER | Age: 48
End: 2023-08-05
Attending: INTERNAL MEDICINE
Payer: MEDICAID

## 2023-08-06 ENCOUNTER — HOSPITAL ENCOUNTER (INPATIENT)
Facility: MEDICAL CENTER | Age: 48
LOS: 11 days | DRG: 638 | End: 2023-08-17
Attending: STUDENT IN AN ORGANIZED HEALTH CARE EDUCATION/TRAINING PROGRAM | Admitting: STUDENT IN AN ORGANIZED HEALTH CARE EDUCATION/TRAINING PROGRAM
Payer: MEDICAID

## 2023-08-06 ENCOUNTER — APPOINTMENT (OUTPATIENT)
Dept: ONCOLOGY | Facility: MEDICAL CENTER | Age: 48
End: 2023-08-06
Attending: INTERNAL MEDICINE
Payer: MEDICAID

## 2023-08-06 ENCOUNTER — APPOINTMENT (OUTPATIENT)
Dept: RADIOLOGY | Facility: MEDICAL CENTER | Age: 48
DRG: 638 | End: 2023-08-06
Attending: STUDENT IN AN ORGANIZED HEALTH CARE EDUCATION/TRAINING PROGRAM
Payer: MEDICAID

## 2023-08-06 DIAGNOSIS — F41.9 ANXIETY: ICD-10-CM

## 2023-08-06 DIAGNOSIS — S32.029A CLOSED FRACTURE OF SECOND LUMBAR VERTEBRA, UNSPECIFIED FRACTURE MORPHOLOGY, INITIAL ENCOUNTER (HCC): ICD-10-CM

## 2023-08-06 DIAGNOSIS — G89.29 CHRONIC LOW BACK PAIN, UNSPECIFIED BACK PAIN LATERALITY, UNSPECIFIED WHETHER SCIATICA PRESENT: ICD-10-CM

## 2023-08-06 DIAGNOSIS — Z89.439 HISTORY OF TRANSMETATARSAL AMPUTATION OF FOOT (HCC): ICD-10-CM

## 2023-08-06 DIAGNOSIS — M54.50 CHRONIC LOW BACK PAIN, UNSPECIFIED BACK PAIN LATERALITY, UNSPECIFIED WHETHER SCIATICA PRESENT: ICD-10-CM

## 2023-08-06 DIAGNOSIS — E11.42 TYPE 2 DIABETES MELLITUS WITH DIABETIC POLYNEUROPATHY, WITHOUT LONG-TERM CURRENT USE OF INSULIN (HCC): ICD-10-CM

## 2023-08-06 PROBLEM — R73.9 HYPERGLYCEMIA: Status: ACTIVE | Noted: 2023-08-06

## 2023-08-06 PROBLEM — Z71.89 ACP (ADVANCE CARE PLANNING): Status: ACTIVE | Noted: 2023-08-06

## 2023-08-06 LAB
ALBUMIN SERPL BCP-MCNC: 3.8 G/DL (ref 3.2–4.9)
ALBUMIN/GLOB SERPL: 1.2 G/DL
ALP SERPL-CCNC: 163 U/L (ref 30–99)
ALT SERPL-CCNC: 21 U/L (ref 2–50)
AMPHET UR QL SCN: NEGATIVE
ANION GAP SERPL CALC-SCNC: 11 MMOL/L (ref 7–16)
AST SERPL-CCNC: 13 U/L (ref 12–45)
BARBITURATES UR QL SCN: NEGATIVE
BASOPHILS # BLD AUTO: 0.6 % (ref 0–1.8)
BASOPHILS # BLD: 0.07 K/UL (ref 0–0.12)
BENZODIAZ UR QL SCN: NEGATIVE
BILIRUB SERPL-MCNC: 0.2 MG/DL (ref 0.1–1.5)
BUN SERPL-MCNC: 9 MG/DL (ref 8–22)
BZE UR QL SCN: POSITIVE
CALCIUM ALBUM COR SERPL-MCNC: 9.5 MG/DL (ref 8.5–10.5)
CALCIUM SERPL-MCNC: 9.3 MG/DL (ref 8.5–10.5)
CANNABINOIDS UR QL SCN: NEGATIVE
CHLORIDE SERPL-SCNC: 102 MMOL/L (ref 96–112)
CK SERPL-CCNC: 174 U/L (ref 0–154)
CO2 SERPL-SCNC: 21 MMOL/L (ref 20–33)
CREAT SERPL-MCNC: 0.61 MG/DL (ref 0.5–1.4)
EOSINOPHIL # BLD AUTO: 0.64 K/UL (ref 0–0.51)
EOSINOPHIL NFR BLD: 5.7 % (ref 0–6.9)
ERYTHROCYTE [DISTWIDTH] IN BLOOD BY AUTOMATED COUNT: 49.3 FL (ref 35.9–50)
FENTANYL UR QL: POSITIVE
GFR SERPLBLD CREATININE-BSD FMLA CKD-EPI: 117 ML/MIN/1.73 M 2
GLOBULIN SER CALC-MCNC: 3.2 G/DL (ref 1.9–3.5)
GLUCOSE BLD STRIP.AUTO-MCNC: 194 MG/DL (ref 65–99)
GLUCOSE BLD STRIP.AUTO-MCNC: 261 MG/DL (ref 65–99)
GLUCOSE BLD STRIP.AUTO-MCNC: 305 MG/DL (ref 65–99)
GLUCOSE SERPL-MCNC: 298 MG/DL (ref 65–99)
HCT VFR BLD AUTO: 34.1 % (ref 42–52)
HGB BLD-MCNC: 11.2 G/DL (ref 14–18)
IMM GRANULOCYTES # BLD AUTO: 0.06 K/UL (ref 0–0.11)
IMM GRANULOCYTES NFR BLD AUTO: 0.5 % (ref 0–0.9)
LYMPHOCYTES # BLD AUTO: 2.38 K/UL (ref 1–4.8)
LYMPHOCYTES NFR BLD: 21.1 % (ref 22–41)
MCH RBC QN AUTO: 29.3 PG (ref 27–33)
MCHC RBC AUTO-ENTMCNC: 32.8 G/DL (ref 32.3–36.5)
MCV RBC AUTO: 89.3 FL (ref 81.4–97.8)
METHADONE UR QL SCN: NEGATIVE
MONOCYTES # BLD AUTO: 1.16 K/UL (ref 0–0.85)
MONOCYTES NFR BLD AUTO: 10.3 % (ref 0–13.4)
NEUTROPHILS # BLD AUTO: 6.98 K/UL (ref 1.82–7.42)
NEUTROPHILS NFR BLD: 61.8 % (ref 44–72)
NRBC # BLD AUTO: 0 K/UL
NRBC BLD-RTO: 0 /100 WBC (ref 0–0.2)
OPIATES UR QL SCN: NEGATIVE
OXYCODONE UR QL SCN: NEGATIVE
PCP UR QL SCN: NEGATIVE
PLATELET # BLD AUTO: 422 K/UL (ref 164–446)
PMV BLD AUTO: 9.2 FL (ref 9–12.9)
POTASSIUM SERPL-SCNC: 3.7 MMOL/L (ref 3.6–5.5)
PROPOXYPH UR QL SCN: NEGATIVE
PROT SERPL-MCNC: 7 G/DL (ref 6–8.2)
RBC # BLD AUTO: 3.82 M/UL (ref 4.7–6.1)
SODIUM SERPL-SCNC: 134 MMOL/L (ref 135–145)
WBC # BLD AUTO: 11.3 K/UL (ref 4.8–10.8)

## 2023-08-06 PROCEDURE — 700111 HCHG RX REV CODE 636 W/ 250 OVERRIDE (IP): Performed by: STUDENT IN AN ORGANIZED HEALTH CARE EDUCATION/TRAINING PROGRAM

## 2023-08-06 PROCEDURE — 72125 CT NECK SPINE W/O DYE: CPT

## 2023-08-06 PROCEDURE — A9270 NON-COVERED ITEM OR SERVICE: HCPCS | Performed by: INTERNAL MEDICINE

## 2023-08-06 PROCEDURE — 36415 COLL VENOUS BLD VENIPUNCTURE: CPT

## 2023-08-06 PROCEDURE — 770004 HCHG ROOM/CARE - ONCOLOGY PRIVATE *

## 2023-08-06 PROCEDURE — 99406 BEHAV CHNG SMOKING 3-10 MIN: CPT | Performed by: STUDENT IN AN ORGANIZED HEALTH CARE EDUCATION/TRAINING PROGRAM

## 2023-08-06 PROCEDURE — 700117 HCHG RX CONTRAST REV CODE 255: Mod: UD | Performed by: STUDENT IN AN ORGANIZED HEALTH CARE EDUCATION/TRAINING PROGRAM

## 2023-08-06 PROCEDURE — 99406 BEHAV CHNG SMOKING 3-10 MIN: CPT

## 2023-08-06 PROCEDURE — 71260 CT THORAX DX C+: CPT

## 2023-08-06 PROCEDURE — 99223 1ST HOSP IP/OBS HIGH 75: CPT | Mod: AI,25 | Performed by: STUDENT IN AN ORGANIZED HEALTH CARE EDUCATION/TRAINING PROGRAM

## 2023-08-06 PROCEDURE — 82962 GLUCOSE BLOOD TEST: CPT

## 2023-08-06 PROCEDURE — 80053 COMPREHEN METABOLIC PANEL: CPT

## 2023-08-06 PROCEDURE — 72128 CT CHEST SPINE W/O DYE: CPT

## 2023-08-06 PROCEDURE — 99285 EMERGENCY DEPT VISIT HI MDM: CPT

## 2023-08-06 PROCEDURE — 700105 HCHG RX REV CODE 258: Performed by: STUDENT IN AN ORGANIZED HEALTH CARE EDUCATION/TRAINING PROGRAM

## 2023-08-06 PROCEDURE — 700102 HCHG RX REV CODE 250 W/ 637 OVERRIDE(OP): Performed by: INTERNAL MEDICINE

## 2023-08-06 PROCEDURE — 70450 CT HEAD/BRAIN W/O DYE: CPT

## 2023-08-06 PROCEDURE — 99497 ADVNCD CARE PLAN 30 MIN: CPT | Performed by: STUDENT IN AN ORGANIZED HEALTH CARE EDUCATION/TRAINING PROGRAM

## 2023-08-06 PROCEDURE — 700102 HCHG RX REV CODE 250 W/ 637 OVERRIDE(OP): Performed by: STUDENT IN AN ORGANIZED HEALTH CARE EDUCATION/TRAINING PROGRAM

## 2023-08-06 PROCEDURE — A9270 NON-COVERED ITEM OR SERVICE: HCPCS | Performed by: STUDENT IN AN ORGANIZED HEALTH CARE EDUCATION/TRAINING PROGRAM

## 2023-08-06 PROCEDURE — 72131 CT LUMBAR SPINE W/O DYE: CPT

## 2023-08-06 PROCEDURE — 70486 CT MAXILLOFACIAL W/O DYE: CPT

## 2023-08-06 PROCEDURE — 85025 COMPLETE CBC W/AUTO DIFF WBC: CPT

## 2023-08-06 PROCEDURE — 82550 ASSAY OF CK (CPK): CPT

## 2023-08-06 PROCEDURE — 80307 DRUG TEST PRSMV CHEM ANLYZR: CPT

## 2023-08-06 RX ORDER — DEXTROSE MONOHYDRATE 25 G/50ML
25 INJECTION, SOLUTION INTRAVENOUS
Status: DISCONTINUED | OUTPATIENT
Start: 2023-08-06 | End: 2023-08-17 | Stop reason: HOSPADM

## 2023-08-06 RX ORDER — CLOPIDOGREL BISULFATE 75 MG/1
75 TABLET ORAL DAILY
Status: DISCONTINUED | OUTPATIENT
Start: 2023-08-06 | End: 2023-08-17 | Stop reason: HOSPADM

## 2023-08-06 RX ORDER — OXYCODONE HYDROCHLORIDE 10 MG/1
10 TABLET ORAL
Status: DISCONTINUED | OUTPATIENT
Start: 2023-08-06 | End: 2023-08-10

## 2023-08-06 RX ORDER — INSULIN LISPRO 100 [IU]/ML
3-14 INJECTION, SOLUTION INTRAVENOUS; SUBCUTANEOUS
Status: DISCONTINUED | OUTPATIENT
Start: 2023-08-06 | End: 2023-08-17 | Stop reason: HOSPADM

## 2023-08-06 RX ORDER — INSULIN LISPRO 100 [IU]/ML
0.2 INJECTION, SOLUTION INTRAVENOUS; SUBCUTANEOUS
Status: DISCONTINUED | OUTPATIENT
Start: 2023-08-06 | End: 2023-08-06

## 2023-08-06 RX ORDER — GABAPENTIN 100 MG/1
100 CAPSULE ORAL 3 TIMES DAILY PRN
Status: DISCONTINUED | OUTPATIENT
Start: 2023-08-06 | End: 2023-08-16

## 2023-08-06 RX ORDER — ASPIRIN 81 MG/1
81 TABLET ORAL DAILY
Status: DISCONTINUED | OUTPATIENT
Start: 2023-08-06 | End: 2023-08-17 | Stop reason: HOSPADM

## 2023-08-06 RX ORDER — HYDROMORPHONE HYDROCHLORIDE 1 MG/ML
0.5 INJECTION, SOLUTION INTRAMUSCULAR; INTRAVENOUS; SUBCUTANEOUS
Status: DISCONTINUED | OUTPATIENT
Start: 2023-08-06 | End: 2023-08-17 | Stop reason: HOSPADM

## 2023-08-06 RX ORDER — ESCITALOPRAM OXALATE 10 MG/1
10 TABLET ORAL DAILY
Status: DISCONTINUED | OUTPATIENT
Start: 2023-08-06 | End: 2023-08-17 | Stop reason: HOSPADM

## 2023-08-06 RX ORDER — SODIUM CHLORIDE 9 MG/ML
INJECTION, SOLUTION INTRAVENOUS CONTINUOUS
Status: ACTIVE | OUTPATIENT
Start: 2023-08-06 | End: 2023-08-06

## 2023-08-06 RX ORDER — MORPHINE SULFATE 4 MG/ML
4 INJECTION INTRAVENOUS EVERY 4 HOURS PRN
Status: DISCONTINUED | OUTPATIENT
Start: 2023-08-06 | End: 2023-08-06

## 2023-08-06 RX ORDER — OXYCODONE HYDROCHLORIDE 5 MG/1
5 TABLET ORAL
Status: DISCONTINUED | OUTPATIENT
Start: 2023-08-06 | End: 2023-08-10

## 2023-08-06 RX ORDER — INSULIN LISPRO 100 [IU]/ML
1-6 INJECTION, SOLUTION INTRAVENOUS; SUBCUTANEOUS
Status: DISCONTINUED | OUTPATIENT
Start: 2023-08-06 | End: 2023-08-06

## 2023-08-06 RX ORDER — ENOXAPARIN SODIUM 100 MG/ML
40 INJECTION SUBCUTANEOUS DAILY
Status: DISCONTINUED | OUTPATIENT
Start: 2023-08-06 | End: 2023-08-17 | Stop reason: HOSPADM

## 2023-08-06 RX ORDER — BUPROPION HYDROCHLORIDE 75 MG/1
150 TABLET ORAL 2 TIMES DAILY
Status: DISCONTINUED | OUTPATIENT
Start: 2023-08-06 | End: 2023-08-15

## 2023-08-06 RX ADMIN — SODIUM CHLORIDE: 9 INJECTION, SOLUTION INTRAVENOUS at 09:15

## 2023-08-06 RX ADMIN — BUPROPION HYDROCHLORIDE 150 MG: 75 TABLET, FILM COATED ORAL at 09:19

## 2023-08-06 RX ADMIN — IOHEXOL 100 ML: 350 INJECTION, SOLUTION INTRAVENOUS at 02:15

## 2023-08-06 RX ADMIN — OXYCODONE HYDROCHLORIDE 10 MG: 10 TABLET ORAL at 20:22

## 2023-08-06 RX ADMIN — MORPHINE SULFATE 4 MG: 4 INJECTION INTRAVENOUS at 09:45

## 2023-08-06 RX ADMIN — ASPIRIN 81 MG: 81 TABLET, COATED ORAL at 06:15

## 2023-08-06 RX ADMIN — ESCITALOPRAM OXALATE 10 MG: 10 TABLET ORAL at 09:18

## 2023-08-06 RX ADMIN — INSULIN GLARGINE-YFGN 16 UNITS: 100 INJECTION, SOLUTION SUBCUTANEOUS at 17:06

## 2023-08-06 RX ADMIN — CLOPIDOGREL BISULFATE 75 MG: 75 TABLET ORAL at 09:18

## 2023-08-06 RX ADMIN — INSULIN LISPRO 10 UNITS: 100 INJECTION, SOLUTION INTRAVENOUS; SUBCUTANEOUS at 17:06

## 2023-08-06 RX ADMIN — DAPTOMYCIN 650 MG: 500 INJECTION, POWDER, LYOPHILIZED, FOR SOLUTION INTRAVENOUS at 09:18

## 2023-08-06 RX ADMIN — INSULIN LISPRO 3 UNITS: 100 INJECTION, SOLUTION INTRAVENOUS; SUBCUTANEOUS at 20:10

## 2023-08-06 RX ADMIN — INSULIN LISPRO 7 UNITS: 100 INJECTION, SOLUTION INTRAVENOUS; SUBCUTANEOUS at 12:05

## 2023-08-06 RX ADMIN — OXYCODONE HYDROCHLORIDE 10 MG: 10 TABLET ORAL at 17:10

## 2023-08-06 ASSESSMENT — LIFESTYLE VARIABLES
HAVE PEOPLE ANNOYED YOU BY CRITICIZING YOUR DRINKING: NO
ALCOHOL_USE: YES
HAVE YOU EVER FELT YOU SHOULD CUT DOWN ON YOUR DRINKING: YES
TOTAL SCORE: 3
TOTAL SCORE: 3
CONSUMPTION TOTAL: INCOMPLETE
EVER HAD A DRINK FIRST THING IN THE MORNING TO STEADY YOUR NERVES TO GET RID OF A HANGOVER: YES
DOES PATIENT WANT TO STOP DRINKING: CANNOT ASSESS
TOTAL SCORE: 3
EVER FELT BAD OR GUILTY ABOUT YOUR DRINKING: YES

## 2023-08-06 ASSESSMENT — FIBROSIS 4 INDEX: FIB4 SCORE: 0.25

## 2023-08-06 ASSESSMENT — PAIN DESCRIPTION - PAIN TYPE
TYPE: ACUTE PAIN

## 2023-08-06 ASSESSMENT — ENCOUNTER SYMPTOMS
HEADACHES: 1
PSYCHIATRIC NEGATIVE: 1
GASTROINTESTINAL NEGATIVE: 1
RESPIRATORY NEGATIVE: 1
CARDIOVASCULAR NEGATIVE: 1
EYES NEGATIVE: 1
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
## 2023-08-06 NOTE — ASSESSMENT & PLAN NOTE
8/16/2023  Noncompliant with insulin regimen at home  Sliding scale  No evidence of DKA   A1c 11.3 last month  Consider Metformin at discharge given his noncompliance with insulin

## 2023-08-06 NOTE — ED NOTES
Received bedside report; assumed care of Pt.    No oxygen.  No SI precautions.  High fall risk precautions.    Pt placed on cardiac monitor.    Introduced self to Pt; informed him that I am part of his care team.  Rounded on Pt. Updated Pt on plan of care. Pt verbalized understanding.  No acute distress at this time.  Will continue to monitor.
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
## 2023-08-06 NOTE — ASSESSMENT & PLAN NOTE
8/16/2023  -counseled for more than 5 minutes on tobacco cessation [id number]  -I have ordered nicotine replacement therapy

## 2023-08-06 NOTE — ED NOTES
Informed Pt of need for blood draw. PICC line does not draw; flushes well.    Pt refusing venipuncture at this time.    Informed MD.
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
## 2023-08-06 NOTE — ED NOTES
Med Rec complete per Pt's home pharmacy records.   Unknown last doses.  Home Pharmacy:  [facility]

## 2023-08-06 NOTE — ASSESSMENT & PLAN NOTE
8/16/2023  Spoke with ERP.  Patient scheduled to have daptomycin for known history of osteomyelitis until August 17.  However patient left nursing facility several days ago and has not received any antibiotics since then.  Patient will have daptomycin resumed for his osteomyelitis.  Monitor for rhabdomyolysis, headache, dizziness from daptomycin administration.  Social work follow-up in a.m. for potential placement facilities (may be difficult due to patient's history of noncompliance)    8/8: Continue IV daptomycin.  Patient is unreliable with PICC line as he had a leave of absence from the skilled nursing facility and did not return.  He will likely need IV daptomycin until full treatment.    - We will need to closely monitor CMP   - Will need close monitoring for CPK should be checked weekly, daily if on statin.  -We will need to monitor for peripheral neuropathy or worsening myalgias.    -Monitor closely for any type of rash with fever, eosinophilia, new organ dysfunction which brings concerns for DRESS.  -We will not discharge him with the PICC line or IV line.    8/9:  I spoke with ID about patient's case, we will continue daptomycin end date 8/17.  Repeat blood cultures ordered.  - I counseled patient on his diabetes and his amputations, osteomyelitis.
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
## 2023-08-07 ENCOUNTER — APPOINTMENT (OUTPATIENT)
Dept: ONCOLOGY | Facility: MEDICAL CENTER | Age: 48
End: 2023-08-07
Attending: INTERNAL MEDICINE
Payer: MEDICAID

## 2023-08-07 LAB
ANION GAP SERPL CALC-SCNC: 9 MMOL/L (ref 7–16)
BUN SERPL-MCNC: 11 MG/DL (ref 8–22)
CALCIUM SERPL-MCNC: 9.2 MG/DL (ref 8.5–10.5)
CHLORIDE SERPL-SCNC: 102 MMOL/L (ref 96–112)
CO2 SERPL-SCNC: 23 MMOL/L (ref 20–33)
CREAT SERPL-MCNC: 0.64 MG/DL (ref 0.5–1.4)
ERYTHROCYTE [DISTWIDTH] IN BLOOD BY AUTOMATED COUNT: 50.3 FL (ref 35.9–50)
GFR SERPLBLD CREATININE-BSD FMLA CKD-EPI: 115 ML/MIN/1.73 M 2
GLUCOSE BLD STRIP.AUTO-MCNC: 166 MG/DL (ref 65–99)
GLUCOSE BLD STRIP.AUTO-MCNC: 235 MG/DL (ref 65–99)
GLUCOSE BLD STRIP.AUTO-MCNC: 265 MG/DL (ref 65–99)
GLUCOSE BLD STRIP.AUTO-MCNC: 345 MG/DL (ref 65–99)
GLUCOSE SERPL-MCNC: 240 MG/DL (ref 65–99)
HCT VFR BLD AUTO: 35.2 % (ref 42–52)
HGB BLD-MCNC: 11.3 G/DL (ref 14–18)
MAGNESIUM SERPL-MCNC: 1.9 MG/DL (ref 1.5–2.5)
MCH RBC QN AUTO: 29.6 PG (ref 27–33)
MCHC RBC AUTO-ENTMCNC: 32.1 G/DL (ref 32.3–36.5)
MCV RBC AUTO: 92.1 FL (ref 81.4–97.8)
PHOSPHATE SERPL-MCNC: 3.3 MG/DL (ref 2.5–4.5)
PLATELET # BLD AUTO: 387 K/UL (ref 164–446)
PMV BLD AUTO: 9 FL (ref 9–12.9)
POTASSIUM SERPL-SCNC: 4.3 MMOL/L (ref 3.6–5.5)
RBC # BLD AUTO: 3.82 M/UL (ref 4.7–6.1)
SODIUM SERPL-SCNC: 134 MMOL/L (ref 135–145)
WBC # BLD AUTO: 8.2 K/UL (ref 4.8–10.8)

## 2023-08-07 PROCEDURE — 700102 HCHG RX REV CODE 250 W/ 637 OVERRIDE(OP): Performed by: INTERNAL MEDICINE

## 2023-08-07 PROCEDURE — 700102 HCHG RX REV CODE 250 W/ 637 OVERRIDE(OP): Performed by: STUDENT IN AN ORGANIZED HEALTH CARE EDUCATION/TRAINING PROGRAM

## 2023-08-07 PROCEDURE — A9270 NON-COVERED ITEM OR SERVICE: HCPCS | Performed by: STUDENT IN AN ORGANIZED HEALTH CARE EDUCATION/TRAINING PROGRAM

## 2023-08-07 PROCEDURE — 85027 COMPLETE CBC AUTOMATED: CPT

## 2023-08-07 PROCEDURE — 99233 SBSQ HOSP IP/OBS HIGH 50: CPT | Performed by: INTERNAL MEDICINE

## 2023-08-07 PROCEDURE — 80048 BASIC METABOLIC PNL TOTAL CA: CPT

## 2023-08-07 PROCEDURE — 83735 ASSAY OF MAGNESIUM: CPT

## 2023-08-07 PROCEDURE — 36415 COLL VENOUS BLD VENIPUNCTURE: CPT

## 2023-08-07 PROCEDURE — 82962 GLUCOSE BLOOD TEST: CPT

## 2023-08-07 PROCEDURE — 97162 PT EVAL MOD COMPLEX 30 MIN: CPT

## 2023-08-07 PROCEDURE — 700111 HCHG RX REV CODE 636 W/ 250 OVERRIDE (IP): Mod: JZ | Performed by: STUDENT IN AN ORGANIZED HEALTH CARE EDUCATION/TRAINING PROGRAM

## 2023-08-07 PROCEDURE — A9270 NON-COVERED ITEM OR SERVICE: HCPCS | Performed by: INTERNAL MEDICINE

## 2023-08-07 PROCEDURE — 700105 HCHG RX REV CODE 258: Performed by: STUDENT IN AN ORGANIZED HEALTH CARE EDUCATION/TRAINING PROGRAM

## 2023-08-07 PROCEDURE — 84100 ASSAY OF PHOSPHORUS: CPT

## 2023-08-07 PROCEDURE — 770004 HCHG ROOM/CARE - ONCOLOGY PRIVATE *

## 2023-08-07 PROCEDURE — 700111 HCHG RX REV CODE 636 W/ 250 OVERRIDE (IP): Mod: JZ | Performed by: INTERNAL MEDICINE

## 2023-08-07 RX ORDER — LANOLIN ALCOHOL/MO/W.PET/CERES
400 CREAM (GRAM) TOPICAL 2 TIMES DAILY
Status: COMPLETED | OUTPATIENT
Start: 2023-08-07 | End: 2023-08-07

## 2023-08-07 RX ADMIN — INSULIN LISPRO 10 UNITS: 100 INJECTION, SOLUTION INTRAVENOUS; SUBCUTANEOUS at 17:51

## 2023-08-07 RX ADMIN — OXYCODONE HYDROCHLORIDE 10 MG: 10 TABLET ORAL at 13:22

## 2023-08-07 RX ADMIN — OXYCODONE HYDROCHLORIDE 10 MG: 10 TABLET ORAL at 21:34

## 2023-08-07 RX ADMIN — OXYCODONE HYDROCHLORIDE 10 MG: 10 TABLET ORAL at 05:42

## 2023-08-07 RX ADMIN — INSULIN LISPRO 7 UNITS: 100 INJECTION, SOLUTION INTRAVENOUS; SUBCUTANEOUS at 08:29

## 2023-08-07 RX ADMIN — OXYCODONE HYDROCHLORIDE 10 MG: 10 TABLET ORAL at 02:26

## 2023-08-07 RX ADMIN — DAPTOMYCIN 650 MG: 500 INJECTION, POWDER, LYOPHILIZED, FOR SOLUTION INTRAVENOUS at 06:20

## 2023-08-07 RX ADMIN — ASPIRIN 81 MG: 81 TABLET, COATED ORAL at 05:40

## 2023-08-07 RX ADMIN — INSULIN LISPRO 4 UNITS: 100 INJECTION, SOLUTION INTRAVENOUS; SUBCUTANEOUS at 12:12

## 2023-08-07 RX ADMIN — ESCITALOPRAM OXALATE 10 MG: 10 TABLET ORAL at 05:40

## 2023-08-07 RX ADMIN — Medication 400 MG: at 08:31

## 2023-08-07 RX ADMIN — INSULIN GLARGINE-YFGN 16 UNITS: 100 INJECTION, SOLUTION SUBCUTANEOUS at 17:52

## 2023-08-07 RX ADMIN — ALTEPLASE 2 MG: 2.2 INJECTION, POWDER, LYOPHILIZED, FOR SOLUTION INTRAVENOUS at 13:16

## 2023-08-07 RX ADMIN — INSULIN LISPRO 3 UNITS: 100 INJECTION, SOLUTION INTRAVENOUS; SUBCUTANEOUS at 20:52

## 2023-08-07 RX ADMIN — CLOPIDOGREL BISULFATE 75 MG: 75 TABLET ORAL at 05:40

## 2023-08-07 RX ADMIN — Medication 400 MG: at 17:54

## 2023-08-07 ASSESSMENT — ENCOUNTER SYMPTOMS
WEAKNESS: 0
COUGH: 0
DIARRHEA: 0
DIZZINESS: 0
BACK PAIN: 1
ABDOMINAL PAIN: 0
CONSTIPATION: 0
MYALGIAS: 1
FEVER: 0
VOMITING: 0
NAUSEA: 0
DEPRESSION: 0
SORE THROAT: 0
CHILLS: 0
BLURRED VISION: 0
SHORTNESS OF BREATH: 0
HEADACHES: 0
NERVOUS/ANXIOUS: 0
PALPITATIONS: 0

## 2023-08-07 ASSESSMENT — COGNITIVE AND FUNCTIONAL STATUS - GENERAL
MOVING FROM LYING ON BACK TO SITTING ON SIDE OF FLAT BED: A LITTLE
WALKING IN HOSPITAL ROOM: A LOT
MOVING TO AND FROM BED TO CHAIR: A LOT
TURNING FROM BACK TO SIDE WHILE IN FLAT BAD: A LITTLE
SUGGESTED CMS G CODE MODIFIER MOBILITY: CL
MOBILITY SCORE: 13
STANDING UP FROM CHAIR USING ARMS: A LOT
CLIMB 3 TO 5 STEPS WITH RAILING: TOTAL

## 2023-08-07 ASSESSMENT — GAIT ASSESSMENTS
DEVIATION: ANTALGIC;STEP TO
DISTANCE (FEET): 1
GAIT LEVEL OF ASSIST: MINIMAL ASSIST
ASSISTIVE DEVICE: FRONT WHEEL WALKER

## 2023-08-07 ASSESSMENT — PAIN DESCRIPTION - PAIN TYPE
TYPE: ACUTE PAIN
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
## 2023-08-07 NOTE — DISCHARGE PLANNING
Received choice form @: [id number]  Agency/Facility name: [facility]   Sent referral per choice form @: [id number]

## 2023-08-07 NOTE — CARE PLAN
The patient is Watcher - Medium risk of patient condition declining or worsening    Shift Goals  Clinical Goals: saftey, pain control  Patient Goals: rest, pain control    Progress made toward(s) clinical / shift goals:      Problem: Knowledge Deficit - Standard  Goal: Patient and family/care givers will demonstrate understanding of plan of care, disease process/condition, diagnostic tests and medications  Outcome: Progressing     Problem: Pain - Standard  Goal: Alleviation of pain or a reduction in pain to the patient’s comfort goal  Outcome: Progressing
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
## 2023-08-07 NOTE — CARE PLAN
The patient is Watcher - Medium risk of patient condition declining or worsening         Progress made toward(s) clinical / shift goals:  Patient is AxO x4 and understands plan of care, all questions answered at this time.  Call light and personal belongings are within reach. Pt calls appropriately for nursing needs. Frequent rounding in place.  Bed is locked and in lowest position.     Patient is not progressing towards the following goals: NA

## 2023-08-08 ENCOUNTER — APPOINTMENT (OUTPATIENT)
Dept: ONCOLOGY | Facility: MEDICAL CENTER | Age: 48
End: 2023-08-08
Attending: INTERNAL MEDICINE
Payer: MEDICAID

## 2023-08-08 PROBLEM — F14.10 COCAINE ABUSE (HCC): Status: ACTIVE | Noted: 2023-08-08

## 2023-08-08 PROBLEM — E83.42 HYPOMAGNESEMIA: Status: ACTIVE | Noted: 2023-08-08

## 2023-08-08 LAB
ALBUMIN SERPL BCP-MCNC: 3.5 G/DL (ref 3.2–4.9)
ALBUMIN/GLOB SERPL: 1.1 G/DL
ALP SERPL-CCNC: 149 U/L (ref 30–99)
ALT SERPL-CCNC: 19 U/L (ref 2–50)
ANION GAP SERPL CALC-SCNC: 10 MMOL/L (ref 7–16)
AST SERPL-CCNC: 12 U/L (ref 12–45)
BASOPHILS # BLD AUTO: 0.7 % (ref 0–1.8)
BASOPHILS # BLD: 0.06 K/UL (ref 0–0.12)
BILIRUB SERPL-MCNC: 0.2 MG/DL (ref 0.1–1.5)
BUN SERPL-MCNC: 15 MG/DL (ref 8–22)
CALCIUM ALBUM COR SERPL-MCNC: 9.5 MG/DL (ref 8.5–10.5)
CALCIUM SERPL-MCNC: 9.1 MG/DL (ref 8.5–10.5)
CHLORIDE SERPL-SCNC: 101 MMOL/L (ref 96–112)
CK SERPL-CCNC: 62 U/L (ref 0–154)
CO2 SERPL-SCNC: 24 MMOL/L (ref 20–33)
CREAT SERPL-MCNC: 0.76 MG/DL (ref 0.5–1.4)
EOSINOPHIL # BLD AUTO: 0.87 K/UL (ref 0–0.51)
EOSINOPHIL NFR BLD: 9.5 % (ref 0–6.9)
ERYTHROCYTE [DISTWIDTH] IN BLOOD BY AUTOMATED COUNT: 49.8 FL (ref 35.9–50)
GFR SERPLBLD CREATININE-BSD FMLA CKD-EPI: 109 ML/MIN/1.73 M 2
GLOBULIN SER CALC-MCNC: 3.3 G/DL (ref 1.9–3.5)
GLUCOSE BLD STRIP.AUTO-MCNC: 175 MG/DL (ref 65–99)
GLUCOSE BLD STRIP.AUTO-MCNC: 190 MG/DL (ref 65–99)
GLUCOSE BLD STRIP.AUTO-MCNC: 206 MG/DL (ref 65–99)
GLUCOSE BLD STRIP.AUTO-MCNC: 235 MG/DL (ref 65–99)
GLUCOSE SERPL-MCNC: 179 MG/DL (ref 65–99)
HCT VFR BLD AUTO: 34.4 % (ref 42–52)
HGB BLD-MCNC: 11.1 G/DL (ref 14–18)
IMM GRANULOCYTES # BLD AUTO: 0.04 K/UL (ref 0–0.11)
IMM GRANULOCYTES NFR BLD AUTO: 0.4 % (ref 0–0.9)
LYMPHOCYTES # BLD AUTO: 2.76 K/UL (ref 1–4.8)
LYMPHOCYTES NFR BLD: 30 % (ref 22–41)
MAGNESIUM SERPL-MCNC: 1.8 MG/DL (ref 1.5–2.5)
MCH RBC QN AUTO: 29.2 PG (ref 27–33)
MCHC RBC AUTO-ENTMCNC: 32.3 G/DL (ref 32.3–36.5)
MCV RBC AUTO: 90.5 FL (ref 81.4–97.8)
MONOCYTES # BLD AUTO: 0.74 K/UL (ref 0–0.85)
MONOCYTES NFR BLD AUTO: 8.1 % (ref 0–13.4)
NEUTROPHILS # BLD AUTO: 4.72 K/UL (ref 1.82–7.42)
NEUTROPHILS NFR BLD: 51.3 % (ref 44–72)
NRBC # BLD AUTO: 0 K/UL
NRBC BLD-RTO: 0 /100 WBC (ref 0–0.2)
PHOSPHATE SERPL-MCNC: 4 MG/DL (ref 2.5–4.5)
PLATELET # BLD AUTO: 319 K/UL (ref 164–446)
PMV BLD AUTO: 9.1 FL (ref 9–12.9)
POTASSIUM SERPL-SCNC: 4.3 MMOL/L (ref 3.6–5.5)
PROT SERPL-MCNC: 6.8 G/DL (ref 6–8.2)
RBC # BLD AUTO: 3.8 M/UL (ref 4.7–6.1)
SODIUM SERPL-SCNC: 135 MMOL/L (ref 135–145)
WBC # BLD AUTO: 9.2 K/UL (ref 4.8–10.8)

## 2023-08-08 PROCEDURE — 700102 HCHG RX REV CODE 250 W/ 637 OVERRIDE(OP): Performed by: INTERNAL MEDICINE

## 2023-08-08 PROCEDURE — A9270 NON-COVERED ITEM OR SERVICE: HCPCS | Performed by: INTERNAL MEDICINE

## 2023-08-08 PROCEDURE — 770004 HCHG ROOM/CARE - ONCOLOGY PRIVATE *

## 2023-08-08 PROCEDURE — 82550 ASSAY OF CK (CPK): CPT

## 2023-08-08 PROCEDURE — 80053 COMPREHEN METABOLIC PANEL: CPT

## 2023-08-08 PROCEDURE — 84100 ASSAY OF PHOSPHORUS: CPT

## 2023-08-08 PROCEDURE — 82962 GLUCOSE BLOOD TEST: CPT | Mod: 91

## 2023-08-08 PROCEDURE — 83735 ASSAY OF MAGNESIUM: CPT

## 2023-08-08 PROCEDURE — 700111 HCHG RX REV CODE 636 W/ 250 OVERRIDE (IP): Mod: JZ | Performed by: STUDENT IN AN ORGANIZED HEALTH CARE EDUCATION/TRAINING PROGRAM

## 2023-08-08 PROCEDURE — 700105 HCHG RX REV CODE 258: Performed by: STUDENT IN AN ORGANIZED HEALTH CARE EDUCATION/TRAINING PROGRAM

## 2023-08-08 PROCEDURE — 99233 SBSQ HOSP IP/OBS HIGH 50: CPT | Performed by: INTERNAL MEDICINE

## 2023-08-08 PROCEDURE — 85025 COMPLETE CBC W/AUTO DIFF WBC: CPT

## 2023-08-08 RX ORDER — UREA 10 %
500 LOTION (ML) TOPICAL
Status: DISCONTINUED | OUTPATIENT
Start: 2023-08-08 | End: 2023-08-17 | Stop reason: HOSPADM

## 2023-08-08 RX ADMIN — DAPTOMYCIN 650 MG: 500 INJECTION, POWDER, LYOPHILIZED, FOR SOLUTION INTRAVENOUS at 05:29

## 2023-08-08 RX ADMIN — OXYCODONE HYDROCHLORIDE 10 MG: 10 TABLET ORAL at 20:33

## 2023-08-08 RX ADMIN — INSULIN LISPRO 4 UNITS: 100 INJECTION, SOLUTION INTRAVENOUS; SUBCUTANEOUS at 20:29

## 2023-08-08 RX ADMIN — INSULIN LISPRO 3 UNITS: 100 INJECTION, SOLUTION INTRAVENOUS; SUBCUTANEOUS at 17:29

## 2023-08-08 RX ADMIN — INSULIN LISPRO 3 UNITS: 100 INJECTION, SOLUTION INTRAVENOUS; SUBCUTANEOUS at 08:21

## 2023-08-08 RX ADMIN — INSULIN LISPRO 4 UNITS: 100 INJECTION, SOLUTION INTRAVENOUS; SUBCUTANEOUS at 12:12

## 2023-08-08 RX ADMIN — OXYCODONE HYDROCHLORIDE 10 MG: 10 TABLET ORAL at 12:57

## 2023-08-08 RX ADMIN — Medication 500 MG: at 12:13

## 2023-08-08 RX ADMIN — OXYCODONE HYDROCHLORIDE 10 MG: 10 TABLET ORAL at 05:28

## 2023-08-08 RX ADMIN — INSULIN GLARGINE-YFGN 16 UNITS: 100 INJECTION, SOLUTION SUBCUTANEOUS at 17:29

## 2023-08-08 ASSESSMENT — COGNITIVE AND FUNCTIONAL STATUS - GENERAL
STANDING UP FROM CHAIR USING ARMS: A LOT
MOBILITY SCORE: 15
SUGGESTED CMS G CODE MODIFIER MOBILITY: CK
MOVING TO AND FROM BED TO CHAIR: A LITTLE
TOILETING: A LITTLE
MOBILITY SCORE: 15
WALKING IN HOSPITAL ROOM: A LOT
DRESSING REGULAR LOWER BODY CLOTHING: A LOT
STANDING UP FROM CHAIR USING ARMS: A LOT
CLIMB 3 TO 5 STEPS WITH RAILING: A LOT
SUGGESTED CMS G CODE MODIFIER MOBILITY: CK
MOVING FROM LYING ON BACK TO SITTING ON SIDE OF FLAT BED: A LOT
CLIMB 3 TO 5 STEPS WITH RAILING: A LOT
WALKING IN HOSPITAL ROOM: A LOT
MOVING TO AND FROM BED TO CHAIR: A LITTLE
HELP NEEDED FOR BATHING: A LOT
DAILY ACTIVITIY SCORE: 19
SUGGESTED CMS G CODE MODIFIER DAILY ACTIVITY: CK
MOVING FROM LYING ON BACK TO SITTING ON SIDE OF FLAT BED: A LOT

## 2023-08-08 ASSESSMENT — PAIN DESCRIPTION - PAIN TYPE
TYPE: ACUTE PAIN

## 2023-08-08 ASSESSMENT — ENCOUNTER SYMPTOMS
CHILLS: 0
DIARRHEA: 0
DIZZINESS: 0
VOMITING: 0
COUGH: 0
SHORTNESS OF BREATH: 0
PALPITATIONS: 0
NAUSEA: 0
ABDOMINAL PAIN: 0
BACK PAIN: 0
FEVER: 0
CONSTIPATION: 0
HEADACHES: 0

## 2023-08-08 NOTE — CARE PLAN
The patient is Watcher - Medium risk of patient condition declining or worsening    Shift Goals  Clinical Goals: pain control, monitor glucose  Patient Goals: rest, pain control  Family Goals: N/A    Progress made toward(s) clinical / shift goals:  Patient is AxO x4 and understands plan of care, all questions answered at this time.  Call light and personal belongings are within reach. Pt calls appropriately for nursing needs. Frequent rounding in place.  Bed is locked and in lowest position. PRN pain medication available per MAR.     Patient is not progressing towards the following goals: NA
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
## 2023-08-08 NOTE — CARE PLAN
The patient is Watcher - Medium risk of patient condition declining or worsening    Shift Goals  Clinical Goals: safety, pain control, monitor blood glucose  Patient Goals: rest, pain control    Progress made toward(s) clinical / shift goals:  Patient is AxO x4 and understands plan of care, all questions answered at this time.  PRN pain medications available per MAR. Blood sugar checks in place.    Patient is not progressing towards the following goals: NA
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
## 2023-08-08 NOTE — CARE PLAN
The patient is Watcher - Medium risk of patient condition declining or worsening    Shift Goals  Clinical Goals: pain control, monitor blood glucose  Patient Goals: Rest, pain control  Family Goals: NA    Progress made toward(s) clinical / shift goals:    Problem: Knowledge Deficit - Standard  Goal: Patient and family/care givers will demonstrate understanding of plan of care, disease process/condition, diagnostic tests and medications  Outcome: Progressing     Problem: Fall Risk  Goal: Patient will remain free from falls  Outcome: Progressing     Problem: Pain - Standard  Goal: Alleviation of pain or a reduction in pain to the patient’s comfort goal  Outcome: Progressing       Patient is not progressing towards the following goals:

## 2023-08-09 ENCOUNTER — APPOINTMENT (OUTPATIENT)
Dept: ONCOLOGY | Facility: MEDICAL CENTER | Age: 48
End: 2023-08-09
Attending: INTERNAL MEDICINE
Payer: MEDICAID

## 2023-08-09 LAB
GLUCOSE BLD STRIP.AUTO-MCNC: 160 MG/DL (ref 65–99)
GLUCOSE BLD STRIP.AUTO-MCNC: 164 MG/DL (ref 65–99)
GLUCOSE BLD STRIP.AUTO-MCNC: 173 MG/DL (ref 65–99)
GLUCOSE BLD STRIP.AUTO-MCNC: 190 MG/DL (ref 65–99)

## 2023-08-09 PROCEDURE — A9270 NON-COVERED ITEM OR SERVICE: HCPCS | Performed by: INTERNAL MEDICINE

## 2023-08-09 PROCEDURE — 82962 GLUCOSE BLOOD TEST: CPT | Mod: 91

## 2023-08-09 PROCEDURE — 99233 SBSQ HOSP IP/OBS HIGH 50: CPT | Performed by: INTERNAL MEDICINE

## 2023-08-09 PROCEDURE — 36415 COLL VENOUS BLD VENIPUNCTURE: CPT

## 2023-08-09 PROCEDURE — 770004 HCHG ROOM/CARE - ONCOLOGY PRIVATE *

## 2023-08-09 PROCEDURE — 700102 HCHG RX REV CODE 250 W/ 637 OVERRIDE(OP): Performed by: STUDENT IN AN ORGANIZED HEALTH CARE EDUCATION/TRAINING PROGRAM

## 2023-08-09 PROCEDURE — 700111 HCHG RX REV CODE 636 W/ 250 OVERRIDE (IP): Mod: JZ | Performed by: STUDENT IN AN ORGANIZED HEALTH CARE EDUCATION/TRAINING PROGRAM

## 2023-08-09 PROCEDURE — 97530 THERAPEUTIC ACTIVITIES: CPT

## 2023-08-09 PROCEDURE — 99255 IP/OBS CONSLTJ NEW/EST HI 80: CPT | Performed by: INTERNAL MEDICINE

## 2023-08-09 PROCEDURE — 700102 HCHG RX REV CODE 250 W/ 637 OVERRIDE(OP): Performed by: INTERNAL MEDICINE

## 2023-08-09 PROCEDURE — 87040 BLOOD CULTURE FOR BACTERIA: CPT

## 2023-08-09 PROCEDURE — 700105 HCHG RX REV CODE 258: Performed by: STUDENT IN AN ORGANIZED HEALTH CARE EDUCATION/TRAINING PROGRAM

## 2023-08-09 PROCEDURE — A9270 NON-COVERED ITEM OR SERVICE: HCPCS | Performed by: STUDENT IN AN ORGANIZED HEALTH CARE EDUCATION/TRAINING PROGRAM

## 2023-08-09 RX ADMIN — OXYCODONE HYDROCHLORIDE 10 MG: 10 TABLET ORAL at 17:10

## 2023-08-09 RX ADMIN — OXYCODONE HYDROCHLORIDE 10 MG: 10 TABLET ORAL at 21:05

## 2023-08-09 RX ADMIN — CLOPIDOGREL BISULFATE 75 MG: 75 TABLET ORAL at 05:56

## 2023-08-09 RX ADMIN — OXYCODONE HYDROCHLORIDE 10 MG: 10 TABLET ORAL at 13:18

## 2023-08-09 RX ADMIN — ASPIRIN 81 MG: 81 TABLET, COATED ORAL at 05:56

## 2023-08-09 RX ADMIN — ESCITALOPRAM OXALATE 10 MG: 10 TABLET ORAL at 05:56

## 2023-08-09 RX ADMIN — INSULIN LISPRO 3 UNITS: 100 INJECTION, SOLUTION INTRAVENOUS; SUBCUTANEOUS at 13:14

## 2023-08-09 RX ADMIN — ENOXAPARIN SODIUM 40 MG: 100 INJECTION SUBCUTANEOUS at 17:10

## 2023-08-09 RX ADMIN — INSULIN GLARGINE-YFGN 16 UNITS: 100 INJECTION, SOLUTION SUBCUTANEOUS at 17:14

## 2023-08-09 RX ADMIN — INSULIN LISPRO 3 UNITS: 100 INJECTION, SOLUTION INTRAVENOUS; SUBCUTANEOUS at 21:06

## 2023-08-09 RX ADMIN — DAPTOMYCIN 650 MG: 500 INJECTION, POWDER, LYOPHILIZED, FOR SOLUTION INTRAVENOUS at 05:56

## 2023-08-09 RX ADMIN — Medication 500 MG: at 13:14

## 2023-08-09 RX ADMIN — OXYCODONE HYDROCHLORIDE 10 MG: 10 TABLET ORAL at 08:38

## 2023-08-09 RX ADMIN — INSULIN LISPRO 3 UNITS: 100 INJECTION, SOLUTION INTRAVENOUS; SUBCUTANEOUS at 08:30

## 2023-08-09 RX ADMIN — INSULIN LISPRO 3 UNITS: 100 INJECTION, SOLUTION INTRAVENOUS; SUBCUTANEOUS at 17:14

## 2023-08-09 RX ADMIN — OXYCODONE HYDROCHLORIDE 10 MG: 10 TABLET ORAL at 03:31

## 2023-08-09 ASSESSMENT — ENCOUNTER SYMPTOMS
HEADACHES: 0
SPUTUM PRODUCTION: 0
FEVER: 0
DOUBLE VISION: 0
PALPITATIONS: 0
BLURRED VISION: 0
MYALGIAS: 1
NECK PAIN: 0
SHORTNESS OF BREATH: 0
CHILLS: 0
NAUSEA: 0
CONSTIPATION: 0
NERVOUS/ANXIOUS: 0
DIZZINESS: 0
WEAKNESS: 0
ABDOMINAL PAIN: 0
DIARRHEA: 0
COUGH: 0
VOMITING: 0
BACK PAIN: 0

## 2023-08-09 ASSESSMENT — COGNITIVE AND FUNCTIONAL STATUS - GENERAL
MOVING TO AND FROM BED TO CHAIR: A LITTLE
STANDING UP FROM CHAIR USING ARMS: A LITTLE
SUGGESTED CMS G CODE MODIFIER MOBILITY: CK
WALKING IN HOSPITAL ROOM: A LITTLE
MOVING FROM LYING ON BACK TO SITTING ON SIDE OF FLAT BED: A LITTLE
MOBILITY SCORE: 18
TURNING FROM BACK TO SIDE WHILE IN FLAT BAD: A LITTLE
CLIMB 3 TO 5 STEPS WITH RAILING: A LITTLE

## 2023-08-09 ASSESSMENT — PAIN DESCRIPTION - PAIN TYPE
TYPE: ACUTE PAIN

## 2023-08-09 ASSESSMENT — GAIT ASSESSMENTS
GAIT LEVEL OF ASSIST: SUPERVISED
DISTANCE (FEET): 400
DEVIATION: BRADYKINETIC
ASSISTIVE DEVICE: FRONT WHEEL WALKER

## 2023-08-09 NOTE — ASSESSMENT & PLAN NOTE
8/16/2023  Patient positive for cocaine on UDS  - I counseled patient on his cocaine use and vascular compromise.
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
## 2023-08-09 NOTE — CARE PLAN
The patient is Stable - Low risk of patient condition declining or worsening    Shift Goals  Clinical Goals: pain control, monitor glucose  Patient Goals: rest, pain control  Family Goals: NA    Progress made toward(s) clinical / shift goals:    Problem: Knowledge Deficit - Standard  Goal: Patient and family/care givers will demonstrate understanding of plan of care, disease process/condition, diagnostic tests and medications  Outcome: Progressing     Problem: Fall Risk  Goal: Patient will remain free from falls  Outcome: Progressing     Problem: Pain - Standard  Goal: Alleviation of pain or a reduction in pain to the patient’s comfort goal  Outcome: Progressing       Patient is not progressing towards the following goals:

## 2023-08-10 ENCOUNTER — APPOINTMENT (OUTPATIENT)
Dept: ONCOLOGY | Facility: MEDICAL CENTER | Age: 48
End: 2023-08-10
Attending: INTERNAL MEDICINE
Payer: MEDICAID

## 2023-08-10 LAB
ALBUMIN SERPL BCP-MCNC: 3.7 G/DL (ref 3.2–4.9)
BUN SERPL-MCNC: 19 MG/DL (ref 8–22)
CALCIUM ALBUM COR SERPL-MCNC: 9.6 MG/DL (ref 8.5–10.5)
CALCIUM SERPL-MCNC: 9.4 MG/DL (ref 8.5–10.5)
CHLORIDE SERPL-SCNC: 100 MMOL/L (ref 96–112)
CO2 SERPL-SCNC: 22 MMOL/L (ref 20–33)
CREAT SERPL-MCNC: 0.79 MG/DL (ref 0.5–1.4)
ERYTHROCYTE [DISTWIDTH] IN BLOOD BY AUTOMATED COUNT: 50.5 FL (ref 35.9–50)
GFR SERPLBLD CREATININE-BSD FMLA CKD-EPI: 108 ML/MIN/1.73 M 2
GLUCOSE BLD STRIP.AUTO-MCNC: 135 MG/DL (ref 65–99)
GLUCOSE BLD STRIP.AUTO-MCNC: 151 MG/DL (ref 65–99)
GLUCOSE BLD STRIP.AUTO-MCNC: 161 MG/DL (ref 65–99)
GLUCOSE BLD STRIP.AUTO-MCNC: 233 MG/DL (ref 65–99)
GLUCOSE SERPL-MCNC: 200 MG/DL (ref 65–99)
HCT VFR BLD AUTO: 36.7 % (ref 42–52)
HGB BLD-MCNC: 12 G/DL (ref 14–18)
MAGNESIUM SERPL-MCNC: 2 MG/DL (ref 1.5–2.5)
MCH RBC QN AUTO: 29.6 PG (ref 27–33)
MCHC RBC AUTO-ENTMCNC: 32.7 G/DL (ref 32.3–36.5)
MCV RBC AUTO: 90.4 FL (ref 81.4–97.8)
PHOSPHATE SERPL-MCNC: 4.4 MG/DL (ref 2.5–4.5)
PLATELET # BLD AUTO: 362 K/UL (ref 164–446)
PMV BLD AUTO: 8.9 FL (ref 9–12.9)
POTASSIUM SERPL-SCNC: 4.1 MMOL/L (ref 3.6–5.5)
RBC # BLD AUTO: 4.06 M/UL (ref 4.7–6.1)
SODIUM SERPL-SCNC: 134 MMOL/L (ref 135–145)
WBC # BLD AUTO: 8.1 K/UL (ref 4.8–10.8)

## 2023-08-10 PROCEDURE — A9270 NON-COVERED ITEM OR SERVICE: HCPCS | Performed by: FAMILY MEDICINE

## 2023-08-10 PROCEDURE — 700102 HCHG RX REV CODE 250 W/ 637 OVERRIDE(OP): Performed by: FAMILY MEDICINE

## 2023-08-10 PROCEDURE — A9270 NON-COVERED ITEM OR SERVICE: HCPCS | Performed by: STUDENT IN AN ORGANIZED HEALTH CARE EDUCATION/TRAINING PROGRAM

## 2023-08-10 PROCEDURE — 99232 SBSQ HOSP IP/OBS MODERATE 35: CPT | Performed by: FAMILY MEDICINE

## 2023-08-10 PROCEDURE — 770004 HCHG ROOM/CARE - ONCOLOGY PRIVATE *

## 2023-08-10 PROCEDURE — 83735 ASSAY OF MAGNESIUM: CPT

## 2023-08-10 PROCEDURE — 82962 GLUCOSE BLOOD TEST: CPT

## 2023-08-10 PROCEDURE — 700102 HCHG RX REV CODE 250 W/ 637 OVERRIDE(OP): Performed by: STUDENT IN AN ORGANIZED HEALTH CARE EDUCATION/TRAINING PROGRAM

## 2023-08-10 PROCEDURE — 700111 HCHG RX REV CODE 636 W/ 250 OVERRIDE (IP): Mod: JZ | Performed by: STUDENT IN AN ORGANIZED HEALTH CARE EDUCATION/TRAINING PROGRAM

## 2023-08-10 PROCEDURE — A9270 NON-COVERED ITEM OR SERVICE: HCPCS | Performed by: INTERNAL MEDICINE

## 2023-08-10 PROCEDURE — 85027 COMPLETE CBC AUTOMATED: CPT

## 2023-08-10 PROCEDURE — 700105 HCHG RX REV CODE 258: Performed by: STUDENT IN AN ORGANIZED HEALTH CARE EDUCATION/TRAINING PROGRAM

## 2023-08-10 PROCEDURE — 700102 HCHG RX REV CODE 250 W/ 637 OVERRIDE(OP): Performed by: INTERNAL MEDICINE

## 2023-08-10 PROCEDURE — 80069 RENAL FUNCTION PANEL: CPT

## 2023-08-10 PROCEDURE — 700111 HCHG RX REV CODE 636 W/ 250 OVERRIDE (IP): Mod: JZ | Performed by: FAMILY MEDICINE

## 2023-08-10 RX ORDER — HYDROCODONE BITARTRATE AND ACETAMINOPHEN 5; 325 MG/1; MG/1
2 TABLET ORAL EVERY 4 HOURS PRN
Status: DISCONTINUED | OUTPATIENT
Start: 2023-08-10 | End: 2023-08-17 | Stop reason: HOSPADM

## 2023-08-10 RX ORDER — HYDROCODONE BITARTRATE AND ACETAMINOPHEN 5; 325 MG/1; MG/1
1 TABLET ORAL EVERY 4 HOURS PRN
Status: DISCONTINUED | OUTPATIENT
Start: 2023-08-10 | End: 2023-08-17 | Stop reason: HOSPADM

## 2023-08-10 RX ORDER — ONDANSETRON 4 MG/1
4 TABLET, ORALLY DISINTEGRATING ORAL EVERY 4 HOURS PRN
Status: DISCONTINUED | OUTPATIENT
Start: 2023-08-10 | End: 2023-08-17 | Stop reason: HOSPADM

## 2023-08-10 RX ADMIN — OXYCODONE HYDROCHLORIDE 10 MG: 10 TABLET ORAL at 04:48

## 2023-08-10 RX ADMIN — Medication 500 MG: at 11:29

## 2023-08-10 RX ADMIN — ASPIRIN 81 MG: 81 TABLET, COATED ORAL at 04:48

## 2023-08-10 RX ADMIN — ESCITALOPRAM OXALATE 10 MG: 10 TABLET ORAL at 04:48

## 2023-08-10 RX ADMIN — HYDROCODONE BITARTRATE AND ACETAMINOPHEN 2 TABLET: 5; 325 TABLET ORAL at 21:20

## 2023-08-10 RX ADMIN — ALTEPLASE 2 MG: 2.2 INJECTION, POWDER, LYOPHILIZED, FOR SOLUTION INTRAVENOUS at 13:57

## 2023-08-10 RX ADMIN — INSULIN GLARGINE-YFGN 16 UNITS: 100 INJECTION, SOLUTION SUBCUTANEOUS at 17:36

## 2023-08-10 RX ADMIN — ONDANSETRON 4 MG: 4 TABLET, ORALLY DISINTEGRATING ORAL at 11:29

## 2023-08-10 RX ADMIN — INSULIN LISPRO 3 UNITS: 100 INJECTION, SOLUTION INTRAVENOUS; SUBCUTANEOUS at 11:33

## 2023-08-10 RX ADMIN — OXYCODONE HYDROCHLORIDE 10 MG: 10 TABLET ORAL at 09:02

## 2023-08-10 RX ADMIN — DAPTOMYCIN 650 MG: 500 INJECTION, POWDER, LYOPHILIZED, FOR SOLUTION INTRAVENOUS at 09:09

## 2023-08-10 RX ADMIN — OXYCODONE HYDROCHLORIDE 10 MG: 10 TABLET ORAL at 16:03

## 2023-08-10 RX ADMIN — ALTEPLASE 2 MG: 2.2 INJECTION, POWDER, LYOPHILIZED, FOR SOLUTION INTRAVENOUS at 16:04

## 2023-08-10 RX ADMIN — INSULIN LISPRO 4 UNITS: 100 INJECTION, SOLUTION INTRAVENOUS; SUBCUTANEOUS at 20:36

## 2023-08-10 RX ADMIN — ENOXAPARIN SODIUM 40 MG: 100 INJECTION SUBCUTANEOUS at 17:33

## 2023-08-10 RX ADMIN — CLOPIDOGREL BISULFATE 75 MG: 75 TABLET ORAL at 04:48

## 2023-08-10 ASSESSMENT — ENCOUNTER SYMPTOMS
NAUSEA: 0
PALPITATIONS: 0
COUGH: 0
DIZZINESS: 0
FEVER: 0
BACK PAIN: 0
CONSTIPATION: 0
ABDOMINAL PAIN: 0
CHILLS: 0
VOMITING: 0
SHORTNESS OF BREATH: 0
DIARRHEA: 0
HEADACHES: 0

## 2023-08-10 ASSESSMENT — PAIN DESCRIPTION - PAIN TYPE
TYPE: ACUTE PAIN
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
## 2023-08-10 NOTE — CARE PLAN
The patient is Watcher - Medium risk of patient condition declining or worsening    Shift Goals  Clinical Goals: pain, nausea control  Patient Goals: pain control, rest  Family Goals: LAXMI    Progress made toward(s) clinical / shift goals:  ***      Problem: Knowledge Deficit - Standard  Goal: Patient and family/care givers will demonstrate understanding of plan of care, disease process/condition, diagnostic tests and medications  Outcome: Progressing     Problem: Fall Risk  Goal: Patient will remain free from falls  Outcome: Progressing     Problem: Pain - Standard  Goal: Alleviation of pain or a reduction in pain to the patient’s comfort goal  Outcome: Progressing       Patient is not progressing towards the following goals:

## 2023-08-10 NOTE — THERAPY
Physical Therapy   Daily Treatment     Patient Name: Rogelio Escudero  Age:  48 y.o., Sex:  male  Medical Record #: [id number]  Today's Date: 8/9/2023     Precautions  Precautions: Fall Risk;Spinal / Back Precautions ;Offloading Shoe  Comments: R L2 TP fx    Assessment  Pt seen for PT tx session and demonstrated significant improvements compared to eval as noted down below. Pt is now at SPV level for all mobility with FWW and feels that he will be ok to DC home once medically cleared. Pt is requesting to have a new pair of shoes (size 10) and TMA insert from [facility] if possible during his admission. Will contact [facility] tomorrow. Pt has no further acute care PT needs at this time, however he is encouraged to continue ambulating with nursing staff while using his offloading shoe per MD request. Patient will not be actively followed for physical therapy services at this time, however may be seen if requested by physician for 1 more visit within 30 days to address any discharge or equipment needs.     Plan    Treatment Plan Status:    Type of Treatment: Gait Training, Therapeutic Exercise, Therapeutic Activities, Stair Training, Neuro Re-Education / Balance, Bed Mobility  Treatment Frequency: 3 Times per Week  Treatment Duration: Until Therapy Goals Met    DC Equipment Recommendations: Front-Wheel Walker (pt needs to confirm with his cousin if he has a FWW at home. will contact [facility] about TMA insert for shoe)  Discharge Recommendations: Anticipate that the patient will have no further physical therapy needs after discharge from the hospital        Vitals   O2 (LPM) 0   O2 Delivery Device None - Room Air   Pain 0 - 10 Group   Therapist Pain Assessment   (no c/o pain)   Balance   Sitting Balance (Static) Good   Sitting Balance (Dynamic) Good   Standing Balance (Static) Fair +   Standing Balance (Dynamic) Fair +   Weight Shift Sitting Good   Weight Shift Standing Fair   Skilled Intervention Verbal Cuing   Comments FWW    Bed Mobility    Supine to Sit Supervised   Sit to Supine Supervised   Scooting Supervised   Gait Analysis   Gait Level Of Assist Supervised   Assistive Device Front Wheel Walker   Distance (Feet) 400   # of Times Distance was Traveled 1   Deviation Bradykinetic  (slighlty altered gait due to TMA)   Weight Bearing Status offloading shoe   Skilled Intervention Verbal Cuing   Functional Mobility   Sit to Stand Supervised   Bed, Chair, Wheelchair Transfer Supervised   Toilet Transfers Supervised   Mobility FWW   Skilled Intervention Verbal Cuing   How much difficulty does the patient currently have...   Turning over in bed (including adjusting bedclothes, sheets and blankets)? 3   Sitting down on and standing up from a chair with arms (e.g., wheelchair, bedside commode, etc.) 3   Moving from lying on back to sitting on the side of the bed? 3   How much help from another person does the patient currently need...   Moving to and from a bed to a chair (including a wheelchair)? 3   Need to walk in a hospital room? 3   Climbing 3-5 steps with a railing? 3   6 clicks Mobility Score 18   Activity Tolerance   Sitting in Chair 5 min (toilet)   Sitting Edge of Bed 5 min   Standing 10 min   Comments no overt pain, SOB, or fatigue   Short Term Goals    Short Term Goal # 1 Pt will be able to perform bed mobility and sup <> sit Lissette in 6 visits.   Goal Outcome # 1 Goal met   Short Term Goal # 2 Pt will be able to perform sit <> stand and transfer Lissette with FWW in 6 visits.   Goal Outcome # 2 Goal met   Short Term Goal # 3 Pt will be able to ambulate 300 ft with FWW Lissette in 6 visits.   Goal Outcome # 3 Goal met   Physical Therapy Treatment Plan   Reason For Discharge Discharge Secondary to Goals Met   Anticipated Discharge Equipment and Recommendations   DC Equipment Recommendations Front-Wheel Walker  (pt needs to confirm with his cousin if he has a FWW at home. will contact  about TMA insert for shoe)   Discharge  Recommendations Anticipate that the patient will have no further physical therapy needs after discharge from the hospital   Interdisciplinary Plan of Care Collaboration   IDT Collaboration with  Nursing;Physician   Patient Position at End of Therapy In Bed;Call Light within Reach;Tray Table within Reach;Phone within Reach   Collaboration Comments RN updated   Session Information   Date / Session Number  8/9- 2 (2/3, 8/13)

## 2023-08-11 ENCOUNTER — APPOINTMENT (OUTPATIENT)
Dept: ONCOLOGY | Facility: MEDICAL CENTER | Age: 48
End: 2023-08-11
Attending: INTERNAL MEDICINE
Payer: MEDICAID

## 2023-08-11 LAB
ANION GAP SERPL CALC-SCNC: 10 MMOL/L (ref 7–16)
BUN SERPL-MCNC: 23 MG/DL (ref 8–22)
CALCIUM SERPL-MCNC: 9.3 MG/DL (ref 8.5–10.5)
CHLORIDE SERPL-SCNC: 105 MMOL/L (ref 96–112)
CK SERPL-CCNC: 61 U/L (ref 0–154)
CO2 SERPL-SCNC: 22 MMOL/L (ref 20–33)
CREAT SERPL-MCNC: 0.76 MG/DL (ref 0.5–1.4)
ERYTHROCYTE [DISTWIDTH] IN BLOOD BY AUTOMATED COUNT: 50.2 FL (ref 35.9–50)
GFR SERPLBLD CREATININE-BSD FMLA CKD-EPI: 109 ML/MIN/1.73 M 2
GLUCOSE BLD STRIP.AUTO-MCNC: 119 MG/DL (ref 65–99)
GLUCOSE BLD STRIP.AUTO-MCNC: 152 MG/DL (ref 65–99)
GLUCOSE BLD STRIP.AUTO-MCNC: 179 MG/DL (ref 65–99)
GLUCOSE BLD STRIP.AUTO-MCNC: 201 MG/DL (ref 65–99)
GLUCOSE SERPL-MCNC: 175 MG/DL (ref 65–99)
HCT VFR BLD AUTO: 37.2 % (ref 42–52)
HGB BLD-MCNC: 12 G/DL (ref 14–18)
MCH RBC QN AUTO: 29 PG (ref 27–33)
MCHC RBC AUTO-ENTMCNC: 32.3 G/DL (ref 32.3–36.5)
MCV RBC AUTO: 89.9 FL (ref 81.4–97.8)
PLATELET # BLD AUTO: 358 K/UL (ref 164–446)
PMV BLD AUTO: 8.8 FL (ref 9–12.9)
POTASSIUM SERPL-SCNC: 4.3 MMOL/L (ref 3.6–5.5)
RBC # BLD AUTO: 4.14 M/UL (ref 4.7–6.1)
SODIUM SERPL-SCNC: 137 MMOL/L (ref 135–145)
WBC # BLD AUTO: 7.1 K/UL (ref 4.8–10.8)

## 2023-08-11 PROCEDURE — 36415 COLL VENOUS BLD VENIPUNCTURE: CPT

## 2023-08-11 PROCEDURE — A9270 NON-COVERED ITEM OR SERVICE: HCPCS | Performed by: STUDENT IN AN ORGANIZED HEALTH CARE EDUCATION/TRAINING PROGRAM

## 2023-08-11 PROCEDURE — A9270 NON-COVERED ITEM OR SERVICE: HCPCS | Performed by: FAMILY MEDICINE

## 2023-08-11 PROCEDURE — 700105 HCHG RX REV CODE 258: Performed by: STUDENT IN AN ORGANIZED HEALTH CARE EDUCATION/TRAINING PROGRAM

## 2023-08-11 PROCEDURE — 82962 GLUCOSE BLOOD TEST: CPT

## 2023-08-11 PROCEDURE — 700102 HCHG RX REV CODE 250 W/ 637 OVERRIDE(OP): Performed by: STUDENT IN AN ORGANIZED HEALTH CARE EDUCATION/TRAINING PROGRAM

## 2023-08-11 PROCEDURE — 99232 SBSQ HOSP IP/OBS MODERATE 35: CPT | Performed by: FAMILY MEDICINE

## 2023-08-11 PROCEDURE — 80048 BASIC METABOLIC PNL TOTAL CA: CPT

## 2023-08-11 PROCEDURE — 700102 HCHG RX REV CODE 250 W/ 637 OVERRIDE(OP): Performed by: FAMILY MEDICINE

## 2023-08-11 PROCEDURE — 85027 COMPLETE CBC AUTOMATED: CPT

## 2023-08-11 PROCEDURE — 82550 ASSAY OF CK (CPK): CPT

## 2023-08-11 PROCEDURE — 700102 HCHG RX REV CODE 250 W/ 637 OVERRIDE(OP): Performed by: INTERNAL MEDICINE

## 2023-08-11 PROCEDURE — 770004 HCHG ROOM/CARE - ONCOLOGY PRIVATE *

## 2023-08-11 PROCEDURE — 700111 HCHG RX REV CODE 636 W/ 250 OVERRIDE (IP): Mod: JZ | Performed by: STUDENT IN AN ORGANIZED HEALTH CARE EDUCATION/TRAINING PROGRAM

## 2023-08-11 PROCEDURE — A9270 NON-COVERED ITEM OR SERVICE: HCPCS | Performed by: INTERNAL MEDICINE

## 2023-08-11 RX ADMIN — INSULIN LISPRO 3 UNITS: 100 INJECTION, SOLUTION INTRAVENOUS; SUBCUTANEOUS at 17:19

## 2023-08-11 RX ADMIN — DAPTOMYCIN 650 MG: 500 INJECTION, POWDER, LYOPHILIZED, FOR SOLUTION INTRAVENOUS at 08:14

## 2023-08-11 RX ADMIN — ENOXAPARIN SODIUM 40 MG: 100 INJECTION SUBCUTANEOUS at 17:17

## 2023-08-11 RX ADMIN — HYDROCODONE BITARTRATE AND ACETAMINOPHEN 2 TABLET: 5; 325 TABLET ORAL at 04:10

## 2023-08-11 RX ADMIN — HYDROCODONE BITARTRATE AND ACETAMINOPHEN 2 TABLET: 5; 325 TABLET ORAL at 23:46

## 2023-08-11 RX ADMIN — HYDROCODONE BITARTRATE AND ACETAMINOPHEN 2 TABLET: 5; 325 TABLET ORAL at 12:40

## 2023-08-11 RX ADMIN — HYDROCODONE BITARTRATE AND ACETAMINOPHEN 2 TABLET: 5; 325 TABLET ORAL at 18:50

## 2023-08-11 RX ADMIN — ASPIRIN 81 MG: 81 TABLET, COATED ORAL at 04:10

## 2023-08-11 RX ADMIN — INSULIN LISPRO 3 UNITS: 100 INJECTION, SOLUTION INTRAVENOUS; SUBCUTANEOUS at 21:09

## 2023-08-11 RX ADMIN — ESCITALOPRAM OXALATE 10 MG: 10 TABLET ORAL at 04:10

## 2023-08-11 RX ADMIN — Medication 500 MG: at 12:40

## 2023-08-11 RX ADMIN — CLOPIDOGREL BISULFATE 75 MG: 75 TABLET ORAL at 04:10

## 2023-08-11 RX ADMIN — INSULIN GLARGINE-YFGN 16 UNITS: 100 INJECTION, SOLUTION SUBCUTANEOUS at 17:18

## 2023-08-11 RX ADMIN — INSULIN LISPRO 4 UNITS: 100 INJECTION, SOLUTION INTRAVENOUS; SUBCUTANEOUS at 08:14

## 2023-08-11 ASSESSMENT — ENCOUNTER SYMPTOMS
HEADACHES: 0
DIZZINESS: 0
ABDOMINAL PAIN: 0
FEVER: 0
CHILLS: 0
DIARRHEA: 0
COUGH: 0
BACK PAIN: 0
PALPITATIONS: 0
VOMITING: 0
CONSTIPATION: 0
NAUSEA: 0
SHORTNESS OF BREATH: 0

## 2023-08-11 ASSESSMENT — PAIN DESCRIPTION - PAIN TYPE
TYPE: ACUTE PAIN

## 2023-08-11 NOTE — ASSESSMENT & PLAN NOTE
8/16/2023  - Did not complete IV antibiotics as an outpatient, left SNF AMA  - To remain in hospital until 8/17 to finish Dapto course  - ID signed off, continue to monitor in hospital with PICC line, check CPK in the am
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
## 2023-08-11 NOTE — ASSESSMENT & PLAN NOTE
8/16/2023  -S/ p recent right fem-pop bypass with Dr. Calderon 07/14/23  -CTA of lower ext 07/20/23 demonstrated bypass graft still patent  - continue home aspirin , Plavix and statins

## 2023-08-11 NOTE — CARE PLAN
The patient is Stable - Low risk of patient condition declining or worsening    Shift Goals  Clinical Goals: Pain control. patient will not have an new S/S of infection  Patient Goals: Rest and double meal portions  Family Goals: Not present    Progress made toward(s) clinical / shift goals:    Problem: Knowledge Deficit - Standard  Goal: Patient and family/care givers will demonstrate understanding of plan of care, disease process/condition, diagnostic tests and medications  Outcome: Progressing     Problem: Fall Risk  Goal: Patient will remain free from falls  Outcome: Progressing  Note: Patient remains free from injury and falls this shift. Calls appropriately for assistance.      Problem: Pain - Standard  Goal: Alleviation of pain or a reduction in pain to the patient’s comfort goal  Outcome: Progressing  Flowsheets (Taken 8/11/2023 0810)  Pain Rating Scale (NPRS): 10  Note: Patient reports pain 10/10 in LRE; able to sleep through pain. Responds well to oral pharmacologic intervention.        Patient is not progressing towards the following goals: NA

## 2023-08-11 NOTE — CARE PLAN
The patient is Watcher - Medium risk of patient condition declining or worsening    Shift Goals  Clinical Goals: pain relief, antibiotics  Patient Goals: pain relief, rest  Family Goals: LAXMI    Progress made toward(s) clinical / shift goals:      Problem: Knowledge Deficit - Standard  Goal: Patient and family/care givers will demonstrate understanding of plan of care, disease process/condition, diagnostic tests and medications  Outcome: Progressing     Problem: Pain - Standard  Goal: Alleviation of pain or a reduction in pain to the patient’s comfort goal  Outcome: Progressing   Pt c/o leg pain. Norco given. Pt reports comfortable pain level throughout doses of PRN medication      Patient is not progressing towards the following goals:

## 2023-08-12 ENCOUNTER — APPOINTMENT (OUTPATIENT)
Dept: ONCOLOGY | Facility: MEDICAL CENTER | Age: 48
End: 2023-08-12
Attending: INTERNAL MEDICINE
Payer: MEDICAID

## 2023-08-12 LAB
GLUCOSE BLD STRIP.AUTO-MCNC: 147 MG/DL (ref 65–99)
GLUCOSE BLD STRIP.AUTO-MCNC: 153 MG/DL (ref 65–99)
GLUCOSE BLD STRIP.AUTO-MCNC: 180 MG/DL (ref 65–99)
GLUCOSE BLD STRIP.AUTO-MCNC: 197 MG/DL (ref 65–99)

## 2023-08-12 PROCEDURE — A9270 NON-COVERED ITEM OR SERVICE: HCPCS | Performed by: INTERNAL MEDICINE

## 2023-08-12 PROCEDURE — A9270 NON-COVERED ITEM OR SERVICE: HCPCS | Performed by: FAMILY MEDICINE

## 2023-08-12 PROCEDURE — 700102 HCHG RX REV CODE 250 W/ 637 OVERRIDE(OP): Performed by: STUDENT IN AN ORGANIZED HEALTH CARE EDUCATION/TRAINING PROGRAM

## 2023-08-12 PROCEDURE — 700102 HCHG RX REV CODE 250 W/ 637 OVERRIDE(OP): Performed by: INTERNAL MEDICINE

## 2023-08-12 PROCEDURE — 770004 HCHG ROOM/CARE - ONCOLOGY PRIVATE *

## 2023-08-12 PROCEDURE — 82962 GLUCOSE BLOOD TEST: CPT | Mod: 91

## 2023-08-12 PROCEDURE — 700102 HCHG RX REV CODE 250 W/ 637 OVERRIDE(OP): Performed by: FAMILY MEDICINE

## 2023-08-12 PROCEDURE — 700111 HCHG RX REV CODE 636 W/ 250 OVERRIDE (IP): Mod: JZ | Performed by: STUDENT IN AN ORGANIZED HEALTH CARE EDUCATION/TRAINING PROGRAM

## 2023-08-12 PROCEDURE — 700105 HCHG RX REV CODE 258: Performed by: STUDENT IN AN ORGANIZED HEALTH CARE EDUCATION/TRAINING PROGRAM

## 2023-08-12 PROCEDURE — A9270 NON-COVERED ITEM OR SERVICE: HCPCS | Performed by: STUDENT IN AN ORGANIZED HEALTH CARE EDUCATION/TRAINING PROGRAM

## 2023-08-12 PROCEDURE — 99231 SBSQ HOSP IP/OBS SF/LOW 25: CPT | Performed by: FAMILY MEDICINE

## 2023-08-12 RX ADMIN — HYDROCODONE BITARTRATE AND ACETAMINOPHEN 2 TABLET: 5; 325 TABLET ORAL at 20:48

## 2023-08-12 RX ADMIN — ESCITALOPRAM OXALATE 10 MG: 10 TABLET ORAL at 06:23

## 2023-08-12 RX ADMIN — ASPIRIN 81 MG: 81 TABLET, COATED ORAL at 06:23

## 2023-08-12 RX ADMIN — DAPTOMYCIN 650 MG: 500 INJECTION, POWDER, LYOPHILIZED, FOR SOLUTION INTRAVENOUS at 08:34

## 2023-08-12 RX ADMIN — INSULIN GLARGINE-YFGN 16 UNITS: 100 INJECTION, SOLUTION SUBCUTANEOUS at 17:52

## 2023-08-12 RX ADMIN — Medication 500 MG: at 12:48

## 2023-08-12 RX ADMIN — HYDROCODONE BITARTRATE AND ACETAMINOPHEN 2 TABLET: 5; 325 TABLET ORAL at 15:28

## 2023-08-12 RX ADMIN — CLOPIDOGREL BISULFATE 75 MG: 75 TABLET ORAL at 06:23

## 2023-08-12 RX ADMIN — INSULIN LISPRO 3 UNITS: 100 INJECTION, SOLUTION INTRAVENOUS; SUBCUTANEOUS at 12:48

## 2023-08-12 RX ADMIN — INSULIN LISPRO 3 UNITS: 100 INJECTION, SOLUTION INTRAVENOUS; SUBCUTANEOUS at 20:31

## 2023-08-12 RX ADMIN — ENOXAPARIN SODIUM 40 MG: 100 INJECTION SUBCUTANEOUS at 17:50

## 2023-08-12 ASSESSMENT — ENCOUNTER SYMPTOMS
CONSTIPATION: 0
ABDOMINAL PAIN: 0
SHORTNESS OF BREATH: 0
COUGH: 0
PALPITATIONS: 0
BACK PAIN: 0
DIARRHEA: 0
CHILLS: 0
VOMITING: 0
FEVER: 0
HEADACHES: 0
DIZZINESS: 0
NAUSEA: 0

## 2023-08-12 ASSESSMENT — FIBROSIS 4 INDEX: FIB4 SCORE: 0.38

## 2023-08-12 ASSESSMENT — PAIN DESCRIPTION - PAIN TYPE
TYPE: ACUTE PAIN
TYPE: ACUTE PAIN
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
## 2023-08-12 NOTE — CARE PLAN
The patient is Watcher - Medium risk of patient condition declining or worsening    Shift Goals  Clinical Goals: pain control, saftey  Patient Goals: rest, pain control, eat  Family Goals: Not present    Progress made toward(s) clinical / shift goals:        Problem: Knowledge Deficit - Standard  Goal: Patient and family/care givers will demonstrate understanding of plan of care, disease process/condition, diagnostic tests and medications  Outcome: Progressing     Problem: Fall Risk  Goal: Patient will remain free from falls  Outcome: Progressing     Problem: Pain - Standard  Goal: Alleviation of pain or a reduction in pain to the patient’s comfort goal  Outcome: Progressing

## 2023-08-13 ENCOUNTER — APPOINTMENT (OUTPATIENT)
Dept: ONCOLOGY | Facility: MEDICAL CENTER | Age: 48
End: 2023-08-13
Attending: INTERNAL MEDICINE
Payer: MEDICAID

## 2023-08-13 PROBLEM — Z89.439 HISTORY OF TRANSMETATARSAL AMPUTATION OF FOOT (HCC): Status: ACTIVE | Noted: 2023-08-13

## 2023-08-13 LAB
ANION GAP SERPL CALC-SCNC: 9 MMOL/L (ref 7–16)
BUN SERPL-MCNC: 26 MG/DL (ref 8–22)
CALCIUM SERPL-MCNC: 8.9 MG/DL (ref 8.5–10.5)
CHLORIDE SERPL-SCNC: 103 MMOL/L (ref 96–112)
CK SERPL-CCNC: 67 U/L (ref 0–154)
CO2 SERPL-SCNC: 22 MMOL/L (ref 20–33)
CREAT SERPL-MCNC: 0.84 MG/DL (ref 0.5–1.4)
GFR SERPLBLD CREATININE-BSD FMLA CKD-EPI: 106 ML/MIN/1.73 M 2
GLUCOSE BLD STRIP.AUTO-MCNC: 143 MG/DL (ref 65–99)
GLUCOSE BLD STRIP.AUTO-MCNC: 166 MG/DL (ref 65–99)
GLUCOSE BLD STRIP.AUTO-MCNC: 170 MG/DL (ref 65–99)
GLUCOSE BLD STRIP.AUTO-MCNC: 247 MG/DL (ref 65–99)
GLUCOSE SERPL-MCNC: 180 MG/DL (ref 65–99)
POTASSIUM SERPL-SCNC: 4 MMOL/L (ref 3.6–5.5)
SODIUM SERPL-SCNC: 134 MMOL/L (ref 135–145)

## 2023-08-13 PROCEDURE — 700111 HCHG RX REV CODE 636 W/ 250 OVERRIDE (IP): Mod: JZ | Performed by: FAMILY MEDICINE

## 2023-08-13 PROCEDURE — 82962 GLUCOSE BLOOD TEST: CPT | Mod: 91

## 2023-08-13 PROCEDURE — 80048 BASIC METABOLIC PNL TOTAL CA: CPT

## 2023-08-13 PROCEDURE — 99232 SBSQ HOSP IP/OBS MODERATE 35: CPT | Performed by: FAMILY MEDICINE

## 2023-08-13 PROCEDURE — 700102 HCHG RX REV CODE 250 W/ 637 OVERRIDE(OP): Performed by: STUDENT IN AN ORGANIZED HEALTH CARE EDUCATION/TRAINING PROGRAM

## 2023-08-13 PROCEDURE — A9270 NON-COVERED ITEM OR SERVICE: HCPCS | Performed by: STUDENT IN AN ORGANIZED HEALTH CARE EDUCATION/TRAINING PROGRAM

## 2023-08-13 PROCEDURE — 82550 ASSAY OF CK (CPK): CPT

## 2023-08-13 PROCEDURE — 770004 HCHG ROOM/CARE - ONCOLOGY PRIVATE *

## 2023-08-13 PROCEDURE — A9270 NON-COVERED ITEM OR SERVICE: HCPCS | Performed by: INTERNAL MEDICINE

## 2023-08-13 PROCEDURE — 700102 HCHG RX REV CODE 250 W/ 637 OVERRIDE(OP): Performed by: FAMILY MEDICINE

## 2023-08-13 PROCEDURE — 700102 HCHG RX REV CODE 250 W/ 637 OVERRIDE(OP): Performed by: INTERNAL MEDICINE

## 2023-08-13 PROCEDURE — 700105 HCHG RX REV CODE 258: Performed by: FAMILY MEDICINE

## 2023-08-13 PROCEDURE — A9270 NON-COVERED ITEM OR SERVICE: HCPCS | Performed by: FAMILY MEDICINE

## 2023-08-13 PROCEDURE — 700111 HCHG RX REV CODE 636 W/ 250 OVERRIDE (IP): Mod: JZ | Performed by: STUDENT IN AN ORGANIZED HEALTH CARE EDUCATION/TRAINING PROGRAM

## 2023-08-13 RX ADMIN — ESCITALOPRAM OXALATE 10 MG: 10 TABLET ORAL at 05:47

## 2023-08-13 RX ADMIN — ASPIRIN 81 MG: 81 TABLET, COATED ORAL at 05:47

## 2023-08-13 RX ADMIN — INSULIN LISPRO 3 UNITS: 100 INJECTION, SOLUTION INTRAVENOUS; SUBCUTANEOUS at 17:20

## 2023-08-13 RX ADMIN — INSULIN LISPRO 4 UNITS: 100 INJECTION, SOLUTION INTRAVENOUS; SUBCUTANEOUS at 20:51

## 2023-08-13 RX ADMIN — HYDROCODONE BITARTRATE AND ACETAMINOPHEN 2 TABLET: 5; 325 TABLET ORAL at 09:40

## 2023-08-13 RX ADMIN — Medication 500 MG: at 12:16

## 2023-08-13 RX ADMIN — DAPTOMYCIN 650 MG: 500 INJECTION, POWDER, LYOPHILIZED, FOR SOLUTION INTRAVENOUS at 05:50

## 2023-08-13 RX ADMIN — INSULIN GLARGINE-YFGN 16 UNITS: 100 INJECTION, SOLUTION SUBCUTANEOUS at 17:21

## 2023-08-13 RX ADMIN — INSULIN LISPRO 3 UNITS: 100 INJECTION, SOLUTION INTRAVENOUS; SUBCUTANEOUS at 12:19

## 2023-08-13 RX ADMIN — HYDROCODONE BITARTRATE AND ACETAMINOPHEN 2 TABLET: 5; 325 TABLET ORAL at 13:40

## 2023-08-13 RX ADMIN — ENOXAPARIN SODIUM 40 MG: 100 INJECTION SUBCUTANEOUS at 17:16

## 2023-08-13 RX ADMIN — HYDROCODONE BITARTRATE AND ACETAMINOPHEN 2 TABLET: 5; 325 TABLET ORAL at 05:47

## 2023-08-13 RX ADMIN — CLOPIDOGREL BISULFATE 75 MG: 75 TABLET ORAL at 05:47

## 2023-08-13 RX ADMIN — HYDROCODONE BITARTRATE AND ACETAMINOPHEN 2 TABLET: 5; 325 TABLET ORAL at 18:46

## 2023-08-13 ASSESSMENT — PAIN DESCRIPTION - PAIN TYPE
TYPE: ACUTE PAIN
TYPE: SURGICAL PAIN

## 2023-08-13 ASSESSMENT — ENCOUNTER SYMPTOMS
HEADACHES: 0
COUGH: 0
FEVER: 0
VOMITING: 0
DIARRHEA: 0
PALPITATIONS: 0
SHORTNESS OF BREATH: 0
DIZZINESS: 0
ABDOMINAL PAIN: 0
NAUSEA: 0
BACK PAIN: 0
CONSTIPATION: 0
CHILLS: 0

## 2023-08-13 NOTE — ASSESSMENT & PLAN NOTE
8/16/2023  - Pt will require an Rx for a new insert from [facility] prior to discharge
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
## 2023-08-13 NOTE — CARE PLAN
The patient is Watcher - Medium risk of patient condition declining or worsening    Shift Goals  Clinical Goals: prs 5 or less by eos  Patient Goals: rest, pain control, perscription for boot before discharge  Family Goals: Not present    Progress made toward(s) clinical / shift goals:      Problem: Knowledge Deficit - Standard  Goal: Patient and family/care givers will demonstrate understanding of plan of care, disease process/condition, diagnostic tests and medications  Outcome: Progressing     Problem: Fall Risk  Goal: Patient will remain free from falls  Outcome: Progressing  Alert and oriented, prs 8/10. Prn medication administered.   Patient requested to leave a note about a prescription for a fitted shoe for his right foot. I informed pt that I would relay the message to day shift RN to see if we can get this taken care of before discharge.     Patient is not progressing towards the following goals:

## 2023-08-14 ENCOUNTER — APPOINTMENT (OUTPATIENT)
Dept: ONCOLOGY | Facility: MEDICAL CENTER | Age: 48
End: 2023-08-14
Attending: INTERNAL MEDICINE
Payer: MEDICAID

## 2023-08-14 LAB
BACTERIA BLD CULT: NORMAL
BACTERIA BLD CULT: NORMAL
GLUCOSE BLD STRIP.AUTO-MCNC: 166 MG/DL (ref 65–99)
GLUCOSE BLD STRIP.AUTO-MCNC: 172 MG/DL (ref 65–99)
GLUCOSE BLD STRIP.AUTO-MCNC: 216 MG/DL (ref 65–99)
GLUCOSE BLD STRIP.AUTO-MCNC: 261 MG/DL (ref 65–99)
SIGNIFICANT IND 70042: NORMAL
SIGNIFICANT IND 70042: NORMAL
SITE SITE: NORMAL
SITE SITE: NORMAL
SOURCE SOURCE: NORMAL
SOURCE SOURCE: NORMAL

## 2023-08-14 PROCEDURE — 700102 HCHG RX REV CODE 250 W/ 637 OVERRIDE(OP): Performed by: STUDENT IN AN ORGANIZED HEALTH CARE EDUCATION/TRAINING PROGRAM

## 2023-08-14 PROCEDURE — 97116 GAIT TRAINING THERAPY: CPT

## 2023-08-14 PROCEDURE — 700102 HCHG RX REV CODE 250 W/ 637 OVERRIDE(OP): Performed by: INTERNAL MEDICINE

## 2023-08-14 PROCEDURE — 82962 GLUCOSE BLOOD TEST: CPT

## 2023-08-14 PROCEDURE — 97110 THERAPEUTIC EXERCISES: CPT

## 2023-08-14 PROCEDURE — 700111 HCHG RX REV CODE 636 W/ 250 OVERRIDE (IP): Performed by: FAMILY MEDICINE

## 2023-08-14 PROCEDURE — A9270 NON-COVERED ITEM OR SERVICE: HCPCS | Performed by: STUDENT IN AN ORGANIZED HEALTH CARE EDUCATION/TRAINING PROGRAM

## 2023-08-14 PROCEDURE — A9270 NON-COVERED ITEM OR SERVICE: HCPCS | Performed by: FAMILY MEDICINE

## 2023-08-14 PROCEDURE — 700102 HCHG RX REV CODE 250 W/ 637 OVERRIDE(OP)

## 2023-08-14 PROCEDURE — 700111 HCHG RX REV CODE 636 W/ 250 OVERRIDE (IP): Mod: JZ | Performed by: STUDENT IN AN ORGANIZED HEALTH CARE EDUCATION/TRAINING PROGRAM

## 2023-08-14 PROCEDURE — 770004 HCHG ROOM/CARE - ONCOLOGY PRIVATE *

## 2023-08-14 PROCEDURE — 700105 HCHG RX REV CODE 258: Performed by: FAMILY MEDICINE

## 2023-08-14 PROCEDURE — 99231 SBSQ HOSP IP/OBS SF/LOW 25: CPT | Performed by: FAMILY MEDICINE

## 2023-08-14 PROCEDURE — 700111 HCHG RX REV CODE 636 W/ 250 OVERRIDE (IP): Mod: JZ | Performed by: FAMILY MEDICINE

## 2023-08-14 PROCEDURE — 700102 HCHG RX REV CODE 250 W/ 637 OVERRIDE(OP): Performed by: FAMILY MEDICINE

## 2023-08-14 PROCEDURE — A9270 NON-COVERED ITEM OR SERVICE: HCPCS | Performed by: INTERNAL MEDICINE

## 2023-08-14 PROCEDURE — A9270 NON-COVERED ITEM OR SERVICE: HCPCS

## 2023-08-14 RX ORDER — HYDROXYZINE HYDROCHLORIDE 25 MG/1
25 TABLET, FILM COATED ORAL 3 TIMES DAILY PRN
Status: DISCONTINUED | OUTPATIENT
Start: 2023-08-14 | End: 2023-08-15

## 2023-08-14 RX ORDER — ALPRAZOLAM 0.25 MG/1
0.25 TABLET ORAL NIGHTLY PRN
Status: DISCONTINUED | OUTPATIENT
Start: 2023-08-14 | End: 2023-08-17 | Stop reason: HOSPADM

## 2023-08-14 RX ADMIN — ESCITALOPRAM OXALATE 10 MG: 10 TABLET ORAL at 06:35

## 2023-08-14 RX ADMIN — INSULIN GLARGINE-YFGN 16 UNITS: 100 INJECTION, SOLUTION SUBCUTANEOUS at 16:47

## 2023-08-14 RX ADMIN — ASPIRIN 81 MG: 81 TABLET, COATED ORAL at 06:35

## 2023-08-14 RX ADMIN — HYDROCODONE BITARTRATE AND ACETAMINOPHEN 2 TABLET: 5; 325 TABLET ORAL at 00:04

## 2023-08-14 RX ADMIN — Medication 500 MG: at 12:57

## 2023-08-14 RX ADMIN — HYDROXYZINE HYDROCHLORIDE 25 MG: 25 TABLET, FILM COATED ORAL at 10:03

## 2023-08-14 RX ADMIN — CLOPIDOGREL BISULFATE 75 MG: 75 TABLET ORAL at 06:34

## 2023-08-14 RX ADMIN — ONDANSETRON 4 MG: 4 TABLET, ORALLY DISINTEGRATING ORAL at 00:57

## 2023-08-14 RX ADMIN — ENOXAPARIN SODIUM 40 MG: 100 INJECTION SUBCUTANEOUS at 16:44

## 2023-08-14 RX ADMIN — INSULIN LISPRO 4 UNITS: 100 INJECTION, SOLUTION INTRAVENOUS; SUBCUTANEOUS at 12:57

## 2023-08-14 RX ADMIN — HYDROCODONE BITARTRATE AND ACETAMINOPHEN 2 TABLET: 5; 325 TABLET ORAL at 20:10

## 2023-08-14 RX ADMIN — INSULIN LISPRO 3 UNITS: 100 INJECTION, SOLUTION INTRAVENOUS; SUBCUTANEOUS at 16:47

## 2023-08-14 RX ADMIN — ALPRAZOLAM 0.25 MG: 0.25 TABLET ORAL at 01:20

## 2023-08-14 RX ADMIN — HYDROCODONE BITARTRATE AND ACETAMINOPHEN 2 TABLET: 5; 325 TABLET ORAL at 13:04

## 2023-08-14 RX ADMIN — INSULIN LISPRO 7 UNITS: 100 INJECTION, SOLUTION INTRAVENOUS; SUBCUTANEOUS at 20:15

## 2023-08-14 RX ADMIN — INSULIN LISPRO 3 UNITS: 100 INJECTION, SOLUTION INTRAVENOUS; SUBCUTANEOUS at 08:19

## 2023-08-14 RX ADMIN — HYDROCODONE BITARTRATE AND ACETAMINOPHEN 2 TABLET: 5; 325 TABLET ORAL at 08:17

## 2023-08-14 RX ADMIN — DAPTOMYCIN 650 MG: 500 INJECTION, POWDER, LYOPHILIZED, FOR SOLUTION INTRAVENOUS at 06:30

## 2023-08-14 RX ADMIN — ALPRAZOLAM 0.25 MG: 0.25 TABLET ORAL at 16:43

## 2023-08-14 ASSESSMENT — COGNITIVE AND FUNCTIONAL STATUS - GENERAL
MOBILITY SCORE: 22
CLIMB 3 TO 5 STEPS WITH RAILING: A LITTLE
WALKING IN HOSPITAL ROOM: A LITTLE
SUGGESTED CMS G CODE MODIFIER MOBILITY: CJ

## 2023-08-14 ASSESSMENT — ENCOUNTER SYMPTOMS
NAUSEA: 0
VOMITING: 0
FEVER: 0
HEADACHES: 0
PALPITATIONS: 0
SHORTNESS OF BREATH: 0
COUGH: 0
CONSTIPATION: 0
DIZZINESS: 0
ABDOMINAL PAIN: 0
CHILLS: 0
DIARRHEA: 0
BACK PAIN: 0

## 2023-08-14 ASSESSMENT — GAIT ASSESSMENTS
DEVIATION: BRADYKINETIC
ASSISTIVE DEVICE: FRONT WHEEL WALKER
DISTANCE (FEET): 500
GAIT LEVEL OF ASSIST: MODIFIED INDEPENDENT

## 2023-08-14 ASSESSMENT — PAIN DESCRIPTION - PAIN TYPE
TYPE: ACUTE PAIN
TYPE: ACUTE PAIN

## 2023-08-14 NOTE — CARE PLAN
Problem: Pain - Standard  Goal: Alleviation of pain or a reduction in pain to the patient’s comfort goal  Outcome: Progressing     Problem: Infection - Standard  Goal: Patient will remain free from infection  Outcome: Progressing  Flowsheets (Taken 8/13/2023 1817)  Standard Infection Interventions:   Assessed for signs and symptoms of infection   Instructed patient/family on signs and symptoms of infection   Provided education on proper hand hygiene and infection prevention measures   Implemented standard precautions   The patient is Stable - Low risk of patient condition declining or worsening    Shift Goals  Clinical Goals: monitor infection; pain control  Patient Goals: go home  Family Goals: none present    Progress made toward(s) clinical / shift goals:  IV abx as ordered. Pt requesting oral Norco 10mg q4-5hrs for right leg pain. Pt states pain 8/10 to RLE with pain decreased to 5/10 after Norco. Pt afebrile. Good hand hygiene before and after pt contact.     Patient is not progressing towards the following goals:

## 2023-08-14 NOTE — PROGRESS NOTES
Assumed care at 0715  Pt a & o x4  DM diet; ac hs   Pt states pain to RLE. Pt denies numbness tingling  Right picc line for IV abx.

## 2023-08-14 NOTE — THERAPY
Physical Therapy   Daily Treatment     Patient Name: Rogelio Escudero  Age:  48 y.o., Sex:  male  Medical Record #: [id number]  Today's Date: 8/14/2023     Precautions  Precautions: (P) Fall Risk;Spinal / Back Precautions ;Offloading Shoe  Comments: (P) R L2 TP fx, pt has offloading shoe for R foot    Assessment    Acute care PT was re-ordered per doctor d/t ambulatory concerns. Pt stated that offloading shoe was not helpful at this time. Pt was last seen by PT on 8/9 when he ambulated 400 ft w/FWW w/SPV. Pt ambulated 500 ft w/FWW w/modified independence. Pt educated on use of FWW vs other AD. Pt educated on benefits of potential TMA insert for R foot. Pt was provided handouts of seated and standing exercises, and was educated on frequency and duration. Exercise bands were also provided and pt was educated on UE exercises. Doctor was contacted for R TMA insert prescription. Pt does not need further acute care PT.     Plan    Treatment Plan Status:    Type of Treatment: Gait Training, Therapeutic Exercise, Therapeutic Activities, Stair Training, Neuro Re-Education / Balance, Bed Mobility  Treatment Frequency: 3 Times per Week  Treatment Duration: Until Therapy Goals Met    DC Equipment Recommendations: (P) Front-Wheel Walker  Discharge Recommendations: (P) Anticipate that the patient will have no further physical therapy needs after discharge from the hospital     Objective       08/14/23 1515   Precautions   Precautions Fall Risk;Spinal / Back Precautions ;Offloading Shoe   Comments R L2 TP fx, pt has offloading shoe for R foot   Vitals   O2 (LPM) 0   O2 Delivery Device None - Room Air   Pain 0 - 10 Group   Therapist Pain Assessment Post Activity Pain Same as Prior to Activity;Nurse Notified;0  (Pt did not c/o pain)   Cognition    Cognition / Consciousness WDL   Level of Consciousness Alert   Sitting Lower Body Exercises   Sit to Stand   (5x)   Standing Lower Body Exercises   Heel Rise Left  (5x)   Home Exercise  Program   Comments Provided handouts for seated and standing exercises. Provided exercise bands and educated on UE exercises   Other Treatments   Other Treatments Provided Pt educated on TMA insert. Pt educated on sitting and standing exercises that can be done post-discharge. Pt provided exercises bands and educated on exercises that can be done post-discharge.   Balance   Sitting Balance (Static) Good   Sitting Balance (Dynamic) Good   Standing Balance (Static) Fair +   Standing Balance (Dynamic) Fair +   Weight Shift Sitting Good   Weight Shift Standing Fair   Skilled Intervention Verbal Cuing   Comments Pt used FWW   Bed Mobility    Supine to Sit Modified Independent   Sit to Supine Modified Independent   Scooting Modified Independent   Rolling Modified Independent   Skilled Intervention Verbal Cuing   Comments Pt required encouragement to initiate   Gait Analysis   Gait Level Of Assist Modified Independent   Assistive Device Front Wheel Walker   Distance (Feet) 500   # of Times Distance was Traveled 1   Deviation Bradykinetic  (R foot inversion during step d/t TMA)   Skilled Intervention Verbal Cuing;Compensatory Strategies   Comments Pt educated on using FWW vs other assistive devices. Pt educated on potential benefits of TMA insert. Pt states that offloading shoe is not helpful for him at this time.   Functional Mobility   Sit to Stand Modified Independent   Bed, Chair, Wheelchair Transfer Modified Independent   Transfer Method Stand Step   Mobility In room and hallway   Skilled Intervention Verbal Cuing   Comments Pt used FWW   How much difficulty does the patient currently have...   Turning over in bed (including adjusting bedclothes, sheets and blankets)? 4   Sitting down on and standing up from a chair with arms (e.g., wheelchair, bedside commode, etc.) 4   Moving from lying on back to sitting on the side of the bed? 4   How much help from another person does the patient currently need...   Moving to and  from a bed to a chair (including a wheelchair)? 4   Need to walk in a hospital room? 3   Climbing 3-5 steps with a railing? 3   6 clicks Mobility Score 22   Education Group   Education Provided Role of Physical Therapist;Exercises - Seated;Exercises - Standing   Role of Physical Therapist Patient Response Patient;Eager;Explanation;Demonstration;Verbal Demonstration;Action Demonstration   Exercises - Seated Patient Response Patient;Eager;Explanation;Demonstration;Verbal Demonstration;Action Demonstration;Handout   Exercise - Standing Patient Response Patient;Eager;Explanation;Demonstration;Action Demonstration;Verbal Demonstration;Handout   Additional Comments Pt educated on potential use of R TMA insert. Pt provided handouts for seated and standing exercises. Pt educated on frequency and duration of exercises and that they can be done post-discharge. Pt educated on UE exercises with resistance bands, and that they can be done post-discharge.   Anticipated Discharge Equipment and Recommendations   DC Equipment Recommendations Front-Wheel Walker   Discharge Recommendations Anticipate that the patient will have no further physical therapy needs after discharge from the hospital   Interdisciplinary Plan of Care Collaboration   IDT Collaboration with  Nursing;Physician   Patient Position at End of Therapy In Bed;Call Light within Reach;Tray Table within Reach;Phone within Reach   Collaboration Comments RN aware of PT session. Doctor contacted for R TMA insert perscription.   Session Information   Date / Session Number  8/14 - 3 (re-order, no needs)

## 2023-08-15 ENCOUNTER — APPOINTMENT (OUTPATIENT)
Dept: ONCOLOGY | Facility: MEDICAL CENTER | Age: 48
End: 2023-08-15
Attending: INTERNAL MEDICINE
Payer: MEDICAID

## 2023-08-15 LAB
GLUCOSE BLD STRIP.AUTO-MCNC: 121 MG/DL (ref 65–99)
GLUCOSE BLD STRIP.AUTO-MCNC: 160 MG/DL (ref 65–99)
GLUCOSE BLD STRIP.AUTO-MCNC: 216 MG/DL (ref 65–99)
GLUCOSE BLD STRIP.AUTO-MCNC: 217 MG/DL (ref 65–99)

## 2023-08-15 PROCEDURE — 700102 HCHG RX REV CODE 250 W/ 637 OVERRIDE(OP)

## 2023-08-15 PROCEDURE — 700102 HCHG RX REV CODE 250 W/ 637 OVERRIDE(OP): Performed by: STUDENT IN AN ORGANIZED HEALTH CARE EDUCATION/TRAINING PROGRAM

## 2023-08-15 PROCEDURE — 82962 GLUCOSE BLOOD TEST: CPT

## 2023-08-15 PROCEDURE — A9270 NON-COVERED ITEM OR SERVICE: HCPCS | Performed by: FAMILY MEDICINE

## 2023-08-15 PROCEDURE — 99233 SBSQ HOSP IP/OBS HIGH 50: CPT | Performed by: INTERNAL MEDICINE

## 2023-08-15 PROCEDURE — 99406 BEHAV CHNG SMOKING 3-10 MIN: CPT | Performed by: INTERNAL MEDICINE

## 2023-08-15 PROCEDURE — 700111 HCHG RX REV CODE 636 W/ 250 OVERRIDE (IP): Mod: JZ | Performed by: STUDENT IN AN ORGANIZED HEALTH CARE EDUCATION/TRAINING PROGRAM

## 2023-08-15 PROCEDURE — A9270 NON-COVERED ITEM OR SERVICE: HCPCS | Performed by: STUDENT IN AN ORGANIZED HEALTH CARE EDUCATION/TRAINING PROGRAM

## 2023-08-15 PROCEDURE — A9270 NON-COVERED ITEM OR SERVICE: HCPCS

## 2023-08-15 PROCEDURE — 700102 HCHG RX REV CODE 250 W/ 637 OVERRIDE(OP): Performed by: INTERNAL MEDICINE

## 2023-08-15 PROCEDURE — 11056 PARNG/CUTG B9 HYPRKR LES 2-4: CPT

## 2023-08-15 PROCEDURE — 700105 HCHG RX REV CODE 258: Performed by: FAMILY MEDICINE

## 2023-08-15 PROCEDURE — 700102 HCHG RX REV CODE 250 W/ 637 OVERRIDE(OP): Performed by: FAMILY MEDICINE

## 2023-08-15 PROCEDURE — A9270 NON-COVERED ITEM OR SERVICE: HCPCS | Performed by: INTERNAL MEDICINE

## 2023-08-15 PROCEDURE — 770004 HCHG ROOM/CARE - ONCOLOGY PRIVATE *

## 2023-08-15 PROCEDURE — 700111 HCHG RX REV CODE 636 W/ 250 OVERRIDE (IP): Mod: JZ | Performed by: FAMILY MEDICINE

## 2023-08-15 RX ORDER — HYDROXYZINE HYDROCHLORIDE 25 MG/1
25 TABLET, FILM COATED ORAL 3 TIMES DAILY PRN
Status: DISCONTINUED | OUTPATIENT
Start: 2023-08-15 | End: 2023-08-17 | Stop reason: HOSPADM

## 2023-08-15 RX ORDER — DULOXETIN HYDROCHLORIDE 20 MG/1
20 CAPSULE, DELAYED RELEASE ORAL DAILY
Status: DISCONTINUED | OUTPATIENT
Start: 2023-08-15 | End: 2023-08-16

## 2023-08-15 RX ADMIN — HYDROCODONE BITARTRATE AND ACETAMINOPHEN 2 TABLET: 5; 325 TABLET ORAL at 07:37

## 2023-08-15 RX ADMIN — CLOPIDOGREL BISULFATE 75 MG: 75 TABLET ORAL at 06:33

## 2023-08-15 RX ADMIN — Medication 500 MG: at 12:58

## 2023-08-15 RX ADMIN — HYDROXYZINE HYDROCHLORIDE 25 MG: 25 TABLET, FILM COATED ORAL at 01:29

## 2023-08-15 RX ADMIN — HYDROXYZINE HYDROCHLORIDE 25 MG: 25 TABLET, FILM COATED ORAL at 15:06

## 2023-08-15 RX ADMIN — INSULIN LISPRO 4 UNITS: 100 INJECTION, SOLUTION INTRAVENOUS; SUBCUTANEOUS at 18:28

## 2023-08-15 RX ADMIN — ALPRAZOLAM 0.25 MG: 0.25 TABLET ORAL at 21:50

## 2023-08-15 RX ADMIN — HYDROCODONE BITARTRATE AND ACETAMINOPHEN 2 TABLET: 5; 325 TABLET ORAL at 03:26

## 2023-08-15 RX ADMIN — DAPTOMYCIN 650 MG: 500 INJECTION, POWDER, LYOPHILIZED, FOR SOLUTION INTRAVENOUS at 06:35

## 2023-08-15 RX ADMIN — DULOXETINE HYDROCHLORIDE 20 MG: 20 CAPSULE, DELAYED RELEASE ORAL at 18:25

## 2023-08-15 RX ADMIN — HYDROCODONE BITARTRATE AND ACETAMINOPHEN 2 TABLET: 5; 325 TABLET ORAL at 12:57

## 2023-08-15 RX ADMIN — INSULIN LISPRO 4 UNITS: 100 INJECTION, SOLUTION INTRAVENOUS; SUBCUTANEOUS at 07:42

## 2023-08-15 RX ADMIN — ASPIRIN 81 MG: 81 TABLET, COATED ORAL at 06:33

## 2023-08-15 RX ADMIN — ESCITALOPRAM OXALATE 10 MG: 10 TABLET ORAL at 06:33

## 2023-08-15 RX ADMIN — INSULIN GLARGINE-YFGN 16 UNITS: 100 INJECTION, SOLUTION SUBCUTANEOUS at 18:27

## 2023-08-15 RX ADMIN — HYDROCODONE BITARTRATE AND ACETAMINOPHEN 2 TABLET: 5; 325 TABLET ORAL at 18:26

## 2023-08-15 ASSESSMENT — ENCOUNTER SYMPTOMS
HEADACHES: 0
BACK PAIN: 0
SHORTNESS OF BREATH: 0
NAUSEA: 0
FEVER: 0
PALPITATIONS: 0
DIARRHEA: 0
ABDOMINAL PAIN: 0
COUGH: 0
VOMITING: 0
DIZZINESS: 0
CONSTIPATION: 0
CHILLS: 0

## 2023-08-15 ASSESSMENT — PAIN DESCRIPTION - PAIN TYPE
TYPE: ACUTE PAIN
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
## 2023-08-15 NOTE — CARE PLAN
The patient is Watcher - Medium risk of patient condition declining or worsening    Shift Goals  Clinical Goals: pain control, anxiety control  Patient Goals: rest  Family Goals: none present    Progress made toward(s) clinical / shift goals:        Problem: Knowledge Deficit - Standard  Goal: Patient and family/care givers will demonstrate understanding of plan of care, disease process/condition, diagnostic tests and medications  Outcome: Progressing     Problem: Self Care  Goal: Patient will have the ability to perform ADLs independently or with assistance (bathe, groom, dress, toilet and feed)  Outcome: Progressing     Problem: Psychosocial  Goal: Patient's level of anxiety will decrease  Outcome: Progressing    Patient is alert and oriented, symptoms of fatigue are present. Reports 9/10 pain, prn pain medication administered. (see mar) Patient has been having feelings of anxiety recently and has a new order for xanex. Patient is resting comfortably, calm with no signs of distress. Call light within reach.

## 2023-08-15 NOTE — CARE PLAN
Problem: Fall Risk  Goal: Patient will remain free from falls  Outcome: Progressing     Problem: Pain - Standard  Goal: Alleviation of pain or a reduction in pain to the patient’s comfort goal  Outcome: Progressing     The patient is Watcher - Medium risk of patient condition declining or worsening    Shift Goals  Clinical Goals: saftey, iv abx, pain control  Patient Goals: shower, pain control  Family Goals: none present    Progress made toward(s) clinical / shift goals:  Pt medicated per MAR    Patient is not progressing towards the following goals:
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
## 2023-08-15 NOTE — CARE PLAN
The patient is Stable - Low risk of patient condition declining or worsening    Shift Goals  Clinical Goals: pain control, anxiety control  Patient Goals: rest  Family Goals: none present    Progress made toward(s) clinical / shift goals:    Problem: Pain - Standard  Goal: Alleviation of pain or a reduction in pain to the patient’s comfort goal  Outcome: Progressing  Note: Pain ranges from 6/10 to 9/10 with PRN norco. Patient able to rest comfortably with 6/10 pain.      Problem: Psychosocial  Goal: Patient's level of anxiety will decrease  Outcome: Progressing  Note: Patient c/o increased anxiety - PRN hydroxizine ineffective. MD aware and circling back to patient to discuss his concerns and possible medication adjustment.       Patient is not progressing towards the following goals:

## 2023-08-16 LAB
ALBUMIN SERPL BCP-MCNC: 4 G/DL (ref 3.2–4.9)
ALBUMIN/GLOB SERPL: 1.3 G/DL
ALP SERPL-CCNC: 127 U/L (ref 30–99)
ALT SERPL-CCNC: 23 U/L (ref 2–50)
ANION GAP SERPL CALC-SCNC: 12 MMOL/L (ref 7–16)
AST SERPL-CCNC: 17 U/L (ref 12–45)
BASOPHILS # BLD AUTO: 0.5 % (ref 0–1.8)
BASOPHILS # BLD: 0.04 K/UL (ref 0–0.12)
BILIRUB SERPL-MCNC: 0.2 MG/DL (ref 0.1–1.5)
BUN SERPL-MCNC: 23 MG/DL (ref 8–22)
CALCIUM ALBUM COR SERPL-MCNC: 9.3 MG/DL (ref 8.5–10.5)
CALCIUM SERPL-MCNC: 9.3 MG/DL (ref 8.5–10.5)
CHLORIDE SERPL-SCNC: 103 MMOL/L (ref 96–112)
CK SERPL-CCNC: 78 U/L (ref 0–154)
CO2 SERPL-SCNC: 22 MMOL/L (ref 20–33)
CREAT SERPL-MCNC: 0.77 MG/DL (ref 0.5–1.4)
EOSINOPHIL # BLD AUTO: 0.64 K/UL (ref 0–0.51)
EOSINOPHIL NFR BLD: 8.1 % (ref 0–6.9)
ERYTHROCYTE [DISTWIDTH] IN BLOOD BY AUTOMATED COUNT: 49.1 FL (ref 35.9–50)
GFR SERPLBLD CREATININE-BSD FMLA CKD-EPI: 109 ML/MIN/1.73 M 2
GLOBULIN SER CALC-MCNC: 3.1 G/DL (ref 1.9–3.5)
GLUCOSE BLD STRIP.AUTO-MCNC: 189 MG/DL (ref 65–99)
GLUCOSE BLD STRIP.AUTO-MCNC: 193 MG/DL (ref 65–99)
GLUCOSE BLD STRIP.AUTO-MCNC: 215 MG/DL (ref 65–99)
GLUCOSE BLD STRIP.AUTO-MCNC: 230 MG/DL (ref 65–99)
GLUCOSE SERPL-MCNC: 282 MG/DL (ref 65–99)
HCT VFR BLD AUTO: 36.3 % (ref 42–52)
HGB BLD-MCNC: 12 G/DL (ref 14–18)
IMM GRANULOCYTES # BLD AUTO: 0.03 K/UL (ref 0–0.11)
IMM GRANULOCYTES NFR BLD AUTO: 0.4 % (ref 0–0.9)
LYMPHOCYTES # BLD AUTO: 2.9 K/UL (ref 1–4.8)
LYMPHOCYTES NFR BLD: 36.8 % (ref 22–41)
MAGNESIUM SERPL-MCNC: 1.8 MG/DL (ref 1.5–2.5)
MCH RBC QN AUTO: 29.8 PG (ref 27–33)
MCHC RBC AUTO-ENTMCNC: 33.1 G/DL (ref 32.3–36.5)
MCV RBC AUTO: 90.1 FL (ref 81.4–97.8)
MONOCYTES # BLD AUTO: 0.93 K/UL (ref 0–0.85)
MONOCYTES NFR BLD AUTO: 11.8 % (ref 0–13.4)
NEUTROPHILS # BLD AUTO: 3.35 K/UL (ref 1.82–7.42)
NEUTROPHILS NFR BLD: 42.4 % (ref 44–72)
NRBC # BLD AUTO: 0 K/UL
NRBC BLD-RTO: 0 /100 WBC (ref 0–0.2)
PHOSPHATE SERPL-MCNC: 3.6 MG/DL (ref 2.5–4.5)
PLATELET # BLD AUTO: 264 K/UL (ref 164–446)
PMV BLD AUTO: 9.6 FL (ref 9–12.9)
POTASSIUM SERPL-SCNC: 4.3 MMOL/L (ref 3.6–5.5)
PROT SERPL-MCNC: 7.1 G/DL (ref 6–8.2)
RBC # BLD AUTO: 4.03 M/UL (ref 4.7–6.1)
SODIUM SERPL-SCNC: 137 MMOL/L (ref 135–145)
WBC # BLD AUTO: 7.9 K/UL (ref 4.8–10.8)

## 2023-08-16 PROCEDURE — A9270 NON-COVERED ITEM OR SERVICE: HCPCS | Performed by: INTERNAL MEDICINE

## 2023-08-16 PROCEDURE — 700102 HCHG RX REV CODE 250 W/ 637 OVERRIDE(OP): Performed by: INTERNAL MEDICINE

## 2023-08-16 PROCEDURE — 770004 HCHG ROOM/CARE - ONCOLOGY PRIVATE *

## 2023-08-16 PROCEDURE — A9270 NON-COVERED ITEM OR SERVICE: HCPCS | Performed by: FAMILY MEDICINE

## 2023-08-16 PROCEDURE — 85025 COMPLETE CBC W/AUTO DIFF WBC: CPT

## 2023-08-16 PROCEDURE — 83735 ASSAY OF MAGNESIUM: CPT

## 2023-08-16 PROCEDURE — 700111 HCHG RX REV CODE 636 W/ 250 OVERRIDE (IP): Mod: JZ | Performed by: FAMILY MEDICINE

## 2023-08-16 PROCEDURE — 99232 SBSQ HOSP IP/OBS MODERATE 35: CPT | Performed by: INTERNAL MEDICINE

## 2023-08-16 PROCEDURE — 84100 ASSAY OF PHOSPHORUS: CPT

## 2023-08-16 PROCEDURE — 700102 HCHG RX REV CODE 250 W/ 637 OVERRIDE(OP): Performed by: FAMILY MEDICINE

## 2023-08-16 PROCEDURE — A9270 NON-COVERED ITEM OR SERVICE: HCPCS | Performed by: STUDENT IN AN ORGANIZED HEALTH CARE EDUCATION/TRAINING PROGRAM

## 2023-08-16 PROCEDURE — 80053 COMPREHEN METABOLIC PANEL: CPT

## 2023-08-16 PROCEDURE — 700105 HCHG RX REV CODE 258: Performed by: FAMILY MEDICINE

## 2023-08-16 PROCEDURE — 82962 GLUCOSE BLOOD TEST: CPT | Mod: 91

## 2023-08-16 PROCEDURE — 700102 HCHG RX REV CODE 250 W/ 637 OVERRIDE(OP): Performed by: STUDENT IN AN ORGANIZED HEALTH CARE EDUCATION/TRAINING PROGRAM

## 2023-08-16 PROCEDURE — 82550 ASSAY OF CK (CPK): CPT

## 2023-08-16 RX ORDER — GABAPENTIN 300 MG/1
300 CAPSULE ORAL 3 TIMES DAILY
Status: DISCONTINUED | OUTPATIENT
Start: 2023-08-16 | End: 2023-08-17 | Stop reason: HOSPADM

## 2023-08-16 RX ORDER — METHOCARBAMOL 500 MG/1
500 TABLET, FILM COATED ORAL 4 TIMES DAILY
Status: DISCONTINUED | OUTPATIENT
Start: 2023-08-16 | End: 2023-08-17 | Stop reason: HOSPADM

## 2023-08-16 RX ADMIN — INSULIN LISPRO 3 UNITS: 100 INJECTION, SOLUTION INTRAVENOUS; SUBCUTANEOUS at 17:12

## 2023-08-16 RX ADMIN — ESCITALOPRAM OXALATE 10 MG: 10 TABLET ORAL at 06:25

## 2023-08-16 RX ADMIN — DULOXETINE HYDROCHLORIDE 20 MG: 20 CAPSULE, DELAYED RELEASE ORAL at 06:25

## 2023-08-16 RX ADMIN — METHOCARBAMOL 500 MG: 500 TABLET ORAL at 21:55

## 2023-08-16 RX ADMIN — INSULIN LISPRO 3 UNITS: 100 INJECTION, SOLUTION INTRAVENOUS; SUBCUTANEOUS at 12:17

## 2023-08-16 RX ADMIN — DAPTOMYCIN 650 MG: 500 INJECTION, POWDER, LYOPHILIZED, FOR SOLUTION INTRAVENOUS at 06:30

## 2023-08-16 RX ADMIN — INSULIN GLARGINE-YFGN 16 UNITS: 100 INJECTION, SOLUTION SUBCUTANEOUS at 17:11

## 2023-08-16 RX ADMIN — INSULIN LISPRO 4 UNITS: 100 INJECTION, SOLUTION INTRAVENOUS; SUBCUTANEOUS at 22:03

## 2023-08-16 RX ADMIN — METHOCARBAMOL 500 MG: 500 TABLET ORAL at 14:25

## 2023-08-16 RX ADMIN — HYDROXYZINE HYDROCHLORIDE 25 MG: 25 TABLET, FILM COATED ORAL at 16:38

## 2023-08-16 RX ADMIN — Medication 500 MG: at 12:20

## 2023-08-16 RX ADMIN — ASPIRIN 81 MG: 81 TABLET, COATED ORAL at 06:25

## 2023-08-16 RX ADMIN — HYDROCODONE BITARTRATE AND ACETAMINOPHEN 2 TABLET: 5; 325 TABLET ORAL at 15:13

## 2023-08-16 RX ADMIN — HYDROCODONE BITARTRATE AND ACETAMINOPHEN 2 TABLET: 5; 325 TABLET ORAL at 11:07

## 2023-08-16 RX ADMIN — HYDROCODONE BITARTRATE AND ACETAMINOPHEN 2 TABLET: 5; 325 TABLET ORAL at 00:00

## 2023-08-16 RX ADMIN — GABAPENTIN 300 MG: 300 CAPSULE ORAL at 21:55

## 2023-08-16 RX ADMIN — HYDROCODONE BITARTRATE AND ACETAMINOPHEN 2 TABLET: 5; 325 TABLET ORAL at 21:59

## 2023-08-16 RX ADMIN — INSULIN LISPRO 4 UNITS: 100 INJECTION, SOLUTION INTRAVENOUS; SUBCUTANEOUS at 08:04

## 2023-08-16 RX ADMIN — CLOPIDOGREL BISULFATE 75 MG: 75 TABLET ORAL at 06:25

## 2023-08-16 ASSESSMENT — ENCOUNTER SYMPTOMS
BACK PAIN: 0
DIARRHEA: 0
CHILLS: 0
SHORTNESS OF BREATH: 0
NAUSEA: 0
DIZZINESS: 0
CONSTIPATION: 0
PALPITATIONS: 0
COUGH: 0
HEADACHES: 0
FEVER: 0
VOMITING: 0
ABDOMINAL PAIN: 0

## 2023-08-16 ASSESSMENT — PAIN DESCRIPTION - PAIN TYPE: TYPE: ACUTE PAIN

## 2023-08-16 NOTE — CARE PLAN
The patient is Watcher - Medium risk of patient condition declining or worsening    Shift Goals  Clinical Goals: pain control, iv abx  Patient Goals: rest, pain control, talk to   Family Goals: none present    Progress made toward(s) clinical / shift goals:    Problem: Knowledge Deficit - Standard  Goal: Patient and family/care givers will demonstrate understanding of plan of care, disease process/condition, diagnostic tests and medications  Outcome: Progressing     Problem: Fall Risk  Goal: Patient will remain free from falls  Outcome: Progressing     Problem: Pain - Standard  Goal: Alleviation of pain or a reduction in pain to the patient’s comfort goal  Outcome: Progressing       Patient is not progressing towards the following goals:

## 2023-08-16 NOTE — WOUND TEAM
In to see pt for callus on L 4th toe. Debrided callus with curette and emery board until smooth. No wound present. Assessed former incision of TMA on R foot. There were a few rough areas so used curette to removed rough tissue until smooth.  Applied moisturizer to R foot, then reapplied slipper sock. No wounds present or c/o pain. Wound team not following pt.

## 2023-08-16 NOTE — PROGRESS NOTES
Assumed patient of care. Patient is resting, little anxious. PRN anxiety medication given. Assessment completed.
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
## 2023-08-16 NOTE — DISCHARGE PLANNING
Case Management:     Received notification pt would like to obtain records from [facility] and [facility], provided pt with PETRA, informed him he needs to complete this and fax form back to [facility] Medical Records.

## 2023-08-16 NOTE — DISCHARGE PLANNING
Case Management Discharge Planning    Admission Date: 8/6/2023  GMLOS: 2.9  ALOS: 10    6-Clicks ADL Score: 19  6-Clicks Mobility Score: 22      Anticipated Discharge Dispo: Discharge Disposition: Discharged to home/self care (01)    DME Needed: No    Action(s) Taken: Pt continues IV medication.  Once antibiotic regimen completed,pt will have PIC line removed and discharged home with no services. Pt can not d/c with PICC line due to drug abuse.    Escalations Completed: None    Medically Clear: No    Next Steps: Await compleiton of IV antibiotics and removal of PICC. D/C  home to self once medically cleared.     Barriers to Discharge: Medical clearance    Is the patient up for discharge tomorrow: No

## 2023-08-17 ENCOUNTER — PHARMACY VISIT (OUTPATIENT)
Dept: PHARMACY | Facility: MEDICAL CENTER | Age: 48
End: 2023-08-17
Payer: COMMERCIAL

## 2023-08-17 VITALS
TEMPERATURE: 97.5 F | HEIGHT: 69 IN | DIASTOLIC BLOOD PRESSURE: 86 MMHG | BODY MASS INDEX: 25.67 KG/M2 | OXYGEN SATURATION: 99 % | SYSTOLIC BLOOD PRESSURE: 129 MMHG | WEIGHT: 173.28 LBS | HEART RATE: 75 BPM | RESPIRATION RATE: 18 BRPM

## 2023-08-17 PROBLEM — F41.9 ANXIETY: Status: ACTIVE | Noted: 2023-08-17

## 2023-08-17 LAB — GLUCOSE BLD STRIP.AUTO-MCNC: 124 MG/DL (ref 65–99)

## 2023-08-17 PROCEDURE — 700105 HCHG RX REV CODE 258: Performed by: FAMILY MEDICINE

## 2023-08-17 PROCEDURE — 82962 GLUCOSE BLOOD TEST: CPT

## 2023-08-17 PROCEDURE — A9270 NON-COVERED ITEM OR SERVICE: HCPCS | Performed by: STUDENT IN AN ORGANIZED HEALTH CARE EDUCATION/TRAINING PROGRAM

## 2023-08-17 PROCEDURE — 700102 HCHG RX REV CODE 250 W/ 637 OVERRIDE(OP): Performed by: STUDENT IN AN ORGANIZED HEALTH CARE EDUCATION/TRAINING PROGRAM

## 2023-08-17 PROCEDURE — A9270 NON-COVERED ITEM OR SERVICE: HCPCS | Performed by: INTERNAL MEDICINE

## 2023-08-17 PROCEDURE — RXMED WILLOW AMBULATORY MEDICATION CHARGE: Performed by: INTERNAL MEDICINE

## 2023-08-17 PROCEDURE — A9270 NON-COVERED ITEM OR SERVICE: HCPCS

## 2023-08-17 PROCEDURE — 700111 HCHG RX REV CODE 636 W/ 250 OVERRIDE (IP): Performed by: FAMILY MEDICINE

## 2023-08-17 PROCEDURE — 99239 HOSP IP/OBS DSCHRG MGMT >30: CPT | Performed by: INTERNAL MEDICINE

## 2023-08-17 PROCEDURE — 700102 HCHG RX REV CODE 250 W/ 637 OVERRIDE(OP)

## 2023-08-17 PROCEDURE — A9270 NON-COVERED ITEM OR SERVICE: HCPCS | Performed by: FAMILY MEDICINE

## 2023-08-17 PROCEDURE — 700102 HCHG RX REV CODE 250 W/ 637 OVERRIDE(OP): Performed by: INTERNAL MEDICINE

## 2023-08-17 PROCEDURE — 700102 HCHG RX REV CODE 250 W/ 637 OVERRIDE(OP): Performed by: FAMILY MEDICINE

## 2023-08-17 PROCEDURE — 700111 HCHG RX REV CODE 636 W/ 250 OVERRIDE (IP): Mod: JZ | Performed by: FAMILY MEDICINE

## 2023-08-17 RX ORDER — METHOCARBAMOL 500 MG/1
500 TABLET, FILM COATED ORAL 4 TIMES DAILY
Qty: 120 TABLET | Refills: 0 | Status: SHIPPED | OUTPATIENT
Start: 2023-08-17

## 2023-08-17 RX ORDER — ALPRAZOLAM 0.25 MG/1
0.25 TABLET ORAL NIGHTLY PRN
Qty: 5 TABLET | Refills: 0 | Status: SHIPPED | OUTPATIENT
Start: 2023-08-17 | End: 2023-08-22

## 2023-08-17 RX ORDER — GABAPENTIN 300 MG/1
300 CAPSULE ORAL 3 TIMES DAILY
Qty: 90 CAPSULE | Refills: 0 | Status: SHIPPED | OUTPATIENT
Start: 2023-08-17 | End: 2023-09-11 | Stop reason: SDUPTHER

## 2023-08-17 RX ORDER — HYDROCODONE BITARTRATE AND ACETAMINOPHEN 5; 325 MG/1; MG/1
1 TABLET ORAL EVERY 6 HOURS PRN
Qty: 15 TABLET | Refills: 0 | Status: SHIPPED | OUTPATIENT
Start: 2023-08-17 | End: 2023-08-22

## 2023-08-17 RX ADMIN — METHOCARBAMOL 500 MG: 500 TABLET ORAL at 08:23

## 2023-08-17 RX ADMIN — GABAPENTIN 300 MG: 300 CAPSULE ORAL at 08:24

## 2023-08-17 RX ADMIN — DAPTOMYCIN 650 MG: 500 INJECTION, POWDER, LYOPHILIZED, FOR SOLUTION INTRAVENOUS at 06:09

## 2023-08-17 RX ADMIN — HYDROCODONE BITARTRATE AND ACETAMINOPHEN 2 TABLET: 5; 325 TABLET ORAL at 09:42

## 2023-08-17 RX ADMIN — CLOPIDOGREL BISULFATE 75 MG: 75 TABLET ORAL at 04:49

## 2023-08-17 RX ADMIN — ALPRAZOLAM 0.25 MG: 0.25 TABLET ORAL at 00:08

## 2023-08-17 RX ADMIN — HYDROCODONE BITARTRATE AND ACETAMINOPHEN 2 TABLET: 5; 325 TABLET ORAL at 04:48

## 2023-08-17 RX ADMIN — ASPIRIN 81 MG: 81 TABLET, COATED ORAL at 04:49

## 2023-08-17 RX ADMIN — ESCITALOPRAM OXALATE 10 MG: 10 TABLET ORAL at 04:49

## 2023-08-17 RX ADMIN — ONDANSETRON 4 MG: 4 TABLET, ORALLY DISINTEGRATING ORAL at 00:08

## 2023-08-17 ASSESSMENT — PAIN DESCRIPTION - PAIN TYPE: TYPE: ACUTE PAIN

## 2023-08-17 NOTE — PROGRESS NOTES
Rx for diabetic shoes and inserts provided to primary team. Discussed patient, no interventions needed from LPS. No formal consult necessary.

## 2023-08-17 NOTE — DISCHARGE INSTRUCTIONS
Discharge Instructions per JACE SwiftOTj    DIET: Diet Order Diet: Consistent CHO (Diabetic)    ACTIVITY: As tolerated    A proper diet that is low in grease, fat, and salt, along with 30 minutes of exercise per day will lead to weight loss, and better controlled blood sugar and blood pressure.    DIAGNOSIS: Osteomyelitis (HCC)    Follow up with your Primary Care Provider ROBBI Pepper as scheduled or sooner if your symptoms persist or worsen.  Return to Emergency Room for sever chest pain, shortness of breath, signs of a stroke, or any other emergencies.
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
## 2023-08-17 NOTE — CONSULTS
Diabetes education: Pt has a hx of diabetes, on insulin and is known from last 2 admits. Pt was admitted with blood sugar of 298, and Hga1c of 11.3% from 7/6/23. Pt was at [facility] from 7/5 to 7/18/23, readmitted on 7/20 as he was not able to take his insulin as he had no insulin pen needles, then discharged to [facility] on 7/28/23.  Per chart, pt left [facility] on pass on 8/3/23 and never returned. Pt did not have his antibiotics, has insulin at home but no pen needles ( CDE gave sample bag of pen needles last admit and explained how he could get them at [facility] without prescription).  Pt is currently on Glargine 16 units pm with Lispro per sliding scale coverage ac and hs. Blood sugars are 230 ( 4 units), 189 ( 3 units)< and 193 ( 3 units).  Met with pt who states he has his insulin in the refrigerator where he lives, as well as meter and strips, but needs insulin pen needles, and any new medications filled.  Plan: Pt will need a prescription for insulin pen needles at discharge, as well as any new medication. Per pt, he has his meter, supplies, and insulin pens at home. Give new directions?

## 2023-08-17 NOTE — PROGRESS NOTES
Diabetes education: Met with pt this afternoon. Please see consult note.  Plan: Pt will need a prescription for insulin pen needles at discharge, as well as any new medication. Per pt, he has his meter, supplies, and insulin pens at home. Give new directions?

## 2023-08-17 NOTE — PROGRESS NOTES
Discharge Instructions    Discharged to home by taxi with self. Discharged via walking, hospital escort: Refused.  Special equipment needed: Walker    Be sure to schedule a follow-up appointment with your primary care doctor or any specialists as instructed.     Discharge Plan:   Diet Plan: Discussed  Activity Level: Discussed  Confirmed Follow up Appointment: Appointment Scheduled  Confirmed Symptoms Management: Discussed  Medication Reconciliation Updated: Yes    I understand that a diet low in cholesterol, fat, and sodium is recommended for good health. Unless I have been given specific instructions below for another diet, I accept this instruction as my diet prescription.   Other diet: diabetic    Special Instructions: None    -Is this patient being discharged with medication to prevent blood clots?  No    Is patient discharged on Warfarin / Coumadin?   No

## 2023-08-27 ENCOUNTER — APPOINTMENT (OUTPATIENT)
Dept: RADIOLOGY | Facility: MEDICAL CENTER | Age: 48
DRG: 638 | End: 2023-08-27
Attending: INTERNAL MEDICINE
Payer: MEDICAID

## 2023-08-27 ENCOUNTER — HOSPITAL ENCOUNTER (INPATIENT)
Facility: MEDICAL CENTER | Age: 48
LOS: 1 days | DRG: 638 | End: 2023-08-28
Attending: EMERGENCY MEDICINE | Admitting: INTERNAL MEDICINE
Payer: MEDICAID

## 2023-08-27 ENCOUNTER — APPOINTMENT (OUTPATIENT)
Dept: RADIOLOGY | Facility: MEDICAL CENTER | Age: 48
DRG: 638 | End: 2023-08-27
Attending: EMERGENCY MEDICINE
Payer: MEDICAID

## 2023-08-27 DIAGNOSIS — D72.829 LEUKOCYTOSIS, UNSPECIFIED TYPE: ICD-10-CM

## 2023-08-27 DIAGNOSIS — E11.628 DIABETIC INFECTION OF RIGHT FOOT (HCC): ICD-10-CM

## 2023-08-27 DIAGNOSIS — K59.00 CONSTIPATION, UNSPECIFIED CONSTIPATION TYPE: ICD-10-CM

## 2023-08-27 DIAGNOSIS — E11.621 DIABETIC ULCER OF OTHER PART OF RIGHT FOOT ASSOCIATED WITH TYPE 2 DIABETES MELLITUS, LIMITED TO BREAKDOWN OF SKIN (HCC): ICD-10-CM

## 2023-08-27 DIAGNOSIS — L08.9 DIABETIC INFECTION OF RIGHT FOOT (HCC): ICD-10-CM

## 2023-08-27 DIAGNOSIS — L97.511 DIABETIC ULCER OF OTHER PART OF RIGHT FOOT ASSOCIATED WITH TYPE 2 DIABETES MELLITUS, LIMITED TO BREAKDOWN OF SKIN (HCC): ICD-10-CM

## 2023-08-27 DIAGNOSIS — S91.302A WOUND, OPEN, FOOT, LEFT, INITIAL ENCOUNTER: ICD-10-CM

## 2023-08-27 DIAGNOSIS — E87.20 LACTIC ACIDOSIS: ICD-10-CM

## 2023-08-27 DIAGNOSIS — F10.20 ALCOHOLISM (HCC): ICD-10-CM

## 2023-08-27 DIAGNOSIS — R79.82 ELEVATED C-REACTIVE PROTEIN (CRP): ICD-10-CM

## 2023-08-27 DIAGNOSIS — L97.521 ULCERATED, FOOT, LEFT, LIMITED TO BREAKDOWN OF SKIN (HCC): ICD-10-CM

## 2023-08-27 DIAGNOSIS — M79.89 LEG SWELLING: ICD-10-CM

## 2023-08-27 PROBLEM — F17.200 TOBACCO DEPENDENCE: Status: ACTIVE | Noted: 2023-08-27

## 2023-08-27 PROBLEM — E11.65 TYPE 2 DIABETES MELLITUS WITH HYPERGLYCEMIA (HCC): Status: ACTIVE | Noted: 2023-08-27

## 2023-08-27 LAB
ALBUMIN SERPL BCP-MCNC: 4.1 G/DL (ref 3.2–4.9)
ALBUMIN/GLOB SERPL: 1.1 G/DL
ALP SERPL-CCNC: 149 U/L (ref 30–99)
ALT SERPL-CCNC: 10 U/L (ref 2–50)
AMPHET UR QL SCN: NEGATIVE
ANION GAP SERPL CALC-SCNC: 15 MMOL/L (ref 7–16)
APPEARANCE UR: CLEAR
AST SERPL-CCNC: 19 U/L (ref 12–45)
BARBITURATES UR QL SCN: NEGATIVE
BASOPHILS # BLD AUTO: 0.4 % (ref 0–1.8)
BASOPHILS # BLD: 0.04 K/UL (ref 0–0.12)
BENZODIAZ UR QL SCN: NEGATIVE
BILIRUB SERPL-MCNC: 0.3 MG/DL (ref 0.1–1.5)
BILIRUB UR QL STRIP.AUTO: NEGATIVE
BUN SERPL-MCNC: 11 MG/DL (ref 8–22)
BZE UR QL SCN: POSITIVE
CALCIUM ALBUM COR SERPL-MCNC: 9.1 MG/DL (ref 8.5–10.5)
CALCIUM SERPL-MCNC: 9.2 MG/DL (ref 8.5–10.5)
CANNABINOIDS UR QL SCN: NEGATIVE
CHLORIDE SERPL-SCNC: 102 MMOL/L (ref 96–112)
CO2 SERPL-SCNC: 17 MMOL/L (ref 20–33)
COLOR UR: YELLOW
CREAT SERPL-MCNC: 0.68 MG/DL (ref 0.5–1.4)
CRP SERPL HS-MCNC: 1.32 MG/DL (ref 0–0.75)
CRP SERPL HS-MCNC: 1.97 MG/DL (ref 0–0.75)
EOSINOPHIL # BLD AUTO: 0.37 K/UL (ref 0–0.51)
EOSINOPHIL NFR BLD: 3.3 % (ref 0–6.9)
ERYTHROCYTE [DISTWIDTH] IN BLOOD BY AUTOMATED COUNT: 50 FL (ref 35.9–50)
ERYTHROCYTE [SEDIMENTATION RATE] IN BLOOD BY WESTERGREN METHOD: 14 MM/HOUR (ref 0–20)
FENTANYL UR QL: NEGATIVE
GFR SERPLBLD CREATININE-BSD FMLA CKD-EPI: 113 ML/MIN/1.73 M 2
GLOBULIN SER CALC-MCNC: 3.6 G/DL (ref 1.9–3.5)
GLUCOSE BLD STRIP.AUTO-MCNC: 144 MG/DL (ref 65–99)
GLUCOSE BLD STRIP.AUTO-MCNC: 181 MG/DL (ref 65–99)
GLUCOSE SERPL-MCNC: 108 MG/DL (ref 65–99)
GLUCOSE UR STRIP.AUTO-MCNC: >=1000 MG/DL
HCT VFR BLD AUTO: 40.4 % (ref 42–52)
HGB BLD-MCNC: 13.2 G/DL (ref 14–18)
IMM GRANULOCYTES # BLD AUTO: 0.04 K/UL (ref 0–0.11)
IMM GRANULOCYTES NFR BLD AUTO: 0.4 % (ref 0–0.9)
INR PPP: 1.03 (ref 0.87–1.13)
KETONES UR STRIP.AUTO-MCNC: ABNORMAL MG/DL
LACTATE SERPL-SCNC: 1.5 MMOL/L (ref 0.5–2)
LACTATE SERPL-SCNC: 2.2 MMOL/L (ref 0.5–2)
LACTATE SERPL-SCNC: 2.8 MMOL/L (ref 0.5–2)
LEUKOCYTE ESTERASE UR QL STRIP.AUTO: NEGATIVE
LYMPHOCYTES # BLD AUTO: 2.33 K/UL (ref 1–4.8)
LYMPHOCYTES NFR BLD: 20.9 % (ref 22–41)
MCH RBC QN AUTO: 29.8 PG (ref 27–33)
MCHC RBC AUTO-ENTMCNC: 32.7 G/DL (ref 32.3–36.5)
MCV RBC AUTO: 91.2 FL (ref 81.4–97.8)
METHADONE UR QL SCN: NEGATIVE
MICRO URNS: ABNORMAL
MONOCYTES # BLD AUTO: 0.98 K/UL (ref 0–0.85)
MONOCYTES NFR BLD AUTO: 8.8 % (ref 0–13.4)
NEUTROPHILS # BLD AUTO: 7.41 K/UL (ref 1.82–7.42)
NEUTROPHILS NFR BLD: 66.2 % (ref 44–72)
NITRITE UR QL STRIP.AUTO: NEGATIVE
NRBC # BLD AUTO: 0 K/UL
NRBC BLD-RTO: 0 /100 WBC (ref 0–0.2)
OPIATES UR QL SCN: POSITIVE
OXYCODONE UR QL SCN: NEGATIVE
PCP UR QL SCN: NEGATIVE
PH UR STRIP.AUTO: 5 [PH] (ref 5–8)
PLATELET # BLD AUTO: 405 K/UL (ref 164–446)
PMV BLD AUTO: 9 FL (ref 9–12.9)
POTASSIUM SERPL-SCNC: 4.2 MMOL/L (ref 3.6–5.5)
PROPOXYPH UR QL SCN: NEGATIVE
PROT SERPL-MCNC: 7.7 G/DL (ref 6–8.2)
PROT UR QL STRIP: NEGATIVE MG/DL
PROTHROMBIN TIME: 13.6 SEC (ref 12–14.6)
RBC # BLD AUTO: 4.43 M/UL (ref 4.7–6.1)
RBC UR QL AUTO: NEGATIVE
SCCMEC + MECA PNL NOSE NAA+PROBE: NEGATIVE
SODIUM SERPL-SCNC: 134 MMOL/L (ref 135–145)
SP GR UR STRIP.AUTO: 1.02
UROBILINOGEN UR STRIP.AUTO-MCNC: 0.2 MG/DL
WBC # BLD AUTO: 11.2 K/UL (ref 4.8–10.8)

## 2023-08-27 PROCEDURE — 85652 RBC SED RATE AUTOMATED: CPT

## 2023-08-27 PROCEDURE — 73630 X-RAY EXAM OF FOOT: CPT | Mod: RT

## 2023-08-27 PROCEDURE — 87040 BLOOD CULTURE FOR BACTERIA: CPT | Mod: 91

## 2023-08-27 PROCEDURE — 82962 GLUCOSE BLOOD TEST: CPT

## 2023-08-27 PROCEDURE — 770001 HCHG ROOM/CARE - MED/SURG/GYN PRIV*

## 2023-08-27 PROCEDURE — 36415 COLL VENOUS BLD VENIPUNCTURE: CPT

## 2023-08-27 PROCEDURE — 700111 HCHG RX REV CODE 636 W/ 250 OVERRIDE (IP): Performed by: EMERGENCY MEDICINE

## 2023-08-27 PROCEDURE — 85610 PROTHROMBIN TIME: CPT

## 2023-08-27 PROCEDURE — 80053 COMPREHEN METABOLIC PANEL: CPT

## 2023-08-27 PROCEDURE — 87641 MR-STAPH DNA AMP PROBE: CPT

## 2023-08-27 PROCEDURE — 700105 HCHG RX REV CODE 258: Performed by: INTERNAL MEDICINE

## 2023-08-27 PROCEDURE — 99406 BEHAV CHNG SMOKING 3-10 MIN: CPT | Performed by: INTERNAL MEDICINE

## 2023-08-27 PROCEDURE — 81003 URINALYSIS AUTO W/O SCOPE: CPT

## 2023-08-27 PROCEDURE — 93971 EXTREMITY STUDY: CPT | Mod: RT

## 2023-08-27 PROCEDURE — 83036 HEMOGLOBIN GLYCOSYLATED A1C: CPT

## 2023-08-27 PROCEDURE — 86140 C-REACTIVE PROTEIN: CPT

## 2023-08-27 PROCEDURE — 83605 ASSAY OF LACTIC ACID: CPT

## 2023-08-27 PROCEDURE — 700111 HCHG RX REV CODE 636 W/ 250 OVERRIDE (IP): Performed by: INTERNAL MEDICINE

## 2023-08-27 PROCEDURE — 96365 THER/PROPH/DIAG IV INF INIT: CPT

## 2023-08-27 PROCEDURE — A9270 NON-COVERED ITEM OR SERVICE: HCPCS | Performed by: INTERNAL MEDICINE

## 2023-08-27 PROCEDURE — 99223 1ST HOSP IP/OBS HIGH 75: CPT | Performed by: INTERNAL MEDICINE

## 2023-08-27 PROCEDURE — 96367 TX/PROPH/DG ADDL SEQ IV INF: CPT

## 2023-08-27 PROCEDURE — 93971 EXTREMITY STUDY: CPT | Mod: 26,RT | Performed by: INTERNAL MEDICINE

## 2023-08-27 PROCEDURE — 700102 HCHG RX REV CODE 250 W/ 637 OVERRIDE(OP): Performed by: INTERNAL MEDICINE

## 2023-08-27 PROCEDURE — 85025 COMPLETE CBC W/AUTO DIFF WBC: CPT

## 2023-08-27 PROCEDURE — 99222 1ST HOSP IP/OBS MODERATE 55: CPT | Performed by: NURSE PRACTITIONER

## 2023-08-27 PROCEDURE — 80307 DRUG TEST PRSMV CHEM ANLYZR: CPT

## 2023-08-27 PROCEDURE — 96375 TX/PRO/DX INJ NEW DRUG ADDON: CPT

## 2023-08-27 PROCEDURE — 700105 HCHG RX REV CODE 258: Performed by: EMERGENCY MEDICINE

## 2023-08-27 PROCEDURE — 306591 TRAY SUTURE REMOVAL DISP: Performed by: INTERNAL MEDICINE

## 2023-08-27 PROCEDURE — 99285 EMERGENCY DEPT VISIT HI MDM: CPT

## 2023-08-27 PROCEDURE — 0HDNXZZ EXTRACTION OF LEFT FOOT SKIN, EXTERNAL APPROACH: ICD-10-PCS

## 2023-08-27 RX ORDER — OXYCODONE HYDROCHLORIDE 10 MG/1
10 TABLET ORAL
Status: DISCONTINUED | OUTPATIENT
Start: 2023-08-27 | End: 2023-08-28 | Stop reason: HOSPADM

## 2023-08-27 RX ORDER — INSULIN GLARGINE 100 [IU]/ML
25 INJECTION, SOLUTION SUBCUTANEOUS EVERY EVENING
COMMUNITY

## 2023-08-27 RX ORDER — FOLIC ACID 1 MG/1
1 TABLET ORAL DAILY
Status: DISCONTINUED | OUTPATIENT
Start: 2023-08-27 | End: 2023-08-28 | Stop reason: HOSPADM

## 2023-08-27 RX ORDER — EMPAGLIFLOZIN 25 MG/1
25 TABLET, FILM COATED ORAL DAILY
COMMUNITY

## 2023-08-27 RX ORDER — CLOPIDOGREL BISULFATE 75 MG/1
75 TABLET ORAL DAILY
Status: DISCONTINUED | OUTPATIENT
Start: 2023-08-27 | End: 2023-08-28 | Stop reason: HOSPADM

## 2023-08-27 RX ORDER — HYDROMORPHONE HYDROCHLORIDE 1 MG/ML
0.5 INJECTION, SOLUTION INTRAMUSCULAR; INTRAVENOUS; SUBCUTANEOUS
Status: DISCONTINUED | OUTPATIENT
Start: 2023-08-27 | End: 2023-08-28 | Stop reason: HOSPADM

## 2023-08-27 RX ORDER — GAUZE BANDAGE 2" X 2"
100 BANDAGE TOPICAL DAILY
Status: DISCONTINUED | OUTPATIENT
Start: 2023-08-27 | End: 2023-08-28 | Stop reason: HOSPADM

## 2023-08-27 RX ORDER — ESCITALOPRAM OXALATE 10 MG/1
10 TABLET ORAL DAILY
Status: DISCONTINUED | OUTPATIENT
Start: 2023-08-27 | End: 2023-08-28 | Stop reason: HOSPADM

## 2023-08-27 RX ORDER — ASPIRIN 81 MG/1
81 TABLET ORAL DAILY
COMMUNITY

## 2023-08-27 RX ORDER — MORPHINE SULFATE 4 MG/ML
4 INJECTION INTRAVENOUS ONCE
Status: COMPLETED | OUTPATIENT
Start: 2023-08-27 | End: 2023-08-27

## 2023-08-27 RX ORDER — BISACODYL 10 MG
10 SUPPOSITORY, RECTAL RECTAL
Status: DISCONTINUED | OUTPATIENT
Start: 2023-08-27 | End: 2023-08-28 | Stop reason: HOSPADM

## 2023-08-27 RX ORDER — LISINOPRIL 10 MG/1
10 TABLET ORAL DAILY
COMMUNITY

## 2023-08-27 RX ORDER — GABAPENTIN 300 MG/1
300 CAPSULE ORAL 3 TIMES DAILY
Status: DISCONTINUED | OUTPATIENT
Start: 2023-08-27 | End: 2023-08-28 | Stop reason: HOSPADM

## 2023-08-27 RX ORDER — METHOCARBAMOL 500 MG/1
500 TABLET, FILM COATED ORAL 4 TIMES DAILY
Status: DISCONTINUED | OUTPATIENT
Start: 2023-08-27 | End: 2023-08-28 | Stop reason: HOSPADM

## 2023-08-27 RX ORDER — AMOXICILLIN 250 MG
2 CAPSULE ORAL 2 TIMES DAILY
Status: DISCONTINUED | OUTPATIENT
Start: 2023-08-27 | End: 2023-08-28 | Stop reason: HOSPADM

## 2023-08-27 RX ORDER — OMEPRAZOLE 20 MG/1
20 CAPSULE, DELAYED RELEASE ORAL EVERY MORNING
Status: DISCONTINUED | OUTPATIENT
Start: 2023-08-27 | End: 2023-08-28 | Stop reason: HOSPADM

## 2023-08-27 RX ORDER — SODIUM CHLORIDE, SODIUM LACTATE, POTASSIUM CHLORIDE, AND CALCIUM CHLORIDE .6; .31; .03; .02 G/100ML; G/100ML; G/100ML; G/100ML
500 INJECTION, SOLUTION INTRAVENOUS
Status: DISCONTINUED | OUTPATIENT
Start: 2023-08-27 | End: 2023-08-28 | Stop reason: HOSPADM

## 2023-08-27 RX ORDER — SODIUM CHLORIDE, SODIUM LACTATE, POTASSIUM CHLORIDE, AND CALCIUM CHLORIDE .6; .31; .03; .02 G/100ML; G/100ML; G/100ML; G/100ML
30 INJECTION, SOLUTION INTRAVENOUS ONCE
Status: COMPLETED | OUTPATIENT
Start: 2023-08-27 | End: 2023-08-27

## 2023-08-27 RX ORDER — POLYETHYLENE GLYCOL 3350 17 G/17G
1 POWDER, FOR SOLUTION ORAL
Status: DISCONTINUED | OUTPATIENT
Start: 2023-08-27 | End: 2023-08-28 | Stop reason: HOSPADM

## 2023-08-27 RX ORDER — ATORVASTATIN CALCIUM 20 MG/1
20 TABLET, FILM COATED ORAL NIGHTLY
COMMUNITY

## 2023-08-27 RX ORDER — OXYCODONE HYDROCHLORIDE 5 MG/1
5 TABLET ORAL
Status: DISCONTINUED | OUTPATIENT
Start: 2023-08-27 | End: 2023-08-28 | Stop reason: HOSPADM

## 2023-08-27 RX ORDER — ENOXAPARIN SODIUM 100 MG/ML
40 INJECTION SUBCUTANEOUS DAILY
Status: DISCONTINUED | OUTPATIENT
Start: 2023-08-27 | End: 2023-08-28 | Stop reason: HOSPADM

## 2023-08-27 RX ORDER — DEXTROSE MONOHYDRATE 25 G/50ML
25 INJECTION, SOLUTION INTRAVENOUS
Status: DISCONTINUED | OUTPATIENT
Start: 2023-08-27 | End: 2023-08-28 | Stop reason: HOSPADM

## 2023-08-27 RX ORDER — ONDANSETRON 4 MG/1
4 TABLET, ORALLY DISINTEGRATING ORAL EVERY 8 HOURS PRN
Status: DISCONTINUED | OUTPATIENT
Start: 2023-08-27 | End: 2023-08-27

## 2023-08-27 RX ORDER — COLCHICINE 0.6 MG/1
0.6 TABLET ORAL DAILY
Status: DISCONTINUED | OUTPATIENT
Start: 2023-08-27 | End: 2023-08-28 | Stop reason: HOSPADM

## 2023-08-27 RX ORDER — ACETAMINOPHEN 325 MG/1
650 TABLET ORAL EVERY 6 HOURS PRN
Status: DISCONTINUED | OUTPATIENT
Start: 2023-08-27 | End: 2023-08-28 | Stop reason: HOSPADM

## 2023-08-27 RX ADMIN — VANCOMYCIN HYDROCHLORIDE 1000 MG: 5 INJECTION, POWDER, LYOPHILIZED, FOR SOLUTION INTRAVENOUS at 21:31

## 2023-08-27 RX ADMIN — CLOPIDOGREL BISULFATE 75 MG: 75 TABLET ORAL at 14:22

## 2023-08-27 RX ADMIN — THERA TABS 1 TABLET: TAB at 17:16

## 2023-08-27 RX ADMIN — OMEPRAZOLE 20 MG: 20 CAPSULE, DELAYED RELEASE ORAL at 14:22

## 2023-08-27 RX ADMIN — FOLIC ACID 1 MG: 1 TABLET ORAL at 17:16

## 2023-08-27 RX ADMIN — Medication 100 MG: at 14:22

## 2023-08-27 RX ADMIN — OXYCODONE HYDROCHLORIDE 10 MG: 10 TABLET ORAL at 18:11

## 2023-08-27 RX ADMIN — ESCITALOPRAM OXALATE 10 MG: 10 TABLET ORAL at 14:22

## 2023-08-27 RX ADMIN — ACETAMINOPHEN 650 MG: 325 TABLET, FILM COATED ORAL at 17:15

## 2023-08-27 RX ADMIN — VANCOMYCIN HYDROCHLORIDE 2000 MG: 5 INJECTION, POWDER, LYOPHILIZED, FOR SOLUTION INTRAVENOUS at 12:54

## 2023-08-27 RX ADMIN — INSULIN HUMAN 2 UNITS: 100 INJECTION, SOLUTION PARENTERAL at 18:19

## 2023-08-27 RX ADMIN — GABAPENTIN 300 MG: 300 CAPSULE ORAL at 21:27

## 2023-08-27 RX ADMIN — AMPICILLIN AND SULBACTAM 3 G: 1; 2 INJECTION, POWDER, FOR SOLUTION INTRAMUSCULAR; INTRAVENOUS at 23:52

## 2023-08-27 RX ADMIN — ENOXAPARIN SODIUM 40 MG: 100 INJECTION SUBCUTANEOUS at 17:16

## 2023-08-27 RX ADMIN — COLCHICINE 0.6 MG: 0.6 TABLET ORAL at 14:22

## 2023-08-27 RX ADMIN — METHOCARBAMOL 500 MG: 500 TABLET ORAL at 14:22

## 2023-08-27 RX ADMIN — INSULIN GLARGINE-YFGN 15 UNITS: 100 INJECTION, SOLUTION SUBCUTANEOUS at 18:17

## 2023-08-27 RX ADMIN — OXYCODONE HYDROCHLORIDE 10 MG: 10 TABLET ORAL at 14:25

## 2023-08-27 RX ADMIN — METHOCARBAMOL 500 MG: 500 TABLET ORAL at 21:27

## 2023-08-27 RX ADMIN — SODIUM CHLORIDE, POTASSIUM CHLORIDE, SODIUM LACTATE AND CALCIUM CHLORIDE 2448 ML: 600; 310; 30; 20 INJECTION, SOLUTION INTRAVENOUS at 12:00

## 2023-08-27 RX ADMIN — AMPICILLIN AND SULBACTAM 3 G: 1; 2 INJECTION, POWDER, FOR SOLUTION INTRAMUSCULAR; INTRAVENOUS at 17:21

## 2023-08-27 RX ADMIN — OXYCODONE HYDROCHLORIDE 10 MG: 10 TABLET ORAL at 21:37

## 2023-08-27 RX ADMIN — AMPICILLIN AND SULBACTAM 3 G: 1; 2 INJECTION, POWDER, FOR SOLUTION INTRAMUSCULAR; INTRAVENOUS at 12:13

## 2023-08-27 RX ADMIN — MORPHINE SULFATE 4 MG: 4 INJECTION, SOLUTION INTRAMUSCULAR; INTRAVENOUS at 09:56

## 2023-08-27 RX ADMIN — GABAPENTIN 300 MG: 300 CAPSULE ORAL at 14:22

## 2023-08-27 ASSESSMENT — LIFESTYLE VARIABLES
HAVE PEOPLE ANNOYED YOU BY CRITICIZING YOUR DRINKING: NO
CONSUMPTION TOTAL: NEGATIVE
HAVE YOU EVER FELT YOU SHOULD CUT DOWN ON YOUR DRINKING: NO
ALCOHOL_USE: NO
EVER FELT BAD OR GUILTY ABOUT YOUR DRINKING: NO
EVER HAD A DRINK FIRST THING IN THE MORNING TO STEADY YOUR NERVES TO GET RID OF A HANGOVER: NO
TOTAL SCORE: 0
ON A TYPICAL DAY WHEN YOU DRINK ALCOHOL HOW MANY DRINKS DO YOU HAVE: 0
TOTAL SCORE: 0
AVERAGE NUMBER OF DAYS PER WEEK YOU HAVE A DRINK CONTAINING ALCOHOL: 0
HOW MANY TIMES IN THE PAST YEAR HAVE YOU HAD 5 OR MORE DRINKS IN A DAY: 0
TOTAL SCORE: 0

## 2023-08-27 ASSESSMENT — ENCOUNTER SYMPTOMS
COUGH: 0
HEMOPTYSIS: 0
EYE PAIN: 0
INSOMNIA: 0
CHILLS: 0
FOCAL WEAKNESS: 0
BLOOD IN STOOL: 0
EYE REDNESS: 0
FALLS: 0
VOMITING: 0
NAUSEA: 0
DIZZINESS: 0
DIARRHEA: 0
LOSS OF CONSCIOUSNESS: 0
WEAKNESS: 0
ABDOMINAL PAIN: 0
WHEEZING: 0
NERVOUS/ANXIOUS: 0
TREMORS: 0
SHORTNESS OF BREATH: 0
SEIZURES: 0
CONSTIPATION: 0
HEADACHES: 0
PALPITATIONS: 0
MYALGIAS: 0
FEVER: 0

## 2023-08-27 ASSESSMENT — COGNITIVE AND FUNCTIONAL STATUS - GENERAL
HELP NEEDED FOR BATHING: A LOT
CLIMB 3 TO 5 STEPS WITH RAILING: A LOT
WALKING IN HOSPITAL ROOM: A LITTLE
PERSONAL GROOMING: A LITTLE
SUGGESTED CMS G CODE MODIFIER DAILY ACTIVITY: CK
MOVING FROM LYING ON BACK TO SITTING ON SIDE OF FLAT BED: A LITTLE
MOBILITY SCORE: 18
MOVING TO AND FROM BED TO CHAIR: A LITTLE
DAILY ACTIVITIY SCORE: 19
STANDING UP FROM CHAIR USING ARMS: A LITTLE
SUGGESTED CMS G CODE MODIFIER MOBILITY: CK
DRESSING REGULAR LOWER BODY CLOTHING: A LOT

## 2023-08-27 ASSESSMENT — PATIENT HEALTH QUESTIONNAIRE - PHQ9
1. LITTLE INTEREST OR PLEASURE IN DOING THINGS: NOT AT ALL
SUM OF ALL RESPONSES TO PHQ9 QUESTIONS 1 AND 2: 0
2. FEELING DOWN, DEPRESSED, IRRITABLE, OR HOPELESS: NOT AT ALL

## 2023-08-27 ASSESSMENT — PAIN DESCRIPTION - PAIN TYPE
TYPE: ACUTE PAIN
TYPE: ACUTE PAIN
TYPE: ACUTE PAIN;CHRONIC PAIN

## 2023-08-27 ASSESSMENT — FIBROSIS 4 INDEX: FIB4 SCORE: 0.67

## 2023-08-27 NOTE — H&P
Hospital Medicine History & Physical Note    Date of Service  8/27/2023    Primary Care Physician  ROBBI Pepper    Consultants      Code Status  Prior    Chief Complaint  Chief Complaint   Patient presents with    Leg Pain    Leg Swelling    Back Pain       History of Presenting Illness  Rogelio Escudero is a 48 y.o. male who presented 8/27/2023 with Leg Pain, Leg Swelling, and Back Pain  HE is homeless, He has a history of diabetes on insulin, CAD status post stent, hypertension, peripheral vascular disease. Also has a history of MRSA bacteremia and MRSA/enterococcal wound infection. Was discharged on July 2023 for R foot osteomyelitis, completed a course of daptomycin. He has a history of leaving against medical advice.   He presents with R foot swelling. There is discharge to that wound.   AT the ED, he is afebrile and hemodynamically stable.  No DVT on Duplex  Mild leukocytosis, mild lactic acidosis.  Antibiotics started.   When I saw him at the medical floor, no acute distress. Foot stump is swollen with a vesicular or bollous lesion close to incision site.    I discussed the plan of care with patient, RN and EDP    Review of Systems  Review of Systems   Constitutional:  Negative for chills and fever.   HENT:  Negative for congestion, hearing loss and nosebleeds.    Eyes:  Negative for pain and redness.   Respiratory:  Negative for cough, hemoptysis, shortness of breath and wheezing.    Cardiovascular:  Negative for chest pain and palpitations.   Gastrointestinal:  Negative for abdominal pain, blood in stool, constipation, diarrhea, nausea and vomiting.   Genitourinary:  Negative for dysuria, frequency and hematuria.   Musculoskeletal:  Negative for falls, joint pain and myalgias.   Skin:  Positive for rash.   Neurological:  Negative for dizziness, tremors, focal weakness, seizures, loss of consciousness, weakness and headaches.   Psychiatric/Behavioral:  The patient is not nervous/anxious and  does not have insomnia.    All other systems reviewed and are negative.      Past Medical History   has a past medical history of Alcohol abuse, Dental disorder, Depression (2010), Diabetes, Diabetic neuropathy (HCC), Heart burn, Hemorrhagic disorder (HCC), Hiatus hernia syndrome, High cholesterol, Hypertension, Pain, Pneumonia (approx 2011), PTSD (post-traumatic stress disorder), and PVD (peripheral vascular disease) (HCC).    Surgical History   has a past surgical history that includes irrigation & debridement general (Right, 2/6/2020); other; pr shldr arthroscop,surg,w/rotat cuff repr (Right, 11/18/2021); pr shldr arthroscop,part acromioplas (Right, 11/18/2021); pr arthroscopy shoulder surgical biceps tenodes* (Right, 11/18/2021); debridement, labrum, hip, arthroscopic (Right, 11/18/2021); and femoral popliteal bypass (Right, 7/14/2023).     Family History  family history is not on file.   Family history reviewed with patient. There is no family history that is pertinent to the chief complaint.     Social History   reports that he has been smoking cigarettes. He has a 5.0 pack-year smoking history. He has never used smokeless tobacco. He reports current alcohol use. He reports current drug use. Drug: Inhaled.    Allergies  Allergies   Allergen Reactions    Apple Hives    Lactose Diarrhea     Diarrhea         Medications  Prior to Admission Medications   Prescriptions Last Dose Informant Patient Reported? Taking?   Insulin Pen Needle 32 G x 4 mm   No No   Sig: Use one pen needle in pen device to inject insulin three times daily.   acetaminophen (TYLENOL) 325 MG Tab   No No   Sig: Take 2 Tablets by mouth every 6 hours as needed for Mild Pain or Moderate Pain.   clopidogrel (PLAVIX) 75 MG Tab  Patient No No   Sig: Take 1 Tablet by mouth every day.   colchicine (COLCRYS) 0.6 MG Tab  Patient No No   Sig: Take 1 Tablet by mouth every day.   escitalopram (LEXAPRO) 10 MG Tab  Patient No No   Sig: Take 1 Tablet by mouth  every day.   gabapentin (NEURONTIN) 300 MG Cap   No No   Sig: Take 1 Capsule by mouth in the morning, at noon, and at bedtime.   insulin GLARGINE (LANTUS,SEMGLEE) 100 UNIT/ML Solution   No No   Sig: Inject 15 Units under the skin every evening.   insulin regular (HUMULIN R) 100 Unit/mL Solution   No No   Sig: Inject 3-14 Units under the skin 4 Times a Day,Before Meals and at Bedtime.   methocarbamol (ROBAXIN) 500 MG Tab   No No   Sig: Take 1 Tablet by mouth 4 times a day.   omeprazole (PRILOSEC) 20 MG delayed-release capsule  Patient No No   Sig: Take 1 Capsule by mouth every morning.   ondansetron (ZOFRAN ODT) 4 MG TABLET DISPERSIBLE   No No   Sig: Take 1 Tablet by mouth every 8 hours as needed for Nausea/Vomiting (give PO if no IV route available).      Facility-Administered Medications: None       Physical Exam  Temp:  [36.2 °C (97.2 °F)] 36.2 °C (97.2 °F)  Pulse:  [95-96] 96  Resp:  [16] 16  BP: (114-124)/(74-79) 124/74  SpO2:  [93 %-97 %] 93 %  Blood Pressure: 124/74   Temperature: 36.2 °C (97.2 °F)   Pulse: 96   Respiration: 16   Pulse Oximetry: 93 %       Physical Exam  Vitals and nursing note reviewed.   HENT:      Head: Normocephalic and atraumatic.      Right Ear: External ear normal.      Left Ear: External ear normal.      Nose: Nose normal.      Mouth/Throat:      Mouth: Mucous membranes are moist.   Eyes:      General: No scleral icterus.     Conjunctiva/sclera: Conjunctivae normal.   Cardiovascular:      Rate and Rhythm: Normal rate and regular rhythm.      Heart sounds: No murmur heard.     No friction rub. No gallop.   Pulmonary:      Effort: Pulmonary effort is normal.      Breath sounds: Normal breath sounds.   Abdominal:      General: Abdomen is flat. Bowel sounds are normal. There is no distension.      Palpations: Abdomen is soft.      Tenderness: There is no abdominal tenderness. There is no guarding.   Musculoskeletal:         General: Tenderness (R foot stump) present. Normal range of  "motion.      Cervical back: Normal range of motion and neck supple.      Right lower leg: Edema present.   Skin:     General: Skin is warm.      Comments: R foot stump bollous or vesiclular lesion  Incision area seems somewhat wet looking but no drainage  Incision noted R leg from previous PVD surgery   Neurological:      Mental Status: He is alert and oriented to person, place, and time. Mental status is at baseline.   Psychiatric:         Mood and Affect: Mood normal.         Behavior: Behavior normal.         Thought Content: Thought content normal.         Judgment: Judgment normal.         Laboratory:  Recent Labs     08/27/23  0840   WBC 11.2*   RBC 4.43*   HEMOGLOBIN 13.2*   HEMATOCRIT 40.4*   MCV 91.2   MCH 29.8   MCHC 32.7   RDW 50.0   PLATELETCT 405   MPV 9.0     Recent Labs     08/27/23  0840   SODIUM 134*   POTASSIUM 4.2   CHLORIDE 102   CO2 17*   GLUCOSE 108*   BUN 11   CREATININE 0.68   CALCIUM 9.2     Recent Labs     08/27/23  0840   ALTSGPT 10   ASTSGOT 19   ALKPHOSPHAT 149*   TBILIRUBIN 0.3   GLUCOSE 108*         No results for input(s): \"NTPROBNP\" in the last 72 hours.      No results for input(s): \"TROPONINT\" in the last 72 hours.    Imaging:  US-EXTREMITY VENOUS LOWER UNILAT RIGHT   Final Result        Assessment/Plan:  Justification for Admission Status  I anticipate this patient will require at least two midnights for appropriate medical management, necessitating inpatient admission because welll need to follow wound cultures, blood cultures, LPS recs for IND, assess circulation and address his polysubstance dependence including alcohol and smoking      * Diabetic infection of right foot, h/o MRSA/enterocccal infx and bacteremia, homelessness/polysubstance dependence/EtOH (HCC)  Assessment & Plan  Ordered XR of R foot, ESR, CRP  No DVT on Duplex. Ordered R foot LATASHA  Started antibiotics  Ordered urine drug screen  Consult [facility]  COnsult Vascular if abnormal LATASHA  Pain " control.  Will be on narcotics. Monitor mentation, respirations, blood pressure for potential adverse effects  Will be on IV vancomycin. Monitor skin for rash and renal function for potential adverse effects of this medication      Type 2 diabetes mellitus with hyperglycemia (HCC)  Assessment & Plan  Ordered basal and SS insulin   A1c    Tobacco dependence  Assessment & Plan  I spent 4 minutes, discussing tobacco dependence and cardiac as well as pulmonary risk. Smoking cessation instructions. Code 46395  I also mentioned smoking retards healing and will worsen his PVD    Alcohol abuse- (present on admission)  Assessment & Plan  Ordered vitamins, thiamine, folate  Monitor for withdrawal        VTE prophylaxis: enoxaparin ppx

## 2023-08-27 NOTE — ED TRIAGE NOTES
BIB EMS to triage w/ c/o RLE pain/swelling x 2 days.  Also reports back pain, hx of chronic back pain. Hx of R fem bypass 7/14, chronic osteomyelitis to R foot. Patient also states that he ran out of his norco 2 days ago.

## 2023-08-27 NOTE — PROGRESS NOTES
4 Eyes Skin Assessment Completed by HANS Gonzales and HANS Kraft.    Head WDL  Ears WDL  Nose WDL  Mouth WDL  Neck WDL  Breast/Chest WDL  Shoulder Blades WDL  Spine WDL  (R) Arm/Elbow/Hand WDL  (L) Arm/Elbow/Hand WDL  Abdomen WDL  Groin Healed incision  Scrotum/Coccyx/Buttocks WDL  (R) Leg WDL  (L) Leg WDL  (R) Heel/Foot/Toe Redness and Swelling  (L) Heel/Foot/Toe Discoloration, callous    Devices In Places none      Interventions In Place Waffle Overlay    Possible Skin Injury No    Pictures Uploaded Into Epic Yes  Wound Consult Placed Yes  RN Wound Prevention Protocol Ordered Yes

## 2023-08-27 NOTE — ED PROVIDER NOTES
"ED Provider Note    CHIEF COMPLAINT  Chief Complaint   Patient presents with    Leg Pain    Leg Swelling    Back Pain       EXTERNAL RECORDS REVIEWED  Inpatient Notes patient was recently admitted for osteomyelitis.  Last month he had a right femoropopliteal bypass.  In hospital, he completed a course of daptomycin.  He was discharged.    HPI/ROS  LIMITATION TO HISTORY   Select: : None      Rogelio Escudero is a 48 y.o. male who presents complaining of \"the same pain and the same swelling.\"  Patient referring to his back and his leg.  He is concerned that he is infected again.  As noted, he had a recent revascularization, and has been treated for osteomyelitis.  The last few days has had increasing swelling.  The wound was bleeding little bit on his stump.  He does not know if he had a fever he just does not feel real well.  He is concerned there may be recurrence of infection.  He had a little bit of upper respiratory congestion.  No cough or significant cold symptoms however.  No rashes.  His leg is persistently bothering him as is his back.  In terms of pain, these are unchanged.  But the swelling is new as is his feeling poorly.    PAST MEDICAL HISTORY   has a past medical history of Alcohol abuse, Dental disorder, Depression (2010), Diabetes, Diabetic neuropathy ([facility]), Heart burn, Hemorrhagic disorder ([facility]), Hiatus hernia syndrome, High cholesterol, Hypertension, Pain, Pneumonia (approx 2011), PTSD (post-traumatic stress disorder), and PVD (peripheral vascular disease) ([facility]).    SURGICAL HISTORY   has a past surgical history that includes irrigation & debridement general (Right, 2/6/2020); other; shldr arthroscop,surg,w/rotat cuff repr (Right, 11/18/2021); shldr arthroscop,part acromioplas (Right, 11/18/2021); arthroscopy shoulder surgical biceps tenodes* (Right, 11/18/2021); debridement, labrum, hip, arthroscopic (Right, 11/18/2021); and femoral popliteal bypass (Right, 7/14/2023).    FAMILY " "HISTORY  History reviewed. No pertinent family history.    SOCIAL HISTORY  Social History     Tobacco Use    Smoking status: Every Day     Current packs/day: 0.25     Average packs/day: 0.3 packs/day for 20.0 years (5.0 ttl pk-yrs)     Types: Cigarettes    Smokeless tobacco: Never    Tobacco comments:     6   Vaping Use    Vaping Use: Never used   Substance and Sexual Activity    Alcohol use: Yes     Comment: occ    Drug use: Yes     Types: Inhaled     Comment: marijuana    Sexual activity: Not on file       CURRENT MEDICATIONS  Noted    ALLERGIES  Allergies   Allergen Reactions    Apple Hives    Lactose Diarrhea     Diarrhea         PHYSICAL EXAM  VITAL SIGNS: /79   Pulse 95   Temp 36.2 °C (97.2 °F) (Temporal)   Resp 16   Ht 1.753 m (5' 9\")   Wt 81.6 kg (180 lb)   SpO2 97%   BMI 26.58 kg/m²    Constitutional: Somewhat tired but otherwise well appearing patient in no acute distress.  HENT: Head is without trauma.  Oropharynx is clear.  Mucous membranes are moist.  Eyes: Sclerae are nonicteric, pupils are equally round.  Neck: Supple with grossly normal range of motion.  Cardiovascular: Heart is regular rate and rhythm without murmur rub or gallop.  Peripheral pulses are intact and symmetric throughout.  Thorax & Lungs: Breathing easily.  Good air movement.  There is no wheeze, rhonchi or rales.  Abdomen: Bowel sounds normal, soft, non-distended, nontender, no mass nor pulsatile mass. I do not appreciate hepatosplenomegaly.  Skin: No apparent rash.  I do not see petechiae or purpura.  Extremities: Right lower extremity: He has a well-healing medial incision over the thigh.  Over the foot at the metatarsals, there is some evidence of recent bleeding but the wound itself is closed.  No evidence of dehiscence.  He has some edema over the foot up into the calf to the knee.  The wound foot is warm and well-perfused.  Neurologic: Alert. Moving all extremities.  Intact sensation and strength throughout.  Gait " is normal.  Psychiatric: Normal for situation      DIAGNOSTIC STUDIES / PROCEDURES      LABS  Labs Reviewed   CBC WITH DIFFERENTIAL - Abnormal; Notable for the following components:       Result Value    WBC 11.2 (*)     RBC 4.43 (*)     Hemoglobin 13.2 (*)     Hematocrit 40.4 (*)     Lymphocytes 20.90 (*)     Monos (Absolute) 0.98 (*)     All other components within normal limits   COMP METABOLIC PANEL - Abnormal; Notable for the following components:    Sodium 134 (*)     Co2 17 (*)     Glucose 108 (*)     Alkaline Phosphatase 149 (*)     Globulin 3.6 (*)     All other components within normal limits   CRP QUANTITIVE (NON-CARDIAC) - Abnormal; Notable for the following components:    Stat C-Reactive Protein 1.32 (*)     All other components within normal limits   LACTIC ACID - Abnormal; Notable for the following components:    Lactic Acid 2.8 (*)     All other components within normal limits   ESTIMATED GFR   BLOOD CULTURE   BLOOD CULTURE   LACTIC ACID   LACTIC ACID   MRSA BY PCR (AMP)   HEMOGLOBIN A1C   LACTIC ACID   PROTHROMBIN TIME   BLOOD CULTURE   BLOOD CULTURE   URINALYSIS   SED RATE   CRP QUANTITIVE (NON-CARDIAC)         RADIOLOGY  I have independently interpreted the diagnostic imaging associated with this visit and am waiting the final reading from the radiologist.   Radiologist interpretation:   US-EXTREMITY VENOUS LOWER UNILAT RIGHT   Final Result        COURSE & MEDICAL DECISION MAKING    ED Observation Status? Yes; I am placing the patient in to an observation status due to a diagnostic uncertainty as well as therapeutic intensity. Patient placed in observation status at 9:35 AM, 8/27/2023.     Observation plan is as follows: We will check labs, sonogram.  We will give him some morphine for pain control, recheck response to this.  At this point his disposition is unclear.      Upon Reevaluation, the patient's condition has: not improved; and will be escalated to hospitalization.    Patient discharged  from ED Observation status at 1155 (Time) 8/27/2023  (Date).     INITIAL ASSESSMENT, COURSE AND PLAN  Care Narrative: This a patient with a complex medical history notably a femoropopliteal bypass, chronic osteomyelitis, presents feeling poorly, with ongoing pain but now with swelling in his right lower extremity.  I am concerned about infectious etiology.   I am going to go ahead check laboratories to include CBC, CRP.  We will also initiate sepsis protocol.  Given his recent hospitalization, we will check an ultrasound looking for DVT as well.     Sonogram return quickly showing no evidence of a deep venous thrombosis.    Laboratories have also subsequent return.  This shows a mild leukocytosis, mild lactic acidosis, and a mild elevation of his CRP.  As I discussed with the patient, although none of these are markedly abnormal, the constellation of them with this worsening pain and swelling and drainage makes me concerned that he has had a recurrence/worsening of infection.  He was some given morphine, he was feeling better after this.  However, at this point I think we might need to put him in the hospital.  I have discussed the patient's case with Dr. Moura, hospitalist, who will be consulting on him.  I have also written him for a sepsis bolus of lactated Ringer's, and also written for Unasyn and vancomycin to cover his wound.  I do note that at this point the patient does not meet SIRS criteria.          ADDITIONAL PROBLEM LIST  Hyperglycemia  DISPOSITION AND DISCUSSIONS  I have discussed management of the patient with the following physicians and YURIY's: List      Barriers to care at this time, including but not limited to: Patient is homeless.     FINAL DIAGNOSIS  1. Leg swelling    2. Lactic acidosis    3. Leukocytosis, unspecified type    4. Elevated C-reactive protein (CRP)           Electronically signed by: Alex Drew M.D., 8/27/2023 9:40 AM
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
## 2023-08-27 NOTE — PROGRESS NOTES
Pt admitted to room 636 Pt A&O x4. Belongings and call light in reach, bed in low position, bed rails up x2 reports pain level 8/10 on pain scale, medicated. RLE with redness and swelling.

## 2023-08-27 NOTE — ASSESSMENT & PLAN NOTE
I spent 4 minutes, discussing tobacco dependence and cardiac as well as pulmonary risk. Smoking cessation instructions. Code 30158  I also mentioned smoking retards healing and will worsen his PVD

## 2023-08-27 NOTE — ASSESSMENT & PLAN NOTE
Ordered XR of R foot, ESR, CRP  No DVT on Duplex. Ordered R foot LATASHA  Started antibiotics  Ordered urine drug screen  Consult Limb Preservation Services  COnsult Vascular if abnormal LATASHA  Pain control.  Will be on narcotics. Monitor mentation, respirations, blood pressure for potential adverse effects  Will be on IV vancomycin. Monitor skin for rash and renal function for potential adverse effects of this medication

## 2023-08-27 NOTE — ED NOTES
Ultrasound at bedside. Medicated for pain per mar order. Allergies verified. Rates pain in right leg as 10/10

## 2023-08-27 NOTE — PROGRESS NOTES
Patient arrived to unit on [facility].  Awaiting clean room.  Resting comfortably, denies any needs

## 2023-08-27 NOTE — PROGRESS NOTES
Pharmacy Vancomycin Kinetics Note for 8/27/2023     48 y.o. male on Vancomycin day # 1     Vancomycin Indication (AUC Dosing): Skin/skin structure infection    Provider specified end date: 09/06/23    Active Antibiotics (From admission, onward)      Ordered     Ordering Provider       Sun Aug 27, 2023 12:02 PM    08/27/23 1202  ampicillin/sulbactam (Unasyn) 3 g in  mL IVPB  (Abscess/Cellulitis )  EVERY 6 HOURS         Rufino Moura M.D.    08/27/23 1202  MD Alert...Vancomycin per Pharmacy  (Abscess/Cellulitis )  PHARMACY TO DOSE        Question:  Indication(s) for vancomycin?  Answer:  Skin and soft tissue infection    Rufino Moura M.D.            Dosing Weight: 81.6 kg (180 lb)      Admission History: Admitted on 8/27/2023 for Diabetic infection of right foot (HCC) [E11.628, L08.9]  Pertinent history: 47YO M presents to ED for possible worsening foot infection due to increased swelling and pain. Pt has hx of MRSA bacteremia which was treated with vanco inpt and transitioned to dapto at SNF and chronic osteomyelitis to R-foot w/ wound cultures positive for MRSA and e. faecalis.    Allergies:     Apple and Lactose     Pertinent cultures to date:     Results       Procedure Component Value Units Date/Time    Urinalysis [[id number]]  (Abnormal) Collected: 08/27/23 1213    Order Status: Completed Specimen: Urine Updated: 08/27/23 1240     Color Yellow     Character Clear     Specific Gravity 1.022     Ph 5.0     Glucose >=1000 mg/dL      Ketones Trace mg/dL      Protein Negative mg/dL      Bilirubin Negative     Urobilinogen, Urine 0.2     Nitrite Negative     Leukocyte Esterase Negative     Occult Blood Negative     Micro Urine Req see below     Comment: Microscopic examination not performed when specimen is clear  and chemically negative for protein, blood, leukocyte esterase  and nitrite.         Narrative:      If not done within the last 24 hours  Release to patient->Immediate    BLOOD CULTURE  "[[id number]] Collected: 23 102    Order Status: Sent Specimen: Blood from Peripheral Updated: 23 1223    Narrative:      Per Hospital Policy: Only change Specimen Src: to \"Line\" if  specified by physician order.  Release to patient->Immediate    BLOOD CULTURE [[id number]] Collected: 23 102    Order Status: Sent Specimen: Blood from Peripheral Updated: 23 1222    Narrative:      Per Hospital Policy: Only change Specimen Src: to \"Line\" if  specified by physician order.  Release to patient->Immediate    Blood Culture [[id number]]     Order Status: Canceled Specimen: Blood from Peripheral     Blood Culture [[id number]]     Order Status: Canceled Specimen: Blood from Peripheral     MRSA By PCR (Amp) [[id number]]     Order Status: Canceled Specimen: Respirate from Nares     MRSA By PCR (Amp) [[id number]]     Order Status: Sent Specimen: Respirate from Nares             Labs:     Estimated Creatinine Clearance: 130 mL/min (by C-G formula based on SCr of 0.68 mg/dL).  Recent Labs     23  0840   WBC 11.2*   NEUTSPOLYS 66.20     Recent Labs     23  0840   BUN 11   CREATININE 0.68   ALBUMIN 4.1     No intake or output data in the 24 hours ending 23 1405   /72   Pulse 98   Temp 36.2 °C (97.2 °F) (Temporal)   Resp 16   Ht 1.753 m (5' 9\")   Wt 81.6 kg (180 lb)   SpO2 95%  Temp (24hrs), Av.2 °C (97.2 °F), Min:36.2 °C (97.2 °F), Max:36.2 °C (97.2 °F)      List concerns for Vancomycin clearance:     None    Pharmacokinetics:     AUC kinetics:   Ke (hr ^-1): 0.1245 hr^-1  Half life: 5.57 hr  Clearance: 6.607  Estimated TDD: 3303.5  Estimated Dose: 1046  Estimated interval: 7.6    A/P:     -  Vancomycin dose:   Load: 2000 mg x1 (started @ 1254 )   Maintenance: 1000 mg Q8H (start @ 2100 )     -  Next vancomycin level(s):    Per clinical pharmacist at steady state ~    -  Predicted vancomycin AUC from initial AUC test calculator: 454 mg·hr/L    -  Comments: No current " concerns for accumulation. MRSA nares PCR and blood cultures have been ordered. Continue to monitor patient's renal function and follow up on resulted cultures to help narrow antimicrobial therapy.     Ally Herring, PharmD

## 2023-08-28 ENCOUNTER — PHARMACY VISIT (OUTPATIENT)
Dept: PHARMACY | Facility: MEDICAL CENTER | Age: 48
End: 2023-08-28
Payer: COMMERCIAL

## 2023-08-28 ENCOUNTER — PATIENT OUTREACH (OUTPATIENT)
Dept: SCHEDULING | Facility: IMAGING CENTER | Age: 48
End: 2023-08-28
Payer: MEDICAID

## 2023-08-28 VITALS
DIASTOLIC BLOOD PRESSURE: 72 MMHG | WEIGHT: 180 LBS | HEIGHT: 69 IN | RESPIRATION RATE: 18 BRPM | TEMPERATURE: 97.5 F | OXYGEN SATURATION: 96 % | BODY MASS INDEX: 26.66 KG/M2 | SYSTOLIC BLOOD PRESSURE: 114 MMHG | HEART RATE: 77 BPM

## 2023-08-28 LAB
ALBUMIN SERPL BCP-MCNC: 3.5 G/DL (ref 3.2–4.9)
BUN SERPL-MCNC: 14 MG/DL (ref 8–22)
CALCIUM ALBUM COR SERPL-MCNC: 9.4 MG/DL (ref 8.5–10.5)
CALCIUM SERPL-MCNC: 9 MG/DL (ref 8.5–10.5)
CHLORIDE SERPL-SCNC: 104 MMOL/L (ref 96–112)
CO2 SERPL-SCNC: 23 MMOL/L (ref 20–33)
CREAT SERPL-MCNC: 0.85 MG/DL (ref 0.5–1.4)
ERYTHROCYTE [DISTWIDTH] IN BLOOD BY AUTOMATED COUNT: 49.2 FL (ref 35.9–50)
EST. AVERAGE GLUCOSE BLD GHB EST-MCNC: 186 MG/DL
GFR SERPLBLD CREATININE-BSD FMLA CKD-EPI: 105 ML/MIN/1.73 M 2
GLUCOSE BLD STRIP.AUTO-MCNC: 153 MG/DL (ref 65–99)
GLUCOSE SERPL-MCNC: 158 MG/DL (ref 65–99)
HBA1C MFR BLD: 8.1 % (ref 4–5.6)
HCT VFR BLD AUTO: 35.8 % (ref 42–52)
HGB BLD-MCNC: 11.6 G/DL (ref 14–18)
MCH RBC QN AUTO: 29.4 PG (ref 27–33)
MCHC RBC AUTO-ENTMCNC: 32.4 G/DL (ref 32.3–36.5)
MCV RBC AUTO: 90.9 FL (ref 81.4–97.8)
PHOSPHATE SERPL-MCNC: 3.8 MG/DL (ref 2.5–4.5)
PLATELET # BLD AUTO: 374 K/UL (ref 164–446)
PMV BLD AUTO: 8.9 FL (ref 9–12.9)
POTASSIUM SERPL-SCNC: 4.4 MMOL/L (ref 3.6–5.5)
RBC # BLD AUTO: 3.94 M/UL (ref 4.7–6.1)
SODIUM SERPL-SCNC: 136 MMOL/L (ref 135–145)
WBC # BLD AUTO: 7.6 K/UL (ref 4.8–10.8)

## 2023-08-28 PROCEDURE — 85027 COMPLETE CBC AUTOMATED: CPT

## 2023-08-28 PROCEDURE — 36415 COLL VENOUS BLD VENIPUNCTURE: CPT

## 2023-08-28 PROCEDURE — RXMED WILLOW AMBULATORY MEDICATION CHARGE: Performed by: INTERNAL MEDICINE

## 2023-08-28 PROCEDURE — 82962 GLUCOSE BLOOD TEST: CPT

## 2023-08-28 PROCEDURE — 700111 HCHG RX REV CODE 636 W/ 250 OVERRIDE (IP): Performed by: INTERNAL MEDICINE

## 2023-08-28 PROCEDURE — 700105 HCHG RX REV CODE 258: Performed by: INTERNAL MEDICINE

## 2023-08-28 PROCEDURE — 80069 RENAL FUNCTION PANEL: CPT

## 2023-08-28 PROCEDURE — 99406 BEHAV CHNG SMOKING 3-10 MIN: CPT | Performed by: INTERNAL MEDICINE

## 2023-08-28 PROCEDURE — 700102 HCHG RX REV CODE 250 W/ 637 OVERRIDE(OP): Performed by: INTERNAL MEDICINE

## 2023-08-28 PROCEDURE — 99239 HOSP IP/OBS DSCHRG MGMT >30: CPT | Mod: 25 | Performed by: INTERNAL MEDICINE

## 2023-08-28 PROCEDURE — A9270 NON-COVERED ITEM OR SERVICE: HCPCS | Performed by: INTERNAL MEDICINE

## 2023-08-28 RX ORDER — ACETAMINOPHEN 325 MG/1
650 TABLET ORAL EVERY 6 HOURS PRN
Qty: 30 TABLET | Refills: 0 | Status: SHIPPED | OUTPATIENT
Start: 2023-08-28

## 2023-08-28 RX ORDER — LANOLIN ALCOHOL/MO/W.PET/CERES
100 CREAM (GRAM) TOPICAL DAILY
Qty: 30 TABLET | COMMUNITY
Start: 2023-08-29

## 2023-08-28 RX ORDER — FOLIC ACID 1 MG/1
1 TABLET ORAL DAILY
Qty: 30 TABLET | COMMUNITY
Start: 2023-08-29

## 2023-08-28 RX ORDER — OXYCODONE HYDROCHLORIDE 5 MG/1
5 TABLET ORAL EVERY 8 HOURS PRN
Qty: 9 TABLET | Refills: 0 | Status: SHIPPED | OUTPATIENT
Start: 2023-08-28 | End: 2023-08-31

## 2023-08-28 RX ORDER — DOXYCYCLINE 100 MG/1
100 CAPSULE ORAL 2 TIMES DAILY
Qty: 18 CAPSULE | Refills: 0 | Status: ACTIVE | OUTPATIENT
Start: 2023-08-28 | End: 2023-09-06

## 2023-08-28 RX ORDER — POLYETHYLENE GLYCOL 3350 17 G/17G
17 POWDER, FOR SOLUTION ORAL
Refills: 3 | COMMUNITY
Start: 2023-08-28

## 2023-08-28 RX ORDER — AMOXICILLIN AND CLAVULANATE POTASSIUM 875; 125 MG/1; MG/1
1 TABLET, FILM COATED ORAL 2 TIMES DAILY
Qty: 18 TABLET | Refills: 0 | Status: ACTIVE | OUTPATIENT
Start: 2023-08-28 | End: 2023-09-06

## 2023-08-28 RX ORDER — AMOXICILLIN 250 MG
2 CAPSULE ORAL 2 TIMES DAILY
Qty: 30 TABLET | Refills: 0 | COMMUNITY
Start: 2023-08-28

## 2023-08-28 RX ADMIN — METHOCARBAMOL 500 MG: 500 TABLET ORAL at 08:59

## 2023-08-28 RX ADMIN — GABAPENTIN 300 MG: 300 CAPSULE ORAL at 08:59

## 2023-08-28 RX ADMIN — FOLIC ACID 1 MG: 1 TABLET ORAL at 05:11

## 2023-08-28 RX ADMIN — AMPICILLIN AND SULBACTAM 3 G: 1; 2 INJECTION, POWDER, FOR SOLUTION INTRAMUSCULAR; INTRAVENOUS at 13:07

## 2023-08-28 RX ADMIN — COLCHICINE 0.6 MG: 0.6 TABLET ORAL at 05:11

## 2023-08-28 RX ADMIN — AMPICILLIN AND SULBACTAM 3 G: 1; 2 INJECTION, POWDER, FOR SOLUTION INTRAMUSCULAR; INTRAVENOUS at 05:10

## 2023-08-28 RX ADMIN — CLOPIDOGREL BISULFATE 75 MG: 75 TABLET ORAL at 05:11

## 2023-08-28 RX ADMIN — METHOCARBAMOL 500 MG: 500 TABLET ORAL at 13:11

## 2023-08-28 RX ADMIN — INSULIN HUMAN 2 UNITS: 100 INJECTION, SOLUTION PARENTERAL at 09:48

## 2023-08-28 RX ADMIN — THERA TABS 1 TABLET: TAB at 05:11

## 2023-08-28 RX ADMIN — OXYCODONE HYDROCHLORIDE 10 MG: 10 TABLET ORAL at 05:16

## 2023-08-28 RX ADMIN — OXYCODONE HYDROCHLORIDE 10 MG: 10 TABLET ORAL at 13:09

## 2023-08-28 RX ADMIN — VANCOMYCIN HYDROCHLORIDE 1000 MG: 5 INJECTION, POWDER, LYOPHILIZED, FOR SOLUTION INTRAVENOUS at 06:21

## 2023-08-28 RX ADMIN — ESCITALOPRAM OXALATE 10 MG: 10 TABLET ORAL at 05:11

## 2023-08-28 RX ADMIN — Medication 100 MG: at 05:10

## 2023-08-28 RX ADMIN — OMEPRAZOLE 20 MG: 20 CAPSULE, DELAYED RELEASE ORAL at 05:11

## 2023-08-28 ASSESSMENT — PAIN DESCRIPTION - PAIN TYPE: TYPE: ACUTE PAIN;CHRONIC PAIN

## 2023-08-28 NOTE — DISCHARGE SUMMARY
Discharge Summary    CHIEF COMPLAINT ON ADMISSION  Chief Complaint   Patient presents with    Leg Pain    Leg Swelling    Back Pain       Reason for Admission  Leg Swelling     Admission Date  8/27/2023    CODE STATUS  Full Code    HPI & HOSPITAL COURSE  This is a 48 y.o. male here with Leg Pain, Leg Swelling, and Back Pain  Please review Dr. Rufino Moura M.D. notes for further details of history of present illness, past medical/social/family histories, allergies and medications. Please review Wound Team consultation notes.  He is homeless but lives now with friends/family. He has a history of leaving against medical advice. He has a history of diabetes on insulin, CAD status post stent, hypertension, peripheral vascular disease. Also has a history of MRSA bacteremia and MRSA/enterococcal wound infection. Was discharged on July 2023 for R foot osteomyelitis, completed a course of daptomycin.   He presents with R foot swelling. There is discharge to that wound.   AT the ED, he is afebrile and hemodynamically stable.  No DVT on Duplex  Mild leukocytosis, mild lactic acidosis.  Antibiotics started.   When I saw him at the medical floor, no acute distress. Foot stump is swollen with a vesicular or bollous lesion close to incision site.  I prdered foot XR. No evidence of osteomyelitis, no gas.  Wound team saw patient. No indication for InD. No indication for circulation studies so LATASHA can be done outpatient and electively. Recommended wound care, dressings and follow up. Per Social Work patient has Medicaid, thus not a candidate for [facility]. Patient is thus discharged.     His blood sugars are stable. I spent 4 minutes, discussing tobacco dependence and cardiac as well as pulmonary risk. Smoking cessation instructions. Code 00834. I explained that nicotine in the blood also retards healing and worsens circulation. He is also advised no more alcohol and no more illicit substances.    At discharge date, Rogelio Arias  Kota afebrile and hemodynamically stable.  Rogelio Escudero wanted to be discharged today.    Discharge Physical Exam  Vitals and nursing note reviewed.   HENT:      Head: Normocephalic and atraumatic.      Right Ear: External ear normal.      Left Ear: External ear normal.      Nose: Nose normal.      Mouth/Throat:      Mouth: Mucous membranes are moist.   Eyes:      General: No scleral icterus.     Conjunctiva/sclera: Conjunctivae normal.   Cardiovascular:      Rate and Rhythm: Normal rate and regular rhythm.      Heart sounds: No murmur heard.     No friction rub. No gallop.   Pulmonary:      Effort: Pulmonary effort is normal.      Breath sounds: Normal breath sounds.   Abdominal:      General: Abdomen is flat. Bowel sounds are normal. There is no distension.      Palpations: Abdomen is soft.      Tenderness: There is no abdominal tenderness. There is no guarding.   Musculoskeletal:         General: Tenderness (R foot stump) present. Normal range of motion.      Cervical back: Normal range of motion and neck supple.      Right lower leg: Edema present.   Skin:     General: Skin is warm.      Comments: R foot stump bullous or vesiclular lesion  Incision area seems somewhat wet looking but no drainage  Incision noted R leg from previous PVD surgery   Neurological:      Mental Status: He is alert and oriented to person, place, and time. Mental status is at baseline.   Psychiatric:         Mood and Affect: Mood normal.         Behavior: Behavior normal.         Thought Content: Thought content normal.         Judgment: Judgment normal.        Imaging  DX-FOOT-COMPLETE 3+ RIGHT   Final Result      1.  No evidence of fracture or dislocation.      2.  Post amputation changes again noted.      3.  Soft tissue swelling is noted which could be due to cellulitis.      US-EXTREMITY VENOUS LOWER UNILAT RIGHT   Final Result      US-EXTREMITY ARTERY LOWER UNILAT W/LATASHA (COMBO) RIGHT    (Results Pending)              Therefore, he is discharged in fair and stable condition to home with close outpatient follow-up.    The patient recovered much more quickly than anticipated on admission.    Discharge Date  8/28/2023    FOLLOW UP ITEMS POST DISCHARGE  Blood culture    DISCHARGE DIAGNOSES  Principal Problem:    Diabetic infection of right foot, h/o MRSA/enterocccal infx and bacteremia, homelessness/polysubstance dependence/EtOH (HCC) (POA: Unknown)  Active Problems:    Tobacco abuse (POA: Yes)    Alcohol abuse (POA: Yes)    Alcoholism (HCC) (POA: Unknown)    Tobacco dependence (POA: Unknown)    Type 2 diabetes mellitus with hyperglycemia (HCC) (POA: Unknown)        FOLLOW UP  No future appointments.   [facility]  [address] [address]  [phone]  Go on 9/6/2023  Go to your appointment on Wednesday September 6th 2023 at 10:00am    [facility]  [address] [facility] [address]  [phone]    Call in 1 week(s)  Per [facility] offfice they will reach out to you to schedule once records ae recieved. If you do not recieve a call within 1 week please call to schedule your follow up appointment.  FOllow up with [facility] Pepper in 1 week  Follow up with the Wound CLinic in 1 week  Follow up with Samaria Steinberg in 1 week  NURSING provide resources/pamphlets for  Wound care instructions, antibiotic use, narcotic use (Avoid swimming, driving or operating machinery. Treat constipation with prescribed bowel regimen), smoking cessation (no more smoking), alcohol cessation (no more alcohol), illicit substances abuse (no more illicit substances), diabetes (check blood glucoses at home and have a log for primary provider to review. Follow diabetic diet. Ask for and review [facility] hand[facility] and pamphlets).    MEDICATIONS ON DISCHARGE     Medication List        START taking these medications        Instructions   acetaminophen 325 MG Tabs  Commonly known as:  Tylenol   Take 2 Tablets by mouth every 6 hours as needed for Fever, Moderate Pain or Mild Pain.  Dose: 650 mg     amoxicillin-clavulanate 875-125 MG Tabs  Commonly known as: Augmentin   Take 1 Tablet by mouth 2 times a day for 9 days.  Dose: 1 Tablet     doxycycline 100 MG capsule  Commonly known as: Monodox   Take 1 Capsule by mouth 2 times a day for 9 days.  Dose: 100 mg     folic acid 1 MG Tabs  Start taking on: August 29, 2023  Commonly known as: Folvite   Take 1 Tablet by mouth every day.  Dose: 1 mg     multivitamin Tabs  Start taking on: August 29, 2023   Take 1 Tablet by mouth every day.  Dose: 1 Tablet     oxyCODONE immediate-release 5 MG Tabs  Commonly known as: Roxicodone   Take 1 Tablet by mouth every 8 hours as needed for Severe Pain for up to 3 days.  Dose: 5 mg     polyethylene glycol/lytes 17 g Pack  Commonly known as: Miralax   Take 1 Packet by mouth 1 time a day as needed (if sennosides and docusate ineffective after 24 hours).  Dose: 17 g     senna-docusate 8.6-50 MG Tabs  Commonly known as: Pericolace Or Senokot S   Take 2 Tablets by mouth 2 times a day.  Dose: 2 Tablet     thiamine 100 MG tablet  Start taking on: August 29, 2023  Commonly known as: Thiamine   Take 1 Tablet by mouth every day.  Dose: 100 mg            CONTINUE taking these medications        Instructions   aspirin 81 MG EC tablet   Take 81 mg by mouth every day.  Dose: 81 mg     atorvastatin 20 MG Tabs  Commonly known as: Lipitor   Take 20 mg by mouth every evening.  Dose: 20 mg     clopidogrel 75 MG Tabs  Commonly known as: Plavix   Take 1 Tablet by mouth every day.  Dose: 75 mg     colchicine 0.6 MG Tabs  Commonly known as: Colcrys   Take 1 Tablet by mouth every day.  Dose: 0.6 mg     escitalopram 10 MG Tabs  Commonly known as: Lexapro   Take 1 Tablet by mouth every day.  Dose: 10 mg     gabapentin 300 MG Caps  Commonly known as: Neurontin   Take 1 Capsule by mouth in the morning, at noon, and at bedtime.  Dose: 300 mg      Insulin Pen Needle 32 G x 4 mm   Doctor's comments: Per patient/formulary preference. ICD-10 code: E11.65 Uncontrolled type 2 Diabetes Mellitus  Use one pen needle in pen device to inject insulin three times daily.     insulin regular 100 Unit/mL Soln  Commonly known as: Humulin R   Inject 3-14 Units under the skin 4 Times a Day,Before Meals and at Bedtime.  Dose: 3-14 Units     Jardiance 25 MG Tabs  Generic drug: Empagliflozin   Take 25 mg by mouth every day.  Dose: 25 mg     Lantus 100 UNIT/ML Soln  Generic drug: insulin glargine   Inject 25 Units under the skin every evening.  Dose: 25 Units     lisinopril 10 MG Tabs  Commonly known as: Prinivil   Take 10 mg by mouth every day.  Dose: 10 mg     metformin 1000 MG tablet  Commonly known as: Glucophage   Take 1,000 mg by mouth 2 times a day with meals.  Dose: 1,000 mg     methocarbamol 500 MG Tabs  Commonly known as: Robaxin   Take 1 Tablet by mouth 4 times a day.  Dose: 500 mg     omeprazole 20 MG delayed-release capsule  Commonly known as: PriLOSEC   Take 1 Capsule by mouth every morning.  Dose: 20 mg              Allergies  Allergies   Allergen Reactions    Apple Hives    Lactose Diarrhea     Diarrhea         DIET  Orders Placed This Encounter   Procedures    Diet Order Diet: Cardiac; Second Modifier: (optional): Consistent CHO (Diabetic)     Standing Status:   Standing     Number of Occurrences:   1     Order Specific Question:   Diet:     Answer:   Cardiac [6]     Order Specific Question:   Second Modifier: (optional)     Answer:   Consistent CHO (Diabetic) [4]       ACTIVITY  Avoid heavy lifting or strenuous activity      CONSULTATIONS  LPS    PROCEDURES      LABORATORY  Lab Results   Component Value Date    SODIUM 136 08/28/2023    POTASSIUM 4.4 08/28/2023    CHLORIDE 104 08/28/2023    CO2 23 08/28/2023    GLUCOSE 158 (H) 08/28/2023    BUN 14 08/28/2023    CREATININE 0.85 08/28/2023    CREATININE 1.2 01/13/2008        Lab Results   Component Value Date     WBC 7.6 08/28/2023    HEMOGLOBIN 11.6 (L) 08/28/2023    HEMATOCRIT 35.8 (L) 08/28/2023    PLATELETCT 374 08/28/2023        Total time of the discharge process exceeds 40 minutes.

## 2023-08-28 NOTE — FACE TO FACE
Face to Face Supporting Documentation - Home Health    The encounter with this patient was in whole or in part the primary reason for home health admission.    Date of encounter:   Patient:                    MRN:                       YOB: 2025  Rogelio Escudero  [id number]  1975     Home health to see patient for:  Skilled Nursing care for assessment, interventions & education, Physical Therapy evaluation and treatment, and Occupational therapy evaluation and treatment    Skilled need for:  Medication Management and wound care    Skilled nursing interventions to include:  Wound Care    Homebound status evidenced by:  Needs the assistance of another person in order to leave the home. Leaving home requires a considerable and taxing effort. There is a normal inability to leave the home.    Community Physician to provide follow up care: [facility]     Optional Interventions? No      I certify the face to face encounter for this home health care referral meets the CMS requirements and the encounter/clinical assessment with the patient was, in whole, or in part, for the medical condition(s) listed above, which is the primary reason for home health care. Based on my clinical findings: the service(s) are medically necessary, support the need for home health care, and the homebound criteria are met.  I certify that this patient has had a face to face encounter by myself.  Rufino Moura M.D. - NPI: [id number]

## 2023-08-28 NOTE — CARE PLAN
The patient is Stable - Low risk of patient condition declining or worsening    Shift Goals  Clinical Goals: Pain control  Patient Goals: pain contol, sleep  Family Goals: jacklyn    Progress made toward(s) clinical / shift goals:  Pain controlled with PO medications    Patient is not progressing towards the following goals:      Problem: Pain - Standard  Goal: Alleviation of pain or a reduction in pain to the patient’s comfort goal  Description: Target End Date:  Prior to discharge or change in level of care    Document on Vitals flowsheet    1.  Document pain using the appropriate pain scale per order or unit policy  2.  Educate and implement non-pharmacologic comfort measures (i.e. relaxation, distraction, massage, cold/heat therapy, etc.)  3.  Pain management medications as ordered  4.  Reassess pain after pain med administration per policy  5.  If opiods administered assess patient's response to pain medication is appropriate per POSS sedation scale  6.  Follow pain management plan developed in collaboration with patient and interdisciplinary team (including palliative care or pain specialists if applicable)  Outcome: Progressing

## 2023-08-28 NOTE — CARE PLAN
The patient is Stable - Low risk of patient condition declining or worsening    Shift Goals  Clinical Goals: pain control , monitor blood glucose and keep it   Patient Goals: pain contol, sleep  Family Goals: jacklyn    Progress made toward(s) clinical / shift goals:    Pain well controlled.    Patient is not progressing towards the following goals:      Problem: Skin Integrity  Goal: Skin integrity is maintained or improved  Description: Target End Date:  Prior to discharge or change in level of care    Document interventions on Skin Risk/Rogelio flowsheet groups and corresponding LDA    1.  Assess and monitor skin integrity, appearance and/or temperature  2.  Assess risk factors for impaired skin integrity and/or pressures ulcers  3.  Implement precautions to protect skin integrity in collaboration with interdisciplinary team  4.  Implement pressure ulcer prevention protocol if at risk for skin breakdown  5.  Confirm wound care consult if at risk for skin breakdown  6.  Ensure patient use of pressure relieving devices  (Low air loss bed, waffle overlay, heel protectors, ROHO cushion, etc)  Outcome: Not Met

## 2023-08-28 NOTE — DISCHARGE PLANNING
Meds-to-Beds: Discharge prescription orders listed below delivered to patient's bedside. RN Janet notified. Patient counseled.      Current Outpatient Medications   Medication Sig Dispense Refill    acetaminophen (TYLENOL) 325 MG Tab Take 2 Tablets by mouth every 6 hours as needed for Fever, Moderate Pain or Mild Pain. 30 Tablet 0    amoxicillin-clavulanate (AUGMENTIN) 875-125 MG Tab Take 1 Tablet by mouth 2 times a day for 9 days. 18 Tablet 0    doxycycline (MONODOX) 100 MG capsule Take 1 Capsule by mouth 2 times a day for 9 days. 18 Capsule 0    oxyCODONE immediate-release (ROXICODONE) 5 MG Tab Take 1 Tablet by mouth every 8 hours as needed for Severe Pain for up to 3 days. 9 Tablet 0      Chey Kumar, PharmD

## 2023-08-28 NOTE — PROGRESS NOTES
Reviewed discharge paperwork with patient.  Verbalized understanding and all questions answered.  Patient provided extra dressing supplies.  States he has all diabetic supplies at home.  Meds to beds at bedside.  Patient verbalized understand that he will need to start PO antibiotics tonight.

## 2023-08-28 NOTE — DISCHARGE INSTRUCTIONS
Diet    Diabetic Diet     A Diabetic Diet can help you control your blood glucose, lower your risk of heart disease, improve your blood pressure and maintain a healthy weight.  Eating healthy foods, low in calories, fat and carbohydrates at the same time every day can help control your blood glucose. Carbohydrates can greatly affect your blood glucose levels.  Counting carbs is important to keep your blood glucose at a healthy level, especially if you use insulin or take certain oral diabetes medicines.  Avoid alcohol as it can cause a sudden decrease in blood glucose (hypoglycemia), especially if you use insulin or take certain oral diabetes medicines.    Follow up with ROBBI Pepper in 1 week [facility] with [facility] Follow up with Samaria Steinberg in 1 week NURSING provide resources/pamphlets for Wound care instructions, antibiotic use, narcotic use (Avoid swimming, driving or operating machinery. Treat constipation with prescribed bowel regimen), smoking cessation (no more smoking), alcohol cessation (no more alcohol), illicit substances abuse (no more illicit substances), diabetes (check blood glucoses at home and have a log for primary provider to review. Follow diabetic diet. RLE heel weight bearing, wear offloading shoe/boot.     Wound Care:  Right TMA plantar blister: Apply heel offloading foam dressing (mepilex).  RN to change every 48 hours and as needed drainage  Apply Pink top (sween 24) to bilateral extremities BID. Avoid wound dressing.    Doxycycline Capsules or Tablets  What is this medication?  DOXYCYCLINE (dox i EMELIA nuñez) treats infections caused by bacteria. It belongs to a group of medications called tetracycline antibiotics. It will not treat colds, the flu, or infections caused by viruses.  This medicine may be used for other purposes; ask your health care provider or pharmacist if you have questions.  COMMON BRAND NAME(S): Acticlate, Adoxa, Adoxa CK, Adoxa  Dylan, Adoxa TT, Alodox, Avidoxy, Doxal, LYMEPAK, Mondoxyne NL, Monodox, Morgidox 1x, Morgidox 1x Kit, Morgidox 2x, Morgidox 2x Kit, NutriDox, Ocudox, Okebo, Periostat, TARGADOX, Vibra-Tabs, Vibramycin  What should I tell my care team before I take this medication?  They need to know if you have any of these conditions:  Kidney disease  Liver disease  Long exposure to sunlight like working outdoors  Recent stomach surgery  Stomach or intestine problems such as colitis  Vision Problems  Yeast or fungal infection of the mouth or vagina  An unusual or allergic reaction to doxycycline, tetracycline antibiotics, other medications, foods, dyes, or preservatives  Pregnant or trying to get pregnant  Breast-feeding  How should I use this medication?  Take this medication by mouth with water. Take it as directed on the prescription label at the same time every day. It is best to take this medication without food, but if it upsets your stomach take it with food. Take all of this medication unless your care team tells you to stop it early. Keep taking it even if you think you are better.  Take antacids and products with aluminum, calcium, magnesium, iron, and zinc in them at a different time of day than this medication. Talk to your care team if you have questions.  Talk to your care team regarding the use of this medication in children. While this medication may be prescribed for selected conditions, precautions do apply.  Overdosage: If you think you have taken too much of this medicine contact a poison control center or emergency room at once.  NOTE: This medicine is only for you. Do not share this medicine with others.  What if I miss a dose?  If you miss a dose, take it as soon as you can. If it is almost time for your next dose, take only that dose. Do not take double or extra doses.  What may interact with this medication?  Antacids, vitamins, or other products that contain aluminum, calcium, iron, magnesium, or  zinc  Barbiturates  Birth control pills  Bismuth subsalicylate  Carbamazepine  Methoxyflurane  Oral retinoids such as acitretin, isotretinoin  Other antibiotics  Phenytoin  Warfarin  This list may not describe all possible interactions. Give your health care provider a list of all the medicines, herbs, non-prescription drugs, or dietary supplements you use. Also tell them if you smoke, drink alcohol, or use illegal drugs. Some items may interact with your medicine.  What should I watch for while using this medication?  Tell your care team if your symptoms do not improve.  Do not treat diarrhea with over the counter products. Contact your care team if you have diarrhea that lasts more than 2 days or if it is severe and watery.  Do not take this medication just before going to bed. It may not dissolve properly when you lay down and can cause pain in your throat. Drink plenty of fluids while taking this medication to also help reduce irritation in your throat.  This medication can make you more sensitive to the sun. Keep out of the sun. If you cannot avoid being in the sun, wear protective clothing and use sunscreen. Do not use sun lamps or tanning beds/booths.  Birth control pills may not work properly while you are taking this medication. Talk to your care team about using an extra method of birth control.  If you are being treated for a sexually transmitted infection, avoid sexual contact until you have finished your treatment. Your sexual partner may also need treatment.  If you are using this medication to prevent malaria, you should still protect yourself from contact with mosquitos. Stay in screened-in areas, use mosquito nets, keep your body covered, and use an insect repellent.  What side effects may I notice from receiving this medication?  Side effects that you should report to your care team as soon as possible:  Allergic reactions--skin rash, itching, hives, swelling of the face, lips, tongue, or  throat  Increased pressure around the brain--severe headache, change in vision, blurry vision, nausea, vomiting  Joint pain  Pain or trouble swallowing  Redness, blistering, peeling, or loosening of the skin, including inside the mouth  Severe diarrhea, fever  Unusual vaginal discharge, itching, or odor  Side effects that usually do not require medical attention (report these to your care team if they continue or are bothersome):  Change in tooth color  Diarrhea  Headache  Heartburn  Nausea  This list may not describe all possible side effects. Call your doctor for medical advice about side effects. You may report side effects to FDA at 0-972-SZE-8596.  Where should I keep my medication?  Keep out of the reach of children and pets.  Store at room temperature, below 30 degrees C (86 degrees F). Protect from light. Keep container tightly closed. Throw away any unused medication after the expiration date. Taking this medication after the expiration date can make you seriously ill.  NOTE: This sheet is a summary. It may not cover all possible information. If you have questions about this medicine, talk to your doctor, pharmacist, or health care provider.  © 2023 Elsevier/Gold Standard (2022-01-14 00:00:00)    Amoxicillin; Clavulanic Acid Tablets  What is this medication?  AMOXICILLIN; CLAVULANIC ACID (a mox i RYAN in; GUANAKO lema AS id) treats infections caused by bacteria. It belongs to a group of medications called penicillin antibiotics. It will not treat colds, the flu, or infections caused by viruses.  This medicine may be used for other purposes; ask your health care provider or pharmacist if you have questions.  COMMON BRAND NAME(S): Augmentin  What should I tell my care team before I take this medication?  They need to know if you have any of these conditions:  Kidney disease  Liver disease  Mononucleosis  Stomach or intestine problems such as colitis  An unusual or allergic reaction to amoxicillin, other  penicillin or cephalosporin antibiotics, clavulanic acid, other medications, foods, dyes, or preservatives  Pregnant or trying to get pregnant  Breast-feeding  How should I use this medication?  Take this medication by mouth. Take it as directed on the prescription label at the same time every day. Take it with food at the start of a meal or snack. Take all of this medication unless your care team tells you to stop it early. Keep taking it even if you think you are better.  Talk to your care team about the use of this medication in children. While it may be prescribed for selected conditions, precautions do apply.  Overdosage: If you think you have taken too much of this medicine contact a poison control center or emergency room at once.  NOTE: This medicine is only for you. Do not share this medicine with others.  What if I miss a dose?  If you miss a dose, take it as soon as you can. If it is almost time for your next dose, take only that dose. Do not take double or extra doses.  What may interact with this medication?  Allopurinol  Anticoagulants  Birth control pills  Methotrexate  Probenecid  This list may not describe all possible interactions. Give your health care provider a list of all the medicines, herbs, non-prescription drugs, or dietary supplements you use. Also tell them if you smoke, drink alcohol, or use illegal drugs. Some items may interact with your medicine.  What should I watch for while using this medication?  Tell your care team if your symptoms do not start to get better or if they get worse.  This medication may cause serious skin reactions. They can happen weeks to months after starting the medication. Contact your care team right away if you notice fevers or flu-like symptoms with a rash. The rash may be red or purple and then turn into blisters or peeling of the skin. Or, you might notice a red rash with swelling of the face, lips or lymph nodes in your neck or under your arms.  Do not  treat diarrhea with over the counter products. Contact your care team if you have diarrhea that lasts more than 2 days or if it is severe and watery.  If you have diabetes, you may get a false-positive result for sugar in your urine. Check with your care team.  Birth control may not work properly while you are taking this medication. Talk to your care team about using an extra method of birth control.  What side effects may I notice from receiving this medication?  Side effects that you should report to your care team as soon as possible:  Allergic reactions--skin rash, itching, hives, swelling of the face, lips, tongue, or throat  Liver injury--right upper belly pain, loss of appetite, nausea, light-colored stool, dark yellow or brown urine, yellowing skin or eyes, unusual weakness or fatigue  Redness, blistering, peeling, or loosening of the skin, including inside the mouth  Severe diarrhea, fever  Unusual vaginal discharge, itching, or odor  Side effects that usually do not require medical attention (report to your care team if they continue or are bothersome):  Diarrhea  Nausea  Vomiting  This list may not describe all possible side effects. Call your doctor for medical advice about side effects. You may report side effects to FDA at 4-159-FDA-2841.  Where should I keep my medication?  Keep out of the reach of children and pets.  Store at room temperature between 20 and 25 degrees C (68 and 77 degrees F). Throw away any unused medication after the expiration date.  NOTE: This sheet is a summary. It may not cover all possible information. If you have questions about this medicine, talk to your doctor, pharmacist, or health care provider.  © 2023 Elsevier/Gold Standard (2021-12-12 00:00:00)

## 2023-09-11 ENCOUNTER — HOSPITAL ENCOUNTER (EMERGENCY)
Facility: MEDICAL CENTER | Age: 48
End: 2023-09-12
Attending: EMERGENCY MEDICINE
Payer: MEDICAID

## 2023-09-11 ENCOUNTER — APPOINTMENT (OUTPATIENT)
Dept: RADIOLOGY | Facility: MEDICAL CENTER | Age: 48
End: 2023-09-11
Attending: EMERGENCY MEDICINE
Payer: MEDICAID

## 2023-09-11 VITALS
OXYGEN SATURATION: 94 % | HEART RATE: 91 BPM | HEIGHT: 69 IN | TEMPERATURE: 97.7 F | RESPIRATION RATE: 16 BRPM | SYSTOLIC BLOOD PRESSURE: 122 MMHG | BODY MASS INDEX: 26.66 KG/M2 | DIASTOLIC BLOOD PRESSURE: 75 MMHG | WEIGHT: 180 LBS

## 2023-09-11 DIAGNOSIS — F41.9 ANXIETY: ICD-10-CM

## 2023-09-11 DIAGNOSIS — G89.29 CHRONIC LOW BACK PAIN, UNSPECIFIED BACK PAIN LATERALITY, UNSPECIFIED WHETHER SCIATICA PRESENT: ICD-10-CM

## 2023-09-11 DIAGNOSIS — M79.2 NERVE PAIN: ICD-10-CM

## 2023-09-11 DIAGNOSIS — G89.18 POST-OP PAIN: ICD-10-CM

## 2023-09-11 DIAGNOSIS — M54.50 CHRONIC LOW BACK PAIN, UNSPECIFIED BACK PAIN LATERALITY, UNSPECIFIED WHETHER SCIATICA PRESENT: ICD-10-CM

## 2023-09-11 LAB
ANION GAP SERPL CALC-SCNC: 12 MMOL/L (ref 7–16)
BASE EXCESS BLDV CALC-SCNC: -1 MMOL/L
BASOPHILS # BLD AUTO: 0.7 % (ref 0–1.8)
BASOPHILS # BLD: 0.07 K/UL (ref 0–0.12)
BODY TEMPERATURE: 36.6 CENTIGRADE
BUN SERPL-MCNC: 19 MG/DL (ref 8–22)
CALCIUM SERPL-MCNC: 9.4 MG/DL (ref 8.5–10.5)
CHLORIDE SERPL-SCNC: 106 MMOL/L (ref 96–112)
CO2 SERPL-SCNC: 22 MMOL/L (ref 20–33)
CREAT SERPL-MCNC: 0.8 MG/DL (ref 0.5–1.4)
CRP SERPL HS-MCNC: 2.5 MG/DL (ref 0–0.75)
EOSINOPHIL # BLD AUTO: 0.76 K/UL (ref 0–0.51)
EOSINOPHIL NFR BLD: 7.7 % (ref 0–6.9)
ERYTHROCYTE [DISTWIDTH] IN BLOOD BY AUTOMATED COUNT: 47.1 FL (ref 35.9–50)
ETHANOL BLD-MCNC: <10.1 MG/DL
GFR SERPLBLD CREATININE-BSD FMLA CKD-EPI: 107 ML/MIN/1.73 M 2
GLUCOSE SERPL-MCNC: 176 MG/DL (ref 65–99)
HCO3 BLDV-SCNC: 24 MMOL/L (ref 24–28)
HCT VFR BLD AUTO: 33.4 % (ref 42–52)
HGB BLD-MCNC: 10.5 G/DL (ref 14–18)
IMM GRANULOCYTES # BLD AUTO: 0.05 K/UL (ref 0–0.11)
IMM GRANULOCYTES NFR BLD AUTO: 0.5 % (ref 0–0.9)
INHALED O2 FLOW RATE: NO L/MIN
LYMPHOCYTES # BLD AUTO: 2.15 K/UL (ref 1–4.8)
LYMPHOCYTES NFR BLD: 21.9 % (ref 22–41)
MCH RBC QN AUTO: 28 PG (ref 27–33)
MCHC RBC AUTO-ENTMCNC: 31.4 G/DL (ref 32.3–36.5)
MCV RBC AUTO: 89.1 FL (ref 81.4–97.8)
MONOCYTES # BLD AUTO: 0.92 K/UL (ref 0–0.85)
MONOCYTES NFR BLD AUTO: 9.4 % (ref 0–13.4)
NEUTROPHILS # BLD AUTO: 5.87 K/UL (ref 1.82–7.42)
NEUTROPHILS NFR BLD: 59.8 % (ref 44–72)
NRBC # BLD AUTO: 0 K/UL
NRBC BLD-RTO: 0 /100 WBC (ref 0–0.2)
PCO2 BLDV: 40.8 MMHG (ref 41–51)
PH BLDV: 7.39 [PH] (ref 7.31–7.45)
PLATELET # BLD AUTO: 647 K/UL (ref 164–446)
PMV BLD AUTO: 8 FL (ref 9–12.9)
PO2 BLDV: 38 MMHG (ref 25–40)
POTASSIUM SERPL-SCNC: 4.2 MMOL/L (ref 3.6–5.5)
RBC # BLD AUTO: 3.75 M/UL (ref 4.7–6.1)
SAO2 % BLDV: 65.6 %
SODIUM SERPL-SCNC: 140 MMOL/L (ref 135–145)
WBC # BLD AUTO: 9.8 K/UL (ref 4.8–10.8)

## 2023-09-11 PROCEDURE — 82077 ASSAY SPEC XCP UR&BREATH IA: CPT

## 2023-09-11 PROCEDURE — 73590 X-RAY EXAM OF LOWER LEG: CPT | Mod: RT

## 2023-09-11 PROCEDURE — 86140 C-REACTIVE PROTEIN: CPT

## 2023-09-11 PROCEDURE — 82803 BLOOD GASES ANY COMBINATION: CPT

## 2023-09-11 PROCEDURE — 85025 COMPLETE CBC W/AUTO DIFF WBC: CPT

## 2023-09-11 PROCEDURE — 36415 COLL VENOUS BLD VENIPUNCTURE: CPT

## 2023-09-11 PROCEDURE — 99284 EMERGENCY DEPT VISIT MOD MDM: CPT

## 2023-09-11 PROCEDURE — 80048 BASIC METABOLIC PNL TOTAL CA: CPT

## 2023-09-11 RX ORDER — GABAPENTIN 300 MG/1
300 CAPSULE ORAL 3 TIMES DAILY
Qty: 90 CAPSULE | Refills: 0 | Status: SHIPPED | OUTPATIENT
Start: 2023-09-11

## 2023-09-11 ASSESSMENT — FIBROSIS 4 INDEX: FIB4 SCORE: 0.48
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
## 2023-09-12 NOTE — ED TRIAGE NOTES
Rogelio Arias Kota  48 y.o. male  Chief Complaint   Patient presents with    Post-Op Complications     2 weeks post op R foot amputation. BIB EMS for 10/10 pain. Wound edges well approximated, surgical staples in place. No obvious sign of infection. Patient is poor historian, unable to stay awake during triage. EMS medicated with 1000 mg Tylenol PTA     Pt BIB EMS for above complaint. Patient in wheelchair.    Pt is GCS 15, speaking in full sentences, follows commands and responds appropriately to questions. Resp are even and unlabored.     Pt placed in lobby. Pt educated on triage process. Pt encouraged to alert staff for any changes.       Vitals:    09/11/23 1717   BP: 106/88   Pulse: (!) 102   Resp: 18   Temp: 36.6 °C (97.9 °F)   SpO2: 98%

## 2023-09-12 NOTE — ED NOTES
Assumed care of pt, received bedside report from Esmer RN. Safety rounds completed, pt somnolent and resting comfortably in the room on room air.

## 2023-09-12 NOTE — ED NOTES
Pt discharged to the [facility] with all belongings, assisted pt into the cab. Vitals and condition stable for discharge. Called pt's cousin to inform her that he is on his way home so that she can let him into the house.

## 2023-09-12 NOTE — ED NOTES
Pt assisted to room via w/c, agree with triage note. Required stimulation to wake up to get onto gurney.  Changed into gown, connected to monitor. Chart up for ERP.      Pulmonary Progress Note        NAME: Janet Ruth - ROOM: 1369/0811-E - MRN: [id number] - Age: 78year old - : 1940        Last 24hrs: No events overnight, noting some arm and neck pain, family reminded her of her cough which is productive o edema    Recent Labs     12/24/19  1538 12/25/19  0604   WBC 6.4 6.0   HGB 10.4* 9.4*   MCV 83.9 84.4   .0 160.0   INR 1.04  --        Recent Labs     12/24/19  1538 12/25/19  0604 12/25/19  1502    142  --    K 3.5 3.8 4.3    109  --

## 2023-09-12 NOTE — ED NOTES
Discharge instructions reviewed with pt and pt has no follow up questions. Vitals and condition stable for discharge. Pt requesting to have his right leg cleaned and re-bandaged.

## 2023-12-08 ENCOUNTER — TELEPHONE (OUTPATIENT)
Dept: HEALTH INFORMATION MANAGEMENT | Facility: OTHER | Age: 48
End: 2023-12-08
Payer: MEDICAID

## 2024-01-05 ENCOUNTER — HOSPITAL ENCOUNTER (EMERGENCY)
Facility: MEDICAL CENTER | Age: 49
End: 2024-01-05
Attending: EMERGENCY MEDICINE
Payer: MEDICAID

## 2024-01-05 VITALS
BODY MASS INDEX: 28.02 KG/M2 | TEMPERATURE: 97.5 F | SYSTOLIC BLOOD PRESSURE: 115 MMHG | HEART RATE: 86 BPM | DIASTOLIC BLOOD PRESSURE: 75 MMHG | HEIGHT: 69 IN | WEIGHT: 189.15 LBS | RESPIRATION RATE: 16 BRPM | OXYGEN SATURATION: 93 %

## 2024-01-05 DIAGNOSIS — M79.89 LEG SWELLING: ICD-10-CM

## 2024-01-05 LAB
ALBUMIN SERPL BCP-MCNC: 4.4 G/DL (ref 3.2–4.9)
ALBUMIN/GLOB SERPL: 1.2 G/DL
ALP SERPL-CCNC: 132 U/L (ref 30–99)
ALT SERPL-CCNC: 18 U/L (ref 2–50)
ANION GAP SERPL CALC-SCNC: 11 MMOL/L (ref 7–16)
AST SERPL-CCNC: 14 U/L (ref 12–45)
BASOPHILS # BLD AUTO: 0.5 % (ref 0–1.8)
BASOPHILS # BLD: 0.04 K/UL (ref 0–0.12)
BILIRUB SERPL-MCNC: 0.4 MG/DL (ref 0.1–1.5)
BUN SERPL-MCNC: 6 MG/DL (ref 8–22)
CALCIUM ALBUM COR SERPL-MCNC: 9.4 MG/DL (ref 8.5–10.5)
CALCIUM SERPL-MCNC: 9.7 MG/DL (ref 8.5–10.5)
CHLORIDE SERPL-SCNC: 98 MMOL/L (ref 96–112)
CO2 SERPL-SCNC: 26 MMOL/L (ref 20–33)
CREAT SERPL-MCNC: 0.67 MG/DL (ref 0.5–1.4)
CRP SERPL HS-MCNC: 5.53 MG/DL (ref 0–0.75)
EOSINOPHIL # BLD AUTO: 0.48 K/UL (ref 0–0.51)
EOSINOPHIL NFR BLD: 6.4 % (ref 0–6.9)
ERYTHROCYTE [DISTWIDTH] IN BLOOD BY AUTOMATED COUNT: 59.4 FL (ref 35.9–50)
ERYTHROCYTE [SEDIMENTATION RATE] IN BLOOD BY WESTERGREN METHOD: 2 MM/HOUR (ref 0–20)
ETHANOL BLD-MCNC: <10.1 MG/DL
GFR SERPLBLD CREATININE-BSD FMLA CKD-EPI: 113 ML/MIN/1.73 M 2
GLOBULIN SER CALC-MCNC: 3.6 G/DL (ref 1.9–3.5)
GLUCOSE SERPL-MCNC: 262 MG/DL (ref 65–99)
HCT VFR BLD AUTO: 48.1 % (ref 42–52)
HGB BLD-MCNC: 15.3 G/DL (ref 14–18)
IMM GRANULOCYTES # BLD AUTO: 0.03 K/UL (ref 0–0.11)
IMM GRANULOCYTES NFR BLD AUTO: 0.4 % (ref 0–0.9)
LYMPHOCYTES # BLD AUTO: 1.89 K/UL (ref 1–4.8)
LYMPHOCYTES NFR BLD: 25.2 % (ref 22–41)
MCH RBC QN AUTO: 26.3 PG (ref 27–33)
MCHC RBC AUTO-ENTMCNC: 31.8 G/DL (ref 32.3–36.5)
MCV RBC AUTO: 82.8 FL (ref 81.4–97.8)
MONOCYTES # BLD AUTO: 0.73 K/UL (ref 0–0.85)
MONOCYTES NFR BLD AUTO: 9.7 % (ref 0–13.4)
NEUTROPHILS # BLD AUTO: 4.34 K/UL (ref 1.82–7.42)
NEUTROPHILS NFR BLD: 57.8 % (ref 44–72)
NRBC # BLD AUTO: 0 K/UL
NRBC BLD-RTO: 0 /100 WBC (ref 0–0.2)
PLATELET # BLD AUTO: 345 K/UL (ref 164–446)
PMV BLD AUTO: 9.6 FL (ref 9–12.9)
POTASSIUM SERPL-SCNC: 4.3 MMOL/L (ref 3.6–5.5)
PROT SERPL-MCNC: 8 G/DL (ref 6–8.2)
RBC # BLD AUTO: 5.81 M/UL (ref 4.7–6.1)
SODIUM SERPL-SCNC: 135 MMOL/L (ref 135–145)
WBC # BLD AUTO: 7.5 K/UL (ref 4.8–10.8)

## 2024-01-05 PROCEDURE — 86140 C-REACTIVE PROTEIN: CPT

## 2024-01-05 PROCEDURE — 85652 RBC SED RATE AUTOMATED: CPT

## 2024-01-05 PROCEDURE — 80053 COMPREHEN METABOLIC PANEL: CPT

## 2024-01-05 PROCEDURE — 99284 EMERGENCY DEPT VISIT MOD MDM: CPT

## 2024-01-05 PROCEDURE — 85025 COMPLETE CBC W/AUTO DIFF WBC: CPT

## 2024-01-05 PROCEDURE — 82077 ASSAY SPEC XCP UR&BREATH IA: CPT

## 2024-01-05 PROCEDURE — 36415 COLL VENOUS BLD VENIPUNCTURE: CPT

## 2024-01-05 ASSESSMENT — PAIN DESCRIPTION - PAIN TYPE: TYPE: CHRONIC PAIN

## 2024-01-05 ASSESSMENT — FIBROSIS 4 INDEX: FIB4 SCORE: 0.44

## 2024-01-05 NOTE — ED TRIAGE NOTES
"Chief Complaint   Patient presents with    Leg Swelling     Pt presents with right leg swelling and pain. Pt recently had a R BKA due to uncontrolled DM. Pain it up to knee on right leg with 1+ edema present. Amputation sight appears to be healed.        Pt wheeled to triage. Pt A&Ox4, for above complaint. Pt falling asleep in triage.     Pt to lobby . Pt educated on alerting staff in changes to condition. Pt verbalized understanding.     BP (!) 146/98   Pulse 95   Temp 36.2 °C (97.1 °F) (Temporal)   Resp 16   Ht 1.753 m (5' 9\")   Wt 85.8 kg (189 lb 2.5 oz)   SpO2 97%   BMI 27.93 kg/m²     "

## 2024-01-05 NOTE — ED NOTES
Patient refusing to leave. He would like an MRI. Patient attempting to go back to sleep. Security called to help with discharge.

## 2024-01-05 NOTE — ED NOTES
Patient given food and juice. Patient assisted into his home wheelchair. Patient encouraged to follow up his orthopedic doctor and PCP. Patient to lobby with security.

## 2024-01-05 NOTE — DISCHARGE INSTRUCTIONS
Please call [facility] at the number listed above to schedule a follow-up appointment next week with Dr. Escudero.  Return to the emergency department if you develop any new or worsening symptoms including worsening pain, if you have any drainage or redness from the wound, if you have any swelling, or if you have any further concerns.  Call [facility] today to schedule that follow-up appointment with Dr. Escudero next week.
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
## 2024-01-15 NOTE — DISCHARGE PLANNING
Received Choice form @: 1029 07/21/23  Agency/Facility Name: [facility]   Referral sent per Choice form @: [id number]     4 = No assist / stand by assistance

## 2024-02-02 ENCOUNTER — APPOINTMENT (OUTPATIENT)
Dept: RADIOLOGY | Facility: MEDICAL CENTER | Age: 49
DRG: 917 | End: 2024-02-02
Attending: EMERGENCY MEDICINE
Payer: MEDICAID

## 2024-02-02 ENCOUNTER — HOSPITAL ENCOUNTER (INPATIENT)
Facility: MEDICAL CENTER | Age: 49
LOS: 3 days | DRG: 917 | End: 2024-02-05
Attending: EMERGENCY MEDICINE | Admitting: STUDENT IN AN ORGANIZED HEALTH CARE EDUCATION/TRAINING PROGRAM
Payer: MEDICAID

## 2024-02-02 DIAGNOSIS — F32.A DEPRESSION, UNSPECIFIED DEPRESSION TYPE: ICD-10-CM

## 2024-02-02 DIAGNOSIS — E11.65 UNCONTROLLED TYPE 2 DIABETES MELLITUS WITH HYPERGLYCEMIA, WITH LONG-TERM CURRENT USE OF INSULIN (HCC): ICD-10-CM

## 2024-02-02 DIAGNOSIS — I25.83 CORONARY ARTERY DISEASE DUE TO LIPID RICH PLAQUE: Chronic | ICD-10-CM

## 2024-02-02 DIAGNOSIS — Z79.4 UNCONTROLLED TYPE 2 DIABETES MELLITUS WITH HYPERGLYCEMIA, WITH LONG-TERM CURRENT USE OF INSULIN (HCC): ICD-10-CM

## 2024-02-02 DIAGNOSIS — Z71.6 TOBACCO ABUSE COUNSELING: ICD-10-CM

## 2024-02-02 DIAGNOSIS — E11.65 UNCONTROLLED TYPE 2 DIABETES MELLITUS WITH HYPERGLYCEMIA (HCC): ICD-10-CM

## 2024-02-02 DIAGNOSIS — Z95.5 HISTORY OF HEART ARTERY STENT: ICD-10-CM

## 2024-02-02 DIAGNOSIS — Z89.511 S/P BKA (BELOW KNEE AMPUTATION) UNILATERAL, RIGHT (HCC): ICD-10-CM

## 2024-02-02 DIAGNOSIS — R73.9 HYPERGLYCEMIA: ICD-10-CM

## 2024-02-02 DIAGNOSIS — J96.01 ACUTE RESPIRATORY FAILURE WITH HYPOXIA (HCC): ICD-10-CM

## 2024-02-02 DIAGNOSIS — Z95.5 HX OF HEART ARTERY STENT: ICD-10-CM

## 2024-02-02 DIAGNOSIS — I10 PRIMARY HYPERTENSION: ICD-10-CM

## 2024-02-02 DIAGNOSIS — T50.901A ACCIDENTAL DRUG OVERDOSE, INITIAL ENCOUNTER: ICD-10-CM

## 2024-02-02 DIAGNOSIS — I25.10 CORONARY ARTERY DISEASE DUE TO LIPID RICH PLAQUE: Chronic | ICD-10-CM

## 2024-02-02 DIAGNOSIS — M94.0 COSTOCHONDRITIS: ICD-10-CM

## 2024-02-02 PROBLEM — K21.9 GERD (GASTROESOPHAGEAL REFLUX DISEASE): Status: ACTIVE | Noted: 2024-02-02

## 2024-02-02 PROBLEM — T50.904A DRUG OVERDOSE OF UNDETERMINED INTENT, INITIAL ENCOUNTER: Status: ACTIVE | Noted: 2024-02-02

## 2024-02-02 PROBLEM — F10.921 ACUTE ALCOHOLIC INTOXICATION WITH DELIRIUM (HCC): Status: ACTIVE | Noted: 2024-02-02

## 2024-02-02 PROBLEM — E87.6 HYPOKALEMIA: Status: ACTIVE | Noted: 2024-02-02

## 2024-02-02 PROBLEM — E11.00 HYPEROSMOLAR HYPERGLYCEMIC STATE (HHS) (HCC): Status: ACTIVE | Noted: 2024-02-02

## 2024-02-02 PROBLEM — T68.XXXA HYPOTHERMIA: Status: ACTIVE | Noted: 2024-02-02

## 2024-02-02 PROBLEM — I73.9 PVD (PERIPHERAL VASCULAR DISEASE) (HCC): Status: ACTIVE | Noted: 2024-02-02

## 2024-02-02 PROBLEM — F39 MOOD DISORDER (HCC): Status: ACTIVE | Noted: 2024-02-02

## 2024-02-02 LAB
ALBUMIN SERPL BCP-MCNC: 4.3 G/DL (ref 3.2–4.9)
ALBUMIN/GLOB SERPL: 1.1 G/DL
ALP SERPL-CCNC: 162 U/L (ref 30–99)
ALT SERPL-CCNC: 26 U/L (ref 2–50)
AMPHET UR QL SCN: NEGATIVE
ANION GAP SERPL CALC-SCNC: 18 MMOL/L (ref 7–16)
APPEARANCE UR: CLEAR
AST SERPL-CCNC: 32 U/L (ref 12–45)
B-OH-BUTYR SERPL-MCNC: 0.27 MMOL/L (ref 0.02–0.27)
BACTERIA #/AREA URNS HPF: NEGATIVE /HPF
BARBITURATES UR QL SCN: NEGATIVE
BASOPHILS # BLD AUTO: 0.3 % (ref 0–1.8)
BASOPHILS # BLD: 0.04 K/UL (ref 0–0.12)
BENZODIAZ UR QL SCN: NEGATIVE
BILIRUB SERPL-MCNC: 0.2 MG/DL (ref 0.1–1.5)
BILIRUB UR QL STRIP.AUTO: NEGATIVE
BUN SERPL-MCNC: 10 MG/DL (ref 8–22)
BZE UR QL SCN: POSITIVE
CALCIUM ALBUM COR SERPL-MCNC: 9.1 MG/DL (ref 8.5–10.5)
CALCIUM SERPL-MCNC: 9.3 MG/DL (ref 8.5–10.5)
CANNABINOIDS UR QL SCN: NEGATIVE
CHLORIDE SERPL-SCNC: 96 MMOL/L (ref 96–112)
CO2 SERPL-SCNC: 20 MMOL/L (ref 20–33)
COLOR UR: YELLOW
CREAT SERPL-MCNC: 0.94 MG/DL (ref 0.5–1.4)
EKG IMPRESSION: NORMAL
EOSINOPHIL # BLD AUTO: 0.44 K/UL (ref 0–0.51)
EOSINOPHIL NFR BLD: 3.3 % (ref 0–6.9)
EPI CELLS #/AREA URNS HPF: NEGATIVE /HPF
ERYTHROCYTE [DISTWIDTH] IN BLOOD BY AUTOMATED COUNT: 56 FL (ref 35.9–50)
ETHANOL BLD-MCNC: 66.1 MG/DL
FENTANYL UR QL: POSITIVE
GFR SERPLBLD CREATININE-BSD FMLA CKD-EPI: 98 ML/MIN/1.73 M 2
GLOBULIN SER CALC-MCNC: 3.9 G/DL (ref 1.9–3.5)
GLUCOSE BLD STRIP.AUTO-MCNC: 280 MG/DL (ref 65–99)
GLUCOSE BLD STRIP.AUTO-MCNC: 293 MG/DL (ref 65–99)
GLUCOSE BLD STRIP.AUTO-MCNC: 332 MG/DL (ref 65–99)
GLUCOSE BLD STRIP.AUTO-MCNC: 336 MG/DL (ref 65–99)
GLUCOSE BLD STRIP.AUTO-MCNC: 403 MG/DL (ref 65–99)
GLUCOSE SERPL-MCNC: 527 MG/DL (ref 65–99)
GLUCOSE UR STRIP.AUTO-MCNC: >=1000 MG/DL
HCT VFR BLD AUTO: 45.2 % (ref 42–52)
HGB BLD-MCNC: 14.9 G/DL (ref 14–18)
HYALINE CASTS #/AREA URNS LPF: ABNORMAL /LPF
IMM GRANULOCYTES # BLD AUTO: 0.09 K/UL (ref 0–0.11)
IMM GRANULOCYTES NFR BLD AUTO: 0.7 % (ref 0–0.9)
INR PPP: 0.93 (ref 0.87–1.13)
KETONES UR STRIP.AUTO-MCNC: NEGATIVE MG/DL
LEUKOCYTE ESTERASE UR QL STRIP.AUTO: NEGATIVE
LIPASE SERPL-CCNC: 105 U/L (ref 11–82)
LYMPHOCYTES # BLD AUTO: 4.74 K/UL (ref 1–4.8)
LYMPHOCYTES NFR BLD: 35.5 % (ref 22–41)
MAGNESIUM SERPL-MCNC: 2.3 MG/DL (ref 1.5–2.5)
MCH RBC QN AUTO: 27.8 PG (ref 27–33)
MCHC RBC AUTO-ENTMCNC: 33 G/DL (ref 32.3–36.5)
MCV RBC AUTO: 84.3 FL (ref 81.4–97.8)
METHADONE UR QL SCN: NEGATIVE
MICRO URNS: ABNORMAL
MONOCYTES # BLD AUTO: 1.22 K/UL (ref 0–0.85)
MONOCYTES NFR BLD AUTO: 9.1 % (ref 0–13.4)
NEUTROPHILS # BLD AUTO: 6.84 K/UL (ref 1.82–7.42)
NEUTROPHILS NFR BLD: 51.1 % (ref 44–72)
NITRITE UR QL STRIP.AUTO: NEGATIVE
NRBC # BLD AUTO: 0 K/UL
NRBC BLD-RTO: 0 /100 WBC (ref 0–0.2)
OPIATES UR QL SCN: NEGATIVE
OXYCODONE UR QL SCN: NEGATIVE
PCP UR QL SCN: NEGATIVE
PH UR STRIP.AUTO: 5 [PH] (ref 5–8)
PHOSPHATE SERPL-MCNC: 4.9 MG/DL (ref 2.5–4.5)
PLATELET # BLD AUTO: 461 K/UL (ref 164–446)
PMV BLD AUTO: 9.5 FL (ref 9–12.9)
POTASSIUM SERPL-SCNC: 3.3 MMOL/L (ref 3.6–5.5)
PROCALCITONIN SERPL-MCNC: 0.06 NG/ML
PROPOXYPH UR QL SCN: NEGATIVE
PROT SERPL-MCNC: 8.2 G/DL (ref 6–8.2)
PROT UR QL STRIP: 30 MG/DL
PROTHROMBIN TIME: 12.5 SEC (ref 12–14.6)
RBC # BLD AUTO: 5.36 M/UL (ref 4.7–6.1)
RBC # URNS HPF: ABNORMAL /HPF
RBC UR QL AUTO: NEGATIVE
SODIUM SERPL-SCNC: 134 MMOL/L (ref 135–145)
SP GR UR STRIP.AUTO: 1.04
UROBILINOGEN UR STRIP.AUTO-MCNC: 0.2 MG/DL
WBC # BLD AUTO: 13.4 K/UL (ref 4.8–10.8)
WBC #/AREA URNS HPF: ABNORMAL /HPF

## 2024-02-02 PROCEDURE — 700102 HCHG RX REV CODE 250 W/ 637 OVERRIDE(OP): Performed by: STUDENT IN AN ORGANIZED HEALTH CARE EDUCATION/TRAINING PROGRAM

## 2024-02-02 PROCEDURE — 700105 HCHG RX REV CODE 258: Performed by: EMERGENCY MEDICINE

## 2024-02-02 PROCEDURE — 70450 CT HEAD/BRAIN W/O DYE: CPT

## 2024-02-02 PROCEDURE — 84145 PROCALCITONIN (PCT): CPT

## 2024-02-02 PROCEDURE — 94760 N-INVAS EAR/PLS OXIMETRY 1: CPT

## 2024-02-02 PROCEDURE — 81001 URINALYSIS AUTO W/SCOPE: CPT

## 2024-02-02 PROCEDURE — 93005 ELECTROCARDIOGRAM TRACING: CPT | Performed by: EMERGENCY MEDICINE

## 2024-02-02 PROCEDURE — 83036 HEMOGLOBIN GLYCOSYLATED A1C: CPT

## 2024-02-02 PROCEDURE — 83735 ASSAY OF MAGNESIUM: CPT

## 2024-02-02 PROCEDURE — 700111 HCHG RX REV CODE 636 W/ 250 OVERRIDE (IP): Performed by: STUDENT IN AN ORGANIZED HEALTH CARE EDUCATION/TRAINING PROGRAM

## 2024-02-02 PROCEDURE — 700105 HCHG RX REV CODE 258: Performed by: STUDENT IN AN ORGANIZED HEALTH CARE EDUCATION/TRAINING PROGRAM

## 2024-02-02 PROCEDURE — 99406 BEHAV CHNG SMOKING 3-10 MIN: CPT

## 2024-02-02 PROCEDURE — 82962 GLUCOSE BLOOD TEST: CPT | Mod: 91

## 2024-02-02 PROCEDURE — 82077 ASSAY SPEC XCP UR&BREATH IA: CPT

## 2024-02-02 PROCEDURE — 770020 HCHG ROOM/CARE - TELE (206)

## 2024-02-02 PROCEDURE — 99223 1ST HOSP IP/OBS HIGH 75: CPT | Mod: 25 | Performed by: STUDENT IN AN ORGANIZED HEALTH CARE EDUCATION/TRAINING PROGRAM

## 2024-02-02 PROCEDURE — 96374 THER/PROPH/DIAG INJ IV PUSH: CPT

## 2024-02-02 PROCEDURE — 99406 BEHAV CHNG SMOKING 3-10 MIN: CPT | Performed by: STUDENT IN AN ORGANIZED HEALTH CARE EDUCATION/TRAINING PROGRAM

## 2024-02-02 PROCEDURE — 700102 HCHG RX REV CODE 250 W/ 637 OVERRIDE(OP): Mod: UD | Performed by: EMERGENCY MEDICINE

## 2024-02-02 PROCEDURE — 36415 COLL VENOUS BLD VENIPUNCTURE: CPT

## 2024-02-02 PROCEDURE — 84100 ASSAY OF PHOSPHORUS: CPT

## 2024-02-02 PROCEDURE — 85610 PROTHROMBIN TIME: CPT

## 2024-02-02 PROCEDURE — 80053 COMPREHEN METABOLIC PANEL: CPT

## 2024-02-02 PROCEDURE — 71045 X-RAY EXAM CHEST 1 VIEW: CPT

## 2024-02-02 PROCEDURE — 85025 COMPLETE CBC W/AUTO DIFF WBC: CPT

## 2024-02-02 PROCEDURE — A9270 NON-COVERED ITEM OR SERVICE: HCPCS | Performed by: STUDENT IN AN ORGANIZED HEALTH CARE EDUCATION/TRAINING PROGRAM

## 2024-02-02 PROCEDURE — 700101 HCHG RX REV CODE 250: Performed by: STUDENT IN AN ORGANIZED HEALTH CARE EDUCATION/TRAINING PROGRAM

## 2024-02-02 PROCEDURE — 83690 ASSAY OF LIPASE: CPT

## 2024-02-02 PROCEDURE — 82010 KETONE BODYS QUAN: CPT

## 2024-02-02 PROCEDURE — 96375 TX/PRO/DX INJ NEW DRUG ADDON: CPT

## 2024-02-02 PROCEDURE — 700111 HCHG RX REV CODE 636 W/ 250 OVERRIDE (IP): Performed by: EMERGENCY MEDICINE

## 2024-02-02 PROCEDURE — 80307 DRUG TEST PRSMV CHEM ANLYZR: CPT

## 2024-02-02 PROCEDURE — 96372 THER/PROPH/DIAG INJ SC/IM: CPT

## 2024-02-02 PROCEDURE — C9113 INJ PANTOPRAZOLE SODIUM, VIA: HCPCS | Performed by: STUDENT IN AN ORGANIZED HEALTH CARE EDUCATION/TRAINING PROGRAM

## 2024-02-02 PROCEDURE — 99285 EMERGENCY DEPT VISIT HI MDM: CPT

## 2024-02-02 RX ORDER — CLOPIDOGREL BISULFATE 75 MG/1
75 TABLET ORAL DAILY
Status: ON HOLD | COMMUNITY
End: 2024-02-04

## 2024-02-02 RX ORDER — LORAZEPAM 2 MG/ML
0.5 INJECTION INTRAMUSCULAR EVERY 4 HOURS PRN
Status: DISCONTINUED | OUTPATIENT
Start: 2024-02-02 | End: 2024-02-05 | Stop reason: HOSPADM

## 2024-02-02 RX ORDER — GABAPENTIN 400 MG/1
400 CAPSULE ORAL 2 TIMES DAILY
Status: ON HOLD | COMMUNITY
End: 2024-02-05

## 2024-02-02 RX ORDER — DEXTROSE MONOHYDRATE 25 G/50ML
25 INJECTION, SOLUTION INTRAVENOUS
Status: DISCONTINUED | OUTPATIENT
Start: 2024-02-02 | End: 2024-02-05 | Stop reason: HOSPADM

## 2024-02-02 RX ORDER — LABETALOL HYDROCHLORIDE 5 MG/ML
10 INJECTION, SOLUTION INTRAVENOUS EVERY 4 HOURS PRN
Status: DISCONTINUED | OUTPATIENT
Start: 2024-02-02 | End: 2024-02-05 | Stop reason: HOSPADM

## 2024-02-02 RX ORDER — IPRATROPIUM BROMIDE AND ALBUTEROL SULFATE 2.5; .5 MG/3ML; MG/3ML
3 SOLUTION RESPIRATORY (INHALATION)
Status: DISCONTINUED | OUTPATIENT
Start: 2024-02-02 | End: 2024-02-05 | Stop reason: HOSPADM

## 2024-02-02 RX ORDER — SODIUM CHLORIDE, SODIUM LACTATE, POTASSIUM CHLORIDE, AND CALCIUM CHLORIDE .6; .31; .03; .02 G/100ML; G/100ML; G/100ML; G/100ML
500 INJECTION, SOLUTION INTRAVENOUS ONCE
Status: COMPLETED | OUTPATIENT
Start: 2024-02-02 | End: 2024-02-02

## 2024-02-02 RX ORDER — SODIUM CHLORIDE, SODIUM LACTATE, POTASSIUM CHLORIDE, CALCIUM CHLORIDE 600; 310; 30; 20 MG/100ML; MG/100ML; MG/100ML; MG/100ML
INJECTION, SOLUTION INTRAVENOUS CONTINUOUS
Status: DISCONTINUED | OUTPATIENT
Start: 2024-02-02 | End: 2024-02-02

## 2024-02-02 RX ORDER — HYDROXYZINE HYDROCHLORIDE 25 MG/1
25 TABLET, FILM COATED ORAL EVERY 8 HOURS PRN
Status: DISCONTINUED | OUTPATIENT
Start: 2024-02-02 | End: 2024-02-05 | Stop reason: HOSPADM

## 2024-02-02 RX ORDER — LORAZEPAM 0.5 MG/1
0.5 TABLET ORAL EVERY 4 HOURS PRN
Status: DISCONTINUED | OUTPATIENT
Start: 2024-02-02 | End: 2024-02-05 | Stop reason: HOSPADM

## 2024-02-02 RX ORDER — AMOXICILLIN 250 MG
2 CAPSULE ORAL EVERY EVENING
Status: DISCONTINUED | OUTPATIENT
Start: 2024-02-02 | End: 2024-02-05 | Stop reason: HOSPADM

## 2024-02-02 RX ORDER — HYDROXYZINE PAMOATE 25 MG/1
25 CAPSULE ORAL EVERY 8 HOURS PRN
Status: ON HOLD | COMMUNITY
End: 2024-02-05

## 2024-02-02 RX ORDER — PROCHLORPERAZINE EDISYLATE 5 MG/ML
5-10 INJECTION INTRAMUSCULAR; INTRAVENOUS EVERY 4 HOURS PRN
Status: DISCONTINUED | OUTPATIENT
Start: 2024-02-02 | End: 2024-02-05 | Stop reason: HOSPADM

## 2024-02-02 RX ORDER — LORAZEPAM 2 MG/1
4 TABLET ORAL
Status: DISCONTINUED | OUTPATIENT
Start: 2024-02-02 | End: 2024-02-05 | Stop reason: HOSPADM

## 2024-02-02 RX ORDER — INSULIN LISPRO 100 [IU]/ML
0.2 INJECTION, SOLUTION INTRAVENOUS; SUBCUTANEOUS
Status: DISCONTINUED | OUTPATIENT
Start: 2024-02-02 | End: 2024-02-05 | Stop reason: HOSPADM

## 2024-02-02 RX ORDER — SODIUM CHLORIDE AND POTASSIUM CHLORIDE 300; 900 MG/100ML; MG/100ML
INJECTION, SOLUTION INTRAVENOUS CONTINUOUS
Status: DISCONTINUED | OUTPATIENT
Start: 2024-02-02 | End: 2024-02-05 | Stop reason: HOSPADM

## 2024-02-02 RX ORDER — LORAZEPAM 2 MG/ML
1.5 INJECTION INTRAMUSCULAR
Status: DISCONTINUED | OUTPATIENT
Start: 2024-02-02 | End: 2024-02-05 | Stop reason: HOSPADM

## 2024-02-02 RX ORDER — ACETAMINOPHEN 325 MG/1
650 TABLET ORAL EVERY 6 HOURS PRN
Status: DISCONTINUED | OUTPATIENT
Start: 2024-02-02 | End: 2024-02-05 | Stop reason: HOSPADM

## 2024-02-02 RX ORDER — LORAZEPAM 2 MG/1
2 TABLET ORAL
Status: DISCONTINUED | OUTPATIENT
Start: 2024-02-02 | End: 2024-02-05 | Stop reason: HOSPADM

## 2024-02-02 RX ORDER — CLOPIDOGREL BISULFATE 75 MG/1
75 TABLET ORAL DAILY
Status: DISCONTINUED | OUTPATIENT
Start: 2024-02-03 | End: 2024-02-05 | Stop reason: HOSPADM

## 2024-02-02 RX ORDER — PANTOPRAZOLE SODIUM 40 MG/1
40 TABLET, DELAYED RELEASE ORAL DAILY
Status: ON HOLD | COMMUNITY
End: 2024-02-05

## 2024-02-02 RX ORDER — ESCITALOPRAM OXALATE 10 MG/1
10 TABLET ORAL DAILY
Status: DISCONTINUED | OUTPATIENT
Start: 2024-02-03 | End: 2024-02-05 | Stop reason: HOSPADM

## 2024-02-02 RX ORDER — GAUZE BANDAGE 2" X 2"
100 BANDAGE TOPICAL DAILY
Status: DISCONTINUED | OUTPATIENT
Start: 2024-02-03 | End: 2024-02-05 | Stop reason: HOSPADM

## 2024-02-02 RX ORDER — LISINOPRIL 10 MG/1
10 TABLET ORAL DAILY
Status: ON HOLD | COMMUNITY
End: 2024-02-05

## 2024-02-02 RX ORDER — FOLIC ACID 1 MG/1
1 TABLET ORAL DAILY
Status: DISCONTINUED | OUTPATIENT
Start: 2024-02-03 | End: 2024-02-05 | Stop reason: HOSPADM

## 2024-02-02 RX ORDER — INSULIN LISPRO 100 [IU]/ML
3-14 INJECTION, SOLUTION INTRAVENOUS; SUBCUTANEOUS
Status: DISCONTINUED | OUTPATIENT
Start: 2024-02-02 | End: 2024-02-05 | Stop reason: HOSPADM

## 2024-02-02 RX ORDER — SODIUM CHLORIDE, SODIUM LACTATE, POTASSIUM CHLORIDE, AND CALCIUM CHLORIDE .6; .31; .03; .02 G/100ML; G/100ML; G/100ML; G/100ML
500 INJECTION, SOLUTION INTRAVENOUS
Status: DISCONTINUED | OUTPATIENT
Start: 2024-02-02 | End: 2024-02-05 | Stop reason: HOSPADM

## 2024-02-02 RX ORDER — INSULIN GLARGINE 100 [IU]/ML
25 INJECTION, SOLUTION SUBCUTANEOUS DAILY
Status: ON HOLD | COMMUNITY
End: 2024-02-05

## 2024-02-02 RX ORDER — GABAPENTIN 400 MG/1
400 CAPSULE ORAL 2 TIMES DAILY
Status: DISCONTINUED | OUTPATIENT
Start: 2024-02-02 | End: 2024-02-05 | Stop reason: HOSPADM

## 2024-02-02 RX ORDER — LORAZEPAM 2 MG/ML
2 INJECTION INTRAMUSCULAR
Status: DISCONTINUED | OUTPATIENT
Start: 2024-02-02 | End: 2024-02-05 | Stop reason: HOSPADM

## 2024-02-02 RX ORDER — NICOTINE 21 MG/24HR
21 PATCH, TRANSDERMAL 24 HOURS TRANSDERMAL
Status: DISCONTINUED | OUTPATIENT
Start: 2024-02-02 | End: 2024-02-05 | Stop reason: HOSPADM

## 2024-02-02 RX ORDER — DEXTROSE MONOHYDRATE 25 G/50ML
25 INJECTION, SOLUTION INTRAVENOUS
Status: DISCONTINUED | OUTPATIENT
Start: 2024-02-02 | End: 2024-02-02

## 2024-02-02 RX ORDER — ENOXAPARIN SODIUM 100 MG/ML
40 INJECTION SUBCUTANEOUS DAILY
Status: DISCONTINUED | OUTPATIENT
Start: 2024-02-03 | End: 2024-02-05 | Stop reason: HOSPADM

## 2024-02-02 RX ORDER — POLYETHYLENE GLYCOL 3350 17 G/17G
1 POWDER, FOR SOLUTION ORAL
Status: DISCONTINUED | OUTPATIENT
Start: 2024-02-02 | End: 2024-02-05 | Stop reason: HOSPADM

## 2024-02-02 RX ORDER — ESCITALOPRAM OXALATE 10 MG/1
10 TABLET ORAL DAILY
Status: ON HOLD | COMMUNITY
End: 2024-02-05

## 2024-02-02 RX ORDER — IPRATROPIUM BROMIDE AND ALBUTEROL SULFATE 2.5; .5 MG/3ML; MG/3ML
3 SOLUTION RESPIRATORY (INHALATION)
Status: DISCONTINUED | OUTPATIENT
Start: 2024-02-02 | End: 2024-02-02

## 2024-02-02 RX ORDER — ONDANSETRON 2 MG/ML
4 INJECTION INTRAMUSCULAR; INTRAVENOUS EVERY 4 HOURS PRN
Status: DISCONTINUED | OUTPATIENT
Start: 2024-02-02 | End: 2024-02-05 | Stop reason: HOSPADM

## 2024-02-02 RX ORDER — LISINOPRIL 10 MG/1
10 TABLET ORAL DAILY
Status: DISCONTINUED | OUTPATIENT
Start: 2024-02-03 | End: 2024-02-05 | Stop reason: HOSPADM

## 2024-02-02 RX ORDER — LORAZEPAM 1 MG/1
1 TABLET ORAL EVERY 4 HOURS PRN
Status: DISCONTINUED | OUTPATIENT
Start: 2024-02-02 | End: 2024-02-05 | Stop reason: HOSPADM

## 2024-02-02 RX ORDER — ATORVASTATIN CALCIUM 40 MG/1
40 TABLET, FILM COATED ORAL DAILY
Status: ON HOLD | COMMUNITY
End: 2024-02-05

## 2024-02-02 RX ORDER — PROMETHAZINE HYDROCHLORIDE 25 MG/1
12.5-25 SUPPOSITORY RECTAL EVERY 4 HOURS PRN
Status: DISCONTINUED | OUTPATIENT
Start: 2024-02-02 | End: 2024-02-05 | Stop reason: HOSPADM

## 2024-02-02 RX ORDER — SODIUM CHLORIDE 9 MG/ML
1000 INJECTION, SOLUTION INTRAVENOUS ONCE
Status: COMPLETED | OUTPATIENT
Start: 2024-02-02 | End: 2024-02-02

## 2024-02-02 RX ORDER — ONDANSETRON 4 MG/1
4 TABLET, ORALLY DISINTEGRATING ORAL EVERY 4 HOURS PRN
Status: DISCONTINUED | OUTPATIENT
Start: 2024-02-02 | End: 2024-02-05 | Stop reason: HOSPADM

## 2024-02-02 RX ORDER — PANTOPRAZOLE SODIUM 40 MG/10ML
40 INJECTION, POWDER, LYOPHILIZED, FOR SOLUTION INTRAVENOUS 2 TIMES DAILY
Status: COMPLETED | OUTPATIENT
Start: 2024-02-02 | End: 2024-02-05

## 2024-02-02 RX ORDER — ONDANSETRON 2 MG/ML
4 INJECTION INTRAMUSCULAR; INTRAVENOUS ONCE
Status: COMPLETED | OUTPATIENT
Start: 2024-02-02 | End: 2024-02-02

## 2024-02-02 RX ORDER — GUAIFENESIN/DEXTROMETHORPHAN 100-10MG/5
10 SYRUP ORAL EVERY 6 HOURS PRN
Status: DISCONTINUED | OUTPATIENT
Start: 2024-02-02 | End: 2024-02-05 | Stop reason: HOSPADM

## 2024-02-02 RX ORDER — NALOXONE HYDROCHLORIDE 0.4 MG/ML
0.4 INJECTION, SOLUTION INTRAMUSCULAR; INTRAVENOUS; SUBCUTANEOUS ONCE
Status: COMPLETED | OUTPATIENT
Start: 2024-02-02 | End: 2024-02-02

## 2024-02-02 RX ORDER — INSULIN LISPRO 100 [IU]/ML
0.2 INJECTION, SOLUTION INTRAVENOUS; SUBCUTANEOUS
Status: DISCONTINUED | OUTPATIENT
Start: 2024-02-02 | End: 2024-02-02

## 2024-02-02 RX ORDER — LORAZEPAM 2 MG/ML
1 INJECTION INTRAMUSCULAR
Status: DISCONTINUED | OUTPATIENT
Start: 2024-02-02 | End: 2024-02-05 | Stop reason: HOSPADM

## 2024-02-02 RX ORDER — SODIUM CHLORIDE AND POTASSIUM CHLORIDE 300; 900 MG/100ML; MG/100ML
INJECTION, SOLUTION INTRAVENOUS CONTINUOUS
Status: DISCONTINUED | OUTPATIENT
Start: 2024-02-02 | End: 2024-02-02

## 2024-02-02 RX ORDER — EMPAGLIFLOZIN 25 MG/1
25 TABLET, FILM COATED ORAL DAILY
Status: ON HOLD | COMMUNITY
End: 2024-02-05

## 2024-02-02 RX ORDER — INSULIN LISPRO 100 [IU]/ML
3-14 INJECTION, SOLUTION INTRAVENOUS; SUBCUTANEOUS
Status: DISCONTINUED | OUTPATIENT
Start: 2024-02-02 | End: 2024-02-02

## 2024-02-02 RX ORDER — PROMETHAZINE HYDROCHLORIDE 25 MG/1
12.5-25 TABLET ORAL EVERY 4 HOURS PRN
Status: DISCONTINUED | OUTPATIENT
Start: 2024-02-02 | End: 2024-02-05 | Stop reason: HOSPADM

## 2024-02-02 RX ORDER — ATORVASTATIN CALCIUM 40 MG/1
40 TABLET, FILM COATED ORAL DAILY
Status: DISCONTINUED | OUTPATIENT
Start: 2024-02-03 | End: 2024-02-05 | Stop reason: HOSPADM

## 2024-02-02 RX ADMIN — INSULIN GLARGINE-YFGN 25 UNITS: 100 INJECTION, SOLUTION SUBCUTANEOUS at 18:19

## 2024-02-02 RX ADMIN — PANTOPRAZOLE SODIUM 40 MG: 40 INJECTION, POWDER, FOR SOLUTION INTRAVENOUS at 18:28

## 2024-02-02 RX ADMIN — INSULIN HUMAN 5 UNITS: 100 INJECTION, SOLUTION PARENTERAL at 17:00

## 2024-02-02 RX ADMIN — SODIUM CHLORIDE 1000 ML: 9 INJECTION, SOLUTION INTRAVENOUS at 11:34

## 2024-02-02 RX ADMIN — POTASSIUM CHLORIDE AND SODIUM CHLORIDE: 900; 300 INJECTION, SOLUTION INTRAVENOUS at 21:37

## 2024-02-02 RX ADMIN — NALOXONE HYDROCHLORIDE 0.4 MG: 0.4 INJECTION, SOLUTION INTRAMUSCULAR; INTRAVENOUS; SUBCUTANEOUS at 10:10

## 2024-02-02 RX ADMIN — GABAPENTIN 400 MG: 400 CAPSULE ORAL at 18:25

## 2024-02-02 RX ADMIN — SODIUM CHLORIDE, POTASSIUM CHLORIDE, SODIUM LACTATE AND CALCIUM CHLORIDE 500 ML: 600; 310; 30; 20 INJECTION, SOLUTION INTRAVENOUS at 17:15

## 2024-02-02 RX ADMIN — THIAMINE HYDROCHLORIDE: 100 INJECTION, SOLUTION INTRAMUSCULAR; INTRAVENOUS at 15:45

## 2024-02-02 RX ADMIN — INSULIN LISPRO 7 UNITS: 100 INJECTION, SOLUTION INTRAVENOUS; SUBCUTANEOUS at 21:27

## 2024-02-02 RX ADMIN — INSULIN HUMAN 5 UNITS: 100 INJECTION, SOLUTION PARENTERAL at 14:07

## 2024-02-02 RX ADMIN — NICOTINE TRANSDERMAL SYSTEM 21 MG: 21 PATCH, EXTENDED RELEASE TRANSDERMAL at 18:24

## 2024-02-02 RX ADMIN — INSULIN LISPRO 7 UNITS: 100 INJECTION, SOLUTION INTRAVENOUS; SUBCUTANEOUS at 18:19

## 2024-02-02 RX ADMIN — ONDANSETRON 4 MG: 2 INJECTION INTRAMUSCULAR; INTRAVENOUS at 10:26

## 2024-02-02 ASSESSMENT — COGNITIVE AND FUNCTIONAL STATUS - GENERAL
SUGGESTED CMS G CODE MODIFIER DAILY ACTIVITY: CI
MOBILITY SCORE: 18
DAILY ACTIVITIY SCORE: 23
DRESSING REGULAR LOWER BODY CLOTHING: A LITTLE
MOVING FROM LYING ON BACK TO SITTING ON SIDE OF FLAT BED: A LITTLE
STANDING UP FROM CHAIR USING ARMS: A LITTLE
MOVING TO AND FROM BED TO CHAIR: A LITTLE
WALKING IN HOSPITAL ROOM: A LITTLE
SUGGESTED CMS G CODE MODIFIER MOBILITY: CK
CLIMB 3 TO 5 STEPS WITH RAILING: A LOT

## 2024-02-02 ASSESSMENT — LIFESTYLE VARIABLES
TOTAL SCORE: 0
NAUSEA AND VOMITING: NO NAUSEA AND NO VOMITING
TREMOR: NO TREMOR
ANXIETY: NO ANXIETY (AT EASE)
AVERAGE NUMBER OF DAYS PER WEEK YOU HAVE A DRINK CONTAINING ALCOHOL: 1
SUBSTANCE_ABUSE: 1
HEADACHE, FULLNESS IN HEAD: NOT PRESENT
VISUAL DISTURBANCES: NOT PRESENT
CONSUMPTION TOTAL: NEGATIVE
HAVE PEOPLE ANNOYED YOU BY CRITICIZING YOUR DRINKING: NO
AGITATION: NORMAL ACTIVITY
EVER HAD A DRINK FIRST THING IN THE MORNING TO STEADY YOUR NERVES TO GET RID OF A HANGOVER: NO
TOTAL SCORE: 0
ORIENTATION AND CLOUDING OF SENSORIUM: ORIENTED AND CAN DO SERIAL ADDITIONS
AUDITORY DISTURBANCES: NOT PRESENT
ON A TYPICAL DAY WHEN YOU DRINK ALCOHOL HOW MANY DRINKS DO YOU HAVE: 1
ALCOHOL_USE: YES
HOW MANY TIMES IN THE PAST YEAR HAVE YOU HAD 5 OR MORE DRINKS IN A DAY: 0
TOTAL SCORE: 0
HAVE YOU EVER FELT YOU SHOULD CUT DOWN ON YOUR DRINKING: NO
TOTAL SCORE: 0
DOES PATIENT WANT TO STOP DRINKING: NO
EVER FELT BAD OR GUILTY ABOUT YOUR DRINKING: NO
PAROXYSMAL SWEATS: NO SWEAT VISIBLE

## 2024-02-02 ASSESSMENT — ENCOUNTER SYMPTOMS
ABDOMINAL PAIN: 0
SPUTUM PRODUCTION: 0
NAUSEA: 0
HEARTBURN: 1
DEPRESSION: 0
BRUISES/BLEEDS EASILY: 0
HEADACHES: 0
WHEEZING: 0
COUGH: 1
DOUBLE VISION: 0
PALPITATIONS: 0
FOCAL WEAKNESS: 0
WEAKNESS: 1
SHORTNESS OF BREATH: 1
VOMITING: 0
BLURRED VISION: 0
MYALGIAS: 0
CHILLS: 1
FEVER: 0
DIZZINESS: 0

## 2024-02-02 ASSESSMENT — COPD QUESTIONNAIRES
DURING THE PAST 4 WEEKS HOW MUCH DID YOU FEEL SHORT OF BREATH: NONE/LITTLE OF THE TIME
DO YOU EVER COUGH UP ANY MUCUS OR PHLEGM?: NO/ONLY WITH OCCASIONAL COLDS OR INFECTIONS
HAVE YOU SMOKED AT LEAST 100 CIGARETTES IN YOUR ENTIRE LIFE: YES
COPD SCREENING SCORE: 3

## 2024-02-02 ASSESSMENT — FIBROSIS 4 INDEX: FIB4 SCORE: 0.69

## 2024-02-02 ASSESSMENT — PAIN DESCRIPTION - PAIN TYPE: TYPE: ACUTE PAIN

## 2024-02-02 NOTE — ED NOTES
Medicated with SQ insulin per order.  PT is awake, conversing with RN.  PT remains with an oxygen saturation > 90% on RA.

## 2024-02-02 NOTE — ED NOTES
Patient has brief drops in O2 sats to about 87%, 2L nasal cannula reapplied. ERP [place] aware. Oral temp abnormal, warming measures applied. POC blood sugar checked, 336.

## 2024-02-02 NOTE — ED NOTES
Med rec completed per [facility] ([phone]). Patient at this time is unable to fully participate in medication review, repeatedly falling asleep during interview. Unable to confirm times of last doses or recent over-the-counter medication usage. In addition to [facility], patient also reported filling at [facility] on [address] [employer] [place] ([phone]), however per [facility] only medications dispensed to patient within the last 90 days were a 7 day course of clindamycin and 2 day supply of [place] on 12/3/2023.    Allergies reviewed with patient.    Outpatient antibiotics within the last 30 days: NONE.    ANTICOAGULATION: NONE; patient is prescribed an antiplatelet medication, PLAVIX.

## 2024-02-02 NOTE — H&P
Hospital Medicine History & Physical Note    Date of Service  2/2/2024    Primary Care Physician  No primary care provider on file.    Consultants  None    Code Status  Full Code    Chief Complaint  Chief Complaint   Patient presents with    Drug Overdose     Pt was found in a [employer] parking lot unresponsive. EMS was called. Initially patient was found with a GCS of 3. During transport to hospital patient was given .5mg IV narcan and .5mg IM narcan. Mentation improved. On arrival to the ER patient was found sitting up in the gurney, drowsy but responding to voice and answering questions. EMS reported a blood sugar of 436.       History of Presenting Illness  Rogelio Escudero is a 49 y.o. male with insulin-dependent diabetes, hyperlipidemia, hypertension, PVD s/p right BKA, prior MRSA bacteremia and wound infection/osteomyelitis s/p left first and second toe resection, polysubstance abuse, tobacco abuse, prior AMAs from hospital who presented 2/2/2024 with evaluation for altered mental status.  Patient apparently overdosed, found down at scene with GCS of 3.  He apparently got CPR, mentation improved with administered Narcan.  Patient was brought to ER for evaluation.  In ER, found to have CMP glucose of 527, BAL 66, UDS positive for cocaine and fentanyl.  No acute findings noted on CT head.  He did have hypothermia with temperature as low as 96.3 Fahrenheit.  Admission requested by ERP.  Admitted to medicine service for further evaluation treatment.    I discussed the plan of care with patient, bedside RN, and pharmacy.    Review of Systems  Review of Systems   Constitutional:  Positive for chills and malaise/fatigue. Negative for fever.   HENT:  Negative for hearing loss and tinnitus.    Eyes:  Negative for blurred vision and double vision.   Respiratory:  Positive for cough and shortness of breath. Negative for sputum production and wheezing.    Cardiovascular:  Negative for chest pain and palpitations.    Gastrointestinal:  Positive for heartburn. Negative for abdominal pain, nausea and vomiting.   Genitourinary:  Negative for dysuria and urgency.   Musculoskeletal:  Negative for joint pain and myalgias.   Skin:  Negative for itching and rash.   Neurological:  Positive for weakness. Negative for dizziness, focal weakness and headaches.   Endo/Heme/Allergies:  Negative for environmental allergies. Does not bruise/bleed easily.   Psychiatric/Behavioral:  Positive for substance abuse. Negative for depression.    All other systems reviewed and are negative.      Past Medical History   has no past medical history on file.    Surgical History   has no past surgical history on file.     Family History  family history is not on file.   Family history reviewed with patient. There is family history that is pertinent to the chief complaint.     Social History   Admits to smoking as much as 1 pack of cigarette daily  Admits to cocaine use, fentanyl use admits to alcohol abuse      Allergies  Allergies   Allergen Reactions    Apple Hives       Medications  Prior to Admission Medications   Prescriptions Last Dose Informant Patient Reported? Taking?   Empagliflozin (JARDIANCE) 25 MG Tab UNK at [facility] Yes Yes   Sig: Take 25 mg by mouth every day.   atorvastatin (LIPITOR) 40 MG Tab UNK at [facility] Yes Yes   Sig: Take 40 mg by mouth every day.   clopidogrel (PLAVIX) 75 MG Tab UNK at [facility] Yes Yes   Sig: Take 75 mg by mouth every day.   escitalopram (LEXAPRO) 10 MG Tab UNK at [facility] Yes Yes   Sig: Take 10 mg by mouth every day.   gabapentin (NEURONTIN) 400 MG Cap UNK at [facility] Yes Yes   Sig: Take 400 mg by mouth 2 times a day.   hydrOXYzine pamoate (VISTARIL) 25 MG Cap UNK at [facility] Yes Yes   Sig: Take 25 mg by mouth every 8 hours as needed for Itching or Anxiety.   insulin glargine (LANTUS SOLOSTAR) 100  UNIT/ML Solution Pen-injector injection UNK at [facility] Patient's Home Pharmacy Yes Yes   Sig: Inject 25 Units under the skin every day.   lisinopril (PRINIVIL) 10 MG Tab UNK at [facility] Patient's Home Pharmacy Yes Yes   Sig: Take 10 mg by mouth every day.   metformin (GLUCOPHAGE) 1000 MG tablet UNK at [facility] Patient's Home Pharmacy Yes Yes   Sig: Take 1,000 mg by mouth 2 times a day with meals.   pantoprazole (PROTONIX) 40 MG Tablet Delayed Response UNK at [facility] Patient's Home Pharmacy Yes Yes   Sig: Take 40 mg by mouth every day.      Facility-Administered Medications: None       Physical Exam  Temp:  [35.7 °C (96.3 °F)-36.2 °C (97.1 °F)] 35.7 °C (96.3 °F)  Pulse:  [83-99] 86  Resp:  [9-36] 18  BP: (122-158)/(64-95) 128/72  SpO2:  [85 %-98 %] 98 %  Blood Pressure: (!) 143/86   Temperature: (!) 35.7 °C (96.3 °F)   Pulse: 88   Respiration: 15   Pulse Oximetry: 98 %       Physical Exam  Vitals and nursing note reviewed.   Constitutional:       General: He is not in acute distress.  HENT:      Head: Normocephalic and atraumatic.      Nose: Nose normal.      Mouth/Throat:      Mouth: Mucous membranes are dry.      Pharynx: Oropharynx is clear.   Eyes:      General: No scleral icterus.     Extraocular Movements: Extraocular movements intact.   Cardiovascular:      Rate and Rhythm: Normal rate and regular rhythm.      Pulses: Normal pulses.      Heart sounds:      No friction rub.   Pulmonary:      Effort: No respiratory distress.      Breath sounds: Rales present. No wheezing.   Chest:      Chest wall: No tenderness.   Abdominal:      General: There is no distension.      Tenderness: There is no abdominal tenderness. There is no guarding or rebound.   Musculoskeletal:         General: Normal range of motion.      Cervical back: Neck supple. No tenderness.      Left lower leg: No edema.      Comments: S/p right BKA   Skin:     General: Skin is warm and dry.      Capillary Refill: Capillary refill takes less than 2 seconds.  "  Neurological:      General: No focal deficit present.      Mental Status: He is alert and oriented to person, place, and time.   Psychiatric:         Mood and Affect: Mood normal.         Laboratory:  Recent Labs     02/02/24  1011   WBC 13.4*   RBC 5.36   HEMOGLOBIN 14.9   HEMATOCRIT 45.2   MCV 84.3   MCH 27.8   MCHC 33.0   RDW 56.0*   PLATELETCT 461*   MPV 9.5     Recent Labs     02/02/24  1011   SODIUM 134*   POTASSIUM 3.3*   CHLORIDE 96   CO2 20   GLUCOSE 527*   BUN 10   CREATININE 0.94   CALCIUM 9.3     Recent Labs     02/02/24  1011   ALTSGPT 26   ASTSGOT 32   ALKPHOSPHAT 162*   TBILIRUBIN 0.2   LIPASE 105*   GLUCOSE 527*     Recent Labs     02/02/24  1011   INR 0.93     No results for input(s): \"NTPROBNP\" in the last 72 hours.      No results for input(s): \"TROPONINT\" in the last 72 hours.    Imaging:  CT-HEAD W/O   Final Result      No acute intracranial abnormality detected.         DX-CHEST-PORTABLE (1 VIEW)   Final Result         Hazy bibasilar opacities, atelectasis or infection.          X-Ray:  I have personally reviewed the images and compared with prior images.  EKG:  I have personally reviewed the images and compared with prior images.    Assessment/Plan:  Justification for Admission Status  I anticipate this patient will require at least 2 midnights of hospitalization, therefore appropriate for inpatient status.        * Drug overdose of undetermined intent, initial encounter- (present on admission)  Assessment & Plan  UDS positive for cocaine, amphetamine  Cessation strongly advised  IVF  As needed Narcan  Admitted to telemetry unit for close monitoring    Hyperosmolar hyperglycemic state (HHS) (HCC)  Assessment & Plan  CMP glucose 527, anion gap 18  S/p insulin 5units IV  S/p 1500cc LR bolus  Continue IVF @200cc/hr  Continue Lantus, ISS  DM education      Hypothermia  Assessment & Plan  RESOLVED  Temp 96.3F in ED    Acute respiratory failure with hypoxia (HCC)  Assessment & Plan  On 2L --wean " off as tolerated  Procalcitonin WNL  Likely secondary to drug overdose  As needed narcan  As needed nebs    S/P BKA (below knee amputation) unilateral, right ([facility])  Assessment & Plan  As noted in PVD    PVD (peripheral vascular disease) ([facility])  Assessment & Plan  Continue Plavix, Lipitor  Smoking cessation advised    Acute alcoholic intoxication with delirium ([facility])  Assessment & Plan  Detox bag  Multivitamins  CIWA    Hypertension  Assessment & Plan  lisinopril    GERD (gastroesophageal reflux disease)  Assessment & Plan  PPI    Hypokalemia  Assessment & Plan  replace    Mood disorder ([facility])  Assessment & Plan  SSRI    Tobacco abuse counseling  Assessment & Plan  Admits to smoking as much as 1 pack of cigarettes daily  Cessation counseling provided: 4 minutes  Offered nicotine patch, nicotine gum, Chantix as alternative.  Provided patient with standard tobacco cessation information per protocol        VTE prophylaxis: enoxaparin ppx

## 2024-02-02 NOTE — ED PROVIDER NOTES
ED Provider Note    CHIEF COMPLAINT  Chief Complaint   Patient presents with    Drug Overdose     Pt was found in a [employer] parking lot unresponsive. EMS was called. Initially patient was found with a GCS of 3. During transport to hospital patient was given .5mg IV narcan and .5mg IM narcan. Mentation improved. On arrival to the ER patient was found sitting up in the gurney, drowsy but responding to voice and answering questions. EMS reported a blood sugar of 436.       EXTERNAL RECORDS REVIEWED  Patient brought in under an alias.  No known history yet.    HPI/ROS  LIMITATION TO HISTORY   Patient is unresponsive unable to provide history history is obtained from EMS.  OUTSIDE HISTORIAN(S):  History obtained the paramedics who brought the patient to the emergency department    Samia Coelho is a 89 y.o. adult who presents to the emergency department as acute medical patient.  He is apparently found down.  History was did some drugs.  CPR was issued by bystanders no document of pulselessness per initial GCS of 3 sat of 30.  He was bagged until his oxygen was elevated and was given Narcan 0.5 intranasal and 0.5 IV he became combative en route.  No history is obtainable the patient is still quite confused.  No known history of trauma.  No other history is obtainable from the patient.  He did have an elevated blood sugar.    PAST MEDICAL HISTORY   Diabetes, hyperglycemia is noted  Previous amputation of the right leg.    SURGICAL HISTORY  patient denies any surgical history    FAMILY HISTORY  No family history on file.    SOCIAL HISTORY  Social History     Tobacco Use    Smoking status: Not on file    Smokeless tobacco: Not on file   Substance and Sexual Activity    Alcohol use: Not on file    Drug use: Not on file    Sexual activity: Not on file       CURRENT MEDICATIONS  Home Medications       Reviewed by Marco Davis (Pharmacy Tech) on 02/02/24 at 1136  Med List Status: Complete     Medication Last Dose Status  "  atorvastatin (LIPITOR) 40 MG Tab UNK Active   clopidogrel (PLAVIX) 75 MG Tab UNK Active   Empagliflozin (JARDIANCE) 25 MG Tab UNK Active   escitalopram (LEXAPRO) 10 MG Tab UNK Active   gabapentin (NEURONTIN) 400 MG Cap UNK Active   hydrOXYzine pamoate (VISTARIL) 25 MG Cap UNK Active   insulin glargine (LANTUS SOLOSTAR) 100 UNIT/ML Solution Pen-injector injection UNK Active   lisinopril (PRINIVIL) 10 MG Tab UNK Active   metformin (GLUCOPHAGE) 1000 MG tablet UNK Active   pantoprazole (PROTONIX) 40 MG Tablet Delayed Response UNK Active                    ALLERGIES  Not on File    PHYSICAL EXAM  VITAL SIGNS: /90   Pulse 98   Temp 36.2 °C (97.1 °F) (Temporal)   Resp 11   Ht 1.803 m (5' 11\")   Wt 74.8 kg (165 lb)   SpO2 97%   BMI 23.01 kg/m²    Constitutional: Somnolent but arousable.  Opens eyes to commands.  HENT: Normocephalic, Atraumatic, Bilateral external ears normal, Oropharynx dry mucous membranes  Eyes: Pupils are 3 mm disconjugate gaze.  Does open his eyes and look around and track  Neck: Trachea is midline  Cardiovascular: Normal heart rate, Normal rhythm, No murmurs, No rubs, No gallops.   Thorax & Lungs: Normal breath sounds, No respiratory distress, No wheezing,  Abdomen: Bowel sounds normal, Soft, No tenderness  Skin: Warm, Dry, No erythema, No rash.   Musculoskeletal: Good range of motion in all major joints.  Neurologic: GCS is E2, M5V 2 total of 9        DIAGNOSTIC STUDIES / PROCEDURES  Results for orders placed or performed during the hospital encounter of 02/02/24   CBC WITH DIFFERENTIAL   Result Value Ref Range    WBC 13.4 (H) 4.8 - 10.8 K/uL    RBC 5.36 4.70 - 6.10 M/uL    Hemoglobin 14.9 14.0 - 18.0 g/dL    Hematocrit 45.2 42.0 - 52.0 %    MCV 84.3 81.4 - 97.8 fL    MCH 27.8 27.0 - 33.0 pg    MCHC 33.0 32.3 - 36.5 g/dL    RDW 56.0 (H) 35.9 - 50.0 fL    Platelet Count 461 (H) 164 - 446 K/uL    MPV 9.5 9.0 - 12.9 fL    Neutrophils-Polys 51.10 44.00 - 72.00 %    Lymphocytes 35.50 22.00 - " 41.00 %    Monocytes 9.10 0.00 - 13.40 %    Eosinophils 3.30 0.00 - 6.90 %    Basophils 0.30 0.00 - 1.80 %    Immature Granulocytes 0.70 0.00 - 0.90 %    Nucleated RBC 0.00 0.00 - 0.20 /100 WBC    Neutrophils (Absolute) 6.84 1.82 - 7.42 K/uL    Lymphs (Absolute) 4.74 1.00 - 4.80 K/uL    Monos (Absolute) 1.22 (H) 0.00 - 0.85 K/uL    Eos (Absolute) 0.44 0.00 - 0.51 K/uL    Baso (Absolute) 0.04 0.00 - 0.12 K/uL    Immature Granulocytes (abs) 0.09 0.00 - 0.11 K/uL    NRBC (Absolute) 0.00 K/uL   COMP METABOLIC PANEL   Result Value Ref Range    Sodium 134 (L) 135 - 145 mmol/L    Potassium 3.3 (L) 3.6 - 5.5 mmol/L    Chloride 96 96 - 112 mmol/L    Co2 20 20 - 33 mmol/L    Anion Gap 18.0 (H) 7.0 - 16.0    Glucose 527 (HH) 65 - 99 mg/dL    Bun 10 8 - 22 mg/dL    Creatinine 0.94 0.50 - 1.40 mg/dL    Calcium 9.3 8.5 - 10.5 mg/dL    Correct Calcium 9.1 8.5 - 10.5 mg/dL    AST(SGOT) 32 12 - 45 U/L    ALT(SGPT) 26 2 - 50 U/L    Alkaline Phosphatase 162 (H) 30 - 99 U/L    Total Bilirubin 0.2 0.1 - 1.5 mg/dL    Albumin 4.3 3.2 - 4.9 g/dL    Total Protein 8.2 6.0 - 8.2 g/dL    Globulin 3.9 (H) 1.9 - 3.5 g/dL    A-G Ratio 1.1 g/dL   LIPASE   Result Value Ref Range    Lipase 105 (H) 11 - 82 U/L   DIAGNOSTIC ALCOHOL   Result Value Ref Range    Diagnostic Alcohol 66.1 (H) <10.1 mg/dL   URINE DRUG SCREEN   Result Value Ref Range    Amphetamines Urine Negative Negative    Barbiturates Negative Negative    Benzodiazepines Negative Negative    Cocaine Metabolite Positive (A) Negative    Fentanyl, Urine Positive (A) Negative    Methadone Negative Negative    Opiates Negative Negative    Oxycodone Negative Negative    Phencyclidine -Pcp Negative Negative    Propoxyphene Negative Negative    Cannabinoid Metab Negative Negative   ESTIMATED GFR   Result Value Ref Range    GFR (CKD-EPI) 98 >60 mL/min/1.73 m 2   EKG (NOW)   Result Value Ref Range    Report       [facility] Emergency Dept.    Test Date:  2024-02-02  Pt Name:     REGLA HAYES               Department: ER  MRN:        [id number]                      Room:        09  Gender:     M                            Technician: [id number]  :        [id number]                   Requested By:MI NEGRON  Order #:    [id number]                    Reading MD: MI NEGRON. [facility]    Measurements  Intervals                                Axis  Rate:       94                           P:          47  UT:         172                          QRS:        73  QRSD:       85                           T:          59  QT:         356  QTc:        446    Interpretive Statements  Sinus rhythm  Baseline wander in lead(s) I,II,aVR  No previous ECG available for comparison  Electronically Signed On 2024 10:59:53 PST by MI NEGRON. AMD     POCT glucose device results   Result Value Ref Range    POC Glucose, Blood 403 (HH) 65 - 99 mg/dL        RADIOLOGY  I have independently interpreted the diagnostic imaging associated with this visit and am waiting the final reading from the radiologist.   My preliminary interpretation is as follows: I have reviewed the x-ray agree with radiologist interpretation.  Radiologist interpretation:     CT-HEAD W/O   Final Result      No acute intracranial abnormality detected.         DX-CHEST-PORTABLE (1 VIEW)   Final Result         Hazy bibasilar opacities, atelectasis or infection.            COURSE & MEDICAL DECISION MAKING    ED Observation Status? Yes; I am placing the patient in to an observation status due to a diagnostic uncertainty as well as therapeutic intensity. Patient placed in observation status at 10:29 AM, 2024.     Observation plan is as follows: The patient is admitted to ED observation we evaluate his drug overdose and respiratory failure and treat his hyperglycemia.      INITIAL ASSESSMENT, COURSE AND PLAN  Care Narrative:     49-year-old male presents found down with altered mental status and a bystander history that he was doing  drugs.  He did respond to Narcan and got agitated in route.    The patient was seen and examined upon arrival history is obtained from EMS.  Patient's prehospital blood sugar was elevated.    Patient placed on a cardiac monitor awakes for examination.  A differential diagnose includes but is not limited to drug and/or alcohol intoxication, intracranial hemorrhage, cardiac arrhythmia, electrolyte abnormality, sepsis, complication of diabetes.    The patient is placed on a cardiac monitor and oxygen he is appropriately mentating does not require additional Narcan at first.  He is worked up for the above differential diagnosis with labs and imaging.        The patient's EKG is unremarkable.  Head CT is unremarkable and chest x-ray does not show any significant findings.  Patient has a mild leukocytosis nonspecific.  Is an elevated blood sugar of 527 as we treat with fluids, and then insulin nebs observation.    Metabolic panel is positive for cocaine which is what the patient thought he was doing and he was also positive for fentanyl.  Alcohol level is mildly elevated.    Patient blood sugars come down to 4 or 100.  He does take long-acting insulin he did not take it since yesterday.  Will give him a little short acting insulin.      The patient is observed here.  He is sleepy but wakes easily.  He is still mildly hypoxemic requiring oxygen.  He is intermittently doing okay off oxygen, he is mildly hypothermic and again still hypoxemic.  This point has been observed in the ED for several hours and not really improving.  He should be hospitalized for further workup and treatment may have a prolonged period of hypoxemia and respiratory failure.  Do not think he requires repeat Narcan at this point.    He has required a second dose of Narcan.  This was several hours ago at this point.  She is not improving spoke with hospitalist Dr. Jaocbo who will see the patient and care transferred at that time.      CRITICAL CARE  The  very real possibilty of a deterioration of this patient's condition required the highest level of my preparedness for sudden, emergent intervention.  I provided critical care services, which included medication orders, frequent reevaluations of the patient's condition and response to treatment, ordering and reviewing test results, and discussing the case with various consultants.  The critical care time associated with the care of the patient was 45 minutes. Review chart for interventions. This time is exclusive of any other billable procedures. '            ADDITIONAL PROBLEM LIST  Diabetes  Drug use    DISPOSITION AND DISCUSSIONS  I have discussed management of the patient with the following physicians and [facility]URI[facility]      Barriers to care at this time, including but not limited to: Patient does not have established PCP.       FINAL DIAGNOSIS  1. Accidental drug overdose, initial encounter    2. Acute respiratory failure with hypoxia (HCC)    3. Hyperglycemia    4.  Critical care time 45 minutes no procedures       Electronically signed by: Chucho Dawn M.D., 2/2/2024 10:29 AM

## 2024-02-02 NOTE — ED TRIAGE NOTES
Chief Complaint   Patient presents with    Drug Overdose     Pt was found in a [employer] parking lot unresponsive. EMS was called. Initially patient was found with a GCS of 3. During transport to hospital patient was given .5mg IV narcan and .5mg IM narcan. Mentation improved. On arrival to the ER patient was found sitting up in the gurney, drowsy but responding to voice and answering questions. EMS reported a blood sugar of 436.

## 2024-02-03 PROBLEM — G92.8 TOXIC METABOLIC ENCEPHALOPATHY: Status: ACTIVE | Noted: 2024-02-03

## 2024-02-03 LAB
ALBUMIN SERPL BCP-MCNC: 3.4 G/DL (ref 3.2–4.9)
ALBUMIN SERPL BCP-MCNC: 3.5 G/DL (ref 3.2–4.9)
ALBUMIN/GLOB SERPL: 1.2 G/DL
ALP SERPL-CCNC: 104 U/L (ref 30–99)
ALT SERPL-CCNC: 17 U/L (ref 2–50)
ANION GAP SERPL CALC-SCNC: 8 MMOL/L (ref 7–16)
AST SERPL-CCNC: 10 U/L (ref 12–45)
BASOPHILS # BLD AUTO: 0.5 % (ref 0–1.8)
BASOPHILS # BLD: 0.06 K/UL (ref 0–0.12)
BILIRUB SERPL-MCNC: 0.4 MG/DL (ref 0.1–1.5)
BUN SERPL-MCNC: 10 MG/DL (ref 8–22)
BUN SERPL-MCNC: 13 MG/DL (ref 8–22)
CALCIUM ALBUM COR SERPL-MCNC: 9.2 MG/DL (ref 8.5–10.5)
CALCIUM ALBUM COR SERPL-MCNC: 9.3 MG/DL (ref 8.5–10.5)
CALCIUM SERPL-MCNC: 8.7 MG/DL (ref 8.5–10.5)
CALCIUM SERPL-MCNC: 8.9 MG/DL (ref 8.5–10.5)
CHLORIDE SERPL-SCNC: 103 MMOL/L (ref 96–112)
CHLORIDE SERPL-SCNC: 107 MMOL/L (ref 96–112)
CO2 SERPL-SCNC: 21 MMOL/L (ref 20–33)
CO2 SERPL-SCNC: 26 MMOL/L (ref 20–33)
CREAT SERPL-MCNC: 0.71 MG/DL (ref 0.5–1.4)
CREAT SERPL-MCNC: 0.83 MG/DL (ref 0.5–1.4)
EKG IMPRESSION: NORMAL
EOSINOPHIL # BLD AUTO: 0.38 K/UL (ref 0–0.51)
EOSINOPHIL NFR BLD: 3 % (ref 0–6.9)
ERYTHROCYTE [DISTWIDTH] IN BLOOD BY AUTOMATED COUNT: 57.9 FL (ref 35.9–50)
EST. AVERAGE GLUCOSE BLD GHB EST-MCNC: 258 MG/DL
GFR SERPLBLD CREATININE-BSD FMLA CKD-EPI: 106 ML/MIN/1.73 M 2
GFR SERPLBLD CREATININE-BSD FMLA CKD-EPI: 111 ML/MIN/1.73 M 2
GLOBULIN SER CALC-MCNC: 3 G/DL (ref 1.9–3.5)
GLUCOSE BLD STRIP.AUTO-MCNC: 143 MG/DL (ref 65–99)
GLUCOSE BLD STRIP.AUTO-MCNC: 218 MG/DL (ref 65–99)
GLUCOSE BLD STRIP.AUTO-MCNC: 296 MG/DL (ref 65–99)
GLUCOSE BLD STRIP.AUTO-MCNC: 338 MG/DL (ref 65–99)
GLUCOSE SERPL-MCNC: 220 MG/DL (ref 65–99)
GLUCOSE SERPL-MCNC: 97 MG/DL (ref 65–99)
HBA1C MFR BLD: 10.6 % (ref 4–5.6)
HCT VFR BLD AUTO: 39.2 % (ref 42–52)
HGB BLD-MCNC: 12.5 G/DL (ref 14–18)
IMM GRANULOCYTES # BLD AUTO: 0.05 K/UL (ref 0–0.11)
IMM GRANULOCYTES NFR BLD AUTO: 0.4 % (ref 0–0.9)
LYMPHOCYTES # BLD AUTO: 2.69 K/UL (ref 1–4.8)
LYMPHOCYTES NFR BLD: 21.6 % (ref 22–41)
MAGNESIUM SERPL-MCNC: 2.2 MG/DL (ref 1.5–2.5)
MCH RBC QN AUTO: 27.3 PG (ref 27–33)
MCHC RBC AUTO-ENTMCNC: 31.9 G/DL (ref 32.3–36.5)
MCV RBC AUTO: 85.6 FL (ref 81.4–97.8)
MONOCYTES # BLD AUTO: 0.92 K/UL (ref 0–0.85)
MONOCYTES NFR BLD AUTO: 7.4 % (ref 0–13.4)
NEUTROPHILS # BLD AUTO: 8.36 K/UL (ref 1.82–7.42)
NEUTROPHILS NFR BLD: 67.1 % (ref 44–72)
NRBC # BLD AUTO: 0 K/UL
NRBC BLD-RTO: 0 /100 WBC (ref 0–0.2)
PHOSPHATE SERPL-MCNC: 2.6 MG/DL (ref 2.5–4.5)
PHOSPHATE SERPL-MCNC: 3.2 MG/DL (ref 2.5–4.5)
PLATELET # BLD AUTO: 370 K/UL (ref 164–446)
PMV BLD AUTO: 9.9 FL (ref 9–12.9)
POTASSIUM SERPL-SCNC: 4.1 MMOL/L (ref 3.6–5.5)
POTASSIUM SERPL-SCNC: 4.6 MMOL/L (ref 3.6–5.5)
PROT SERPL-MCNC: 6.5 G/DL (ref 6–8.2)
RBC # BLD AUTO: 4.58 M/UL (ref 4.7–6.1)
SODIUM SERPL-SCNC: 134 MMOL/L (ref 135–145)
SODIUM SERPL-SCNC: 141 MMOL/L (ref 135–145)
WBC # BLD AUTO: 12.5 K/UL (ref 4.8–10.8)

## 2024-02-03 PROCEDURE — 3E0234Z INTRODUCTION OF SERUM, TOXOID AND VACCINE INTO MUSCLE, PERCUTANEOUS APPROACH: ICD-10-PCS | Performed by: STUDENT IN AN ORGANIZED HEALTH CARE EDUCATION/TRAINING PROGRAM

## 2024-02-03 PROCEDURE — 90471 IMMUNIZATION ADMIN: CPT

## 2024-02-03 PROCEDURE — 85025 COMPLETE CBC W/AUTO DIFF WBC: CPT

## 2024-02-03 PROCEDURE — 700101 HCHG RX REV CODE 250: Performed by: STUDENT IN AN ORGANIZED HEALTH CARE EDUCATION/TRAINING PROGRAM

## 2024-02-03 PROCEDURE — C9113 INJ PANTOPRAZOLE SODIUM, VIA: HCPCS | Performed by: STUDENT IN AN ORGANIZED HEALTH CARE EDUCATION/TRAINING PROGRAM

## 2024-02-03 PROCEDURE — 700111 HCHG RX REV CODE 636 W/ 250 OVERRIDE (IP): Performed by: STUDENT IN AN ORGANIZED HEALTH CARE EDUCATION/TRAINING PROGRAM

## 2024-02-03 PROCEDURE — 83735 ASSAY OF MAGNESIUM: CPT

## 2024-02-03 PROCEDURE — 82962 GLUCOSE BLOOD TEST: CPT

## 2024-02-03 PROCEDURE — 36415 COLL VENOUS BLD VENIPUNCTURE: CPT

## 2024-02-03 PROCEDURE — 90686 IIV4 VACC NO PRSV 0.5 ML IM: CPT | Performed by: STUDENT IN AN ORGANIZED HEALTH CARE EDUCATION/TRAINING PROGRAM

## 2024-02-03 PROCEDURE — 80069 RENAL FUNCTION PANEL: CPT

## 2024-02-03 PROCEDURE — 700102 HCHG RX REV CODE 250 W/ 637 OVERRIDE(OP): Performed by: STUDENT IN AN ORGANIZED HEALTH CARE EDUCATION/TRAINING PROGRAM

## 2024-02-03 PROCEDURE — 99233 SBSQ HOSP IP/OBS HIGH 50: CPT | Performed by: STUDENT IN AN ORGANIZED HEALTH CARE EDUCATION/TRAINING PROGRAM

## 2024-02-03 PROCEDURE — A9270 NON-COVERED ITEM OR SERVICE: HCPCS | Performed by: STUDENT IN AN ORGANIZED HEALTH CARE EDUCATION/TRAINING PROGRAM

## 2024-02-03 PROCEDURE — 80053 COMPREHEN METABOLIC PANEL: CPT

## 2024-02-03 PROCEDURE — 770020 HCHG ROOM/CARE - TELE (206)

## 2024-02-03 PROCEDURE — 93005 ELECTROCARDIOGRAM TRACING: CPT | Performed by: STUDENT IN AN ORGANIZED HEALTH CARE EDUCATION/TRAINING PROGRAM

## 2024-02-03 PROCEDURE — 93010 ELECTROCARDIOGRAM REPORT: CPT | Performed by: INTERNAL MEDICINE

## 2024-02-03 PROCEDURE — 84100 ASSAY OF PHOSPHORUS: CPT

## 2024-02-03 RX ADMIN — ENOXAPARIN SODIUM 40 MG: 100 INJECTION SUBCUTANEOUS at 18:51

## 2024-02-03 RX ADMIN — PANTOPRAZOLE SODIUM 40 MG: 40 INJECTION, POWDER, FOR SOLUTION INTRAVENOUS at 05:59

## 2024-02-03 RX ADMIN — CLOPIDOGREL BISULFATE 75 MG: 75 TABLET ORAL at 05:59

## 2024-02-03 RX ADMIN — INSULIN LISPRO 5 UNITS: 100 INJECTION, SOLUTION INTRAVENOUS; SUBCUTANEOUS at 08:08

## 2024-02-03 RX ADMIN — ATORVASTATIN CALCIUM 40 MG: 40 TABLET, FILM COATED ORAL at 05:59

## 2024-02-03 RX ADMIN — ESCITALOPRAM OXALATE 10 MG: 10 TABLET ORAL at 05:59

## 2024-02-03 RX ADMIN — GABAPENTIN 400 MG: 400 CAPSULE ORAL at 17:03

## 2024-02-03 RX ADMIN — INSULIN LISPRO 7 UNITS: 100 INJECTION, SOLUTION INTRAVENOUS; SUBCUTANEOUS at 08:08

## 2024-02-03 RX ADMIN — NICOTINE TRANSDERMAL SYSTEM 21 MG: 21 PATCH, EXTENDED RELEASE TRANSDERMAL at 06:00

## 2024-02-03 RX ADMIN — THERA TABS 1 TABLET: TAB at 05:59

## 2024-02-03 RX ADMIN — INSULIN LISPRO 10 UNITS: 100 INJECTION, SOLUTION INTRAVENOUS; SUBCUTANEOUS at 21:15

## 2024-02-03 RX ADMIN — INSULIN LISPRO 5 UNITS: 100 INJECTION, SOLUTION INTRAVENOUS; SUBCUTANEOUS at 17:06

## 2024-02-03 RX ADMIN — LISINOPRIL 10 MG: 10 TABLET ORAL at 05:59

## 2024-02-03 RX ADMIN — GABAPENTIN 400 MG: 400 CAPSULE ORAL at 05:59

## 2024-02-03 RX ADMIN — INSULIN LISPRO 4 UNITS: 100 INJECTION, SOLUTION INTRAVENOUS; SUBCUTANEOUS at 17:06

## 2024-02-03 RX ADMIN — POTASSIUM CHLORIDE AND SODIUM CHLORIDE: 900; 300 INJECTION, SOLUTION INTRAVENOUS at 02:46

## 2024-02-03 RX ADMIN — PANTOPRAZOLE SODIUM 40 MG: 40 INJECTION, POWDER, FOR SOLUTION INTRAVENOUS at 17:03

## 2024-02-03 RX ADMIN — FOLIC ACID 1 MG: 1 TABLET ORAL at 06:00

## 2024-02-03 RX ADMIN — INFLUENZA A VIRUS A/VICTORIA/4897/2022 IVR-238 (H1N1) ANTIGEN (FORMALDEHYDE INACTIVATED), INFLUENZA A VIRUS A/DARWIN/9/2021 SAN-010 (H3N2) ANTIGEN (FORMALDEHYDE INACTIVATED), INFLUENZA B VIRUS B/PHUKET/3073/2013 ANTIGEN (FORMALDEHYDE INACTIVATED), AND INFLUENZA B VIRUS B/MICHIGAN/01/2021 ANTIGEN (FORMALDEHYDE INACTIVATED) 0.5 ML: 15; 15; 15; 15 INJECTION, SUSPENSION INTRAMUSCULAR at 06:44

## 2024-02-03 RX ADMIN — INSULIN LISPRO 5 UNITS: 100 INJECTION, SOLUTION INTRAVENOUS; SUBCUTANEOUS at 11:54

## 2024-02-03 RX ADMIN — Medication 100 MG: at 05:59

## 2024-02-03 RX ADMIN — INSULIN GLARGINE-YFGN 25 UNITS: 100 INJECTION, SOLUTION SUBCUTANEOUS at 17:05

## 2024-02-03 RX ADMIN — ACETAMINOPHEN 650 MG: 325 TABLET, FILM COATED ORAL at 06:43

## 2024-02-03 ASSESSMENT — ENCOUNTER SYMPTOMS
SHORTNESS OF BREATH: 0
NAUSEA: 0
WEAKNESS: 1
VOMITING: 0
HEADACHES: 0
FEVER: 0
CHILLS: 0
DIZZINESS: 0
ABDOMINAL PAIN: 0
COUGH: 0
PALPITATIONS: 0
MYALGIAS: 0

## 2024-02-03 ASSESSMENT — PAIN DESCRIPTION - PAIN TYPE
TYPE: ACUTE PAIN
TYPE: CHRONIC PAIN

## 2024-02-03 NOTE — ASSESSMENT & PLAN NOTE
Detox bag  Multivitamins  CIWA    02/03/24  CIWA scores of 0.  He does not appear to be withdrawing from alcohol.

## 2024-02-03 NOTE — ASSESSMENT & PLAN NOTE
Admits to smoking as much as 1 pack of cigarettes daily  Cessation counseling provided: 4 minutes  Offered nicotine patch, nicotine gum, Chantix as alternative.  Provided patient with standard tobacco cessation information per protocol

## 2024-02-03 NOTE — ASSESSMENT & PLAN NOTE
On 2L --wean off as tolerated  Procalcitonin WNL  Likely secondary to drug overdose  As needed narcan  As needed nebs    02/03/24  Continues on 2 LPM oxygen by nasal cannula.  Continue to wean off as tolerated.  Continuous pulse oximetry monitoring.  Incentive spirometry

## 2024-02-03 NOTE — ASSESSMENT & PLAN NOTE
CMP glucose 527, anion gap 18  S/p insulin 5units IV  S/p 1500cc LR bolus  Continue IVF @200cc/hr  Continue Lantus, ISS  DM education      02/03/24  Improving  Continue glargine insulin 25 units every evening  Lispro insulin sign scale with scheduled insulin  Hypoglycemia protocol  Diabetic diet

## 2024-02-03 NOTE — PROGRESS NOTES
4 Eyes Skin Assessment Completed by Evy, RN and HANS Godwin.    Head WDL  Ears WDL  Nose WDL  Mouth WDL  Neck WDL  Breast/Chest WDL  Shoulder Blades WDL  Spine WDL  (R) Arm/Elbow/Hand Scar  (L) Arm/Elbow/Hand WDL  Abdomen WDL  Groin WDL  Scrotum/Coccyx/Buttocks WDL  (R) Leg Healed BKA with few small scabs  (L) Leg WDL  (R) Heel/Foot/Toe N/A (BKA)  (L) Heel/Foot/Toe Missing toes          Devices In Places Tele Box, Pulse Ox, and Nasal Cannula      Interventions In Place Low Air Loss Mattress    Possible Skin Injury No    Pictures Uploaded Into Epic N/A  Wound Consult Placed N/A  RN Wound Prevention Protocol Ordered No

## 2024-02-03 NOTE — CARE PLAN
The patient is Stable - Low risk of patient condition declining or worsening    Shift Goals  Clinical Goals: wean off 02, BG control  Patient Goals: rest, comfort    Progress made toward(s) clinical / shift goals:    Problem: Hemodynamics  Goal: Patient's hemodynamics, fluid balance and neurologic status will be stable or improve  Outcome: Progressing     Problem: Respiratory  Goal: Patient will achieve/maintain optimum respiratory ventilation and gas exchange  Outcome: Progressing     Problem: Fall Risk  Goal: Patient will remain free from falls  Outcome: Progressing       Patient is not progressing towards the following goals:

## 2024-02-03 NOTE — PROGRESS NOTES
Assumed care on patient post bedside report. Alert and oriented x 4. In no apparent distress. On 2LNC with unlabored breathing. Denies pain.    Tele on and heart rhythm and rate checked on monitor.    Plan of care discussed. Monitor v/s, lab values and blood glucose and maintain safety, he verbalized understanding.    Safety precautions in place are side rails up x 3, bed to lowest level, alarms on. Appropriate to use call light for needs and assistance. Will round hourly and as needed.

## 2024-02-03 NOTE — ASSESSMENT & PLAN NOTE
UDS positive for cocaine, amphetamine  Cessation strongly advised  IVF  As needed Narcan  Admitted to telemetry unit for close monitoring    02/03/24  Patient is still very lethargic.  He is at high risk of deterioration.  Continuous pulse oximetry monitoring to monitor for hypoxia.  Continues telemetry monitoring to monitor for arrhythmias.

## 2024-02-03 NOTE — PROGRESS NOTES
"Pt arrived to unit via gurney on zoll monitor. Pt oriented to room, unit, and plan of care. Tele-monitor placed. All questions answered at this time. While looking through clothing pt dropped bag of illicit drugs. Pt reports \"I thought it was cocaine but now I'm not sure.\" Informed pt that item would have to be confiscated and given to security, pt verbalized understanding and stated \"Please take it, I don't want that no more.\" Security updated and will send officer to retrieve item.   "

## 2024-02-03 NOTE — ED NOTES
Medicated per MAR.  XI=[id number].  PT provided turkey sandwich at his request.    Labs added on to prior specimens and  urine sample sent.

## 2024-02-03 NOTE — PROGRESS NOTES
Bedside report received from NOC RN. Assumed care of pt. Pt awake, laying in bed. A/Ox4, VSS. No concerns, complaints or distress. Pt educated to call before getting out of bed. POC reviewed and white board updated. Tele box on. SR 73 on the monitor. Call light in reach. Bed locked in lowest position with 2 upper bed rails up. Bed alarm on.

## 2024-02-03 NOTE — CARE PLAN
The patient is Stable - Low risk of patient condition declining or worsening    Shift Goals  Clinical Goals: vss, control pain, maintain safety  Patient Goals: sleep    Progress made toward(s) clinical / shift goals:      Problem: Knowledge Deficit - Standard  Goal: Patient and family/care givers will demonstrate understanding of plan of care, disease process/condition, diagnostic tests and medications  Description: Target End Date:  1-3 days or as soon as patient condition allows    Document in Patient Education    1.  Patient and family/caregiver oriented to unit, equipment, visitation policy and means for communicating concern  2.  Complete/review Learning Assessment  3.  Assess knowledge level of disease process/condition, treatment plan, diagnostic tests and medications  4.  Explain disease process/condition, treatment plan, diagnostic tests and medications  Outcome: Progressing       Patient is not progressing towards the following goals:      Problem: Lifestyle Changes  Goal: Patient's ability to identify lifestyle changes and available resources to help reduce recurrence of condition will improve  Description: Target End Date:  1 to 3 days    1.  Discuss recommended lifestyle changes  2.  Identify available resources and support systems  3.  Consider referral to substance abuse program  Outcome: Not Progressing

## 2024-02-04 PROBLEM — R79.89 ELEVATED TROPONIN: Status: ACTIVE | Noted: 2024-02-04

## 2024-02-04 PROBLEM — E87.6 HYPOKALEMIA: Status: RESOLVED | Noted: 2024-02-02 | Resolved: 2024-02-04

## 2024-02-04 PROBLEM — T68.XXXA HYPOTHERMIA: Status: RESOLVED | Noted: 2024-02-02 | Resolved: 2024-02-04

## 2024-02-04 PROBLEM — J96.01 ACUTE RESPIRATORY FAILURE WITH HYPOXIA (HCC): Status: RESOLVED | Noted: 2024-02-02 | Resolved: 2024-02-04

## 2024-02-04 PROBLEM — T50.904A DRUG OVERDOSE OF UNDETERMINED INTENT, INITIAL ENCOUNTER: Status: RESOLVED | Noted: 2024-02-02 | Resolved: 2024-02-04

## 2024-02-04 PROBLEM — Z71.6 TOBACCO ABUSE COUNSELING: Status: RESOLVED | Noted: 2024-02-02 | Resolved: 2024-02-04

## 2024-02-04 PROBLEM — F10.921 ACUTE ALCOHOLIC INTOXICATION WITH DELIRIUM (HCC): Status: RESOLVED | Noted: 2024-02-02 | Resolved: 2024-02-04

## 2024-02-04 PROBLEM — G92.8 TOXIC METABOLIC ENCEPHALOPATHY: Status: RESOLVED | Noted: 2024-02-03 | Resolved: 2024-02-04

## 2024-02-04 PROBLEM — Z95.5 HX OF HEART ARTERY STENT: Status: ACTIVE | Noted: 2024-02-04

## 2024-02-04 LAB
BASOPHILS # BLD AUTO: 0.7 % (ref 0–1.8)
BASOPHILS # BLD: 0.07 K/UL (ref 0–0.12)
EOSINOPHIL # BLD AUTO: 0.45 K/UL (ref 0–0.51)
EOSINOPHIL NFR BLD: 4.3 % (ref 0–6.9)
ERYTHROCYTE [DISTWIDTH] IN BLOOD BY AUTOMATED COUNT: 56.7 FL (ref 35.9–50)
GLUCOSE BLD STRIP.AUTO-MCNC: 234 MG/DL (ref 65–99)
GLUCOSE BLD STRIP.AUTO-MCNC: 261 MG/DL (ref 65–99)
GLUCOSE BLD STRIP.AUTO-MCNC: 302 MG/DL (ref 65–99)
GLUCOSE BLD STRIP.AUTO-MCNC: 305 MG/DL (ref 65–99)
HCT VFR BLD AUTO: 38.7 % (ref 42–52)
HGB BLD-MCNC: 12.7 G/DL (ref 14–18)
IMM GRANULOCYTES # BLD AUTO: 0.03 K/UL (ref 0–0.11)
IMM GRANULOCYTES NFR BLD AUTO: 0.3 % (ref 0–0.9)
LYMPHOCYTES # BLD AUTO: 1.77 K/UL (ref 1–4.8)
LYMPHOCYTES NFR BLD: 17 % (ref 22–41)
MAGNESIUM SERPL-MCNC: 1.8 MG/DL (ref 1.5–2.5)
MCH RBC QN AUTO: 27.4 PG (ref 27–33)
MCHC RBC AUTO-ENTMCNC: 32.8 G/DL (ref 32.3–36.5)
MCV RBC AUTO: 83.6 FL (ref 81.4–97.8)
MONOCYTES # BLD AUTO: 0.79 K/UL (ref 0–0.85)
MONOCYTES NFR BLD AUTO: 7.6 % (ref 0–13.4)
NEUTROPHILS # BLD AUTO: 7.3 K/UL (ref 1.82–7.42)
NEUTROPHILS NFR BLD: 70.1 % (ref 44–72)
NRBC # BLD AUTO: 0 K/UL
NRBC BLD-RTO: 0 /100 WBC (ref 0–0.2)
PLATELET # BLD AUTO: 358 K/UL (ref 164–446)
PMV BLD AUTO: 9.5 FL (ref 9–12.9)
RBC # BLD AUTO: 4.63 M/UL (ref 4.7–6.1)
TROPONIN T SERPL-MCNC: 14 NG/L (ref 6–19)
TROPONIN T SERPL-MCNC: 14 NG/L (ref 6–19)
TROPONIN T SERPL-MCNC: 15 NG/L (ref 6–19)
TROPONIN T SERPL-MCNC: 20 NG/L (ref 6–19)
WBC # BLD AUTO: 10.4 K/UL (ref 4.8–10.8)

## 2024-02-04 PROCEDURE — A9270 NON-COVERED ITEM OR SERVICE: HCPCS | Performed by: STUDENT IN AN ORGANIZED HEALTH CARE EDUCATION/TRAINING PROGRAM

## 2024-02-04 PROCEDURE — 85025 COMPLETE CBC W/AUTO DIFF WBC: CPT

## 2024-02-04 PROCEDURE — 82962 GLUCOSE BLOOD TEST: CPT

## 2024-02-04 PROCEDURE — 700102 HCHG RX REV CODE 250 W/ 637 OVERRIDE(OP): Performed by: STUDENT IN AN ORGANIZED HEALTH CARE EDUCATION/TRAINING PROGRAM

## 2024-02-04 PROCEDURE — 700101 HCHG RX REV CODE 250: Performed by: STUDENT IN AN ORGANIZED HEALTH CARE EDUCATION/TRAINING PROGRAM

## 2024-02-04 PROCEDURE — 770020 HCHG ROOM/CARE - TELE (206)

## 2024-02-04 PROCEDURE — 700111 HCHG RX REV CODE 636 W/ 250 OVERRIDE (IP): Mod: JZ | Performed by: STUDENT IN AN ORGANIZED HEALTH CARE EDUCATION/TRAINING PROGRAM

## 2024-02-04 PROCEDURE — 99233 SBSQ HOSP IP/OBS HIGH 50: CPT | Performed by: STUDENT IN AN ORGANIZED HEALTH CARE EDUCATION/TRAINING PROGRAM

## 2024-02-04 PROCEDURE — 83735 ASSAY OF MAGNESIUM: CPT

## 2024-02-04 PROCEDURE — 84484 ASSAY OF TROPONIN QUANT: CPT

## 2024-02-04 PROCEDURE — 93005 ELECTROCARDIOGRAM TRACING: CPT | Performed by: STUDENT IN AN ORGANIZED HEALTH CARE EDUCATION/TRAINING PROGRAM

## 2024-02-04 PROCEDURE — 99254 IP/OBS CNSLTJ NEW/EST MOD 60: CPT | Performed by: INTERNAL MEDICINE

## 2024-02-04 PROCEDURE — C9113 INJ PANTOPRAZOLE SODIUM, VIA: HCPCS | Performed by: STUDENT IN AN ORGANIZED HEALTH CARE EDUCATION/TRAINING PROGRAM

## 2024-02-04 RX ORDER — ASPIRIN 325 MG
325 TABLET ORAL DAILY
Status: DISCONTINUED | OUTPATIENT
Start: 2024-02-04 | End: 2024-02-05

## 2024-02-04 RX ORDER — ASPIRIN 81 MG/1
81 TABLET ORAL DAILY
Qty: 100 TABLET | Refills: 2 | Status: SHIPPED | OUTPATIENT
Start: 2024-02-04 | End: 2024-02-05

## 2024-02-04 RX ORDER — ASPIRIN 81 MG/1
81 TABLET ORAL DAILY
Status: DISCONTINUED | OUTPATIENT
Start: 2024-02-04 | End: 2024-02-04

## 2024-02-04 RX ORDER — NICOTINE 21 MG/24HR
1 PATCH, TRANSDERMAL 24 HOURS TRANSDERMAL EVERY 24 HOURS
Qty: 30 PATCH | Refills: 2 | Status: SHIPPED | OUTPATIENT
Start: 2024-02-04 | End: 2024-02-05

## 2024-02-04 RX ADMIN — ENOXAPARIN SODIUM 40 MG: 100 INJECTION SUBCUTANEOUS at 17:12

## 2024-02-04 RX ADMIN — PANTOPRAZOLE SODIUM 40 MG: 40 INJECTION, POWDER, FOR SOLUTION INTRAVENOUS at 04:21

## 2024-02-04 RX ADMIN — NICOTINE TRANSDERMAL SYSTEM 21 MG: 21 PATCH, EXTENDED RELEASE TRANSDERMAL at 04:21

## 2024-02-04 RX ADMIN — GABAPENTIN 400 MG: 400 CAPSULE ORAL at 04:27

## 2024-02-04 RX ADMIN — INSULIN LISPRO 7 UNITS: 100 INJECTION, SOLUTION INTRAVENOUS; SUBCUTANEOUS at 12:15

## 2024-02-04 RX ADMIN — ATORVASTATIN CALCIUM 40 MG: 40 TABLET, FILM COATED ORAL at 04:22

## 2024-02-04 RX ADMIN — ASPIRIN 325 MG: 325 TABLET ORAL at 09:23

## 2024-02-04 RX ADMIN — Medication 100 MG: at 04:21

## 2024-02-04 RX ADMIN — INSULIN LISPRO 5 UNITS: 100 INJECTION, SOLUTION INTRAVENOUS; SUBCUTANEOUS at 07:58

## 2024-02-04 RX ADMIN — DICLOFENAC SODIUM 2 G: 10 GEL TOPICAL at 20:09

## 2024-02-04 RX ADMIN — CLOPIDOGREL BISULFATE 75 MG: 75 TABLET ORAL at 04:22

## 2024-02-04 RX ADMIN — INSULIN LISPRO 10 UNITS: 100 INJECTION, SOLUTION INTRAVENOUS; SUBCUTANEOUS at 07:58

## 2024-02-04 RX ADMIN — DICLOFENAC SODIUM 2 G: 10 GEL TOPICAL at 12:10

## 2024-02-04 RX ADMIN — INSULIN LISPRO 7 UNITS: 100 INJECTION, SOLUTION INTRAVENOUS; SUBCUTANEOUS at 20:08

## 2024-02-04 RX ADMIN — INSULIN LISPRO 5 UNITS: 100 INJECTION, SOLUTION INTRAVENOUS; SUBCUTANEOUS at 12:15

## 2024-02-04 RX ADMIN — INSULIN GLARGINE-YFGN 25 UNITS: 100 INJECTION, SOLUTION SUBCUTANEOUS at 17:33

## 2024-02-04 RX ADMIN — THERA TABS 1 TABLET: TAB at 04:22

## 2024-02-04 RX ADMIN — FOLIC ACID 1 MG: 1 TABLET ORAL at 04:22

## 2024-02-04 RX ADMIN — LISINOPRIL 10 MG: 10 TABLET ORAL at 04:22

## 2024-02-04 RX ADMIN — DICLOFENAC SODIUM 2 G: 10 GEL TOPICAL at 17:21

## 2024-02-04 RX ADMIN — ESCITALOPRAM OXALATE 10 MG: 10 TABLET ORAL at 04:21

## 2024-02-04 RX ADMIN — INSULIN LISPRO 4 UNITS: 100 INJECTION, SOLUTION INTRAVENOUS; SUBCUTANEOUS at 17:33

## 2024-02-04 RX ADMIN — GABAPENTIN 400 MG: 400 CAPSULE ORAL at 17:13

## 2024-02-04 RX ADMIN — PANTOPRAZOLE SODIUM 40 MG: 40 INJECTION, POWDER, FOR SOLUTION INTRAVENOUS at 17:13

## 2024-02-04 RX ADMIN — ACETAMINOPHEN 650 MG: 325 TABLET, FILM COATED ORAL at 08:03

## 2024-02-04 ASSESSMENT — ENCOUNTER SYMPTOMS
WEAKNESS: 1
COUGH: 0
MYALGIAS: 0
VOMITING: 0
HEADACHES: 0
PALPITATIONS: 0
NAUSEA: 0
CHILLS: 0
DIZZINESS: 0
SHORTNESS OF BREATH: 0
FEVER: 0
ABDOMINAL PAIN: 0

## 2024-02-04 NOTE — HOSPITAL COURSE
Rogelio Escudero is a 49 y.o. male, who presented on 2/2/2024 with altered mental status.  This is a pleasant gentleman with a history of diabetes mellitus on insulin, hyperlipidemia, hypertension, peripheral vascular disease status post right below the knee amputation, prior MRSA bacteremia with wound infection and osteomyelitis status post left first toe resection, polysubstance use disorder, smoking, and prior AMA's from the hospital.  The patient was found in a [employer] parking lot unresponsive.  EMS was called and the patient was noted to have a [place] Coma Scale of 3 at the scene.  Reportedly received CPR and received IM and IV Narcan with improvement of his mentation.  He was brought to the emergency room for further evaluation, where he was found to have a glucose level of 527, blood alcohol level of 66.  Urine drug screen was positive for cocaine and fentanyl.  Head CT was negative.  He also was noted to be hypothermic with temperature of 96.3 Fahrenheit.

## 2024-02-04 NOTE — ASSESSMENT & PLAN NOTE
02/03/24  Secondary to unintentional drug overdose.  Appears to be improving.  Still very lethargic.  High risk of deterioration.  Continuous telemetry monitoring and continuous pulse oximetry to monitor for respiratory depression and hypoxia.

## 2024-02-04 NOTE — PROGRESS NOTES
Assumed care on patient post bedside report. Wakes up to touch. In no apparent distress. On RA. Denies pain. Vss.    Tele on and heart rhythm and rate checked on monitor.    Informed of plan of care, monitor v/s, lab values, maintain comfort and injury free.    Safety precautions in place are nonskid sock on, padded side rails up x 3, bed to lowest level, alarms on. Appropriate to use call light for needs and assistance. Will round hourly and as needed.

## 2024-02-04 NOTE — CARE PLAN
The patient is Stable - Low risk of patient condition declining or worsening    Shift Goals  Clinical Goals: STABLE V/S, MONITOR BG, MAINTAIN SAFETY  Patient Goals: REST,FOOD    Progress made toward(s) clinical / shift goals:      Problem: Knowledge Deficit - Standard  Goal: Patient and family/care givers will demonstrate understanding of plan of care, disease process/condition, diagnostic tests and medications  Description: Target End Date:  1-3 days or as soon as patient condition allows    Document in Patient Education    1.  Patient and family/caregiver oriented to unit, equipment, visitation policy and means for communicating concern  2.  Complete/review Learning Assessment  3.  Assess knowledge level of disease process/condition, treatment plan, diagnostic tests and medications  4.  Explain disease process/condition, treatment plan, diagnostic tests and medications  Outcome: Progressing     Problem: Fall Risk  Goal: Patient will remain free from falls  Description: Target End Date:  Prior to discharge or change in level of care    Document interventions on the [place] [facility] Fall Risk Assessment    1.  Assess for fall risk factors  2.  Implement fall precautions  Outcome: Progressing     Problem: Pain - Standard  Goal: Alleviation of pain or a reduction in pain to the patient’s comfort goal  Description: Target End Date:  Prior to discharge or change in level of care    Document on Vitals flowsheet    1.  Document pain using the appropriate pain scale per order or unit policy  2.  Educate and implement non-pharmacologic comfort measures (i.e. relaxation, distraction, massage, cold/heat therapy, etc.)  3.  Pain management medications as ordered  4.  Reassess pain after pain med administration per policy  5.  If opiods administered assess patient's response to pain medication is appropriate per POSS sedation scale  6.  Follow pain management plan developed in collaboration with patient and interdisciplinary team  (including palliative care or pain specialists if applicable)  Outcome: Progressing     Problem: Skin Integrity  Goal: Skin integrity is maintained or improved  Description: Target End Date:  Prior to discharge or change in level of care    Document interventions on Skin Risk/Rogelio flowsheet groups and corresponding LDA    1.  Assess and monitor skin integrity, appearance and/or temperature  2.  Assess risk factors for impaired skin integrity and/or pressures ulcers  3.  Implement precautions to protect skin integrity in collaboration with interdisciplinary team  4.  Implement pressure ulcer prevention protocol if at risk for skin breakdown  5.  Confirm wound care consult if at risk for skin breakdown  6.  Ensure patient use of pressure relieving devices  (Low air loss bed, waffle overlay, heel protectors, ROHO cushion, etc)  Outcome: Progressing       Patient is not progressing towards the following goals:

## 2024-02-04 NOTE — FACE TO FACE
Face to Face Note  -  Durable Medical Equipment    Ariel Deleon M.D. - NPI: [id number]  I certify that this patient is under my care and that they had a durable medical equipment(DME)face to face encounter by myself that meets the physician DME face-to-face encounter requirements with this patient on:    Date of encounter:   Patient:                    MRN:                       YOB: 2026  Rogelio Escudero  [id number]  1975     The encounter with the patient was in whole, or in part, for the following medical condition, which is the primary reason for durable medical equipment:  Other - right below the knee amputation    I certify that, based on my findings, the following durable medical equipment is medically necessary:    Wheelchair   Patient needs manual wheelchair for use inside the home based on the above diagnosis. Per guidelines patient meets criteria in the following ways:   A.  Patient has significant impairment in the following Toileting and Bathing and is is unable to complete these tasks in a reasonable timeframe and places the patient at a heightened risk of morbidity.   B.  The patient's mobility limitations cannot be sufficiently resolved by use of  fitted cane or walker.   C.  The patient reports his home provides adequate access between rooms,  maneuvering space, and surfaces for use of the manual wheelchair that is  provided.   D.  The use of the manual wheelchair will significantly improve the patient's  ability to participate in MRADLs and the patient will use it on a regular basis in  the home.   E.  The patient has not expressed an unwillingness to use the manual  wheelchair.   F. The patient has limitations of strength, endurance, range of motion, or coordination per OT notes:.    My Clinical findings support the need for the above equipment due to:  Abnormal Gait

## 2024-02-04 NOTE — DISCHARGE PLANNING
1152  DPA received faxed choice form(s). DPA filed choice in Pt Media file. Awaiting MD orders.     1410  Agency/Facility Name: [facility]  Sent Referral per Choice Form at: 1410 pm

## 2024-02-04 NOTE — PROGRESS NOTES
Monitor summary:        Rhythm: SR   Rate: 72-77  Ectopy: (r)PVC  Measurements: 15./.08/.38        12hr chart check

## 2024-02-04 NOTE — DISCHARGE INSTRUCTIONS
Thank you for coming to [facility].  It has been my pleasure to take care of you!    Please follow-up with your primary care provider in 3-5 days.    Consider attending Narcotics Anonymous meetings to stay sober.    Start glargine insulin 25 units in the mornings.  Increase or decrease 1 to 2 units every 1-2 days for fasting glucose in the morning goal of 100-120.      If signs of hypoglycemia such as shaking, dizziness, sweating, and extreme fatigue, drink sugar containing drinks such as orange juice or eat candy.

## 2024-02-04 NOTE — DISCHARGE SUMMARY
Discharge Summary    CHIEF COMPLAINT ON ADMISSION  Chief Complaint   Patient presents with    Drug Overdose     Pt was found in a [employer] parking lot unresponsive. EMS was called. Initially patient was found with a GCS of 3. During transport to hospital patient was given .5mg IV narcan and .5mg IM narcan. Mentation improved. On arrival to the ER patient was found sitting up in the gurney, drowsy but responding to voice and answering questions. EMS reported a blood sugar of 436.       Reason for Admission  Acute toxic metabolic encephalopathy due to unintentional drug overdose    Admission Date  2/2/2024    CODE STATUS  Full Code    HPI & HOSPITAL COURSE  Rogelio Escudero is a 49 y.o. male, who presented on 2/2/2024 with altered mental status.  This is a pleasant gentleman with a history of diabetes mellitus on insulin, hyperlipidemia, hypertension, peripheral vascular disease status post right below the knee amputation, prior MRSA bacteremia with wound infection and osteomyelitis status post left first toe resection, polysubstance use disorder, smoking, and prior AMA's from the hospital.  The patient was found in a [employer] parking lot unresponsive.  EMS was called and the patient was noted to have a [place] Coma Scale of 3 at the scene.  Reportedly received CPR and received IM and IV Narcan with improvement of his mentation.  He was brought to the emergency room for further evaluation, where he was found to have a glucose level of 527, blood alcohol level of 66.  Urine drug screen was positive for cocaine and fentanyl.  Head CT was negative.  He also was noted to be hypothermic with temperature of 96.3 Fahrenheit.    Patient was admitted to the telemetry floor for further close monitoring and care.  Patient's drug screen was notably positive positive for cocaine and fentanyl.  The patient denied any intentional use of either stating that he only used cannabis.  Interestingly, cannabinoids were negative.  Per  nursing and care staff found cocaine in his room, which was confiscated.  Patient's blood Stroger was initially 527, which decreased to the 100s to 200s with initiation of insulin.  Patient's encephalopathy resolved and was about to be discharged.  However, developed some chest pain, which was partially reproducible.  Serial troponins increased to 24 and then decreased to 16.  No significant ischemic changes on EKG.  Patient stated that he had a stent placed 1 year ago was initially taking aspirin and clopidogrel and was no longer taking aspirin.  He reported continued to take his clopidogrel.  Cardiology was consulted and felt that the patient would not be a candidate for left heart catheterization unless he had a ST elevation myocardial infarction.  Recommended avoiding use of beta-blockers given his ongoing cocaine use, which was also present at [facility] in 2021.  Recommended monotherapy with clopidogrel lifelong therapy.  Patient was counseled on cessation of polysubstances.  Patient was referred to Narcotics Anonymous for continued sustained sobriety.  Patient's chest pain did improve somewhat with diclofenac gel.    Patient was evaluated by physical therapy who recommended a wheelchair as well as outpatient follow-up for prosthesis.  No postacute recommendations were made.    Patient's hemoglobin A1c was 10.6 consistent with uncontrolled diabetes mellitus type 2.  The patient had lost his medication and was provided metformin as well as insulin with needles prior to discharge.    The patient had losses right lower extremity prosthesis, and he was provided with a wheelchair prior to discharge.  He was discharged to his home with his girlfriend.  He was provided meds to beds prescriptions prior to discharge.      Therefore, he is discharged in good and stable condition to home with close outpatient follow-up.    The patient met 2-midnight criteria for an inpatient stay at the time of discharge.    Discharge  Date  2/5/2024    FOLLOW UP ITEMS POST DISCHARGE  -Follow-up with primary care provider in 3 to 5 days.  -Attend Narcotics Anonymous meetings.    DISCHARGE DIAGNOSES  Principal Problem:    Drug overdose of undetermined intent, initial encounter (POA: Yes)  Active Problems:    Acute costochondritis (POA: Yes)    Coronary artery vasospasm (HCC) (POA: Yes)    Opioid overdose (HCC) (POA: Yes)    Respiratory arrest (HCC) (POA: Yes)    Cocaine overdose, accidental or unintentional, initial encounter (HCC) (POA: Unknown)    Uncontrolled type 2 diabetes mellitus with hyperglycemia, with long-term current use of insulin (HCC) (POA: Yes)    Hyperosmolar hyperglycemic state (HHS) (HCC) (POA: Yes)    PVD (peripheral vascular disease) (HCC) (POA: Yes)    Mood disorder (HCC) (POA: Yes)    GERD (gastroesophageal reflux disease) (POA: Yes)    S/P BKA (below knee amputation) unilateral, right (HCC) (POA: Yes)    Hypertension (POA: Yes)  Resolved Problems:    Acute respiratory failure with hypoxia (HCC) (POA: Yes)    Toxic metabolic encephalopathy (POA: Yes)    Hx of heart artery stent (POA: Yes)    Elevated troponin (POA: No)    Acute alcoholic intoxication with delirium (HCC) (POA: Yes)    Tobacco abuse counseling (POA: Yes)    Hypokalemia (POA: Yes)    Hypothermia (POA: Yes)    Coronary artery disease due to lipid rich plaque - PCI to  2022 with RCA and diagnaal stenosis (Chronic) (POA: Unknown)      FOLLOW UP  No future appointments.  [address] [address]  [phone]  Follow up in 3 day(s)        MEDICATIONS ON DISCHARGE     Medication List        Start taking these medications        Instructions   Alcohol Prep 70 % Pads   Doctor's comments: Per formulary preference. ICD-10 code: E11.65 Uncontrolled type 2 Diabetes Mellitus  Wipe site with prep pad prior to injection.     BD Pen Needle Mary U/F  Generic drug: Insulin Pen Needle 32 G x 4 mm   Doctor's comments: Per patient/formulary  preference. ICD-10 code: E11.65 Uncontrolled type 2 Diabetes Mellitus  Use one pen needle in pen device to inject insulin once daily.     diclofenac sodium 1 % Gel  Commonly known as: Voltaren   Apply 2 g topically 4 times a day as needed (chest pain).  Dose: 2 g     nicotine 21 MG/24HR Pt24  Commonly known as: Nicoderm   Place 1 Patch on the skin every 24 hours.  Dose: 1 Patch     nicotine polacrilex 2 MG Gum  Commonly known as: Nicorette   Take 1 Each by mouth every 1 hour as needed for Smoking Cessation (For nicotine urge).  Dose: 2 mg     TechLite Lancets Misc   Doctor's comments: Or per formulary preference. ICD-10 code: E11.65 Uncontrolled type 2 Diabetes Mellitus  Use one lancet to test blood sugar twice daily before meals.     True Metrix Blood Glucose Test strip  Generic drug: glucose blood   Doctor's comments: Or per formulary preference. ICD-10 code: E11.65 Uncontrolled type 2 Diabetes Mellitus  Use one strip to test blood sugar twice daily before meals.     True Metrix Meter w/Device Kit   Doctor's comments: Or per formulary preference. ICD-10 code: E11.65 Uncontrolled type 2 Diabetes Mellitus  Test blood sugar as recommended by provider.            Continue taking these medications        Instructions   atorvastatin 40 MG Tabs  Commonly known as: Lipitor   Take 1 Tablet by mouth every day.  Dose: 40 mg     clopidogrel 75 MG Tabs  Commonly known as: Plavix   Take 1 Tablet by mouth every day.  Dose: 75 mg     escitalopram 10 MG Tabs  Commonly known as: Lexapro   Take 1 Tablet by mouth every day.  Dose: 10 mg     gabapentin 400 MG Caps  Commonly known as: Neurontin   Take 1 Capsule by mouth 2 times a day.  Dose: 400 mg     Jardiance 25 MG Tabs  Generic drug: Empagliflozin   Take 1 tablet by mouth every day.  Dose: 25 mg     Lantus SoloStar 100 UNIT/ML Sopn injection  Generic drug: insulin glargine   Inject 25 Units under the skin every day.  (Inject 25 Units under the skin every day.)  Dose: 25 Units      lisinopril 10 MG Tabs  Commonly known as: Prinivil   Take 1 Tablet by mouth every day.  Dose: 10 mg     metformin 1000 MG tablet  Commonly known as: Glucophage   Take 1 Tablet by mouth 2 times a day with meals.  Dose: 1,000 mg     pantoprazole 40 MG Tbec  Commonly known as: Protonix   Take 1 Tablet by mouth every day.  Dose: 40 mg            Stop taking these medications      hydrOXYzine pamoate 25 MG Caps  Commonly known as: Vistaril              Allergies  Allergies   Allergen Reactions    Apple Hives    Lactose        DIET  Orders Placed This Encounter   Procedures    Diet Order Diet: Consistent CHO (Diabetic) (no lactose)     Standing Status:   Standing     Number of Occurrences:   1     Order Specific Question:   Diet:     Answer:   Consistent CHO (Diabetic) [4]     Comments:   no lactose       ACTIVITY  As tolerated.  Weight bearing as tolerated    CONSULTATIONS  Cardiology    PROCEDURES  None    LABORATORY  Lab Results   Component Value Date    SODIUM 134 (L) 02/03/2024    POTASSIUM 4.1 02/03/2024    CHLORIDE 103 02/03/2024    CO2 21 02/03/2024    GLUCOSE 220 (H) 02/03/2024    BUN 10 02/03/2024    CREATININE 0.71 02/03/2024        Lab Results   Component Value Date    WBC 10.4 02/04/2024    HEMOGLOBIN 12.7 (L) 02/04/2024    HEMATOCRIT 38.7 (L) 02/04/2024    PLATELETCT 358 02/04/2024        Total time of the discharge process 36 minutes.

## 2024-02-04 NOTE — PROGRESS NOTES
Hospital Medicine Daily Progress Note    Date of Service  2/3/2024    Chief Complaint  Rogelio Escudero is a 49 y.o. male admitted 2/2/2024 with altered mental status due to drug overdose.    Hospital Course  Rogelio Escudero is a 49 y.o. male, who presented on 2/2/2024 with altered mental status.  This is a pleasant gentleman with a history of diabetes mellitus on insulin, hyperlipidemia, hypertension, peripheral vascular disease status post right below the knee amputation, prior MRSA bacteremia with wound infection and osteomyelitis status post left first toe resection, polysubstance use disorder, smoking, and prior AMA's from the hospital.  The patient was found in a [employer] parking lot unresponsive.  EMS was called and the patient was noted to have a Mahendra Coma Scale of 3 at the scene.  Reportedly received CPR and received IM and IV Narcan with improvement of his mentation.  He was brought to the emergency room for further evaluation, where he was found to have a glucose level of 527, blood alcohol level of 66.  Urine drug screen was positive for cocaine and fentanyl.  Head CT was negative.  He also was noted to be hypothermic with temperature of 96.3 Fahrenheit.    Interval Problem Update  02/03/24  Patient was seen and examined on the telemetry floor.  He continues on continuous cardiac monitoring and continuous pulse oximetry monitoring.  He is very lethargic.  He is alert and orient x 3.  He states that he obtained marijuana and did not intentionally take fentanyl.  He does report cocaine use.  Blood glucose improving to 146.  On glargine insulin 25 units in lispro insulin sign scale.  He states that his previous amputations were related to complications of his diabetes.    I have discussed this patient's plan of care and discharge plan at IDT rounds today with Case Management, Nursing, Nursing leadership, and other members of the IDT team.    Consultants/Specialty  None    Code Status  Full  Code    Disposition  The patient is not medically cleared for discharge to home or a post-acute facility.  Anticipate discharge to: home with close outpatient follow-up    I have placed the appropriate orders for post-discharge needs.    Review of Systems  Review of Systems   Constitutional:  Negative for chills and fever.   Respiratory:  Negative for cough and shortness of breath.    Cardiovascular:  Negative for chest pain and palpitations.   Gastrointestinal:  Negative for abdominal pain, nausea and vomiting.   Musculoskeletal:  Negative for joint pain and myalgias.   Neurological:  Positive for weakness. Negative for dizziness and headaches.        Physical Exam  Temp:  [36.3 °C (97.3 °F)-36.7 °C (98.1 °F)] 36.4 °C (97.5 °F)  Pulse:  [68-93] 68  Resp:  [12-18] 18  BP: (102-130)/(61-83) 102/71  SpO2:  [93 %-98 %] 96 %    Physical Exam  Vitals and nursing note reviewed.   Constitutional:       General: He is not in acute distress.     Appearance: He is ill-appearing and toxic-appearing. He is not diaphoretic.   HENT:      Head: Normocephalic and atraumatic.      Mouth/Throat:      Mouth: Mucous membranes are moist.      Pharynx: Oropharynx is clear. No oropharyngeal exudate.   Eyes:      General: No scleral icterus.        Right eye: No discharge.         Left eye: No discharge.      Conjunctiva/sclera: Conjunctivae normal.   Cardiovascular:      Rate and Rhythm: Normal rate and regular rhythm.      Pulses: Normal pulses.      Heart sounds: Normal heart sounds. No murmur heard.  Pulmonary:      Effort: Pulmonary effort is normal. No respiratory distress.      Breath sounds: Normal breath sounds.   Abdominal:      General: Abdomen is flat. Bowel sounds are normal. There is no distension.      Palpations: Abdomen is soft.   Musculoskeletal:         General: Deformity present. No swelling.      Cervical back: Neck supple. No tenderness.      Right lower leg: No edema.      Left lower leg: No edema.      Comments:  Left below the knee amputation, left toe amputation, incisions clean dry and intact   Skin:     General: Skin is warm and dry.      Coloration: Skin is pale.   Neurological:      Mental Status: He is alert and oriented to person, place, and time. Mental status is at baseline.      Motor: Weakness present.   Psychiatric:         Thought Content: Thought content normal.         Judgment: Judgment normal.         Fluids    Intake/Output Summary (Last 24 hours) at 2/3/2024 1636  Last data filed at 2/3/2024 1345  Gross per 24 hour   Intake 1640 ml   Output 1150 ml   Net 490 ml       Laboratory  Recent Labs     02/02/24  1011 02/03/24  0346   WBC 13.4* 12.5*   RBC 5.36 4.58*   HEMOGLOBIN 14.9 12.5*   HEMATOCRIT 45.2 39.2*   MCV 84.3 85.6   MCH 27.8 27.3   MCHC 33.0 31.9*   RDW 56.0* 57.9*   PLATELETCT 461* 370   MPV 9.5 9.9     Recent Labs     02/02/24  1011 02/03/24  0346   SODIUM 134* 141   POTASSIUM 3.3* 4.6   CHLORIDE 96 107   CO2 20 26   GLUCOSE 527* 97   BUN 10 13   CREATININE 0.94 0.83   CALCIUM 9.3 8.9     Recent Labs     02/02/24  1011   INR 0.93               Imaging  CT-HEAD W/O   Final Result      No acute intracranial abnormality detected.         DX-CHEST-PORTABLE (1 VIEW)   Final Result         Hazy bibasilar opacities, atelectasis or infection.           Assessment/Plan  * Drug overdose of undetermined intent, initial encounter- (present on admission)  Assessment & Plan  UDS positive for cocaine, amphetamine  Cessation strongly advised  IVF  As needed Narcan  Admitted to telemetry unit for close monitoring    02/03/24  Patient is still very lethargic.  He is at high risk of deterioration.  Continuous pulse oximetry monitoring to monitor for hypoxia.  Continues telemetry monitoring to monitor for arrhythmias.    Toxic metabolic encephalopathy- (present on admission)  Assessment & Plan  02/03/24  Secondary to unintentional drug overdose.  Appears to be improving.  Still very lethargic.  High risk of  deterioration.  Continuous telemetry monitoring and continuous pulse oximetry to monitor for respiratory depression and hypoxia.    Acute respiratory failure with hypoxia (HCC)- (present on admission)  Assessment & Plan  On 2L --wean off as tolerated  Procalcitonin WNL  Likely secondary to drug overdose  As needed narcan  As needed nebs    02/03/24  Continues on 2 LPM oxygen by nasal cannula.  Continue to wean off as tolerated.  Continuous pulse oximetry monitoring.  Incentive spirometry    Hypertension- (present on admission)  Assessment & Plan  lisinopril    Hypothermia- (present on admission)  Assessment & Plan  RESOLVED  Temp 96.3F in ED    S/P BKA (below knee amputation) unilateral, right (HCC)- (present on admission)  Assessment & Plan  As noted in PVD    GERD (gastroesophageal reflux disease)  Assessment & Plan  PPI    Hypokalemia  Assessment & Plan  replace    Mood disorder ([facility])  Assessment & Plan  SSRI    Tobacco abuse counseling- (present on admission)  Assessment & Plan  Admits to smoking as much as 1 pack of cigarettes daily  Cessation counseling provided: 4 minutes  Offered nicotine patch, nicotine gum, Chantix as alternative.  Provided patient with standard tobacco cessation information per protocol    PVD (peripheral vascular disease) ([facility])- (present on admission)  Assessment & Plan  Continue Plavix, Lipitor  Smoking cessation advised    Acute alcoholic intoxication with delirium ([facility])- (present on admission)  Assessment & Plan  Detox bag  Multivitamins  CIWA    02/03/24  CIWA scores of 0.  He does not appear to be withdrawing from alcohol.    Hyperosmolar hyperglycemic state (HHS) ([facility])- (present on admission)  Assessment & Plan  CMP glucose 527, anion gap 18  S/p insulin 5units IV  S/p 1500cc LR bolus  Continue IVF @200cc/hr  Continue Lant, KATI  DM education      02/03/24  Improving  Continue glargine insulin 25 units every evening  Lispro insulin sign scale with scheduled insulin  Hypoglycemia  protocol  Diabetic diet         VTE prophylaxis:    enoxaparin ppx      I have performed a physical exam and reviewed and updated ROS and Plan today (2/3/2024). In review of yesterday's note (2/2/2024), there are no changes except as documented above.

## 2024-02-04 NOTE — PROGRESS NOTES
Bedside report received from NOC RN. Assumed care of pt. Pt awake, laying in bed. A/Ox4, VSS. No concerns, complaints or distress. Pt educated to call before getting out of bed. POC reviewed and white board updated. Tele box on. SR 75 on the monitor. Call light in reach. Bed locked in lowest position with 2 upper bed rails up. Bed alarm on.

## 2024-02-04 NOTE — CONSULTS
Reason for Consult:  Asked by Dr Ariel Deleon M.D. to see this patient with chest pain  Patient's PCP: Pcp Pt States None    CC:   Chief Complaint   Patient presents with    Drug Overdose     Pt was found in a [employer] parking lot unresponsive. EMS was called. Initially patient was found with a GCS of 3. During transport to hospital patient was given .5mg IV narcan and .5mg IM narcan. Mentation improved. On arrival to the ER patient was found sitting up in the gurney, drowsy but responding to voice and answering questions. EMS reported a blood sugar of 436.       HPI: This is a 49-year-old gentleman with a history of coronary disease status post stenting to his obtuse marginal with three-vessel coronary disease RCA  and diagonal stenosis which was diagnosed in 2022 in the setting of chest pains and [place] ultimately more likely pericarditis rather than coronary disease at the time who had altered mental status history of diabetes on insulin peripheral arterial disease who has been seen here on multiple occasions due to known cardiac concerns and then at other hospitals for cardiac evaluation in the past.  He is positive for cocaine.    Medications / Drug list prior to admission:  No current facility-administered medications on file prior to encounter.     Current Outpatient Medications on File Prior to Encounter   Medication Sig Dispense Refill    pantoprazole (PROTONIX) 40 MG Tablet Delayed Response Take 40 mg by mouth every day.      atorvastatin (LIPITOR) 40 MG Tab Take 40 mg by mouth every day.      escitalopram (LEXAPRO) 10 MG Tab Take 10 mg by mouth every day.      insulin glargine (LANTUS SOLOSTAR) 100 UNIT/ML Solution Pen-injector injection Inject 25 Units under the skin every day.      Empagliflozin (JARDIANCE) 25 MG Tab Take 25 mg by mouth every day.      metformin (GLUCOPHAGE) 1000 MG tablet Take 1,000 mg by mouth 2 times a day with meals.      lisinopril (PRINIVIL) 10 MG Tab Take 10 mg by mouth every  day.      hydrOXYzine pamoate (VISTARIL) 25 MG Cap Take 25 mg by mouth every 8 hours as needed for Itching or Anxiety.      gabapentin (NEURONTIN) 400 MG Cap Take 400 mg by mouth 2 times a day.         Current list of administered Medications:    Current Facility-Administered Medications:     aspirin (Asa) tablet 325 mg, 325 mg, Oral, DAILY, Ariel Deleon M.D., 325 mg at 02/04/24 0923    diclofenac sodium (Voltaren) 1 % gel 2 g, 2 g, Topical, 4X/DAY, Ariel Deleon M.D., 2 g at 02/04/24 1210    LR (Bolus) infusion 500 mL, 500 mL, Intravenous, Once PRN, Jasiel Jacobo M.D.    acetaminophen (Tylenol) tablet 650 mg, 650 mg, Oral, Q6HRS PRN, Jasiel Jacobo M.D., 650 mg at 02/04/24 0803    labetalol (Normodyne/Trandate) injection 10 mg, 10 mg, Intravenous, Q4HRS PRN, Jasiel Jacobo M.D.    ondansetron (Zofran) syringe/vial injection 4 mg, 4 mg, Intravenous, Q4HRS PRN, Jasiel Jacobo M.D.    ondansetron (Zofran ODT) dispertab 4 mg, 4 mg, Oral, Q4HRS PRN, Jasiel Jacobo M.D.    promethazine (Phenergan) tablet 12.5-25 mg, 12.5-25 mg, Oral, Q4HRS PRN, Jasiel Jacobo M.D.    promethazine (Phenergan) suppository 12.5-25 mg, 12.5-25 mg, Rectal, Q4HRS PRN, Jasiel Jacobo M.D.    prochlorperazine (Compazine) injection 5-10 mg, 5-10 mg, Intravenous, Q4HRS PRN, Jasiel Jacobo M.D.    guaiFENesin dextromethorphan (Robitussin DM) 100-10 MG/5ML syrup 10 mL, 10 mL, Oral, Q6HRS PRN, Jasiel Jacobo M.D.    senna-docusate (Pericolace Or Senokot S) 8.6-50 MG per tablet 2 Tablet, 2 Tablet, Oral, Q EVENING **AND** polyethylene glycol/lytes (Miralax) Packet 1 Packet, 1 Packet, Oral, QDAY PRN, Jasiel Jacobo M.D.    LORazepam (Ativan) tablet 0.5 mg, 0.5 mg, Oral, Q4HRS PRN, Jasiel Jacobo M.D.    LORazepam (Ativan) tablet 1 mg, 1 mg, Oral, Q4HRS PRN **OR** LORazepam (Ativan) injection 0.5 mg, 0.5 mg, Intravenous, Q4HRS PRN, Jasiel Jacobo M.D.    LORazepam (Ativan) tablet 2 mg, 2 mg, Oral, Q2HRS PRN **OR** LORazepam (Ativan) injection 1 mg, 1 mg,  Intravenous, Q2HRS PRN, Jasiel Jacobo M.D.    LORazepam (Ativan) tablet 3 mg, 3 mg, Oral, Q HOUR PRN **OR** LORazepam (Ativan) injection 1.5 mg, 1.5 mg, Intravenous, Q HOUR PRN, Jasiel Jacobo M.D.    LORazepam (Ativan) tablet 4 mg, 4 mg, Oral, Q15 MIN PRN **OR** LORazepam (Ativan) injection 2 mg, 2 mg, Intravenous, Q15 MIN PRN, Jasiel Jacobo M.D.    [COMPLETED] Rally Bag IV (D5LR 1000 mL + Thiamine 100 mg + Folic Acid 1 mg + Magnesium Sulfate 1 g) infusion, , Intravenous, Once, Last Rate: 250 mL/hr at 02/02/24 1545, New Bag at 02/02/24 1545 **AND** thiamine (Vitamin B-1) tablet 100 mg, 100 mg, Oral, DAILY, 100 mg at 02/04/24 0421 **AND** multivitamin tablet 1 Tablet, 1 Tablet, Oral, DAILY, 1 Tablet at 02/04/24 0422 **AND** folic acid (Folvite) tablet 1 mg, 1 mg, Oral, DAILY, Jasiel Jacobo M.D., 1 mg at 02/04/24 0422    atorvastatin (Lipitor) tablet 40 mg, 40 mg, Oral, DAILY, Jasiel Jacobo M.D., 40 mg at 02/04/24 0422    clopidogrel (Plavix) tablet 75 mg, 75 mg, Oral, DAILY, Jasiel Jacobo M.D., 75 mg at 02/04/24 0422    escitalopram (Lexapro) tablet 10 mg, 10 mg, Oral, DAILY, Jasiel Jacobo M.D., 10 mg at 02/04/24 0421    gabapentin (Neurontin) capsule 400 mg, 400 mg, Oral, BID, Jasiel Jacobo M.D., 400 mg at 02/04/24 0427    hydrOXYzine HCl (Atarax) tablet 25 mg, 25 mg, Oral, Q8HRS PRN, Jasiel Jacobo M.D.    lisinopril (Prinivil) tablet 10 mg, 10 mg, Oral, DAILY, Jasiel Jacobo M.D., 10 mg at 02/04/24 0422    pantoprazole (Protonix) injection 40 mg, 40 mg, Intravenous, BID, Jasiel Jacobo M.D., 40 mg at 02/04/24 0421    insulin GLARGINE (Lantus,Semglee) injection, 25 Units, Subcutaneous, Q EVENING, 25 Units at 02/03/24 1705 **AND** insulin lispro (HumaLOG,AdmeLOG) injection, 0.2 Units/kg/day, Subcutaneous, TID AC, 5 Units at 02/04/24 1215 **AND** insulin lispro (HumaLOG,AdmeLOG) injection, 3-14 Units, Subcutaneous, 4X/DAY ACHS, 7 Units at 02/04/24 1215 **AND** POC blood glucose manual result, , , Q AC AND BEDTIME(S)  **AND** NOTIFY MD and PharmD, , , Once **AND** Administer 20 grams of glucose (approximately 8 ounces of fruit juice) every 15 minutes PRN FSBG less than 70 mg/dL, , , PRN **AND** dextrose 50% (D50W) injection 25 g, 25 g, Intravenous, Q15 MIN PRN, Jasiel Jacobo M.D.    0.9 % NaCl with KCl 40 mEq 1,000 mL, , Intravenous, Continuous, Jasiel Jacobo M.D., Last Rate: 200 mL/hr at 02/03/24 0246, New Bag at 02/03/24 0246    enoxaparin (Lovenox) inj 40 mg, 40 mg, Subcutaneous, DAILY AT 1800, Jasiel Jacobo M.D., 40 mg at 02/03/24 1851    nicotine (Nicoderm) 21 MG/24HR 21 mg, 21 mg, Transdermal, Daily-0600, 21 mg at 02/04/24 0421 **AND** Nicotine Replacement Patient Education Materials, , , Once **AND** nicotine polacrilex (Nicorette) 2 MG piece 2 mg, 2 mg, Oral, Q HOUR PRN, Jasiel Jacobo M.D.    Respiratory Therapy Consult, , Nebulization, Continuous RT, Jasiel Jacobo M.D.    ipratropium-albuterol (DUONEB) nebulizer solution, 3 mL, Nebulization, Q2HRS PRN (RT), Jasiel Jacobo M.D.    Past Medical History   has a past medical history of Alcohol abuse, Dental disorder, Depression (2010), Diabetes, Diabetic neuropathy (HCC), Heart burn, Hemorrhagic disorder (HCC), Hiatus hernia syndrome, High cholesterol, Hypertension, Pain, Pneumonia (approx 2011), PTSD (post-traumatic stress disorder), and PVD (peripheral vascular disease) ([facility]).     Surgical History   has a past surgical history that includes irrigation & debridement general (Right, 2/6/2020); other; pr shldr arthroscop,surg,w/rotat cuff repr (Right, 11/18/2021); pr shldr arthroscop,part acromioplas (Right, 11/18/2021); pr arthroscopy shoulder surgical biceps tenodes* (Right, 11/18/2021); debridement, labrum, hip, arthroscopic (Right, 11/18/2021); and femoral popliteal bypass (Right, 7/14/2023).     Patient family history was personally reviewed, no pertinent family history to current presentation     Social history smoking cocaine use fentanyl alcohol  abuse        ALLERGIES:  Allergies   Allergen Reactions    Apple Hives    Lactose        Review of systems:  A complete review of symptoms was reviewed with patient. This is reviewed in H&P and PMH. ALL OTHERS reviewed and negative    Physical exam:  Patient Vitals for the past 24 hrs:   BP Temp Temp src Pulse Resp SpO2   02/04/24 1238 -- -- -- 73 18 94 %   02/04/24 0749 -- -- -- 84 20 95 %   02/04/24 0332 127/89 -- Temporal 89 18 97 %   02/03/24 2325 133/85 -- Temporal 75 17 97 %   02/03/24 1936 127/76 -- Temporal 86 18 92 %   02/03/24 1524 102/71 36.4 °C (97.5 °F) Temporal 68 18 96 %     General: No acute distress.   EYES: no jaundice  HEENT: OP clear   Neck:  No JVD.   CVS:  [RRR.   Resp: Normal respiratory effort,   Abdomen: ND,  Skin: Grossly nothing acute no obvious rashes  Neurological: Alert, Moves all extremities  Extremities:   [no edema. No cyanosis.       Data:  Laboratory studies personally reviewed by me:  Recent Results (from the past 24 hour(s))   POCT glucose device results    Collection Time: 02/03/24  5:03 PM   Result Value Ref Range    POC Glucose, Blood 218 (H) 65 - 99 mg/dL   Renal Function Panel    Collection Time: 02/03/24  5:06 PM   Result Value Ref Range    Sodium 134 (L) 135 - 145 mmol/L    Potassium 4.1 3.6 - 5.5 mmol/L    Chloride 103 96 - 112 mmol/L    Co2 21 20 - 33 mmol/L    Glucose 220 (H) 65 - 99 mg/dL    Creatinine 0.71 0.50 - 1.40 mg/dL    Bun 10 8 - 22 mg/dL    Calcium 8.7 8.5 - 10.5 mg/dL    Correct Calcium 9.2 8.5 - 10.5 mg/dL    Phosphorus 2.6 2.5 - 4.5 mg/dL    Albumin 3.4 3.2 - 4.9 g/dL   ESTIMATED GFR    Collection Time: 02/03/24  5:06 PM   Result Value Ref Range    GFR (CKD-EPI) 111 >60 mL/min/1.73 m 2   POCT glucose device results    Collection Time: 02/03/24  9:13 PM   Result Value Ref Range    POC Glucose, Blood 338 (H) 65 - 99 mg/dL   CBC WITH DIFFERENTIAL    Collection Time: 02/04/24  3:33 AM   Result Value Ref Range    WBC 10.4 4.8 - 10.8 K/uL    RBC 4.63 (L) 4.70 -  6.10 M/uL    Hemoglobin 12.7 (L) 14.0 - 18.0 g/dL    Hematocrit 38.7 (L) 42.0 - 52.0 %    MCV 83.6 81.4 - 97.8 fL    MCH 27.4 27.0 - 33.0 pg    MCHC 32.8 32.3 - 36.5 g/dL    RDW 56.7 (H) 35.9 - 50.0 fL    Platelet Count 358 164 - 446 K/uL    MPV 9.5 9.0 - 12.9 fL    Neutrophils-Polys 70.10 44.00 - 72.00 %    Lymphocytes 17.00 (L) 22.00 - 41.00 %    Monocytes 7.60 0.00 - 13.40 %    Eosinophils 4.30 0.00 - 6.90 %    Basophils 0.70 0.00 - 1.80 %    Immature Granulocytes 0.30 0.00 - 0.90 %    Nucleated RBC 0.00 0.00 - 0.20 /100 WBC    Neutrophils (Absolute) 7.30 1.82 - 7.42 K/uL    Lymphs (Absolute) 1.77 1.00 - 4.80 K/uL    Monos (Absolute) 0.79 0.00 - 0.85 K/uL    Eos (Absolute) 0.45 0.00 - 0.51 K/uL    Baso (Absolute) 0.07 0.00 - 0.12 K/uL    Immature Granulocytes (abs) 0.03 0.00 - 0.11 K/uL    NRBC (Absolute) 0.00 K/uL   MAGNESIUM    Collection Time: 24  3:33 AM   Result Value Ref Range    Magnesium 1.8 1.5 - 2.5 mg/dL   POCT glucose device results    Collection Time: 24  7:51 AM   Result Value Ref Range    POC Glucose, Blood 305 (H) 65 - 99 mg/dL   EKG    Collection Time: 24  7:58 AM   Result Value Ref Range    Report       [facility]    Test Date:  2024  Pt Name:    REGLA HAYES               Department: 171  MRN:        [id number]                      Room:       T731  Gender:     Male                         Technician: CFR  :        1975                   Requested By:KAELA HERNANDEZ  Order #:    [id number]                    Reading MD:    Measurements  Intervals                                Axis  Rate:       77                           P:          28  NE:         169                          QRS:        35  QRSD:       87                           T:          26  QT:         374  QTc:        424    Interpretive Statements  Sinus rhythm  Compared to ECG 2024 08:39:55  No significant changes     TROPONIN    Collection Time: 24  8:32 AM   Result Value Ref Range     Troponin T 14 6 - 19 ng/L   POCT glucose device results    Collection Time: 02/04/24 12:10 PM   Result Value Ref Range    POC Glucose, Blood 261 (H) 65 - 99 mg/dL   TROPONIN    Collection Time: 02/04/24 12:56 PM   Result Value Ref Range    Troponin T 20 (H) 6 - 19 ng/L       Imaging:  CT-HEAD W/O   Final Result      No acute intracranial abnormality detected.         DX-CHEST-PORTABLE (1 VIEW)   Final Result         Hazy bibasilar opacities, atelectasis or infection.              EKG tracings personally reviewed by me sinus rhythm    Echocardiogram from [facility] last year was normal    [facility] PE at [facility];s  IMPRESSION  1.  No pulmonary embolism.  2.  Scattered small mediastinal lymph nodes, may be reactive.  3.  Mildly dilated main pulmonary artery suggesting pulmonary  hypertension.  4.  Additional chronic incidental findings as above.  Exam End: 01/09/24  1:28 AM Last Resulted: 01/09/24  2:00 AM   Received From: [facility]        All pertinent features of laboratory and imaging reviewed including primary images where applicable      Principal Problem:    Drug overdose of undetermined intent, initial encounter (POA: Yes)  Active Problems:    Hyperosmolar hyperglycemic state (HHS) (HCC) (POA: Yes)    Acute alcoholic intoxication with delirium (HCC) (POA: Yes)    PVD (peripheral vascular disease) (HCC) (POA: Yes)    Tobacco abuse counseling (POA: Yes)    Mood disorder (HCC) (POA: Yes)    Hypokalemia (POA: Yes)    GERD (gastroesophageal reflux disease) (POA: Yes)    S/P BKA (below knee amputation) unilateral, right (HCC) (POA: Yes)    Acute respiratory failure with hypoxia (HCC) (POA: Yes)    Hypothermia (POA: Yes)    Hypertension (POA: Yes)    Toxic metabolic encephalopathy (POA: Yes)    Hx of heart artery stent (POA: Yes)    Elevated troponin (POA: No)    Coronary artery disease due to lipid rich plaque - PCI to OM 2022 with RCA and diagnaal stenosis (Chronic) (POA: Unknown)  Resolved Problems:    * No  resolved hospital problems. *      Assessment / Plan:  Chest pain with known coronary artery disease RCA   Cocaine cessation   not a Cardiac cath candidate unless he has a STEMI  Medical therapy avoiding beta-blockers with ongoing cocaine abuse which was also present at [facility] in 2021  For chest pains focus on nitrates  Recommend Plavix monotherapy lifelong  Statin therapy  Polysubstance abuse cessation    Cardiology will sign off      I personally discussed his case with  Dr Ariel Deleon M.D.      It is my pleasure to participate in the care of Mr. Escudero.  Please do not hesitate to contact me with questions or concerns.    Flip Resendez MD PhD FAC  Cardiologist [facility]    2/4/2024    Please note that this dictation was created using voice recognition software. There may be errors I did not discover before finalizing the note.

## 2024-02-04 NOTE — DISCHARGE PLANNING
Patient has discharge order for today. Patient needs wheelchair for discharge and a ride to friends/ shelter. RNCM has reached out to [facility] to see if they could provide w/c to patient for d/c. Waiting for response. RNCM has also reached out to leader-on-call as well.    13:48- RNCM has requested an order for patient's wheelchair. Per Eduardo of [employer]- he can deliver to patient however there was never an order placed. Hospitalist advised.    14:12- Order is now in and referral has been sent to [facility].    16:03- RNCM spoke with Eduardo pierre Brown. W/C should be here shortly. Per hospitalist- patient needs cardiology consult. Bedside RN provided with a cab voucher for w/c cab once patient is able to discharge. Patient unable to give RNCM address for discharge. RNCM put [facility] shelter at [address] for destination.

## 2024-02-05 ENCOUNTER — PHARMACY VISIT (OUTPATIENT)
Dept: PHARMACY | Facility: MEDICAL CENTER | Age: 49
End: 2024-02-05
Payer: COMMERCIAL

## 2024-02-05 VITALS
WEIGHT: 198.41 LBS | RESPIRATION RATE: 20 BRPM | HEIGHT: 69 IN | HEART RATE: 72 BPM | BODY MASS INDEX: 29.39 KG/M2 | OXYGEN SATURATION: 95 % | SYSTOLIC BLOOD PRESSURE: 125 MMHG | DIASTOLIC BLOOD PRESSURE: 82 MMHG | TEMPERATURE: 98.1 F

## 2024-02-05 PROBLEM — E11.65 UNCONTROLLED TYPE 2 DIABETES MELLITUS WITH HYPERGLYCEMIA, WITH LONG-TERM CURRENT USE OF INSULIN (HCC): Status: ACTIVE | Noted: 2024-02-05

## 2024-02-05 PROBLEM — G92.8 TOXIC METABOLIC ENCEPHALOPATHY: Status: RESOLVED | Noted: 2024-02-03 | Resolved: 2024-02-05

## 2024-02-05 PROBLEM — I25.10 CORONARY ARTERY DISEASE DUE TO LIPID RICH PLAQUE: Chronic | Status: RESOLVED | Noted: 2024-02-05 | Resolved: 2024-02-05

## 2024-02-05 PROBLEM — T68.XXXA HYPOTHERMIA: Status: RESOLVED | Noted: 2024-02-02 | Resolved: 2024-02-05

## 2024-02-05 PROBLEM — Z95.5 HX OF HEART ARTERY STENT: Status: RESOLVED | Noted: 2024-02-04 | Resolved: 2024-02-05

## 2024-02-05 PROBLEM — I20.1 CORONARY ARTERY VASOSPASM (HCC): Status: ACTIVE | Noted: 2024-02-05

## 2024-02-05 PROBLEM — J96.01 ACUTE RESPIRATORY FAILURE WITH HYPOXIA (HCC): Status: RESOLVED | Noted: 2024-02-02 | Resolved: 2024-02-05

## 2024-02-05 PROBLEM — I25.83 CORONARY ARTERY DISEASE DUE TO LIPID RICH PLAQUE: Chronic | Status: RESOLVED | Noted: 2024-02-05 | Resolved: 2024-02-05

## 2024-02-05 PROBLEM — M94.0 ACUTE COSTOCHONDRITIS: Status: ACTIVE | Noted: 2024-02-05

## 2024-02-05 PROBLEM — I25.10 CORONARY ARTERY DISEASE DUE TO LIPID RICH PLAQUE: Chronic | Status: ACTIVE | Noted: 2024-02-05

## 2024-02-05 PROBLEM — Z71.6 TOBACCO ABUSE COUNSELING: Status: RESOLVED | Noted: 2024-02-02 | Resolved: 2024-02-05

## 2024-02-05 PROBLEM — R79.89 ELEVATED TROPONIN: Status: RESOLVED | Noted: 2024-02-04 | Resolved: 2024-02-05

## 2024-02-05 PROBLEM — I25.83 CORONARY ARTERY DISEASE DUE TO LIPID RICH PLAQUE: Chronic | Status: ACTIVE | Noted: 2024-02-05

## 2024-02-05 PROBLEM — F10.921 ACUTE ALCOHOLIC INTOXICATION WITH DELIRIUM (HCC): Status: RESOLVED | Noted: 2024-02-02 | Resolved: 2024-02-05

## 2024-02-05 PROBLEM — Z79.4 UNCONTROLLED TYPE 2 DIABETES MELLITUS WITH HYPERGLYCEMIA, WITH LONG-TERM CURRENT USE OF INSULIN (HCC): Status: ACTIVE | Noted: 2024-02-05

## 2024-02-05 PROBLEM — E87.6 HYPOKALEMIA: Status: RESOLVED | Noted: 2024-02-02 | Resolved: 2024-02-05

## 2024-02-05 LAB
EKG IMPRESSION: NORMAL
GLUCOSE BLD STRIP.AUTO-MCNC: 297 MG/DL (ref 65–99)
TROPONIN T SERPL-MCNC: 16 NG/L (ref 6–19)
TROPONIN T SERPL-MCNC: 17 NG/L (ref 6–19)
TROPONIN T SERPL-MCNC: 24 NG/L (ref 6–19)

## 2024-02-05 PROCEDURE — 82962 GLUCOSE BLOOD TEST: CPT

## 2024-02-05 PROCEDURE — 700111 HCHG RX REV CODE 636 W/ 250 OVERRIDE (IP): Performed by: STUDENT IN AN ORGANIZED HEALTH CARE EDUCATION/TRAINING PROGRAM

## 2024-02-05 PROCEDURE — 97162 PT EVAL MOD COMPLEX 30 MIN: CPT

## 2024-02-05 PROCEDURE — 97535 SELF CARE MNGMENT TRAINING: CPT

## 2024-02-05 PROCEDURE — 700102 HCHG RX REV CODE 250 W/ 637 OVERRIDE(OP): Performed by: STUDENT IN AN ORGANIZED HEALTH CARE EDUCATION/TRAINING PROGRAM

## 2024-02-05 PROCEDURE — A9270 NON-COVERED ITEM OR SERVICE: HCPCS | Performed by: STUDENT IN AN ORGANIZED HEALTH CARE EDUCATION/TRAINING PROGRAM

## 2024-02-05 PROCEDURE — 84484 ASSAY OF TROPONIN QUANT: CPT

## 2024-02-05 PROCEDURE — 99239 HOSP IP/OBS DSCHRG MGMT >30: CPT | Performed by: STUDENT IN AN ORGANIZED HEALTH CARE EDUCATION/TRAINING PROGRAM

## 2024-02-05 PROCEDURE — RXMED WILLOW AMBULATORY MEDICATION CHARGE: Performed by: STUDENT IN AN ORGANIZED HEALTH CARE EDUCATION/TRAINING PROGRAM

## 2024-02-05 PROCEDURE — 93010 ELECTROCARDIOGRAM REPORT: CPT | Performed by: INTERNAL MEDICINE

## 2024-02-05 PROCEDURE — C9113 INJ PANTOPRAZOLE SODIUM, VIA: HCPCS | Performed by: STUDENT IN AN ORGANIZED HEALTH CARE EDUCATION/TRAINING PROGRAM

## 2024-02-05 RX ORDER — LANCETS 30 GAUGE
EACH MISCELLANEOUS
Qty: 100 EACH | Refills: 0 | Status: SHIPPED | OUTPATIENT
Start: 2024-02-05

## 2024-02-05 RX ORDER — ESCITALOPRAM OXALATE 10 MG/1
10 TABLET ORAL DAILY
Qty: 30 TABLET | Refills: 2 | Status: SHIPPED | OUTPATIENT
Start: 2024-02-05

## 2024-02-05 RX ORDER — INSULIN GLARGINE 100 [IU]/ML
25 INJECTION, SOLUTION SUBCUTANEOUS DAILY
Qty: 15 ML | Refills: 2 | Status: ACTIVE | OUTPATIENT
Start: 2024-02-05

## 2024-02-05 RX ORDER — CLOPIDOGREL BISULFATE 75 MG/1
75 TABLET ORAL DAILY
Qty: 30 TABLET | Refills: 3 | Status: SHIPPED | OUTPATIENT
Start: 2024-02-05

## 2024-02-05 RX ORDER — GABAPENTIN 400 MG/1
400 CAPSULE ORAL 2 TIMES DAILY
Qty: 60 CAPSULE | Refills: 2 | Status: SHIPPED | OUTPATIENT
Start: 2024-02-05

## 2024-02-05 RX ORDER — PANTOPRAZOLE SODIUM 40 MG/1
40 TABLET, DELAYED RELEASE ORAL DAILY
Qty: 30 TABLET | Refills: 2 | Status: SHIPPED | OUTPATIENT
Start: 2024-02-05

## 2024-02-05 RX ORDER — EMPAGLIFLOZIN 25 MG/1
25 TABLET, FILM COATED ORAL DAILY
Qty: 30 TABLET | Refills: 2 | Status: SHIPPED | OUTPATIENT
Start: 2024-02-05

## 2024-02-05 RX ORDER — ATORVASTATIN CALCIUM 40 MG/1
40 TABLET, FILM COATED ORAL DAILY
Qty: 30 TABLET | Refills: 2 | Status: SHIPPED | OUTPATIENT
Start: 2024-02-05

## 2024-02-05 RX ORDER — DIPHENHYDRAMINE HYDROCHLORIDE 25 MG/1
CAPSULE, LIQUID FILLED ORAL
Qty: 1 KIT | Refills: 0 | Status: SHIPPED | OUTPATIENT
Start: 2024-02-05

## 2024-02-05 RX ORDER — GLUCOSAMINE HCL/CHONDROITIN SU 500-400 MG
CAPSULE ORAL
Qty: 100 EACH | Refills: 0 | Status: SHIPPED | OUTPATIENT
Start: 2024-02-05

## 2024-02-05 RX ORDER — LISINOPRIL 10 MG/1
10 TABLET ORAL DAILY
Qty: 30 TABLET | Refills: 2 | Status: SHIPPED | OUTPATIENT
Start: 2024-02-05

## 2024-02-05 RX ORDER — NICOTINE 21 MG/24HR
1 PATCH, TRANSDERMAL 24 HOURS TRANSDERMAL EVERY 24 HOURS
Qty: 28 PATCH | Refills: 2 | Status: SHIPPED | OUTPATIENT
Start: 2024-02-05

## 2024-02-05 RX ADMIN — THERA TABS 1 TABLET: TAB at 04:41

## 2024-02-05 RX ADMIN — LISINOPRIL 10 MG: 10 TABLET ORAL at 04:41

## 2024-02-05 RX ADMIN — ESCITALOPRAM OXALATE 10 MG: 10 TABLET ORAL at 04:41

## 2024-02-05 RX ADMIN — PANTOPRAZOLE SODIUM 40 MG: 40 INJECTION, POWDER, FOR SOLUTION INTRAVENOUS at 04:41

## 2024-02-05 RX ADMIN — INSULIN LISPRO 7 UNITS: 100 INJECTION, SOLUTION INTRAVENOUS; SUBCUTANEOUS at 07:48

## 2024-02-05 RX ADMIN — ATORVASTATIN CALCIUM 40 MG: 40 TABLET, FILM COATED ORAL at 04:41

## 2024-02-05 RX ADMIN — GABAPENTIN 400 MG: 400 CAPSULE ORAL at 04:41

## 2024-02-05 RX ADMIN — Medication 100 MG: at 04:41

## 2024-02-05 RX ADMIN — NICOTINE TRANSDERMAL SYSTEM 21 MG: 21 PATCH, EXTENDED RELEASE TRANSDERMAL at 04:40

## 2024-02-05 RX ADMIN — INSULIN LISPRO 5 UNITS: 100 INJECTION, SOLUTION INTRAVENOUS; SUBCUTANEOUS at 07:48

## 2024-02-05 RX ADMIN — CLOPIDOGREL BISULFATE 75 MG: 75 TABLET ORAL at 04:41

## 2024-02-05 RX ADMIN — FOLIC ACID 1 MG: 1 TABLET ORAL at 04:41

## 2024-02-05 ASSESSMENT — COGNITIVE AND FUNCTIONAL STATUS - GENERAL
MOBILITY SCORE: 20
CLIMB 3 TO 5 STEPS WITH RAILING: A LOT
SUGGESTED CMS G CODE MODIFIER MOBILITY: CJ
WALKING IN HOSPITAL ROOM: A LOT

## 2024-02-05 ASSESSMENT — FIBROSIS 4 INDEX: FIB4 SCORE: 0.35

## 2024-02-05 NOTE — ASSESSMENT & PLAN NOTE
02/04/24  Troponins increasing to 20 from 15.  In setting of chest pain concerning for ACS.  Cardiology consulted.  Per discussion with Dr. Resendez, patient is not a Candidate for catheterization due to use of cocaine.  Continuous telemetry monitoring.  Resumed full dose aspirin.  States that he had stents placed 1 year ago.  Continue to trend troponins and EKG  Continuous telemetry monitoring

## 2024-02-05 NOTE — THERAPY
Physical Therapy   Initial Evaluation     Patient Name: Rogelio Escudero  Age:  49 y.o., Sex:  male  Medical Record #: [id number]  Today's Date: 2/5/2024     Precautions  Precautions: Fall Risk    Assessment  Patient is 49 y.o. male with a diagnosis of drug overdose,  h/o insulin-dependent diabetes, hyperlipidemia, hypertension, PVD s/p right BKA, prior MRSA bacteremia and wound infection/osteomyelitis s/p left first and second toe resection, polysubstance abuse, tobacco abuse, prior AMAs from hospital who presented 2/2/2024 with evaluation for altered mental status.  Patient apparently overdosed, found down at scene with GCS of 3.  He apparently got CPR, mentation improved with administered Narcan.  Patient was brought to ER for evaluation.  In ER, found to have CMP glucose of 527, BAL 66, UDS positive for cocaine and fentanyl. .  Additional factors influencing patient status / progress : today, pt needs no assist to perform squat/stand pivot from bed to wc. Pt is staying with his cousin in apt that has elevator. Pt shows full AROM R knee. Education done re therex to maintain full motion. Pt reports plan to follow up outpt re prosthesis as he has yet to do that.     Plan      DC Equipment Recommendations:  (new wc was already delivered to room)  Discharge Recommendations: Other - (pt to follow up outpatient re prosthesis)            Objective       02/05/24 0854   Charge Group   PT Evaluation PT Evaluation Mod   PT Self Care / Home Evaluation (Units) 1   Total Time Spent   PT Total Time Yes   PT Evaluation Time Spent (Mins) 20   PT Self Care/Home Evaluation Time Spent (Mins) 8   PT Total Time Spent (Calculated) 28   Initial Contact Note    Initial Contact Note Order Received and Verified, Evaluation Only - Patient Does Not Require Further Acute Physical Therapy at this Time.  However, May Benefit from Post Acute Therapy for Higher Level Functional Deficits.   Precautions   Precautions Fall Risk   Vitals   O2  Delivery Device None - Room Air   Pain 0 - 10 Group   Therapist Pain Assessment During Activity;Nurse Notified;0   Prior Living Situation   Prior Services None   Housing / Facility 1 Story Apartment / Condo   Steps Into Home 0   Steps In Home 0   Elevator Yes   Equipment Owned   (lost his wc, does not have a prosthesis yet for R LE)   Lives with - Patient's Self Care Capacity Related Adult  (staying with his cousin)   Prior Level of Functional Mobility   Bed Mobility Independent   Transfer Status Independent   Ambulation   (squat pivot to wc)   Assistive Devices Used Wheelchair   Wheelchair Independent   Cognition    Level of Consciousness Alert   Comments cooperative   Active ROM Lower Body    Active ROM Lower Body  X   Comments R BKA showing full AROM, L LE wfl   Strength Lower Body   Lower Body Strength  X   Comments R BKA   Balance Assessment   Sitting Balance (Static) Fair   Sitting Balance (Dynamic) Fair   Standing Balance (Static) Fair   Weight Shift Sitting Good   Weight Shift Standing   (pivot)   Bed Mobility    Supine to Sit Supervised   Sit to Supine   (up chair)   Functional Mobility   Sit to Stand Supervised   Bed, Chair, Wheelchair Transfer Supervised   Transfer Method Squat Pivot   How much difficulty does the patient currently have...   Turning over in bed (including adjusting bedclothes, sheets and blankets)? 4   Sitting down on and standing up from a chair with arms (e.g., wheelchair, bedside commode, etc.) 4   Moving from lying on back to sitting on the side of the bed? 4   How much help from another person does the patient currently need...   Moving to and from a bed to a chair (including a wheelchair)? 4   Need to walk in a hospital room? 2   Climbing 3-5 steps with a railing? 2   6 clicks Mobility Score 20   Activity Tolerance   Sitting in Chair 15+   Education Group   Education Provided Role of Physical Therapist   Role of Physical Therapist Patient Response  "Patient;Acceptance;Explanation;Verbal Demonstration   Exercises - Seated Patient Response Patient;Acceptance;Explanation;Demonstration;Action Demonstration;Verbal Demonstration   Additional Comments ed done re therex to maintain full AROM R knee in preparation for prosthesis. Pt reports receiving information after the amputation but does not know where it is. Pt's friend in room reports \"I saved all that\". Pt will follow up outpt re preparation for prosthesis.   Anticipated Discharge Equipment and Recommendations   DC Equipment Recommendations   (new wc was already delivered to room)   Discharge Recommendations Other -  (pt to follow up outpatient re prosthesis)   Interdisciplinary Plan of Care Collaboration   IDT Collaboration with  Nursing   Patient Position at End of Therapy Seated;Call Light within Reach;Tray Table within Reach;Family / Friend in Room;Phone within Reach   Collaboration Comments nsg updated   Session Information   Date / Session Number  2/5-1x only           "

## 2024-02-05 NOTE — CARE PLAN
The patient is Stable - Low risk of patient condition declining or worsening    Shift Goals  Clinical Goals: vss, maintain safety  Patient Goals: rest    Progress made toward(s) clinical / shift goals:        Patient is not progressing towards the following goals:      Problem: Pain - Standard  Goal: Alleviation of pain or a reduction in pain to the patient’s comfort goal  Description: Target End Date:  Prior to discharge or change in level of care    Document on Vitals flowsheet    1.  Document pain using the appropriate pain scale per order or unit policy  2.  Educate and implement non-pharmacologic comfort measures (i.e. relaxation, distraction, massage, cold/heat therapy, etc.)  3.  Pain management medications as ordered  4.  Reassess pain after pain med administration per policy  5.  If opiods administered assess patient's response to pain medication is appropriate per POSS sedation scale  6.  Follow pain management plan developed in collaboration with patient and interdisciplinary team (including palliative care or pain specialists if applicable)  Outcome: Not Progressing

## 2024-02-05 NOTE — PROGRESS NOTES
[facility] Medicine Daily Progress Note    Date of Service  2/4/2024    Chief Complaint  Rogelio Escudero is a 49 y.o. male admitted 2/2/2024 with altered mental status due to drug overdose.    Hospital Course  Rogelio Escudero is a 49 y.o. male, who presented on 2/2/2024 with altered mental status.  This is a pleasant gentleman with a history of diabetes mellitus on insulin, hyperlipidemia, hypertension, peripheral vascular disease status post right below the knee amputation, prior MRSA bacteremia with wound infection and osteomyelitis status post left first toe resection, polysubstance use disorder, smoking, and prior AMA's from the hospital.  The patient was found in a [employer] parking lot unresponsive.  EMS was called and the patient was noted to have a Mahendra Coma Scale of 3 at the scene.  Reportedly received CPR and received IM and IV Narcan with improvement of his mentation.  He was brought to the emergency room for further evaluation, where he was found to have a glucose level of 527, blood alcohol level of 66.  Urine drug screen was positive for cocaine and fentanyl.  Head CT was negative.  He also was noted to be hypothermic with temperature of 96.3 Fahrenheit.    Interval Problem Update  02/03/24  Patient was seen and examined on the telemetry floor.  He continues on continuous cardiac monitoring and continuous pulse oximetry monitoring.  He is very lethargic.  He is alert and orient x 3.  He states that he obtained marijuana and did not intentionally take fentanyl.  He does report cocaine use.  Blood glucose improving to 146.  On glargine insulin 25 units in lispro insulin sign scale.  He states that his previous amputations were related to complications of his diabetes.    02/04/24  Patient was seen and examined on the telemetry floor.  He continues on continuous cardiac monitoring and continuous pulse oximetry monitoring.   Patient complaining of chest pain this morning.  He states that he  stopped taking his aspirin approximately a week ago.  He has been taking his Plavix.  He had a stent placed 1 year ago he states.  Partially reproducible.  Starting diclofenac gel.      Trending troponins increasing to 20.  Continue to have chest pain.  Cardiology consulted.    I have discussed this patient's plan of care and discharge plan at IDT rounds today with Case Management, Nursing, Nursing leadership, and other members of the IDT team.    Consultants/Specialty  None    Code Status  Full Code    Disposition  The patient is not medically cleared for discharge to home or a post-acute facility.  Anticipate discharge to: home with close outpatient follow-up    I have placed the appropriate orders for post-discharge needs.    Review of Systems  Review of Systems   Constitutional:  Negative for chills and fever.   Respiratory:  Negative for cough and shortness of breath.    Cardiovascular:  Positive for chest pain. Negative for palpitations.   Gastrointestinal:  Negative for abdominal pain, nausea and vomiting.   Musculoskeletal:  Negative for joint pain and myalgias.   Neurological:  Positive for weakness. Negative for dizziness and headaches.        Physical Exam  Pulse:  [73-89] 74  Resp:  [16-20] 18  BP: (127-137)/(76-89) 137/78  SpO2:  [92 %-97 %] 95 %    Physical Exam  Vitals and nursing note reviewed.   Constitutional:       General: He is not in acute distress.     Appearance: He is ill-appearing and toxic-appearing. He is not diaphoretic.   HENT:      Head: Normocephalic and atraumatic.      Mouth/Throat:      Mouth: Mucous membranes are moist.      Pharynx: Oropharynx is clear. No oropharyngeal exudate.   Eyes:      General: No scleral icterus.        Right eye: No discharge.         Left eye: No discharge.      Conjunctiva/sclera: Conjunctivae normal.   Cardiovascular:      Rate and Rhythm: Normal rate and regular rhythm.      Pulses: Normal pulses.      Heart sounds: Normal heart sounds. No murmur  heard.  Pulmonary:      Effort: Pulmonary effort is normal. No respiratory distress.      Breath sounds: Normal breath sounds.   Chest:      Chest wall: Tenderness (Pressure really reproducible to palpation) present.   Abdominal:      General: Abdomen is flat. Bowel sounds are normal. There is no distension.      Palpations: Abdomen is soft.   Musculoskeletal:         General: Deformity present. No swelling.      Cervical back: Neck supple. No tenderness.      Right lower leg: No edema.      Left lower leg: No edema.      Comments: Left below the knee amputation, left toe amputation, incisions clean dry and intact   Skin:     General: Skin is warm and dry.      Coloration: Skin is pale.   Neurological:      Mental Status: He is alert and oriented to person, place, and time. Mental status is at baseline.      Motor: Weakness present.   Psychiatric:         Thought Content: Thought content normal.         Judgment: Judgment normal.         Fluids    Intake/Output Summary (Last 24 hours) at 2/4/2024 1848  Last data filed at 2/4/2024 1800  Gross per 24 hour   Intake 680 ml   Output 2400 ml   Net -1720 ml         Laboratory  Recent Labs     02/02/24  1011 02/03/24  0346 02/04/24  0333   WBC 13.4* 12.5* 10.4   RBC 5.36 4.58* 4.63*   HEMOGLOBIN 14.9 12.5* 12.7*   HEMATOCRIT 45.2 39.2* 38.7*   MCV 84.3 85.6 83.6   MCH 27.8 27.3 27.4   MCHC 33.0 31.9* 32.8   RDW 56.0* 57.9* 56.7*   PLATELETCT 461* 370 358   MPV 9.5 9.9 9.5       Recent Labs     02/02/24  1011 02/03/24  0346 02/03/24  1706   SODIUM 134* 141 134*   POTASSIUM 3.3* 4.6 4.1   CHLORIDE 96 107 103   CO2 20 26 21   GLUCOSE 527* 97 220*   BUN 10 13 10   CREATININE 0.94 0.83 0.71   CALCIUM 9.3 8.9 8.7       Recent Labs     02/02/24  1011   INR 0.93                 Imaging  CT-HEAD W/O   Final Result      No acute intracranial abnormality detected.         DX-CHEST-PORTABLE (1 VIEW)   Final Result         Hazy bibasilar opacities, atelectasis or infection.            Assessment/Plan  * Drug overdose of undetermined intent, initial encounter- (present on admission)  Assessment & Plan  UDS positive for cocaine, amphetamine  Cessation strongly advised  IVF  As needed Narcan  Admitted to telemetry unit for close monitoring    02/03/24  Patient is still very lethargic.  He is at high risk of deterioration.  Continuous pulse oximetry monitoring to monitor for hypoxia.  Continues telemetry monitoring to monitor for arrhythmias.    Elevated troponin  Assessment & Plan  02/04/24  Troponins increasing to 20 from 15.  In setting of chest pain concerning for ACS.  Cardiology consulted.  Per discussion with Dr. Resendez, patient is not a Candidate for catheterization due to use of cocaine.  Continuous telemetry monitoring.  Resumed full dose aspirin.  States that he had stents placed 1 year ago.  Continue to trend troponins and EKG  Continuous telemetry monitoring    Hx of heart artery stent- (present on admission)  Assessment & Plan  02/04/24  As per history.  Has stopped taking aspirin 1 week ago.  He has been taking his clopidogrel.  Resume to full dose aspirin  Troponin is increasing.  Concerning for ACS.  Continue to trend troponins.  EKG as needed.  Continuous telemetry monitoring    Toxic metabolic encephalopathy- (present on admission)  Assessment & Plan  02/03/24  Secondary to unintentional drug overdose.  Appears to be improving.  Still very lethargic.  High risk of deterioration.  Continuous telemetry monitoring and continuous pulse oximetry to monitor for respiratory depression and hypoxia.    Acute respiratory failure with hypoxia (HCC)- (present on admission)  Assessment & Plan  On 2L --wean off as tolerated  Procalcitonin WNL  Likely secondary to drug overdose  As needed narcan  As needed nebs    02/03/24  Continues on 2 LPM oxygen by nasal cannula.  Continue to wean off as tolerated.  Continuous pulse oximetry monitoring.  Incentive spirometry    Hypertension- (present on  admission)  Assessment & Plan  lisinopril    Hypothermia- (present on admission)  Assessment & Plan  RESOLVED  Temp 96.3F in ED    S/P BKA (below knee amputation) unilateral, right (HCC)- (present on admission)  Assessment & Plan  As noted in PVD    GERD (gastroesophageal reflux disease)- (present on admission)  Assessment & Plan  PPI    Hypokalemia- (present on admission)  Assessment & Plan  replace    Mood disorder (HCC)- (present on admission)  Assessment & Plan  SSRI    Tobacco abuse counseling- (present on admission)  Assessment & Plan  Admits to smoking as much as 1 pack of cigarettes daily  Cessation counseling provided: 4 minutes  Offered nicotine patch, nicotine gum, Chantix as alternative.  Provided patient with standard tobacco cessation information per protocol    PVD (peripheral vascular disease) ([facility])- (present on admission)  Assessment & Plan  Continue Plavix, Lipitor  Smoking cessation advised    Acute alcoholic intoxication with delirium ([facility])- (present on admission)  Assessment & Plan  Detox bag  Multivitamins  CIWA    02/03/24  CIWA scores of 0.  He does not appear to be withdrawing from alcohol.    Hyperosmolar hyperglycemic state (HHS) ([facility])- (present on admission)  Assessment & Plan  CMP glucose 527, anion gap 18  S/p insulin 5units IV  S/p 1500cc LR bolus  Continue IVF @200cc/hr  Continue Lantus, ISS  DM education      02/03/24  Improving  Continue glargine insulin 25 units every evening  Lispro insulin sign scale with scheduled insulin  Hypoglycemia protocol  Diabetic diet         VTE prophylaxis:    enoxaparin ppx      I have performed a physical exam and reviewed and updated ROS and Plan today (2/4/2024). In review of yesterday's note (2/3/2024), there are no changes except as documented above.

## 2024-02-05 NOTE — DISCHARGE PLANNING
1030, Pt is not in room and was discharged. RN CM not able to obtain assessment information for discharge planning.

## 2024-02-05 NOTE — PROGRESS NOTES
Assumed care on patient post bedside report. Arouses to voice. Breathing even and unlabored. On RA. Denies pain. Vss.    Tele on and heart rhythm and rate checked on monitor.    Plan of care - monitor v/s, blood glucose, increase mobility and maintain safety.    Safety precautions in place are side rails up x 2, bed to lowest level, alarms on. Appropriate to use call light for needs and assistance. Will round hourly and as needed.

## 2024-02-05 NOTE — ASSESSMENT & PLAN NOTE
02/04/24  As per history.  Has stopped taking aspirin 1 week ago.  He has been taking his clopidogrel.  Resume to full dose aspirin  Troponin is increasing.  Concerning for ACS.  Continue to trend troponins.  EKG as needed.  Continuous telemetry monitoring

## 2024-02-05 NOTE — PROGRESS NOTES
Monitor summary:        Rhythm: SR   Rate: 74-90  Ectopy: n/a  Measurements: 16./.09/.33        12hr chart check

## 2024-02-05 NOTE — PROGRESS NOTES
Bedside report received from NOC RN. Assumed care of pt. Pt awake, laying in bed. A/Ox4, VSS. No concerns, complaints or distress. Pt educated to call before getting out of bed. POC reviewed and white board updated. Tele box on. SR 70 on the monitor. Call light in reach. Bed locked in lowest position with 2 upper bed rails up. Bed alarm on.

## 2024-02-06 PROBLEM — T40.2X1A OPIOID OVERDOSE (HCC): Status: ACTIVE | Noted: 2024-02-06

## 2024-02-06 PROBLEM — G92.8 TOXIC METABOLIC ENCEPHALOPATHY: Status: RESOLVED | Noted: 2024-02-03 | Resolved: 2024-02-06

## 2024-02-06 PROBLEM — T40.5X1A COCAINE OVERDOSE (HCC): Status: ACTIVE | Noted: 2024-02-06

## 2024-02-06 PROBLEM — R09.2 RESPIRATORY ARREST (HCC): Status: ACTIVE | Noted: 2024-02-06

## 2024-02-16 NOTE — FACE TO FACE
Face to Face Note  -  Durable Medical Equipment    Pal Flores D.O. - NPI: [id number]  I certify that this patient is under my care and that they had a durable medical equipment(DME)face to face encounter by myself that meets the physician DME face-to-face encounter requirements with this patient on:    Date of encounter:   Patient:                    MRN:                       YOB: 2025  Rogelio Escudero  [id number]  1975     The encounter with the patient was in whole, or in part, for the following medical condition, which is the primary reason for durable medical equipment:  Other - Bilateral TMA for foot infections    I certify that, based on my findings, the following durable medical equipment is medically necessary:    Walkers and Other DME Equipment - TMA insert from hangar,  Knee scooter.    My Clinical findings support the need for the above equipment due to:  Abnormal Gait  
Residential stability/Relationship stability

## 2024-02-27 LAB — ACETONE SERPL-MCNC: <5 MG/DL

## 2024-03-21 ENCOUNTER — TELEPHONE (OUTPATIENT)
Dept: HEALTH INFORMATION MANAGEMENT | Facility: OTHER | Age: 49
End: 2024-03-21
Payer: MEDICAID

## 2024-11-11 ENCOUNTER — APPOINTMENT (OUTPATIENT)
Dept: RADIOLOGY | Facility: MEDICAL CENTER | Age: 49
End: 2024-11-11
Attending: EMERGENCY MEDICINE
Payer: MEDICAID

## 2024-11-11 ENCOUNTER — HOSPITAL ENCOUNTER (EMERGENCY)
Facility: MEDICAL CENTER | Age: 49
End: 2024-11-11
Attending: EMERGENCY MEDICINE
Payer: MEDICAID

## 2024-11-11 VITALS
OXYGEN SATURATION: 95 % | HEART RATE: 97 BPM | DIASTOLIC BLOOD PRESSURE: 88 MMHG | BODY MASS INDEX: 25.92 KG/M2 | HEIGHT: 69 IN | SYSTOLIC BLOOD PRESSURE: 128 MMHG | WEIGHT: 175 LBS | TEMPERATURE: 98.4 F | RESPIRATION RATE: 18 BRPM

## 2024-11-11 DIAGNOSIS — I73.9 PERIPHERAL VASCULAR DISEASE (HCC): ICD-10-CM

## 2024-11-11 DIAGNOSIS — Z00.8 MEDICAL CLEARANCE FOR INCARCERATION: ICD-10-CM

## 2024-11-11 DIAGNOSIS — M79.604 PAIN OF RIGHT LOWER EXTREMITY: ICD-10-CM

## 2024-11-11 DIAGNOSIS — Z98.890 HISTORY OF FEMOROPOPLITEAL BYPASS: ICD-10-CM

## 2024-11-11 LAB
ANION GAP SERPL CALC-SCNC: 13 MMOL/L (ref 7–16)
APTT PPP: 24.2 SEC (ref 24.7–36)
BASOPHILS # BLD AUTO: 0.4 % (ref 0–1.8)
BASOPHILS # BLD: 0.03 K/UL (ref 0–0.12)
BUN SERPL-MCNC: 14 MG/DL (ref 8–22)
CALCIUM SERPL-MCNC: 9.4 MG/DL (ref 8.5–10.5)
CHLORIDE SERPL-SCNC: 102 MMOL/L (ref 96–112)
CO2 SERPL-SCNC: 19 MMOL/L (ref 20–33)
CREAT SERPL-MCNC: 0.87 MG/DL (ref 0.5–1.4)
EOSINOPHIL # BLD AUTO: 0.17 K/UL (ref 0–0.51)
EOSINOPHIL NFR BLD: 2.1 % (ref 0–6.9)
ERYTHROCYTE [DISTWIDTH] IN BLOOD BY AUTOMATED COUNT: 48.9 FL (ref 35.9–50)
GFR SERPLBLD CREATININE-BSD FMLA CKD-EPI: 104 ML/MIN/1.73 M 2
GLUCOSE SERPL-MCNC: 315 MG/DL (ref 65–99)
HCT VFR BLD AUTO: 46.4 % (ref 42–52)
HGB BLD-MCNC: 16.5 G/DL (ref 14–18)
IMM GRANULOCYTES # BLD AUTO: 0.04 K/UL (ref 0–0.11)
IMM GRANULOCYTES NFR BLD AUTO: 0.5 % (ref 0–0.9)
INR PPP: 0.95 (ref 0.87–1.13)
LACTATE SERPL-SCNC: 1.6 MMOL/L (ref 0.5–2)
LYMPHOCYTES # BLD AUTO: 1.66 K/UL (ref 1–4.8)
LYMPHOCYTES NFR BLD: 20.6 % (ref 22–41)
MCH RBC QN AUTO: 32 PG (ref 27–33)
MCHC RBC AUTO-ENTMCNC: 35.6 G/DL (ref 32.3–36.5)
MCV RBC AUTO: 90.1 FL (ref 81.4–97.8)
MONOCYTES # BLD AUTO: 0.7 K/UL (ref 0–0.85)
MONOCYTES NFR BLD AUTO: 8.7 % (ref 0–13.4)
NEUTROPHILS # BLD AUTO: 5.46 K/UL (ref 1.82–7.42)
NEUTROPHILS NFR BLD: 67.7 % (ref 44–72)
NRBC # BLD AUTO: 0 K/UL
NRBC BLD-RTO: 0 /100 WBC (ref 0–0.2)
PLATELET # BLD AUTO: 284 K/UL (ref 164–446)
PMV BLD AUTO: 9.6 FL (ref 9–12.9)
POTASSIUM SERPL-SCNC: 4.2 MMOL/L (ref 3.6–5.5)
PROTHROMBIN TIME: 12.7 SEC (ref 12–14.6)
RBC # BLD AUTO: 5.15 M/UL (ref 4.7–6.1)
SODIUM SERPL-SCNC: 134 MMOL/L (ref 135–145)
WBC # BLD AUTO: 8.1 K/UL (ref 4.8–10.8)

## 2024-11-11 PROCEDURE — 99284 EMERGENCY DEPT VISIT MOD MDM: CPT

## 2024-11-11 PROCEDURE — 93971 EXTREMITY STUDY: CPT | Mod: RT

## 2024-11-11 PROCEDURE — 700102 HCHG RX REV CODE 250 W/ 637 OVERRIDE(OP): Mod: UD | Performed by: EMERGENCY MEDICINE

## 2024-11-11 PROCEDURE — 85025 COMPLETE CBC W/AUTO DIFF WBC: CPT

## 2024-11-11 PROCEDURE — 83605 ASSAY OF LACTIC ACID: CPT

## 2024-11-11 PROCEDURE — 93926 LOWER EXTREMITY STUDY: CPT | Mod: 26,RT | Performed by: INTERNAL MEDICINE

## 2024-11-11 PROCEDURE — 93926 LOWER EXTREMITY STUDY: CPT | Mod: RT

## 2024-11-11 PROCEDURE — 80048 BASIC METABOLIC PNL TOTAL CA: CPT

## 2024-11-11 PROCEDURE — 36415 COLL VENOUS BLD VENIPUNCTURE: CPT

## 2024-11-11 PROCEDURE — 85610 PROTHROMBIN TIME: CPT

## 2024-11-11 PROCEDURE — 85730 THROMBOPLASTIN TIME PARTIAL: CPT

## 2024-11-11 PROCEDURE — A9270 NON-COVERED ITEM OR SERVICE: HCPCS | Mod: UD | Performed by: EMERGENCY MEDICINE

## 2024-11-11 PROCEDURE — 93971 EXTREMITY STUDY: CPT | Mod: 26,RT | Performed by: INTERNAL MEDICINE

## 2024-11-11 RX ORDER — ACETAMINOPHEN 325 MG/1
650 TABLET ORAL ONCE
Status: COMPLETED | OUTPATIENT
Start: 2024-11-11 | End: 2024-11-11

## 2024-11-11 RX ORDER — IBUPROFEN 600 MG/1
600 TABLET, FILM COATED ORAL ONCE
Status: COMPLETED | OUTPATIENT
Start: 2024-11-11 | End: 2024-11-11

## 2024-11-11 RX ADMIN — ACETAMINOPHEN 650 MG: 325 TABLET ORAL at 12:18

## 2024-11-11 RX ADMIN — IBUPROFEN 600 MG: 600 TABLET, FILM COATED ORAL at 12:18

## 2024-11-11 ASSESSMENT — FIBROSIS 4 INDEX: FIB4 SCORE: 0.67

## 2024-11-11 NOTE — ED NOTES
Discharge instructions given to pt. Prescriptions unchanged. Pt educated, verbalizes understanding. All belongings accounted for. Pt awaiting transportation out of ED with guards to go back to incarceration. PIV removed and dressing applied.
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
## 2024-11-11 NOTE — ED TRIAGE NOTES
Chief Complaint   Patient presents with    Leg Pain     R sided. BKA about a year ago. 5 days of pain and swelling on R leg per pt.      Pt BIB EMS for above. Pt reports history of his stent clogging. Pt takes plavix.

## (undated) DEVICE — SENSOR OXIMETER ADULT SPO2 RD SET (20EA/BX)

## (undated) DEVICE — SUTURE 2-0 VICRYL PLUS CT-1 - 8 X 18 INCH(12/BX)

## (undated) DEVICE — SYRINGE 10 ML CONTROL LL (25EA/BX 4BX/CA)

## (undated) DEVICE — PUNCH 4MM SHRP CNCL TIP DL - (6/BX)

## (undated) DEVICE — NEEDLE EXPRESSEW III (5EA/PK)

## (undated) DEVICE — TOWELS CLOTH SURGICAL - (4/PK 20PK/CA)

## (undated) DEVICE — SUTURE 5-0 PROLENE C-1 D/A 24 (36PK/BX)"

## (undated) DEVICE — PACK LOWER EXTREMITY - (2/CA)

## (undated) DEVICE — PAD LAP STERILE 18 X 18 - (5/PK 40PK/CA)

## (undated) DEVICE — BLADE SURGICAL #15 - (50/BX 3BX/CA)

## (undated) DEVICE — SUTURE 6-0 PROLENE RB-2 D/A 30 (36PK/BX)"

## (undated) DEVICE — SUTURE 0 PDS CT-2 (36PK/BX)

## (undated) DEVICE — HEAD HOLDER JUNIOR/ADULT

## (undated) DEVICE — DRAPE IOBAN II INCISE 23X17 - (10EA/BX 4BX/CA)

## (undated) DEVICE — TUBE E-T HI-LO CUFF 8.0MM (10EA/PK)

## (undated) DEVICE — KIT ANESTHESIA W/CIRCUIT & 3/LT BAG W/FILTER (20EA/CA)

## (undated) DEVICE — SUTURE 2-0 PDS II CT-2 - (36/BX)

## (undated) DEVICE — GLOVE BIOGEL SZ 7 SURGICAL PF LTX - (50PR/BX 4BX/CA)

## (undated) DEVICE — BANDAGE ELASTIC 4 HONEYCOMB - 4"X5YD LF (20/CA)"

## (undated) DEVICE — LACTATED RINGERS INJ 1000 ML - (14EA/CA 60CA/PF)

## (undated) DEVICE — CORDS BIPOLAR COAGULATION - 12FT STERILE DISP. (10EA/BX)

## (undated) DEVICE — PACK MAJOR BASIN - (2EA/CA)

## (undated) DEVICE — SYRINGE STERILE 10 ML LL (200/BX)

## (undated) DEVICE — SPONGE GAUZESTER 4 X 4 4PLY - (128PK/CA)

## (undated) DEVICE — SODIUM CHL IRRIGATION 0.9% 1000ML (12EA/CA)

## (undated) DEVICE — SUTURE GENERAL

## (undated) DEVICE — DETERGENT RENUZYME PLUS 10 OZ PACKET (50/BX)

## (undated) DEVICE — DRAPETIBURON SHOULDER W/POUCH - (5EA/CA)

## (undated) DEVICE — COVER LIGHT HANDLE ALC PLUS DISP (18EA/BX)

## (undated) DEVICE — SLEEVE, VASO, THIGH, MED

## (undated) DEVICE — SET EXTENSION WITH 2 PORTS (48EA/CA) ***PART #2C8610 IS A SUBSTITUTE*****

## (undated) DEVICE — MASK ANESTHESIA ADULT  - (100/CA)

## (undated) DEVICE — SENSOR SPO2 NEO LNCS ADHESIVE (20/BX) SEE USER NOTES

## (undated) DEVICE — ELECTRODE DUAL RETURN W/ CORD - (50/PK)

## (undated) DEVICE — CLIP MED LG INTNL HRZN TI ESCP - (20/BX)

## (undated) DEVICE — PROTECTOR ULNA NERVE - (36PR/CA)

## (undated) DEVICE — TOWEL STOP TIMEOUT SAFETY FLAG (40EA/CA)

## (undated) DEVICE — SPONGE KITTNER DISSECTORS - (5EA/PK 50PK/CA)

## (undated) DEVICE — GLOVE BIOGEL INDICATOR SZ 8 SURGICAL PF LTX - (50/BX 4BX/CA)

## (undated) DEVICE — SUTURE 2-0 VICRYL PLUS CT-1 36 (36PK/BX)"

## (undated) DEVICE — SUTURE 2-0 MONOCRYL SH&UR-6 27 - (12/BX)

## (undated) DEVICE — SUTURE 3-0 VICRYL PLUS SH - 27 INCH (36/BX)

## (undated) DEVICE — BAG SPONGE COUNT 10.25 X 32 - BLUE (250/CA)

## (undated) DEVICE — SUTURE 7-0 PROLENE BV-1 D/A 24 (36PK/BX)"

## (undated) DEVICE — NEPTUNE 4 PORT MANIFOLD - (20/PK)

## (undated) DEVICE — CANNULA TWIST IN 8.25MM X 7CM (5/BX)

## (undated) DEVICE — CANNULA FULLY THREADED 8 X 75 (5EA/BX)

## (undated) DEVICE — SUTURE ETHILON 2-0 FSLX 30 (36PK/BX)"

## (undated) DEVICE — SPIDER SHOULDER HOLDER (12EA/BX)

## (undated) DEVICE — CHLORAPREP 26 ML APPLICATOR - ORANGE TINT(25/CA)

## (undated) DEVICE — SUTURE 3-0 ETHILON FS-1 - (36/BX) 30 INCH

## (undated) DEVICE — CANISTER SUCTION RIGID RED 1500CC (40EA/CA)

## (undated) DEVICE — ADHESIVE MASTISOL - (48/BX)

## (undated) DEVICE — TUBE CONNECTING SUCTION - CLEAR PLASTIC STERILE 72 IN (50EA/CA)

## (undated) DEVICE — SUTURE 7-0 PROLENE BV-1 D/A 30 (36PK/BX)"

## (undated) DEVICE — SUTURE 4-0 SILK 12 X 18 INCH - (36/BX)

## (undated) DEVICE — HUMID-VENT HEAT AND MOISTURE EXCHANGE- (50/BX)

## (undated) DEVICE — DECANTER FLD BLS - (50/CA)

## (undated) DEVICE — PAD PREP 24 X 48 CUFFED - (100/CA)

## (undated) DEVICE — PADDING CAST 6 IN STERILE - 6 X 4 YDS (24/CA)

## (undated) DEVICE — SODIUM CHL. IRRIGATION 0.9% 3000ML (4EA/CA 65CA/PF)

## (undated) DEVICE — SUTURE 5-0 PROLENE BV-1 HEMOSEAL (36PK/BX)

## (undated) DEVICE — KIT RADIAL ARTERY 20GA W/MAX BARRIER AND BIOPATCH  (5EA/CA) #10740 IS FOR THE SET RADIAL ARTERIAL

## (undated) DEVICE — TUBING CLEARLINK DUO-VENT - C-FLO (48EA/CA)

## (undated) DEVICE — VALVULOTOME LE MAITRE---MUST ORDER MINIMUM OF 10----

## (undated) DEVICE — SUTURE 2-0 SILK 12 X 18" (36PK/BX)"

## (undated) DEVICE — POLY UMBILICAL TAPE 1/8X30 - (36/BX)

## (undated) DEVICE — SUTURE 3-0 SILK 12 X 18 IN - (36/BX)

## (undated) DEVICE — KIT SURGIFLO W/OUT THROMBIN - (6EA/CA)

## (undated) DEVICE — SET LEADWIRE 5 LEAD BEDSIDE DISPOSABLE ECG (1SET OF 5/EA)

## (undated) DEVICE — CANISTER SUCTION 3000ML MECHANICAL FILTER AUTO SHUTOFF MEDI-VAC NONSTERILE LF DISP  (40EA/CA)

## (undated) DEVICE — TRAY, CV CATH MULTI-LUM.AK-25703

## (undated) DEVICE — WRAP CO-FLEX 4IN X 5YD STERIL - SELF-ADHERENT (18/CA)

## (undated) DEVICE — BLADE SURGICAL #11 - (50/BX)

## (undated) DEVICE — GOWN WARMING STANDARD FLEX - (30/CA)

## (undated) DEVICE — VESSELOOP MINI BLUE STERILE - SURG-I-LOOP (10EA/BX)

## (undated) DEVICE — DRAPE SURGICAL U 77X120 - (10/CA)

## (undated) DEVICE — ELECTRODE 850 FOAM ADHESIVE - HYDROGEL RADIOTRNSPRNT (50/PK)

## (undated) DEVICE — KIT ROOM DECONTAMINATION

## (undated) DEVICE — TUBING PATIENT W/CONNECTOR REDEUCE (1EA)

## (undated) DEVICE — PACK SHOULDER ARTHROSCOPY SM - (2EA/CA)

## (undated) DEVICE — CLIP SM INTNL HRZN TI ESCP LGT - (24EA/PK 25PK/BX)

## (undated) DEVICE — SUTURE 3-0 VICRYL PLUS CT-1 - 36 INCH (36/BX)

## (undated) DEVICE — INSTRUMENT JAWCOVER SUTURE - BOOTS (5PK/BX)

## (undated) DEVICE — SUTURE 4-0 MONOCRYL PLUS PS-1 - 27 INCH (36/BX)

## (undated) DEVICE — GLOVE BIOGEL INDICATOR SZ 7.5 SURGICAL PF LTX - (50PR/BX 4BX/CA)

## (undated) DEVICE — GLOVE BIOGEL PI INDICATOR SZ 6.5 SURGICAL PF LF - (50/BX 4BX/CA)

## (undated) DEVICE — TRANSDUCER ADULT DISP. SINGLE BONDED STERILE - (20EA/CA)

## (undated) DEVICE — HANDPIECE 10FT INTPLS SCT PLS IRRIGATION HAND CONTROL SET (6/PK)

## (undated) DEVICE — SUTURE 0 PDS CT-1 CR 8 X 18 (12PK/BX)"

## (undated) DEVICE — BANDAGE ELASTIC LATEX STERILE VELCRO 4 X 5 YDS (25EA/CA)

## (undated) DEVICE — PADDING CAST 4 IN STERILE - 4 X 4 YDS (24/CA)

## (undated) DEVICE — BLADE SURGICAL #10 - (50/BX)

## (undated) DEVICE — ABLATOR WAND SERFAS 90-S CRUISE

## (undated) DEVICE — TIP INTPLS HFLO ML ORFC BTRY - (12/CS)  FOR SURGILAV

## (undated) DEVICE — GELAQUASONIC 100 ULTRASOUND - 48/BX 20GM STERILE FOIL POUCH

## (undated) DEVICE — CLIP MED INTNL HRZN TI ESCP - (25/BX)

## (undated) DEVICE — GLOVE, LITE (PAIR)

## (undated) DEVICE — TUBING PUMP WITH CONNECTOR REDEUCE (1EA)

## (undated) DEVICE — BANDAGE ELASTIC STERILE VELCRO 6 X 5 YDS (25EA/CA)

## (undated) DEVICE — SUTURE CV

## (undated) DEVICE — SHAVER4.0 AGGRESSIVE + FORMLA (5EA/BX)

## (undated) DEVICE — SET FLUID WARMING STANDARD FLOW - (10/CA)

## (undated) DEVICE — DRAPE LAPAROTOMY T SHEET - (12EA/CA)

## (undated) DEVICE — POLYMER PLEDGETS, 3MM X 7MM

## (undated) DEVICE — SHEAR HS FOCUS 9CM CVD - (6/BX)

## (undated) DEVICE — SYRINGE NON SAFETY 10 CC 20 GA X 1-1/2 IN (100/BX 4BX/CA)

## (undated) DEVICE — SYSTEM PREVEAN CUSTOMIZABLE 90CM/150ML CANISTER INCIS

## (undated) DEVICE — GLOVE BIOGEL SZ 7.5 SURGICAL PF LTX - (50PR/BX 4BX/CA)

## (undated) DEVICE — SUCTION INSTRUMENT YANKAUER BULBOUS TIP W/O VENT (50EA/CA)

## (undated) DEVICE — STAPLER SKIN DISP - (6/BX 10BX/CA) VISISTAT

## (undated) DEVICE — CONTAINER SPECIMEN BAG OR - STERILE 4 OZ W/LID (100EA/CA)

## (undated) DEVICE — SUTURE 3-0 PROLENE PS-1 (12PK/BX)

## (undated) DEVICE — GOWN SURGEONS X-LARGE - DISP. (30/CA)

## (undated) DEVICE — PACK MINOR BASIN - (2EA/CA)

## (undated) DEVICE — WATER IRRIGATION STERILE 1000ML (12EA/CA)

## (undated) DEVICE — GLOVE BIOGEL SZ 8 SURGICAL PF LTX - (50PR/BX 4BX/CA)

## (undated) DEVICE — SYRINGE NON SAFETY TB 1 CC 27 GA X 1/2 IN (100/BX 8BX/CA)

## (undated) DEVICE — CLOSURE SKIN STRIP 1/2 X 4 IN - (STERI STRIP) (50/BX 4BX/CA)

## (undated) DEVICE — SHAVER 5.5 RESECTOR FORMULA (5EA/BX )

## (undated) DEVICE — BOVIE BLADE COATED - (50/PK)

## (undated) DEVICE — SUTURE 3-0 MONOCRYL PLUS PS-1 - 27 INCH (36/BX)

## (undated) DEVICE — SUTURE 6-0 PROLENE BV-1 D/A 30 (36PK/BX)"

## (undated) DEVICE — SODIUM CHL. INJ. 0.9% 500ML (24EA/CA 50CA/PF)

## (undated) DEVICE — GLOVE SZ 6 BIOGEL PI MICRO - PF LF (50PR/BX 4BX/CA)